# Patient Record
Sex: MALE | Race: WHITE | Employment: OTHER | URBAN - METROPOLITAN AREA
[De-identification: names, ages, dates, MRNs, and addresses within clinical notes are randomized per-mention and may not be internally consistent; named-entity substitution may affect disease eponyms.]

---

## 2017-11-17 PROBLEM — F31.9 BIPOLAR 1 DISORDER (HCC): Status: ACTIVE | Noted: 2017-11-17

## 2017-11-18 PROBLEM — K50.90 CROHN DISEASE (HCC): Status: ACTIVE | Noted: 2017-11-18

## 2017-11-18 PROBLEM — K21.9 GERD (GASTROESOPHAGEAL REFLUX DISEASE): Status: ACTIVE | Noted: 2017-11-18

## 2017-11-19 PROBLEM — F31.9 BIPOLAR 1 DISORDER (HCC): Status: ACTIVE | Noted: 2017-11-19

## 2018-05-25 ENCOUNTER — HOSPITAL ENCOUNTER (OUTPATIENT)
Age: 53
Discharge: HOME | End: 2018-05-27
Payer: MEDICARE

## 2018-05-25 ENCOUNTER — HOSPITAL ENCOUNTER (OUTPATIENT)
Age: 53
Discharge: HOME | End: 2018-05-25
Payer: MEDICARE

## 2018-05-25 ENCOUNTER — HOSPITAL ENCOUNTER (OUTPATIENT)
Dept: GENERAL RADIOLOGY | Age: 53
Discharge: HOME | End: 2018-05-27
Payer: MEDICARE

## 2018-05-25 DIAGNOSIS — R52 PAIN: ICD-10-CM

## 2018-05-25 LAB — VALPROIC ACID LEVEL: 27 MCG/ML (ref 50–100)

## 2018-05-25 PROCEDURE — 71046 X-RAY EXAM CHEST 2 VIEWS: CPT

## 2018-05-25 PROCEDURE — 80164 ASSAY DIPROPYLACETIC ACD TOT: CPT

## 2018-05-25 PROCEDURE — 36415 COLL VENOUS BLD VENIPUNCTURE: CPT

## 2018-05-27 ENCOUNTER — HOSPITAL ENCOUNTER (EMERGENCY)
Age: 53
Discharge: PSYCHIATRIC OR SUBSTANCE USE FACILITY | End: 2018-05-28
Attending: EMERGENCY MEDICINE
Payer: MEDICARE

## 2018-05-27 ENCOUNTER — APPOINTMENT (OUTPATIENT)
Dept: GENERAL RADIOLOGY | Age: 53
End: 2018-05-27
Payer: MEDICARE

## 2018-05-27 DIAGNOSIS — R45.851 SUICIDAL IDEATIONS: ICD-10-CM

## 2018-05-27 DIAGNOSIS — F23 ACUTE PSYCHOSIS (HCC): Primary | ICD-10-CM

## 2018-05-27 DIAGNOSIS — R45.850 HOMICIDAL THOUGHTS: ICD-10-CM

## 2018-05-27 LAB
ACETAMINOPHEN LEVEL: <5 MCG/ML (ref 10–30)
ALBUMIN SERPL-MCNC: 3.6 G/DL (ref 3.5–5.2)
ALP BLD-CCNC: 133 U/L (ref 40–129)
ALT SERPL-CCNC: 13 U/L (ref 0–40)
AMORPHOUS: ABNORMAL
AMPHETAMINE SCREEN, URINE: POSITIVE
ANION GAP SERPL CALCULATED.3IONS-SCNC: 10 MMOL/L (ref 7–16)
AST SERPL-CCNC: 11 U/L (ref 0–39)
BACTERIA: ABNORMAL /HPF
BARBITURATE SCREEN URINE: NOT DETECTED
BASOPHILS ABSOLUTE: 0.07 E9/L (ref 0–0.2)
BASOPHILS RELATIVE PERCENT: 0.5 % (ref 0–2)
BENZODIAZEPINE SCREEN, URINE: POSITIVE
BILIRUB SERPL-MCNC: 0.3 MG/DL (ref 0–1.2)
BILIRUBIN URINE: NEGATIVE
BLOOD, URINE: ABNORMAL
BUN BLDV-MCNC: 23 MG/DL (ref 6–20)
CALCIUM SERPL-MCNC: 9.1 MG/DL (ref 8.6–10.2)
CANNABINOID SCREEN URINE: NOT DETECTED
CHLORIDE BLD-SCNC: 101 MMOL/L (ref 98–107)
CLARITY: CLEAR
CO2: 30 MMOL/L (ref 22–29)
COCAINE METABOLITE SCREEN URINE: NOT DETECTED
COLOR: YELLOW
CREAT SERPL-MCNC: 1.4 MG/DL (ref 0.7–1.2)
EKG ATRIAL RATE: 84 BPM
EKG P AXIS: 59 DEGREES
EKG P-R INTERVAL: 140 MS
EKG Q-T INTERVAL: 372 MS
EKG QRS DURATION: 86 MS
EKG QTC CALCULATION (BAZETT): 439 MS
EKG R AXIS: 48 DEGREES
EKG T AXIS: 53 DEGREES
EKG VENTRICULAR RATE: 84 BPM
EOSINOPHILS ABSOLUTE: 0.16 E9/L (ref 0.05–0.5)
EOSINOPHILS RELATIVE PERCENT: 1.1 % (ref 0–6)
ETHANOL: <10 MG/DL (ref 0–0.08)
GFR AFRICAN AMERICAN: >60
GFR NON-AFRICAN AMERICAN: 53 ML/MIN/1.73
GLUCOSE BLD-MCNC: 109 MG/DL (ref 74–109)
GLUCOSE URINE: NEGATIVE MG/DL
HCT VFR BLD CALC: 40 % (ref 37–54)
HEMOGLOBIN: 12.8 G/DL (ref 12.5–16.5)
IMMATURE GRANULOCYTES #: 0.12 E9/L
IMMATURE GRANULOCYTES %: 0.8 % (ref 0–5)
KETONES, URINE: NEGATIVE MG/DL
LEUKOCYTE ESTERASE, URINE: NEGATIVE
LYMPHOCYTES ABSOLUTE: 2.51 E9/L (ref 1.5–4)
LYMPHOCYTES RELATIVE PERCENT: 17.6 % (ref 20–42)
MCH RBC QN AUTO: 30.1 PG (ref 26–35)
MCHC RBC AUTO-ENTMCNC: 32 % (ref 32–34.5)
MCV RBC AUTO: 94.1 FL (ref 80–99.9)
METHADONE SCREEN, URINE: NOT DETECTED
MONOCYTES ABSOLUTE: 1.21 E9/L (ref 0.1–0.95)
MONOCYTES RELATIVE PERCENT: 8.5 % (ref 2–12)
NEUTROPHILS ABSOLUTE: 10.2 E9/L (ref 1.8–7.3)
NEUTROPHILS RELATIVE PERCENT: 71.5 % (ref 43–80)
NITRITE, URINE: NEGATIVE
OPIATE SCREEN URINE: NOT DETECTED
PDW BLD-RTO: 16.1 FL (ref 11.5–15)
PH UA: 7 (ref 5–9)
PHENCYCLIDINE SCREEN URINE: NOT DETECTED
PLATELET # BLD: 277 E9/L (ref 130–450)
PMV BLD AUTO: 8.6 FL (ref 7–12)
POTASSIUM SERPL-SCNC: 4.1 MMOL/L (ref 3.5–5)
PROPOXYPHENE SCREEN: NOT DETECTED
PROTEIN UA: NEGATIVE MG/DL
RBC # BLD: 4.25 E12/L (ref 3.8–5.8)
RBC UA: ABNORMAL /HPF (ref 0–2)
SALICYLATE, SERUM: <0.3 MG/DL (ref 0–30)
SODIUM BLD-SCNC: 141 MMOL/L (ref 132–146)
SPECIFIC GRAVITY UA: 1.01 (ref 1–1.03)
TOTAL PROTEIN: 6.3 G/DL (ref 6.4–8.3)
TRICYCLIC ANTIDEPRESSANTS SCREEN SERUM: POSITIVE NG/ML
UROBILINOGEN, URINE: 0.2 E.U./DL
VALPROIC ACID LEVEL: 32 MCG/ML (ref 50–100)
WBC # BLD: 14.3 E9/L (ref 4.5–11.5)
WBC UA: ABNORMAL /HPF (ref 0–5)

## 2018-05-27 PROCEDURE — 6370000000 HC RX 637 (ALT 250 FOR IP): Performed by: EMERGENCY MEDICINE

## 2018-05-27 PROCEDURE — 36415 COLL VENOUS BLD VENIPUNCTURE: CPT

## 2018-05-27 PROCEDURE — 80307 DRUG TEST PRSMV CHEM ANLYZR: CPT

## 2018-05-27 PROCEDURE — 85025 COMPLETE CBC W/AUTO DIFF WBC: CPT

## 2018-05-27 PROCEDURE — 71046 X-RAY EXAM CHEST 2 VIEWS: CPT

## 2018-05-27 PROCEDURE — 93005 ELECTROCARDIOGRAM TRACING: CPT | Performed by: EMERGENCY MEDICINE

## 2018-05-27 PROCEDURE — 80164 ASSAY DIPROPYLACETIC ACD TOT: CPT

## 2018-05-27 PROCEDURE — G0480 DRUG TEST DEF 1-7 CLASSES: HCPCS

## 2018-05-27 PROCEDURE — 87088 URINE BACTERIA CULTURE: CPT

## 2018-05-27 PROCEDURE — 99285 EMERGENCY DEPT VISIT HI MDM: CPT

## 2018-05-27 PROCEDURE — 80053 COMPREHEN METABOLIC PANEL: CPT

## 2018-05-27 PROCEDURE — 81001 URINALYSIS AUTO W/SCOPE: CPT

## 2018-05-27 RX ORDER — NICOTINE 21 MG/24HR
1 PATCH, TRANSDERMAL 24 HOURS TRANSDERMAL DAILY
Status: DISCONTINUED | OUTPATIENT
Start: 2018-05-27 | End: 2018-05-27

## 2018-05-27 RX ORDER — ALPRAZOLAM 1 MG/1
1 TABLET ORAL ONCE
Status: COMPLETED | OUTPATIENT
Start: 2018-05-27 | End: 2018-05-27

## 2018-05-27 RX ADMIN — ALPRAZOLAM 1 MG: 1 TABLET ORAL at 17:34

## 2018-05-28 VITALS
WEIGHT: 280 LBS | BODY MASS INDEX: 34.82 KG/M2 | SYSTOLIC BLOOD PRESSURE: 121 MMHG | OXYGEN SATURATION: 98 % | DIASTOLIC BLOOD PRESSURE: 73 MMHG | HEIGHT: 75 IN | HEART RATE: 108 BPM | TEMPERATURE: 97.7 F | RESPIRATION RATE: 18 BRPM

## 2018-05-28 LAB — URINE CULTURE, ROUTINE: NORMAL

## 2018-06-01 LAB
7-AMINOCLONAZEPAM, URINE: <5 NG/ML
ALPHA-HYDROXYALPRAZOLAM, URINE: 578 NG/ML
ALPHA-HYDROXYMIDAZOLAM, URINE: <20 NG/ML
ALPRAZOLAM, URINE: 75 NG/ML
AMPHETAMINES, URINE: >5000 NG/ML
CHLORDIAZEPOXIDE, URINE: <20 NG/ML
CLONAZEPAM, URINE: <5 NG/ML
DIAZEPAM, URINE: <20 NG/ML
LORAZEPAM, URINE: <20 NG/ML
METHAMPHETAMINE, URINE: <200 NG/ML
METHYLENEDIOXYAMPHETAMINE, UR: <200 NG/ML
METHYLENEDIOXYETHYLAMPHETAMINE, UR: <200 NG/ML
METHYLENEDIOXYMETHAMPHETAMINE, UR: <200 NG/ML
MIDAZOLAM, URINE: <20 NG/ML
NORDIAZEPAM, URINE: 1549 NG/ML
OXAZEPAM, URINE: 2948 NG/ML
TEMAZEPAM, URINE: >4000 NG/ML

## 2018-06-18 ENCOUNTER — HOSPITAL ENCOUNTER (OUTPATIENT)
Age: 53
Discharge: HOME | End: 2018-06-18
Payer: MEDICARE

## 2018-06-27 ENCOUNTER — HOSPITAL ENCOUNTER (OUTPATIENT)
Age: 53
Discharge: HOME | End: 2018-06-27
Payer: MEDICARE

## 2018-06-27 LAB
ALBUMIN SERPL-MCNC: 3.9 G/DL (ref 3.5–5.2)
ALP BLD-CCNC: 121 U/L (ref 40–129)
ALT SERPL-CCNC: 13 U/L (ref 0–40)
ANION GAP SERPL CALCULATED.3IONS-SCNC: 11 MMOL/L (ref 7–16)
AST SERPL-CCNC: 11 U/L (ref 0–39)
BASOPHILS ABSOLUTE: 0.08 E9/L (ref 0–0.2)
BASOPHILS RELATIVE PERCENT: 0.7 % (ref 0–2)
BILIRUB SERPL-MCNC: 0.3 MG/DL (ref 0–1.2)
BILIRUBIN DIRECT: <0.2 MG/DL (ref 0–0.3)
BILIRUBIN, INDIRECT: NORMAL MG/DL (ref 0–1)
BUN BLDV-MCNC: 24 MG/DL (ref 6–20)
CALCIUM SERPL-MCNC: 9.6 MG/DL (ref 8.6–10.2)
CHLORIDE BLD-SCNC: 99 MMOL/L (ref 98–107)
CHOLESTEROL, FASTING: 292 MG/DL (ref 0–199)
CO2: 29 MMOL/L (ref 22–29)
CREAT SERPL-MCNC: 1.3 MG/DL (ref 0.7–1.2)
EOSINOPHILS ABSOLUTE: 0.61 E9/L (ref 0.05–0.5)
EOSINOPHILS RELATIVE PERCENT: 5.5 % (ref 0–6)
GFR AFRICAN AMERICAN: >60
GFR NON-AFRICAN AMERICAN: 58 ML/MIN/1.73
GLUCOSE FASTING: 89 MG/DL (ref 74–109)
HCT VFR BLD CALC: 47.3 % (ref 37–54)
HDLC SERPL-MCNC: 67 MG/DL
HEMOGLOBIN: 15.3 G/DL (ref 12.5–16.5)
IMMATURE GRANULOCYTES #: 0.07 E9/L
IMMATURE GRANULOCYTES %: 0.6 % (ref 0–5)
LDL CHOLESTEROL CALCULATED: 167 MG/DL (ref 0–99)
LITHIUM DOSE AMOUNT: ABNORMAL
LITHIUM LEVEL: <0.1 MMOL/L (ref 0.5–1.5)
LYMPHOCYTES ABSOLUTE: 1.73 E9/L (ref 1.5–4)
LYMPHOCYTES RELATIVE PERCENT: 15.6 % (ref 20–42)
MCH RBC QN AUTO: 30.1 PG (ref 26–35)
MCHC RBC AUTO-ENTMCNC: 32.3 % (ref 32–34.5)
MCV RBC AUTO: 93.1 FL (ref 80–99.9)
MONOCYTES ABSOLUTE: 0.68 E9/L (ref 0.1–0.95)
MONOCYTES RELATIVE PERCENT: 6.1 % (ref 2–12)
NEUTROPHILS ABSOLUTE: 7.89 E9/L (ref 1.8–7.3)
NEUTROPHILS RELATIVE PERCENT: 71.5 % (ref 43–80)
PDW BLD-RTO: 13.7 FL (ref 11.5–15)
PLATELET # BLD: 280 E9/L (ref 130–450)
PMV BLD AUTO: 8.7 FL (ref 7–12)
POTASSIUM SERPL-SCNC: 4.3 MMOL/L (ref 3.5–5)
RBC # BLD: 5.08 E12/L (ref 3.8–5.8)
SODIUM BLD-SCNC: 139 MMOL/L (ref 132–146)
T3 TOTAL: 99.06 NG/DL (ref 80–200)
T4 FREE: 1.25 NG/DL (ref 0.93–1.7)
T4 TOTAL: 7.2 MCG/DL (ref 4.5–11.7)
TOTAL PROTEIN: 6.8 G/DL (ref 6.4–8.3)
TRIGLYCERIDE, FASTING: 288 MG/DL (ref 0–149)
TSH SERPL DL<=0.05 MIU/L-ACNC: 3.31 UIU/ML (ref 0.27–4.2)
VLDLC SERPL CALC-MCNC: 58 MG/DL
WBC # BLD: 11.1 E9/L (ref 4.5–11.5)

## 2018-06-27 PROCEDURE — 80061 LIPID PANEL: CPT

## 2018-06-27 PROCEDURE — 84443 ASSAY THYROID STIM HORMONE: CPT

## 2018-06-27 PROCEDURE — 36415 COLL VENOUS BLD VENIPUNCTURE: CPT

## 2018-06-27 PROCEDURE — 80076 HEPATIC FUNCTION PANEL: CPT

## 2018-06-27 PROCEDURE — 84480 ASSAY TRIIODOTHYRONINE (T3): CPT

## 2018-06-27 PROCEDURE — 85025 COMPLETE CBC W/AUTO DIFF WBC: CPT

## 2018-06-27 PROCEDURE — 80178 ASSAY OF LITHIUM: CPT

## 2018-06-27 PROCEDURE — 80053 COMPREHEN METABOLIC PANEL: CPT

## 2018-06-27 PROCEDURE — 84439 ASSAY OF FREE THYROXINE: CPT

## 2018-07-19 ENCOUNTER — HOSPITAL ENCOUNTER (EMERGENCY)
Age: 53
Discharge: PSYCHIATRIC OR SUBSTANCE USE FACILITY | End: 2018-07-20
Attending: EMERGENCY MEDICINE
Payer: MEDICARE

## 2018-07-19 DIAGNOSIS — F39 MOOD DISORDER (HCC): Primary | ICD-10-CM

## 2018-07-19 LAB
ACETAMINOPHEN LEVEL: <5 MCG/ML (ref 10–30)
AMPHETAMINE SCREEN, URINE: POSITIVE
ANION GAP SERPL CALCULATED.3IONS-SCNC: 12 MMOL/L (ref 7–16)
BARBITURATE SCREEN URINE: NOT DETECTED
BENZODIAZEPINE SCREEN, URINE: POSITIVE
BUN BLDV-MCNC: 21 MG/DL (ref 6–20)
CALCIUM SERPL-MCNC: 8.8 MG/DL (ref 8.6–10.2)
CANNABINOID SCREEN URINE: NOT DETECTED
CHLORIDE BLD-SCNC: 101 MMOL/L (ref 98–107)
CO2: 26 MMOL/L (ref 22–29)
COCAINE METABOLITE SCREEN URINE: NOT DETECTED
CREAT SERPL-MCNC: 1.5 MG/DL (ref 0.7–1.2)
ETHANOL: <10 MG/DL (ref 0–0.08)
GFR AFRICAN AMERICAN: 59
GFR NON-AFRICAN AMERICAN: 49 ML/MIN/1.73
GLUCOSE BLD-MCNC: 115 MG/DL (ref 74–109)
HCT VFR BLD CALC: 37.8 % (ref 37–54)
HEMOGLOBIN: 12.7 G/DL (ref 12.5–16.5)
MCH RBC QN AUTO: 31 PG (ref 26–35)
MCHC RBC AUTO-ENTMCNC: 33.6 % (ref 32–34.5)
MCV RBC AUTO: 92.2 FL (ref 80–99.9)
METHADONE SCREEN, URINE: NOT DETECTED
OPIATE SCREEN URINE: NOT DETECTED
PDW BLD-RTO: 12.9 FL (ref 11.5–15)
PHENCYCLIDINE SCREEN URINE: NOT DETECTED
PLATELET # BLD: 242 E9/L (ref 130–450)
PMV BLD AUTO: 8.9 FL (ref 7–12)
POTASSIUM SERPL-SCNC: 3.4 MMOL/L (ref 3.5–5)
PROPOXYPHENE SCREEN: NOT DETECTED
RBC # BLD: 4.1 E12/L (ref 3.8–5.8)
SALICYLATE, SERUM: <0.3 MG/DL (ref 0–30)
SODIUM BLD-SCNC: 139 MMOL/L (ref 132–146)
TRICYCLIC ANTIDEPRESSANTS SCREEN SERUM: NEGATIVE NG/ML
WBC # BLD: 10.2 E9/L (ref 4.5–11.5)

## 2018-07-19 PROCEDURE — 80307 DRUG TEST PRSMV CHEM ANLYZR: CPT

## 2018-07-19 PROCEDURE — G0480 DRUG TEST DEF 1-7 CLASSES: HCPCS

## 2018-07-19 PROCEDURE — 80048 BASIC METABOLIC PNL TOTAL CA: CPT

## 2018-07-19 PROCEDURE — 36415 COLL VENOUS BLD VENIPUNCTURE: CPT

## 2018-07-19 PROCEDURE — 99285 EMERGENCY DEPT VISIT HI MDM: CPT

## 2018-07-19 PROCEDURE — 85027 COMPLETE CBC AUTOMATED: CPT

## 2018-07-19 NOTE — ED PROVIDER NOTES
EXAM--------------------------------------    Constitutional/General: Alert and oriented x3, well appearing, non toxic in NAD  Head: Normocephalic and atraumatic  Eyes: PERRL, EOMI. Mouth: Oropharynx clear, mucous membranes moist.   Neck: Supple. Full ROM. Respiratory: Lungs clear to auscultation bilaterally, no wheezes, rales, or rhonchi. Not in respiratory distress   Cardiovascular:  Regular rate. Regular rhythm. No murmurs, gallops, or rubs. 2+ distal pulses  GI:  Abdomen Soft, Non tender, Non distended. No rebound, guarding, or rigidity. Musculoskeletal: Moves all extremities x 4. Warm and well perfused. Integument: skin warm and dry. No rashes. Neurologic: GCS 15, 5/5 strength. Psychiatric: denies suicidal or homicidal ideation.   -------------------------------------------------- RESULTS -------------------------------------------------  I have personally reviewed all laboratory and imaging results for this patient. Results are listed below.      LABS:  Results for orders placed or performed during the hospital encounter of 07/19/18   CBC   Result Value Ref Range    WBC 10.2 4.5 - 11.5 E9/L    RBC 4.10 3.80 - 5.80 E12/L    Hemoglobin 12.7 12.5 - 16.5 g/dL    Hematocrit 37.8 37.0 - 54.0 %    MCV 92.2 80.0 - 99.9 fL    MCH 31.0 26.0 - 35.0 pg    MCHC 33.6 32.0 - 34.5 %    RDW 12.9 11.5 - 15.0 fL    Platelets 990 854 - 646 E9/L    MPV 8.9 7.0 - 12.0 fL   Basic Metabolic Panel   Result Value Ref Range    Sodium 139 132 - 146 mmol/L    Potassium 3.4 (L) 3.5 - 5.0 mmol/L    Chloride 101 98 - 107 mmol/L    CO2 26 22 - 29 mmol/L    Anion Gap 12 7 - 16 mmol/L    Glucose 115 (H) 74 - 109 mg/dL    BUN 21 (H) 6 - 20 mg/dL    CREATININE 1.5 (H) 0.7 - 1.2 mg/dL    GFR Non-African American 49 >=60 mL/min/1.73    GFR African American 59     Calcium 8.8 8.6 - 10.2 mg/dL   Urine Drug Screen   Result Value Ref Range    Amphetamine Screen, Urine POSITIVE (A) Negative <1000 ng/mL    Barbiturate Screen, Ur NOT DETECTED

## 2018-07-20 VITALS
RESPIRATION RATE: 20 BRPM | BODY MASS INDEX: 24.49 KG/M2 | HEIGHT: 75 IN | OXYGEN SATURATION: 98 % | TEMPERATURE: 98.3 F | DIASTOLIC BLOOD PRESSURE: 85 MMHG | HEART RATE: 84 BPM | WEIGHT: 197 LBS | SYSTOLIC BLOOD PRESSURE: 119 MMHG

## 2018-07-20 LAB
EKG ATRIAL RATE: 78 BPM
EKG P AXIS: 63 DEGREES
EKG P-R INTERVAL: 152 MS
EKG Q-T INTERVAL: 418 MS
EKG QRS DURATION: 90 MS
EKG QTC CALCULATION (BAZETT): 476 MS
EKG R AXIS: 48 DEGREES
EKG T AXIS: 58 DEGREES
EKG VENTRICULAR RATE: 78 BPM

## 2018-07-20 NOTE — ED NOTES
Peg from [facility] reports:    Accepted at [facility] by Dr. Dustin Calderon. [facility], room 501-A  Report already called. [facility] setting up transport.         Jeffrey Murry RN  07/20/18 5428

## 2018-07-20 NOTE — ED NOTES
Verbal consent given to talk to patient's wife. Wife, Efren Kennedy reports she has POA, paperwork requested. Verbalized understanding. Patient's wife reports that the patient got angry today and decided he was leaving and stated he was going to [facility]. Wife reports that when he says this, she feels he means he is going there to kill himself. While he was out driving, she called the  and the  found him when he got back to the house. She was worried he was going to kill himself or kill someone else just by driving. She reports he took his home medications today with Tylenol and she is unsure if this caused what happened. She also reports that he had recent medication changes and today was the first day of one of the changes. He does not have access to his medications, his wife reports they are locked up and she gives them to him daily. (Per physician note, \"wife stated that the patient has been over medicating\") She is unsure of all the medication names and dosages. Pharmacy closed at this time. She reports he treats at [facility] and sees the NP Cipriano Govea. Patient does sometimes slur his words, per wife. She believes he is hallucinating, did not further explain how or why. Does not appear to be internally stimulated at this time. Wife reports that he does have tremors at times and does not like to drink/eat when people are watching because he is embarrassed. Has been incontinent of urine/stool for about 1 year and wears a brief 24/7, but they have not been able to find out why. She reports he cleans himself up usually, but this nurse did have to clean up patient of urine incontinence. Unable to obtain urine sample at this time d/t recent incontinence. Oriented to person and place at this time. He is sometimes cooperative with staff and responds to direction and other times requires multiple redirection and seems as though he does not remember instructions he was given.    Patient does get anger outbursts but does calm down with verbal redirection. Per medical record: history of depression, suicidal thoughts, multiple suicide attempts, multiple overdoses, hx of attempting to cut his neck and wrists requiring stitches, this occurred 11/17 and attempted to kill self by turning on the gas in his home approx 3/17. History of schizophrenia, MDD, bipolar 1, anxiety, ADD, and depression. Wife reports a history of alcohol abuse (sober 3 years) and drug abuse about 28 years ago \"anything\". Patient does have a penile implant that is \"off\" and cannot get MRIs. She requests we do not send to [facility] because when he was sent there, he was discontinued off all medications except one and patient did not do well, per wife.          Bridgett Cheung RN  07/19/18 1460

## 2018-07-20 NOTE — ED NOTES
Attempted to speak to the pt. He woke up momentarily and then fell back to sleep.      [facility]  07/19/18 2128

## 2018-07-20 NOTE — ED NOTES
Pt referred to the [facility].      Ludwin Rivers, [facility]  07/19/18 5392

## 2018-07-22 LAB
7-AMINOCLONAZEPAM, URINE: <5 NG/ML
ALPHA-HYDROXYALPRAZOLAM, URINE: >1000 NG/ML
ALPHA-HYDROXYMIDAZOLAM, URINE: <20 NG/ML
ALPRAZOLAM, URINE: 223 NG/ML
CHLORDIAZEPOXIDE, URINE: <20 NG/ML
CLONAZEPAM, URINE: <5 NG/ML
DIAZEPAM, URINE: <20 NG/ML
LORAZEPAM, URINE: <20 NG/ML
MIDAZOLAM, URINE: <20 NG/ML
NORDIAZEPAM, URINE: 52 NG/ML
OXAZEPAM, URINE: 629 NG/ML
TEMAZEPAM, URINE: 325 NG/ML

## 2018-07-23 LAB
AMPHETAMINES, URINE: >5000 NG/ML
METHAMPHETAMINE, URINE: <200 NG/ML
METHYLENEDIOXYAMPHETAMINE, UR: <200 NG/ML
METHYLENEDIOXYETHYLAMPHETAMINE, UR: <200 NG/ML
METHYLENEDIOXYMETHAMPHETAMINE, UR: <200 NG/ML

## 2018-08-06 ENCOUNTER — HOSPITAL ENCOUNTER (OUTPATIENT)
Age: 53
Discharge: HOME | End: 2018-08-06
Payer: MEDICARE

## 2018-08-06 PROCEDURE — 80299 QUANTITATIVE ASSAY DRUG: CPT

## 2018-08-08 LAB
CLOMIP & MET, TOTAL: 186 NG/ML (ref 220–500)
CLOMIPRAMINE (ANAFRANIL) LEVEL: 113 NG/ML
DESEMETHYLCLOMIPRAMINE: 73 NG/ML

## 2018-09-04 ENCOUNTER — HOSPITAL ENCOUNTER (EMERGENCY)
Age: 53
Discharge: HOME | End: 2018-09-04
Attending: EMERGENCY MEDICINE
Payer: MEDICARE

## 2018-09-04 VITALS
TEMPERATURE: 98.3 F | BODY MASS INDEX: 22.38 KG/M2 | OXYGEN SATURATION: 95 % | HEART RATE: 110 BPM | DIASTOLIC BLOOD PRESSURE: 81 MMHG | RESPIRATION RATE: 16 BRPM | SYSTOLIC BLOOD PRESSURE: 137 MMHG | HEIGHT: 75 IN | WEIGHT: 180 LBS

## 2018-09-04 DIAGNOSIS — B02.9 HERPES ZOSTER WITHOUT COMPLICATION: Primary | ICD-10-CM

## 2018-09-04 DIAGNOSIS — S05.02XA ABRASION OF LEFT CORNEA, INITIAL ENCOUNTER: ICD-10-CM

## 2018-09-04 PROCEDURE — 99282 EMERGENCY DEPT VISIT SF MDM: CPT

## 2018-09-04 PROCEDURE — 6370000000 HC RX 637 (ALT 250 FOR IP): Performed by: EMERGENCY MEDICINE

## 2018-09-04 PROCEDURE — 6370000000 HC RX 637 (ALT 250 FOR IP)

## 2018-09-04 RX ORDER — ERYTHROMYCIN 5 MG/G
OINTMENT OPHTHALMIC NIGHTLY
Status: DISCONTINUED | OUTPATIENT
Start: 2018-09-04 | End: 2018-09-04

## 2018-09-04 RX ORDER — HYDROCODONE BITARTRATE AND ACETAMINOPHEN 5; 325 MG/1; MG/1
1 TABLET ORAL EVERY 6 HOURS PRN
Qty: 12 TABLET | Refills: 0 | Status: SHIPPED | OUTPATIENT
Start: 2018-09-04 | End: 2018-09-07

## 2018-09-04 RX ORDER — PREDNISONE 20 MG/1
80 TABLET ORAL DAILY
Qty: 20 TABLET | Refills: 0 | Status: SHIPPED | OUTPATIENT
Start: 2018-09-04 | End: 2018-09-09

## 2018-09-04 RX ORDER — TETRACAINE HYDROCHLORIDE 5 MG/ML
1 SOLUTION OPHTHALMIC ONCE
Status: COMPLETED | OUTPATIENT
Start: 2018-09-04 | End: 2018-09-04

## 2018-09-04 RX ORDER — ERYTHROMYCIN 5 MG/G
OINTMENT OPHTHALMIC ONCE
Status: COMPLETED | OUTPATIENT
Start: 2018-09-04 | End: 2018-09-04

## 2018-09-04 RX ORDER — ACYCLOVIR 200 MG/1
800 CAPSULE ORAL ONCE
Status: COMPLETED | OUTPATIENT
Start: 2018-09-04 | End: 2018-09-04

## 2018-09-04 RX ORDER — VALACYCLOVIR HYDROCHLORIDE 1 G/1
1000 TABLET, FILM COATED ORAL 3 TIMES DAILY
Qty: 14 TABLET | Refills: 0 | Status: SHIPPED | OUTPATIENT
Start: 2018-09-04 | End: 2018-09-11

## 2018-09-04 RX ORDER — PREDNISONE 20 MG/1
80 TABLET ORAL ONCE
Status: COMPLETED | OUTPATIENT
Start: 2018-09-04 | End: 2018-09-04

## 2018-09-04 RX ADMIN — TETRACAINE HYDROCHLORIDE 1 DROP: 5 SOLUTION OPHTHALMIC at 06:19

## 2018-09-04 RX ADMIN — FLUORESCEIN SODIUM 1 EACH: 0.6 STRIP OPHTHALMIC at 06:19

## 2018-09-04 RX ADMIN — PREDNISONE 80 MG: 20 TABLET ORAL at 06:50

## 2018-09-04 RX ADMIN — ACYCLOVIR 800 MG: 200 CAPSULE ORAL at 06:50

## 2018-09-04 RX ADMIN — ERYTHROMYCIN: 5 OINTMENT OPHTHALMIC at 06:50

## 2018-09-04 ASSESSMENT — PAIN SCALES - GENERAL: PAINLEVEL_OUTOF10: 10

## 2018-09-04 ASSESSMENT — ENCOUNTER SYMPTOMS
EYE ITCHING: 1
EYE DISCHARGE: 0
BACK PAIN: 0
DIARRHEA: 0
WHEEZING: 0
EYE PAIN: 0
PHOTOPHOBIA: 0
ABDOMINAL PAIN: 0
BLOOD IN STOOL: 0
FACIAL SWELLING: 0
SORE THROAT: 0
VOMITING: 0
SHORTNESS OF BREATH: 0
SINUS PRESSURE: 0
THROAT SWELLING: 0
NAUSEA: 0
CONSTIPATION: 0
EYE REDNESS: 0
COUGH: 0

## 2018-09-04 ASSESSMENT — PAIN DESCRIPTION - LOCATION: LOCATION: FACE

## 2018-09-04 ASSESSMENT — PAIN DESCRIPTION - PAIN TYPE: TYPE: ACUTE PAIN

## 2018-09-04 ASSESSMENT — PAIN DESCRIPTION - ORIENTATION: ORIENTATION: LEFT

## 2018-09-04 ASSESSMENT — PAIN DESCRIPTION - DESCRIPTORS: DESCRIPTORS: BURNING

## 2018-09-04 NOTE — ED PROVIDER NOTES
lesion is not vesicular in nature. The rash seems to involve to C2 and C3 branches from the ear to the lower neck on the left. My plan: Symptomatic and supportive care. Antiviral and steroids and pain management. Ophthalmology follow up.      Electronically signed by Serene Garnica DO on 9/4/18 at 6:31 AM        [TG]      ED Course User Index  [TG] Serene Garnica [address]  Resident  09/04/18 5916

## 2018-10-03 ENCOUNTER — HOSPITAL ENCOUNTER (INPATIENT)
Age: 53
LOS: 3 days | Discharge: OTHER INSTITUTION | DRG: 917 | End: 2018-10-07
Attending: EMERGENCY MEDICINE | Admitting: FAMILY MEDICINE
Payer: MEDICARE

## 2018-10-03 DIAGNOSIS — T40.2X1A OPIOID OVERDOSE, ACCIDENTAL OR UNINTENTIONAL, INITIAL ENCOUNTER (HCC): ICD-10-CM

## 2018-10-03 DIAGNOSIS — N17.9 ACUTE RENAL FAILURE, UNSPECIFIED ACUTE RENAL FAILURE TYPE (HCC): Primary | ICD-10-CM

## 2018-10-03 LAB
ACETAMINOPHEN LEVEL: 7.3 MCG/ML (ref 10–30)
ALBUMIN SERPL-MCNC: 3.4 G/DL (ref 3.5–5.2)
ALP BLD-CCNC: 80 U/L (ref 40–129)
ALT SERPL-CCNC: 8 U/L (ref 0–40)
AMPHETAMINE SCREEN, URINE: POSITIVE
ANION GAP SERPL CALCULATED.3IONS-SCNC: 9 MMOL/L (ref 7–16)
AST SERPL-CCNC: 10 U/L (ref 0–39)
B.E.: 4.3 MMOL/L (ref -3–3)
BARBITURATE SCREEN URINE: NOT DETECTED
BASOPHILS ABSOLUTE: 0.08 E9/L (ref 0–0.2)
BASOPHILS RELATIVE PERCENT: 0.6 % (ref 0–2)
BENZODIAZEPINE SCREEN, URINE: POSITIVE
BILIRUB SERPL-MCNC: <0.2 MG/DL (ref 0–1.2)
BUN BLDV-MCNC: 23 MG/DL (ref 6–20)
CALCIUM SERPL-MCNC: 8.9 MG/DL (ref 8.6–10.2)
CANNABINOID SCREEN URINE: NOT DETECTED
CHLORIDE BLD-SCNC: 100 MMOL/L (ref 98–107)
CO2: 31 MMOL/L (ref 22–29)
COCAINE METABOLITE SCREEN URINE: NOT DETECTED
COHB: 0.7 % (ref 0–1.5)
CREAT SERPL-MCNC: 1.8 MG/DL (ref 0.7–1.2)
CRITICAL: ABNORMAL
DATE ANALYZED: ABNORMAL
DATE OF COLLECTION: ABNORMAL
EKG ATRIAL RATE: 56 BPM
EKG P AXIS: 49 DEGREES
EKG P-R INTERVAL: 158 MS
EKG Q-T INTERVAL: 486 MS
EKG QRS DURATION: 90 MS
EKG QTC CALCULATION (BAZETT): 468 MS
EKG R AXIS: 45 DEGREES
EKG T AXIS: 67 DEGREES
EKG VENTRICULAR RATE: 56 BPM
EOSINOPHILS ABSOLUTE: 0.26 E9/L (ref 0.05–0.5)
EOSINOPHILS RELATIVE PERCENT: 1.9 % (ref 0–6)
ETHANOL: <10 MG/DL (ref 0–0.08)
GFR AFRICAN AMERICAN: 48
GFR NON-AFRICAN AMERICAN: 40 ML/MIN/1.73
GLUCOSE BLD-MCNC: 121 MG/DL (ref 74–109)
HCO3: 29.9 MMOL/L (ref 22–26)
HCT VFR BLD CALC: 42.1 % (ref 37–54)
HEMOGLOBIN: 13.2 G/DL (ref 12.5–16.5)
HHB: 4.2 % (ref 0–5)
IMMATURE GRANULOCYTES #: 0.28 E9/L
IMMATURE GRANULOCYTES %: 2.1 % (ref 0–5)
LAB: 9558
LITHIUM DOSE AMOUNT: ABNORMAL
LITHIUM LEVEL: 2.67 MMOL/L (ref 0.5–1.5)
LYMPHOCYTES ABSOLUTE: 4.19 E9/L (ref 1.5–4)
LYMPHOCYTES RELATIVE PERCENT: 30.8 % (ref 20–42)
Lab: ABNORMAL
MCH RBC QN AUTO: 30.2 PG (ref 26–35)
MCHC RBC AUTO-ENTMCNC: 31.4 % (ref 32–34.5)
MCV RBC AUTO: 96.3 FL (ref 80–99.9)
METER GLUCOSE: 107 MG/DL (ref 70–110)
METER GLUCOSE: 78 MG/DL (ref 70–110)
METHADONE SCREEN, URINE: NOT DETECTED
METHB: 0.3 % (ref 0–1.5)
MODE: ABNORMAL
MONOCYTES ABSOLUTE: 1.14 E9/L (ref 0.1–0.95)
MONOCYTES RELATIVE PERCENT: 8.4 % (ref 2–12)
NEUTROPHILS ABSOLUTE: 7.67 E9/L (ref 1.8–7.3)
NEUTROPHILS RELATIVE PERCENT: 56.2 % (ref 43–80)
O2 CONTENT: 18 ML/DL
O2 SATURATION: 95.8 % (ref 92–98.5)
O2HB: 94.8 % (ref 94–97)
OPERATOR ID: 1088
OPIATE SCREEN URINE: NOT DETECTED
PATIENT TEMP: 37 C
PCO2: 48.4 MMHG (ref 35–45)
PDW BLD-RTO: 14.4 FL (ref 11.5–15)
PH BLOOD GAS: 7.41 (ref 7.35–7.45)
PHENCYCLIDINE SCREEN URINE: NOT DETECTED
PLATELET # BLD: 230 E9/L (ref 130–450)
PMV BLD AUTO: 9 FL (ref 7–12)
PO2: 80.9 MMHG (ref 60–100)
POTASSIUM SERPL-SCNC: 3.6 MMOL/L (ref 3.5–5)
POTASSIUM SERPL-SCNC: 4.16 MMOL/L (ref 3.3–5.1)
PROPOXYPHENE SCREEN: NOT DETECTED
RBC # BLD: 4.37 E12/L (ref 3.8–5.8)
SALICYLATE, SERUM: <0.3 MG/DL (ref 0–30)
SODIUM BLD-SCNC: 140 MMOL/L (ref 132–146)
SOURCE, BLOOD GAS: ABNORMAL
THB: 13.5 G/DL (ref 11.5–16.5)
TIME ANALYZED: 2301
TOTAL CK: 26 U/L (ref 20–200)
TOTAL PROTEIN: 5.9 G/DL (ref 6.4–8.3)
TRICYCLIC ANTIDEPRESSANTS SCREEN SERUM: POSITIVE NG/ML
WBC # BLD: 13.6 E9/L (ref 4.5–11.5)

## 2018-10-03 PROCEDURE — G0378 HOSPITAL OBSERVATION PER HR: HCPCS

## 2018-10-03 PROCEDURE — 82805 BLOOD GASES W/O2 SATURATION: CPT

## 2018-10-03 PROCEDURE — 93005 ELECTROCARDIOGRAM TRACING: CPT | Performed by: INTERNAL MEDICINE

## 2018-10-03 PROCEDURE — 82140 ASSAY OF AMMONIA: CPT

## 2018-10-03 PROCEDURE — G0480 DRUG TEST DEF 1-7 CLASSES: HCPCS

## 2018-10-03 PROCEDURE — 36600 WITHDRAWAL OF ARTERIAL BLOOD: CPT

## 2018-10-03 PROCEDURE — 82550 ASSAY OF CK (CPK): CPT

## 2018-10-03 PROCEDURE — 80076 HEPATIC FUNCTION PANEL: CPT

## 2018-10-03 PROCEDURE — 80178 ASSAY OF LITHIUM: CPT

## 2018-10-03 PROCEDURE — 02HV33Z INSERTION OF INFUSION DEVICE INTO SUPERIOR VENA CAVA, PERCUTANEOUS APPROACH: ICD-10-PCS | Performed by: FAMILY MEDICINE

## 2018-10-03 PROCEDURE — 83605 ASSAY OF LACTIC ACID: CPT

## 2018-10-03 PROCEDURE — 83735 ASSAY OF MAGNESIUM: CPT

## 2018-10-03 PROCEDURE — 80164 ASSAY DIPROPYLACETIC ACD TOT: CPT

## 2018-10-03 PROCEDURE — 84132 ASSAY OF SERUM POTASSIUM: CPT

## 2018-10-03 PROCEDURE — 84100 ASSAY OF PHOSPHORUS: CPT

## 2018-10-03 PROCEDURE — 36415 COLL VENOUS BLD VENIPUNCTURE: CPT

## 2018-10-03 PROCEDURE — 85025 COMPLETE CBC W/AUTO DIFF WBC: CPT

## 2018-10-03 PROCEDURE — 82962 GLUCOSE BLOOD TEST: CPT

## 2018-10-03 PROCEDURE — 94761 N-INVAS EAR/PLS OXIMETRY MLT: CPT

## 2018-10-03 PROCEDURE — 80053 COMPREHEN METABOLIC PANEL: CPT

## 2018-10-03 PROCEDURE — 6360000002 HC RX W HCPCS

## 2018-10-03 PROCEDURE — 93005 ELECTROCARDIOGRAM TRACING: CPT | Performed by: EMERGENCY MEDICINE

## 2018-10-03 PROCEDURE — 5A1D70Z PERFORMANCE OF URINARY FILTRATION, INTERMITTENT, LESS THAN 6 HOURS PER DAY: ICD-10-PCS | Performed by: FAMILY MEDICINE

## 2018-10-03 PROCEDURE — 80307 DRUG TEST PRSMV CHEM ANLYZR: CPT

## 2018-10-03 PROCEDURE — 99285 EMERGENCY DEPT VISIT HI MDM: CPT

## 2018-10-03 PROCEDURE — 51702 INSERT TEMP BLADDER CATH: CPT

## 2018-10-03 PROCEDURE — 6360000002 HC RX W HCPCS: Performed by: STUDENT IN AN ORGANIZED HEALTH CARE EDUCATION/TRAINING PROGRAM

## 2018-10-03 PROCEDURE — 2580000003 HC RX 258: Performed by: STUDENT IN AN ORGANIZED HEALTH CARE EDUCATION/TRAINING PROGRAM

## 2018-10-03 PROCEDURE — 84443 ASSAY THYROID STIM HORMONE: CPT

## 2018-10-03 RX ORDER — DEXTROSE MONOHYDRATE 50 MG/ML
100 INJECTION, SOLUTION INTRAVENOUS PRN
Status: DISCONTINUED | OUTPATIENT
Start: 2018-10-03 | End: 2018-10-07 | Stop reason: HOSPADM

## 2018-10-03 RX ORDER — DEXTROSE MONOHYDRATE 25 G/50ML
12.5 INJECTION, SOLUTION INTRAVENOUS PRN
Status: DISCONTINUED | OUTPATIENT
Start: 2018-10-03 | End: 2018-10-07 | Stop reason: HOSPADM

## 2018-10-03 RX ORDER — NALOXONE HYDROCHLORIDE 0.4 MG/ML
2 INJECTION, SOLUTION INTRAMUSCULAR; INTRAVENOUS; SUBCUTANEOUS ONCE
Status: COMPLETED | OUTPATIENT
Start: 2018-10-03 | End: 2018-10-03

## 2018-10-03 RX ORDER — NALOXONE HYDROCHLORIDE 1 MG/ML
INJECTION INTRAMUSCULAR; INTRAVENOUS; SUBCUTANEOUS
Status: COMPLETED
Start: 2018-10-03 | End: 2018-10-03

## 2018-10-03 RX ORDER — NICOTINE POLACRILEX 4 MG
15 LOZENGE BUCCAL PRN
Status: DISCONTINUED | OUTPATIENT
Start: 2018-10-03 | End: 2018-10-07 | Stop reason: HOSPADM

## 2018-10-03 RX ORDER — SODIUM CHLORIDE 9 MG/ML
INJECTION, SOLUTION INTRAVENOUS CONTINUOUS
Status: DISCONTINUED | OUTPATIENT
Start: 2018-10-03 | End: 2018-10-05

## 2018-10-03 RX ORDER — 0.9 % SODIUM CHLORIDE 0.9 %
1000 INTRAVENOUS SOLUTION INTRAVENOUS ONCE
Status: COMPLETED | OUTPATIENT
Start: 2018-10-03 | End: 2018-10-03

## 2018-10-03 RX ADMIN — NALOXONE HYDROCHLORIDE: 1 INJECTION PARENTERAL at 15:42

## 2018-10-03 RX ADMIN — SODIUM CHLORIDE 1000 ML: 9 INJECTION, SOLUTION INTRAVENOUS at 19:04

## 2018-10-03 RX ADMIN — NALOXONE HYDROCHLORIDE: 1 INJECTION PARENTERAL at 15:38

## 2018-10-03 RX ADMIN — NALOXONE HYDROCHLORIDE 2 MG: 0.4 INJECTION, SOLUTION INTRAMUSCULAR; INTRAVENOUS; SUBCUTANEOUS at 19:05

## 2018-10-03 NOTE — ED NOTES
Pt had 5-6 large bowel movements.   everytime he got cleaned up he would have another bowel movement      Nancy Padilla RN  10/03/18 0657

## 2018-10-03 NOTE — ED NOTES
Bed: 13  Expected date:   Expected time:   Means of arrival:   Comments:  ems     Liz Hillman RN  10/03/18 8120

## 2018-10-03 NOTE — ED PROVIDER NOTES
Patient is a 52-year-old male who presents to ED for evaluation of altered mental status. Patient was found in the driveway unresponsive. EMS gave 2 mg of Narcan with improved responsiveness. On arrival to ED, patient not responsive to her normal stimuli, responsive to pain. Patient was given 2 of Narcan in ED with response. Patient then needed 2 more of Narcan after return of decreased responsiveness. Patient with significant past medical history drug abuse, bipolar 1, and mood disorder. Review of Systems   Unable to perform ROS: Mental status change       Physical Exam   Constitutional: He appears well-developed and well-nourished. He appears lethargic. No distress. HENT:   Head: Normocephalic and atraumatic. Right Ear: External ear normal.   Left Ear: External ear normal.   Mouth/Throat: Oropharynx is clear and moist. No oropharyngeal exudate. Eyes: Pupils are equal, round, and reactive to light. Conjunctivae and EOM are normal. Right eye exhibits no discharge. Left eye exhibits no discharge. Neck: Normal range of motion. Neck supple. No thyromegaly present. Cardiovascular: Normal rate, regular rhythm and normal heart sounds. No murmur heard. Pulmonary/Chest: Effort normal and breath sounds normal. No respiratory distress. He has no wheezes. He has no rales. He exhibits no tenderness. Abdominal: Soft. He exhibits no distension and no mass. There is no tenderness. There is no rebound and no guarding. Musculoskeletal: Normal range of motion. He exhibits no tenderness. Neurological: He appears lethargic. He is not disoriented. Skin: Skin is warm and dry. No rash noted. He is not diaphoretic. Psychiatric: He has a normal mood and affect. His speech is delayed and slurred. He is slowed and withdrawn. Cognition and memory are impaired.        Procedures    MDM  Number of Diagnoses or Management Options  Acute renal failure, unspecified acute renal failure type [facility]):   Opioid ------------------------- NURSING NOTES AND VITALS REVIEWED ---------------------------  Date / Time Roomed:  10/3/2018  3:24 PM  ED Bed Assignment:  13/13    The nursing notes within the ED encounter and vital signs as below have been reviewed. Patient Vitals for the past 24 hrs:   BP Temp Temp src Pulse Resp SpO2 Height Weight   10/03/18 1744 (!) 108/54 98.4 °F (36.9 °C) - 67 16 96 % - -   10/03/18 1527 90/68 98.4 °F (36.9 °C) Temporal 60 14 94 % 6' (1.829 m) 246 lb (111.6 kg)       Oxygen Saturation Interpretation: Normal    ------------------------------------------ PROGRESS NOTES ------------------------------------------  Re-evaluation(s):  Time: 6:26 PM  Patients symptoms show no change  Repeat physical examination is not changed    Counseling:  I have spoken with the patient and discussed todays results, in addition to providing specific details for the plan of care and counseling regarding the diagnosis and prognosis. Their questions are answered at this time and they are agreeable with the plan of admission.    --------------------------------- ADDITIONAL PROVIDER NOTES ---------------------------------  Consultations:  Time: 6:37 PM. Spoke with Dr. Jose Schmitz. Discussed case. They will admit the patient. This patient's ED course included: a personal history and physicial examination, re-evaluation prior to disposition, multiple bedside re-evaluations, IV medications, cardiac monitoring and continuous pulse oximetry    This patient has remained hemodynamically stable during their ED course. Diagnosis:  1. Acute renal failure, unspecified acute renal failure type ([facility]    2. Opioid overdose, accidental or unintentional, initial encounter ([facility]        Disposition:  Patient's disposition: Admit to telemetry  Patient's condition is stable.          Angel Brantley DO  Resident  10/03/18 6778

## 2018-10-04 LAB
ACETAMINOPHEN LEVEL: <5 MCG/ML (ref 10–30)
ALBUMIN SERPL-MCNC: 3 G/DL (ref 3.5–5.2)
ALP BLD-CCNC: 69 U/L (ref 40–129)
ALT SERPL-CCNC: 9 U/L (ref 0–40)
AMMONIA: 102 UMOL/L (ref 16–60)
AMMONIA: 109 UMOL/L (ref 16–60)
ANION GAP SERPL CALCULATED.3IONS-SCNC: 5 MMOL/L (ref 7–16)
ANION GAP SERPL CALCULATED.3IONS-SCNC: 6 MMOL/L (ref 7–16)
ANION GAP SERPL CALCULATED.3IONS-SCNC: 8 MMOL/L (ref 7–16)
AST SERPL-CCNC: 17 U/L (ref 0–39)
B.E.: 4.5 MMOL/L (ref -3–3)
BASOPHILS ABSOLUTE: 0.05 E9/L (ref 0–0.2)
BASOPHILS RELATIVE PERCENT: 0.4 % (ref 0–2)
BILIRUB SERPL-MCNC: <0.2 MG/DL (ref 0–1.2)
BILIRUBIN DIRECT: <0.2 MG/DL (ref 0–0.3)
BILIRUBIN, INDIRECT: ABNORMAL MG/DL (ref 0–1)
BUN BLDV-MCNC: 10 MG/DL (ref 6–20)
BUN BLDV-MCNC: 12 MG/DL (ref 6–20)
BUN BLDV-MCNC: 20 MG/DL (ref 6–20)
CALCIUM SERPL-MCNC: 7.5 MG/DL (ref 8.6–10.2)
CALCIUM SERPL-MCNC: 8.1 MG/DL (ref 8.6–10.2)
CALCIUM SERPL-MCNC: 8.6 MG/DL (ref 8.6–10.2)
CHLORIDE BLD-SCNC: 105 MMOL/L (ref 98–107)
CHLORIDE BLD-SCNC: 106 MMOL/L (ref 98–107)
CHLORIDE BLD-SCNC: 106 MMOL/L (ref 98–107)
CO2: 26 MMOL/L (ref 22–29)
CO2: 29 MMOL/L (ref 22–29)
CO2: 29 MMOL/L (ref 22–29)
COHB: 0.6 % (ref 0–1.5)
CREAT SERPL-MCNC: 1 MG/DL (ref 0.7–1.2)
CREAT SERPL-MCNC: 1.1 MG/DL (ref 0.7–1.2)
CREAT SERPL-MCNC: 1.5 MG/DL (ref 0.7–1.2)
CRITICAL: ABNORMAL
DATE ANALYZED: ABNORMAL
DATE OF COLLECTION: ABNORMAL
EOSINOPHILS ABSOLUTE: 0.38 E9/L (ref 0.05–0.5)
EOSINOPHILS RELATIVE PERCENT: 3.1 % (ref 0–6)
GFR AFRICAN AMERICAN: 59
GFR AFRICAN AMERICAN: >60
GFR AFRICAN AMERICAN: >60
GFR NON-AFRICAN AMERICAN: 49 ML/MIN/1.73
GFR NON-AFRICAN AMERICAN: >60 ML/MIN/1.73
GFR NON-AFRICAN AMERICAN: >60 ML/MIN/1.73
GLUCOSE BLD-MCNC: 58 MG/DL (ref 74–109)
GLUCOSE BLD-MCNC: 62 MG/DL (ref 74–109)
GLUCOSE BLD-MCNC: 69 MG/DL (ref 74–109)
HAV IGM SER IA-ACNC: NORMAL
HBV SURFACE AB TITR SER: NORMAL {TITER}
HCO3: 28.9 MMOL/L (ref 22–26)
HCT VFR BLD CALC: 41.2 % (ref 37–54)
HEMOGLOBIN: 12.8 G/DL (ref 12.5–16.5)
HEPATITIS B CORE IGM ANTIBODY: NORMAL
HEPATITIS B SURFACE ANTIGEN INTERPRETATION: NORMAL
HEPATITIS C ANTIBODY INTERPRETATION: NORMAL
HHB: 1.4 % (ref 0–5)
IMMATURE GRANULOCYTES #: 0.27 E9/L
IMMATURE GRANULOCYTES %: 2.2 % (ref 0–5)
LAB: 9558
LACTIC ACID: 2 MMOL/L (ref 0.5–2.2)
LITHIUM DOSE AMOUNT: ABNORMAL
LITHIUM DOSE AMOUNT: NORMAL
LITHIUM LEVEL: 1.25 MMOL/L (ref 0.5–1.5)
LITHIUM LEVEL: 1.53 MMOL/L (ref 0.5–1.5)
LITHIUM LEVEL: 1.6 MMOL/L (ref 0.5–1.5)
LITHIUM LEVEL: 1.66 MMOL/L (ref 0.5–1.5)
LITHIUM LEVEL: 1.77 MMOL/L (ref 0.5–1.5)
LITHIUM LEVEL: 1.83 MMOL/L (ref 0.5–1.5)
LITHIUM LEVEL: 3.13 MMOL/L (ref 0.5–1.5)
LITHIUM LEVEL: 3.65 MMOL/L (ref 0.5–1.5)
LITHIUM LEVEL: 3.85 MMOL/L (ref 0.5–1.5)
LITHIUM LEVEL: 3.95 MMOL/L (ref 0.5–1.5)
LYMPHOCYTES ABSOLUTE: 1.95 E9/L (ref 1.5–4)
LYMPHOCYTES RELATIVE PERCENT: 16.1 % (ref 20–42)
Lab: ABNORMAL
MAGNESIUM: 2.3 MG/DL (ref 1.6–2.6)
MCH RBC QN AUTO: 30.5 PG (ref 26–35)
MCHC RBC AUTO-ENTMCNC: 31.1 % (ref 32–34.5)
MCV RBC AUTO: 98.1 FL (ref 80–99.9)
METER GLUCOSE: 104 MG/DL (ref 70–110)
METER GLUCOSE: 112 MG/DL (ref 70–110)
METER GLUCOSE: 46 MG/DL (ref 70–110)
METER GLUCOSE: 60 MG/DL (ref 70–110)
METER GLUCOSE: 85 MG/DL (ref 70–110)
METER GLUCOSE: 89 MG/DL (ref 70–110)
METER GLUCOSE: 89 MG/DL (ref 70–110)
METHB: 0.3 % (ref 0–1.5)
MODE: ABNORMAL
MONOCYTES ABSOLUTE: 0.47 E9/L (ref 0.1–0.95)
MONOCYTES RELATIVE PERCENT: 3.9 % (ref 2–12)
NEUTROPHILS ABSOLUTE: 9.02 E9/L (ref 1.8–7.3)
NEUTROPHILS RELATIVE PERCENT: 74.3 % (ref 43–80)
O2 CONTENT: 17.6 ML/DL
O2 SATURATION: 98.6 % (ref 92–98.5)
O2HB: 97.7 % (ref 94–97)
OPERATOR ID: 1868
PATIENT TEMP: 37 C
PCO2: 41.9 MMHG (ref 35–45)
PDW BLD-RTO: 14.2 FL (ref 11.5–15)
PH BLOOD GAS: 7.46 (ref 7.35–7.45)
PHOSPHORUS: 3.4 MG/DL (ref 2.5–4.5)
PLATELET # BLD: 207 E9/L (ref 130–450)
PMV BLD AUTO: 9 FL (ref 7–12)
PO2: 125.3 MMHG (ref 60–100)
POTASSIUM SERPL-SCNC: 3.7 MMOL/L (ref 3.5–5)
POTASSIUM SERPL-SCNC: 3.9 MMOL/L (ref 3.5–5)
POTASSIUM SERPL-SCNC: 4.4 MMOL/L (ref 3.5–5)
RBC # BLD: 4.2 E12/L (ref 3.8–5.8)
SODIUM BLD-SCNC: 140 MMOL/L (ref 132–146)
SOURCE, BLOOD GAS: ABNORMAL
THB: 12.7 G/DL (ref 11.5–16.5)
TIME ANALYZED: 1539
TOTAL CK: 79 U/L (ref 20–200)
TOTAL CK: 80 U/L (ref 20–200)
TOTAL CK: 81 U/L (ref 20–200)
TOTAL PROTEIN: 5.5 G/DL (ref 6.4–8.3)
TSH SERPL DL<=0.05 MIU/L-ACNC: 4.71 UIU/ML (ref 0.27–4.2)
VALPROIC ACID LEVEL: 55 MCG/ML (ref 50–100)
VALPROIC ACID LEVEL: 87 MCG/ML (ref 50–100)
WBC # BLD: 12.1 E9/L (ref 4.5–11.5)

## 2018-10-04 PROCEDURE — 82962 GLUCOSE BLOOD TEST: CPT

## 2018-10-04 PROCEDURE — 82805 BLOOD GASES W/O2 SATURATION: CPT

## 2018-10-04 PROCEDURE — 80164 ASSAY DIPROPYLACETIC ACD TOT: CPT

## 2018-10-04 PROCEDURE — 86706 HEP B SURFACE ANTIBODY: CPT

## 2018-10-04 PROCEDURE — 36415 COLL VENOUS BLD VENIPUNCTURE: CPT

## 2018-10-04 PROCEDURE — 80048 BASIC METABOLIC PNL TOTAL CA: CPT

## 2018-10-04 PROCEDURE — 2580000003 HC RX 258: Performed by: FAMILY MEDICINE

## 2018-10-04 PROCEDURE — 80074 ACUTE HEPATITIS PANEL: CPT

## 2018-10-04 PROCEDURE — 6370000000 HC RX 637 (ALT 250 FOR IP): Performed by: INTERNAL MEDICINE

## 2018-10-04 PROCEDURE — 82550 ASSAY OF CK (CPK): CPT

## 2018-10-04 PROCEDURE — 2060000000 HC ICU INTERMEDIATE R&B

## 2018-10-04 PROCEDURE — 80178 ASSAY OF LITHIUM: CPT

## 2018-10-04 PROCEDURE — 90935 HEMODIALYSIS ONE EVALUATION: CPT | Performed by: INTERNAL MEDICINE

## 2018-10-04 PROCEDURE — 2580000003 HC RX 258: Performed by: INTERNAL MEDICINE

## 2018-10-04 PROCEDURE — 93010 ELECTROCARDIOGRAM REPORT: CPT | Performed by: INTERNAL MEDICINE

## 2018-10-04 PROCEDURE — 80307 DRUG TEST PRSMV CHEM ANLYZR: CPT

## 2018-10-04 PROCEDURE — 82140 ASSAY OF AMMONIA: CPT

## 2018-10-04 RX ORDER — LACTULOSE 10 G/15ML
20 SOLUTION ORAL 3 TIMES DAILY
Status: DISCONTINUED | OUTPATIENT
Start: 2018-10-04 | End: 2018-10-06

## 2018-10-04 RX ORDER — LACTULOSE 10 G/15ML
20 SOLUTION ORAL 3 TIMES DAILY
Status: DISCONTINUED | OUTPATIENT
Start: 2018-10-04 | End: 2018-10-04

## 2018-10-04 RX ORDER — SODIUM CHLORIDE 0.9 % (FLUSH) 0.9 %
10 SYRINGE (ML) INJECTION PRN
Status: DISCONTINUED | OUTPATIENT
Start: 2018-10-04 | End: 2018-10-07 | Stop reason: HOSPADM

## 2018-10-04 RX ADMIN — SODIUM CHLORIDE: 9 INJECTION, SOLUTION INTRAVENOUS at 01:35

## 2018-10-04 RX ADMIN — DEXTROSE MONOHYDRATE 12.5 G: 25 INJECTION, SOLUTION INTRAVENOUS at 12:30

## 2018-10-04 RX ADMIN — Medication 10 ML: at 13:04

## 2018-10-04 RX ADMIN — LACTULOSE 20 G: 20 SOLUTION ORAL at 14:54

## 2018-10-04 RX ADMIN — LACTULOSE 20 G: 20 SOLUTION ORAL at 21:43

## 2018-10-04 RX ADMIN — LACTULOSE 20 G: 20 SOLUTION ORAL at 02:00

## 2018-10-04 RX ADMIN — DEXTROSE MONOHYDRATE 12.5 G: 25 INJECTION, SOLUTION INTRAVENOUS at 17:17

## 2018-10-04 RX ADMIN — DEXTROSE MONOHYDRATE 12.5 G: 25 INJECTION, SOLUTION INTRAVENOUS at 13:03

## 2018-10-04 ASSESSMENT — PAIN SCALES - GENERAL
PAINLEVEL_OUTOF10: 0

## 2018-10-04 NOTE — CONSULTS
Psychosis      Recommendations and plan of treatment: Patient is actively presenting with severe psychiatric symptoms. Will benefit from inpatient hospitalization. Please transfer to psych when medically clear.

## 2018-10-04 NOTE — PROCEDURES
flat  Catheter type: double lumen  Catheter size: 14 Fr  Pre-procedure: landmarks identified  Ultrasound guidance: yes  Sterile ultrasound techniques: sterile gel and sterile probe covers were used  Number of attempts: 1  Successful placement: yes  Post-procedure: line sutured and dressing applied  Assessment: free fluid flow,  placement verified by x-ray and no pneumothorax on x-ray  Patient tolerance: Patient tolerated the procedure well with no immediate complications          Willy Hall MD  10/4/2018

## 2018-10-04 NOTE — FLOWSHEET NOTE
10/04/18 1145   Vital Signs   BP (!) 135/54   Temp 98.1 °F (36.7 °C)   Pulse 88   Weight 224 lb 13.9 oz (102 kg)   Weight Method Actual   Percent Weight Change 0.89   Pain Assessment   Pain Assessment 0-10   Pain Level 0   Post-Hemodialysis Assessment   Post-Treatment Procedures Blood returned;Catheter capped, clamped and heparinized x 2 ports   Machine Disinfection Process Acid/Vinegar Clean;Bleach   Rinseback Volume (ml) 300 ml   Total Liters Processed (l/min) 43.9 l/min   Duration of Treatment (minutes) 220 minutes   Hemodialysis Intake (ml) 400 ml   Hemodialysis Output (ml) 700 ml   NET Removed (ml) 0 ml   Tolerated Treatment Good   Patient Response to Treatment Tolerated well, more alert at end of tx, tx stopped 25 min early, system clotting, able to return all of blood. Bilateral Breath Sounds Clear   Edema None   Physician Notified?  Yes

## 2018-10-04 NOTE — PROGRESS NOTES
Pt bed alarm was going off, pt was out of the bed looking in the closet thinking he was at home when two nurses arrived. Pt stated he was going into the kitchen to get something to eat. Two nurses tried to redirect the patient and patient was not re-directable. Pt became manic and physically abusive with the staff. Staff was able to get patient back into bed. Pt was more confused and only alert to self in comparison to his noon assessment post dialysis. Pt previous ripped out two IV sites. A new IV was attempted to be placed, unsuccessful at that time. Pt was in bed tremoring. Nurse observed behavior, vitals were taken and a message was sent to Dr. Kishore Valenzuela requesting stat labs. New orders were obtained. Pt stopped shaking and started to become more alert. Nurse instructed pt to stay in bed while new IV supplies were obtained. Bed alarm was turned on prior to nurse leaving the room. Nurse left the room and bed alarm started to go off. Nurse turned around to go back into room and pt ran out into the hallway not wanting to go back into room. Multiple nurses were able to get pt back into room. Once pt calmed down a new a new IV site was placed in RAC, fluids were resumed. Nurse went back into pt room because IV was beeping, IV site was infiltrated and pt was pulling at IV site. IV team was paged for a new IV.

## 2018-10-04 NOTE — PROGRESS NOTES
Dr. Millie Nobles called in regards to pt status. Pt was placed in 4pt soft restraints. Pt. continuously trying to get out of bed, removing IVs, pulling at oneill, and verbally/physically abusive with the staff. Order obtained. Dr. Mlilie Nobles was notified about poison control  recommending levocarnitin antidote. Medication not ordered at this time. Dr. Nuzhat Albarran notified about poison control recommendation. Dr. Nuzhat Albarran stated pt received dialysis and does not need this medication at this time. We were instructed to tell poison control this when they call to check on the patient again.

## 2018-10-04 NOTE — H&P
EXAMINATION:  GENERAL:  Reveals a 50-year-old male, in no acute distress. VITAL SIGNS:  BP of 108/54, pulse rate of 67, respiratory rate of 16,  temperature is 98.4, skin is noted to be warm with good turgor. HEENT:  Negative. HEART:  Regular. LUNGS:  Clear. ABDOMEN:  Noted to be soft with positive bowel sounds. EXTREMITIES:  Revealed no evidence of edema and/or cyanosis. ASSESSMENT:  1. Acute encephalopathy in the setting of lithium overdose. 2.  Acute kidney injury in the setting of lithium overdose. 3.  History of mood disorder with bipolar I. PLAN OF TREATMENT:  See orders with appropriate consultations. Discharge  plan to be determined based on hospital outcome.         Kenneth Martinez DO    D: 10/04/2018 5:54:00       T: 10/04/2018 8:53:36     [id number]  Job#: [id number]     Doc#: [id number]    CC:

## 2018-10-05 LAB
ALBUMIN SERPL-MCNC: 3 G/DL (ref 3.5–5.2)
ALP BLD-CCNC: 67 U/L (ref 40–129)
ALT SERPL-CCNC: 9 U/L (ref 0–40)
AMMONIA: 39 UMOL/L (ref 16–60)
ANION GAP SERPL CALCULATED.3IONS-SCNC: 12 MMOL/L (ref 7–16)
AST SERPL-CCNC: 14 U/L (ref 0–39)
BILIRUB SERPL-MCNC: 0.2 MG/DL (ref 0–1.2)
BUN BLDV-MCNC: 12 MG/DL (ref 6–20)
CALCIUM SERPL-MCNC: 8.6 MG/DL (ref 8.6–10.2)
CHLORIDE BLD-SCNC: 104 MMOL/L (ref 98–107)
CO2: 23 MMOL/L (ref 22–29)
CREAT SERPL-MCNC: 1.5 MG/DL (ref 0.7–1.2)
EKG ATRIAL RATE: 60 BPM
EKG P AXIS: 61 DEGREES
EKG P-R INTERVAL: 156 MS
EKG Q-T INTERVAL: 448 MS
EKG QRS DURATION: 90 MS
EKG QTC CALCULATION (BAZETT): 448 MS
EKG R AXIS: 39 DEGREES
EKG T AXIS: 64 DEGREES
EKG VENTRICULAR RATE: 60 BPM
GFR AFRICAN AMERICAN: 59
GFR NON-AFRICAN AMERICAN: 49 ML/MIN/1.73
GLUCOSE BLD-MCNC: 92 MG/DL (ref 74–109)
LITHIUM DOSE AMOUNT: NORMAL
LITHIUM DOSE AMOUNT: NORMAL
LITHIUM LEVEL: 1.02 MMOL/L (ref 0.5–1.5)
LITHIUM LEVEL: 1.21 MMOL/L (ref 0.5–1.5)
METER GLUCOSE: 110 MG/DL (ref 70–110)
METER GLUCOSE: 110 MG/DL (ref 70–110)
METER GLUCOSE: 87 MG/DL (ref 70–110)
METER GLUCOSE: 87 MG/DL (ref 70–110)
METER GLUCOSE: 90 MG/DL (ref 70–110)
METER GLUCOSE: 91 MG/DL (ref 70–110)
POTASSIUM SERPL-SCNC: 3.7 MMOL/L (ref 3.5–5)
SODIUM BLD-SCNC: 139 MMOL/L (ref 132–146)
TOTAL CK: 44 U/L (ref 20–200)
TOTAL CK: 50 U/L (ref 20–200)
TOTAL CK: 57 U/L (ref 20–200)
TOTAL CK: 65 U/L (ref 20–200)
TOTAL PROTEIN: 5.3 G/DL (ref 6.4–8.3)

## 2018-10-05 PROCEDURE — 82140 ASSAY OF AMMONIA: CPT

## 2018-10-05 PROCEDURE — 6370000000 HC RX 637 (ALT 250 FOR IP): Performed by: INTERNAL MEDICINE

## 2018-10-05 PROCEDURE — 80053 COMPREHEN METABOLIC PANEL: CPT

## 2018-10-05 PROCEDURE — 6370000000 HC RX 637 (ALT 250 FOR IP): Performed by: NEUROMUSCULOSKELETAL MEDICINE & OMM

## 2018-10-05 PROCEDURE — 2060000000 HC ICU INTERMEDIATE R&B

## 2018-10-05 PROCEDURE — 80178 ASSAY OF LITHIUM: CPT

## 2018-10-05 PROCEDURE — 2580000003 HC RX 258: Performed by: INTERNAL MEDICINE

## 2018-10-05 PROCEDURE — 82550 ASSAY OF CK (CPK): CPT

## 2018-10-05 PROCEDURE — 36415 COLL VENOUS BLD VENIPUNCTURE: CPT

## 2018-10-05 PROCEDURE — 82962 GLUCOSE BLOOD TEST: CPT

## 2018-10-05 RX ORDER — ACETAMINOPHEN 325 MG/1
650 TABLET ORAL EVERY 6 HOURS PRN
Status: DISCONTINUED | OUTPATIENT
Start: 2018-10-05 | End: 2018-10-07 | Stop reason: HOSPADM

## 2018-10-05 RX ADMIN — SODIUM CHLORIDE: 9 INJECTION, SOLUTION INTRAVENOUS at 05:28

## 2018-10-05 RX ADMIN — LACTULOSE 20 G: 20 SOLUTION ORAL at 07:47

## 2018-10-05 RX ADMIN — ACETAMINOPHEN 650 MG: 325 TABLET, FILM COATED ORAL at 21:53

## 2018-10-05 RX ADMIN — ACETAMINOPHEN 650 MG: 325 TABLET, FILM COATED ORAL at 11:39

## 2018-10-05 RX ADMIN — LACTULOSE 20 G: 20 SOLUTION ORAL at 14:05

## 2018-10-05 RX ADMIN — LACTULOSE 20 G: 20 SOLUTION ORAL at 20:14

## 2018-10-05 ASSESSMENT — PAIN DESCRIPTION - FREQUENCY
FREQUENCY: INTERMITTENT
FREQUENCY: INTERMITTENT

## 2018-10-05 ASSESSMENT — PAIN DESCRIPTION - PAIN TYPE
TYPE: ACUTE PAIN
TYPE: ACUTE PAIN

## 2018-10-05 ASSESSMENT — PAIN DESCRIPTION - ONSET
ONSET: GRADUAL

## 2018-10-05 ASSESSMENT — PAIN DESCRIPTION - LOCATION
LOCATION: GENERALIZED
LOCATION: GENERALIZED
LOCATION: HEAD
LOCATION: HEAD

## 2018-10-05 ASSESSMENT — PAIN SCALES - GENERAL
PAINLEVEL_OUTOF10: 4
PAINLEVEL_OUTOF10: 0
PAINLEVEL_OUTOF10: 0
PAINLEVEL_OUTOF10: 3
PAINLEVEL_OUTOF10: 0
PAINLEVEL_OUTOF10: 3
PAINLEVEL_OUTOF10: 3
PAINLEVEL_OUTOF10: 0
PAINLEVEL_OUTOF10: 2
PAINLEVEL_OUTOF10: 0

## 2018-10-05 ASSESSMENT — PAIN DESCRIPTION - DESCRIPTORS
DESCRIPTORS: ACHING;HEADACHE;DISCOMFORT
DESCRIPTORS: HEADACHE;NAGGING
DESCRIPTORS: ACHING;DISCOMFORT;HEADACHE

## 2018-10-05 NOTE — PROGRESS NOTES
Pt. continuously trying to get out of bed, removing IVs, pulling at oneill, and verbally/physically abusive with the staff. Nursing reoriented patient and will continue to monitor.

## 2018-10-05 NOTE — PLAN OF CARE
Problem: Skin Integrity:  Goal: Absence of new skin breakdown  Absence of new skin breakdown  Outcome: Met This Shift

## 2018-10-05 NOTE — PROGRESS NOTES
Pt. continuously trying to get out of bed, removing IVs, pulling at oneill. Nursing reoriented patient and will continue to monitor.

## 2018-10-05 NOTE — PROGRESS NOTES
Nephrology Attending   Inpatient Progress Note    Admit Date: 10/3/2018                                  PCP: Norma Booker DO    Patient Active Problem List   Diagnosis    Drug overdose, multiple drugs    Severe episode of recurrent major depressive disorder, with psychotic features [facility]    Mood disorder due to a general medical condition    [facility] attempt ([facility]    Laceration of neck    Laceration of left wrist    Bipolar 1 disorder ([facility]    Crohn disease ([facility]    GERD (gastroesophageal reflux disease)    Bipolar 1 disorder ([facility]    Opioid overdose ([facility]       Subjective:  No new c/o; periods of agitation        Vitals:  VITALS:  /82   Pulse 89   Temp 98.2 °F (36.8 °C) (Oral)   Resp 18   Ht 6' (1.829 m)   Wt 224 lb 13.9 oz (102 kg)   SpO2 96%   BMI 30.50 kg/m²   24HR INTAKE/OUTPUT:    Intake/Output Summary (Last 24 hours) at 10/05/18 1431  Last data filed at 10/05/18 0524   Gross per 24 hour   Intake              240 ml   Output             1200 ml   Net             -960 ml     CURRENT PULSE OXIMETRY:  SpO2: 96 %  24HR PULSE OXIMETRY RANGE:  SpO2  Av.4 %  Min: 92 %  Max: 96 %        I/O:      I/O last 3 completed shifts: In: 860 [P.O.:460]  Out: 3400 [Urine:2700]  No intake/output data recorded.     Medications:    IV:   dextrose        lactulose  20 g Oral TID        Current Meds:  Current Facility-Administered Medications   Medication Dose Route Frequency Provider Last Rate Last Dose    acetaminophen (TYLENOL) tablet 650 mg  650 mg Oral Q6H PRN Nura Azar, DO   650 mg at 10/05/18 1139    lactulose (CHRONULAC) 10 GM/15ML solution 20 g  20 g Oral TID Cirilo Miramontes MD   20 g at 10/05/18 1405    sodium chloride flush 0.9 % injection 10 mL  10 mL Intravenous PRN Skylar Salmon, DO   10 mL at 10/04/18 1304    glucose (GLUTOSE) 40 % oral gel 15 g  15 g Oral PRN Cirilo Miramontes MD        dextrose 50 % solution 12.5 g  12.5 g Intravenous PRN Cirilo Miramontes MD   12.5 g at 10/04/18 6697 lithium toxicity; level now within therapeutic range; no ffurther dialysis planned; temp HD cat removed  5. Hyperammonemia, ? Cause; no h/o lever disease; repeat  Ammonia level and if improved stop /decrease lactulose    Procedure: Removal of temp HD cath          Care reviewed with the patient's family as available.       Sugey Loaiza MD

## 2018-10-06 LAB
7-AMINOCLONAZEPAM, URINE: <5 NG/ML
ALPHA-HYDROXYALPRAZOLAM, URINE: 650 NG/ML
ALPHA-HYDROXYMIDAZOLAM, URINE: <20 NG/ML
ALPRAZOLAM, URINE: 205 NG/ML
ANION GAP SERPL CALCULATED.3IONS-SCNC: 13 MMOL/L (ref 7–16)
BUN BLDV-MCNC: 9 MG/DL (ref 6–20)
CALCIUM SERPL-MCNC: 9 MG/DL (ref 8.6–10.2)
CHLORDIAZEPOXIDE, URINE: <20 NG/ML
CHLORIDE BLD-SCNC: 101 MMOL/L (ref 98–107)
CLONAZEPAM, URINE: <5 NG/ML
CO2: 24 MMOL/L (ref 22–29)
CREAT SERPL-MCNC: 1.1 MG/DL (ref 0.7–1.2)
DIAZEPAM, URINE: <20 NG/ML
GFR AFRICAN AMERICAN: >60
GFR NON-AFRICAN AMERICAN: >60 ML/MIN/1.73
GLUCOSE BLD-MCNC: 104 MG/DL (ref 74–109)
LITHIUM DOSE AMOUNT: NORMAL
LITHIUM LEVEL: 0.7 MMOL/L (ref 0.5–1.5)
LORAZEPAM, URINE: <20 NG/ML
METER GLUCOSE: 95 MG/DL (ref 70–110)
MIDAZOLAM, URINE: <20 NG/ML
NORDIAZEPAM, URINE: 699 NG/ML
OXAZEPAM, URINE: 1615 NG/ML
POTASSIUM SERPL-SCNC: 3.7 MMOL/L (ref 3.5–5)
SODIUM BLD-SCNC: 138 MMOL/L (ref 132–146)
TEMAZEPAM, URINE: 2705 NG/ML
TOTAL CK: 26 U/L (ref 20–200)
TOTAL CK: 32 U/L (ref 20–200)
TOTAL CK: 43 U/L (ref 20–200)

## 2018-10-06 PROCEDURE — 82550 ASSAY OF CK (CPK): CPT

## 2018-10-06 PROCEDURE — 80178 ASSAY OF LITHIUM: CPT

## 2018-10-06 PROCEDURE — 80048 BASIC METABOLIC PNL TOTAL CA: CPT

## 2018-10-06 PROCEDURE — 2060000000 HC ICU INTERMEDIATE R&B

## 2018-10-06 PROCEDURE — 6370000000 HC RX 637 (ALT 250 FOR IP): Performed by: NEUROMUSCULOSKELETAL MEDICINE & OMM

## 2018-10-06 PROCEDURE — 36415 COLL VENOUS BLD VENIPUNCTURE: CPT

## 2018-10-06 PROCEDURE — 82962 GLUCOSE BLOOD TEST: CPT

## 2018-10-06 RX ORDER — LACTULOSE 10 G/15ML
10 SOLUTION ORAL 3 TIMES DAILY
Status: DISCONTINUED | OUTPATIENT
Start: 2018-10-06 | End: 2018-10-07

## 2018-10-06 RX ORDER — SUMATRIPTAN 50 MG/1
100 TABLET, FILM COATED ORAL ONCE
Status: COMPLETED | OUTPATIENT
Start: 2018-10-06 | End: 2018-10-06

## 2018-10-06 RX ADMIN — ACETAMINOPHEN 650 MG: 325 TABLET, FILM COATED ORAL at 14:09

## 2018-10-06 RX ADMIN — SUMATRIPTAN SUCCINATE 100 MG: 50 TABLET, FILM COATED ORAL at 18:54

## 2018-10-06 RX ADMIN — ACETAMINOPHEN 650 MG: 325 TABLET, FILM COATED ORAL at 06:40

## 2018-10-06 ASSESSMENT — PAIN SCALES - GENERAL
PAINLEVEL_OUTOF10: 0
PAINLEVEL_OUTOF10: 5
PAINLEVEL_OUTOF10: 0
PAINLEVEL_OUTOF10: 0
PAINLEVEL_OUTOF10: 3
PAINLEVEL_OUTOF10: 10
PAINLEVEL_OUTOF10: 5
PAINLEVEL_OUTOF10: 0
PAINLEVEL_OUTOF10: 10
PAINLEVEL_OUTOF10: 3
PAINLEVEL_OUTOF10: 0
PAINLEVEL_OUTOF10: 4

## 2018-10-06 ASSESSMENT — PAIN DESCRIPTION - DESCRIPTORS
DESCRIPTORS: HEADACHE
DESCRIPTORS: ACHING;DISCOMFORT;HEADACHE
DESCRIPTORS: ACHING;CONSTANT;DISCOMFORT;DULL
DESCRIPTORS: ACHING;DISCOMFORT;DULL;HEADACHE

## 2018-10-06 ASSESSMENT — PAIN DESCRIPTION - FREQUENCY: FREQUENCY: INTERMITTENT

## 2018-10-06 ASSESSMENT — PAIN DESCRIPTION - PAIN TYPE
TYPE: ACUTE PAIN

## 2018-10-06 ASSESSMENT — PAIN DESCRIPTION - LOCATION
LOCATION: HEAD
LOCATION: GENERALIZED

## 2018-10-06 ASSESSMENT — PAIN DESCRIPTION - ONSET: ONSET: GRADUAL

## 2018-10-06 NOTE — PLAN OF CARE
Problem: Restraint Use - Nonviolent/Non-Self-Destructive Behavior:  Goal: Absence of restraint indications  Absence of restraint indications   Outcome: Not Met This Shift    Goal: Absence of restraint-related injury  Absence of restraint-related injury   Outcome: Met This Shift      Problem: Skin Integrity:  Goal: Absence of new skin breakdown  Absence of new skin breakdown   Outcome: Met This Shift      Problem: Pain:  Goal: Control of acute pain  Control of acute pain   Outcome: Not Met This Shift

## 2018-10-07 ENCOUNTER — HOSPITAL ENCOUNTER (INPATIENT)
Age: 53
LOS: 4 days | Discharge: HOME | DRG: 918 | End: 2018-10-11
Attending: PSYCHIATRY & NEUROLOGY | Admitting: PSYCHIATRY & NEUROLOGY
Payer: MEDICARE

## 2018-10-07 VITALS
HEIGHT: 72 IN | DIASTOLIC BLOOD PRESSURE: 88 MMHG | HEART RATE: 96 BPM | OXYGEN SATURATION: 93 % | TEMPERATURE: 98.3 F | SYSTOLIC BLOOD PRESSURE: 138 MMHG | RESPIRATION RATE: 16 BRPM | BODY MASS INDEX: 30.46 KG/M2 | WEIGHT: 224.87 LBS

## 2018-10-07 LAB
AMPHETAMINES, URINE: 1652 NG/ML
ANION GAP SERPL CALCULATED.3IONS-SCNC: 16 MMOL/L (ref 7–16)
BUN BLDV-MCNC: 12 MG/DL (ref 6–20)
CALCIUM SERPL-MCNC: 9.2 MG/DL (ref 8.6–10.2)
CHLORIDE BLD-SCNC: 98 MMOL/L (ref 98–107)
CO2: 23 MMOL/L (ref 22–29)
CREAT SERPL-MCNC: 1.2 MG/DL (ref 0.7–1.2)
GFR AFRICAN AMERICAN: >60
GFR NON-AFRICAN AMERICAN: >60 ML/MIN/1.73
GLUCOSE BLD-MCNC: 112 MG/DL (ref 74–109)
LITHIUM DOSE AMOUNT: ABNORMAL
LITHIUM DOSE AMOUNT: NORMAL
LITHIUM LEVEL: 0.44 MMOL/L (ref 0.5–1.5)
LITHIUM LEVEL: 0.52 MMOL/L (ref 0.5–1.5)
METHAMPHETAMINE, URINE: <200 NG/ML
METHYLENEDIOXYAMPHETAMINE, UR: <200 NG/ML
METHYLENEDIOXYETHYLAMPHETAMINE, UR: <200 NG/ML
METHYLENEDIOXYMETHAMPHETAMINE, UR: <200 NG/ML
POTASSIUM SERPL-SCNC: 4.2 MMOL/L (ref 3.5–5)
SODIUM BLD-SCNC: 137 MMOL/L (ref 132–146)
TOTAL CK: 18 U/L (ref 20–200)
TOTAL CK: 26 U/L (ref 20–200)
TOTAL CK: 42 U/L (ref 20–200)

## 2018-10-07 PROCEDURE — 82550 ASSAY OF CK (CPK): CPT

## 2018-10-07 PROCEDURE — 6370000000 HC RX 637 (ALT 250 FOR IP): Performed by: NURSE PRACTITIONER

## 2018-10-07 PROCEDURE — 6370000000 HC RX 637 (ALT 250 FOR IP): Performed by: NEUROMUSCULOSKELETAL MEDICINE & OMM

## 2018-10-07 PROCEDURE — 1240000000 HC EMOTIONAL WELLNESS R&B

## 2018-10-07 PROCEDURE — 36415 COLL VENOUS BLD VENIPUNCTURE: CPT

## 2018-10-07 PROCEDURE — 80178 ASSAY OF LITHIUM: CPT

## 2018-10-07 PROCEDURE — 80048 BASIC METABOLIC PNL TOTAL CA: CPT

## 2018-10-07 RX ORDER — FERROUS SULFATE 325(65) MG
325 TABLET ORAL
Status: DISCONTINUED | OUTPATIENT
Start: 2018-10-08 | End: 2018-10-11 | Stop reason: HOSPADM

## 2018-10-07 RX ORDER — LACTULOSE 10 G/15ML
10 SOLUTION ORAL DAILY
Status: CANCELLED | OUTPATIENT
Start: 2018-10-08

## 2018-10-07 RX ORDER — FLUTICASONE PROPIONATE 50 MCG
1 SPRAY, SUSPENSION (ML) NASAL DAILY
Status: DISCONTINUED | OUTPATIENT
Start: 2018-10-07 | End: 2018-10-11 | Stop reason: HOSPADM

## 2018-10-07 RX ORDER — OLANZAPINE 10 MG/1
10 TABLET ORAL
Status: COMPLETED | OUTPATIENT
Start: 2018-10-07 | End: 2018-10-07

## 2018-10-07 RX ORDER — FERROUS SULFATE 325(65) MG
325 TABLET ORAL
Status: CANCELLED | OUTPATIENT
Start: 2018-10-08

## 2018-10-07 RX ORDER — BENZTROPINE MESYLATE 1 MG/ML
2 INJECTION INTRAMUSCULAR; INTRAVENOUS 2 TIMES DAILY PRN
Status: DISCONTINUED | OUTPATIENT
Start: 2018-10-07 | End: 2018-10-11 | Stop reason: HOSPADM

## 2018-10-07 RX ORDER — OLANZAPINE 10 MG/1
20 TABLET ORAL NIGHTLY
Status: CANCELLED | OUTPATIENT
Start: 2018-10-07

## 2018-10-07 RX ORDER — CLONAZEPAM 0.5 MG/1
1 TABLET ORAL NIGHTLY PRN
Status: CANCELLED | OUTPATIENT
Start: 2018-10-07

## 2018-10-07 RX ORDER — DEXTROAMPHETAMINE SACCHARATE, AMPHETAMINE ASPARTATE, DEXTROAMPHETAMINE SULFATE AND AMPHETAMINE SULFATE 7.5; 7.5; 7.5; 7.5 MG/1; MG/1; MG/1; MG/1
30 TABLET ORAL DAILY
Status: CANCELLED | OUTPATIENT
Start: 2018-10-07

## 2018-10-07 RX ORDER — DIVALPROEX SODIUM 500 MG/1
500 TABLET, EXTENDED RELEASE ORAL 2 TIMES DAILY
Status: CANCELLED | OUTPATIENT
Start: 2018-10-07

## 2018-10-07 RX ORDER — OLANZAPINE 10 MG/1
10 TABLET ORAL DAILY
Status: CANCELLED | OUTPATIENT
Start: 2018-10-07

## 2018-10-07 RX ORDER — HYDROXYZINE PAMOATE 50 MG/1
50 CAPSULE ORAL EVERY 6 HOURS PRN
Status: DISCONTINUED | OUTPATIENT
Start: 2018-10-07 | End: 2018-10-11 | Stop reason: HOSPADM

## 2018-10-07 RX ORDER — TRAZODONE HYDROCHLORIDE 50 MG/1
50 TABLET ORAL NIGHTLY PRN
Status: DISCONTINUED | OUTPATIENT
Start: 2018-10-07 | End: 2018-10-11 | Stop reason: HOSPADM

## 2018-10-07 RX ORDER — MAGNESIUM HYDROXIDE/ALUMINUM HYDROXICE/SIMETHICONE 120; 1200; 1200 MG/30ML; MG/30ML; MG/30ML
30 SUSPENSION ORAL PRN
Status: DISCONTINUED | OUTPATIENT
Start: 2018-10-07 | End: 2018-10-11 | Stop reason: HOSPADM

## 2018-10-07 RX ORDER — HALOPERIDOL 5 MG/ML
10 INJECTION INTRAMUSCULAR EVERY 6 HOURS PRN
Status: DISCONTINUED | OUTPATIENT
Start: 2018-10-07 | End: 2018-10-11 | Stop reason: HOSPADM

## 2018-10-07 RX ORDER — TAMSULOSIN HYDROCHLORIDE 0.4 MG/1
0.4 CAPSULE ORAL DAILY
Status: CANCELLED | OUTPATIENT
Start: 2018-10-07

## 2018-10-07 RX ORDER — ACETAMINOPHEN 325 MG/1
650 TABLET ORAL EVERY 4 HOURS PRN
Status: DISCONTINUED | OUTPATIENT
Start: 2018-10-07 | End: 2018-10-11 | Stop reason: HOSPADM

## 2018-10-07 RX ORDER — CLONAZEPAM 0.5 MG/1
0.5 TABLET ORAL 2 TIMES DAILY PRN
Status: CANCELLED | OUTPATIENT
Start: 2018-10-07

## 2018-10-07 RX ORDER — DIVALPROEX SODIUM 500 MG/1
500 TABLET, EXTENDED RELEASE ORAL 2 TIMES DAILY
Status: DISCONTINUED | OUTPATIENT
Start: 2018-10-07 | End: 2018-10-11 | Stop reason: HOSPADM

## 2018-10-07 RX ADMIN — ACETAMINOPHEN 650 MG: 325 TABLET, FILM COATED ORAL at 20:47

## 2018-10-07 RX ADMIN — LURASIDONE HYDROCHLORIDE 80 MG: 20 TABLET, FILM COATED ORAL at 20:39

## 2018-10-07 RX ADMIN — DIVALPROEX SODIUM 500 MG: 500 TABLET, EXTENDED RELEASE ORAL at 20:39

## 2018-10-07 RX ADMIN — TRAZODONE HYDROCHLORIDE 50 MG: 50 TABLET ORAL at 20:39

## 2018-10-07 RX ADMIN — FLUTICASONE PROPIONATE 1 SPRAY: 50 SPRAY, METERED NASAL at 17:27

## 2018-10-07 RX ADMIN — OLANZAPINE 10 MG: 10 TABLET, FILM COATED ORAL at 20:47

## 2018-10-07 RX ADMIN — ACETAMINOPHEN 650 MG: 325 TABLET, FILM COATED ORAL at 10:30

## 2018-10-07 RX ADMIN — ACETAMINOPHEN 650 MG: 325 TABLET, FILM COATED ORAL at 01:07

## 2018-10-07 ASSESSMENT — PAIN SCALES - GENERAL
PAINLEVEL_OUTOF10: 7
PAINLEVEL_OUTOF10: 7
PAINLEVEL_OUTOF10: 3
PAINLEVEL_OUTOF10: 9
PAINLEVEL_OUTOF10: 0
PAINLEVEL_OUTOF10: 8
PAINLEVEL_OUTOF10: 0
PAINLEVEL_OUTOF10: 9

## 2018-10-07 ASSESSMENT — PAIN DESCRIPTION - ONSET: ONSET: GRADUAL

## 2018-10-07 ASSESSMENT — PAIN DESCRIPTION - DESCRIPTORS: DESCRIPTORS: HEADACHE

## 2018-10-07 ASSESSMENT — PAIN DESCRIPTION - ORIENTATION
ORIENTATION: RIGHT;LEFT
ORIENTATION: RIGHT
ORIENTATION: RIGHT

## 2018-10-07 ASSESSMENT — SLEEP AND FATIGUE QUESTIONNAIRES
DIFFICULTY ARISING: NO
DIFFICULTY FALLING ASLEEP: NO
DO YOU USE A SLEEP AID: NO
DIFFICULTY STAYING ASLEEP: YES
AVERAGE NUMBER OF SLEEP HOURS: 6
DO YOU HAVE DIFFICULTY SLEEPING: YES
SLEEP PATTERN: DISTURBED/INTERRUPTED SLEEP;RESTLESSNESS

## 2018-10-07 ASSESSMENT — PAIN DESCRIPTION - FREQUENCY: FREQUENCY: INTERMITTENT

## 2018-10-07 ASSESSMENT — PAIN DESCRIPTION - LOCATION
LOCATION: HEAD

## 2018-10-07 ASSESSMENT — LIFESTYLE VARIABLES: HISTORY_ALCOHOL_USE: YES

## 2018-10-07 ASSESSMENT — PAIN DESCRIPTION - PAIN TYPE: TYPE: ACUTE PAIN

## 2018-10-07 NOTE — PROGRESS NOTES
General Progress Note  10/7/2018 10:05 AM  Subjective:   Admit Date: 10/3/2018  PCP: Skylar Salmon DO  Interval History: no acute issues overnight. Medically doing well. Appreciate Psych input. Diet: DIET GENERAL; Safety Tray  Pain is:None  Nausea:None  Bowel Movement/Flatus yes    Data:   Scheduled Meds:   lactulose  10 g Oral TID     Continuous Infusions:   dextrose       PRN Meds:acetaminophen, sodium chloride flush, glucose, dextrose, glucagon (rDNA), dextrose  I/O last 3 completed shifts: In: 680 [P.O.:680]  Out: 750 [Urine:750]  I/O this shift:   In: 0   Out: 1500 [Urine:1500]    Intake/Output Summary (Last 24 hours) at 10/07/18 1005  Last data filed at 10/07/18 0819   Gross per 24 hour   Intake              460 ml   Output             2250 ml   Net            -1790 ml       CBC with Differential:  Lab Results   Component Value Date    WBC 12.1 10/03/2018    RBC 4.20 10/03/2018    HGB 12.8 10/03/2018    HCT 41.2 10/03/2018     10/03/2018    MCV 98.1 10/03/2018    MCH 30.5 10/03/2018    MCHC 31.1 10/03/2018    RDW 14.2 10/03/2018    NRBC 0.0 07/08/2017    BANDSPCT 1 09/17/2014    METASPCT 1.7 07/08/2017    LYMPHOPCT 16.1 10/03/2018    MONOPCT 3.9 10/03/2018    BASOPCT 0.4 10/03/2018    MONOSABS 0.47 10/03/2018    LYMPHSABS 1.95 10/03/2018    EOSABS 0.38 10/03/2018    BASOSABS 0.05 10/03/2018     CMP:  Lab Results   Component Value Date     10/07/2018    K 4.2 10/07/2018    CL 98 10/07/2018    CO2 23 10/07/2018    BUN 12 10/07/2018    CREATININE 1.2 10/07/2018    GFRAA >60 10/07/2018    LABGLOM >60 10/07/2018    GLUCOSE 112 10/07/2018    GLUCOSE 118 02/05/2011    PROT 5.3 10/05/2018    LABALBU 3.0 10/05/2018    LABALBU 3.5 02/05/2011    CALCIUM 9.2 10/07/2018    BILITOT 0.2 10/05/2018    ALKPHOS 67 10/05/2018    AST 14 10/05/2018    ALT 9 10/05/2018     Magnesium:    Lab Results   Component Value Date    MG 2.3 10/03/2018     Phosphorus:    Lab Results   Component Value Date    PHOS 3.4

## 2018-10-08 LAB
CHOLESTEROL, TOTAL: 188 MG/DL (ref 0–199)
HBA1C MFR BLD: 5.5 % (ref 4–5.6)
HCT VFR BLD CALC: 36.3 % (ref 37–54)
HDLC SERPL-MCNC: 52 MG/DL
HEMOGLOBIN: 12.1 G/DL (ref 12.5–16.5)
LDL CHOLESTEROL CALCULATED: 102 MG/DL (ref 0–99)
MCH RBC QN AUTO: 30.4 PG (ref 26–35)
MCHC RBC AUTO-ENTMCNC: 33.3 % (ref 32–34.5)
MCV RBC AUTO: 91.2 FL (ref 80–99.9)
PDW BLD-RTO: 14.1 FL (ref 11.5–15)
PLATELET # BLD: 160 E9/L (ref 130–450)
PMV BLD AUTO: 9.5 FL (ref 7–12)
RBC # BLD: 3.98 E12/L (ref 3.8–5.8)
TRIGL SERPL-MCNC: 171 MG/DL (ref 0–149)
VLDLC SERPL CALC-MCNC: 34 MG/DL
WBC # BLD: 18.6 E9/L (ref 4.5–11.5)

## 2018-10-08 PROCEDURE — 99222 1ST HOSP IP/OBS MODERATE 55: CPT | Performed by: PSYCHIATRY & NEUROLOGY

## 2018-10-08 PROCEDURE — 6370000000 HC RX 637 (ALT 250 FOR IP): Performed by: NURSE PRACTITIONER

## 2018-10-08 PROCEDURE — 85027 COMPLETE CBC AUTOMATED: CPT

## 2018-10-08 PROCEDURE — 80061 LIPID PANEL: CPT

## 2018-10-08 PROCEDURE — 83036 HEMOGLOBIN GLYCOSYLATED A1C: CPT

## 2018-10-08 PROCEDURE — 36415 COLL VENOUS BLD VENIPUNCTURE: CPT

## 2018-10-08 PROCEDURE — 1240000000 HC EMOTIONAL WELLNESS R&B

## 2018-10-08 RX ORDER — PROPRANOLOL HYDROCHLORIDE 10 MG/1
10 TABLET ORAL 2 TIMES DAILY
Status: DISCONTINUED | OUTPATIENT
Start: 2018-10-08 | End: 2018-10-11 | Stop reason: HOSPADM

## 2018-10-08 RX ORDER — PRAZOSIN HYDROCHLORIDE 5 MG/1
5 CAPSULE ORAL NIGHTLY
Status: ON HOLD | COMMUNITY
End: 2018-10-11 | Stop reason: HOSPADM

## 2018-10-08 RX ORDER — CLOMIPRAMINE HYDROCHLORIDE 25 MG/1
25 CAPSULE ORAL NIGHTLY
Status: ON HOLD | COMMUNITY
End: 2018-10-11 | Stop reason: HOSPADM

## 2018-10-08 RX ORDER — HYDROXYZINE HYDROCHLORIDE 25 MG/1
25 TABLET, FILM COATED ORAL EVERY MORNING
Status: ON HOLD | COMMUNITY
End: 2018-10-11 | Stop reason: HOSPADM

## 2018-10-08 RX ORDER — CLOMIPRAMINE HYDROCHLORIDE 75 MG/1
75 CAPSULE ORAL 2 TIMES DAILY
Status: ON HOLD | COMMUNITY
End: 2018-10-11 | Stop reason: HOSPADM

## 2018-10-08 RX ORDER — HYDROXYZINE 50 MG/1
50 TABLET, FILM COATED ORAL NIGHTLY
Status: ON HOLD | COMMUNITY
End: 2018-10-11 | Stop reason: HOSPADM

## 2018-10-08 RX ORDER — PROPRANOLOL HYDROCHLORIDE 10 MG/1
10 TABLET ORAL 2 TIMES DAILY
COMMUNITY
End: 2018-12-27 | Stop reason: ALTCHOICE

## 2018-10-08 RX ORDER — ALPRAZOLAM 1 MG/1
1 TABLET ORAL 4 TIMES DAILY PRN
Status: ON HOLD | COMMUNITY
End: 2018-10-11 | Stop reason: HOSPADM

## 2018-10-08 RX ADMIN — DIVALPROEX SODIUM 500 MG: 500 TABLET, EXTENDED RELEASE ORAL at 21:36

## 2018-10-08 RX ADMIN — PROPRANOLOL HYDROCHLORIDE 10 MG: 10 TABLET ORAL at 13:33

## 2018-10-08 RX ADMIN — PROPRANOLOL HYDROCHLORIDE 10 MG: 10 TABLET ORAL at 21:36

## 2018-10-08 RX ADMIN — DIVALPROEX SODIUM 500 MG: 500 TABLET, EXTENDED RELEASE ORAL at 08:21

## 2018-10-08 RX ADMIN — FERROUS SULFATE TAB 325 MG (65 MG ELEMENTAL FE) 325 MG: 325 (65 FE) TAB at 08:21

## 2018-10-08 RX ADMIN — HYDROXYZINE PAMOATE 50 MG: 50 CAPSULE ORAL at 18:22

## 2018-10-08 RX ADMIN — FLUTICASONE PROPIONATE 1 SPRAY: 50 SPRAY, METERED NASAL at 08:21

## 2018-10-08 RX ADMIN — LURASIDONE HYDROCHLORIDE 80 MG: 20 TABLET, FILM COATED ORAL at 21:36

## 2018-10-08 ASSESSMENT — SLEEP AND FATIGUE QUESTIONNAIRES
DIFFICULTY ARISING: YES
RESTFUL SLEEP: YES
DIFFICULTY STAYING ASLEEP: NO
DO YOU USE A SLEEP AID: NO
DO YOU HAVE DIFFICULTY SLEEPING: YES
DIFFICULTY FALLING ASLEEP: NO
SLEEP PATTERN: DIFFICULTY ARISING;RESTLESSNESS
AVERAGE NUMBER OF SLEEP HOURS: 9

## 2018-10-08 ASSESSMENT — PAIN SCALES - GENERAL
PAINLEVEL_OUTOF10: 0
PAINLEVEL_OUTOF10: 0

## 2018-10-08 NOTE — H&P
Current Health Status     2. Weaknesses: [x] Emotional          [] Motivational     MEDICATIONS: Current Facility-Administered Medications: propranolol (INDERAL) tablet 10 mg, 10 mg, Oral, BID  divalproex (DEPAKOTE ER) extended release tablet 500 mg, 500 mg, Oral, BID  ferrous sulfate tablet 325 mg, 325 mg, Oral, Daily with breakfast  fluticasone (FLONASE) 50 MCG/ACT nasal spray 1 spray, 1 spray, Nasal, Daily  lurasidone (LATUDA) tablet 80 mg, 80 mg, Oral, Nightly  acetaminophen (TYLENOL) tablet 650 mg, 650 mg, Oral, Q4H PRN  hydrOXYzine (VISTARIL) capsule 50 mg, 50 mg, Oral, Q6H PRN  haloperidol lactate (HALDOL) injection 10 mg, 10 mg, Intramuscular, Q6H PRN  traZODone (DESYREL) tablet 50 mg, 50 mg, Oral, Nightly PRN  benztropine mesylate (COGENTIN) injection 2 mg, 2 mg, Intramuscular, BID PRN  magnesium hydroxide (MILK OF MAGNESIA) 400 MG/5ML suspension 30 mL, 30 mL, Oral, Daily PRN  aluminum & magnesium hydroxide-simethicone (MAALOX) 200-200-20 MG/5ML suspension 30 mL, 30 mL, Oral, PRN    Medical Review of Systems:     All other than marked systmes have been reviewed and are all negative.     Constitutional Symptoms: []  fever []  Chills  Skin Symptoms: [] rash []  Pruritus   Eye Symptoms: [] Vision unchanged []  recent vision problems[] blurred vision   Respiratory Symptoms:[] shortness of breath [] cough  Cardiovascular Symptoms:  [] chest pain   [] palpitations   Gastrointestinal Symptoms: []  abdominal pain []  nausea []  vomiting []  diarrhea  Genitourinary Symptoms: []  dysuria  []  hematuria   Musculoskeletal Symptoms: []  back pain []  muscle pain []  joint pain  Neurologic Symptoms: []  headache []  dizziness  Hematolymphoid Symptoms: [] Adenopathy [] Bruises   [] Schimosis       VITALS: /78 Comment: manual  Pulse 76   Temp 98.1 °F (36.7 °C) (Temporal)   Resp 18   Ht 6' 3\" (1.905 m)   Wt 210 lb 11.2 oz (95.6 kg)   SpO2 97%   BMI 26.34 kg/m²     ALLERGIES: Ciprofloxacin hcl and Nsaids

## 2018-10-09 ENCOUNTER — APPOINTMENT (OUTPATIENT)
Dept: GENERAL RADIOLOGY | Age: 53
DRG: 918 | End: 2018-10-09
Attending: PSYCHIATRY & NEUROLOGY
Payer: MEDICARE

## 2018-10-09 LAB
BACTERIA: ABNORMAL /HPF
BILIRUBIN URINE: NEGATIVE
BLOOD, URINE: ABNORMAL
CLARITY: CLEAR
COLOR: YELLOW
GLUCOSE URINE: NEGATIVE MG/DL
KETONES, URINE: NEGATIVE MG/DL
LEUKOCYTE ESTERASE, URINE: ABNORMAL
NITRITE, URINE: NEGATIVE
PH UA: 6 (ref 5–9)
PROTEIN UA: NEGATIVE MG/DL
RBC UA: ABNORMAL /HPF (ref 0–2)
SPECIFIC GRAVITY UA: <=1.005 (ref 1–1.03)
UROBILINOGEN, URINE: 0.2 E.U./DL
WBC UA: >20 /HPF (ref 0–5)

## 2018-10-09 PROCEDURE — 81001 URINALYSIS AUTO W/SCOPE: CPT

## 2018-10-09 PROCEDURE — 6370000000 HC RX 637 (ALT 250 FOR IP): Performed by: NURSE PRACTITIONER

## 2018-10-09 PROCEDURE — 1240000000 HC EMOTIONAL WELLNESS R&B

## 2018-10-09 PROCEDURE — 36415 COLL VENOUS BLD VENIPUNCTURE: CPT

## 2018-10-09 PROCEDURE — 71045 X-RAY EXAM CHEST 1 VIEW: CPT

## 2018-10-09 PROCEDURE — 87040 BLOOD CULTURE FOR BACTERIA: CPT

## 2018-10-09 PROCEDURE — 99231 SBSQ HOSP IP/OBS SF/LOW 25: CPT | Performed by: PSYCHIATRY & NEUROLOGY

## 2018-10-09 RX ADMIN — PROPRANOLOL HYDROCHLORIDE 10 MG: 10 TABLET ORAL at 20:35

## 2018-10-09 RX ADMIN — ACETAMINOPHEN 650 MG: 325 TABLET, FILM COATED ORAL at 07:57

## 2018-10-09 RX ADMIN — FLUTICASONE PROPIONATE 1 SPRAY: 50 SPRAY, METERED NASAL at 08:54

## 2018-10-09 RX ADMIN — PROPRANOLOL HYDROCHLORIDE 10 MG: 10 TABLET ORAL at 08:54

## 2018-10-09 RX ADMIN — DIVALPROEX SODIUM 500 MG: 500 TABLET, EXTENDED RELEASE ORAL at 08:54

## 2018-10-09 RX ADMIN — LURASIDONE HYDROCHLORIDE 80 MG: 20 TABLET, FILM COATED ORAL at 20:41

## 2018-10-09 RX ADMIN — FERROUS SULFATE TAB 325 MG (65 MG ELEMENTAL FE) 325 MG: 325 (65 FE) TAB at 08:54

## 2018-10-09 RX ADMIN — DIVALPROEX SODIUM 500 MG: 500 TABLET, EXTENDED RELEASE ORAL at 20:35

## 2018-10-09 ASSESSMENT — PAIN DESCRIPTION - PAIN TYPE: TYPE: ACUTE PAIN

## 2018-10-09 ASSESSMENT — PAIN DESCRIPTION - LOCATION: LOCATION: HEAD

## 2018-10-09 ASSESSMENT — PAIN SCALES - GENERAL
PAINLEVEL_OUTOF10: 0
PAINLEVEL_OUTOF10: 9
PAINLEVEL_OUTOF10: 0
PAINLEVEL_OUTOF10: 0

## 2018-10-09 ASSESSMENT — PAIN DESCRIPTION - DESCRIPTORS: DESCRIPTORS: ACHING;HEADACHE;HEAVINESS

## 2018-10-09 NOTE — PROGRESS NOTES
DATE OF SERVICE:     10/9/2018    Ruiz Torres seen today for the purpose of continuation of care. Nursing, social work reports, laboratory studies and vital signs are reviewed. Patient chief complaint today is:             [] Depression      [] Anxiety        [x] Psychosis         [] Suicidal/Homicidal                         [] Delusions           [] Aggression          Subjective: Today patient states that \"I think I am doing better, I feel better back on my medicine. \"  Patient denies AVH currently, SI are improving. Sleep:  [] Good [x] Fair  [] Poor  Appetite:  [] Good [x] Fair  [] Poor    Depression:  [] Mild [] Moderate [x] Severe                [x] Constant [] Sporadic     Anxiety: [] Mild [] Moderate [x] Severe    [x] Constant [] Sporadic     Delusions: [] Mild [] Moderate [x] Severe     [x] Constant [] Sporadic     [x] Paranoid [] Somatic [] Grandiose     Hallucinations: [x] Mild [] Moderate [] Severe     [] Constant [x] Sporadic    [x] Auditory  [] Visual [] Tactile       Suicidal: [] Constant [] Sporadic  Homicidal: [] Constant [] Sporadic    Unscheduled Medications     [] Patient Receiving Emergency Medications \" Chemical Restraint\"   [] Requesting PRN medications for anxiety    Medical Review of Systems:     All other than marked systmes have been reviewed and are all negative.     Constitutional Symptoms: [x]  fever []  Chills  Skin Symptoms: [] rash []  Pruritus   Eye Symptoms: [] Vision unchanged []  recent vision problems[] blurred vision   Respiratory Symptoms:[] shortness of breath [] cough  Cardiovascular Symptoms:  [] chest pain   [] palpitations   Gastrointestinal Symptoms: []  abdominal pain []  nausea []  vomiting []  diarrhea  Genitourinary Symptoms: []  dysuria  []  hematuria   Musculoskeletal Symptoms: []  back pain []  muscle pain []  joint pain  Neurologic Symptoms: []  headache []  dizziness  Hematolymphoid Symptoms: [] Adenopathy [] Bruises   [] Schimosis [] Grandiose    Perception: [x]  None  [] Auditory   [] Visual  [] tactile   [] olfactory  [] Illusions         Insight: [] Intact  [] Fair  [x] Limited    Judgement:  [] Intact  [] Fair  [x] Limited      Assessment/Plan:        Patient Active Problem List   Diagnosis Code    Drug overdose, multiple drugs T50.901A    Severe episode of recurrent major depressive disorder, with psychotic features ([facility] F33.3    Mood disorder due to a general medical condition F06.30    Suicide attempt ([facility] T14.91XA    Laceration of neck S11.91XA    Laceration of left wrist S61.512A    Bipolar 1 disorder (HCC) F31.9    Crohn disease ([facility] K50.90    GERD (gastroesophageal reflux disease) K21.9    [facility] F31.9    Opioid overdose ([facility] T40.2X1A         Plan:    []  Patient is refusing medications  [] Improving as expected   [x] Not improving as expected   [] Worsening    []  At Baseline     GI and medical consulted. Patient with elevated WBC, has a history of Chron's and is having diarrhea and low grade temp.       Reason for more than one antipsychotic:  [x] N/A  [] 3 failed monotherapy(drugs tried):  [] Cross over to a new antipsychotic  [] Taper to monotherapy from polypharmacy  [] Augmentation of Clozapine therapy due to treatment resistance to single therapy      Signed:  Denia Mullen  10/9/2018  1:58 PM

## 2018-10-09 NOTE — PROGRESS NOTES
Spiritual Support Group Note    Number of Participants in Group:  8                            Time: 2-2:45    Goal: Relief from isolation and loneliness             Christina Sharing             Self-understanding and gain insight              Acceptance and belonging            Recognize they are not alone                Socialization             Empowerment       Encouragement    Topic:  [x] Spiritual Wellness and Self Care                  [] Hope                     [] Connecting with Divine/Others        [x] Thankfulness and Gratitude               []  Meaningfulness and Purpose               [] Forgiveness               [] Peace               [] Connect to [employer]      [] Other    Participation Level:   [x] Active Listener   [] Minimal   [] Monopolizing   [x] Interactive   [] No Participation   []  Other:     Attention:   [x] Alert   [] Distractible   [] Drowsy   [] Poor   [] Other:    Manner:   [x] Cooperative   [] Suspicious   [] Withdrawn   [] Guarded   [] Irritable   [] Inhospitable   [] Other:     Others Comments from Group:

## 2018-10-10 LAB
BASOPHILS ABSOLUTE: 0.06 E9/L (ref 0–0.2)
BASOPHILS RELATIVE PERCENT: 0.5 % (ref 0–2)
EOSINOPHILS ABSOLUTE: 0.3 E9/L (ref 0.05–0.5)
EOSINOPHILS RELATIVE PERCENT: 2.5 % (ref 0–6)
HCT VFR BLD CALC: 37 % (ref 37–54)
HEMOGLOBIN: 12 G/DL (ref 12.5–16.5)
IMMATURE GRANULOCYTES #: 0.07 E9/L
IMMATURE GRANULOCYTES %: 0.6 % (ref 0–5)
LYMPHOCYTES ABSOLUTE: 1.66 E9/L (ref 1.5–4)
LYMPHOCYTES RELATIVE PERCENT: 13.7 % (ref 20–42)
MCH RBC QN AUTO: 30.1 PG (ref 26–35)
MCHC RBC AUTO-ENTMCNC: 32.4 % (ref 32–34.5)
MCV RBC AUTO: 92.7 FL (ref 80–99.9)
MONOCYTES ABSOLUTE: 1.32 E9/L (ref 0.1–0.95)
MONOCYTES RELATIVE PERCENT: 10.9 % (ref 2–12)
NEUTROPHILS ABSOLUTE: 8.68 E9/L (ref 1.8–7.3)
NEUTROPHILS RELATIVE PERCENT: 71.8 % (ref 43–80)
PDW BLD-RTO: 14.1 FL (ref 11.5–15)
PLATELET # BLD: 208 E9/L (ref 130–450)
PMV BLD AUTO: 9.7 FL (ref 7–12)
RBC # BLD: 3.99 E12/L (ref 3.8–5.8)
WBC # BLD: 12.1 E9/L (ref 4.5–11.5)

## 2018-10-10 PROCEDURE — 6370000000 HC RX 637 (ALT 250 FOR IP): Performed by: NURSE PRACTITIONER

## 2018-10-10 PROCEDURE — 99232 SBSQ HOSP IP/OBS MODERATE 35: CPT | Performed by: PSYCHIATRY & NEUROLOGY

## 2018-10-10 PROCEDURE — 85025 COMPLETE CBC W/AUTO DIFF WBC: CPT

## 2018-10-10 PROCEDURE — 36415 COLL VENOUS BLD VENIPUNCTURE: CPT

## 2018-10-10 PROCEDURE — 1240000000 HC EMOTIONAL WELLNESS R&B

## 2018-10-10 PROCEDURE — 6370000000 HC RX 637 (ALT 250 FOR IP): Performed by: PSYCHIATRY & NEUROLOGY

## 2018-10-10 RX ORDER — PAROXETINE 10 MG/1
10 TABLET, FILM COATED ORAL DAILY
Status: DISCONTINUED | OUTPATIENT
Start: 2018-10-10 | End: 2018-10-11 | Stop reason: HOSPADM

## 2018-10-10 RX ADMIN — PROPRANOLOL HYDROCHLORIDE 10 MG: 10 TABLET ORAL at 09:29

## 2018-10-10 RX ADMIN — LURASIDONE HYDROCHLORIDE 80 MG: 20 TABLET, FILM COATED ORAL at 21:21

## 2018-10-10 RX ADMIN — PROPRANOLOL HYDROCHLORIDE 10 MG: 10 TABLET ORAL at 21:21

## 2018-10-10 RX ADMIN — PAROXETINE HYDROCHLORIDE HEMIHYDRATE 10 MG: 10 TABLET, FILM COATED ORAL at 15:06

## 2018-10-10 RX ADMIN — DIVALPROEX SODIUM 500 MG: 500 TABLET, EXTENDED RELEASE ORAL at 21:21

## 2018-10-10 RX ADMIN — TRAZODONE HYDROCHLORIDE 50 MG: 50 TABLET ORAL at 21:21

## 2018-10-10 RX ADMIN — DIVALPROEX SODIUM 500 MG: 500 TABLET, EXTENDED RELEASE ORAL at 09:29

## 2018-10-10 RX ADMIN — FLUTICASONE PROPIONATE 1 SPRAY: 50 SPRAY, METERED NASAL at 09:29

## 2018-10-10 ASSESSMENT — PAIN SCALES - GENERAL
PAINLEVEL_OUTOF10: 0

## 2018-10-10 NOTE — PROGRESS NOTES
DATE OF SERVICE:     10/10/2018    Edgar Bhatt seen today for the purpose of continuation of care. Nursing, social work reports, laboratory studies and vital signs are reviewed. Patient chief complaint today is:             [x] Depression      [x] Anxiety        [] Psychosis         [] Suicidal/Homicidal                         [x] Delusions           [] Aggression          Subjective: Today patient states that \"I am okay. I did not take the lithium to kill myself, just to try and get high. \"  Patient admits to feeling anxious. Patient denies AVH currently. Sleep:  [] Good [x] Fair  [] Poor  Appetite:  [] Good [x] Fair  [] Poor    Depression:  [] Mild [] Moderate [x] Severe                [x] Constant [] Sporadic     Anxiety: [] Mild [] Moderate [x] Severe    [x] Constant [] Sporadic     Delusions: [] Mild [x] Moderate [] Severe     [] Constant [x] Sporadic     [x] Paranoid [] Somatic [] Grandiose     Hallucinations: [] Mild [] Moderate [] Severe     [] Constant [x] Sporadic    [] Auditory  [] Visual [] Tactile       Suicidal: [] Constant [] Sporadic  Homicidal: [] Constant [] Sporadic    Unscheduled Medications     [] Patient Receiving Emergency Medications \" Chemical Restraint\"   [] Requesting PRN medications for anxiety    Medical Review of Systems:     All other than marked systmes have been reviewed and are all negative.     Constitutional Symptoms: [x]  fever []  Chills  Skin Symptoms: [] rash []  Pruritus   Eye Symptoms: [] Vision unchanged []  recent vision problems[] blurred vision   Respiratory Symptoms:[] shortness of breath [] cough  Cardiovascular Symptoms:  [] chest pain   [] palpitations   Gastrointestinal Symptoms: []  abdominal pain []  nausea []  vomiting []  diarrhea  Genitourinary Symptoms: []  dysuria  []  hematuria   Musculoskeletal Symptoms: []  back pain []  muscle pain []  joint pain  Neurologic Symptoms: []  headache []  dizziness  Hematolymphoid Symptoms: [] Adenopathy [] [x] Paranoid  [] Somatic  [] Grandiose    Perception: [x]  None  [] Auditory   [] Visual  [] tactile   [] olfactory  [] Illusions         Insight: [] Intact  [] Fair  [x] Limited    Judgement:  [] Intact  [] Fair  [x] Limited      Assessment/Plan:        Patient Active Problem List   Diagnosis Code    Drug overdose, multiple drugs T50.901A    Severe episode of recurrent major depressive disorder, with psychotic features ([facility] [address] F33.3    Mood disorder due to a general medical condition F06.30    Suicide attempt ([facility] [address] T14.91XA    Laceration of neck S11.91XA    Laceration of left wrist S61.512A    Bipolar 1 disorder (HCC) F31.9    Crohn disease ([facility] [facility] K50.90    GERD (gastroesophageal reflux disease) K21.9    Bipolar 1 disorder ([facility] [facility] F31.9    Opio[facility] overdose [facility] T40.2X1A         Plan:    []  Patient is refusing medications  [x] Improving as expected Will start Paxil 10 mg daily   [] Not improving as expected   [] Worsening    []  At Baseline           Reason for more than one antipsychotic:  [x] N/A  [] 3 failed monotherapy(drugs tried):  [] Cross over to a new antipsychotic  [] Taper to monotherapy from polypharmacy  [] Augmentation of Clozapine therapy due to treatment resistance to single therapy      Signed:  Ethyl Blind  10/10/2018  1:51 PM

## 2018-10-10 NOTE — PROGRESS NOTES
Group Therapy Note    Patient attended community meeting. Patient identified daily goal as to think better. Group Therapy Note     Date: 10/10/2018  Start Time: 11:00  End Time:  11:45  Number of Participants: 9     Type of Group: Psychoeducation     Wellness Binder Information  Module Name:  Coping Skills  Session Number:  9-1     Patient's Goal: To identify at least one positive coping skill.     Notes: Attended group and was able to identify positive coping skills to use at least on a daily basis.     Status After Intervention:  Improved    Participation Level:  Active Listener and Interactive    Participation Quality: Attentive and Sharing      Speech:  normal      Thought Process/Content: Logical      Affective Functioning: Flat      Mood: depressed      Level of consciousness:  Alert      Response to Learning: Progressing to goal      Endings: None Reported    Modes of Intervention: Education      Discipline Responsible: Psychoeducational Specialist      Signature:  Reyes Cella, LSW

## 2018-10-10 NOTE — PROGRESS NOTES
Group Therapy Note    Date: 10/10/2018  Start Time: 1:15  End Time:  1:50  Number of Participants: 8     Type of Group: Cognitive Skills     Wellness Binder Information  Module Name:  Happiness  Session Number:  7-2     Patient's Goal:  To understand the benefits of happiness in wellness recovery.     Notes: Attended group and was an active participant in the discussion on happiness.     Status After Intervention:  Improved    Participation Level:  Active Listener and Interactive    Participation Quality: Attentive and Sharing      Speech:  normal      Thought Process/Content: Logical      Affective Functioning: Flat      Mood: depressed      Level of consciousness:  Alert      Response to Learning: Progressing to goal      Endings: None Reported    Modes of Intervention: Education      Discipline Responsible: Psychoeducational Specialist      Signature:  SUSHANT Cole

## 2018-10-10 NOTE — PLAN OF CARE
Problem: Depressive Behavior With or Without Suicide Precautions:  Goal: Able to verbalize and/or display a decrease in depressive symptoms  Able to verbalize and/or display a decrease in depressive symptoms   Outcome: Ongoing  Patient resting quietly, no S/S of distress noted or observed, No prn medications given at this time or behavior issues. Needs met. Will continue to monitor and provide safe environment with continued rounding every 15 minutes per facility policy.

## 2018-10-10 NOTE — PROGRESS NOTES
Group Therapy Note    Date: 10/10/2018  Start Time: 2:00  End Time:  2:30  Number of Participants: 9    Type of Group: Recovery    Wellness Binder Information  Module Name: 5 principles to  Increase happiness.   Session Number:     Patient's Goal:  Pt will learn 5 new ways to increase happiness    Notes:  Pt will be able to verbalize and acknowledge new learning    Status After Intervention:  Improved    Participation Level: Interactive    Participation Quality: Attentive and Sharing      Speech:  Normal      Thought Process/Content: Logical      Affective Functioning: Congruent      Mood: anxious and depressed      Level of consciousness:  Attentive      Response to Learning: Able to verbalize/acknowledge new learning      Endings: None Reported    Modes of Intervention: Support and Socialization      Discipline Responsible: /Counselor      Signature:  AVRIL Lambert

## 2018-10-10 NOTE — PROGRESS NOTES
Pt did  attend and did  participate in  groups. pleasant mood, bright affect. Questionable still  insight and fair judgement. Pt states he will  talk with staff if they experience feelings of SI or HI. No c/o or aggressive behavior. Appropriate interactions with staff and peers. Monitored for safety, encouraged to express feelings, and to attend groups. Pt was  cooperative and showed  response to encouragement. Pt was  med compliant. .  Will continue to monitor.

## 2018-10-10 NOTE — PROGRESS NOTES
Group Therapy Note    Date: 10/10/2018  Start Time: 3:15  End Time:  3:50  Number of Participants: 5     Type of Group: Recreational     Wellness Binder Information  Module Name:  Leisure Activity  Session Number:  NA     Patient's Goal:  To understand the benefits of leisure activity in wellness recovery.     Notes: Attended activity and was an active participant. Status After Intervention:  Improved    Participation Level:  Active Listener and Interactive    Participation Quality: Attentive and Sharing      Speech:  normal      Thought Process/Content: Logical      Affective Functioning: Flat      Mood: depressed      Level of consciousness:  Alert      Response to Learning: Progressing to goal      Endings: None Reported    Modes of Intervention: Education      Discipline Responsible: Psychoeducational Specialist      Signature:  SUSHANT Smith

## 2018-10-11 VITALS
HEART RATE: 98 BPM | TEMPERATURE: 97.8 F | SYSTOLIC BLOOD PRESSURE: 115 MMHG | RESPIRATION RATE: 16 BRPM | HEIGHT: 75 IN | DIASTOLIC BLOOD PRESSURE: 80 MMHG | BODY MASS INDEX: 26.2 KG/M2 | WEIGHT: 210.7 LBS | OXYGEN SATURATION: 98 %

## 2018-10-11 PROBLEM — G25.1 DRUG-INDUCED TREMOR: Status: ACTIVE | Noted: 2018-10-11

## 2018-10-11 LAB — VALPROIC ACID LEVEL: 33 MCG/ML (ref 50–100)

## 2018-10-11 PROCEDURE — 6370000000 HC RX 637 (ALT 250 FOR IP): Performed by: NURSE PRACTITIONER

## 2018-10-11 PROCEDURE — 99238 HOSP IP/OBS DSCHRG MGMT 30/<: CPT | Performed by: PSYCHIATRY & NEUROLOGY

## 2018-10-11 PROCEDURE — 6370000000 HC RX 637 (ALT 250 FOR IP): Performed by: PSYCHIATRY & NEUROLOGY

## 2018-10-11 PROCEDURE — 80164 ASSAY DIPROPYLACETIC ACD TOT: CPT

## 2018-10-11 PROCEDURE — 36415 COLL VENOUS BLD VENIPUNCTURE: CPT

## 2018-10-11 RX ORDER — DIVALPROEX SODIUM 500 MG/1
500 TABLET, EXTENDED RELEASE ORAL 2 TIMES DAILY
Qty: 30 TABLET | Refills: 0 | Status: SHIPPED | OUTPATIENT
Start: 2018-10-11 | End: 2018-12-19

## 2018-10-11 RX ORDER — PAROXETINE 10 MG/1
10 TABLET, FILM COATED ORAL DAILY
Qty: 30 TABLET | Refills: 0 | Status: ON HOLD | OUTPATIENT
Start: 2018-10-12 | End: 2018-11-23 | Stop reason: HOSPADM

## 2018-10-11 RX ADMIN — FLUTICASONE PROPIONATE 1 SPRAY: 50 SPRAY, METERED NASAL at 08:47

## 2018-10-11 RX ADMIN — FERROUS SULFATE TAB 325 MG (65 MG ELEMENTAL FE) 325 MG: 325 (65 FE) TAB at 08:41

## 2018-10-11 RX ADMIN — DIVALPROEX SODIUM 500 MG: 500 TABLET, EXTENDED RELEASE ORAL at 08:41

## 2018-10-11 RX ADMIN — PAROXETINE HYDROCHLORIDE HEMIHYDRATE 10 MG: 10 TABLET, FILM COATED ORAL at 08:41

## 2018-10-11 RX ADMIN — PROPRANOLOL HYDROCHLORIDE 10 MG: 10 TABLET ORAL at 08:41

## 2018-10-11 ASSESSMENT — PAIN SCALES - GENERAL: PAINLEVEL_OUTOF10: 0

## 2018-10-14 LAB
BLOOD CULTURE, ROUTINE: NORMAL
CULTURE, BLOOD 2: NORMAL

## 2018-10-15 NOTE — CARE COORDINATION
SARA called PT for follow up. PT reported he is doing good . PT reported he was looking forward to going to his follow up appointment. PT reported his Meds are helping him sleep. PT reported he enjoyed his stay at the hospital and this time he went to the psych banks was the most beneficial stay.

## 2018-10-24 ENCOUNTER — APPOINTMENT (OUTPATIENT)
Dept: GENERAL RADIOLOGY | Age: 53
DRG: 728 | End: 2018-10-24
Payer: MEDICARE

## 2018-10-24 ENCOUNTER — HOSPITAL ENCOUNTER (INPATIENT)
Age: 53
LOS: 2 days | Discharge: HOME | DRG: 728 | End: 2018-10-26
Attending: EMERGENCY MEDICINE | Admitting: FAMILY MEDICINE
Payer: MEDICARE

## 2018-10-24 ENCOUNTER — APPOINTMENT (OUTPATIENT)
Dept: CT IMAGING | Age: 53
DRG: 728 | End: 2018-10-24
Payer: MEDICARE

## 2018-10-24 ENCOUNTER — APPOINTMENT (OUTPATIENT)
Dept: ULTRASOUND IMAGING | Age: 53
DRG: 728 | End: 2018-10-24
Payer: MEDICARE

## 2018-10-24 DIAGNOSIS — N45.2 ORCHITIS OF LEFT TESTICLE: Primary | ICD-10-CM

## 2018-10-24 LAB
ALBUMIN SERPL-MCNC: 3.9 G/DL (ref 3.5–5.2)
ALP BLD-CCNC: 108 U/L (ref 40–129)
ALT SERPL-CCNC: 10 U/L (ref 0–40)
AMORPHOUS: ABNORMAL
ANION GAP SERPL CALCULATED.3IONS-SCNC: 15 MMOL/L (ref 7–16)
AST SERPL-CCNC: 24 U/L (ref 0–39)
BACTERIA: ABNORMAL /HPF
BASOPHILS ABSOLUTE: 0.08 E9/L (ref 0–0.2)
BASOPHILS RELATIVE PERCENT: 0.6 % (ref 0–2)
BILIRUB SERPL-MCNC: 0.3 MG/DL (ref 0–1.2)
BILIRUBIN URINE: NEGATIVE
BLOOD, URINE: ABNORMAL
BUN BLDV-MCNC: 20 MG/DL (ref 6–20)
CALCIUM SERPL-MCNC: 9.3 MG/DL (ref 8.6–10.2)
CHLORIDE BLD-SCNC: 99 MMOL/L (ref 98–107)
CLARITY: CLEAR
CO2: 23 MMOL/L (ref 22–29)
COLOR: YELLOW
CREAT SERPL-MCNC: 1.6 MG/DL (ref 0.7–1.2)
EKG ATRIAL RATE: 111 BPM
EKG P AXIS: 62 DEGREES
EKG P-R INTERVAL: 142 MS
EKG Q-T INTERVAL: 322 MS
EKG QRS DURATION: 82 MS
EKG QTC CALCULATION (BAZETT): 437 MS
EKG R AXIS: 41 DEGREES
EKG T AXIS: 53 DEGREES
EKG VENTRICULAR RATE: 111 BPM
EOSINOPHILS ABSOLUTE: 0.09 E9/L (ref 0.05–0.5)
EOSINOPHILS RELATIVE PERCENT: 0.7 % (ref 0–6)
EPITHELIAL CELLS, UA: ABNORMAL /HPF
GFR AFRICAN AMERICAN: 55
GFR NON-AFRICAN AMERICAN: 46 ML/MIN/1.73
GLUCOSE BLD-MCNC: 105 MG/DL (ref 74–109)
GLUCOSE URINE: NEGATIVE MG/DL
HCT VFR BLD CALC: 40.7 % (ref 37–54)
HEMOGLOBIN: 13.5 G/DL (ref 12.5–16.5)
IMMATURE GRANULOCYTES #: 0.06 E9/L
IMMATURE GRANULOCYTES %: 0.5 % (ref 0–5)
KETONES, URINE: ABNORMAL MG/DL
LACTIC ACID: 1.6 MMOL/L (ref 0.5–2.2)
LEUKOCYTE ESTERASE, URINE: ABNORMAL
LITHIUM DOSE AMOUNT: ABNORMAL
LITHIUM LEVEL: <0.1 MMOL/L (ref 0.5–1.5)
LYMPHOCYTES ABSOLUTE: 2.03 E9/L (ref 1.5–4)
LYMPHOCYTES RELATIVE PERCENT: 15.7 % (ref 20–42)
MCH RBC QN AUTO: 30.6 PG (ref 26–35)
MCHC RBC AUTO-ENTMCNC: 33.2 % (ref 32–34.5)
MCV RBC AUTO: 92.3 FL (ref 80–99.9)
MONOCYTES ABSOLUTE: 1.29 E9/L (ref 0.1–0.95)
MONOCYTES RELATIVE PERCENT: 10 % (ref 2–12)
NEUTROPHILS ABSOLUTE: 9.36 E9/L (ref 1.8–7.3)
NEUTROPHILS RELATIVE PERCENT: 72.5 % (ref 43–80)
NITRITE, URINE: NEGATIVE
PDW BLD-RTO: 13.4 FL (ref 11.5–15)
PH UA: 6 (ref 5–9)
PLATELET # BLD: 379 E9/L (ref 130–450)
PMV BLD AUTO: 9.2 FL (ref 7–12)
POTASSIUM SERPL-SCNC: 4.9 MMOL/L (ref 3.5–5)
PROTEIN UA: ABNORMAL MG/DL
RBC # BLD: 4.41 E12/L (ref 3.8–5.8)
RBC UA: ABNORMAL /HPF (ref 0–2)
SODIUM BLD-SCNC: 137 MMOL/L (ref 132–146)
SPECIFIC GRAVITY UA: 1.02 (ref 1–1.03)
TOTAL PROTEIN: 7.4 G/DL (ref 6.4–8.3)
TROPONIN: <0.01 NG/ML (ref 0–0.03)
UROBILINOGEN, URINE: 0.2 E.U./DL
WBC # BLD: 12.9 E9/L (ref 4.5–11.5)
WBC UA: ABNORMAL /HPF (ref 0–5)

## 2018-10-24 PROCEDURE — 81001 URINALYSIS AUTO W/SCOPE: CPT

## 2018-10-24 PROCEDURE — 83605 ASSAY OF LACTIC ACID: CPT

## 2018-10-24 PROCEDURE — 36415 COLL VENOUS BLD VENIPUNCTURE: CPT

## 2018-10-24 PROCEDURE — 2580000003 HC RX 258: Performed by: EMERGENCY MEDICINE

## 2018-10-24 PROCEDURE — 84484 ASSAY OF TROPONIN QUANT: CPT

## 2018-10-24 PROCEDURE — 99285 EMERGENCY DEPT VISIT HI MDM: CPT

## 2018-10-24 PROCEDURE — 6360000004 HC RX CONTRAST MEDICATION: Performed by: RADIOLOGY

## 2018-10-24 PROCEDURE — 85025 COMPLETE CBC W/AUTO DIFF WBC: CPT

## 2018-10-24 PROCEDURE — 80178 ASSAY OF LITHIUM: CPT

## 2018-10-24 PROCEDURE — 74177 CT ABD & PELVIS W/CONTRAST: CPT

## 2018-10-24 PROCEDURE — 76870 US EXAM SCROTUM: CPT

## 2018-10-24 PROCEDURE — 93005 ELECTROCARDIOGRAM TRACING: CPT | Performed by: EMERGENCY MEDICINE

## 2018-10-24 PROCEDURE — 6370000000 HC RX 637 (ALT 250 FOR IP): Performed by: EMERGENCY MEDICINE

## 2018-10-24 PROCEDURE — 71045 X-RAY EXAM CHEST 1 VIEW: CPT

## 2018-10-24 PROCEDURE — 87186 SC STD MICRODIL/AGAR DIL: CPT

## 2018-10-24 PROCEDURE — 6360000002 HC RX W HCPCS: Performed by: EMERGENCY MEDICINE

## 2018-10-24 PROCEDURE — 80053 COMPREHEN METABOLIC PANEL: CPT

## 2018-10-24 PROCEDURE — 6360000002 HC RX W HCPCS: Performed by: INTERNAL MEDICINE

## 2018-10-24 PROCEDURE — 2580000003 HC RX 258: Performed by: INTERNAL MEDICINE

## 2018-10-24 PROCEDURE — 87088 URINE BACTERIA CULTURE: CPT

## 2018-10-24 PROCEDURE — 87040 BLOOD CULTURE FOR BACTERIA: CPT

## 2018-10-24 PROCEDURE — 2060000000 HC ICU INTERMEDIATE R&B

## 2018-10-24 PROCEDURE — 96365 THER/PROPH/DIAG IV INF INIT: CPT

## 2018-10-24 RX ORDER — ALPRAZOLAM 1 MG/1
1 TABLET ORAL 4 TIMES DAILY
Status: ON HOLD | COMMUNITY
End: 2018-11-23 | Stop reason: HOSPADM

## 2018-10-24 RX ORDER — SODIUM CHLORIDE 9 MG/ML
INJECTION, SOLUTION INTRAVENOUS ONCE
Status: COMPLETED | OUTPATIENT
Start: 2018-10-24 | End: 2018-10-24

## 2018-10-24 RX ORDER — MORPHINE SULFATE 2 MG/ML
4 INJECTION, SOLUTION INTRAMUSCULAR; INTRAVENOUS ONCE
Status: COMPLETED | OUTPATIENT
Start: 2018-10-24 | End: 2018-10-24

## 2018-10-24 RX ORDER — LINEZOLID 2 MG/ML
600 INJECTION, SOLUTION INTRAVENOUS EVERY 12 HOURS
Status: DISCONTINUED | OUTPATIENT
Start: 2018-10-24 | End: 2018-10-26

## 2018-10-24 RX ORDER — ACETAMINOPHEN 500 MG
1000 TABLET ORAL ONCE
Status: COMPLETED | OUTPATIENT
Start: 2018-10-24 | End: 2018-10-24

## 2018-10-24 RX ADMIN — LINEZOLID 600 MG: 600 INJECTION, SOLUTION INTRAVENOUS at 21:46

## 2018-10-24 RX ADMIN — VANCOMYCIN HYDROCHLORIDE 1000 MG: 1 INJECTION, POWDER, LYOPHILIZED, FOR SOLUTION INTRAVENOUS at 19:58

## 2018-10-24 RX ADMIN — ACETAMINOPHEN 1000 MG: 500 TABLET ORAL at 18:38

## 2018-10-24 RX ADMIN — CEFTRIAXONE SODIUM 2 G: 2 INJECTION, POWDER, FOR SOLUTION INTRAMUSCULAR; INTRAVENOUS at 18:44

## 2018-10-24 RX ADMIN — MORPHINE SULFATE 4 MG: 2 INJECTION, SOLUTION INTRAMUSCULAR; INTRAVENOUS at 19:49

## 2018-10-24 RX ADMIN — IOPAMIDOL 110 ML: 755 INJECTION, SOLUTION INTRAVENOUS at 19:19

## 2018-10-24 RX ADMIN — SODIUM CHLORIDE: 9 INJECTION, SOLUTION INTRAVENOUS at 18:39

## 2018-10-24 RX ADMIN — CEFEPIME 1 G: 1 INJECTION, POWDER, FOR SOLUTION INTRAMUSCULAR; INTRAVENOUS at 22:50

## 2018-10-24 ASSESSMENT — PAIN DESCRIPTION - DESCRIPTORS
DESCRIPTORS: ACHING
DESCRIPTORS: CONSTANT;ACHING;BURNING
DESCRIPTORS: ACHING
DESCRIPTORS: DISCOMFORT;NAGGING;CONSTANT

## 2018-10-24 ASSESSMENT — PAIN DESCRIPTION - FREQUENCY
FREQUENCY: CONTINUOUS

## 2018-10-24 ASSESSMENT — PAIN DESCRIPTION - LOCATION
LOCATION: SCROTUM
LOCATION: SCROTUM
LOCATION: OTHER (COMMENT)

## 2018-10-24 ASSESSMENT — PAIN SCALES - GENERAL
PAINLEVEL_OUTOF10: 10
PAINLEVEL_OUTOF10: 9
PAINLEVEL_OUTOF10: 9
PAINLEVEL_OUTOF10: 8
PAINLEVEL_OUTOF10: 9

## 2018-10-24 ASSESSMENT — PAIN DESCRIPTION - PROGRESSION: CLINICAL_PROGRESSION: GRADUALLY WORSENING

## 2018-10-24 ASSESSMENT — PAIN DESCRIPTION - PAIN TYPE
TYPE: ACUTE PAIN

## 2018-10-24 ASSESSMENT — PAIN DESCRIPTION - ORIENTATION
ORIENTATION: LEFT

## 2018-10-24 ASSESSMENT — PAIN DESCRIPTION - ONSET: ONSET: GRADUAL

## 2018-10-25 PROCEDURE — 6370000000 HC RX 637 (ALT 250 FOR IP): Performed by: FAMILY MEDICINE

## 2018-10-25 PROCEDURE — 2580000003 HC RX 258: Performed by: INTERNAL MEDICINE

## 2018-10-25 PROCEDURE — 6360000002 HC RX W HCPCS: Performed by: FAMILY MEDICINE

## 2018-10-25 PROCEDURE — 2060000000 HC ICU INTERMEDIATE R&B

## 2018-10-25 PROCEDURE — 6360000002 HC RX W HCPCS: Performed by: INTERNAL MEDICINE

## 2018-10-25 RX ORDER — PROPRANOLOL HYDROCHLORIDE 20 MG/1
10 TABLET ORAL 2 TIMES DAILY
Status: DISCONTINUED | OUTPATIENT
Start: 2018-10-25 | End: 2018-10-26 | Stop reason: HOSPADM

## 2018-10-25 RX ORDER — FLUTICASONE PROPIONATE 50 MCG
1 SPRAY, SUSPENSION (ML) NASAL DAILY
Status: DISCONTINUED | OUTPATIENT
Start: 2018-10-25 | End: 2018-10-26 | Stop reason: HOSPADM

## 2018-10-25 RX ORDER — DIVALPROEX SODIUM 500 MG/1
500 TABLET, EXTENDED RELEASE ORAL 2 TIMES DAILY
Status: DISCONTINUED | OUTPATIENT
Start: 2018-10-25 | End: 2018-10-26 | Stop reason: HOSPADM

## 2018-10-25 RX ORDER — ALPRAZOLAM 1 MG/1
1 TABLET ORAL 4 TIMES DAILY
Status: DISCONTINUED | OUTPATIENT
Start: 2018-10-25 | End: 2018-10-25

## 2018-10-25 RX ORDER — FERROUS SULFATE 325(65) MG
325 TABLET ORAL
Status: DISCONTINUED | OUTPATIENT
Start: 2018-10-25 | End: 2018-10-26 | Stop reason: HOSPADM

## 2018-10-25 RX ORDER — PAROXETINE 10 MG/1
10 TABLET, FILM COATED ORAL DAILY
Status: DISCONTINUED | OUTPATIENT
Start: 2018-10-25 | End: 2018-10-26 | Stop reason: HOSPADM

## 2018-10-25 RX ORDER — ALPRAZOLAM 1 MG/1
2 TABLET ORAL 2 TIMES DAILY
Status: DISCONTINUED | OUTPATIENT
Start: 2018-10-25 | End: 2018-10-26 | Stop reason: HOSPADM

## 2018-10-25 RX ORDER — MORPHINE SULFATE 2 MG/ML
2 INJECTION, SOLUTION INTRAMUSCULAR; INTRAVENOUS EVERY 4 HOURS PRN
Status: DISCONTINUED | OUTPATIENT
Start: 2018-10-25 | End: 2018-10-26 | Stop reason: HOSPADM

## 2018-10-25 RX ADMIN — ALPRAZOLAM 1 MG: 1 TABLET ORAL at 00:46

## 2018-10-25 RX ADMIN — PAROXETINE HYDROCHLORIDE HEMIHYDRATE 10 MG: 10 TABLET, FILM COATED ORAL at 09:00

## 2018-10-25 RX ADMIN — FERROUS SULFATE TAB 325 MG (65 MG ELEMENTAL FE) 325 MG: 325 (65 FE) TAB at 08:57

## 2018-10-25 RX ADMIN — FLUTICASONE PROPIONATE 1 SPRAY: 50 SPRAY, METERED NASAL at 09:13

## 2018-10-25 RX ADMIN — LURASIDONE HYDROCHLORIDE 80 MG: 20 TABLET, FILM COATED ORAL at 20:26

## 2018-10-25 RX ADMIN — DIVALPROEX SODIUM 500 MG: 500 TABLET, FILM COATED, EXTENDED RELEASE ORAL at 00:46

## 2018-10-25 RX ADMIN — LINEZOLID 600 MG: 600 INJECTION, SOLUTION INTRAVENOUS at 20:27

## 2018-10-25 RX ADMIN — ALPRAZOLAM 2 MG: 1 TABLET ORAL at 18:21

## 2018-10-25 RX ADMIN — PROPRANOLOL HYDROCHLORIDE 10 MG: 20 TABLET ORAL at 20:26

## 2018-10-25 RX ADMIN — DIVALPROEX SODIUM 500 MG: 500 TABLET, FILM COATED, EXTENDED RELEASE ORAL at 08:57

## 2018-10-25 RX ADMIN — ENOXAPARIN SODIUM 30 MG: 100 INJECTION SUBCUTANEOUS at 08:58

## 2018-10-25 RX ADMIN — PROPRANOLOL HYDROCHLORIDE 10 MG: 20 TABLET ORAL at 09:00

## 2018-10-25 RX ADMIN — CEFEPIME 1 G: 1 INJECTION, POWDER, FOR SOLUTION INTRAMUSCULAR; INTRAVENOUS at 06:44

## 2018-10-25 RX ADMIN — LINEZOLID 600 MG: 600 INJECTION, SOLUTION INTRAVENOUS at 09:56

## 2018-10-25 RX ADMIN — DIVALPROEX SODIUM 500 MG: 500 TABLET, FILM COATED, EXTENDED RELEASE ORAL at 20:26

## 2018-10-25 RX ADMIN — MORPHINE SULFATE 2 MG: 2 INJECTION, SOLUTION INTRAMUSCULAR; INTRAVENOUS at 09:01

## 2018-10-25 RX ADMIN — MORPHINE SULFATE 2 MG: 2 INJECTION, SOLUTION INTRAMUSCULAR; INTRAVENOUS at 00:45

## 2018-10-25 RX ADMIN — MORPHINE SULFATE 2 MG: 2 INJECTION, SOLUTION INTRAMUSCULAR; INTRAVENOUS at 15:38

## 2018-10-25 RX ADMIN — ALPRAZOLAM 2 MG: 1 TABLET ORAL at 08:56

## 2018-10-25 RX ADMIN — PROPRANOLOL HYDROCHLORIDE 10 MG: 20 TABLET ORAL at 00:46

## 2018-10-25 RX ADMIN — MORPHINE SULFATE 2 MG: 2 INJECTION, SOLUTION INTRAMUSCULAR; INTRAVENOUS at 20:34

## 2018-10-25 RX ADMIN — CEFEPIME 1 G: 1 INJECTION, POWDER, FOR SOLUTION INTRAMUSCULAR; INTRAVENOUS at 15:38

## 2018-10-25 RX ADMIN — MORPHINE SULFATE 2 MG: 2 INJECTION, SOLUTION INTRAMUSCULAR; INTRAVENOUS at 04:45

## 2018-10-25 ASSESSMENT — PAIN SCALES - GENERAL
PAINLEVEL_OUTOF10: 5
PAINLEVEL_OUTOF10: 0
PAINLEVEL_OUTOF10: 5
PAINLEVEL_OUTOF10: 8
PAINLEVEL_OUTOF10: 8
PAINLEVEL_OUTOF10: 5
PAINLEVEL_OUTOF10: 9
PAINLEVEL_OUTOF10: 9

## 2018-10-25 ASSESSMENT — PAIN DESCRIPTION - ORIENTATION
ORIENTATION: LEFT

## 2018-10-25 ASSESSMENT — PAIN DESCRIPTION - DESCRIPTORS: DESCRIPTORS: ACHING;DISCOMFORT;SORE

## 2018-10-25 ASSESSMENT — PAIN DESCRIPTION - LOCATION
LOCATION: SCROTUM

## 2018-10-25 ASSESSMENT — PAIN DESCRIPTION - PAIN TYPE
TYPE: ACUTE PAIN

## 2018-10-25 ASSESSMENT — PAIN DESCRIPTION - PROGRESSION: CLINICAL_PROGRESSION: GRADUALLY IMPROVING

## 2018-10-25 ASSESSMENT — PAIN DESCRIPTION - FREQUENCY: FREQUENCY: INTERMITTENT

## 2018-10-25 ASSESSMENT — PAIN DESCRIPTION - ONSET: ONSET: GRADUAL

## 2018-10-25 NOTE — PROGRESS NOTES
Attention Dr Sahra Rene: The patient's current CrCl is 65 mL/min and is currently ordered Lovenox 30 mg daily. According to official package labeling, if the patient's CrCl is greater than or equal to 30 mL/min, the recommended dose for Lovenox for DVT prophylaxis is 40 mg daily.   Charles Villalobos [facility]  10/25/2018  12:03 AM

## 2018-10-25 NOTE — CONSULTS
[address] [place] [address] [facility]    Consultation Note     Admit Date: 10/24/2018  3:37 PM    Reason for Consult:  orchitis  Attending Physician:  Ev Pleitez DO       Chief Complaint   Patient presents with    Groin Swelling     left testicle pain, swelling and redness, onset 2 weeks ago         History Obtained From:  For a detailed history please see previous and current available medical records. HISTORY OF PRESENT ILLNESS:             The patient is a 47 y.o. male admitted from home with L groin and scrotum swelling, redness and severe pain worsening over the last 2 weeks. He has h/o robotic assisted laparoscopic prostatectomy at age 51 by Dr Dowd in 2014. Since then he had stress incontinence and required an artifical urinary sphincter inserted on 5/3/17. He was recently in the hospital after suicide attempt and had a oneill catheter placement 2 weeks ago. Reports having significant pain while the catheter was removed. He had more frequency and urgency following this, but no blood in the urine. He is still incontinent of urine. ID was consulted from ER last night and was started ion zyvox and cefepime. He received one dose of iv vancomycin and rocephin in the ER. Past Medical History:        Diagnosis Date    ADD (attention deficit disorder)     Anxiety     Bipolar 1 disorder ([address] 11/19/2017    Cancer ([address] prostate cancer    2014 / treated with surgery    [facility]'s [facility]     Depression     GERD (gastroesophageal reflux disease)     Panic attack     Rectal pain     has a fissure    Schizo affective schizophrenia ([address]     stable     Past Surgical History:        Procedure Laterality Date    COLONOSCOPY      ENDOSCOPY, COLON, DIAGNOSTIC      NOSE SURGERY  2002? deviated septum    PROSTATECTOMY  9/15/2014    laparoscopic robotic assisted.      Current Medications:   Scheduled Meds:   enoxaparin  30 mg Subcutaneous Daily    divalproex  500 mg Oral BID   
sensory deficits in the 4 quadrant extremities   Rectal: deferred  Genitalia:  There is a left scrotal swelling with erythema but there is no crepitance. The pump for the urinary sphincter is deep in the hemiscrotum and I could feel it and I was able to deactivate it feeling both  the pump and the deactivation bead    Labs:     Recent Labs      10/24/18   1720   WBC  12.9*   RBC  4.41   HGB  13.5   HCT  40.7   MCV  92.3   MCH  30.6   MCHC  33.2   RDW  13.4   PLT  379   MPV  9.2         Recent Labs      10/24/18   1720   CREATININE  1.6*         Assessment:  Aroldotyra Beebe 47 y.o. male     I think when the patient had his Castillo catheter 2 weeks ago and he may have had some disruption of the mucosa during that admission. I think he subsequently has developed infection from the urethra along the pump and the hemiscrotum and then up through the scrotum to the inguinal area around the reservoir    Plan: We will treat him with antibiotics at this point and nonsurgical management.  At some point he may need cystoscopy and explantation  of the AUS system    Electronically signed by Rere Gunter MD on 10/24/2018 at 9:48 PM

## 2018-10-25 NOTE — PLAN OF CARE
Problem: Pain:  Goal: Pain level will decrease  Pain level will decrease   Outcome: Met This Shift    Goal: Control of acute pain  Control of acute pain   Outcome: Met This Shift      Problem:  Body Temperature - Imbalanced:  Goal: Ability to maintain a body temperature in the normal range will improve  Ability to maintain a body temperature in the normal range will improve  Outcome: Met This Shift      Problem: Pain:  Goal: Pain level will decrease  Pain level will decrease   Outcome: Met This Shift

## 2018-10-26 VITALS
OXYGEN SATURATION: 95 % | HEIGHT: 75 IN | RESPIRATION RATE: 16 BRPM | WEIGHT: 208 LBS | HEART RATE: 76 BPM | BODY MASS INDEX: 25.86 KG/M2 | TEMPERATURE: 97.5 F | DIASTOLIC BLOOD PRESSURE: 84 MMHG | SYSTOLIC BLOOD PRESSURE: 120 MMHG

## 2018-10-26 PROBLEM — E44.0 MODERATE PROTEIN-CALORIE MALNUTRITION (HCC): Chronic | Status: ACTIVE | Noted: 2018-10-26

## 2018-10-26 LAB
ORGANISM: ABNORMAL
URINE CULTURE, ROUTINE: ABNORMAL
URINE CULTURE, ROUTINE: ABNORMAL

## 2018-10-26 PROCEDURE — 6370000000 HC RX 637 (ALT 250 FOR IP): Performed by: FAMILY MEDICINE

## 2018-10-26 PROCEDURE — 6360000002 HC RX W HCPCS: Performed by: FAMILY MEDICINE

## 2018-10-26 PROCEDURE — 6360000002 HC RX W HCPCS: Performed by: INTERNAL MEDICINE

## 2018-10-26 PROCEDURE — 2580000003 HC RX 258

## 2018-10-26 PROCEDURE — 2580000003 HC RX 258: Performed by: INTERNAL MEDICINE

## 2018-10-26 RX ORDER — SODIUM CHLORIDE 0.9 % (FLUSH) 0.9 %
SYRINGE (ML) INJECTION
Status: COMPLETED
Start: 2018-10-26 | End: 2018-10-26

## 2018-10-26 RX ORDER — CLOMIPRAMINE HYDROCHLORIDE 25 MG/1
25 CAPSULE ORAL
Status: ON HOLD | COMMUNITY
End: 2018-11-23 | Stop reason: HOSPADM

## 2018-10-26 RX ORDER — CIPROFLOXACIN 500 MG/1
500 TABLET, FILM COATED ORAL EVERY 12 HOURS SCHEDULED
Status: DISCONTINUED | OUTPATIENT
Start: 2018-10-26 | End: 2018-10-26 | Stop reason: HOSPADM

## 2018-10-26 RX ORDER — OLANZAPINE 5 MG/1
5 TABLET ORAL NIGHTLY
COMMUNITY
End: 2018-11-20 | Stop reason: ALTCHOICE

## 2018-10-26 RX ORDER — CLOMIPRAMINE HYDROCHLORIDE 75 MG/1
75 CAPSULE ORAL NIGHTLY
Status: ON HOLD | COMMUNITY
End: 2018-11-23 | Stop reason: HOSPADM

## 2018-10-26 RX ORDER — CIPROFLOXACIN 500 MG/1
500 TABLET, FILM COATED ORAL EVERY 12 HOURS SCHEDULED
Qty: 28 TABLET | Refills: 0 | Status: SHIPPED | OUTPATIENT
Start: 2018-10-26 | End: 2018-11-09

## 2018-10-26 RX ORDER — OLANZAPINE 10 MG/1
10 TABLET ORAL
COMMUNITY
End: 2018-11-20 | Stop reason: ALTCHOICE

## 2018-10-26 RX ORDER — CLOMIPRAMINE HYDROCHLORIDE 75 MG/1
75 CAPSULE ORAL
Status: ON HOLD | COMMUNITY
End: 2018-11-23 | Stop reason: HOSPADM

## 2018-10-26 RX ORDER — HYDROXYZINE PAMOATE 25 MG/1
25 CAPSULE ORAL 3 TIMES DAILY PRN
COMMUNITY
End: 2018-12-19

## 2018-10-26 RX ORDER — SODIUM CHLORIDE 0.9 % (FLUSH) 0.9 %
SYRINGE (ML) INJECTION
Status: DISCONTINUED
Start: 2018-10-26 | End: 2018-10-26 | Stop reason: HOSPADM

## 2018-10-26 RX ORDER — CLOMIPRAMINE HYDROCHLORIDE 25 MG/1
25 CAPSULE ORAL NIGHTLY
Status: DISCONTINUED | OUTPATIENT
Start: 2018-10-26 | End: 2018-10-26 | Stop reason: HOSPADM

## 2018-10-26 RX ADMIN — FLUTICASONE PROPIONATE 1 SPRAY: 50 SPRAY, METERED NASAL at 09:16

## 2018-10-26 RX ADMIN — MORPHINE SULFATE 2 MG: 2 INJECTION, SOLUTION INTRAMUSCULAR; INTRAVENOUS at 09:12

## 2018-10-26 RX ADMIN — CLOMIPRAMINE HYDROCHLORIDE 75 MG: 50 CAPSULE ORAL at 09:15

## 2018-10-26 RX ADMIN — PROPRANOLOL HYDROCHLORIDE 10 MG: 20 TABLET ORAL at 09:16

## 2018-10-26 RX ADMIN — ENOXAPARIN SODIUM 30 MG: 100 INJECTION SUBCUTANEOUS at 09:16

## 2018-10-26 RX ADMIN — ALPRAZOLAM 2 MG: 1 TABLET ORAL at 09:16

## 2018-10-26 RX ADMIN — Medication 10 ML: at 12:33

## 2018-10-26 RX ADMIN — MORPHINE SULFATE 2 MG: 2 INJECTION, SOLUTION INTRAMUSCULAR; INTRAVENOUS at 12:32

## 2018-10-26 RX ADMIN — MORPHINE SULFATE 2 MG: 2 INJECTION, SOLUTION INTRAMUSCULAR; INTRAVENOUS at 00:16

## 2018-10-26 RX ADMIN — CEFEPIME 1 G: 1 INJECTION, POWDER, FOR SOLUTION INTRAMUSCULAR; INTRAVENOUS at 07:01

## 2018-10-26 RX ADMIN — SODIUM CHLORIDE, PRESERVATIVE FREE: 5 INJECTION INTRAVENOUS at 12:51

## 2018-10-26 RX ADMIN — FERROUS SULFATE TAB 325 MG (65 MG ELEMENTAL FE) 325 MG: 325 (65 FE) TAB at 09:16

## 2018-10-26 RX ADMIN — PAROXETINE HYDROCHLORIDE HEMIHYDRATE 10 MG: 10 TABLET, FILM COATED ORAL at 09:15

## 2018-10-26 RX ADMIN — MORPHINE SULFATE 2 MG: 2 INJECTION, SOLUTION INTRAMUSCULAR; INTRAVENOUS at 04:33

## 2018-10-26 RX ADMIN — DIVALPROEX SODIUM 500 MG: 500 TABLET, FILM COATED, EXTENDED RELEASE ORAL at 09:16

## 2018-10-26 RX ADMIN — LINEZOLID 600 MG: 600 INJECTION, SOLUTION INTRAVENOUS at 11:12

## 2018-10-26 RX ADMIN — CEFEPIME 1 G: 1 INJECTION, POWDER, FOR SOLUTION INTRAMUSCULAR; INTRAVENOUS at 00:10

## 2018-10-26 ASSESSMENT — PAIN DESCRIPTION - FREQUENCY
FREQUENCY: CONTINUOUS

## 2018-10-26 ASSESSMENT — PAIN DESCRIPTION - PAIN TYPE
TYPE: ACUTE PAIN

## 2018-10-26 ASSESSMENT — PAIN DESCRIPTION - DESCRIPTORS
DESCRIPTORS: ACHING;SHARP;STABBING;CONSTANT
DESCRIPTORS: ACHING;SHARP;STABBING;CONSTANT
DESCRIPTORS: ACHING;CONSTANT;SHARP;BURNING

## 2018-10-26 ASSESSMENT — PAIN DESCRIPTION - LOCATION
LOCATION: SCROTUM

## 2018-10-26 ASSESSMENT — PAIN SCALES - GENERAL
PAINLEVEL_OUTOF10: 8
PAINLEVEL_OUTOF10: 4
PAINLEVEL_OUTOF10: 9
PAINLEVEL_OUTOF10: 9
PAINLEVEL_OUTOF10: 0
PAINLEVEL_OUTOF10: 9
PAINLEVEL_OUTOF10: 9
PAINLEVEL_OUTOF10: 0

## 2018-10-26 ASSESSMENT — PAIN DESCRIPTION - PROGRESSION
CLINICAL_PROGRESSION: NOT CHANGED

## 2018-10-26 ASSESSMENT — PAIN DESCRIPTION - ONSET
ONSET: ON-GOING

## 2018-10-26 ASSESSMENT — PAIN DESCRIPTION - ORIENTATION
ORIENTATION: LEFT

## 2018-10-26 NOTE — PLAN OF CARE
Problem: Pain:  Goal: Pain level will decrease  Pain level will decrease    Outcome: Met This Shift    Goal: Control of acute pain  Control of acute pain   Outcome: Met This Shift      Problem: Pain:  Goal: Pain level will decrease  Pain level will decrease    Outcome: Met This Shift

## 2018-10-26 NOTE — PLAN OF CARE
Problem: Skin Integrity/Risk  Goal: No skin breakdown during hospitalization  Outcome: Met This Shift

## 2018-10-26 NOTE — PROGRESS NOTES
Admit Date: 10/24/2018      Subjective:     Patient: no acute issues reported overnight  Medication side effects: none    Scheduled Meds:   enoxaparin  30 mg Subcutaneous Daily    divalproex  500 mg Oral BID    ferrous sulfate  325 mg Oral Daily with breakfast    fluticasone  1 spray Nasal Daily    lurasidone  80 mg Oral Nightly    propranolol  10 mg Oral BID    PARoxetine  10 mg Oral Daily    ALPRAZolam  2 mg Oral BID    linezolid  600 mg Intravenous Q12H    cefepime  1 g Intravenous Q8H     Continuous Infusions:  PRN Meds:morphine    Objective:   I/O last 3 completed shifts: In: 600 [P.O.:600]  Out: -   No intake/output data recorded.     /75   Pulse 72   Temp 98.8 °F (37.1 °C) (Oral)   Resp 16   Ht 6' 3\" (1.905 m)   Wt 211 lb 6.4 oz (95.9 kg)   SpO2 97%   BMI 26.42 kg/m²   General appearance: alert, appears stated age and cooperative  Skin:negative  Heent:negative  Lungs: clear to auscultation bilaterally  Heart: regular rate and rhythm, S1, S2 normal, no murmur, click, rub or gallop  Abdomen: soft, non-tender; bowel sounds normal; no masses,  no organomegaly  Extremities:no edema  Neurologic: Grossly normal    Labs:  CBC with Differential:    Lab Results   Component Value Date    WBC 12.9 10/24/2018    RBC 4.41 10/24/2018    HGB 13.5 10/24/2018    HCT 40.7 10/24/2018     10/24/2018    MCV 92.3 10/24/2018    MCH 30.6 10/24/2018    MCHC 33.2 10/24/2018    RDW 13.4 10/24/2018    NRBC 0.0 07/08/2017    BANDSPCT 1 09/17/2014    METASPCT 1.7 07/08/2017    LYMPHOPCT 15.7 10/24/2018    MONOPCT 10.0 10/24/2018    BASOPCT 0.6 10/24/2018    MONOSABS 1.29 10/24/2018    LYMPHSABS 2.03 10/24/2018    EOSABS 0.09 10/24/2018    BASOSABS 0.08 10/24/2018     BMP:    Lab Results   Component Value Date     10/24/2018    K 4.9 10/24/2018    CL 99 10/24/2018    CO2 23 10/24/2018    BUN 20 10/24/2018    LABALBU 3.9 10/24/2018    LABALBU 3.5 02/05/2011    CREATININE 1.6 10/24/2018    CALCIUM 9.3 10/24/2018    GFRAA 55 10/24/2018    LABGLOM 46 10/24/2018     PT/INR:    Lab Results   Component Value Date    PROTIME 11.7 01/20/2018    PROTIME 11.5 02/05/2011    INR 1.1 01/20/2018     Last 3 Troponin:    Lab Results   Component Value Date    TROPONINI <0.01 10/24/2018    TROPONINI <0.01 11/16/2017    TROPONINI <0.01 11/07/2017     TSH:    Lab Results   Component Value Date    TSH 4.710 10/03/2018      Assessment:     1. L scrotal cellulitis  2.  Bipolar disorder    Plan:       Continue same plan and orders

## 2018-10-26 NOTE — PROGRESS NOTES
Dr. Ainsley Abernathy will put in discharge orders later today, patient will not be discharged with pain medication, just alternate tylenol and ibuprofen.  Electronically signed by Millicent Olivier RN on 10/26/2018 at 2:46 PM

## 2018-10-29 LAB
BLOOD CULTURE, ROUTINE: NORMAL
CULTURE, BLOOD 2: NORMAL

## 2018-11-20 ENCOUNTER — HOSPITAL ENCOUNTER (INPATIENT)
Age: 53
LOS: 3 days | Discharge: HOME | DRG: 885 | End: 2018-11-23
Attending: EMERGENCY MEDICINE | Admitting: PSYCHIATRY & NEUROLOGY
Payer: MEDICARE

## 2018-11-20 DIAGNOSIS — F25.0 SCHIZOAFFECTIVE DISORDER, BIPOLAR TYPE (HCC): ICD-10-CM

## 2018-11-20 DIAGNOSIS — F23 ACUTE PSYCHOSIS (HCC): Primary | ICD-10-CM

## 2018-11-20 LAB
ACETAMINOPHEN LEVEL: <5 MCG/ML (ref 10–30)
ALBUMIN SERPL-MCNC: 3.9 G/DL (ref 3.5–5.2)
ALP BLD-CCNC: 74 U/L (ref 40–129)
ALT SERPL-CCNC: 8 U/L (ref 0–40)
AMPHETAMINE SCREEN, URINE: NOT DETECTED
ANION GAP SERPL CALCULATED.3IONS-SCNC: 15 MMOL/L (ref 7–16)
AST SERPL-CCNC: 9 U/L (ref 0–39)
BARBITURATE SCREEN URINE: NOT DETECTED
BASOPHILS ABSOLUTE: 0.02 E9/L (ref 0–0.2)
BASOPHILS RELATIVE PERCENT: 0.3 % (ref 0–2)
BENZODIAZEPINE SCREEN, URINE: NOT DETECTED
BILIRUB SERPL-MCNC: <0.2 MG/DL (ref 0–1.2)
BUN BLDV-MCNC: 27 MG/DL (ref 6–20)
CALCIUM SERPL-MCNC: 9.1 MG/DL (ref 8.6–10.2)
CANNABINOID SCREEN URINE: NOT DETECTED
CHLORIDE BLD-SCNC: 100 MMOL/L (ref 98–107)
CO2: 22 MMOL/L (ref 22–29)
COCAINE METABOLITE SCREEN URINE: NOT DETECTED
CREAT SERPL-MCNC: 1.3 MG/DL (ref 0.7–1.2)
EKG ATRIAL RATE: 73 BPM
EKG P AXIS: 51 DEGREES
EKG P-R INTERVAL: 144 MS
EKG Q-T INTERVAL: 356 MS
EKG QRS DURATION: 76 MS
EKG QTC CALCULATION (BAZETT): 392 MS
EKG R AXIS: 35 DEGREES
EKG T AXIS: 57 DEGREES
EKG VENTRICULAR RATE: 73 BPM
EOSINOPHILS ABSOLUTE: 0.02 E9/L (ref 0.05–0.5)
EOSINOPHILS RELATIVE PERCENT: 0.3 % (ref 0–6)
ETHANOL: <10 MG/DL (ref 0–0.08)
GFR AFRICAN AMERICAN: >60
GFR NON-AFRICAN AMERICAN: 58 ML/MIN/1.73
GLUCOSE BLD-MCNC: 116 MG/DL (ref 74–99)
HCT VFR BLD CALC: 40.2 % (ref 37–54)
HEMOGLOBIN: 13 G/DL (ref 12.5–16.5)
IMMATURE GRANULOCYTES #: 0.04 E9/L
IMMATURE GRANULOCYTES %: 0.5 % (ref 0–5)
LYMPHOCYTES ABSOLUTE: 0.95 E9/L (ref 1.5–4)
LYMPHOCYTES RELATIVE PERCENT: 12 % (ref 20–42)
MCH RBC QN AUTO: 29.6 PG (ref 26–35)
MCHC RBC AUTO-ENTMCNC: 32.3 % (ref 32–34.5)
MCV RBC AUTO: 91.6 FL (ref 80–99.9)
METHADONE SCREEN, URINE: NOT DETECTED
MONOCYTES ABSOLUTE: 0.31 E9/L (ref 0.1–0.95)
MONOCYTES RELATIVE PERCENT: 3.9 % (ref 2–12)
NEUTROPHILS ABSOLUTE: 6.59 E9/L (ref 1.8–7.3)
NEUTROPHILS RELATIVE PERCENT: 83 % (ref 43–80)
OPIATE SCREEN URINE: NOT DETECTED
PDW BLD-RTO: 13.2 FL (ref 11.5–15)
PHENCYCLIDINE SCREEN URINE: NOT DETECTED
PLATELET # BLD: 265 E9/L (ref 130–450)
PMV BLD AUTO: 9.7 FL (ref 7–12)
POTASSIUM SERPL-SCNC: 4.1 MMOL/L (ref 3.5–5)
PROPOXYPHENE SCREEN: NOT DETECTED
RBC # BLD: 4.39 E12/L (ref 3.8–5.8)
SALICYLATE, SERUM: <0.3 MG/DL (ref 0–30)
SODIUM BLD-SCNC: 137 MMOL/L (ref 132–146)
TOTAL PROTEIN: 6.5 G/DL (ref 6.4–8.3)
TRICYCLIC ANTIDEPRESSANTS SCREEN SERUM: NEGATIVE NG/ML
TROPONIN: <0.01 NG/ML (ref 0–0.03)
VALPROIC ACID LEVEL: 52 MCG/ML (ref 50–100)
WBC # BLD: 7.9 E9/L (ref 4.5–11.5)

## 2018-11-20 PROCEDURE — 84484 ASSAY OF TROPONIN QUANT: CPT

## 2018-11-20 PROCEDURE — 1240000000 HC EMOTIONAL WELLNESS R&B

## 2018-11-20 PROCEDURE — 99284 EMERGENCY DEPT VISIT MOD MDM: CPT

## 2018-11-20 PROCEDURE — 36415 COLL VENOUS BLD VENIPUNCTURE: CPT

## 2018-11-20 PROCEDURE — 93005 ELECTROCARDIOGRAM TRACING: CPT | Performed by: EMERGENCY MEDICINE

## 2018-11-20 PROCEDURE — G0480 DRUG TEST DEF 1-7 CLASSES: HCPCS

## 2018-11-20 PROCEDURE — 80053 COMPREHEN METABOLIC PANEL: CPT

## 2018-11-20 PROCEDURE — 80164 ASSAY DIPROPYLACETIC ACD TOT: CPT

## 2018-11-20 PROCEDURE — 80307 DRUG TEST PRSMV CHEM ANLYZR: CPT

## 2018-11-20 PROCEDURE — 85025 COMPLETE CBC W/AUTO DIFF WBC: CPT

## 2018-11-20 RX ORDER — TRAZODONE HYDROCHLORIDE 50 MG/1
50 TABLET ORAL NIGHTLY PRN
Status: DISCONTINUED | OUTPATIENT
Start: 2018-11-20 | End: 2018-11-23 | Stop reason: HOSPADM

## 2018-11-20 RX ORDER — HALOPERIDOL 5 MG/ML
10 INJECTION INTRAMUSCULAR EVERY 6 HOURS PRN
Status: DISCONTINUED | OUTPATIENT
Start: 2018-11-20 | End: 2018-11-23 | Stop reason: HOSPADM

## 2018-11-20 RX ORDER — BENZTROPINE MESYLATE 1 MG/ML
2 INJECTION INTRAMUSCULAR; INTRAVENOUS 2 TIMES DAILY PRN
Status: DISCONTINUED | OUTPATIENT
Start: 2018-11-20 | End: 2018-11-23 | Stop reason: HOSPADM

## 2018-11-20 RX ORDER — ACETAMINOPHEN 325 MG/1
650 TABLET ORAL EVERY 4 HOURS PRN
Status: DISCONTINUED | OUTPATIENT
Start: 2018-11-20 | End: 2018-11-23 | Stop reason: HOSPADM

## 2018-11-20 RX ORDER — MAGNESIUM HYDROXIDE/ALUMINUM HYDROXICE/SIMETHICONE 120; 1200; 1200 MG/30ML; MG/30ML; MG/30ML
30 SUSPENSION ORAL PRN
Status: DISCONTINUED | OUTPATIENT
Start: 2018-11-20 | End: 2018-11-23 | Stop reason: HOSPADM

## 2018-11-20 RX ORDER — HYDROXYZINE PAMOATE 50 MG/1
50 CAPSULE ORAL EVERY 6 HOURS PRN
Status: DISCONTINUED | OUTPATIENT
Start: 2018-11-20 | End: 2018-11-23 | Stop reason: HOSPADM

## 2018-11-20 RX ORDER — OLANZAPINE 10 MG/1
10 TABLET ORAL
Status: COMPLETED | OUTPATIENT
Start: 2018-11-20 | End: 2018-11-21

## 2018-11-20 ASSESSMENT — SLEEP AND FATIGUE QUESTIONNAIRES
DO YOU HAVE DIFFICULTY SLEEPING: YES
DIFFICULTY STAYING ASLEEP: YES
DIFFICULTY ARISING: YES
AVERAGE NUMBER OF SLEEP HOURS: 2
RESTFUL SLEEP: NO
DIFFICULTY FALLING ASLEEP: YES
DO YOU USE A SLEEP AID: NO

## 2018-11-20 ASSESSMENT — PAIN SCALES - GENERAL
PAINLEVEL_OUTOF10: 0
PAINLEVEL_OUTOF10: 6

## 2018-11-20 ASSESSMENT — PAIN DESCRIPTION - ORIENTATION: ORIENTATION: LEFT

## 2018-11-20 ASSESSMENT — PAIN DESCRIPTION - DIRECTION: RADIATING_TOWARDS: NON RADIATING

## 2018-11-20 ASSESSMENT — LIFESTYLE VARIABLES: HISTORY_ALCOHOL_USE: NO

## 2018-11-20 ASSESSMENT — PAIN DESCRIPTION - DESCRIPTORS: DESCRIPTORS: ACHING;SORE

## 2018-11-20 ASSESSMENT — PAIN DESCRIPTION - FREQUENCY: FREQUENCY: INTERMITTENT

## 2018-11-20 ASSESSMENT — PAIN DESCRIPTION - LOCATION: LOCATION: CHEST

## 2018-11-20 ASSESSMENT — PAIN DESCRIPTION - ONSET: ONSET: ON-GOING

## 2018-11-20 ASSESSMENT — PATIENT HEALTH QUESTIONNAIRE - PHQ9: SUM OF ALL RESPONSES TO PHQ QUESTIONS 1-9: 24

## 2018-11-20 ASSESSMENT — PAIN DESCRIPTION - PAIN TYPE: TYPE: CHRONIC PAIN

## 2018-11-20 NOTE — ED NOTES
UPON MEDICAL CLEARANCE, VANESSA WILL PROCEED WITH ADMISSION FOR IN PT [address] [place], [place]  11/20/18 [id number]

## 2018-11-20 NOTE — ED NOTES
Pt is stable and alert. Pt reports feeling psychotic. Denies suicidal / homicidal ideations presently. Pt denies hallucinations. Pt cooperative. Pt is not violent at this time. Though reports being violent at home ripping things up. Not violent towards wife. Will follow and monitor.       Delfino Quintero RN  11/20/18 [place]  11/20/18 [id number]

## 2018-11-21 PROBLEM — F25.0 SCHIZOAFFECTIVE DISORDER, BIPOLAR TYPE (HCC): Status: ACTIVE | Noted: 2018-11-21

## 2018-11-21 PROBLEM — F31.2 BIPOLAR DISORDER, CURRENT EPISODE MANIC, SEVERE WITH PSYCHOTIC FEATURES (HCC): Status: ACTIVE | Noted: 2018-11-21

## 2018-11-21 PROCEDURE — 6370000000 HC RX 637 (ALT 250 FOR IP): Performed by: NURSE PRACTITIONER

## 2018-11-21 PROCEDURE — 6360000002 HC RX W HCPCS: Performed by: NURSE PRACTITIONER

## 2018-11-21 PROCEDURE — 6370000000 HC RX 637 (ALT 250 FOR IP)

## 2018-11-21 PROCEDURE — 1240000000 HC EMOTIONAL WELLNESS R&B

## 2018-11-21 PROCEDURE — 99222 1ST HOSP IP/OBS MODERATE 55: CPT | Performed by: NURSE PRACTITIONER

## 2018-11-21 PROCEDURE — 6370000000 HC RX 637 (ALT 250 FOR IP): Performed by: PSYCHIATRY & NEUROLOGY

## 2018-11-21 RX ORDER — PAROXETINE HYDROCHLORIDE 20 MG/1
20 TABLET, FILM COATED ORAL 2 TIMES DAILY
Status: DISCONTINUED | OUTPATIENT
Start: 2018-11-21 | End: 2018-11-23 | Stop reason: HOSPADM

## 2018-11-21 RX ORDER — OLANZAPINE 10 MG/1
TABLET ORAL
Status: COMPLETED
Start: 2018-11-21 | End: 2018-11-21

## 2018-11-21 RX ORDER — PAROXETINE 10 MG/1
10 TABLET, FILM COATED ORAL DAILY
Status: DISCONTINUED | OUTPATIENT
Start: 2018-11-21 | End: 2018-11-21

## 2018-11-21 RX ORDER — GABAPENTIN 300 MG/1
300 CAPSULE ORAL 3 TIMES DAILY
Status: DISCONTINUED | OUTPATIENT
Start: 2018-11-21 | End: 2018-11-23 | Stop reason: HOSPADM

## 2018-11-21 RX ORDER — FLUTICASONE PROPIONATE 50 MCG
1 SPRAY, SUSPENSION (ML) NASAL DAILY
Status: DISCONTINUED | OUTPATIENT
Start: 2018-11-21 | End: 2018-11-23 | Stop reason: HOSPADM

## 2018-11-21 RX ORDER — DIVALPROEX SODIUM 500 MG/1
500 TABLET, EXTENDED RELEASE ORAL 2 TIMES DAILY
Status: DISCONTINUED | OUTPATIENT
Start: 2018-11-21 | End: 2018-11-23 | Stop reason: HOSPADM

## 2018-11-21 RX ORDER — PERPHENAZINE 2 MG/1
2 TABLET, FILM COATED ORAL 2 TIMES DAILY WITH MEALS
Status: DISCONTINUED | OUTPATIENT
Start: 2018-11-21 | End: 2018-11-22

## 2018-11-21 RX ORDER — PROPRANOLOL HYDROCHLORIDE 10 MG/1
10 TABLET ORAL 2 TIMES DAILY
Status: DISCONTINUED | OUTPATIENT
Start: 2018-11-21 | End: 2018-11-23 | Stop reason: HOSPADM

## 2018-11-21 RX ADMIN — OLANZAPINE 10 MG: 10 TABLET ORAL at 00:32

## 2018-11-21 RX ADMIN — TRAZODONE HYDROCHLORIDE 50 MG: 50 TABLET ORAL at 20:58

## 2018-11-21 RX ADMIN — HALOPERIDOL LACTATE 10 MG: 5 INJECTION, SOLUTION INTRAMUSCULAR at 00:26

## 2018-11-21 RX ADMIN — PAROXETINE 20 MG: 20 TABLET, FILM COATED ORAL at 09:38

## 2018-11-21 RX ADMIN — GABAPENTIN 300 MG: 300 CAPSULE ORAL at 09:38

## 2018-11-21 RX ADMIN — GABAPENTIN 300 MG: 300 CAPSULE ORAL at 20:58

## 2018-11-21 RX ADMIN — PROPRANOLOL HYDROCHLORIDE 10 MG: 10 TABLET ORAL at 09:38

## 2018-11-21 RX ADMIN — PAROXETINE 20 MG: 20 TABLET, FILM COATED ORAL at 20:58

## 2018-11-21 RX ADMIN — GABAPENTIN 300 MG: 300 CAPSULE ORAL at 14:41

## 2018-11-21 RX ADMIN — TRAZODONE HYDROCHLORIDE 50 MG: 50 TABLET ORAL at 00:26

## 2018-11-21 RX ADMIN — DIVALPROEX SODIUM 500 MG: 500 TABLET, EXTENDED RELEASE ORAL at 09:38

## 2018-11-21 RX ADMIN — DIVALPROEX SODIUM 500 MG: 500 TABLET, EXTENDED RELEASE ORAL at 20:58

## 2018-11-21 RX ADMIN — OLANZAPINE 10 MG: 10 TABLET, FILM COATED ORAL at 00:32

## 2018-11-21 RX ADMIN — PROPRANOLOL HYDROCHLORIDE 10 MG: 10 TABLET ORAL at 20:58

## 2018-11-21 RX ADMIN — PERPHENAZINE 2 MG: 2 TABLET, FILM COATED ORAL at 13:05

## 2018-11-21 RX ADMIN — FLUTICASONE PROPIONATE 1 SPRAY: 50 SPRAY, METERED NASAL at 08:00

## 2018-11-21 ASSESSMENT — SLEEP AND FATIGUE QUESTIONNAIRES
DIFFICULTY FALLING ASLEEP: YES
AVERAGE NUMBER OF SLEEP HOURS: 0
SLEEP PATTERN: DISTURBED/INTERRUPTED SLEEP;INSOMNIA;NIGHTMARES/TERRORS
DO YOU HAVE DIFFICULTY SLEEPING: YES
RESTFUL SLEEP: NO
DO YOU USE A SLEEP AID: NO
DIFFICULTY ARISING: YES
DIFFICULTY STAYING ASLEEP: YES

## 2018-11-21 ASSESSMENT — LIFESTYLE VARIABLES: HISTORY_ALCOHOL_USE: NO

## 2018-11-21 ASSESSMENT — PATIENT HEALTH QUESTIONNAIRE - PHQ9: SUM OF ALL RESPONSES TO PHQ QUESTIONS 1-9: 25

## 2018-11-22 PROBLEM — Z86.002 H/O CARCINOMA IN SITU OF PROSTATE: Chronic | Status: ACTIVE | Noted: 2018-11-22

## 2018-11-22 PROBLEM — K50.90 CROHN'S DISEASE (HCC): Chronic | Status: ACTIVE | Noted: 2018-11-22

## 2018-11-22 PROCEDURE — 6370000000 HC RX 637 (ALT 250 FOR IP): Performed by: PSYCHIATRY & NEUROLOGY

## 2018-11-22 PROCEDURE — 99232 SBSQ HOSP IP/OBS MODERATE 35: CPT | Performed by: NURSE PRACTITIONER

## 2018-11-22 PROCEDURE — 6370000000 HC RX 637 (ALT 250 FOR IP): Performed by: FAMILY MEDICINE

## 2018-11-22 PROCEDURE — 6370000000 HC RX 637 (ALT 250 FOR IP): Performed by: NURSE PRACTITIONER

## 2018-11-22 PROCEDURE — 1240000000 HC EMOTIONAL WELLNESS R&B

## 2018-11-22 RX ORDER — PERPHENAZINE 4 MG/1
4 TABLET, FILM COATED ORAL 2 TIMES DAILY WITH MEALS
Status: DISCONTINUED | OUTPATIENT
Start: 2018-11-22 | End: 2018-11-23 | Stop reason: HOSPADM

## 2018-11-22 RX ORDER — PREDNISONE 20 MG/1
20 TABLET ORAL ONCE
Status: COMPLETED | OUTPATIENT
Start: 2018-11-22 | End: 2018-11-22

## 2018-11-22 RX ADMIN — HYDROXYZINE PAMOATE 50 MG: 50 CAPSULE ORAL at 23:46

## 2018-11-22 RX ADMIN — GABAPENTIN 300 MG: 300 CAPSULE ORAL at 13:37

## 2018-11-22 RX ADMIN — PROPRANOLOL HYDROCHLORIDE 10 MG: 10 TABLET ORAL at 09:11

## 2018-11-22 RX ADMIN — PERPHENAZINE 4 MG: 2 TABLET, FILM COATED ORAL at 17:01

## 2018-11-22 RX ADMIN — GABAPENTIN 300 MG: 300 CAPSULE ORAL at 21:12

## 2018-11-22 RX ADMIN — GABAPENTIN 300 MG: 300 CAPSULE ORAL at 09:10

## 2018-11-22 RX ADMIN — FLUTICASONE PROPIONATE 1 SPRAY: 50 SPRAY, METERED NASAL at 09:10

## 2018-11-22 RX ADMIN — PROPRANOLOL HYDROCHLORIDE 10 MG: 10 TABLET ORAL at 21:11

## 2018-11-22 RX ADMIN — PAROXETINE 20 MG: 20 TABLET, FILM COATED ORAL at 09:10

## 2018-11-22 RX ADMIN — TRAZODONE HYDROCHLORIDE 50 MG: 50 TABLET ORAL at 21:12

## 2018-11-22 RX ADMIN — DIVALPROEX SODIUM 500 MG: 500 TABLET, EXTENDED RELEASE ORAL at 21:11

## 2018-11-22 RX ADMIN — PAROXETINE 20 MG: 20 TABLET, FILM COATED ORAL at 21:11

## 2018-11-22 RX ADMIN — DIVALPROEX SODIUM 500 MG: 500 TABLET, EXTENDED RELEASE ORAL at 09:10

## 2018-11-22 RX ADMIN — PREDNISONE 20 MG: 20 TABLET ORAL at 17:18

## 2018-11-22 ASSESSMENT — PAIN SCALES - GENERAL: PAINLEVEL_OUTOF10: 0

## 2018-11-23 VITALS
DIASTOLIC BLOOD PRESSURE: 73 MMHG | HEIGHT: 75 IN | BODY MASS INDEX: 25.86 KG/M2 | SYSTOLIC BLOOD PRESSURE: 117 MMHG | OXYGEN SATURATION: 97 % | WEIGHT: 208 LBS | HEART RATE: 81 BPM | RESPIRATION RATE: 14 BRPM | TEMPERATURE: 97.4 F

## 2018-11-23 PROCEDURE — 6370000000 HC RX 637 (ALT 250 FOR IP): Performed by: NURSE PRACTITIONER

## 2018-11-23 PROCEDURE — 6370000000 HC RX 637 (ALT 250 FOR IP): Performed by: PSYCHIATRY & NEUROLOGY

## 2018-11-23 PROCEDURE — 99238 HOSP IP/OBS DSCHRG MGMT 30/<: CPT | Performed by: NURSE PRACTITIONER

## 2018-11-23 PROCEDURE — 6370000000 HC RX 637 (ALT 250 FOR IP): Performed by: FAMILY MEDICINE

## 2018-11-23 RX ORDER — PAROXETINE HYDROCHLORIDE 40 MG/1
40 TABLET, FILM COATED ORAL EVERY MORNING
Qty: 30 TABLET | Refills: 0 | Status: SHIPPED | OUTPATIENT
Start: 2018-11-23 | End: 2018-12-19

## 2018-11-23 RX ORDER — GABAPENTIN 300 MG/1
300 CAPSULE ORAL 3 TIMES DAILY
Qty: 90 CAPSULE | Refills: 0 | Status: SHIPPED | OUTPATIENT
Start: 2018-11-23 | End: 2018-12-27 | Stop reason: ALTCHOICE

## 2018-11-23 RX ORDER — PERPHENAZINE 4 MG/1
4 TABLET, FILM COATED ORAL 2 TIMES DAILY WITH MEALS
Qty: 60 TABLET | Refills: 0 | Status: SHIPPED | OUTPATIENT
Start: 2018-11-23 | End: 2018-12-19

## 2018-11-23 RX ADMIN — GABAPENTIN 300 MG: 300 CAPSULE ORAL at 14:43

## 2018-11-23 RX ADMIN — FLUTICASONE PROPIONATE 1 SPRAY: 50 SPRAY, METERED NASAL at 09:35

## 2018-11-23 RX ADMIN — PROPRANOLOL HYDROCHLORIDE 10 MG: 10 TABLET ORAL at 09:29

## 2018-11-23 RX ADMIN — GABAPENTIN 300 MG: 300 CAPSULE ORAL at 09:30

## 2018-11-23 RX ADMIN — PERPHENAZINE 4 MG: 2 TABLET, FILM COATED ORAL at 09:29

## 2018-11-23 RX ADMIN — PAROXETINE 20 MG: 20 TABLET, FILM COATED ORAL at 09:29

## 2018-11-23 RX ADMIN — DIVALPROEX SODIUM 500 MG: 500 TABLET, EXTENDED RELEASE ORAL at 09:34

## 2018-11-23 RX ADMIN — PREDNISONE 15 MG: 5 TABLET ORAL at 09:29

## 2018-11-23 ASSESSMENT — PAIN SCALES - GENERAL: PAINLEVEL_OUTOF10: 0

## 2018-11-23 NOTE — CONSULTS
CARDIOLOGY CONSULTATION  Coverage for Dr. Jose Melgar    Patient Name:  Zeny Vang    :  1965    Reason for Consultation:   Cardiac assessment of psychiatric drugs    History of Present Illness:   Zeny Vang presents to [facility], following a history of acute decompensation of his psychiatric state. Upon further questioning, he notes he has a long-standing history of Crohn's disease and has been on multiple medications for treatment of his Crohn's disease where he is treated at the [facility]. His last medication was Stelera and up to this time has had no problems with it. Regarding multiple colonoscopies by his gastroenterologist at the [facility]. He suffered from depression since age 16 and has been on multiple drug regimens. In rare occasion he feels a lingering in his left sclerae area and exertional chest pressure, jaw arm nor intrascapular discomfort he probably notes that he quit smoking 6 months ago noted to be compliant with his Crohn's disease medications. To the best of his knowledge, he has not been treated for any cardiac disorder. Past Medical History:   has a past medical history of ADD (attention deficit disorder); Anxiety; Bipolar 1 disorder ([address] Cancer ([facility] Crohn's disease ([facility] [address] Depression; GERD (gastroesophageal reflux disease); Panic attack; Rectal pain; and Schizo affective schizophrenia ([address] Surgical History:   has a past surgical history that includes Colonoscopy; Nose surgery (?); Prostatectomy (9/15/2014); and Endoscopy, colon, diagnostic. Social History:   reports that he quit smoking about 12 months ago. His smoking use included Cigarettes. He has a 60.00 pack-year smoking history. He has never used smokeless tobacco. He reports that he does not drink alcohol or use drugs.      Family History:  family history includes Depression in his father; Heart Disease in his mother; Mental Illness in his brother, father, mother, and No cough or wheezing, no sputum production. No hemoptysis, pleuritic pain. · Gastrointestinal: Occasional abdominal pain, appetite loss, blood in stools. No change in bowel habits. No hematemesis  · Genitourinary: No dysuria, trouble voiding or hematuria. No nocturia or increased frequency. · Musculoskeletal:  No gait disturbance, weakness or joint complaints. · Integumentary: No rash or pruritis. · Neurological: No headache, diplopia, change in muscle strength, numbness or tingling. No change in gait, balance, coordination, mood, affect, memory, mentation, behavior. · Psychiatric: Has anxiety and depression. · Endocrine: No temperature intolerance. No excessive thirst, fluid intake, or urination. No tremor. · Hematologic/Lymphatic: No abnormal bruising or bleeding, blood clots or swollen lymph nodes. · Allergic/Immunologic: No nasal congestion or hives. Physical Examination:    Vital Signs: BP (!) 154/94   Pulse 74   Temp 98.4 °F (36.9 °C) (Oral)   Resp 18   Ht 6' 3\" (1.905 m)   Wt 208 lb (94.3 kg)   SpO2 97%   BMI 26.00 kg/m²   General appearance: Well preserved, mesomorphic body habitus, alert, no distress. Skin: Skin color, texture, turgor normal. No rashes or lesions. No induration or tightening palpated. Head: Normocephalic. No masses, lesions, tenderness or abnormalities  Eyes: Conjunctivae/corneas clear. PERRL, EOMs intact. Sclera non icteric. Ears: External ears normal. Canals clear. TM's clear bilaterally. Hearing normal to finger rub. Nose/Sinuses: Nares normal. Septum midline. Mucosa normal. No drainage or sinus tenderness. Oropharynx: Lips, mucosa, and tongue normal. Oropharynx clear with no exudate seen. Neck: Neck supple and symmetric. No adenopathy. Thyroid symmetric, normal size, without nodules. Trachea is midline. Carotids brisk in upstroke without bruits, no abnormal JVP noted at 45°. Chest: Even excursion  Lungs: Lungs clear to auscultation bilaterally.  No retractions or of iterative reconstruction. FINDINGS: CHEST: The lung bases are normal in appearance with no evidence of acute dependent thoracic cardiopulmonary pathology findings. The chest wall appears intact. . Old healed multilevel right-sided lateral rib fractures are noted. LIVER: The liver is uniform in density, likely representing a degree of fatty change without focal abnormality. GALLBLADDER AND BILIARY TREE: The gallbladder is normal in appearance with no evidence of ductal ectasia. SPLEEN: Normal ADRENALS:  Normal bilaterally PANCREAS: No acute inflammatory change, and slightly decreased density in the dependent head and uncinate process of the pancreas is unchanged from the comparison study, and stable since the comparison study of July 9, 2014. KIDNEYS/BLADDER: Small posterior cortical cysts are noted in the cortex of the left kidney, one cyst shows some marginal density and equivocally increased attenuation measurements, and should be further characterized with ultrasound to exclude a suspicious mass. MRI or noncontrast CT imaging would also be options for further characterization of the posterior medial left renal exophytic finding with perpendicular diameter measurements of 2.2 x 1.7 cm. APPENDIX:  No inflammation noted BOWEL:  The cecum is on a mesentery and extends medially. There is no evidence of obstruction, perforation, or inflammation in the abdomen or pelvis. There are some fluid-filled mid ileum in the left upper pelvic region. PELVIS: There is a penile prosthesis with the reservoir noted in the abdomen above the left pubic bone. This just posterior to the inguinal canal, and the prosthetic catheter from the reservoir extends through the left inguinal canal. LYMPHATICS: Small retroperitoneal lymph nodes are noted, but there is no evidence of mesenteric adenopathy. VASCULAR: There is minimal atherosclerotic aortic calcification SKELETAL: Degenerative changes lower lumbar spine     1.  A posterior left

## 2018-11-23 NOTE — PLAN OF CARE
Problem: Altered Mood, Manic Behavior:  Goal: Ability to interact with others will improve  Ability to interact with others will improve   Outcome: Ongoing  OUT ON THE UNIT SOCIAL AND PLAYING TABLE GAMES WITH PEERS. WIFE VISITED. DENIES SUICIDAL THOUGHTS OR INTENT TO HARM HIMSELF OR OTHERS.

## 2018-11-23 NOTE — PROGRESS NOTES
[facility]  Day 3 Interdisciplinary Treatment Plan NOTE    Review Date & Time: 11-23-18 0900    Patient WAS in treatment team    Admission Type:   Admission Type: Voluntary    Reason for admission:  Reason for Admission: \"TO GET HELP FOR MY SCHIZOAFFECTIVE, OCD, ADD AND DEPRESSION\"  Estimated Length of Stay Update:  3-5 DAYS  Estimated Discharge Date Update: 5-8 DAYS    PATIENT STRENGTHS:  Patient Strengths Strengths: Communication, Motivated  Patient Strengths and Limitations:Limitations: Difficulty problem solving/relies on others to help solve problems, Inappropriate/potentially harmful leisure interests, General negative or hopeless attitude about future/recovery  Addictive Behavior:Addictive Behavior  In the past 3 months, have you felt or has someone told you that you have a problem with:  : None  Do you have a history of Chemical Use?: No  Do you have a history of Alcohol Use?: No  Do you have a history of Street Drug Abuse?: No  Histroy of Prescripton Drug Abuse?: No  Medical Problems:  Past Medical History:   Diagnosis Date    ADD (attention deficit disorder)     Anxiety     B[facility] 1 disorder ([facility] 11/19/2017    Cancer ([facility] prostate cancer    2014 / treated with surgery    Crohn's disease ([facility]     Depression     GERD (gastroesophageal reflux disease)     Panic attack     Rectal pain     has a fissure    Schizo affective schizophrenia ([facility]     stable       Risk:  Fall RiskTotal: 77  Andre Scale Andre Scale Score: 22  BVC Total: 0  Change in scores NO.  Changes to plan of Care NONE    Status EXAM:   Status and Exam  Normal: Yes  Facial Expression: Avoids Gaze  Affect: Appropriate  Level of Consciousness: Alert  Mood:Normal: No  Mood: Anxious  Motor Activity:Normal: Yes  Motor Activity: Increased, Tremors  Interview Behavior: Cooperative  Preception: [place] to Person, [place] to Time, [place] to Place, [place] to Situation  Attention:Normal: No  Attention: Distractible  Thought
Attended community meeting, shared goal for the day as to have more gratitude. Group Therapy Note    Date: 11/23/2018  Start Time: 1010  End Time:  1055  Number of Participants: 18    Type of Group: Psychoeducation    Wellness Binder Information  Module Name: id of stressors   Session Number:  na  Patient's Goal: patient will be able to id daily/life stressors. And discuss positive coping skills to manage on a daily basis. Notes:  Pleasant and engaged in group. Status After Intervention:  Improved    Participation Level:  Active Listener and Interactive    Participation Quality: Appropriate, Attentive, Sharing and Supportive      Speech:  normal      Thought Process/Content: Logical      Affective Functioning: Congruent      Mood: euthymic      Level of consciousness:  Alert, Oriented x4 and Attentive      Response to Learning: Able to verbalize/acknowledge new learning, Able to retain information and Progressing to goal      Endings: None Reported    Modes of Intervention: Education, Support, Socialization, Exploration and Problem-solving      Discipline Responsible: Psychoeducational Specialist      Signature:  Antony Lewis
CLINICAL PHARMACY NOTE: MEDS TO [address] Select Patient?: Yes  Total # of Prescriptions Filled: 3   The following medications were delivered to the patient:  · Perphenazine 4mg  · Gabapentin 300mg  · paxil 40mg  Total # of Interventions Completed: 3  Time Spent (min): 30    Additional Documentation:
Declined wellness and wrap up groups-new admit
Patient attended wrap up group, goal achieved.
Patient awake and crying and hollering in the hallway. Patient came up to the nurses station stating \"I'm having horrible nightmares, every night I wake up from nightmares. \"  Patient states that \"no matter how hard I try I can't get them to stop. \"  Patient stated that he was cussing and yelling in his dreams, but no staff heard him doing so. Will continue to monitor.
Patient declined to attend community meeting. Attempted to have patient identify goal for the day but patient stated: \"No\"                                                                         Group Therapy Note    Date: 11/21/2018  Start Time: 10:00  End Time:  10:45  Number of Participants: 14    Type of Group: Psychoeducation    Wellness Binder Information  Module Name:  APPLE(Acknowledge, Pause, Pull back, Let go, and Explore)  Session Number:  n/a    Patient's Goal:  Patient will identify ways to utilize the APPLE acronym in recovery. Notes:  Patient was interactive during group sharing how she can utilize APPLE in her recovery. Patient accepted support from others. Status After Intervention:  Improved    Participation Level:  Active Listener and Interactive    Participation Quality: Appropriate, Attentive, Sharing and Supportive      Speech:  normal      Thought Process/Content: Logical      Affective Functioning: Congruent      Mood: euthymic      Level of consciousness:  Alert, Oriented x4 and Attentive      Response to Learning: Able to verbalize current knowledge/experience, Able to verbalize/acknowledge new learning, Able to retain information, Capable of insight, Able to change behavior and Progressing to goal      Endings: None Reported    Modes of Intervention: Education, Support, Socialization, Exploration, Clarifying and Problem-solving      Discipline Responsible: Psychoeducational Specialist      Signature:  Lizzy Jo
Patient up and active on unit, socializing with other peers. Patient talking with this nurse during med pass regarding a medication that he took earlier which made him \"feel weird and not right. I was tired all day and I didn't like the way it made me feel. I probably shouldn't take that pill anymore. It also gave me really vivid and scary dreams. \"  Went through med list with the patient and determined that it was possibly the perphenazine, as he described it was a \"little white pill. \"  Told patient that he should mention it to the doctor and that I would put a note in and pass it on as well. No other complaints or concerns at this time.
Psychoeducation assessment was completed.
Spoke with pt. He states he was on prednisone prior to hospitalization and is aware of how to take since he was on it for chron's disease, it was ordered by dr. Lata Blakely and it is at his house. He will take his prednisone as ordered by dr. Lata Blakely.
THE PT SLEPT WELL
[]  muscle pain []  joint pain  Neurologic Symptoms: []  headache []  dizziness  Hematolymphoid Symptoms: [] Adenopathy [] Bruises   [] Schimosis       Psychiatric Review of systems  Delusions:  [] Denies [] Endorses   Withdrawals:  [] Denies [] Endorses    Hallucinations: [] Denies [] Endorses    Extra Pyramidal Symptoms: [] Denies [] Endorses      BP (!) 135/91   Pulse 129   Temp 98.4 °F (36.9 °C) (Oral)   Resp 18   Ht 6' 3\" (1.905 m)   Wt 208 lb (94.3 kg)   SpO2 97%   BMI 26.00 kg/m²     Mental Status Examination:    Cognition:      [x] Alert  [x] Awake  [x] Oriented  [x] Person  [x] Place [x] Time      [] drowsy  [] tired  [] lethargic  [] distractable  [] Other     Attention/Concentration:   [x] Attentive  [] Distracted        Memory Recent and Remote: [x] Intact   [] Impaired [] Partially Impaired     Language: [] Able to recognize and name objects          [] Unable to recognize and name Objects    Fund of Knowledge:  [] Poor []  Fair  [x] Good    Speech: [x] Normal  [] Soft  [] Slow  [] Fast [] Pressured            [] Loud [] Dysarthria  [] Incoherent    Appearance: [] Well Groomed  [x] Casual Dressed  [] Unkept  [] Disheveled          [] Normal weight[] Thin  [] Overweight  [] Obese           Attitude: [] Positive  [] Hostile  [] Demanding  [] Guarded  [] Defensive         [x] Cooperative  []  Uncooperative      Behavior:  [x] Normal Gait  [] Walks with Assistance  [] Deborah Chair    [] Walks with Manisha Lennox  [] In Hospital Bed  [] Sitting in Chair    Muscle-Skeletal:  [x] Normal Muscle Tone [] Muscle Atrophy       [] Abnormal Muscle Movement     Eye Contact:  [x] Good eye contact  [] Intermittent Eye Contact  [] Poor Eye Contact     Mood: [] Depressed  [x] Anxious  [] Irritated  [] Euthymic   [] Angry [x] Restless    Affect:  [] Congruent  [] Incongruent  [x] Labile  [] Constricted  [] Flat  [] Bizarre     Thought Process and Association:  [] Logical [] Illogical       [] Linear and Goal Directed  []
and Judgment: No  Insight and Judgment: Poor Judgment, Poor Insight, Unrealistic  Present Suicidal Ideation: No  Present Homicidal Ideation: No    Tobacco Screening:  Practical Counseling, on admission, cherie X, if applicable and completed (first 3 are required if patient doesn't refuse):            ( )  Recognizing danger situations (included triggers and roadblocks)                    ( )  Coping skills (new ways to manage stress, exercise, relaxation techniques, changing routine, distraction)                                                           ( )  Basic information about quitting (benefits of quitting, techniques in how to quit, available resources  ( ) Referral for counseling faxed to [facility]                                           ( ) Patient refused counseling  ( X) Patient has not smoked in the last 30 days    Metabolic Screening:    Lab Results   Component Value Date    LABA1C 5.5 10/08/2018       No results for input(s): CHOL, TRIG, HDL, LDLCALC, LABVLDL in the last 72 hours. Body mass index is 26 kg/m². BP Readings from Last 2 Encounters:   11/20/18 (!) 133/96   10/26/18 120/84           Pt admitted with followings belongings:  Dentures: None  Vision - Corrective Lenses: Glasses  Hearing Aid: None  Jewelry: Ring  Body Piercings Removed: N/A  Were All Patient Medications Collected?: Not Applicable     Patient's home medications were REVIEWED. Patient oriented to surroundings and program expectations and copy of patient rights given. Received admission packet. Consents reviewed, signed. Patient verbalize understanding OF PROGRAM AND EXPECTATIONS. Patient education on SUICIDE, FALLS precautions AND FREQUENT CHECKS.                   Nella Can RN

## 2018-12-19 ENCOUNTER — HOSPITAL ENCOUNTER (EMERGENCY)
Age: 53
Discharge: HOME | End: 2018-12-19
Attending: EMERGENCY MEDICINE
Payer: MEDICARE

## 2018-12-19 VITALS
RESPIRATION RATE: 16 BRPM | OXYGEN SATURATION: 96 % | HEIGHT: 75 IN | TEMPERATURE: 97.6 F | HEART RATE: 108 BPM | BODY MASS INDEX: 25.86 KG/M2 | SYSTOLIC BLOOD PRESSURE: 148 MMHG | WEIGHT: 208 LBS | DIASTOLIC BLOOD PRESSURE: 109 MMHG

## 2018-12-19 DIAGNOSIS — Z76.89 ENCOUNTER FOR PSYCHIATRIC ASSESSMENT: Primary | ICD-10-CM

## 2018-12-19 LAB
ACETAMINOPHEN LEVEL: <5 MCG/ML (ref 10–30)
ALBUMIN SERPL-MCNC: 3.8 G/DL (ref 3.5–5.2)
ALP BLD-CCNC: 133 U/L (ref 40–129)
ALT SERPL-CCNC: 11 U/L (ref 0–40)
AMPHETAMINE SCREEN, URINE: NOT DETECTED
ANION GAP SERPL CALCULATED.3IONS-SCNC: 11 MMOL/L (ref 7–16)
AST SERPL-CCNC: 15 U/L (ref 0–39)
BACTERIA: NORMAL /HPF
BARBITURATE SCREEN URINE: NOT DETECTED
BASOPHILS ABSOLUTE: 0.05 E9/L (ref 0–0.2)
BASOPHILS RELATIVE PERCENT: 0.7 % (ref 0–2)
BENZODIAZEPINE SCREEN, URINE: POSITIVE
BILIRUB SERPL-MCNC: <0.2 MG/DL (ref 0–1.2)
BILIRUBIN URINE: NEGATIVE
BLOOD, URINE: ABNORMAL
BUN BLDV-MCNC: 16 MG/DL (ref 6–20)
CALCIUM SERPL-MCNC: 9.7 MG/DL (ref 8.6–10.2)
CANNABINOID SCREEN URINE: NOT DETECTED
CHLORIDE BLD-SCNC: 104 MMOL/L (ref 98–107)
CLARITY: CLEAR
CO2: 27 MMOL/L (ref 22–29)
COCAINE METABOLITE SCREEN URINE: NOT DETECTED
COLOR: YELLOW
CREAT SERPL-MCNC: 1.4 MG/DL (ref 0.7–1.2)
CRYSTALS, UA: NORMAL
EOSINOPHILS ABSOLUTE: 0.33 E9/L (ref 0.05–0.5)
EOSINOPHILS RELATIVE PERCENT: 4.9 % (ref 0–6)
ETHANOL: <10 MG/DL (ref 0–0.08)
GFR AFRICAN AMERICAN: >60
GFR NON-AFRICAN AMERICAN: 53 ML/MIN/1.73
GLUCOSE BLD-MCNC: 111 MG/DL (ref 74–99)
GLUCOSE URINE: 100 MG/DL
HCT VFR BLD CALC: 42.2 % (ref 37–54)
HEMOGLOBIN: 13.7 G/DL (ref 12.5–16.5)
IMMATURE GRANULOCYTES #: 0.02 E9/L
IMMATURE GRANULOCYTES %: 0.3 % (ref 0–5)
KETONES, URINE: NEGATIVE MG/DL
LEUKOCYTE ESTERASE, URINE: NEGATIVE
LYMPHOCYTES ABSOLUTE: 1.67 E9/L (ref 1.5–4)
LYMPHOCYTES RELATIVE PERCENT: 24.7 % (ref 20–42)
MCH RBC QN AUTO: 29.7 PG (ref 26–35)
MCHC RBC AUTO-ENTMCNC: 32.5 % (ref 32–34.5)
MCV RBC AUTO: 91.3 FL (ref 80–99.9)
METHADONE SCREEN, URINE: NOT DETECTED
MONOCYTES ABSOLUTE: 0.39 E9/L (ref 0.1–0.95)
MONOCYTES RELATIVE PERCENT: 5.8 % (ref 2–12)
NEUTROPHILS ABSOLUTE: 4.29 E9/L (ref 1.8–7.3)
NEUTROPHILS RELATIVE PERCENT: 63.6 % (ref 43–80)
NITRITE, URINE: NEGATIVE
OPIATE SCREEN URINE: NOT DETECTED
PDW BLD-RTO: 13.9 FL (ref 11.5–15)
PH UA: 5 (ref 5–9)
PHENCYCLIDINE SCREEN URINE: NOT DETECTED
PLATELET # BLD: 316 E9/L (ref 130–450)
PMV BLD AUTO: 8.8 FL (ref 7–12)
POTASSIUM SERPL-SCNC: 3.8 MMOL/L (ref 3.5–5)
PROPOXYPHENE SCREEN: NOT DETECTED
PROTEIN UA: 30 MG/DL
RBC # BLD: 4.62 E12/L (ref 3.8–5.8)
RBC UA: NORMAL /HPF (ref 0–2)
SALICYLATE, SERUM: <0.3 MG/DL (ref 0–30)
SODIUM BLD-SCNC: 142 MMOL/L (ref 132–146)
SPECIFIC GRAVITY UA: >=1.03 (ref 1–1.03)
TOTAL PROTEIN: 6.7 G/DL (ref 6.4–8.3)
TRICYCLIC ANTIDEPRESSANTS SCREEN SERUM: NEGATIVE NG/ML
UROBILINOGEN, URINE: 0.2 E.U./DL
WBC # BLD: 6.8 E9/L (ref 4.5–11.5)
WBC UA: NORMAL /HPF (ref 0–5)

## 2018-12-19 PROCEDURE — 80053 COMPREHEN METABOLIC PANEL: CPT

## 2018-12-19 PROCEDURE — 85025 COMPLETE CBC W/AUTO DIFF WBC: CPT

## 2018-12-19 PROCEDURE — G0480 DRUG TEST DEF 1-7 CLASSES: HCPCS

## 2018-12-19 PROCEDURE — 99284 EMERGENCY DEPT VISIT MOD MDM: CPT

## 2018-12-19 PROCEDURE — 80307 DRUG TEST PRSMV CHEM ANLYZR: CPT

## 2018-12-19 PROCEDURE — 81001 URINALYSIS AUTO W/SCOPE: CPT

## 2018-12-19 PROCEDURE — 36415 COLL VENOUS BLD VENIPUNCTURE: CPT

## 2018-12-19 PROCEDURE — 6370000000 HC RX 637 (ALT 250 FOR IP): Performed by: PHYSICIAN ASSISTANT

## 2018-12-19 RX ORDER — PERPHENAZINE 4 MG/1
4 TABLET, FILM COATED ORAL 2 TIMES DAILY WITH MEALS
Qty: 6 TABLET | Refills: 0 | Status: SHIPPED | OUTPATIENT
Start: 2018-12-19 | End: 2018-12-27 | Stop reason: ALTCHOICE

## 2018-12-19 RX ORDER — PAROXETINE HYDROCHLORIDE 40 MG/1
40 TABLET, FILM COATED ORAL EVERY MORNING
Qty: 3 TABLET | Refills: 0 | Status: SHIPPED | OUTPATIENT
Start: 2018-12-19 | End: 2018-12-27 | Stop reason: ALTCHOICE

## 2018-12-19 RX ORDER — HYDROXYZINE PAMOATE 25 MG/1
25 CAPSULE ORAL 3 TIMES DAILY PRN
Qty: 9 CAPSULE | Refills: 0 | Status: SHIPPED | OUTPATIENT
Start: 2018-12-19 | End: 2018-12-22

## 2018-12-19 RX ORDER — DIVALPROEX SODIUM 500 MG/1
500 TABLET, EXTENDED RELEASE ORAL 2 TIMES DAILY
Qty: 6 TABLET | Refills: 0 | Status: SHIPPED | OUTPATIENT
Start: 2018-12-19 | End: 2018-12-27 | Stop reason: ALTCHOICE

## 2018-12-19 RX ORDER — CALCIUM CARBONATE 200(500)MG
500 TABLET,CHEWABLE ORAL ONCE
Status: COMPLETED | OUTPATIENT
Start: 2018-12-19 | End: 2018-12-19

## 2018-12-19 RX ADMIN — CALCIUM CARBONATE (ANTACID) CHEW TAB 500 MG 500 MG: 500 CHEW TAB at 17:41

## 2018-12-22 ENCOUNTER — APPOINTMENT (OUTPATIENT)
Dept: GENERAL RADIOLOGY | Age: 53
End: 2018-12-22
Payer: MEDICARE

## 2018-12-22 ENCOUNTER — HOSPITAL ENCOUNTER (EMERGENCY)
Age: 53
Discharge: HOME | End: 2018-12-22
Payer: MEDICARE

## 2018-12-22 VITALS
WEIGHT: 208 LBS | HEIGHT: 75 IN | RESPIRATION RATE: 16 BRPM | TEMPERATURE: 97.5 F | DIASTOLIC BLOOD PRESSURE: 114 MMHG | HEART RATE: 95 BPM | BODY MASS INDEX: 25.86 KG/M2 | SYSTOLIC BLOOD PRESSURE: 158 MMHG | OXYGEN SATURATION: 98 %

## 2018-12-22 DIAGNOSIS — M79.604 RIGHT LEG PAIN: Primary | ICD-10-CM

## 2018-12-22 DIAGNOSIS — S39.012A STRAIN OF LUMBAR REGION, INITIAL ENCOUNTER: ICD-10-CM

## 2018-12-22 PROCEDURE — 73552 X-RAY EXAM OF FEMUR 2/>: CPT

## 2018-12-22 PROCEDURE — 99283 EMERGENCY DEPT VISIT LOW MDM: CPT

## 2018-12-22 PROCEDURE — 72100 X-RAY EXAM L-S SPINE 2/3 VWS: CPT

## 2018-12-22 ASSESSMENT — PAIN DESCRIPTION - FREQUENCY: FREQUENCY: CONTINUOUS

## 2018-12-22 ASSESSMENT — PAIN SCALES - GENERAL: PAINLEVEL_OUTOF10: 10

## 2018-12-22 ASSESSMENT — PAIN DESCRIPTION - PAIN TYPE: TYPE: ACUTE PAIN

## 2018-12-22 ASSESSMENT — PAIN DESCRIPTION - ORIENTATION: ORIENTATION: RIGHT

## 2018-12-22 ASSESSMENT — PAIN DESCRIPTION - LOCATION: LOCATION: LEG

## 2018-12-22 ASSESSMENT — PAIN DESCRIPTION - DESCRIPTORS: DESCRIPTORS: ACHING

## 2018-12-22 NOTE — ED NOTES
This RN went to discharge patient and patient had already left before discharge papers were signed     Davonte Briggs RN  12/22/18 8271

## 2018-12-22 NOTE — ED PROVIDER NOTES
alcohol or use drugs. Family History: family history includes Depression in his father; Heart Disease in his mother; Mental Illness in his brother, father, mother, and sister. Allergies: Ciprofloxacin hcl and Nsaids    Physical Exam   Oxygen Saturation Interpretation: Normal.   ED Triage Vitals [12/22/18 1426]   BP Temp Temp Source Pulse Resp SpO2 Height Weight   (!) 158/114 97.5 °F (36.4 °C) Temporal 120 16 98 % 6' 3\" (1.905 m) 208 lb (94.3 kg)       Physical Exam  · Constitutional/General: Alert and oriented x3, well appearing, non toxic  · HEENT:  NC/NT. PERRLA,  Airway patent. · Neck: Supple, full ROM, non tender to palpation in the midline, no stridor, no crepitus, no meningeal signs  · Respiratory: Lungs clear to auscultation bilaterally, no wheezes, rales, or rhonchi. Not in respiratory distress  · CV:  Regular rate. Regular rhythm. No murmurs, gallops, or rubs. 2+ distal pulses  · Chest: No chest wall tenderness  · GI:  Abdomen Soft, Non tender, Non distended. +BS. No rebound, guarding, or rigidity. No pulsatile masses. · Musculoskeletal: Moves all extremities x 4. Warm and well perfused, no clubbing, cyanosis, or edema. Capillary refill <3 seconds. 2+ dorsalis pedis pulses b/l. Moderate tenderness to palpation of the right lateral hip and right mid femur area. No swelling or erythema. Patient able to ambulate without difficulty. We will tenderness over the lower lumbar midline. No overlying erythema, ecchymosis or edema. · Integument: skin warm and dry. No rashes. · Lymphatic: no lymphadenopathy noted  · Neurologic: GCS 15, no focal deficits, symmetric strength 5/5 in the upper and lower extremities bilaterally  · Psychiatric: Normal Affect    Lab / Imaging Results   (All laboratory and radiology results have been personally reviewed by myself)  Labs:  No results found for this visit on 12/22/18. Imaging: All Radiology results interpreted by Radiologist unless otherwise noted.   XR LUMBAR SPINE

## 2018-12-24 LAB
7-AMINOCLONAZEPAM, URINE: <5 NG/ML
ALPHA-HYDROXYALPRAZOLAM, URINE: <5 NG/ML
ALPHA-HYDROXYMIDAZOLAM, URINE: <20 NG/ML
ALPRAZOLAM, URINE: <5 NG/ML
CHLORDIAZEPOXIDE, URINE: <20 NG/ML
CLONAZEPAM, URINE: <5 NG/ML
DIAZEPAM, URINE: <20 NG/ML
LORAZEPAM, URINE: <20 NG/ML
MIDAZOLAM, URINE: <20 NG/ML
NORDIAZEPAM, URINE: 818 NG/ML
OXAZEPAM, URINE: 2743 NG/ML
TEMAZEPAM, URINE: 1346 NG/ML

## 2018-12-26 ENCOUNTER — HOSPITAL ENCOUNTER (OUTPATIENT)
Age: 53
Discharge: HOME | End: 2018-12-26
Payer: MEDICARE

## 2018-12-26 LAB
EKG ATRIAL RATE: 82 BPM
EKG P AXIS: 58 DEGREES
EKG P-R INTERVAL: 152 MS
EKG Q-T INTERVAL: 360 MS
EKG QRS DURATION: 78 MS
EKG QTC CALCULATION (BAZETT): 420 MS
EKG R AXIS: 53 DEGREES
EKG T AXIS: 68 DEGREES
EKG VENTRICULAR RATE: 82 BPM

## 2018-12-26 PROCEDURE — 93010 ELECTROCARDIOGRAM REPORT: CPT | Performed by: INTERNAL MEDICINE

## 2018-12-26 PROCEDURE — 93005 ELECTROCARDIOGRAM TRACING: CPT | Performed by: NURSE PRACTITIONER

## 2018-12-27 ENCOUNTER — HOSPITAL ENCOUNTER (EMERGENCY)
Age: 53
Discharge: HOME | End: 2018-12-27
Attending: EMERGENCY MEDICINE
Payer: MEDICARE

## 2018-12-27 ENCOUNTER — APPOINTMENT (OUTPATIENT)
Dept: GENERAL RADIOLOGY | Age: 53
End: 2018-12-27
Payer: MEDICARE

## 2018-12-27 ENCOUNTER — APPOINTMENT (OUTPATIENT)
Dept: CT IMAGING | Age: 53
End: 2018-12-27
Payer: MEDICARE

## 2018-12-27 VITALS
OXYGEN SATURATION: 98 % | SYSTOLIC BLOOD PRESSURE: 161 MMHG | HEIGHT: 75 IN | BODY MASS INDEX: 25.86 KG/M2 | DIASTOLIC BLOOD PRESSURE: 87 MMHG | TEMPERATURE: 98.5 F | RESPIRATION RATE: 17 BRPM | HEART RATE: 91 BPM | WEIGHT: 208 LBS

## 2018-12-27 DIAGNOSIS — I10 HYPERTENSION, UNSPECIFIED TYPE: Primary | ICD-10-CM

## 2018-12-27 LAB
ALBUMIN SERPL-MCNC: 4 G/DL (ref 3.5–5.2)
ALP BLD-CCNC: 112 U/L (ref 40–129)
ALT SERPL-CCNC: 10 U/L (ref 0–40)
ANION GAP SERPL CALCULATED.3IONS-SCNC: 11 MMOL/L (ref 7–16)
AST SERPL-CCNC: 15 U/L (ref 0–39)
BACTERIA: NORMAL /HPF
BASOPHILS ABSOLUTE: 0.06 E9/L (ref 0–0.2)
BASOPHILS RELATIVE PERCENT: 0.7 % (ref 0–2)
BILIRUB SERPL-MCNC: 0.4 MG/DL (ref 0–1.2)
BILIRUBIN URINE: NEGATIVE
BLOOD, URINE: ABNORMAL
BUN BLDV-MCNC: 14 MG/DL (ref 6–20)
CALCIUM SERPL-MCNC: 9.7 MG/DL (ref 8.6–10.2)
CHLORIDE BLD-SCNC: 101 MMOL/L (ref 98–107)
CLARITY: CLEAR
CO2: 26 MMOL/L (ref 22–29)
COLOR: YELLOW
CREAT SERPL-MCNC: 1.1 MG/DL (ref 0.7–1.2)
EKG ATRIAL RATE: 95 BPM
EKG P AXIS: 66 DEGREES
EKG P-R INTERVAL: 154 MS
EKG Q-T INTERVAL: 352 MS
EKG QRS DURATION: 76 MS
EKG QTC CALCULATION (BAZETT): 442 MS
EKG R AXIS: 73 DEGREES
EKG T AXIS: 74 DEGREES
EKG VENTRICULAR RATE: 95 BPM
EOSINOPHILS ABSOLUTE: 0.22 E9/L (ref 0.05–0.5)
EOSINOPHILS RELATIVE PERCENT: 2.6 % (ref 0–6)
GFR AFRICAN AMERICAN: >60
GFR NON-AFRICAN AMERICAN: >60 ML/MIN/1.73
GLUCOSE BLD-MCNC: 109 MG/DL (ref 74–99)
GLUCOSE URINE: NEGATIVE MG/DL
HCT VFR BLD CALC: 43.4 % (ref 37–54)
HEMOGLOBIN: 13.8 G/DL (ref 12.5–16.5)
IMMATURE GRANULOCYTES #: 0.03 E9/L
IMMATURE GRANULOCYTES %: 0.4 % (ref 0–5)
KETONES, URINE: NEGATIVE MG/DL
LEUKOCYTE ESTERASE, URINE: ABNORMAL
LYMPHOCYTES ABSOLUTE: 1.7 E9/L (ref 1.5–4)
LYMPHOCYTES RELATIVE PERCENT: 20 % (ref 20–42)
MCH RBC QN AUTO: 29.1 PG (ref 26–35)
MCHC RBC AUTO-ENTMCNC: 31.8 % (ref 32–34.5)
MCV RBC AUTO: 91.4 FL (ref 80–99.9)
MONOCYTES ABSOLUTE: 0.7 E9/L (ref 0.1–0.95)
MONOCYTES RELATIVE PERCENT: 8.2 % (ref 2–12)
NEUTROPHILS ABSOLUTE: 5.81 E9/L (ref 1.8–7.3)
NEUTROPHILS RELATIVE PERCENT: 68.1 % (ref 43–80)
NITRITE, URINE: NEGATIVE
PDW BLD-RTO: 13.9 FL (ref 11.5–15)
PH UA: 6.5 (ref 5–9)
PLATELET # BLD: 281 E9/L (ref 130–450)
PMV BLD AUTO: 9.6 FL (ref 7–12)
POTASSIUM REFLEX MAGNESIUM: 3.7 MMOL/L (ref 3.5–5)
PROTEIN UA: NEGATIVE MG/DL
RBC # BLD: 4.75 E12/L (ref 3.8–5.8)
RBC UA: NORMAL /HPF (ref 0–2)
SODIUM BLD-SCNC: 138 MMOL/L (ref 132–146)
SPECIFIC GRAVITY UA: <=1.005 (ref 1–1.03)
TOTAL PROTEIN: 6.9 G/DL (ref 6.4–8.3)
TROPONIN: <0.01 NG/ML (ref 0–0.03)
UROBILINOGEN, URINE: 0.2 E.U./DL
WBC # BLD: 8.5 E9/L (ref 4.5–11.5)
WBC UA: NORMAL /HPF (ref 0–5)

## 2018-12-27 PROCEDURE — 81001 URINALYSIS AUTO W/SCOPE: CPT

## 2018-12-27 PROCEDURE — 70450 CT HEAD/BRAIN W/O DYE: CPT

## 2018-12-27 PROCEDURE — 85025 COMPLETE CBC W/AUTO DIFF WBC: CPT

## 2018-12-27 PROCEDURE — 93005 ELECTROCARDIOGRAM TRACING: CPT | Performed by: NURSE PRACTITIONER

## 2018-12-27 PROCEDURE — 36415 COLL VENOUS BLD VENIPUNCTURE: CPT

## 2018-12-27 PROCEDURE — 84484 ASSAY OF TROPONIN QUANT: CPT

## 2018-12-27 PROCEDURE — 80053 COMPREHEN METABOLIC PANEL: CPT

## 2018-12-27 PROCEDURE — 71046 X-RAY EXAM CHEST 2 VIEWS: CPT

## 2018-12-27 PROCEDURE — 99283 EMERGENCY DEPT VISIT LOW MDM: CPT

## 2018-12-27 RX ORDER — FLUOXETINE HYDROCHLORIDE 40 MG/1
80 CAPSULE ORAL DAILY
Status: ON HOLD | COMMUNITY
End: 2019-07-30 | Stop reason: HOSPADM

## 2018-12-27 RX ORDER — OLANZAPINE 10 MG/1
10 TABLET ORAL NIGHTLY
COMMUNITY
End: 2019-01-11

## 2018-12-27 RX ORDER — HYDROXYZINE PAMOATE 25 MG/1
25 CAPSULE ORAL 2 TIMES DAILY
COMMUNITY
End: 2020-01-25

## 2018-12-27 RX ORDER — DIVALPROEX SODIUM 500 MG/1
500 TABLET, DELAYED RELEASE ORAL 2 TIMES DAILY
COMMUNITY
End: 2018-12-28 | Stop reason: SDUPTHER

## 2018-12-27 ASSESSMENT — PAIN DESCRIPTION - LOCATION: LOCATION: HEAD

## 2018-12-27 ASSESSMENT — PAIN DESCRIPTION - PAIN TYPE: TYPE: ACUTE PAIN

## 2018-12-27 ASSESSMENT — PAIN DESCRIPTION - DESCRIPTORS: DESCRIPTORS: SHARP

## 2018-12-27 ASSESSMENT — PAIN SCALES - GENERAL: PAINLEVEL_OUTOF10: 9

## 2018-12-27 NOTE — ED PROVIDER NOTES
Department of Emergency Medicine   ED  Provider Note  Admit Date/RoomTime: 12/27/2018  4:26 PM  ED Room: 19/19   Chief Complaint   Hypertension (was seen at [address] and sent here for HTN)    History of Present Illness   Source of history provided by:  patient and spouse/SO. History/Exam Limitations: mental illness. Nilesh Odell is a 47 y.o. old male who has a past medical history of:   Past Medical History:   Diagnosis Date    ADD (attention deficit disorder)     Anxiety     Bipolar 1 disorder ([facility] 11/19/2017    Cancer ([facility] prostate cancer    2014 / treated with surgery    Crohn's disease ([facility]     Depression     GERD (gastroesophageal reflux disease)     Panic attack     Rectal pain     has a fissure    Schizo affective schizophrenia ([facility]     stable     Patient with extensive psychiatric history presents for elevated blood pressure readings. He states that 2 weeks ago, he went to the [facility] crisis unit because he was having hallucinations and thoughts of killing himself. He ended up at the [facility] for the past 2 weeks. They were checking his vital signs there and noticed that his blood pressure was elevated. He has been a symptomatically at this. He denies taking any blood pressure medications. He states the highest systolic number was 542. He denies chest pain, shortness of breath or dizziness. He was discharged from the [facility] today and decided to come to the emergency department to have it checked. Severity:  How High: known to be 066 systolic. Symptoms:  None. Current Pregnancy:  NA.  HTN Treatment: on no treatment. Recent Use of:  No OTC or illegal substances. Associated Signs & Symptoms:    []   Abdominal Pain     []   Back Pain     []   Hematuria     []   Chest Pain     []   Shortness of Breath     []   Palpitations     []   Headache     []   Dizziness     []   Blurred Vision     .   ROS    Pertinent positives and negatives are stated within HPI, all other systems reviewed and are negative. Past Surgical History:   Procedure Laterality Date    COLONOSCOPY      ENDOSCOPY, COLON, DIAGNOSTIC      NOSE SURGERY  2002? deviated septum    PROSTATECTOMY  9/15/2014    laparoscopic robotic assisted. Social History:  reports that he quit smoking about 13 months ago. His smoking use included Cigarettes. He has a 60.00 pack-year smoking history. He has never used smokeless tobacco. He reports that he does not drink alcohol or use drugs. Family History: family history includes Depression in his father; Heart Disease in his mother; Mental Illness in his brother, father, mother, and sister. Allergies: Ciprofloxacin hcl and Nsaids    Physical Exam           ED Triage Vitals [12/27/18 1242]   BP Temp Temp Source Pulse Resp SpO2 Height Weight   (!) 165/112 98.5 °F (36.9 °C) Oral 112 16 97 % 6' 3\" (1.905 m) 208 lb (94.3 kg)      Oxygen Saturation Interpretation: Normal.    Constitutional:  Alert, development consistent with age. Eyes:  PERRL, EOMI, no discharge or conjunctival injection. Ears:  External ears without lesions. Throat:  Pharynx without injection, exudate, or tonsillar hypertrophy. Airway patient. Neck:  Normal ROM. Supple. Respiratory:  Clear to auscultation and breath sounds equal.  CV:  Regular rate and rhythm, normal heart sounds, without pathological murmurs, ectopy, gallops, or rubs. GI:  Abdomen Soft, nontender, good bowel sounds. No firm or pulsatile mass. Back:  No costovertebral tenderness. Integument:  Normal turgor. Warm, dry, without visible rash, unless noted elsewhere. Lymphatics: No lymphangitis or adenopathy noted. Neurological:  Oriented. Motor functions intact.     Lab / Imaging Results   (All laboratory and radiology results have been personally reviewed by myself)  Labs:  Results for orders placed or performed during the hospital encounter of 12/27/18   CBC Auto Differential   Result Value Ref Range    WBC 8.5 4.5 - 11.5 E9/L

## 2018-12-28 PROBLEM — I10 ESSENTIAL HYPERTENSION: Status: ACTIVE | Noted: 2018-12-28

## 2019-01-11 PROBLEM — R09.82 PND (POST-NASAL DRIP): Status: ACTIVE | Noted: 2019-01-11

## 2019-02-03 ENCOUNTER — APPOINTMENT (OUTPATIENT)
Dept: CT IMAGING | Age: 54
DRG: 387 | End: 2019-02-03
Payer: MEDICARE

## 2019-02-03 ENCOUNTER — HOSPITAL ENCOUNTER (INPATIENT)
Age: 54
LOS: 3 days | Discharge: LEFT BEFORE DISCHARGE | DRG: 387 | End: 2019-02-07
Attending: EMERGENCY MEDICINE | Admitting: INTERNAL MEDICINE
Payer: MEDICARE

## 2019-02-03 DIAGNOSIS — K52.9 COLITIS: Primary | ICD-10-CM

## 2019-02-03 LAB
ANION GAP SERPL CALCULATED.3IONS-SCNC: 11 MMOL/L (ref 7–16)
APTT: 28.5 SEC (ref 24.5–35.1)
BUN BLDV-MCNC: 21 MG/DL (ref 6–20)
CALCIUM SERPL-MCNC: 8.4 MG/DL (ref 8.6–10.2)
CHLORIDE BLD-SCNC: 97 MMOL/L (ref 98–107)
CO2: 24 MMOL/L (ref 22–29)
CREAT SERPL-MCNC: 1.1 MG/DL (ref 0.7–1.2)
GFR AFRICAN AMERICAN: >60
GFR NON-AFRICAN AMERICAN: >60 ML/MIN/1.73
GLUCOSE BLD-MCNC: 107 MG/DL (ref 74–99)
INR BLD: 1.2
POTASSIUM REFLEX MAGNESIUM: 3.9 MMOL/L (ref 3.5–5)
PROTHROMBIN TIME: 13.4 SEC (ref 9.3–12.4)
SODIUM BLD-SCNC: 132 MMOL/L (ref 132–146)

## 2019-02-03 PROCEDURE — 99285 EMERGENCY DEPT VISIT HI MDM: CPT

## 2019-02-03 PROCEDURE — 85610 PROTHROMBIN TIME: CPT

## 2019-02-03 PROCEDURE — 96374 THER/PROPH/DIAG INJ IV PUSH: CPT

## 2019-02-03 PROCEDURE — 85730 THROMBOPLASTIN TIME PARTIAL: CPT

## 2019-02-03 PROCEDURE — 96375 TX/PRO/DX INJ NEW DRUG ADDON: CPT

## 2019-02-03 PROCEDURE — 6360000002 HC RX W HCPCS: Performed by: EMERGENCY MEDICINE

## 2019-02-03 PROCEDURE — 85025 COMPLETE CBC W/AUTO DIFF WBC: CPT

## 2019-02-03 PROCEDURE — 74177 CT ABD & PELVIS W/CONTRAST: CPT

## 2019-02-03 PROCEDURE — 2580000003 HC RX 258: Performed by: EMERGENCY MEDICINE

## 2019-02-03 PROCEDURE — 6360000004 HC RX CONTRAST MEDICATION: Performed by: RADIOLOGY

## 2019-02-03 PROCEDURE — 80048 BASIC METABOLIC PNL TOTAL CA: CPT

## 2019-02-03 RX ORDER — 0.9 % SODIUM CHLORIDE 0.9 %
1000 INTRAVENOUS SOLUTION INTRAVENOUS ONCE
Status: COMPLETED | OUTPATIENT
Start: 2019-02-03 | End: 2019-02-04

## 2019-02-03 RX ORDER — ONDANSETRON 2 MG/ML
4 INJECTION INTRAMUSCULAR; INTRAVENOUS EVERY 6 HOURS PRN
Status: DISCONTINUED | OUTPATIENT
Start: 2019-02-03 | End: 2019-02-07 | Stop reason: HOSPADM

## 2019-02-03 RX ADMIN — HYDROMORPHONE HYDROCHLORIDE 1 MG: 1 INJECTION, SOLUTION INTRAMUSCULAR; INTRAVENOUS; SUBCUTANEOUS at 23:03

## 2019-02-03 RX ADMIN — IOPAMIDOL 110 ML: 755 INJECTION, SOLUTION INTRAVENOUS at 23:45

## 2019-02-03 RX ADMIN — SODIUM CHLORIDE 1000 ML: 9 INJECTION, SOLUTION INTRAVENOUS at 23:04

## 2019-02-03 RX ADMIN — ONDANSETRON 4 MG: 2 INJECTION INTRAMUSCULAR; INTRAVENOUS at 23:03

## 2019-02-03 ASSESSMENT — PAIN DESCRIPTION - PAIN TYPE: TYPE: ACUTE PAIN

## 2019-02-03 ASSESSMENT — PAIN DESCRIPTION - DESCRIPTORS: DESCRIPTORS: PATIENT UNABLE TO DESCRIBE

## 2019-02-03 ASSESSMENT — PAIN DESCRIPTION - ONSET: ONSET: SUDDEN

## 2019-02-03 ASSESSMENT — PAIN DESCRIPTION - ORIENTATION: ORIENTATION: LOWER

## 2019-02-03 ASSESSMENT — PAIN SCALES - GENERAL
PAINLEVEL_OUTOF10: 10
PAINLEVEL_OUTOF10: 10

## 2019-02-03 ASSESSMENT — PAIN DESCRIPTION - FREQUENCY: FREQUENCY: CONTINUOUS

## 2019-02-03 ASSESSMENT — PAIN DESCRIPTION - LOCATION: LOCATION: ABDOMEN

## 2019-02-04 PROBLEM — K52.9 COLITIS: Status: ACTIVE | Noted: 2019-02-04

## 2019-02-04 LAB
BASOPHILS ABSOLUTE: 0.06 E9/L (ref 0–0.2)
BASOPHILS RELATIVE PERCENT: 0.5 % (ref 0–2)
EOSINOPHILS ABSOLUTE: 0.34 E9/L (ref 0.05–0.5)
EOSINOPHILS RELATIVE PERCENT: 2.9 % (ref 0–6)
HCT VFR BLD CALC: 41.6 % (ref 37–54)
HEMOGLOBIN: 13.8 G/DL (ref 12.5–16.5)
IMMATURE GRANULOCYTES #: 0.03 E9/L
IMMATURE GRANULOCYTES %: 0.3 % (ref 0–5)
LYMPHOCYTES ABSOLUTE: 2 E9/L (ref 1.5–4)
LYMPHOCYTES RELATIVE PERCENT: 16.8 % (ref 20–42)
MCH RBC QN AUTO: 29 PG (ref 26–35)
MCHC RBC AUTO-ENTMCNC: 33.2 % (ref 32–34.5)
MCV RBC AUTO: 87.4 FL (ref 80–99.9)
MONOCYTES ABSOLUTE: 1.54 E9/L (ref 0.1–0.95)
MONOCYTES RELATIVE PERCENT: 13 % (ref 2–12)
NEUTROPHILS ABSOLUTE: 7.92 E9/L (ref 1.8–7.3)
NEUTROPHILS RELATIVE PERCENT: 66.5 % (ref 43–80)
PDW BLD-RTO: 14 FL (ref 11.5–15)
PLATELET # BLD: 253 E9/L (ref 130–450)
PMV BLD AUTO: 8.9 FL (ref 7–12)
RBC # BLD: 4.76 E12/L (ref 3.8–5.8)
WBC # BLD: 11.9 E9/L (ref 4.5–11.5)

## 2019-02-04 PROCEDURE — 2580000003 HC RX 258: Performed by: INTERNAL MEDICINE

## 2019-02-04 PROCEDURE — 2700000000 HC OXYGEN THERAPY PER DAY

## 2019-02-04 PROCEDURE — 2500000003 HC RX 250 WO HCPCS: Performed by: EMERGENCY MEDICINE

## 2019-02-04 PROCEDURE — 6370000000 HC RX 637 (ALT 250 FOR IP): Performed by: INTERNAL MEDICINE

## 2019-02-04 PROCEDURE — 87324 CLOSTRIDIUM AG IA: CPT

## 2019-02-04 PROCEDURE — 2500000003 HC RX 250 WO HCPCS: Performed by: INTERNAL MEDICINE

## 2019-02-04 PROCEDURE — 6360000002 HC RX W HCPCS: Performed by: EMERGENCY MEDICINE

## 2019-02-04 PROCEDURE — 1200000000 HC SEMI PRIVATE

## 2019-02-04 PROCEDURE — 87425 ROTAVIRUS AG IA: CPT

## 2019-02-04 PROCEDURE — G0328 FECAL BLOOD SCRN IMMUNOASSAY: HCPCS

## 2019-02-04 PROCEDURE — 87329 GIARDIA AG IA: CPT

## 2019-02-04 PROCEDURE — 87077 CULTURE AEROBIC IDENTIFY: CPT

## 2019-02-04 PROCEDURE — 2580000003 HC RX 258: Performed by: EMERGENCY MEDICINE

## 2019-02-04 PROCEDURE — 87045 FECES CULTURE AEROBIC BACT: CPT

## 2019-02-04 PROCEDURE — 96375 TX/PRO/DX INJ NEW DRUG ADDON: CPT

## 2019-02-04 RX ORDER — SODIUM CHLORIDE 9 MG/ML
INJECTION, SOLUTION INTRAVENOUS CONTINUOUS
Status: DISCONTINUED | OUTPATIENT
Start: 2019-02-04 | End: 2019-02-07 | Stop reason: HOSPADM

## 2019-02-04 RX ORDER — OLANZAPINE 10 MG/1
20 TABLET ORAL NIGHTLY
Status: DISCONTINUED | OUTPATIENT
Start: 2019-02-04 | End: 2019-02-07 | Stop reason: HOSPADM

## 2019-02-04 RX ORDER — SODIUM CHLORIDE 0.9 % (FLUSH) 0.9 %
10 SYRINGE (ML) INJECTION EVERY 12 HOURS SCHEDULED
Status: DISCONTINUED | OUTPATIENT
Start: 2019-02-04 | End: 2019-02-07 | Stop reason: HOSPADM

## 2019-02-04 RX ORDER — FLUOXETINE HYDROCHLORIDE 20 MG/1
60 CAPSULE ORAL DAILY
Status: DISCONTINUED | OUTPATIENT
Start: 2019-02-04 | End: 2019-02-07 | Stop reason: HOSPADM

## 2019-02-04 RX ORDER — DIVALPROEX SODIUM 250 MG/1
500 TABLET, DELAYED RELEASE ORAL 2 TIMES DAILY
Status: DISCONTINUED | OUTPATIENT
Start: 2019-02-04 | End: 2019-02-07 | Stop reason: HOSPADM

## 2019-02-04 RX ORDER — AMLODIPINE BESYLATE 5 MG/1
5 TABLET ORAL DAILY
Status: DISCONTINUED | OUTPATIENT
Start: 2019-02-04 | End: 2019-02-07 | Stop reason: HOSPADM

## 2019-02-04 RX ORDER — SODIUM CHLORIDE 0.9 % (FLUSH) 0.9 %
10 SYRINGE (ML) INJECTION PRN
Status: DISCONTINUED | OUTPATIENT
Start: 2019-02-04 | End: 2019-02-07 | Stop reason: HOSPADM

## 2019-02-04 RX ADMIN — METRONIDAZOLE 500 MG: 500 INJECTION, SOLUTION INTRAVENOUS at 00:53

## 2019-02-04 RX ADMIN — OLANZAPINE 20 MG: 10 TABLET, FILM COATED ORAL at 20:56

## 2019-02-04 RX ADMIN — METRONIDAZOLE 500 MG: 500 INJECTION, SOLUTION INTRAVENOUS at 08:42

## 2019-02-04 RX ADMIN — AMLODIPINE BESYLATE 5 MG: 5 TABLET ORAL at 08:42

## 2019-02-04 RX ADMIN — FLUOXETINE 60 MG: 20 CAPSULE ORAL at 08:42

## 2019-02-04 RX ADMIN — DIVALPROEX SODIUM 500 MG: 250 TABLET, DELAYED RELEASE ORAL at 08:42

## 2019-02-04 RX ADMIN — METRONIDAZOLE 500 MG: 500 INJECTION, SOLUTION INTRAVENOUS at 23:32

## 2019-02-04 RX ADMIN — SODIUM CHLORIDE: 9 INJECTION, SOLUTION INTRAVENOUS at 08:42

## 2019-02-04 RX ADMIN — DIVALPROEX SODIUM 500 MG: 250 TABLET, DELAYED RELEASE ORAL at 20:56

## 2019-02-04 RX ADMIN — CEFTRIAXONE SODIUM 2 G: 2 INJECTION, POWDER, FOR SOLUTION INTRAMUSCULAR; INTRAVENOUS at 00:52

## 2019-02-04 RX ADMIN — SODIUM CHLORIDE: 9 INJECTION, SOLUTION INTRAVENOUS at 20:56

## 2019-02-04 RX ADMIN — METRONIDAZOLE 500 MG: 500 INJECTION, SOLUTION INTRAVENOUS at 16:16

## 2019-02-04 ASSESSMENT — PAIN SCALES - GENERAL
PAINLEVEL_OUTOF10: 0

## 2019-02-05 LAB
C DIFFICILE TOXIN, EIA: NORMAL
HCT VFR BLD CALC: 34.4 % (ref 37–54)
HEMOGLOBIN: 11.2 G/DL (ref 12.5–16.5)
MCH RBC QN AUTO: 29.3 PG (ref 26–35)
MCHC RBC AUTO-ENTMCNC: 32.6 % (ref 32–34.5)
MCV RBC AUTO: 90.1 FL (ref 80–99.9)
OCCULT BLOOD SCREENING: NORMAL
PDW BLD-RTO: 13.9 FL (ref 11.5–15)
PLATELET # BLD: 225 E9/L (ref 130–450)
PMV BLD AUTO: 9.1 FL (ref 7–12)
RBC # BLD: 3.82 E12/L (ref 3.8–5.8)
ROTAVIRUS ANTIGEN: NORMAL
WBC # BLD: 8.9 E9/L (ref 4.5–11.5)

## 2019-02-05 PROCEDURE — 85027 COMPLETE CBC AUTOMATED: CPT

## 2019-02-05 PROCEDURE — 6360000002 HC RX W HCPCS: Performed by: INTERNAL MEDICINE

## 2019-02-05 PROCEDURE — 6370000000 HC RX 637 (ALT 250 FOR IP): Performed by: INTERNAL MEDICINE

## 2019-02-05 PROCEDURE — 90686 IIV4 VACC NO PRSV 0.5 ML IM: CPT | Performed by: INTERNAL MEDICINE

## 2019-02-05 PROCEDURE — 36415 COLL VENOUS BLD VENIPUNCTURE: CPT

## 2019-02-05 PROCEDURE — 1200000000 HC SEMI PRIVATE

## 2019-02-05 PROCEDURE — 2580000003 HC RX 258: Performed by: INTERNAL MEDICINE

## 2019-02-05 PROCEDURE — G0008 ADMIN INFLUENZA VIRUS VAC: HCPCS | Performed by: INTERNAL MEDICINE

## 2019-02-05 PROCEDURE — 2700000000 HC OXYGEN THERAPY PER DAY

## 2019-02-05 PROCEDURE — 2500000003 HC RX 250 WO HCPCS: Performed by: INTERNAL MEDICINE

## 2019-02-05 RX ADMIN — AMLODIPINE BESYLATE 5 MG: 5 TABLET ORAL at 07:39

## 2019-02-05 RX ADMIN — METRONIDAZOLE 500 MG: 500 INJECTION, SOLUTION INTRAVENOUS at 16:54

## 2019-02-05 RX ADMIN — FLUOXETINE 60 MG: 20 CAPSULE ORAL at 07:39

## 2019-02-05 RX ADMIN — OLANZAPINE 20 MG: 10 TABLET, FILM COATED ORAL at 20:03

## 2019-02-05 RX ADMIN — DIVALPROEX SODIUM 500 MG: 250 TABLET, DELAYED RELEASE ORAL at 20:03

## 2019-02-05 RX ADMIN — DIVALPROEX SODIUM 500 MG: 250 TABLET, DELAYED RELEASE ORAL at 07:39

## 2019-02-05 RX ADMIN — Medication 10 ML: at 16:55

## 2019-02-05 RX ADMIN — METRONIDAZOLE 500 MG: 500 INJECTION, SOLUTION INTRAVENOUS at 07:39

## 2019-02-05 RX ADMIN — INFLUENZA A VIRUS A/MICHIGAN/45/2015 X-275 (H1N1) ANTIGEN (FORMALDEHYDE INACTIVATED), INFLUENZA A VIRUS A/SINGAPORE/INFIMH-16-0019/2016 IVR-186 (H3N2) ANTIGEN (FORMALDEHYDE INACTIVATED), INFLUENZA B VIRUS B/PHUKET/3073/2013 ANTIGEN (FORMALDEHYDE INACTIVATED), AND INFLUENZA B VIRUS B/MARYLAND/15/2016 BX-69A ANTIGEN (FORMALDEHYDE INACTIVATED) 0.5 ML: 15; 15; 15; 15 INJECTION, SUSPENSION INTRAMUSCULAR at 07:40

## 2019-02-05 ASSESSMENT — PAIN SCALES - GENERAL
PAINLEVEL_OUTOF10: 0
PAINLEVEL_OUTOF10: 0

## 2019-02-06 LAB
ALBUMIN SERPL-MCNC: 3.2 G/DL (ref 3.5–5.2)
ALP BLD-CCNC: 69 U/L (ref 40–129)
ALT SERPL-CCNC: 13 U/L (ref 0–40)
ANION GAP SERPL CALCULATED.3IONS-SCNC: 10 MMOL/L (ref 7–16)
AST SERPL-CCNC: 21 U/L (ref 0–39)
BASOPHILS ABSOLUTE: 0.04 E9/L (ref 0–0.2)
BASOPHILS RELATIVE PERCENT: 0.6 % (ref 0–2)
BILIRUB SERPL-MCNC: <0.2 MG/DL (ref 0–1.2)
BUN BLDV-MCNC: 12 MG/DL (ref 6–20)
C-REACTIVE PROTEIN: 3.3 MG/DL (ref 0–0.4)
CALCIUM SERPL-MCNC: 8.8 MG/DL (ref 8.6–10.2)
CHLORIDE BLD-SCNC: 105 MMOL/L (ref 98–107)
CO2: 26 MMOL/L (ref 22–29)
CREAT SERPL-MCNC: 0.9 MG/DL (ref 0.7–1.2)
CULTURE, STOOL: NORMAL
EOSINOPHILS ABSOLUTE: 0.53 E9/L (ref 0.05–0.5)
EOSINOPHILS RELATIVE PERCENT: 7.6 % (ref 0–6)
GFR AFRICAN AMERICAN: >60
GFR NON-AFRICAN AMERICAN: >60 ML/MIN/1.73
GIARDIA ANTIGEN STOOL: NORMAL
GLUCOSE BLD-MCNC: 99 MG/DL (ref 74–99)
HCT VFR BLD CALC: 36.9 % (ref 37–54)
HEMOGLOBIN: 12 G/DL (ref 12.5–16.5)
IMMATURE GRANULOCYTES #: 0.02 E9/L
IMMATURE GRANULOCYTES %: 0.3 % (ref 0–5)
LYMPHOCYTES ABSOLUTE: 0.93 E9/L (ref 1.5–4)
LYMPHOCYTES RELATIVE PERCENT: 13.4 % (ref 20–42)
MCH RBC QN AUTO: 28.8 PG (ref 26–35)
MCHC RBC AUTO-ENTMCNC: 32.5 % (ref 32–34.5)
MCV RBC AUTO: 88.7 FL (ref 80–99.9)
MONOCYTES ABSOLUTE: 0.32 E9/L (ref 0.1–0.95)
MONOCYTES RELATIVE PERCENT: 4.6 % (ref 2–12)
NEUTROPHILS ABSOLUTE: 5.11 E9/L (ref 1.8–7.3)
NEUTROPHILS RELATIVE PERCENT: 73.5 % (ref 43–80)
PDW BLD-RTO: 14 FL (ref 11.5–15)
PLATELET # BLD: 285 E9/L (ref 130–450)
PMV BLD AUTO: 9 FL (ref 7–12)
POTASSIUM SERPL-SCNC: 4.5 MMOL/L (ref 3.5–5)
RBC # BLD: 4.16 E12/L (ref 3.8–5.8)
SODIUM BLD-SCNC: 141 MMOL/L (ref 132–146)
TOTAL PROTEIN: 6.1 G/DL (ref 6.4–8.3)
WBC # BLD: 7 E9/L (ref 4.5–11.5)

## 2019-02-06 PROCEDURE — 36415 COLL VENOUS BLD VENIPUNCTURE: CPT

## 2019-02-06 PROCEDURE — 2500000003 HC RX 250 WO HCPCS: Performed by: INTERNAL MEDICINE

## 2019-02-06 PROCEDURE — 6370000000 HC RX 637 (ALT 250 FOR IP): Performed by: CLINICAL NURSE SPECIALIST

## 2019-02-06 PROCEDURE — 2580000003 HC RX 258: Performed by: INTERNAL MEDICINE

## 2019-02-06 PROCEDURE — 6370000000 HC RX 637 (ALT 250 FOR IP): Performed by: INTERNAL MEDICINE

## 2019-02-06 PROCEDURE — 85025 COMPLETE CBC W/AUTO DIFF WBC: CPT

## 2019-02-06 PROCEDURE — 1200000000 HC SEMI PRIVATE

## 2019-02-06 PROCEDURE — 6360000002 HC RX W HCPCS: Performed by: CLINICAL NURSE SPECIALIST

## 2019-02-06 PROCEDURE — 80053 COMPREHEN METABOLIC PANEL: CPT

## 2019-02-06 PROCEDURE — 86140 C-REACTIVE PROTEIN: CPT

## 2019-02-06 RX ORDER — PANTOPRAZOLE SODIUM 40 MG/1
40 TABLET, DELAYED RELEASE ORAL
Status: DISCONTINUED | OUTPATIENT
Start: 2019-02-06 | End: 2019-02-07 | Stop reason: HOSPADM

## 2019-02-06 RX ORDER — DOCUSATE SODIUM 100 MG/1
100 CAPSULE, LIQUID FILLED ORAL DAILY
Status: DISCONTINUED | OUTPATIENT
Start: 2019-02-07 | End: 2019-02-07 | Stop reason: HOSPADM

## 2019-02-06 RX ORDER — ACETAMINOPHEN 325 MG/1
650 TABLET ORAL EVERY 4 HOURS PRN
Status: DISCONTINUED | OUTPATIENT
Start: 2019-02-06 | End: 2019-02-07 | Stop reason: HOSPADM

## 2019-02-06 RX ORDER — METHYLPREDNISOLONE SODIUM SUCCINATE 40 MG/ML
30 INJECTION, POWDER, LYOPHILIZED, FOR SOLUTION INTRAMUSCULAR; INTRAVENOUS EVERY 6 HOURS
Status: DISCONTINUED | OUTPATIENT
Start: 2019-02-06 | End: 2019-02-07 | Stop reason: HOSPADM

## 2019-02-06 RX ORDER — HYDROXYZINE PAMOATE 25 MG/1
25 CAPSULE ORAL 2 TIMES DAILY PRN
Status: DISCONTINUED | OUTPATIENT
Start: 2019-02-06 | End: 2019-02-07 | Stop reason: HOSPADM

## 2019-02-06 RX ADMIN — DIVALPROEX SODIUM 500 MG: 250 TABLET, DELAYED RELEASE ORAL at 21:00

## 2019-02-06 RX ADMIN — METHYLPREDNISOLONE SODIUM SUCCINATE 30 MG: 40 INJECTION, POWDER, FOR SOLUTION INTRAMUSCULAR; INTRAVENOUS at 14:23

## 2019-02-06 RX ADMIN — ACETAMINOPHEN 650 MG: 325 TABLET ORAL at 23:58

## 2019-02-06 RX ADMIN — METRONIDAZOLE 500 MG: 500 INJECTION, SOLUTION INTRAVENOUS at 08:47

## 2019-02-06 RX ADMIN — HYDROXYZINE PAMOATE 25 MG: 25 CAPSULE ORAL at 23:58

## 2019-02-06 RX ADMIN — SODIUM CHLORIDE: 9 INJECTION, SOLUTION INTRAVENOUS at 16:30

## 2019-02-06 RX ADMIN — METRONIDAZOLE 500 MG: 500 INJECTION, SOLUTION INTRAVENOUS at 16:30

## 2019-02-06 RX ADMIN — AMLODIPINE BESYLATE 5 MG: 5 TABLET ORAL at 08:47

## 2019-02-06 RX ADMIN — DIVALPROEX SODIUM 500 MG: 250 TABLET, DELAYED RELEASE ORAL at 08:47

## 2019-02-06 RX ADMIN — PANTOPRAZOLE SODIUM 40 MG: 40 TABLET, DELAYED RELEASE ORAL at 16:30

## 2019-02-06 RX ADMIN — SODIUM CHLORIDE: 9 INJECTION, SOLUTION INTRAVENOUS at 00:18

## 2019-02-06 RX ADMIN — FLUOXETINE 60 MG: 20 CAPSULE ORAL at 08:47

## 2019-02-06 RX ADMIN — Medication 10 ML: at 20:25

## 2019-02-06 RX ADMIN — OLANZAPINE 20 MG: 10 TABLET, FILM COATED ORAL at 20:24

## 2019-02-06 RX ADMIN — METRONIDAZOLE 500 MG: 500 INJECTION, SOLUTION INTRAVENOUS at 00:18

## 2019-02-06 RX ADMIN — METHYLPREDNISOLONE SODIUM SUCCINATE 30 MG: 40 INJECTION, POWDER, FOR SOLUTION INTRAMUSCULAR; INTRAVENOUS at 20:24

## 2019-02-06 ASSESSMENT — PAIN DESCRIPTION - ONSET: ONSET: ON-GOING

## 2019-02-06 ASSESSMENT — PAIN DESCRIPTION - ORIENTATION: ORIENTATION: RIGHT

## 2019-02-06 ASSESSMENT — PAIN DESCRIPTION - LOCATION: LOCATION: LEG

## 2019-02-06 ASSESSMENT — PAIN DESCRIPTION - PAIN TYPE: TYPE: ACUTE PAIN

## 2019-02-06 ASSESSMENT — PAIN SCALES - GENERAL
PAINLEVEL_OUTOF10: 0
PAINLEVEL_OUTOF10: 10

## 2019-02-06 ASSESSMENT — PAIN DESCRIPTION - FREQUENCY: FREQUENCY: INTERMITTENT

## 2019-02-06 ASSESSMENT — PAIN DESCRIPTION - DESCRIPTORS: DESCRIPTORS: ACHING;DISCOMFORT

## 2019-02-07 VITALS
HEART RATE: 95 BPM | TEMPERATURE: 98.5 F | DIASTOLIC BLOOD PRESSURE: 85 MMHG | OXYGEN SATURATION: 94 % | RESPIRATION RATE: 16 BRPM | SYSTOLIC BLOOD PRESSURE: 134 MMHG | WEIGHT: 225 LBS | HEIGHT: 74 IN | BODY MASS INDEX: 28.88 KG/M2

## 2019-02-07 LAB
ALBUMIN SERPL-MCNC: 3.2 G/DL (ref 3.5–5.2)
ALP BLD-CCNC: 70 U/L (ref 40–129)
ALT SERPL-CCNC: 12 U/L (ref 0–40)
ANION GAP SERPL CALCULATED.3IONS-SCNC: 9 MMOL/L (ref 7–16)
AST SERPL-CCNC: 15 U/L (ref 0–39)
BASOPHILS ABSOLUTE: 0.01 E9/L (ref 0–0.2)
BASOPHILS RELATIVE PERCENT: 0.2 % (ref 0–2)
BILIRUB SERPL-MCNC: <0.2 MG/DL (ref 0–1.2)
BUN BLDV-MCNC: 13 MG/DL (ref 6–20)
BURR CELLS: ABNORMAL
CALCIUM SERPL-MCNC: 8.8 MG/DL (ref 8.6–10.2)
CHLORIDE BLD-SCNC: 106 MMOL/L (ref 98–107)
CO2: 23 MMOL/L (ref 22–29)
CREAT SERPL-MCNC: 0.7 MG/DL (ref 0.7–1.2)
EOSINOPHILS ABSOLUTE: 0 E9/L (ref 0.05–0.5)
EOSINOPHILS RELATIVE PERCENT: 0 % (ref 0–6)
GFR AFRICAN AMERICAN: >60
GFR NON-AFRICAN AMERICAN: >60 ML/MIN/1.73
GLUCOSE BLD-MCNC: 144 MG/DL (ref 74–99)
HCT VFR BLD CALC: 37.2 % (ref 37–54)
HEMOGLOBIN: 12.3 G/DL (ref 12.5–16.5)
IMMATURE GRANULOCYTES #: 0.03 E9/L
IMMATURE GRANULOCYTES %: 0.6 % (ref 0–5)
LYMPHOCYTES ABSOLUTE: 0.59 E9/L (ref 1.5–4)
LYMPHOCYTES RELATIVE PERCENT: 11.2 % (ref 20–42)
MCH RBC QN AUTO: 29.2 PG (ref 26–35)
MCHC RBC AUTO-ENTMCNC: 33.1 % (ref 32–34.5)
MCV RBC AUTO: 88.4 FL (ref 80–99.9)
MONOCYTES ABSOLUTE: 0.11 E9/L (ref 0.1–0.95)
MONOCYTES RELATIVE PERCENT: 2.1 % (ref 2–12)
NEUTROPHILS ABSOLUTE: 4.51 E9/L (ref 1.8–7.3)
NEUTROPHILS RELATIVE PERCENT: 85.9 % (ref 43–80)
PDW BLD-RTO: 13.9 FL (ref 11.5–15)
PLATELET # BLD: 279 E9/L (ref 130–450)
PMV BLD AUTO: 9.4 FL (ref 7–12)
POIKILOCYTES: ABNORMAL
POLYCHROMASIA: ABNORMAL
POTASSIUM SERPL-SCNC: 4.3 MMOL/L (ref 3.5–5)
RBC # BLD: 4.21 E12/L (ref 3.8–5.8)
SODIUM BLD-SCNC: 138 MMOL/L (ref 132–146)
TOTAL PROTEIN: 6.1 G/DL (ref 6.4–8.3)
WBC # BLD: 5.3 E9/L (ref 4.5–11.5)

## 2019-02-07 PROCEDURE — 6360000002 HC RX W HCPCS: Performed by: CLINICAL NURSE SPECIALIST

## 2019-02-07 PROCEDURE — 6370000000 HC RX 637 (ALT 250 FOR IP): Performed by: CLINICAL NURSE SPECIALIST

## 2019-02-07 PROCEDURE — 6370000000 HC RX 637 (ALT 250 FOR IP): Performed by: INTERNAL MEDICINE

## 2019-02-07 PROCEDURE — 2500000003 HC RX 250 WO HCPCS: Performed by: INTERNAL MEDICINE

## 2019-02-07 PROCEDURE — 2580000003 HC RX 258: Performed by: INTERNAL MEDICINE

## 2019-02-07 PROCEDURE — 36415 COLL VENOUS BLD VENIPUNCTURE: CPT

## 2019-02-07 PROCEDURE — 85025 COMPLETE CBC W/AUTO DIFF WBC: CPT

## 2019-02-07 PROCEDURE — 80053 COMPREHEN METABOLIC PANEL: CPT

## 2019-02-07 RX ADMIN — Medication 10 ML: at 08:55

## 2019-02-07 RX ADMIN — AMLODIPINE BESYLATE 5 MG: 5 TABLET ORAL at 08:55

## 2019-02-07 RX ADMIN — METHYLPREDNISOLONE SODIUM SUCCINATE 30 MG: 40 INJECTION, POWDER, FOR SOLUTION INTRAMUSCULAR; INTRAVENOUS at 07:21

## 2019-02-07 RX ADMIN — PANTOPRAZOLE SODIUM 40 MG: 40 TABLET, DELAYED RELEASE ORAL at 06:20

## 2019-02-07 RX ADMIN — FLUOXETINE 60 MG: 20 CAPSULE ORAL at 08:55

## 2019-02-07 RX ADMIN — METHYLPREDNISOLONE SODIUM SUCCINATE 30 MG: 40 INJECTION, POWDER, FOR SOLUTION INTRAMUSCULAR; INTRAVENOUS at 01:19

## 2019-02-07 RX ADMIN — METRONIDAZOLE 500 MG: 500 INJECTION, SOLUTION INTRAVENOUS at 00:19

## 2019-02-07 RX ADMIN — METRONIDAZOLE 500 MG: 500 INJECTION, SOLUTION INTRAVENOUS at 08:55

## 2019-02-07 RX ADMIN — DIVALPROEX SODIUM 500 MG: 250 TABLET, DELAYED RELEASE ORAL at 08:55

## 2019-02-07 RX ADMIN — DOCUSATE SODIUM 100 MG: 100 CAPSULE, LIQUID FILLED ORAL at 08:55

## 2019-02-07 ASSESSMENT — PAIN SCALES - GENERAL
PAINLEVEL_OUTOF10: 0
PAINLEVEL_OUTOF10: 0

## 2019-03-05 ENCOUNTER — HOSPITAL ENCOUNTER (OUTPATIENT)
Age: 54
Discharge: HOME | End: 2019-03-05
Payer: MEDICARE

## 2019-03-05 LAB
ALBUMIN SERPL-MCNC: 3.7 G/DL (ref 3.5–5.2)
ALP BLD-CCNC: 101 U/L (ref 40–129)
ALT SERPL-CCNC: 17 U/L (ref 0–40)
ANION GAP SERPL CALCULATED.3IONS-SCNC: 10 MMOL/L (ref 7–16)
AST SERPL-CCNC: 23 U/L (ref 0–39)
BILIRUB SERPL-MCNC: <0.2 MG/DL (ref 0–1.2)
BUN BLDV-MCNC: 15 MG/DL (ref 6–20)
C-REACTIVE PROTEIN: 1.3 MG/DL (ref 0–0.4)
CALCIUM SERPL-MCNC: 9.4 MG/DL (ref 8.6–10.2)
CHLORIDE BLD-SCNC: 103 MMOL/L (ref 98–107)
CO2: 26 MMOL/L (ref 22–29)
CREAT SERPL-MCNC: 1.1 MG/DL (ref 0.7–1.2)
GFR AFRICAN AMERICAN: >60
GFR NON-AFRICAN AMERICAN: >60 ML/MIN/1.73
GLUCOSE BLD-MCNC: 135 MG/DL (ref 74–99)
HCT VFR BLD CALC: 44.4 % (ref 37–54)
HEMOGLOBIN: 14.2 G/DL (ref 12.5–16.5)
MCH RBC QN AUTO: 28.7 PG (ref 26–35)
MCHC RBC AUTO-ENTMCNC: 32 % (ref 32–34.5)
MCV RBC AUTO: 89.7 FL (ref 80–99.9)
PDW BLD-RTO: 14.2 FL (ref 11.5–15)
PLATELET # BLD: 316 E9/L (ref 130–450)
PMV BLD AUTO: 9.2 FL (ref 7–12)
POTASSIUM SERPL-SCNC: 4.1 MMOL/L (ref 3.5–5)
RBC # BLD: 4.95 E12/L (ref 3.8–5.8)
SODIUM BLD-SCNC: 139 MMOL/L (ref 132–146)
TOTAL PROTEIN: 6.8 G/DL (ref 6.4–8.3)
WBC # BLD: 8 E9/L (ref 4.5–11.5)

## 2019-03-05 PROCEDURE — 80053 COMPREHEN METABOLIC PANEL: CPT

## 2019-03-05 PROCEDURE — 86706 HEP B SURFACE ANTIBODY: CPT

## 2019-03-05 PROCEDURE — 85027 COMPLETE CBC AUTOMATED: CPT

## 2019-03-05 PROCEDURE — 86140 C-REACTIVE PROTEIN: CPT

## 2019-03-05 PROCEDURE — 36415 COLL VENOUS BLD VENIPUNCTURE: CPT

## 2019-03-08 LAB — HBV SURFACE AB TITR SER: NORMAL {TITER}

## 2019-04-10 ENCOUNTER — HOSPITAL ENCOUNTER (OUTPATIENT)
Age: 54
Discharge: HOME | End: 2019-04-12
Payer: MEDICARE

## 2019-04-10 DIAGNOSIS — F31.9 BIPOLAR 1 DISORDER (HCC): ICD-10-CM

## 2019-04-10 DIAGNOSIS — Z79.899 ENCOUNTER FOR LONG-TERM (CURRENT) USE OF MEDICATIONS: ICD-10-CM

## 2019-04-10 LAB
ALBUMIN SERPL-MCNC: 3.7 G/DL (ref 3.5–5.2)
ALP BLD-CCNC: 103 U/L (ref 40–129)
ALT SERPL-CCNC: 12 U/L (ref 0–40)
ANION GAP SERPL CALCULATED.3IONS-SCNC: 13 MMOL/L (ref 7–16)
AST SERPL-CCNC: 19 U/L (ref 0–39)
BASOPHILS ABSOLUTE: 0.05 E9/L (ref 0–0.2)
BASOPHILS RELATIVE PERCENT: 0.5 % (ref 0–2)
BILIRUB SERPL-MCNC: <0.2 MG/DL (ref 0–1.2)
BUN BLDV-MCNC: 24 MG/DL (ref 6–20)
CALCIUM SERPL-MCNC: 9.3 MG/DL (ref 8.6–10.2)
CHLORIDE BLD-SCNC: 102 MMOL/L (ref 98–107)
CO2: 23 MMOL/L (ref 22–29)
CREAT SERPL-MCNC: 1.1 MG/DL (ref 0.7–1.2)
EOSINOPHILS ABSOLUTE: 0.43 E9/L (ref 0.05–0.5)
EOSINOPHILS RELATIVE PERCENT: 4.5 % (ref 0–6)
GFR AFRICAN AMERICAN: >60
GFR NON-AFRICAN AMERICAN: >60 ML/MIN/1.73
GLUCOSE BLD-MCNC: 111 MG/DL (ref 74–99)
HCT VFR BLD CALC: 40.9 % (ref 37–54)
HEMOGLOBIN: 13.2 G/DL (ref 12.5–16.5)
IMMATURE GRANULOCYTES #: 0.04 E9/L
IMMATURE GRANULOCYTES %: 0.4 % (ref 0–5)
LYMPHOCYTES ABSOLUTE: 1.96 E9/L (ref 1.5–4)
LYMPHOCYTES RELATIVE PERCENT: 20.6 % (ref 20–42)
MCH RBC QN AUTO: 28.3 PG (ref 26–35)
MCHC RBC AUTO-ENTMCNC: 32.3 % (ref 32–34.5)
MCV RBC AUTO: 87.8 FL (ref 80–99.9)
MONOCYTES ABSOLUTE: 0.67 E9/L (ref 0.1–0.95)
MONOCYTES RELATIVE PERCENT: 7 % (ref 2–12)
NEUTROPHILS ABSOLUTE: 6.37 E9/L (ref 1.8–7.3)
NEUTROPHILS RELATIVE PERCENT: 67 % (ref 43–80)
PDW BLD-RTO: 14.8 FL (ref 11.5–15)
PLATELET # BLD: 225 E9/L (ref 130–450)
PMV BLD AUTO: 10.1 FL (ref 7–12)
POTASSIUM SERPL-SCNC: 4.2 MMOL/L (ref 3.5–5)
RBC # BLD: 4.66 E12/L (ref 3.8–5.8)
SODIUM BLD-SCNC: 138 MMOL/L (ref 132–146)
TOTAL PROTEIN: 6.7 G/DL (ref 6.4–8.3)
VALPROIC ACID LEVEL: 108 MCG/ML (ref 50–100)
WBC # BLD: 9.5 E9/L (ref 4.5–11.5)

## 2019-04-10 PROCEDURE — 80164 ASSAY DIPROPYLACETIC ACD TOT: CPT

## 2019-04-10 PROCEDURE — 80053 COMPREHEN METABOLIC PANEL: CPT

## 2019-04-10 PROCEDURE — 85025 COMPLETE CBC W/AUTO DIFF WBC: CPT

## 2019-04-30 ENCOUNTER — HOSPITAL ENCOUNTER (EMERGENCY)
Age: 54
Discharge: HOME | End: 2019-04-30
Attending: EMERGENCY MEDICINE
Payer: MEDICARE

## 2019-04-30 VITALS
RESPIRATION RATE: 18 BRPM | HEIGHT: 75 IN | WEIGHT: 225 LBS | HEART RATE: 96 BPM | DIASTOLIC BLOOD PRESSURE: 98 MMHG | SYSTOLIC BLOOD PRESSURE: 152 MMHG | TEMPERATURE: 97.8 F | OXYGEN SATURATION: 95 % | BODY MASS INDEX: 27.98 KG/M2

## 2019-04-30 DIAGNOSIS — K50.90 EXACERBATION OF CROHN'S DISEASE WITHOUT COMPLICATION (HCC): Primary | ICD-10-CM

## 2019-04-30 LAB
ALBUMIN SERPL-MCNC: 4 G/DL (ref 3.5–5.2)
ALP BLD-CCNC: 100 U/L (ref 40–129)
ALT SERPL-CCNC: 10 U/L (ref 0–40)
ANION GAP SERPL CALCULATED.3IONS-SCNC: 14 MMOL/L (ref 7–16)
AST SERPL-CCNC: 13 U/L (ref 0–39)
BASOPHILS ABSOLUTE: 0.09 E9/L (ref 0–0.2)
BASOPHILS RELATIVE PERCENT: 0.8 % (ref 0–2)
BILIRUB SERPL-MCNC: <0.2 MG/DL (ref 0–1.2)
BUN BLDV-MCNC: 20 MG/DL (ref 6–20)
CALCIUM SERPL-MCNC: 9.3 MG/DL (ref 8.6–10.2)
CHLORIDE BLD-SCNC: 103 MMOL/L (ref 98–107)
CO2: 23 MMOL/L (ref 22–29)
CREAT SERPL-MCNC: 1.2 MG/DL (ref 0.7–1.2)
EOSINOPHILS ABSOLUTE: 0.33 E9/L (ref 0.05–0.5)
EOSINOPHILS RELATIVE PERCENT: 3.1 % (ref 0–6)
GFR AFRICAN AMERICAN: >60
GFR NON-AFRICAN AMERICAN: >60 ML/MIN/1.73
GLUCOSE BLD-MCNC: 96 MG/DL (ref 74–99)
HCT VFR BLD CALC: 40.3 % (ref 37–54)
HEMOGLOBIN: 13.2 G/DL (ref 12.5–16.5)
IMMATURE GRANULOCYTES #: 0.03 E9/L
IMMATURE GRANULOCYTES %: 0.3 % (ref 0–5)
LACTIC ACID: 1.4 MMOL/L (ref 0.5–2.2)
LIPASE: 31 U/L (ref 13–60)
LYMPHOCYTES ABSOLUTE: 2.09 E9/L (ref 1.5–4)
LYMPHOCYTES RELATIVE PERCENT: 19.7 % (ref 20–42)
MCH RBC QN AUTO: 29 PG (ref 26–35)
MCHC RBC AUTO-ENTMCNC: 32.8 % (ref 32–34.5)
MCV RBC AUTO: 88.6 FL (ref 80–99.9)
MONOCYTES ABSOLUTE: 0.88 E9/L (ref 0.1–0.95)
MONOCYTES RELATIVE PERCENT: 8.3 % (ref 2–12)
NEUTROPHILS ABSOLUTE: 7.19 E9/L (ref 1.8–7.3)
NEUTROPHILS RELATIVE PERCENT: 67.8 % (ref 43–80)
PDW BLD-RTO: 16.1 FL (ref 11.5–15)
PLATELET # BLD: 280 E9/L (ref 130–450)
PMV BLD AUTO: 9.3 FL (ref 7–12)
POTASSIUM REFLEX MAGNESIUM: 3.9 MMOL/L (ref 3.5–5)
RBC # BLD: 4.55 E12/L (ref 3.8–5.8)
REASON FOR REJECTION: NORMAL
REJECTED TEST: NORMAL
SODIUM BLD-SCNC: 140 MMOL/L (ref 132–146)
TOTAL PROTEIN: 7.2 G/DL (ref 6.4–8.3)
WBC # BLD: 10.6 E9/L (ref 4.5–11.5)

## 2019-04-30 PROCEDURE — 2580000003 HC RX 258: Performed by: EMERGENCY MEDICINE

## 2019-04-30 PROCEDURE — 96375 TX/PRO/DX INJ NEW DRUG ADDON: CPT

## 2019-04-30 PROCEDURE — 80053 COMPREHEN METABOLIC PANEL: CPT

## 2019-04-30 PROCEDURE — 85025 COMPLETE CBC W/AUTO DIFF WBC: CPT

## 2019-04-30 PROCEDURE — 96374 THER/PROPH/DIAG INJ IV PUSH: CPT

## 2019-04-30 PROCEDURE — 36415 COLL VENOUS BLD VENIPUNCTURE: CPT

## 2019-04-30 PROCEDURE — 83605 ASSAY OF LACTIC ACID: CPT

## 2019-04-30 PROCEDURE — 99284 EMERGENCY DEPT VISIT MOD MDM: CPT

## 2019-04-30 PROCEDURE — 83690 ASSAY OF LIPASE: CPT

## 2019-04-30 PROCEDURE — 6360000002 HC RX W HCPCS: Performed by: EMERGENCY MEDICINE

## 2019-04-30 RX ORDER — PREDNISONE 50 MG/1
TABLET ORAL
Qty: 5 TABLET | Refills: 0 | Status: ON HOLD | OUTPATIENT
Start: 2019-04-30 | End: 2019-07-21

## 2019-04-30 RX ORDER — 0.9 % SODIUM CHLORIDE 0.9 %
1000 INTRAVENOUS SOLUTION INTRAVENOUS ONCE
Status: COMPLETED | OUTPATIENT
Start: 2019-04-30 | End: 2019-04-30

## 2019-04-30 RX ORDER — MORPHINE SULFATE 2 MG/ML
8 INJECTION, SOLUTION INTRAMUSCULAR; INTRAVENOUS ONCE
Status: COMPLETED | OUTPATIENT
Start: 2019-04-30 | End: 2019-04-30

## 2019-04-30 RX ORDER — METHYLPREDNISOLONE SODIUM SUCCINATE 125 MG/2ML
125 INJECTION, POWDER, LYOPHILIZED, FOR SOLUTION INTRAMUSCULAR; INTRAVENOUS ONCE
Status: COMPLETED | OUTPATIENT
Start: 2019-04-30 | End: 2019-04-30

## 2019-04-30 RX ORDER — ONDANSETRON 2 MG/ML
4 INJECTION INTRAMUSCULAR; INTRAVENOUS ONCE
Status: COMPLETED | OUTPATIENT
Start: 2019-04-30 | End: 2019-04-30

## 2019-04-30 RX ORDER — HYDROCODONE BITARTRATE AND ACETAMINOPHEN 5; 325 MG/1; MG/1
1 TABLET ORAL EVERY 6 HOURS PRN
Qty: 12 TABLET | Refills: 0 | Status: SHIPPED | OUTPATIENT
Start: 2019-04-30 | End: 2019-05-03

## 2019-04-30 RX ADMIN — SODIUM CHLORIDE 1000 ML: 9 INJECTION, SOLUTION INTRAVENOUS at 03:45

## 2019-04-30 RX ADMIN — METHYLPREDNISOLONE SODIUM SUCCINATE 125 MG: 125 INJECTION, POWDER, FOR SOLUTION INTRAMUSCULAR; INTRAVENOUS at 03:56

## 2019-04-30 RX ADMIN — ONDANSETRON 4 MG: 2 INJECTION INTRAMUSCULAR; INTRAVENOUS at 03:56

## 2019-04-30 RX ADMIN — MORPHINE SULFATE 8 MG: 2 INJECTION, SOLUTION INTRAMUSCULAR; INTRAVENOUS at 03:56

## 2019-04-30 ASSESSMENT — PAIN SCALES - GENERAL: PAINLEVEL_OUTOF10: 10

## 2019-04-30 ASSESSMENT — PAIN DESCRIPTION - PAIN TYPE: TYPE: ACUTE PAIN

## 2019-04-30 ASSESSMENT — PAIN DESCRIPTION - DESCRIPTORS: DESCRIPTORS: CRAMPING

## 2019-04-30 ASSESSMENT — PAIN DESCRIPTION - ORIENTATION: ORIENTATION: LOWER

## 2019-04-30 ASSESSMENT — PAIN DESCRIPTION - LOCATION: LOCATION: ABDOMEN

## 2019-04-30 NOTE — ED PROVIDER NOTES
HPI:  4/30/19,   Time: 3:15 AM         Pete Menjivar is a 47 y.o. male presenting to the ED for diffuse lower abdominal pain and history of Crohn's, beginning 2 hours ago. The complaint has been constant, moderate in severity, and worsened by nothing. Gradual onset, is out of prednisone at this time,  we'll receive his immune modulator on May 10, no fever or chills and no vomiting or diarrhea or black or bloody stools    ROS:   Pertinent positives and negatives are stated within HPI, all other systems reviewed and are negative.  --------------------------------------------- PAST HISTORY ---------------------------------------------  Past Medical History:  has a past medical history of ADD (attention deficit disorder), Anxiety, Bipolar 1 disorder ([facility] Cancer ([facility] Crohn's disease ([facility] Depression, GERD (gastroesophageal reflux disease), Panic attack, Rectal pain, and Schizo affective schizophrenia ([facility] Past Surgical History:  has a past surgical history that includes Colonoscopy; Nose surgery (2002?); Prostatectomy (9/15/2014); and Endoscopy, colon, diagnostic. Social History:  reports that he has been smoking cigarettes. He started smoking about 41 years ago. He has been smoking about 2.00 packs per day. He has never used smokeless tobacco. He reports that he does not drink alcohol or use drugs. Family History: family history includes Depression in his father; Heart Disease in his mother; Mental Illness in his brother, father, mother, and sister. The patients home medications have been reviewed.     Allergies: Ciprofloxacin hcl and Nsaids    -------------------------------------------------- RESULTS -------------------------------------------------  All laboratory and radiology results have been personally reviewed by myself   LABS:  Results for orders placed or performed during the hospital encounter of 04/30/19   Comprehensive Metabolic Panel w/ Reflex to MG   Result Value Ref Range    Sodium 140 132 - 146 mmol/L    Potassium reflex Magnesium 3.9 3.5 - 5.0 mmol/L    Chloride 103 98 - 107 mmol/L    CO2 23 22 - 29 mmol/L    Anion Gap 14 7 - 16 mmol/L    Glucose 96 74 - 99 mg/dL    BUN 20 6 - 20 mg/dL    CREATININE 1.2 0.7 - 1.2 mg/dL    GFR Non-African American >60 >=60 mL/min/1.73    GFR African American >60     Calcium 9.3 8.6 - 10.2 mg/dL    Total Protein 7.2 6.4 - 8.3 g/dL    Alb 4.0 3.5 - 5.2 g/dL    Total Bilirubin <0.2 0.0 - 1.2 mg/dL    Alkaline Phosphatase 100 40 - 129 U/L    ALT 10 0 - 40 U/L    AST 13 0 - 39 U/L   Lipase   Result Value Ref Range    Lipase 31 13 - 60 U/L   Lactic Acid, Plasma   Result Value Ref Range    Lactic Acid 1.4 0.5 - 2.2 mmol/L   SPECIMEN REJECTION   Result Value Ref Range    Rejected Test CBCWD     Reason for Rejection see below    CBC auto differential   Result Value Ref Range    WBC 10.6 4.5 - 11.5 E9/L    RBC 4.55 3.80 - 5.80 E12/L    Hemoglobin 13.2 12.5 - 16.5 g/dL    Hematocrit 40.3 37.0 - 54.0 %    MCV 88.6 80.0 - 99.9 fL    MCH 29.0 26.0 - 35.0 pg    MCHC 32.8 32.0 - 34.5 %    RDW 16.1 (H) 11.5 - 15.0 fL    Platelets 060 436 - 574 E9/L    MPV 9.3 7.0 - 12.0 fL    Neutrophils % 67.8 43.0 - 80.0 %    Immature Granulocytes % 0.3 0.0 - 5.0 %    Lymphocytes % 19.7 (L) 20.0 - 42.0 %    Monocytes % 8.3 2.0 - 12.0 %    Eosinophils % 3.1 0.0 - 6.0 %    Basophils % 0.8 0.0 - 2.0 %    Neutrophils # 7.19 1.80 - 7.30 E9/L    Immature Granulocytes # 0.03 E9/L    Lymphocytes # 2.09 1.50 - 4.00 E9/L    Monocytes # 0.88 0.10 - 0.95 E9/L    Eosinophils # 0.33 0.05 - 0.50 E9/L    Basophils # 0.09 0.00 - 0.20 E9/L       RADIOLOGY:  Interpreted by Radiologist.  No orders to display       ------------------------- NURSING NOTES AND VITALS REVIEWED ---------------------------   The nursing notes within the ED encounter and vital signs as below have been reviewed.    BP (!) 152/98   Pulse 96   Temp 97.8 °F (36.6 °C) (Oral)   Resp 18   Ht 6' 3\" (1.905 m)   Wt 225 lb (102.1 kg) SpO2 95%   BMI 28.12 kg/m²   Oxygen Saturation Interpretation: Normal      ---------------------------------------------------PHYSICAL EXAM--------------------------------------    Injection for Crohn's  Constitutional/General: Alert and oriented x3, well appearing, non toxic in mild distress secondary to pain  Head: NC/AT  Eyes: PERRL, EOMI  Mouth: Oropharynx clear, handling secretions, no trismus  Neck: Supple, full ROM, no meningeal signs  Pulmonary: Lungs clear to auscultation bilaterally, no wheezes, rales, or rhonchi. Not in respiratory distress  Cardiovascular:  Regular rate and rhythm, no murmurs, gallops, or rubs. 2+ distal pulses  Abdomen: Soft, mild diffuse lower abdominal tenderness to palpation with mild guarding but no rebound tenderness, non distended,   Extremities: Moves all extremities x 4. Warm and well perfused  Skin: warm and dry without rash  Neurologic: GCS 15,  Psych: Normal Affect      ------------------------------ ED COURSE/MEDICAL DECISION MAKING----------------------  Medications   0.9 % sodium chloride bolus (0 mLs Intravenous Stopped 4/30/19 0526)   morphine (PF) injection 8 mg (8 mg Intravenous Given 4/30/19 0356)   ondansetron (ZOFRAN) injection 4 mg (4 mg Intravenous Given 4/30/19 0356)   methylPREDNISolone sodium (SOLU-MEDROL) injection 125 mg (125 mg Intravenous Given 4/30/19 0356)         Medical Decision Making:    Suspected Crohn's exacerbation-will hold off on CT scan at this time because he has had multiple CT scans and is afebrile at this time    Counseling: The emergency provider has spoken with the patient and discussed todays results, in addition to providing specific details for the plan of care and counseling regarding the diagnosis and prognosis. Questions are answered at this time and they are agreeable with the plan.      --------------------------------- IMPRESSION AND DISPOSITION ---------------------------------    IMPRESSION  1.  Exacerbation of [facility]'s disease without complication [facility] Stable       DISPOSITION  Disposition: Discharge to home  Patient condition is stable                  Therehetal Warren MD  04/30/19 4511

## 2019-05-13 ENCOUNTER — HOSPITAL ENCOUNTER (INPATIENT)
Age: 54
LOS: 6 days | Discharge: HOME | DRG: 709 | End: 2019-05-20
Attending: EMERGENCY MEDICINE | Admitting: FAMILY MEDICINE
Payer: MEDICARE

## 2019-05-13 ENCOUNTER — APPOINTMENT (OUTPATIENT)
Dept: CT IMAGING | Age: 54
DRG: 709 | End: 2019-05-13
Payer: MEDICARE

## 2019-05-13 ENCOUNTER — APPOINTMENT (OUTPATIENT)
Dept: ULTRASOUND IMAGING | Age: 54
DRG: 709 | End: 2019-05-13
Payer: MEDICARE

## 2019-05-13 DIAGNOSIS — N49.2 SCROTAL ABSCESS: Primary | ICD-10-CM

## 2019-05-13 DIAGNOSIS — N45.2 ORCHITIS OF LEFT TESTICLE: ICD-10-CM

## 2019-05-13 LAB
BACTERIA: ABNORMAL /HPF
BILIRUBIN URINE: NEGATIVE
BLOOD, URINE: ABNORMAL
CLARITY: CLEAR
COLOR: YELLOW
EPITHELIAL CELLS, UA: ABNORMAL /HPF
GLUCOSE URINE: NEGATIVE MG/DL
KETONES, URINE: ABNORMAL MG/DL
LEUKOCYTE ESTERASE, URINE: ABNORMAL
MUCUS: PRESENT
NITRITE, URINE: NEGATIVE
PH UA: 5.5 (ref 5–9)
PROTEIN UA: ABNORMAL MG/DL
RBC UA: ABNORMAL /HPF (ref 0–2)
SPECIFIC GRAVITY UA: 1.02 (ref 1–1.03)
UROBILINOGEN, URINE: 0.2 E.U./DL
WBC UA: ABNORMAL /HPF (ref 0–5)
YEAST: ABNORMAL

## 2019-05-13 PROCEDURE — 81001 URINALYSIS AUTO W/SCOPE: CPT

## 2019-05-13 PROCEDURE — 80048 BASIC METABOLIC PNL TOTAL CA: CPT

## 2019-05-13 PROCEDURE — 76870 US EXAM SCROTUM: CPT

## 2019-05-13 PROCEDURE — 83605 ASSAY OF LACTIC ACID: CPT

## 2019-05-13 PROCEDURE — 6360000002 HC RX W HCPCS: Performed by: STUDENT IN AN ORGANIZED HEALTH CARE EDUCATION/TRAINING PROGRAM

## 2019-05-13 PROCEDURE — 85025 COMPLETE CBC W/AUTO DIFF WBC: CPT

## 2019-05-13 PROCEDURE — 74177 CT ABD & PELVIS W/CONTRAST: CPT

## 2019-05-13 PROCEDURE — 96375 TX/PRO/DX INJ NEW DRUG ADDON: CPT

## 2019-05-13 PROCEDURE — 99285 EMERGENCY DEPT VISIT HI MDM: CPT

## 2019-05-13 RX ORDER — MORPHINE SULFATE 2 MG/ML
8 INJECTION, SOLUTION INTRAMUSCULAR; INTRAVENOUS ONCE
Status: COMPLETED | OUTPATIENT
Start: 2019-05-13 | End: 2019-05-13

## 2019-05-13 RX ADMIN — MORPHINE SULFATE 8 MG: 2 INJECTION, SOLUTION INTRAMUSCULAR; INTRAVENOUS at 23:55

## 2019-05-13 ASSESSMENT — PAIN DESCRIPTION - ORIENTATION: ORIENTATION: LEFT

## 2019-05-13 ASSESSMENT — PAIN SCALES - GENERAL
PAINLEVEL_OUTOF10: 10
PAINLEVEL_OUTOF10: 10

## 2019-05-13 ASSESSMENT — ENCOUNTER SYMPTOMS
COUGH: 0
CONSTIPATION: 0
NAUSEA: 0
SHORTNESS OF BREATH: 0
VOMITING: 0
SORE THROAT: 0
BACK PAIN: 0
COLOR CHANGE: 0
ABDOMINAL PAIN: 0
DIARRHEA: 0

## 2019-05-13 ASSESSMENT — PAIN DESCRIPTION - FREQUENCY: FREQUENCY: CONTINUOUS

## 2019-05-13 ASSESSMENT — PAIN DESCRIPTION - DESCRIPTORS: DESCRIPTORS: ACHING;CONSTANT;DISCOMFORT

## 2019-05-13 ASSESSMENT — PAIN DESCRIPTION - PAIN TYPE: TYPE: ACUTE PAIN

## 2019-05-13 ASSESSMENT — PAIN DESCRIPTION - ONSET: ONSET: ON-GOING

## 2019-05-13 ASSESSMENT — PAIN DESCRIPTION - LOCATION: LOCATION: SCROTUM

## 2019-05-13 ASSESSMENT — PAIN DESCRIPTION - PROGRESSION: CLINICAL_PROGRESSION: NOT CHANGED

## 2019-05-14 ENCOUNTER — APPOINTMENT (OUTPATIENT)
Dept: CT IMAGING | Age: 54
DRG: 709 | End: 2019-05-14
Payer: MEDICARE

## 2019-05-14 ENCOUNTER — ANESTHESIA (OUTPATIENT)
Dept: OPERATING ROOM | Age: 54
DRG: 709 | End: 2019-05-14
Payer: MEDICARE

## 2019-05-14 ENCOUNTER — ANESTHESIA EVENT (OUTPATIENT)
Dept: OPERATING ROOM | Age: 54
DRG: 709 | End: 2019-05-14
Payer: MEDICARE

## 2019-05-14 PROBLEM — N49.2 SCROTAL ABSCESS: Status: ACTIVE | Noted: 2019-05-14

## 2019-05-14 LAB
ALBUMIN SERPL-MCNC: 3.5 G/DL (ref 3.5–5.2)
ALP BLD-CCNC: 83 U/L (ref 40–129)
ALT SERPL-CCNC: 10 U/L (ref 0–40)
ANION GAP SERPL CALCULATED.3IONS-SCNC: 11 MMOL/L (ref 7–16)
ANION GAP SERPL CALCULATED.3IONS-SCNC: 12 MMOL/L (ref 7–16)
AST SERPL-CCNC: 13 U/L (ref 0–39)
BASOPHILS ABSOLUTE: 0.04 E9/L (ref 0–0.2)
BASOPHILS ABSOLUTE: 0.06 E9/L (ref 0–0.2)
BASOPHILS RELATIVE PERCENT: 0.6 % (ref 0–2)
BASOPHILS RELATIVE PERCENT: 0.7 % (ref 0–2)
BILIRUB SERPL-MCNC: <0.2 MG/DL (ref 0–1.2)
BUN BLDV-MCNC: 21 MG/DL (ref 6–20)
BUN BLDV-MCNC: 28 MG/DL (ref 6–20)
CALCIUM SERPL-MCNC: 8.6 MG/DL (ref 8.6–10.2)
CALCIUM SERPL-MCNC: 9.2 MG/DL (ref 8.6–10.2)
CHLORIDE BLD-SCNC: 102 MMOL/L (ref 98–107)
CHLORIDE BLD-SCNC: 103 MMOL/L (ref 98–107)
CO2: 23 MMOL/L (ref 22–29)
CO2: 24 MMOL/L (ref 22–29)
CREAT SERPL-MCNC: 1 MG/DL (ref 0.7–1.2)
CREAT SERPL-MCNC: 1.1 MG/DL (ref 0.7–1.2)
EOSINOPHILS ABSOLUTE: 0.56 E9/L (ref 0.05–0.5)
EOSINOPHILS ABSOLUTE: 0.69 E9/L (ref 0.05–0.5)
EOSINOPHILS RELATIVE PERCENT: 7.9 % (ref 0–6)
EOSINOPHILS RELATIVE PERCENT: 8.1 % (ref 0–6)
GFR AFRICAN AMERICAN: >60
GFR AFRICAN AMERICAN: >60
GFR NON-AFRICAN AMERICAN: >60 ML/MIN/1.73
GFR NON-AFRICAN AMERICAN: >60 ML/MIN/1.73
GLUCOSE BLD-MCNC: 103 MG/DL (ref 74–99)
GLUCOSE BLD-MCNC: 107 MG/DL (ref 74–99)
HCT VFR BLD CALC: 37.2 % (ref 37–54)
HCT VFR BLD CALC: 39.5 % (ref 37–54)
HEMOGLOBIN: 12.1 G/DL (ref 12.5–16.5)
HEMOGLOBIN: 13.1 G/DL (ref 12.5–16.5)
IMMATURE GRANULOCYTES #: 0.02 E9/L
IMMATURE GRANULOCYTES #: 0.04 E9/L
IMMATURE GRANULOCYTES %: 0.3 % (ref 0–5)
IMMATURE GRANULOCYTES %: 0.5 % (ref 0–5)
LACTIC ACID: 1.4 MMOL/L (ref 0.5–2.2)
LYMPHOCYTES ABSOLUTE: 1.95 E9/L (ref 1.5–4)
LYMPHOCYTES ABSOLUTE: 2.35 E9/L (ref 1.5–4)
LYMPHOCYTES RELATIVE PERCENT: 27.4 % (ref 20–42)
LYMPHOCYTES RELATIVE PERCENT: 27.6 % (ref 20–42)
MCH RBC QN AUTO: 29.3 PG (ref 26–35)
MCH RBC QN AUTO: 29.7 PG (ref 26–35)
MCHC RBC AUTO-ENTMCNC: 32.5 % (ref 32–34.5)
MCHC RBC AUTO-ENTMCNC: 33.2 % (ref 32–34.5)
MCV RBC AUTO: 89.6 FL (ref 80–99.9)
MCV RBC AUTO: 90.1 FL (ref 80–99.9)
MONOCYTES ABSOLUTE: 0.61 E9/L (ref 0.1–0.95)
MONOCYTES ABSOLUTE: 0.93 E9/L (ref 0.1–0.95)
MONOCYTES RELATIVE PERCENT: 10.9 % (ref 2–12)
MONOCYTES RELATIVE PERCENT: 8.6 % (ref 2–12)
NEUTROPHILS ABSOLUTE: 3.93 E9/L (ref 1.8–7.3)
NEUTROPHILS ABSOLUTE: 4.43 E9/L (ref 1.8–7.3)
NEUTROPHILS RELATIVE PERCENT: 52.2 % (ref 43–80)
NEUTROPHILS RELATIVE PERCENT: 55.2 % (ref 43–80)
PDW BLD-RTO: 15.8 FL (ref 11.5–15)
PDW BLD-RTO: 15.8 FL (ref 11.5–15)
PLATELET # BLD: 286 E9/L (ref 130–450)
PLATELET # BLD: 320 E9/L (ref 130–450)
PMV BLD AUTO: 8.7 FL (ref 7–12)
PMV BLD AUTO: 9.1 FL (ref 7–12)
POTASSIUM SERPL-SCNC: 3.7 MMOL/L (ref 3.5–5)
POTASSIUM SERPL-SCNC: 4.8 MMOL/L (ref 3.5–5)
RBC # BLD: 4.13 E12/L (ref 3.8–5.8)
RBC # BLD: 4.41 E12/L (ref 3.8–5.8)
SODIUM BLD-SCNC: 137 MMOL/L (ref 132–146)
SODIUM BLD-SCNC: 138 MMOL/L (ref 132–146)
TOTAL PROTEIN: 6.4 G/DL (ref 6.4–8.3)
WBC # BLD: 7.1 E9/L (ref 4.5–11.5)
WBC # BLD: 8.5 E9/L (ref 4.5–11.5)

## 2019-05-14 PROCEDURE — 80053 COMPREHEN METABOLIC PANEL: CPT

## 2019-05-14 PROCEDURE — 6360000002 HC RX W HCPCS: Performed by: EMERGENCY MEDICINE

## 2019-05-14 PROCEDURE — 2580000003 HC RX 258: Performed by: RADIOLOGY

## 2019-05-14 PROCEDURE — 85025 COMPLETE CBC W/AUTO DIFF WBC: CPT

## 2019-05-14 PROCEDURE — 87081 CULTURE SCREEN ONLY: CPT

## 2019-05-14 PROCEDURE — 96365 THER/PROPH/DIAG IV INF INIT: CPT

## 2019-05-14 PROCEDURE — 6370000000 HC RX 637 (ALT 250 FOR IP): Performed by: FAMILY MEDICINE

## 2019-05-14 PROCEDURE — 6360000002 HC RX W HCPCS: Performed by: FAMILY MEDICINE

## 2019-05-14 PROCEDURE — 36415 COLL VENOUS BLD VENIPUNCTURE: CPT

## 2019-05-14 PROCEDURE — 1200000000 HC SEMI PRIVATE

## 2019-05-14 PROCEDURE — 6360000004 HC RX CONTRAST MEDICATION: Performed by: RADIOLOGY

## 2019-05-14 PROCEDURE — 2580000003 HC RX 258: Performed by: EMERGENCY MEDICINE

## 2019-05-14 PROCEDURE — 2580000003 HC RX 258: Performed by: FAMILY MEDICINE

## 2019-05-14 PROCEDURE — 72193 CT PELVIS W/DYE: CPT

## 2019-05-14 PROCEDURE — 2580000003 HC RX 258: Performed by: SPECIALIST

## 2019-05-14 PROCEDURE — 6360000002 HC RX W HCPCS: Performed by: SPECIALIST

## 2019-05-14 RX ORDER — SODIUM CHLORIDE 0.9 % (FLUSH) 0.9 %
10 SYRINGE (ML) INJECTION EVERY 12 HOURS SCHEDULED
Status: DISCONTINUED | OUTPATIENT
Start: 2019-05-14 | End: 2019-05-20 | Stop reason: HOSPADM

## 2019-05-14 RX ORDER — AMLODIPINE BESYLATE 10 MG/1
10 TABLET ORAL DAILY
Status: DISCONTINUED | OUTPATIENT
Start: 2019-05-14 | End: 2019-05-20 | Stop reason: HOSPADM

## 2019-05-14 RX ORDER — SODIUM CHLORIDE 9 MG/ML
INJECTION, SOLUTION INTRAVENOUS CONTINUOUS
Status: DISCONTINUED | OUTPATIENT
Start: 2019-05-14 | End: 2019-05-20 | Stop reason: HOSPADM

## 2019-05-14 RX ORDER — DIVALPROEX SODIUM 250 MG/1
500 TABLET, DELAYED RELEASE ORAL 2 TIMES DAILY
Status: DISCONTINUED | OUTPATIENT
Start: 2019-05-14 | End: 2019-05-20 | Stop reason: HOSPADM

## 2019-05-14 RX ORDER — FLUOXETINE HYDROCHLORIDE 20 MG/1
80 CAPSULE ORAL DAILY
Status: DISCONTINUED | OUTPATIENT
Start: 2019-05-14 | End: 2019-05-20 | Stop reason: HOSPADM

## 2019-05-14 RX ORDER — MORPHINE SULFATE 2 MG/ML
2 INJECTION, SOLUTION INTRAMUSCULAR; INTRAVENOUS EVERY 4 HOURS PRN
Status: DISCONTINUED | OUTPATIENT
Start: 2019-05-14 | End: 2019-05-20 | Stop reason: HOSPADM

## 2019-05-14 RX ORDER — SODIUM CHLORIDE 0.9 % (FLUSH) 0.9 %
10 SYRINGE (ML) INJECTION PRN
Status: DISCONTINUED | OUTPATIENT
Start: 2019-05-14 | End: 2019-05-20 | Stop reason: HOSPADM

## 2019-05-14 RX ORDER — 0.9 % SODIUM CHLORIDE 0.9 %
1000 INTRAVENOUS SOLUTION INTRAVENOUS ONCE
Status: COMPLETED | OUTPATIENT
Start: 2019-05-14 | End: 2019-05-14

## 2019-05-14 RX ORDER — HYDROXYZINE PAMOATE 25 MG/1
25 CAPSULE ORAL 2 TIMES DAILY
Status: DISCONTINUED | OUTPATIENT
Start: 2019-05-14 | End: 2019-05-20 | Stop reason: HOSPADM

## 2019-05-14 RX ORDER — SODIUM CHLORIDE 0.9 % (FLUSH) 0.9 %
10 SYRINGE (ML) INJECTION 2 TIMES DAILY
Status: DISCONTINUED | OUTPATIENT
Start: 2019-05-14 | End: 2019-05-20 | Stop reason: HOSPADM

## 2019-05-14 RX ORDER — MORPHINE SULFATE 2 MG/ML
4 INJECTION, SOLUTION INTRAMUSCULAR; INTRAVENOUS ONCE
Status: COMPLETED | OUTPATIENT
Start: 2019-05-14 | End: 2019-05-14

## 2019-05-14 RX ORDER — ACETAMINOPHEN 325 MG/1
650 TABLET ORAL EVERY 4 HOURS PRN
Status: DISCONTINUED | OUTPATIENT
Start: 2019-05-14 | End: 2019-05-20 | Stop reason: HOSPADM

## 2019-05-14 RX ORDER — CHLORPROMAZINE HYDROCHLORIDE 25 MG/1
50 TABLET, FILM COATED ORAL 3 TIMES DAILY
Status: DISCONTINUED | OUTPATIENT
Start: 2019-05-14 | End: 2019-05-20 | Stop reason: HOSPADM

## 2019-05-14 RX ADMIN — SODIUM CHLORIDE: 9 INJECTION, SOLUTION INTRAVENOUS at 06:40

## 2019-05-14 RX ADMIN — CHLORPROMAZINE HYDROCHLORIDE 50 MG: 25 TABLET, SUGAR COATED ORAL at 20:11

## 2019-05-14 RX ADMIN — IOPAMIDOL 100 ML: 755 INJECTION, SOLUTION INTRAVENOUS at 00:44

## 2019-05-14 RX ADMIN — MORPHINE SULFATE 2 MG: 2 INJECTION, SOLUTION INTRAMUSCULAR; INTRAVENOUS at 15:12

## 2019-05-14 RX ADMIN — MORPHINE SULFATE 2 MG: 2 INJECTION, SOLUTION INTRAMUSCULAR; INTRAVENOUS at 11:06

## 2019-05-14 RX ADMIN — CEFTRIAXONE 2 G: 2 INJECTION, POWDER, FOR SOLUTION INTRAMUSCULAR; INTRAVENOUS at 12:36

## 2019-05-14 RX ADMIN — DIVALPROEX SODIUM 500 MG: 250 TABLET, DELAYED RELEASE ORAL at 20:11

## 2019-05-14 RX ADMIN — Medication 10 ML: at 05:46

## 2019-05-14 RX ADMIN — SODIUM CHLORIDE 1000 ML: 9 INJECTION, SOLUTION INTRAVENOUS at 03:30

## 2019-05-14 RX ADMIN — IOPAMIDOL 110 ML: 755 INJECTION, SOLUTION INTRAVENOUS at 08:14

## 2019-05-14 RX ADMIN — MORPHINE SULFATE 2 MG: 2 INJECTION, SOLUTION INTRAMUSCULAR; INTRAVENOUS at 06:42

## 2019-05-14 RX ADMIN — MORPHINE SULFATE 4 MG: 2 INJECTION, SOLUTION INTRAMUSCULAR; INTRAVENOUS at 05:09

## 2019-05-14 RX ADMIN — HYDROXYZINE PAMOATE 25 MG: 25 CAPSULE ORAL at 20:11

## 2019-05-14 RX ADMIN — VANCOMYCIN HYDROCHLORIDE 1000 MG: 1 INJECTION, POWDER, LYOPHILIZED, FOR SOLUTION INTRAVENOUS at 05:46

## 2019-05-14 RX ADMIN — Medication 10 ML: at 00:41

## 2019-05-14 RX ADMIN — FLUOXETINE 80 MG: 20 CAPSULE ORAL at 15:46

## 2019-05-14 RX ADMIN — CEFEPIME 2 G: 2 INJECTION, POWDER, FOR SOLUTION INTRAVENOUS at 03:30

## 2019-05-14 ASSESSMENT — PAIN DESCRIPTION - DESCRIPTORS
DESCRIPTORS: BURNING;THROBBING;CONSTANT
DESCRIPTORS: BURNING;CONSTANT;THROBBING
DESCRIPTORS: ACHING;DISCOMFORT;CONSTANT
DESCRIPTORS: ACHING;CONSTANT;DISCOMFORT

## 2019-05-14 ASSESSMENT — PAIN DESCRIPTION - PAIN TYPE
TYPE: ACUTE PAIN

## 2019-05-14 ASSESSMENT — PAIN DESCRIPTION - ONSET
ONSET: ON-GOING

## 2019-05-14 ASSESSMENT — PAIN SCALES - GENERAL
PAINLEVEL_OUTOF10: 5
PAINLEVEL_OUTOF10: 9
PAINLEVEL_OUTOF10: 3
PAINLEVEL_OUTOF10: 9
PAINLEVEL_OUTOF10: 3
PAINLEVEL_OUTOF10: 4
PAINLEVEL_OUTOF10: 3
PAINLEVEL_OUTOF10: 4

## 2019-05-14 ASSESSMENT — PAIN DESCRIPTION - ORIENTATION
ORIENTATION: LEFT
ORIENTATION: LEFT

## 2019-05-14 ASSESSMENT — PAIN DESCRIPTION - PROGRESSION
CLINICAL_PROGRESSION: NOT CHANGED
CLINICAL_PROGRESSION: NOT CHANGED

## 2019-05-14 ASSESSMENT — PAIN DESCRIPTION - FREQUENCY
FREQUENCY: CONTINUOUS

## 2019-05-14 ASSESSMENT — LIFESTYLE VARIABLES: SMOKING_STATUS: 0

## 2019-05-14 ASSESSMENT — PAIN DESCRIPTION - LOCATION
LOCATION: SCROTUM

## 2019-05-14 NOTE — CONSULTS
Consults     INPATIENT CONSULTATION RECORD FOR  5/14/2019      [facility]  [address] [facility]. [facility], [address] [facility]  [phone]        REASON FOR CONSULTATION:      Epididymitis versus scrotal abscess    HISTORY OF PRESENT ILLNESS:      The patient is a 47 y.o. male patient who presents with left testicular pain that is severe. He was admitted overnight. He is well-known to Dr. Kenya Dowd with a history of prostate cancer. Status post prostatectomy leading to urinary incontinence. He had an artificial urinary sphincter placed which he states is never really worked very well. He has a history of scrotal infection with cellulitis last year but is never had concern for an abscess. Imaging reveals a possible abscess versus orchitis. The patient was made nothing by mouth in the event that he needs to be taken to surgery based on assessment over the phone. Past Medical History:   Diagnosis Date    ADD (attention deficit disorder)     Anxiety     Bipolar 1 disorder ([facility] 11/19/2017    Cancer ([facility] prostate cancer    2014 / treated with surgery    Crohn's disease ([facility]     Depression     GERD (gastroesophageal reflux disease)     Panic attack     Rectal pain     has a fissure    Schizo affective schizophrenia ([facility]     stable         Past Surgical History:   Procedure Laterality Date    COLONOSCOPY      ENDOSCOPY, COLON, DIAGNOSTIC      NOSE SURGERY  2002? deviated septum    PROSTATECTOMY  9/15/2014    laparoscopic robotic assisted.        Medications Prior to Admission:    Medications Prior to Admission: SUMAtriptan (IMITREX) 50 MG tablet, Take 1 tablet by mouth once as needed for Migraine  predniSONE (DELTASONE) 50 MG tablet, Take 50mg po qd x 5 days QS for 5 days  amLODIPine (NORVASC) 10 MG tablet, Take 1 tablet by mouth daily  chlorproMAZINE (THORAZINE) 50 MG tablet, Take 50 mg by mouth 3 times daily   divalproex (DEPAKOTE) 500 MG DR tablet, Take 500 mg by mouth 2 times daily  ustekinumab (STELARA) 90 MG/ML SOSY injection, Inject 90 mg into the skin Once every 6 weeks  FLUoxetine (PROZAC) 40 MG capsule, Take 80 mg by mouth daily   hydrOXYzine (VISTARIL) 25 MG capsule, Take 25 mg by mouth 2 times daily     Allergies:    Ciprofloxacin hcl and Nsaids    Social History:    reports that he has been smoking cigarettes. He started smoking about 41 years ago. He has been smoking about 2.00 packs per day. He has never used smokeless tobacco. He reports that he does not drink alcohol or use drugs. Family History:   Non-contributory to this Urological problem  family history includes Depression in his father; Heart Disease in his mother; Mental Illness in his brother, father, mother, and sister. REVIEW OF SYSTEMS:  Respiratory: negative for cough and hemoptysis  Cardiovascular: negative for chest pain and dyspnea  Gastrointestinal: negative for abdominal pain, diarrhea, nausea and vomiting  Derm: negative for rash and skin lesion(s)  Neurological: negative for seizures and tremors  Endocrine: negative for diabetic symptoms including polydipsia and polyuria  : As above in the HPI, otherwise negative  All other systems negative    PHYSICAL EXAM:    Vitals:  BP (!) 139/96   Pulse 73   Temp 98.1 °F (36.7 °C) (Oral)   Resp 18   Ht 6' 3\" (1.905 m)   Wt 238 lb (108 kg)   SpO2 96%   BMI 29.75 kg/m²     General:  Awake, alert, oriented X 3. Well developed, well nourished, well groomed. No apparent distress. HEENT:  Normocephalic, atraumatic. Pupils equal, round. No scleral icterus. No conjunctival injection. Normal lips, teeth, and gums. No nasal discharge. Neck:  Supple, no masses. Heart:  RRR  Lungs:  No audible wheezing. Respirations symmetric and non-labored.   Abdomen:  soft, nontender, no masses, no organomegaly, no peritoneal signs  Extremities:  No clubbing, cyanosis, or edema  Skin:  Warm and dry, no open lesions or rashes  Neuro:  Cranial nerves 2-12 intact, no

## 2019-05-14 NOTE — CARE COORDINATION
Introduced my self and provided explanation of CM role to patient. Patient is awake, alert, and aware of current diagnosis and treatment plan including ID and urology consults. He is hopeful urology plans to surgically remove artificial sphincter. Patient verbalizes no concerns and identifies no areas to focus on nor barriers to discharge. He is open to [place] [id number] for IV atb therapy if needed. Will follow along with  and assist with discharge planning as necessary. Explained ELOS of 3-4 days; patient voiced understanding and agreement. Srikanth Muro.  Dale, MSN, RN  [facility] Case Management  [phone]

## 2019-05-14 NOTE — PROGRESS NOTES
Nothing by mouth after midnight for incision and drainage of scrotal abscess and explantation of artificial urinary sphincter.

## 2019-05-14 NOTE — CARE COORDINATION
Social Work / Discharge Planning : SW and CM met with patient and spouse and explained role as discharge planner/ transition of care. Patient resides with spouse. Prior to admit, patient is independent and denies use of device. Patient denies hx of HHC/SNF. Patient does have psyche hx . Patient PCP is DR Salmon and he uses [facility]. Patient currently on IV antibiotics and plan is for surgery. Patient will need HHC. [facility] choices discussed and he would like referral to [facility]. SW did make referral to Helen from [facility]. Await ID plan and need to fax scripts if any for IV antibiotcs to [facility]. SW to follow.  ,Electronically signed by SUSHANT Cueva on 5/14/19 at 2:16 PM

## 2019-05-14 NOTE — H&P
[address] [address]                              HISTORY AND PHYSICAL    PATIENT NAME: Danilo Kruger                       :        1965  MED REC NO:   [id number]                            ROOM:       0515  ACCOUNT NO:   [de-identified]                           ADMIT DATE: 2019  PROVIDER:     Kashif Lou DO    CHIEF COMPLAINT/REASON FOR THIS HOSPITAL ADMISSION:  Scrotal abscess. HISTORY OF PRESENT ILLNESS:  The patient is a 52-year-old  male  with history of GERD, Crohn's disease, bipolar disorder, and essential  hypertension with a history of an implantable intersphincteric, who  presents to this Emergency Room Department complaining of left  testicular pain and swelling that he first noticed on the day of this  admission. He states he has been urinating okay. He does have some  incontinence, which is his normal baseline. He states that he had an  implantable urinary sphincter placed four to five years ago by  urologist.  Upon arrival to this hospital facility, he was noted to have  a scrotal abscess as per the emergency room physician. He was started  on antibiotic therapy with Urology and ID consultations requested. MEDICATIONS:  His recent and present medications are noted. PAST SURGICAL HISTORY:  Consistent with prior colonoscopy and  prostatectomy. SOCIAL HISTORY:  The patient is without use of alcohol, tobacco, or  illicit drugs. FAMILY HISTORY:  Negative with regards to above chief complaint. ALLERGIES:  Are to CIPRO and NSAIDs. REVIEW OF SYSTEMS:  Remainder of systems is otherwise negative except  for the above-mentioned. PHYSICAL EXAMINATION:  GENERAL:  Physical assessment reveals a 52-year-old male, in no acute  distress. VITAL SIGNS:  His blood pressure is 118/74, pulse rate of 90,  respiratory rate of 18, temperature is 98.9.   HEENT:  ENT exam is

## 2019-05-14 NOTE — PLAN OF CARE
Problem: Pain:  Goal: Control of acute pain  Description  Control of acute pain  5/14/2019 1924 by Tabatha Chávez RN  Outcome: Ongoing

## 2019-05-14 NOTE — CONSULTS
[address] [place] [address] [facility]    Consultation Note     Admit Date: 5/13/2019 10:24 PM    Reason for Consult:   Recurrent left scrotal abscess assoc c sphincter device    Attending Physician:  Nelson Peters DO     Chief Complaint: pain and swelling left testicle      HISTORY OF PRESENT ILLNESS:   The patient is a 47 y.o.  man known to the Infectious Diseases service. The patient is admitted c recurrent swelling and pain in left scrotal area. He has a urinary sphincter device that he is not using and is located in the left scrotum. He was seen on 1-24-18 by ID and at that time a straight cath was _ E coli and he was treated for 2 weeks c cipro. He has h/o robotic assisted laparoscopic prostatectomy at age 51 by Dr Dowd in 2014. Since then he had stress incontinence and required an artifical urinary sphincter inserted on 5/3/17. He was recently in the hospital( 2018)  after suicide attempt and had a oneill catheter placement 2 weeks prior to the readmission for the left scrotal pain, swelling and e coli. Past Medical History:        Diagnosis Date    ADD (attention deficit disorder)     Anxiety     Bipolar 1 disorder ([address] 11/19/2017    Cancer ([facility] prostate cancer    2014 / treated with surgery    Crohn's disease ([facility]     Depression     GERD (gastroesophageal reflux disease)     Panic attack     Rectal pain     has a fissure    Schizo affective schizophrenia ([facility]     stable     Past Surgical History:        Procedure Laterality Date    COLONOSCOPY      ENDOSCOPY, COLON, DIAGNOSTIC      NOSE SURGERY  2002? deviated septum    PROSTATECTOMY  9/15/2014    laparoscopic robotic assisted.      Current Medications:   Scheduled Meds:   sodium chloride flush  10 mL Intravenous BID    sodium chloride flush  10 mL Intravenous 2 times per day    enoxaparin  40 mg Subcutaneous Daily    amLODIPine  10 mg Oral Daily    chlorproMAZINE  50 mg Oral TID    divalproex 500 mg Oral BID    FLUoxetine  80 mg Oral Daily    hydrOXYzine  25 mg Oral BID     Continuous Infusions:   sodium chloride 75 mL/hr at 05/14/19 0640     PRN Meds:sodium chloride flush, acetaminophen, morphine    Allergies:  Ciprofloxacin hcl and Nsaids    Social History:   Social History     Socioeconomic History    Marital status:      Spouse name: ZENAIDA    Number of children: 0    Years of education: 12 +8    Highest education level: Not on file   Occupational History    Occupation: [employer]     Employer: UNEMPLOYED     Comment: SSDI   Social Needs    Financial resource strain: Not on file    Food insecurity:     Worry: Not on file     Inability: Not on file    Transportation needs:     Medical: Not on file     Non-medical: Not on file   Tobacco Use    Smoking status: Current Every Day Smoker     Packs/day: 2.00     Types: Cigarettes     Start date: 1978    Smokeless tobacco: Never Used   Substance and Sexual Activity    Alcohol use: No     Comment: no alcohol in5 yrs    Drug use: No     Comment: last use 1997 / marijuana    Sexual activity: Yes   Lifestyle    Physical activity:     Days per week: Not on file     Minutes per session: Not on file    Stress: Not on file   Relationships    Social connections:     Talks on phone: Not on file     Gets together: Not on file     Attends Confucianism service: Not on file     Active member of club or organization: Not on file     Attends meetings of clubs or organizations: Not on file     Relationship status: Not on file    Intimate partner violence:     Fear of current or ex partner: Not on file     Emotionally abused: Not on file     Physically abused: Not on file     Forced sexual activity: Not on file   Other Topics Concern    Not on file   Social History Narrative    ** Merged History Encounter **            Family History:       Problem Relation Age of Onset    Mental Illness Mother     Mental Illness Father    [facility] peripheral      CBC+dif:  Recent Labs     05/13/19  2338 05/14/19  0650   WBC 8.5 7.1   HGB 13.1 12.1*   HCT 39.5 37.2   MCV 89.6 90.1    286   NEUTROABS 4.43 3.93     Lab Results   Component Value Date    CRP 1.3 (H) 03/05/2019    CRP 3.3 (H) 02/06/2019     No results found for: CRPHS  No results found for: SEDRATE  Lab Results   Component Value Date    ALT 10 05/14/2019    AST 13 05/14/2019    ALKPHOS 83 05/14/2019    BILITOT <0.2 05/14/2019     Lab Results   Component Value Date     05/14/2019    K 3.7 05/14/2019    K 3.9 04/30/2019     05/14/2019    CO2 24 05/14/2019    BUN 21 05/14/2019    CREATININE 1.0 05/14/2019    GFRAA >60 05/14/2019    LABGLOM >60 05/14/2019    GLUCOSE 107 05/14/2019    GLUCOSE 118 02/05/2011    PROT 6.4 05/14/2019    LABALBU 3.5 05/14/2019    LABALBU 3.5 02/05/2011    CALCIUM 8.6 05/14/2019    BILITOT <0.2 05/14/2019    ALKPHOS 83 05/14/2019    AST 13 05/14/2019    ALT 10 05/14/2019       Lab Results   Component Value Date    PROTIME 13.4 02/03/2019    PROTIME 11.5 02/05/2011    INR 1.2 02/03/2019       Lab Results   Component Value Date    TSH 4.710 10/03/2018       Lab Results   Component Value Date    COLORU Yellow 05/13/2019    PHUR 5.5 05/13/2019    LABCAST RARE 03/06/2018    WBCUA 10-20 05/13/2019    RBCUA 2-5 05/13/2019    MUCUS Present 05/13/2019    YEAST MODERATE 05/13/2019    BACTERIA FEW 05/13/2019    CLARITYU Clear 05/13/2019    SPECGRAV 1.025 05/13/2019    LEUKOCYTESUR SMALL 05/13/2019    UROBILINOGEN 0.2 05/13/2019    BILIRUBINUR Negative 05/13/2019    BLOODU MODERATE 05/13/2019    GLUCOSEU Negative 05/13/2019    AMORPHOUS FEW 10/24/2018       No results found for: [place], BEART, C3EETRTB, PHART, THGBART, WGC2BET, PO2ART, DDW3JGN  Radiology:  CT PELVIS W CONTRAST Additional Contrast? None   Final Result   Findings compatible with abscess involving the left   scrotum. This does not appear to extend into the pelvis proper. See   discussion above.

## 2019-05-14 NOTE — ANESTHESIA PRE PROCEDURE
Department of Anesthesiology  Preprocedure Note       Name:  Giorgio Chong   Age:  47 y.o.  :  1965                                          MRN:  [id number]         Date:  2019      Surgeon: Richard Stern):  Fide Ceballos MD    Procedure: I & D SCROTAL ABSCESS POSSIBLE EXPLANTATION ARTIFICIAL URINARY SPHINCTER (N/A )    Medications prior to admission:   Prior to Admission medications    Medication Sig Start Date End Date Taking?  Authorizing Provider   SUMAtriptan (IMITREX) 50 MG tablet Take 1 tablet by mouth once as needed for Migraine 5/10/19 5/10/19  Skylar Buccino, DO   predniSONE (DELTASONE) 50 MG tablet Take 50mg po qd x 5 days  QS for 5 days 19   Dianna Kaminski MD   amLODIPine (NORVASC) 10 MG tablet Take 1 tablet by mouth daily 19   Skylar Buccino, DO   chlorproMAZINE (THORAZINE) 50 MG tablet Take 50 mg by mouth 3 times daily     Historical Provider, MD   divalproex (DEPAKOTE) 500 MG DR tablet Take 500 mg by mouth 2 times daily 19   Historical Provider, MD   ustekinumab Cindra Boas) 90 MG/ML SOSY injection Inject 90 mg into the skin Once every 6 weeks 19   Historical Provider, MD   FLUoxetine (PROZAC) 40 MG capsule Take 80 mg by mouth daily     Historical Provider, MD   hydrOXYzine (VISTARIL) 25 MG capsule Take 25 mg by mouth 2 times daily     Historical Provider, MD       Current medications:    Current Facility-Administered Medications   Medication Dose Route Frequency Provider Last Rate Last Dose    sodium chloride flush 0.9 % injection 10 mL  10 mL Intravenous BID Dianna Kaminski MD   10 mL at 19 0041    sodium chloride flush 0.9 % injection 10 mL  10 mL Intravenous 2 times per day Skylar Buccino, DO        sodium chloride flush 0.9 % injection 10 mL  10 mL Intravenous PRN Skylar Buccino, DO   10 mL at 19 0546    acetaminophen (TYLENOL) tablet 650 mg  650 mg Oral Q4H PRN Skylar Buccino, DO        enoxaparin (LOVENOX) injection 40 mg  40 mg Subcutaneous Daily Skylar Buccino, DO        amLODIPine (NORVASC) tablet 10 mg  10 mg Oral Daily Skylar Buccino, DO        chlorproMAZINE (THORAZINE) tablet 50 mg  50 mg Oral TID Skylar Buccino, DO        divalproex (DEPAKOTE) DR tablet 500 mg  500 mg Oral BID Skylar Buccino, DO        FLUoxetine (PROZAC) capsule 80 mg  80 mg Oral Daily Skylar Buccino, DO   80 mg at 05/14/19 1546    hydrOXYzine (VISTARIL) capsule 25 mg  25 mg Oral BID Skylar Buccino, DO        0.9 % sodium chloride infusion   Intravenous Continuous Skylar Buccino, DO 75 mL/hr at 05/14/19 0640      morphine (PF) injection 2 mg  2 mg Intravenous Q4H PRN Skylar Buccino, DO   2 mg at 05/14/19 1512    cefTRIAXone (ROCEPHIN) 2 g IVPB in D5W 50ml minibag  2 g Intravenous Q24H Kevin Roberts MD   Stopped at 05/14/19 1306       Allergies: Allergies   Allergen Reactions    Ciprofloxacin Hcl      constipation    Nsaids Other (See Comments)     Per pt's physician Chiqui Felder) he is not to take NSAIDS due to HX of Crohn's disease.        Problem List:    Patient Active Problem List   Diagnosis Code    Drug overdose, multiple drugs T50.901A    Severe episode of recurrent major depressive disorder, with psychotic features ([address] F33.3    Mood disorder due to a general medical condition F06.30    Suicide attempt [facility]) T14.91XA    Laceration of neck S11.91XA    Laceration of left wrist S61.512A    Bipolar 1 disorder ([address] F31.9    Crohn disease ([address] K50.90    GERD (gastroesophageal reflux disease) K21.9    Bipolar 1 disorder ([address] F31.9    Opioid overdose ([address] T40.2X1A    Drug-induced tremor G25.1    Orchitis N45.2    Moderate protein-calorie malnutrition (HCC) E44.0    Acute psychosis ([address] F23    Schizoaffective disorder, bipolar type ([address] F25.0    Crohn's disease ([address] K50.90    H/O carcinoma in situ of prostate Z86.008    Essential hypertension I10    PND (post-nasal drip) R09.82    Colitis K52.9    Scrotal abscess N49.2       Past Medical History: Results   Component Value Date     05/14/2019    K 3.7 05/14/2019    K 3.9 04/30/2019     05/14/2019    CO2 24 05/14/2019    BUN 21 05/14/2019    CREATININE 1.0 05/14/2019    GFRAA >60 05/14/2019    LABGLOM >60 05/14/2019    GLUCOSE 107 05/14/2019    GLUCOSE 118 02/05/2011    PROT 6.4 05/14/2019    CALCIUM 8.6 05/14/2019    BILITOT <0.2 05/14/2019    ALKPHOS 83 05/14/2019    AST 13 05/14/2019    ALT 10 05/14/2019       POC Tests: No results for input(s): POCGLU, POCNA, POCK, POCCL, POCBUN, POCHEMO, POCHCT in the last 72 hours. Coags:   Lab Results   Component Value Date    PROTIME 13.4 02/03/2019    PROTIME 11.5 02/05/2011    INR 1.2 02/03/2019    APTT 28.5 02/03/2019       HCG (If Applicable): No results found for: PREGTESTUR, PREGSERUM, HCG, HCGQUANT     ABGs: No results found for: PHART, PO2ART, KJN1SMU, NSG4SAH, BEART, L3SBTRTB     Type & Screen (If Applicable):  No results found for: LABABO, [address]    Anesthesia Evaluation  Patient summary reviewed and Nursing notes reviewed no history of anesthetic complications:   Airway: Mallampati: III  TM distance: >3 FB   Neck ROM: full  Mouth opening: > = 3 FB Dental:      Comment: MISSING SEVERAL    Pulmonary:Negative Pulmonary ROS breath sounds clear to auscultation      (-) not a current smoker                           Cardiovascular:  Exercise tolerance: good (>4 METS),   (+) hypertension:,         Rhythm: regular  Rate: normal           Beta Blocker:  Not on Beta Blocker         Neuro/Psych:   (+) psychiatric history:depression/anxiety              ROS comment: Bipolar disorder. GI/Hepatic/Renal:   (+) GERD: well controlled,          ROS comment: CROHNS--recently took steroids. .   Endo/Other: Negative Endo/Other ROS   (+) malignancy/cancer (Prostate). Abdominal:           Vascular: negative vascular ROS.                                    Anesthesia Plan      general     ASA 3     (Pt agrees to General--IV induction and

## 2019-05-14 NOTE — ED PROVIDER NOTES
2 CT scan attempts to image the scrotum were unsuccessful but clinically appears to be scrotal abscess with induration and slight fluctuation noted throughout the inferior aspect of the scrotum .   Spoke with urology Maggi Phillips who states to place the patient on his schedule today and spoke with primary care physician Dr. Sandra Holter who agrees to admit the patient     Katie Brennan MD  05/14/19 6329

## 2019-05-14 NOTE — ED PROVIDER NOTES
swelling. Musculoskeletal: Negative for arthralgias, back pain, joint swelling, myalgias, neck pain and neck stiffness. Skin: Negative for color change, pallor, rash and wound. Allergic/Immunologic: Negative for immunocompromised state. Neurological: Negative for dizziness, seizures, syncope, weakness, light-headedness and headaches. Hematological: Negative for adenopathy. Does not bruise/bleed easily. Psychiatric/Behavioral: Negative. Physical Exam   Constitutional: He is oriented to person, place, and time. He appears well-developed and well-nourished. Non-toxic appearance. He does not have a sickly appearance. He does not appear ill. No distress. HENT:   Head: Normocephalic and atraumatic. Right Ear: External ear normal.   Left Ear: External ear normal.   Nose: Nose normal.   Mouth/Throat: Uvula is midline, oropharynx is clear and moist and mucous membranes are normal. No trismus in the jaw. No uvula swelling. No oropharyngeal exudate, posterior oropharyngeal edema, posterior oropharyngeal erythema or tonsillar abscesses. Eyes: Pupils are equal, round, and reactive to light. Conjunctivae and EOM are normal. Right eye exhibits no discharge. Left eye exhibits no discharge. No scleral icterus. Neck: Normal range of motion. Neck supple. No JVD present. No tracheal deviation present. No thyromegaly present. Cardiovascular: Normal rate, regular rhythm, S1 normal, S2 normal, normal heart sounds and intact distal pulses. Exam reveals no gallop, no S3, no S4, no distant heart sounds and no friction rub. No murmur heard. Pulses:       Radial pulses are 2+ on the right side, and 2+ on the left side. Pulmonary/Chest: Effort normal and breath sounds normal. No accessory muscle usage or stridor. No tachypnea. No respiratory distress. He has no decreased breath sounds. He has no wheezes. He has no rhonchi. He has no rales. He exhibits no tenderness. Abdominal: Soft.  Bowel sounds are normal. He exhibits no distension, no pulsatile midline mass and no mass. There is no tenderness. There is no rigidity, no rebound, no guarding and no CVA tenderness. Hernia confirmed negative in the right inguinal area and confirmed negative in the left inguinal area. Genitourinary: Penis normal. Cremasteric reflex is present. Right testis shows no mass, no swelling and no tenderness. Right testis is descended. Cremasteric reflex is not absent on the right side. Left testis shows no mass, no swelling and no tenderness. Left testis is descended. Cremasteric reflex is not absent on the left side. Genitourinary Comments: The left hemiscrotum has a palpable object which the patient describes this is implanted device. Musculoskeletal: Normal range of motion. He exhibits no edema, tenderness or deformity. Lymphadenopathy:     He has no cervical adenopathy. No inguinal adenopathy noted on the right or left side. Neurological: He is alert and oriented to person, place, and time. Skin: Skin is warm and dry. Capillary refill takes less than 2 seconds. No rash noted. He is not diaphoretic. No erythema. No pallor. Psychiatric: He has a normal mood and affect. His speech is normal and behavior is normal. Judgment and thought content normal. Cognition and memory are normal.   Nursing note and vitals reviewed. Procedures    MDM  Patient presents to the ED for left testicle pain and swelling. Differential diagnoses included but not limited to testicular torsion, hydrocele, cellulitis, abscess, necrotizing soft tissue infection, Audrey's gangrene, epididymitis, infection of implanted. Workup in the ED revealed findings on ultrasound consistent with orchitis versus abscess as well as hydrocele. Patient was given IV morphine. CT scan imaging was performed to assess for abscess versus necrotizing soft tissue infection.  Patient requires continued workup and management of their symptoms and will be admitted to the [facility] for further evaluation and treatment. ED Course as of May 14 0200   Mon May 13, 2019   2321 Patient has findings on all symptoms consistent with left-sided orchitis versus abscess as well as there is a left hydrocele present. We're going to CT scan the patient to further assess the testicle and scrotum.    [LS]   Tue May 14, 2019   0141 CT scan was performed and the scrotum was not imaged and this study. I have called back radiology notified that we need to include the scrotum and the study due to concerns for abscess versus necrotizing soft tissue infection of the scrotum. These insertions were specifically included on the 1st imaging ordered. We will perform a CT scan without contrast at this time.    [LS]   0159 Patient signed out to Dr. Ankur Mcmillan and Dr. Flako Brothers. We are repeating the CT scan to include the scrotum and urology will need to be consulted.     [LS]      ED Course User Index  [LS] DO Chelsea Black DO  Resident  05/14/19 0200

## 2019-05-14 NOTE — PATIENT CARE CONFERENCE
[facility] Quality Flow/Interdisciplinary Rounds Progress Note        Quality Flow Rounds held on May 14, 2019    Disciplines Attending:  Bedside Nurse, ,  and Nursing Unit Keyona Morejon was admitted on 5/13/2019 10:24 PM    Anticipated Discharge Date:  Expected Discharge Date: 05/17/19    Disposition:    Andre Score:  Andre Scale Score: 22    Readmission Risk              Risk of Unplanned Readmission:        29           Discussed patient goal for the day, patient clinical progression, and barriers to discharge.   The following Goal(s) of the Day/Commitment(s) have been identified:  Diagnostics - Report Results and Labs - Report Results      Zach Schmidt  May 14, 2019

## 2019-05-15 VITALS
RESPIRATION RATE: 1 BRPM | DIASTOLIC BLOOD PRESSURE: 88 MMHG | SYSTOLIC BLOOD PRESSURE: 135 MMHG | TEMPERATURE: 96.3 F | OXYGEN SATURATION: 94 %

## 2019-05-15 PROCEDURE — 7100000000 HC PACU RECOVERY - FIRST 15 MIN: Performed by: UROLOGY

## 2019-05-15 PROCEDURE — 1200000000 HC SEMI PRIVATE

## 2019-05-15 PROCEDURE — 3700000001 HC ADD 15 MINUTES (ANESTHESIA): Performed by: UROLOGY

## 2019-05-15 PROCEDURE — 0TJB8ZZ INSPECTION OF BLADDER, VIA NATURAL OR ARTIFICIAL OPENING ENDOSCOPIC: ICD-10-PCS | Performed by: UROLOGY

## 2019-05-15 PROCEDURE — 2709999900 HC NON-CHARGEABLE SUPPLY: Performed by: UROLOGY

## 2019-05-15 PROCEDURE — 2580000003 HC RX 258: Performed by: NURSE ANESTHETIST, CERTIFIED REGISTERED

## 2019-05-15 PROCEDURE — 3600000012 HC SURGERY LEVEL 2 ADDTL 15MIN: Performed by: UROLOGY

## 2019-05-15 PROCEDURE — C1769 GUIDE WIRE: HCPCS | Performed by: UROLOGY

## 2019-05-15 PROCEDURE — 0TQD0ZZ REPAIR URETHRA, OPEN APPROACH: ICD-10-PCS | Performed by: UROLOGY

## 2019-05-15 PROCEDURE — 7100000001 HC PACU RECOVERY - ADDTL 15 MIN: Performed by: UROLOGY

## 2019-05-15 PROCEDURE — 6360000002 HC RX W HCPCS: Performed by: SPECIALIST

## 2019-05-15 PROCEDURE — 3600000002 HC SURGERY LEVEL 2 BASE: Performed by: UROLOGY

## 2019-05-15 PROCEDURE — 87205 SMEAR GRAM STAIN: CPT

## 2019-05-15 PROCEDURE — 3700000000 HC ANESTHESIA ATTENDED CARE: Performed by: UROLOGY

## 2019-05-15 PROCEDURE — 2500000003 HC RX 250 WO HCPCS: Performed by: NURSE ANESTHETIST, CERTIFIED REGISTERED

## 2019-05-15 PROCEDURE — 6360000002 HC RX W HCPCS: Performed by: NURSE ANESTHETIST, CERTIFIED REGISTERED

## 2019-05-15 PROCEDURE — 6360000002 HC RX W HCPCS: Performed by: FAMILY MEDICINE

## 2019-05-15 PROCEDURE — 2580000003 HC RX 258: Performed by: SPECIALIST

## 2019-05-15 PROCEDURE — 87070 CULTURE OTHR SPECIMN AEROBIC: CPT

## 2019-05-15 PROCEDURE — 0V953ZZ DRAINAGE OF SCROTUM, PERCUTANEOUS APPROACH: ICD-10-PCS | Performed by: UROLOGY

## 2019-05-15 PROCEDURE — 0VJ84ZZ INSPECTION OF SCROTUM AND TUNICA VAGINALIS, PERCUTANEOUS ENDOSCOPIC APPROACH: ICD-10-PCS | Performed by: UROLOGY

## 2019-05-15 PROCEDURE — 0HQ9XZZ REPAIR PERINEUM SKIN, EXTERNAL APPROACH: ICD-10-PCS | Performed by: UROLOGY

## 2019-05-15 PROCEDURE — 6370000000 HC RX 637 (ALT 250 FOR IP)

## 2019-05-15 PROCEDURE — 6370000000 HC RX 637 (ALT 250 FOR IP): Performed by: FAMILY MEDICINE

## 2019-05-15 PROCEDURE — 0T7D8ZZ DILATION OF URETHRA, VIA NATURAL OR ARTIFICIAL OPENING ENDOSCOPIC: ICD-10-PCS | Performed by: UROLOGY

## 2019-05-15 PROCEDURE — 2580000003 HC RX 258: Performed by: FAMILY MEDICINE

## 2019-05-15 PROCEDURE — 87075 CULTR BACTERIA EXCEPT BLOOD: CPT

## 2019-05-15 PROCEDURE — 88300 SURGICAL PATH GROSS: CPT

## 2019-05-15 PROCEDURE — 6360000002 HC RX W HCPCS: Performed by: ANESTHESIOLOGY

## 2019-05-15 RX ORDER — FENTANYL CITRATE 50 UG/ML
INJECTION, SOLUTION INTRAMUSCULAR; INTRAVENOUS PRN
Status: DISCONTINUED | OUTPATIENT
Start: 2019-05-15 | End: 2019-05-15 | Stop reason: SDUPTHER

## 2019-05-15 RX ORDER — DIMETHICONE, OXYBENZONE, AND PADIMATE O 2; 2.5; 6.6 G/100G; G/100G; G/100G
STICK TOPICAL
Status: COMPLETED
Start: 2019-05-15 | End: 2019-05-15

## 2019-05-15 RX ORDER — MIDAZOLAM HYDROCHLORIDE 1 MG/ML
INJECTION INTRAMUSCULAR; INTRAVENOUS PRN
Status: DISCONTINUED | OUTPATIENT
Start: 2019-05-15 | End: 2019-05-15 | Stop reason: SDUPTHER

## 2019-05-15 RX ORDER — NEOSTIGMINE METHYLSULFATE 1 MG/ML
INJECTION, SOLUTION INTRAVENOUS PRN
Status: DISCONTINUED | OUTPATIENT
Start: 2019-05-15 | End: 2019-05-15 | Stop reason: SDUPTHER

## 2019-05-15 RX ORDER — FENTANYL CITRATE 50 UG/ML
25 INJECTION, SOLUTION INTRAMUSCULAR; INTRAVENOUS EVERY 5 MIN PRN
Status: DISCONTINUED | OUTPATIENT
Start: 2019-05-15 | End: 2019-05-15 | Stop reason: HOSPADM

## 2019-05-15 RX ORDER — ROCURONIUM BROMIDE 10 MG/ML
INJECTION, SOLUTION INTRAVENOUS PRN
Status: DISCONTINUED | OUTPATIENT
Start: 2019-05-15 | End: 2019-05-15 | Stop reason: SDUPTHER

## 2019-05-15 RX ORDER — FENTANYL CITRATE 50 UG/ML
25 INJECTION, SOLUTION INTRAMUSCULAR; INTRAVENOUS EVERY 5 MIN PRN
Status: COMPLETED | OUTPATIENT
Start: 2019-05-15 | End: 2019-05-15

## 2019-05-15 RX ORDER — PROPOFOL 10 MG/ML
INJECTION, EMULSION INTRAVENOUS PRN
Status: DISCONTINUED | OUTPATIENT
Start: 2019-05-15 | End: 2019-05-15 | Stop reason: SDUPTHER

## 2019-05-15 RX ORDER — SODIUM CHLORIDE, SODIUM LACTATE, POTASSIUM CHLORIDE, CALCIUM CHLORIDE 600; 310; 30; 20 MG/100ML; MG/100ML; MG/100ML; MG/100ML
INJECTION, SOLUTION INTRAVENOUS CONTINUOUS PRN
Status: DISCONTINUED | OUTPATIENT
Start: 2019-05-15 | End: 2019-05-15 | Stop reason: SDUPTHER

## 2019-05-15 RX ORDER — ONDANSETRON 2 MG/ML
INJECTION INTRAMUSCULAR; INTRAVENOUS PRN
Status: DISCONTINUED | OUTPATIENT
Start: 2019-05-15 | End: 2019-05-15 | Stop reason: SDUPTHER

## 2019-05-15 RX ORDER — CEFTRIAXONE 1 G/1
INJECTION, POWDER, FOR SOLUTION INTRAMUSCULAR; INTRAVENOUS PRN
Status: DISCONTINUED | OUTPATIENT
Start: 2019-05-15 | End: 2019-05-15 | Stop reason: SDUPTHER

## 2019-05-15 RX ORDER — GLYCOPYRROLATE 1 MG/5 ML
SYRINGE (ML) INTRAVENOUS PRN
Status: DISCONTINUED | OUTPATIENT
Start: 2019-05-15 | End: 2019-05-15 | Stop reason: SDUPTHER

## 2019-05-15 RX ORDER — DEXAMETHASONE SODIUM PHOSPHATE 4 MG/ML
INJECTION, SOLUTION INTRA-ARTICULAR; INTRALESIONAL; INTRAMUSCULAR; INTRAVENOUS; SOFT TISSUE PRN
Status: DISCONTINUED | OUTPATIENT
Start: 2019-05-15 | End: 2019-05-15 | Stop reason: SDUPTHER

## 2019-05-15 RX ORDER — LIDOCAINE HYDROCHLORIDE 20 MG/ML
INJECTION, SOLUTION EPIDURAL; INFILTRATION; INTRACAUDAL; PERINEURAL PRN
Status: DISCONTINUED | OUTPATIENT
Start: 2019-05-15 | End: 2019-05-15 | Stop reason: SDUPTHER

## 2019-05-15 RX ADMIN — MIDAZOLAM HYDROCHLORIDE 2 MG: 1 INJECTION, SOLUTION INTRAMUSCULAR; INTRAVENOUS at 11:11

## 2019-05-15 RX ADMIN — ONDANSETRON HYDROCHLORIDE 4 MG: 2 INJECTION, SOLUTION INTRAMUSCULAR; INTRAVENOUS at 12:58

## 2019-05-15 RX ADMIN — DIVALPROEX SODIUM 500 MG: 250 TABLET, DELAYED RELEASE ORAL at 21:15

## 2019-05-15 RX ADMIN — LIDOCAINE HYDROCHLORIDE 20 MG: 20 INJECTION, SOLUTION EPIDURAL; INFILTRATION; INTRACAUDAL; PERINEURAL at 11:22

## 2019-05-15 RX ADMIN — Medication 3 MG: at 12:59

## 2019-05-15 RX ADMIN — MORPHINE SULFATE 2 MG: 2 INJECTION, SOLUTION INTRAMUSCULAR; INTRAVENOUS at 19:08

## 2019-05-15 RX ADMIN — Medication: at 17:27

## 2019-05-15 RX ADMIN — FENTANYL CITRATE 50 MCG: 50 INJECTION, SOLUTION INTRAMUSCULAR; INTRAVENOUS at 12:09

## 2019-05-15 RX ADMIN — CEFTRIAXONE 2 G: 2 INJECTION, POWDER, FOR SOLUTION INTRAMUSCULAR; INTRAVENOUS at 11:45

## 2019-05-15 RX ADMIN — FENTANYL CITRATE 25 MCG: 50 INJECTION, SOLUTION INTRAMUSCULAR; INTRAVENOUS at 13:52

## 2019-05-15 RX ADMIN — FENTANYL CITRATE 50 MCG: 50 INJECTION, SOLUTION INTRAMUSCULAR; INTRAVENOUS at 11:47

## 2019-05-15 RX ADMIN — DEXAMETHASONE SODIUM PHOSPHATE 10 MG: 4 INJECTION, SOLUTION INTRAMUSCULAR; INTRAVENOUS at 11:22

## 2019-05-15 RX ADMIN — SODIUM CHLORIDE, POTASSIUM CHLORIDE, SODIUM LACTATE AND CALCIUM CHLORIDE: 600; 310; 30; 20 INJECTION, SOLUTION INTRAVENOUS at 13:00

## 2019-05-15 RX ADMIN — CHLORPROMAZINE HYDROCHLORIDE 50 MG: 25 TABLET, SUGAR COATED ORAL at 15:12

## 2019-05-15 RX ADMIN — ROCURONIUM BROMIDE 5 MG: 10 SOLUTION INTRAVENOUS at 11:50

## 2019-05-15 RX ADMIN — FENTANYL CITRATE 25 MCG: 50 INJECTION, SOLUTION INTRAMUSCULAR; INTRAVENOUS at 13:42

## 2019-05-15 RX ADMIN — ROCURONIUM BROMIDE 40 MG: 10 SOLUTION INTRAVENOUS at 11:22

## 2019-05-15 RX ADMIN — ENOXAPARIN SODIUM 40 MG: 40 INJECTION SUBCUTANEOUS at 21:15

## 2019-05-15 RX ADMIN — HYDROMORPHONE HYDROCHLORIDE 0.25 MG: 1 INJECTION, SOLUTION INTRAMUSCULAR; INTRAVENOUS; SUBCUTANEOUS at 14:03

## 2019-05-15 RX ADMIN — CEFTRIAXONE SODIUM 2 G: 1 INJECTION, POWDER, FOR SOLUTION INTRAMUSCULAR; INTRAVENOUS at 11:45

## 2019-05-15 RX ADMIN — FENTANYL CITRATE 25 MCG: 50 INJECTION, SOLUTION INTRAMUSCULAR; INTRAVENOUS at 13:47

## 2019-05-15 RX ADMIN — SODIUM CHLORIDE, POTASSIUM CHLORIDE, SODIUM LACTATE AND CALCIUM CHLORIDE: 600; 310; 30; 20 INJECTION, SOLUTION INTRAVENOUS at 11:11

## 2019-05-15 RX ADMIN — FENTANYL CITRATE 50 MCG: 50 INJECTION, SOLUTION INTRAMUSCULAR; INTRAVENOUS at 12:50

## 2019-05-15 RX ADMIN — PROPOFOL 250 MG: 10 INJECTION, EMULSION INTRAVENOUS at 11:22

## 2019-05-15 RX ADMIN — FENTANYL CITRATE 50 MCG: 50 INJECTION, SOLUTION INTRAMUSCULAR; INTRAVENOUS at 11:54

## 2019-05-15 RX ADMIN — Medication 10 ML: at 15:12

## 2019-05-15 RX ADMIN — HYDROXYZINE PAMOATE 25 MG: 25 CAPSULE ORAL at 15:13

## 2019-05-15 RX ADMIN — Medication 0.6 MG: at 12:59

## 2019-05-15 RX ADMIN — HYDROMORPHONE HYDROCHLORIDE 0.25 MG: 1 INJECTION, SOLUTION INTRAMUSCULAR; INTRAVENOUS; SUBCUTANEOUS at 14:16

## 2019-05-15 RX ADMIN — MORPHINE SULFATE 2 MG: 2 INJECTION, SOLUTION INTRAMUSCULAR; INTRAVENOUS at 23:21

## 2019-05-15 RX ADMIN — FENTANYL CITRATE 25 MCG: 50 INJECTION, SOLUTION INTRAMUSCULAR; INTRAVENOUS at 13:37

## 2019-05-15 RX ADMIN — FLUOXETINE 80 MG: 20 CAPSULE ORAL at 15:12

## 2019-05-15 RX ADMIN — CHLORPROMAZINE HYDROCHLORIDE 50 MG: 25 TABLET, SUGAR COATED ORAL at 21:15

## 2019-05-15 RX ADMIN — AMLODIPINE BESYLATE 10 MG: 10 TABLET ORAL at 15:13

## 2019-05-15 RX ADMIN — HYDROMORPHONE HYDROCHLORIDE 0.25 MG: 1 INJECTION, SOLUTION INTRAMUSCULAR; INTRAVENOUS; SUBCUTANEOUS at 14:08

## 2019-05-15 RX ADMIN — HYDROXYZINE PAMOATE 25 MG: 25 CAPSULE ORAL at 21:15

## 2019-05-15 RX ADMIN — HYDROMORPHONE HYDROCHLORIDE 0.25 MG: 1 INJECTION, SOLUTION INTRAMUSCULAR; INTRAVENOUS; SUBCUTANEOUS at 13:57

## 2019-05-15 RX ADMIN — DIVALPROEX SODIUM 500 MG: 250 TABLET, DELAYED RELEASE ORAL at 15:12

## 2019-05-15 RX ADMIN — FENTANYL CITRATE 50 MCG: 50 INJECTION, SOLUTION INTRAMUSCULAR; INTRAVENOUS at 11:22

## 2019-05-15 ASSESSMENT — PULMONARY FUNCTION TESTS
PIF_VALUE: 25
PIF_VALUE: 23
PIF_VALUE: 3
PIF_VALUE: 23
PIF_VALUE: 25
PIF_VALUE: 1
PIF_VALUE: 21
PIF_VALUE: 19
PIF_VALUE: 25
PIF_VALUE: 22
PIF_VALUE: 1
PIF_VALUE: 1
PIF_VALUE: 22
PIF_VALUE: 23
PIF_VALUE: 22
PIF_VALUE: 18
PIF_VALUE: 23
PIF_VALUE: 21
PIF_VALUE: 21
PIF_VALUE: 23
PIF_VALUE: 23
PIF_VALUE: 24
PIF_VALUE: 18
PIF_VALUE: 21
PIF_VALUE: 19
PIF_VALUE: 0
PIF_VALUE: 24
PIF_VALUE: 0
PIF_VALUE: 0
PIF_VALUE: 25
PIF_VALUE: 19
PIF_VALUE: 23
PIF_VALUE: 18
PIF_VALUE: 21
PIF_VALUE: 7
PIF_VALUE: 2
PIF_VALUE: 22
PIF_VALUE: 21
PIF_VALUE: 19
PIF_VALUE: 21
PIF_VALUE: 17
PIF_VALUE: 21
PIF_VALUE: 25
PIF_VALUE: 20
PIF_VALUE: 5
PIF_VALUE: 23
PIF_VALUE: 22
PIF_VALUE: 23
PIF_VALUE: 1
PIF_VALUE: 18
PIF_VALUE: 0
PIF_VALUE: 23
PIF_VALUE: 19
PIF_VALUE: 21
PIF_VALUE: 27
PIF_VALUE: 22
PIF_VALUE: 18
PIF_VALUE: 1
PIF_VALUE: 21
PIF_VALUE: 21
PIF_VALUE: 18
PIF_VALUE: 19
PIF_VALUE: 0
PIF_VALUE: 22
PIF_VALUE: 25
PIF_VALUE: 21
PIF_VALUE: 26
PIF_VALUE: 22
PIF_VALUE: 21
PIF_VALUE: 20
PIF_VALUE: 23
PIF_VALUE: 21
PIF_VALUE: 2
PIF_VALUE: 22
PIF_VALUE: 19
PIF_VALUE: 22
PIF_VALUE: 0
PIF_VALUE: 21
PIF_VALUE: 22
PIF_VALUE: 10
PIF_VALUE: 21
PIF_VALUE: 22
PIF_VALUE: 1
PIF_VALUE: 25
PIF_VALUE: 25
PIF_VALUE: 22
PIF_VALUE: 22
PIF_VALUE: 21
PIF_VALUE: 0
PIF_VALUE: 2
PIF_VALUE: 21
PIF_VALUE: 24
PIF_VALUE: 21
PIF_VALUE: 22
PIF_VALUE: 21
PIF_VALUE: 21
PIF_VALUE: 25
PIF_VALUE: 23
PIF_VALUE: 19
PIF_VALUE: 21
PIF_VALUE: 22
PIF_VALUE: 22
PIF_VALUE: 21
PIF_VALUE: 5
PIF_VALUE: 0
PIF_VALUE: 10
PIF_VALUE: 22
PIF_VALUE: 25
PIF_VALUE: 1
PIF_VALUE: 7
PIF_VALUE: 21
PIF_VALUE: 24
PIF_VALUE: 26
PIF_VALUE: 25

## 2019-05-15 ASSESSMENT — PAIN SCALES - GENERAL
PAINLEVEL_OUTOF10: 8
PAINLEVEL_OUTOF10: 9
PAINLEVEL_OUTOF10: 9
PAINLEVEL_OUTOF10: 7
PAINLEVEL_OUTOF10: 9
PAINLEVEL_OUTOF10: 7
PAINLEVEL_OUTOF10: 8
PAINLEVEL_OUTOF10: 8
PAINLEVEL_OUTOF10: 9

## 2019-05-15 ASSESSMENT — PAIN DESCRIPTION - FREQUENCY
FREQUENCY: CONTINUOUS

## 2019-05-15 ASSESSMENT — PAIN DESCRIPTION - LOCATION
LOCATION: SCROTUM
LOCATION: SCROTUM;OTHER (COMMENT)
LOCATION: SCROTUM

## 2019-05-15 ASSESSMENT — PAIN DESCRIPTION - ORIENTATION
ORIENTATION: LEFT

## 2019-05-15 ASSESSMENT — PAIN DESCRIPTION - ONSET
ONSET: ON-GOING
ONSET: GRADUAL
ONSET: ON-GOING

## 2019-05-15 ASSESSMENT — PAIN DESCRIPTION - PROGRESSION
CLINICAL_PROGRESSION: NOT CHANGED
CLINICAL_PROGRESSION: NOT CHANGED

## 2019-05-15 ASSESSMENT — PAIN DESCRIPTION - DESCRIPTORS
DESCRIPTORS: ACHING
DESCRIPTORS: ACHING
DESCRIPTORS: ACHING;BURNING;CONSTANT
DESCRIPTORS: ACHING
DESCRIPTORS: BURNING;CONSTANT
DESCRIPTORS: ACHING

## 2019-05-15 ASSESSMENT — PAIN DESCRIPTION - PAIN TYPE
TYPE: SURGICAL PAIN

## 2019-05-15 ASSESSMENT — PAIN - FUNCTIONAL ASSESSMENT
PAIN_FUNCTIONAL_ASSESSMENT: PREVENTS OR INTERFERES SOME ACTIVE ACTIVITIES AND ADLS
PAIN_FUNCTIONAL_ASSESSMENT: PREVENTS OR INTERFERES SOME ACTIVE ACTIVITIES AND ADLS

## 2019-05-15 NOTE — HOME CARE
HERMAN AMNUEL    PRICE QUOTE GIVEN TO: INTAKE  ORDERED THERAPY AT TIME OF QUOTE: CEFTRIAXONE 2GM IV Q24H  COVERAGE: [employer] FOR DRUG; 80% COVERAGE FOR PER ROLA  DRUG COST: 0  PER ROLA COST: $28.74/DAY  TOTAL PT RESPONSIBILITY: $28.74/DAY    Spoke with wife this a.m. She states that they are not able to afford the $28.74/day. Would like to discuss other options. Explained billing/payment arrangements for pharmacy bill and  Continues to state they would not be agreeable to pricing. LSW notified.    Nilo Contreras LPN/HC

## 2019-05-15 NOTE — PLAN OF CARE
Problem: Pain:  Description  Pain management should include both nonpharmacologic and pharmacologic interventions.   Goal: Pain level will decrease  Description  Pain level will decrease  Outcome: Met This Shift  Goal: Control of acute pain  Description  Control of acute pain  5/15/2019 0256 by Vera Florentino RN  Outcome: Met This Shift  Goal: Control of chronic pain  Description  Control of chronic pain  Outcome: Met This Shift

## 2019-05-15 NOTE — PROGRESS NOTES
Infectious Disease  Progress Note  [facility]    Chief Complaint: left scrotal pain    Subjective:  Left scrotum feels better after explantation of sphincter device    Scheduled Meds:   medicated lip balm        sodium chloride flush  10 mL Intravenous BID    sodium chloride flush  10 mL Intravenous 2 times per day    enoxaparin  40 mg Subcutaneous Daily    amLODIPine  10 mg Oral Daily    chlorproMAZINE  50 mg Oral TID    divalproex  500 mg Oral BID    FLUoxetine  80 mg Oral Daily    hydrOXYzine  25 mg Oral BID    cefTRIAXone (ROCEPHIN) IV  2 g Intravenous Q24H     Continuous Infusions:   sodium chloride 75 mL/hr at 05/14/19 0640     PRN Meds:sodium chloride flush, acetaminophen, morphine    ROS:  Constitutional:No Fever, chills or rigors. No unexplained weight loss. HEENT: No headache, dizziness or lightheadedness. No recent change in vision or hearing. No sore throat or runny nose. Respiratory: no cough, chest pain, shortness of breath or wheeze. Cardiovascular: no chest pain or palpitations  Gastrointestinal: no nausea, vomiting, diarrhea, constipation. No blood in stool. Genitourinary: No h/o dysuria, hematuria, urgency, frequency or incontinence. Musculoskeletal: No h/o joint pain anywhere in body, or bodyache. Neurologic: No h/o passing out, focal weakness or seizure. Skin: No h/o rashes or easy bruising. Hematologic: No gum bleeding or easy bruising. No hemoptysis.     Patient Vitals for the past 24 hrs:   BP Temp Temp src Pulse Resp SpO2 Weight   05/15/19 1500 131/75 97.4 °F (36.3 °C) Oral 70 14 -- --   05/15/19 1416 -- -- -- -- -- 94 % --   05/15/19 1415 (!) 146/104 97.3 °F (36.3 °C) Temporal 67 11 97 % --   05/15/19 1406 -- -- -- -- -- 92 % --   05/15/19 1401 -- -- -- -- -- 97 % --   05/15/19 1400 (!) 155/110 -- -- 67 14 94 % --   05/15/19 1356 -- -- -- -- -- 94 % --   05/15/19 1351 -- -- -- -- -- 94 % --   05/15/19 1345 (!) 147/96 97.2 °F (36.2 °C) Temporal 78 10 94 % --   05/15/19 1341 -- -- -- -- -- 94 % --   05/15/19 1337 -- -- -- -- -- 97 % --   05/15/19 1330 (!) 138/93 -- -- 82 10 98 % --   05/15/19 1320 (!) 138/93 97.3 °F (36.3 °C) Temporal 81 16 95 % --   05/15/19 1315 (!) 138/93 97.3 °F (36.3 °C) Temporal 73 16 93 % --   05/15/19 0842 109/65 98.3 °F (36.8 °C) Oral 65 16 94 % --   05/15/19 0500 -- -- -- -- -- -- 229 lb (103.9 kg)   05/14/19 1930 122/76 98.1 °F (36.7 °C) Oral 63 18 95 % --       CBC with Differential:    Lab Results   Component Value Date    WBC 7.1 05/14/2019    WBC 8.5 05/13/2019    WBC 10.6 04/30/2019    WBC 9.5 04/10/2019    WBC 8.0 03/05/2019    RBC 4.13 05/14/2019    RBC 4.41 05/13/2019    RBC 4.55 04/30/2019    RBC 4.66 04/10/2019    RBC 4.95 03/05/2019    HGB 12.1 05/14/2019    HGB 13.1 05/13/2019    HGB 13.2 04/30/2019    HGB 13.2 04/10/2019    HGB 14.2 03/05/2019    HCT 37.2 05/14/2019    HCT 39.5 05/13/2019    HCT 40.3 04/30/2019    HCT 40.9 04/10/2019    HCT 44.4 03/05/2019     05/14/2019     05/13/2019     04/30/2019     04/10/2019     03/05/2019    MCV 90.1 05/14/2019    MCV 89.6 05/13/2019    MCV 88.6 04/30/2019    MCV 87.8 04/10/2019    MCV 89.7 03/05/2019    MCH 29.3 05/14/2019    MCH 29.7 05/13/2019    MCH 29.0 04/30/2019    MCH 28.3 04/10/2019    MCH 28.7 03/05/2019    MCHC 32.5 05/14/2019    MCHC 33.2 05/13/2019    MCHC 32.8 04/30/2019    MCHC 32.3 04/10/2019    MCHC 32.0 03/05/2019    RDW 15.8 05/14/2019    RDW 15.8 05/13/2019    RDW 16.1 04/30/2019    RDW 14.8 04/10/2019    RDW 14.2 03/05/2019    NRBC 0.0 07/08/2017    NRBC 0.0 05/04/2017    BANDSPCT 1 09/17/2014    METASPCT 1.7 07/08/2017    METASPCT 1 09/17/2014    LYMPHOPCT 27.4 05/14/2019    LYMPHOPCT 27.6 05/13/2019    LYMPHOPCT 19.7 04/30/2019    LYMPHOPCT 20.6 04/10/2019    LYMPHOPCT 11.2 02/07/2019    MONOPCT 8.6 05/14/2019    MONOPCT 10.9 05/13/2019    MONOPCT 8.3 04/30/2019    MONOPCT 7.0 04/10/2019    MONOPCT 2.1 02/07/2019    BASOPCT 0.6 05/14/2019    BASOPCT 0.7 05/13/2019    BASOPCT 0.8 04/30/2019    BASOPCT 0.5 04/10/2019    BASOPCT 0.2 02/07/2019    MONOSABS 0.61 05/14/2019    MONOSABS 0.93 05/13/2019    MONOSABS 0.88 04/30/2019    MONOSABS 0.67 04/10/2019    MONOSABS 0.11 02/07/2019    LYMPHSABS 1.95 05/14/2019    LYMPHSABS 2.35 05/13/2019    LYMPHSABS 2.09 04/30/2019    LYMPHSABS 1.96 04/10/2019    LYMPHSABS 0.59 02/07/2019    EOSABS 0.56 05/14/2019    EOSABS 0.69 05/13/2019    EOSABS 0.33 04/30/2019    EOSABS 0.43 04/10/2019    EOSABS 0.00 02/07/2019    BASOSABS 0.04 05/14/2019    BASOSABS 0.06 05/13/2019    BASOSABS 0.09 04/30/2019    BASOSABS 0.05 04/10/2019    BASOSABS 0.01 02/07/2019     CMP:    Lab Results   Component Value Date     05/14/2019    K 3.7 05/14/2019    K 3.9 04/30/2019     05/14/2019    CO2 24 05/14/2019    BUN 21 05/14/2019    BUN 28 05/13/2019    BUN 20 04/30/2019    BUN 24 04/10/2019    BUN 15 03/05/2019    CREATININE 1.0 05/14/2019    CREATININE 1.1 05/13/2019    CREATININE 1.2 04/30/2019    CREATININE 1.1 04/10/2019    CREATININE 1.1 03/05/2019    GFRAA >60 05/14/2019    LABGLOM >60 05/14/2019    GLUCOSE 107 05/14/2019    GLUCOSE 118 02/05/2011    PROT 6.4 05/14/2019    LABALBU 3.5 05/14/2019    LABALBU 3.5 02/05/2011    CALCIUM 8.6 05/14/2019    BILITOT <0.2 05/14/2019    ALKPHOS 83 05/14/2019    AST 13 05/14/2019    ALT 10 05/14/2019     Lab Results   Component Value Date    CRP 1.3 (H) 03/05/2019    CRP 3.3 (H) 02/06/2019     No results found for: SEDRATE      /75   Pulse 70   Temp 97.4 °F (36.3 °C) (Oral)   Resp 14   Ht 6' 3\" (1.905 m)   Wt 229 lb (103.9 kg)   SpO2 94%   BMI 28.62 kg/m²     Physical Exam  Const/Neuro- unchanged, no signs of acute distress, Alert  ENMT- Within Normal Limits, Normocephalic, mucous membranes pink/moist, No thrush  Neck: Neck supple  Heart- Regular, Rate, Rhythm- no murmur appreciated. Lungs- clear to ascultation. Respirations even and nonlabored.   Abdomen- Soft, bowel sounds positive, non tender  Musculo/Extremities-  Equal and symmetrical, no edema. No tenderness. Neurological- No focal motor or sensory loss. No confusion  Skin:  Warm and dry, free from rashes. Scrotal area packed and dressed    Cultures reviewed  pending  Radiology reviewed  CT PELVIS W CONTRAST Additional Contrast? None   Final Result   Findings compatible with abscess involving the left   scrotum. This does not appear to extend into the pelvis proper. See   discussion above. CT ABDOMEN PELVIS W IV CONTRAST Additional Contrast? None   Final Result   Addendum 1 of 1   ADDENDUM:      Additional images of the lower pelvis, scrotum, and perineum were    provided. Left inguinal approach penile prosthetic device in place. There is    increased    attenuation of the left hemiscrotum soft tissues, possibly cellulitis. No    evidence of scrotal or perineum abscess or soft tissue gas. Small bowel    fat    containing inguinal hernias. This addendum has been electronically signed by Mahin Sethi MD.      Final      [address]   Final Result   Increased flow as well as abnormal echogenicity in the left left   testicle in a pattern compatible with orchitis, an abscess could give   this appearance. Please correlate clinically.    Left hydrocele   Right epididymal cyst or spermatocele      ALERT:  THIS IS AN ABNORMAL REPORT             Assessment  Infected sphincter device  Cellulitis of scrotum on left    Plan  Follow up cultures  Continue ceftriaxone      Electronically signed by Saqib Craven MD on 5/15/2019 at 4:26 PM

## 2019-05-15 NOTE — PROGRESS NOTES
Admit Date: 5/13/2019      Subjective:     Patient: no acute issues reported overnight  Medication side effects: none    Scheduled Meds:   sodium chloride flush  10 mL Intravenous BID    sodium chloride flush  10 mL Intravenous 2 times per day    enoxaparin  40 mg Subcutaneous Daily    amLODIPine  10 mg Oral Daily    chlorproMAZINE  50 mg Oral TID    divalproex  500 mg Oral BID    FLUoxetine  80 mg Oral Daily    hydrOXYzine  25 mg Oral BID    cefTRIAXone (ROCEPHIN) IV  2 g Intravenous Q24H     Continuous Infusions:   sodium chloride 75 mL/hr at 05/14/19 0640     PRN Meds:sodium chloride flush, acetaminophen, morphine    Objective:   I/O last 3 completed shifts:   In: 240 [P.O.:240]  Out: 200 [Urine:200]  I/O this shift:  In: 1640 [I.V.:1640]  Out: -     /76   Pulse 63   Temp 98.1 °F (36.7 °C) (Oral)   Resp 18   Ht 6' 3\" (1.905 m)   Wt 229 lb (103.9 kg)   SpO2 95%   BMI 28.62 kg/m²   General appearance: alert, appears stated age and cooperative  Skin:negative  Heent: negative  Lungs: clear to auscultation bilaterally  Heart: regular rate and rhythm, S1, S2 normal, no murmur, click, rub or gallop  Abdomen: soft, non-tender; bowel sounds normal; no masses,  no organomegaly  Extremities:no edema  Neurologic: Grossly normal    Labs:  CBC with Differential:    Lab Results   Component Value Date    WBC 7.1 05/14/2019    RBC 4.13 05/14/2019    HGB 12.1 05/14/2019    HCT 37.2 05/14/2019     05/14/2019    MCV 90.1 05/14/2019    MCH 29.3 05/14/2019    MCHC 32.5 05/14/2019    RDW 15.8 05/14/2019    NRBC 0.0 07/08/2017    BANDSPCT 1 09/17/2014    METASPCT 1.7 07/08/2017    LYMPHOPCT 27.4 05/14/2019    MONOPCT 8.6 05/14/2019    BASOPCT 0.6 05/14/2019    MONOSABS 0.61 05/14/2019    LYMPHSABS 1.95 05/14/2019    EOSABS 0.56 05/14/2019    BASOSABS 0.04 05/14/2019     BMP:    Lab Results   Component Value Date     05/14/2019    K 3.7 05/14/2019    K 3.9 04/30/2019     05/14/2019    CO2 24 05/14/2019    BUN 21 05/14/2019    LABALBU 3.5 05/14/2019    LABALBU 3.5 02/05/2011    CREATININE 1.0 05/14/2019    CALCIUM 8.6 05/14/2019    GFRAA >60 05/14/2019    LABGLOM >60 05/14/2019     PT/INR:    Lab Results   Component Value Date    PROTIME 13.4 02/03/2019    PROTIME 11.5 02/05/2011    INR 1.2 02/03/2019     Last 3 Troponin:    Lab Results   Component Value Date    TROPONINI <0.01 12/27/2018    TROPONINI <0.01 11/20/2018    TROPONINI <0.01 10/24/2018     TSH:    Lab Results   Component Value Date    TSH 4.710 10/03/2018      Assessment:     1. Scrotal abscess in the setting of infected artificial urinary sphincter  2.  H/O essential HTN  3. H/O bipolar disorder    Plan:       Continue same plan and orders    For I & D of scrotal abscess and removal of artificial urinary sphincter

## 2019-05-15 NOTE — PLAN OF CARE
Problem: Pain:  Goal: Pain level will decrease  Description  Pain level will decrease  Outcome: Met This Shift  Goal: Control of acute pain  Description  Control of acute pain  Outcome: Met This Shift  Goal: Control of chronic pain  Description  Control of chronic pain  Outcome: Met This Shift     Problem: Skin Integrity:  Goal: Absence of new skin breakdown  Description  Absence of new skin breakdown  Outcome: Met This Shift

## 2019-05-15 NOTE — CARE COORDINATION
Social Work / Discharge Planning : SW updated by Erica Vargas at [facility]. If patient needs IV antibiotics at discharge, patient spouse refusing to pay 28.00 a day cost for [address] for IV. Await ID plan, can refer to [facility] one time a day if IV antibiotics needed. SW to follow.  Electronically signed by SUSHANT Nicole on 5/15/19 at 8:42 AM  .

## 2019-05-15 NOTE — PROGRESS NOTES
Pt unable to get ring off left ring finger. Retained jewelry consent signed and in chart.    Electronically signed by Heriberto Avila RN on 5/15/2019 at 8:55 AM

## 2019-05-16 LAB
GRAM STAIN ORDERABLE: NORMAL
MRSA CULTURE ONLY: NORMAL

## 2019-05-16 PROCEDURE — 6370000000 HC RX 637 (ALT 250 FOR IP): Performed by: FAMILY MEDICINE

## 2019-05-16 PROCEDURE — 1200000000 HC SEMI PRIVATE

## 2019-05-16 PROCEDURE — 6370000000 HC RX 637 (ALT 250 FOR IP): Performed by: SPECIALIST

## 2019-05-16 PROCEDURE — 6360000002 HC RX W HCPCS: Performed by: SPECIALIST

## 2019-05-16 PROCEDURE — 2580000003 HC RX 258: Performed by: FAMILY MEDICINE

## 2019-05-16 PROCEDURE — 2580000003 HC RX 258: Performed by: SPECIALIST

## 2019-05-16 PROCEDURE — 6360000002 HC RX W HCPCS: Performed by: FAMILY MEDICINE

## 2019-05-16 RX ORDER — LINEZOLID 600 MG/1
600 TABLET, FILM COATED ORAL EVERY 12 HOURS SCHEDULED
Status: DISCONTINUED | OUTPATIENT
Start: 2019-05-16 | End: 2019-05-20

## 2019-05-16 RX ADMIN — FLUOXETINE 80 MG: 20 CAPSULE ORAL at 09:18

## 2019-05-16 RX ADMIN — CHLORPROMAZINE HYDROCHLORIDE 50 MG: 25 TABLET, SUGAR COATED ORAL at 21:03

## 2019-05-16 RX ADMIN — CEFTRIAXONE 2 G: 2 INJECTION, POWDER, FOR SOLUTION INTRAMUSCULAR; INTRAVENOUS at 12:06

## 2019-05-16 RX ADMIN — SODIUM CHLORIDE: 9 INJECTION, SOLUTION INTRAVENOUS at 18:28

## 2019-05-16 RX ADMIN — SODIUM CHLORIDE: 9 INJECTION, SOLUTION INTRAVENOUS at 04:01

## 2019-05-16 RX ADMIN — MORPHINE SULFATE 2 MG: 2 INJECTION, SOLUTION INTRAMUSCULAR; INTRAVENOUS at 22:57

## 2019-05-16 RX ADMIN — HYDROXYZINE PAMOATE 25 MG: 25 CAPSULE ORAL at 21:03

## 2019-05-16 RX ADMIN — CHLORPROMAZINE HYDROCHLORIDE 50 MG: 25 TABLET, SUGAR COATED ORAL at 14:19

## 2019-05-16 RX ADMIN — LINEZOLID 600 MG: 600 TABLET, FILM COATED ORAL at 12:06

## 2019-05-16 RX ADMIN — HYDROXYZINE PAMOATE 25 MG: 25 CAPSULE ORAL at 09:20

## 2019-05-16 RX ADMIN — AMLODIPINE BESYLATE 10 MG: 10 TABLET ORAL at 09:20

## 2019-05-16 RX ADMIN — DIVALPROEX SODIUM 500 MG: 250 TABLET, DELAYED RELEASE ORAL at 09:20

## 2019-05-16 RX ADMIN — MORPHINE SULFATE 2 MG: 2 INJECTION, SOLUTION INTRAMUSCULAR; INTRAVENOUS at 04:01

## 2019-05-16 RX ADMIN — CHLORPROMAZINE HYDROCHLORIDE 50 MG: 25 TABLET, SUGAR COATED ORAL at 09:20

## 2019-05-16 RX ADMIN — MORPHINE SULFATE 2 MG: 2 INJECTION, SOLUTION INTRAMUSCULAR; INTRAVENOUS at 12:07

## 2019-05-16 RX ADMIN — DIVALPROEX SODIUM 500 MG: 250 TABLET, DELAYED RELEASE ORAL at 21:04

## 2019-05-16 RX ADMIN — ENOXAPARIN SODIUM 40 MG: 40 INJECTION SUBCUTANEOUS at 09:18

## 2019-05-16 RX ADMIN — LINEZOLID 600 MG: 600 TABLET, FILM COATED ORAL at 21:03

## 2019-05-16 RX ADMIN — MORPHINE SULFATE 2 MG: 2 INJECTION, SOLUTION INTRAMUSCULAR; INTRAVENOUS at 18:43

## 2019-05-16 ASSESSMENT — PAIN DESCRIPTION - ONSET
ONSET: ON-GOING

## 2019-05-16 ASSESSMENT — PAIN SCALES - GENERAL
PAINLEVEL_OUTOF10: 3
PAINLEVEL_OUTOF10: 8
PAINLEVEL_OUTOF10: 8
PAINLEVEL_OUTOF10: 9
PAINLEVEL_OUTOF10: 0
PAINLEVEL_OUTOF10: 0
PAINLEVEL_OUTOF10: 8
PAINLEVEL_OUTOF10: 8
PAINLEVEL_OUTOF10: 9

## 2019-05-16 ASSESSMENT — PAIN DESCRIPTION - LOCATION
LOCATION: SCROTUM

## 2019-05-16 ASSESSMENT — PAIN DESCRIPTION - ORIENTATION
ORIENTATION: LEFT

## 2019-05-16 ASSESSMENT — PAIN DESCRIPTION - FREQUENCY
FREQUENCY: CONTINUOUS

## 2019-05-16 ASSESSMENT — PAIN - FUNCTIONAL ASSESSMENT
PAIN_FUNCTIONAL_ASSESSMENT: ACTIVITIES ARE NOT PREVENTED
PAIN_FUNCTIONAL_ASSESSMENT: ACTIVITIES ARE NOT PREVENTED
PAIN_FUNCTIONAL_ASSESSMENT: PREVENTS OR INTERFERES SOME ACTIVE ACTIVITIES AND ADLS
PAIN_FUNCTIONAL_ASSESSMENT: PREVENTS OR INTERFERES SOME ACTIVE ACTIVITIES AND ADLS
PAIN_FUNCTIONAL_ASSESSMENT: ACTIVITIES ARE NOT PREVENTED

## 2019-05-16 ASSESSMENT — PAIN DESCRIPTION - PAIN TYPE
TYPE: SURGICAL PAIN
TYPE: SURGICAL PAIN;ACUTE PAIN
TYPE: SURGICAL PAIN
TYPE: SURGICAL PAIN;ACUTE PAIN
TYPE: SURGICAL PAIN;ACUTE PAIN

## 2019-05-16 ASSESSMENT — PAIN DESCRIPTION - PROGRESSION
CLINICAL_PROGRESSION: NOT CHANGED

## 2019-05-16 ASSESSMENT — PAIN DESCRIPTION - DESCRIPTORS
DESCRIPTORS: CONSTANT;BURNING
DESCRIPTORS: BURNING;CONSTANT
DESCRIPTORS: BURNING;DISCOMFORT;SHARP
DESCRIPTORS: CONSTANT;BURNING
DESCRIPTORS: ACHING;BURNING;DISCOMFORT

## 2019-05-16 ASSESSMENT — PAIN DESCRIPTION - DIRECTION: RADIATING_TOWARDS: GROIN

## 2019-05-16 NOTE — PLAN OF CARE
Problem: Pain:  Goal: Pain level will decrease  Description  Pain level will decrease  5/16/2019 1124 by Nathan John RN  Outcome: Met This Shift     Problem: Pain:  Goal: Control of acute pain  Description  Control of acute pain  5/16/2019 1124 by Nathan John RN  Outcome: Met This Shift     Problem: Pain:  Goal: Control of chronic pain  Description  Control of chronic pain  5/16/2019 1124 by Nathan John RN  Outcome: Met This Shift     Problem: Skin Integrity:  Goal: Will show no infection signs and symptoms  Description  Will show no infection signs and symptoms  5/16/2019 1124 by Nathan John RN  Outcome: Met This Shift     Problem: Skin Integrity:  Goal: Absence of new skin breakdown  Description  Absence of new skin breakdown  5/16/2019 1124 by Nathan John RN  Outcome: Met This Shift

## 2019-05-16 NOTE — OP NOTE
[id number] [address]                                OPERATIVE REPORT    PATIENT NAME: Taylor Mccarty                       :        1965  MED REC NO:   [id number]                            ROOM:       0515  ACCOUNT NO:   [de-identified]                           ADMIT DATE: 2019  PROVIDER:     Joe Dowd DO    DATE OF PROCEDURE:  05/15/2019    PREOPERATIVE DIAGNOSES:  1. History of prostate cancer, status post a radical robotic-assisted  laparoscopic prostatectomy. 2.  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 3.  Noncompliance with the artificial urinary sphincter. 4.  Scrotal abscess. 5.  Underlying psychiatric disorder. POSTOPERATIVE DIAGNOSES:  1. History of prostate cancer, status post a radical robotic-assisted  laparoscopic prostatectomy. 2.  Postprostatectomy incontinence with placement of an artificial  urinary sphincter in . 3.  Noncompliance with the artificial urinary sphincter. 4.  Scrotal abscess. 5.  Underlying psychiatric disorder. 6.  There was a complete urethral erosion. 7.  There was also a urethral stricture. PROCEDURE PERFORMED:  The patient had:  1. Scrotal exploration. 2.  I and D, scrotal abscess. 3.  Explantation of an artificial urinary sphincter. 4.  Flexible cystoscopy. 5.  Dilation of a proximal urethral stricture. 6.  Complex urethroplasty. 7.  Complex perineal reconstruction. ANESTHESIA:  General anesthesia. SURGEON:  Toshia Aguila DO.    ASSISTANT:  Leni Smith, certified surgical tech. ESTIMATED BLOOD LOSS:  Less than 50 mL. PATHOLOGIC SPECIMEN:  Intraoperative cultures were obtained. Preoperative antibiotics were administered.     STORY ON THIS PATIENT:  This is a 81-year-old male, well known to me  that I diagnosed with prostate cancer two years ago and he underwent a  robotic-assisted laparoscopic prostatectomy. Postprostatectomy, he  complained significantly of a postprostatectomy incontinence. He  underwent an artificial urinary sphincter in 2007. He had some  underlying psychiatric disorder, and he was unable to manually use the  sphincter and though it worked, and was able to get him dry. It was  deactivated and then the patient had noncompliance in the office. We  had even talked to him about explantation, but on multiple attempts, he  has canceled progressing with this. He presented to [facility] on 05/13/2019, and had inflammation over the  underlying pump mechanism in the scrotum. He was put on for me today  for scrotal exploration and explantation of the artificial urinary  sphincter. The risks, the benefits, and the alternatives of the  proposed surgical procedure were extensively explained to his wife as  well as to the patient. They understood the risks, the benefits, and  the alternatives, and elected to proceed. DESCRIPTION OF PROCEDURE:  This is 67-year-old  male was  brought to the operating room #6 at [facility],  [facility], placed in the supine position, and induced with general  anesthesia. Anesthesia monitored the head and neck area, IV access, and  vital signs throughout the course of the case. Status post induction,  the patient was placed in the dorsal lithotomy position. All underlying  points were pressure padded. SCDs were applied. He was prepped and  draped in normal sterile fashion. I proceeded by localizing and making  a perineal incision and finding the actual pump mechanism in the scrotum  and I was able to deliver this and as I did this, I got immediate  abscess to pour out. I should say that I tried to put a Castillo catheter  in first and when I met resistance, I knew that there was at that point  most likely malfunctioning of the cuff, but also _____ malposition of  the cuff.   So, in the process of removing this, I was getting the  flexible cystoscope ready and I started to the remove the pump and I got  this abscess to drain and it was at this point that I did scope, I did  note there was a complete disruption of the urethra and that the cuff  had eroded completely through the urethra. I continued with my  dissection and I was able to identify the cuff and remove it, but  basically the patient had a 3 cm gaping defect in the underlying  urethra. I was able to find the more proximal aspect of the urethra and  placed a wire in. I then subsequently placed a scope in, but it was  densely adherent. Basically, the patient had formed a stricture  proximal to the underlying cuff. I was able to dilate, get a scope in,  and drain clear urine out. After this occurred, I did remove the  remaining portion of the artificial urinary sphincter. I got the  reservoir out, got the actual cuff out, got the pump out, got all the  tubing out. I did copiously irrigate all these area. Then, I began to  mobilize the urethra knowing that there was quite a large urethral  defect. I opted to do a complex urethroplasty and what I did was really  mobilize it on its proximal and distal aspect. Then, I did 3-0 Vicryl  on SHS, probably about 15 of them, and I reapproximated all of the  urethra bringing back into in appropriate position. Prior to doing  this, I did place a Silastic Castillo catheter through the tip of the penis  into the bladder without any issues. I then irrigated this and made  sure after the complex urethroplasty was closed that there was no  leakage, which there was not. The tissue looked viable, looked healthy,  and looked tension free, and was watertight in its nature. I then  reapproximated the tissue also with interrupteds, but this time with 2-0  Vicryl on SHS. I made sure that the next layer also was interrupted. I  did about four layers all the way to the skin.   On the skin, I did

## 2019-05-16 NOTE — PROGRESS NOTES
Admit Date: 5/13/2019      Subjective:     Patient: no acute issues reported overnight  Medication side effects: none    Scheduled Meds:   sodium chloride flush  10 mL Intravenous BID    sodium chloride flush  10 mL Intravenous 2 times per day    enoxaparin  40 mg Subcutaneous Daily    amLODIPine  10 mg Oral Daily    chlorproMAZINE  50 mg Oral TID    divalproex  500 mg Oral BID    FLUoxetine  80 mg Oral Daily    hydrOXYzine  25 mg Oral BID    cefTRIAXone (ROCEPHIN) IV  2 g Intravenous Q24H     Continuous Infusions:   sodium chloride 75 mL/hr at 05/16/19 0401     PRN Meds:sodium chloride flush, acetaminophen, morphine    Objective:   I/O last 3 completed shifts:   In: 3155 [P.O.:360; I.V.:2795]  Out: 725 [Urine:225; Blood:500]  I/O this shift:  In: -   Out: 550 [Urine:550]    /71   Pulse 88   Temp 98.5 °F (36.9 °C) (Oral)   Resp 16   Ht 6' 3\" (1.905 m)   Wt 234 lb 11.2 oz (106.5 kg)   SpO2 98%   BMI 29.34 kg/m²   General appearance: alert, appears stated age and cooperative  Skin:negative  Heent:negative  Lungs: clear to auscultation bilaterally  Heart: regular rate and rhythm, S1, S2 normal, no murmur, click, rub or gallop  Abdomen: soft, non-tender; bowel sounds normal; no masses,  no organomegaly  Extremities:no edema  Neurologic: Grossly normal    Labs:  CBC with Differential:    Lab Results   Component Value Date    WBC 7.1 05/14/2019    RBC 4.13 05/14/2019    HGB 12.1 05/14/2019    HCT 37.2 05/14/2019     05/14/2019    MCV 90.1 05/14/2019    MCH 29.3 05/14/2019    MCHC 32.5 05/14/2019    RDW 15.8 05/14/2019    NRBC 0.0 07/08/2017    BANDSPCT 1 09/17/2014    METASPCT 1.7 07/08/2017    LYMPHOPCT 27.4 05/14/2019    MONOPCT 8.6 05/14/2019    BASOPCT 0.6 05/14/2019    MONOSABS 0.61 05/14/2019    LYMPHSABS 1.95 05/14/2019    EOSABS 0.56 05/14/2019    BASOSABS 0.04 05/14/2019     BMP:    Lab Results   Component Value Date     05/14/2019    K 3.7 05/14/2019    K 3.9 04/30/2019    CL 103 05/14/2019    CO2 24 05/14/2019    BUN 21 05/14/2019    LABALBU 3.5 05/14/2019    LABALBU 3.5 02/05/2011    CREATININE 1.0 05/14/2019    CALCIUM 8.6 05/14/2019    GFRAA >60 05/14/2019    LABGLOM >60 05/14/2019     PT/INR:    Lab Results   Component Value Date    PROTIME 13.4 02/03/2019    PROTIME 11.5 02/05/2011    INR 1.2 02/03/2019     Last 3 Troponin:    Lab Results   Component Value Date    TROPONINI <0.01 12/27/2018    TROPONINI <0.01 11/20/2018    TROPONINI <0.01 10/24/2018     TSH:    Lab Results   Component Value Date    TSH 4.710 10/03/2018      Assessment:     1.  Scrotal abscess in the setting of infected artificial urinary sphincter device s/p I & D  2. H/O HTN  3. H/O bipolar disorder    Plan:       Continue same plan and orders

## 2019-05-16 NOTE — PLAN OF CARE
Problem: Pain:  Goal: Pain level will decrease  Description  Pain level will decrease  5/15/2019 2211 by Lawyer Diamond RN  Outcome: Met This Shift  5/15/2019 1759 by Pepe Méndez RN  Outcome: Met This Shift  Goal: Control of acute pain  Description  Control of acute pain  5/15/2019 2211 by Lawyer Diamond RN  Outcome: Met This Shift  5/15/2019 1759 by Pepe Méndez RN  Outcome: Met This Shift  Goal: Control of chronic pain  Description  Control of chronic pain  5/15/2019 2211 by Lawyer Diamond RN  Outcome: Met This Shift  5/15/2019 1759 by Pepe Méndez RN  Outcome: Met This Shift     Problem: Skin Integrity:  Goal: Will show no infection signs and symptoms  Description  Will show no infection signs and symptoms  Outcome: Met This Shift  Goal: Absence of new skin breakdown  Description  Absence of new skin breakdown  5/15/2019 2211 by Lawyer Diamond RN  Outcome: Met This Shift  5/15/2019 1759 by Pepe Méndez RN  Outcome: Met This Shift

## 2019-05-16 NOTE — PROGRESS NOTES
[facility] PROGRESS NOTE                                                                       5/16/2019        CHIEF UROLOGIC COMPLAINT: Scrotal abscess     HISTORY OF PRESENT ILLNESS:  Patient without new complaints. Feels better than admission especially after procedure yesterday. Catheter indwelling and overall comfortable. He denies fever, chills, nausea, vomiting. He is eating well.     REVIEW OF SYSTEMS:   CONSTITUTIONAL: negative  HEENT: negative  HEMATOLOGIC: negative  ENDOCRINE: negative  RESPIRATORY: negative  CV: negative  GI: negative  NEURO: negative  ORTHOPEDICS: negative  PSYCHIATRIC: negative  : as above    PAST FAMILY HISTORY:    Family History   Problem Relation Age of Onset    Mental Illness Mother     Mental Illness Father     Mental Illness Sister     Mental Illness Brother     Heart Disease Mother     Depression Father      PAST SOCIAL HISTORY:    Social History     Socioeconomic History    Marital status:      Spouse name: ZENAIDA    Number of children: 0    Years of education: 12 +8    Highest education level: None   Occupational History    Occupation: STUDIED [employer]     Employer: UNEMPLOYED     Comment: SSDI   Social Needs    Financial resource strain: None    Food insecurity:     Worry: None     Inability: None    Transportation needs:     Medical: None     Non-medical: None   Tobacco Use    Smoking status: Current Every Day Smoker     Packs/day: 2.00     Types: Cigarettes     Start date: 1978    Smokeless tobacco: Never Used   Substance and Sexual Activity    Alcohol use: No     Comment: no alcohol in5 yrs    Drug use: No     Comment: last use 1997 / marijuana    Sexual activity: Yes   Lifestyle    Physical activity:     Days per week: None     Minutes per session: None    Stress: None   Relationships    Social connections:     Talks on phone: None     Gets together: None     Attends [employer] service: None     Active member of club or organization: None     Attends meetings of clubs or organizations: None     Relationship status: None    Intimate partner violence:     Fear of current or ex partner: None     Emotionally abused: None     Physically abused: None     Forced sexual activity: None   Other Topics Concern    None   Social History Narrative    ** Merged History Encounter **            Scheduled Meds:   sodium chloride flush  10 mL Intravenous BID    sodium chloride flush  10 mL Intravenous 2 times per day    enoxaparin  40 mg Subcutaneous Daily    amLODIPine  10 mg Oral Daily    chlorproMAZINE  50 mg Oral TID    divalproex  500 mg Oral BID    FLUoxetine  80 mg Oral Daily    hydrOXYzine  25 mg Oral BID    cefTRIAXone (ROCEPHIN) IV  2 g Intravenous Q24H     Continuous Infusions:   sodium chloride 75 mL/hr at 05/16/19 0401     PRN Meds:.sodium chloride flush, acetaminophen, morphine    /71   Pulse 88   Temp 98.5 °F (36.9 °C) (Oral)   Resp 16   Ht 6' 3\" (1.905 m)   Wt 234 lb 11.2 oz (106.5 kg)   SpO2 98%   BMI 29.34 kg/m²     Lab Results   Component Value Date    WBC 7.1 05/14/2019    HGB 12.1 (L) 05/14/2019    HCT 37.2 05/14/2019    MCV 90.1 05/14/2019     05/14/2019       Lab Results   Component Value Date    CREATININE 1.0 05/14/2019       Lab Results   Component Value Date    PSA <0.01 06/20/2017    PSA <0.01 02/09/2017    PSA <0.01 07/12/2016       No results for input(s): Minh Vallejoois in the last 72 hours. No results for input(s): BC in the last 72 hours. No results for input(s): Alexandre Torres in the last 72 hours. PHYSICAL EXAMINATION:  Skin dry, without rashes  Respirations non-labored, intact  Abdomen soft, non-tender, non-distended  Alert and oriented x3  Incision on scrotum clean, dry, intact. Moderate induration bilaterally of the scrotum.   Castillo draining clear, yellow

## 2019-05-16 NOTE — PROGRESS NOTES
Infectious Disease  Progress Note  [facility]    Chief Complaint: left scrotal pain    Subjective:  Left scrotum feels better after explantation of sphincter device    Scheduled Meds:   sodium chloride flush  10 mL Intravenous BID    sodium chloride flush  10 mL Intravenous 2 times per day    enoxaparin  40 mg Subcutaneous Daily    amLODIPine  10 mg Oral Daily    chlorproMAZINE  50 mg Oral TID    divalproex  500 mg Oral BID    FLUoxetine  80 mg Oral Daily    hydrOXYzine  25 mg Oral BID    cefTRIAXone (ROCEPHIN) IV  2 g Intravenous Q24H     Continuous Infusions:   sodium chloride 75 mL/hr at 05/16/19 0401     PRN Meds:sodium chloride flush, acetaminophen, morphine    ROS:  Constitutional:No Fever, chills or rigors. No unexplained weight loss. HEENT: No headache, dizziness or lightheadedness. No recent change in vision or hearing. No sore throat or runny nose. Respiratory: no cough, chest pain, shortness of breath or wheeze. Cardiovascular: no chest pain or palpitations  Gastrointestinal: no nausea, vomiting, diarrhea, constipation. No blood in stool. Genitourinary: No h/o dysuria, hematuria, urgency, frequency or incontinence. Musculoskeletal: No h/o joint pain anywhere in body, or bodyache. Neurologic: No h/o passing out, focal weakness or seizure. Skin: No h/o rashes or easy bruising. Hematologic: No gum bleeding or easy bruising. No hemoptysis.     Patient Vitals for the past 24 hrs:   BP Temp Temp src Pulse Resp SpO2 Weight   05/16/19 0745 (!) 108/58 98.4 °F (36.9 °C) Oral 75 16 93 % --   05/16/19 0345 123/71 98.5 °F (36.9 °C) Oral 88 16 98 % --   05/16/19 0221 -- -- -- -- -- -- 234 lb 11.2 oz (106.5 kg)   05/15/19 2315 123/80 98.6 °F (37 °C) Oral 98 18 97 % --   05/15/19 2100 103/72 98.5 °F (36.9 °C) Oral 85 16 93 % --   05/15/19 1639 109/66 97.8 °F (36.6 °C) Oral 66 14 -- --   05/15/19 1500 131/75 97.4 °F (36.3 °C) Oral 70 14 94 % --   05/15/19 1416 -- -- -- -- -- 94 % --   05/15/19 1415 (!) 146/104 97.3 °F (36.3 °C) Temporal 67 11 97 % --   05/15/19 1406 -- -- -- -- -- 92 % --   05/15/19 1401 -- -- -- -- -- 97 % --   05/15/19 1400 (!) 155/110 -- -- 67 14 94 % --   05/15/19 1356 -- -- -- -- -- 94 % --   05/15/19 1351 -- -- -- -- -- 94 % --   05/15/19 1345 (!) 147/96 97.2 °F (36.2 °C) Temporal 78 10 94 % --   05/15/19 1341 -- -- -- -- -- 94 % --   05/15/19 1337 -- -- -- -- -- 97 % --   05/15/19 1330 (!) 138/93 -- -- 82 10 98 % --   05/15/19 1320 (!) 138/93 97.3 °F (36.3 °C) Temporal 81 16 95 % --   05/15/19 1315 (!) 138/93 97.3 °F (36.3 °C) Temporal 73 16 93 % --       CBC with Differential:    Lab Results   Component Value Date    WBC 7.1 05/14/2019    WBC 8.5 05/13/2019    WBC 10.6 04/30/2019    WBC 9.5 04/10/2019    WBC 8.0 03/05/2019    RBC 4.13 05/14/2019    RBC 4.41 05/13/2019    RBC 4.55 04/30/2019    RBC 4.66 04/10/2019    RBC 4.95 03/05/2019    HGB 12.1 05/14/2019    HGB 13.1 05/13/2019    HGB 13.2 04/30/2019    HGB 13.2 04/10/2019    HGB 14.2 03/05/2019    HCT 37.2 05/14/2019    HCT 39.5 05/13/2019    HCT 40.3 04/30/2019    HCT 40.9 04/10/2019    HCT 44.4 03/05/2019     05/14/2019     05/13/2019     04/30/2019     04/10/2019     03/05/2019    MCV 90.1 05/14/2019    MCV 89.6 05/13/2019    MCV 88.6 04/30/2019    MCV 87.8 04/10/2019    MCV 89.7 03/05/2019    MCH 29.3 05/14/2019    MCH 29.7 05/13/2019    MCH 29.0 04/30/2019    MCH 28.3 04/10/2019    MCH 28.7 03/05/2019    MCHC 32.5 05/14/2019    MCHC 33.2 05/13/2019    MCHC 32.8 04/30/2019    MCHC 32.3 04/10/2019    MCHC 32.0 03/05/2019    RDW 15.8 05/14/2019    RDW 15.8 05/13/2019    RDW 16.1 04/30/2019    RDW 14.8 04/10/2019    RDW 14.2 03/05/2019    NRBC 0.0 07/08/2017    NRBC 0.0 05/04/2017    BANDSPCT 1 09/17/2014    METASPCT 1.7 07/08/2017    METASPCT 1 09/17/2014    LYMPHOPCT 27.4 05/14/2019    LYMPHOPCT 27.6 05/13/2019    LYMPHOPCT 19.7 04/30/2019    LYMPHOPCT 20.6 04/10/2019    LYMPHOPCT 11.2 02/07/2019 MONOPCT 8.6 05/14/2019    MONOPCT 10.9 05/13/2019    MONOPCT 8.3 04/30/2019    MONOPCT 7.0 04/10/2019    MONOPCT 2.1 02/07/2019    BASOPCT 0.6 05/14/2019    BASOPCT 0.7 05/13/2019    BASOPCT 0.8 04/30/2019    BASOPCT 0.5 04/10/2019    BASOPCT 0.2 02/07/2019    MONOSABS 0.61 05/14/2019    MONOSABS 0.93 05/13/2019    MONOSABS 0.88 04/30/2019    MONOSABS 0.67 04/10/2019    MONOSABS 0.11 02/07/2019    LYMPHSABS 1.95 05/14/2019    LYMPHSABS 2.35 05/13/2019    LYMPHSABS 2.09 04/30/2019    LYMPHSABS 1.96 04/10/2019    LYMPHSABS 0.59 02/07/2019    EOSABS 0.56 05/14/2019    EOSABS 0.69 05/13/2019    EOSABS 0.33 04/30/2019    EOSABS 0.43 04/10/2019    EOSABS 0.00 02/07/2019    BASOSABS 0.04 05/14/2019    BASOSABS 0.06 05/13/2019    BASOSABS 0.09 04/30/2019    BASOSABS 0.05 04/10/2019    BASOSABS 0.01 02/07/2019     CMP:    Lab Results   Component Value Date     05/14/2019    K 3.7 05/14/2019    K 3.9 04/30/2019     05/14/2019    CO2 24 05/14/2019    BUN 21 05/14/2019    BUN 28 05/13/2019    BUN 20 04/30/2019    BUN 24 04/10/2019    BUN 15 03/05/2019    CREATININE 1.0 05/14/2019    CREATININE 1.1 05/13/2019    CREATININE 1.2 04/30/2019    CREATININE 1.1 04/10/2019    CREATININE 1.1 03/05/2019    GFRAA >60 05/14/2019    LABGLOM >60 05/14/2019    GLUCOSE 107 05/14/2019    GLUCOSE 118 02/05/2011    PROT 6.4 05/14/2019    LABALBU 3.5 05/14/2019    LABALBU 3.5 02/05/2011    CALCIUM 8.6 05/14/2019    BILITOT <0.2 05/14/2019    ALKPHOS 83 05/14/2019    AST 13 05/14/2019    ALT 10 05/14/2019     Lab Results   Component Value Date    CRP 1.3 (H) 03/05/2019    CRP 3.3 (H) 02/06/2019     No results found for: SEDRATE      BP (!) 108/58   Pulse 75   Temp 98.4 °F (36.9 °C) (Oral)   Resp 16   Ht 6' 3\" (1.905 m)   Wt 234 lb 11.2 oz (106.5 kg)   SpO2 93%   BMI 29.34 kg/m²     Physical Exam  Const/Neuro- unchanged, no signs of acute distress, Alert  ENMT- Within Normal Limits, Normocephalic, mucous membranes pink/moist, No thrush  Neck: Neck supple  Heart- Regular, Rate, Rhythm- no murmur appreciated. Lungs- clear to ascultation. Respirations even and nonlabored. Abdomen- Soft, bowel sounds positive, non tender  Musculo/Extremities-  Equal and symmetrical, no edema. No tenderness. Neurological- No focal motor or sensory loss. No confusion  Skin:  Warm and dry, free from rashes. Scrotal area packed and dressed    Cultures reviewed  pending  Radiology reviewed  CT PELVIS W CONTRAST Additional Contrast? None   Final Result   Findings compatible with abscess involving the left   scrotum. This does not appear to extend into the pelvis proper. See   discussion above. CT ABDOMEN PELVIS W IV CONTRAST Additional Contrast? None   Final Result   Addendum 1 of 1   ADDENDUM:      Additional images of the lower pelvis, scrotum, and perineum were    provided. Left inguinal approach penile prosthetic device in place. There is    increased    attenuation of the left hemiscrotum soft tissues, possibly cellulitis. No    evidence of scrotal or perineum abscess or soft tissue gas. Small bowel    fat    containing inguinal hernias. This addendum has been electronically signed by Kya Aguilera MD.      Final      [address]   Final Result   Increased flow as well as abnormal echogenicity in the left left   testicle in a pattern compatible with orchitis, an abscess could give   this appearance. Please correlate clinically.    Left hydrocele   Right epididymal cyst or spermatocele      ALERT:  THIS IS AN ABNORMAL REPORT             Assessment  Infected sphincter device  Cellulitis of scrotum on left    Plan  Follow up cultures  Continue ceftriaxone      Electronically signed by Kevin Roberts MD on 5/16/2019 at 10:42 AM

## 2019-05-17 LAB
CULTURE SURGICAL: NORMAL
GRAM STAIN RESULT: NORMAL

## 2019-05-17 PROCEDURE — 6370000000 HC RX 637 (ALT 250 FOR IP): Performed by: SPECIALIST

## 2019-05-17 PROCEDURE — 2580000003 HC RX 258: Performed by: FAMILY MEDICINE

## 2019-05-17 PROCEDURE — 1200000000 HC SEMI PRIVATE

## 2019-05-17 PROCEDURE — 2580000003 HC RX 258: Performed by: SPECIALIST

## 2019-05-17 PROCEDURE — 6360000002 HC RX W HCPCS: Performed by: SPECIALIST

## 2019-05-17 PROCEDURE — 6370000000 HC RX 637 (ALT 250 FOR IP): Performed by: INTERNAL MEDICINE

## 2019-05-17 PROCEDURE — 6370000000 HC RX 637 (ALT 250 FOR IP): Performed by: FAMILY MEDICINE

## 2019-05-17 PROCEDURE — 6360000002 HC RX W HCPCS: Performed by: FAMILY MEDICINE

## 2019-05-17 RX ORDER — DOCUSATE SODIUM 100 MG/1
100 CAPSULE, LIQUID FILLED ORAL 2 TIMES DAILY
Status: DISCONTINUED | OUTPATIENT
Start: 2019-05-17 | End: 2019-05-20 | Stop reason: HOSPADM

## 2019-05-17 RX ADMIN — MORPHINE SULFATE 2 MG: 2 INJECTION, SOLUTION INTRAMUSCULAR; INTRAVENOUS at 17:26

## 2019-05-17 RX ADMIN — CHLORPROMAZINE HYDROCHLORIDE 50 MG: 25 TABLET, SUGAR COATED ORAL at 13:59

## 2019-05-17 RX ADMIN — MORPHINE SULFATE 2 MG: 2 INJECTION, SOLUTION INTRAMUSCULAR; INTRAVENOUS at 07:38

## 2019-05-17 RX ADMIN — MORPHINE SULFATE 2 MG: 2 INJECTION, SOLUTION INTRAMUSCULAR; INTRAVENOUS at 02:57

## 2019-05-17 RX ADMIN — SODIUM CHLORIDE: 9 INJECTION, SOLUTION INTRAVENOUS at 17:28

## 2019-05-17 RX ADMIN — CHLORPROMAZINE HYDROCHLORIDE 50 MG: 25 TABLET, SUGAR COATED ORAL at 09:16

## 2019-05-17 RX ADMIN — DIVALPROEX SODIUM 500 MG: 250 TABLET, DELAYED RELEASE ORAL at 20:54

## 2019-05-17 RX ADMIN — DIVALPROEX SODIUM 500 MG: 250 TABLET, DELAYED RELEASE ORAL at 09:16

## 2019-05-17 RX ADMIN — DOCUSATE SODIUM 100 MG: 100 CAPSULE, LIQUID FILLED ORAL at 12:23

## 2019-05-17 RX ADMIN — DOCUSATE SODIUM 100 MG: 100 CAPSULE, LIQUID FILLED ORAL at 20:54

## 2019-05-17 RX ADMIN — FLUOXETINE 80 MG: 20 CAPSULE ORAL at 09:16

## 2019-05-17 RX ADMIN — HYDROXYZINE PAMOATE 25 MG: 25 CAPSULE ORAL at 09:16

## 2019-05-17 RX ADMIN — SODIUM CHLORIDE: 9 INJECTION, SOLUTION INTRAVENOUS at 05:39

## 2019-05-17 RX ADMIN — HYDROXYZINE PAMOATE 25 MG: 25 CAPSULE ORAL at 20:54

## 2019-05-17 RX ADMIN — CEFTRIAXONE 2 G: 2 INJECTION, POWDER, FOR SOLUTION INTRAMUSCULAR; INTRAVENOUS at 11:58

## 2019-05-17 RX ADMIN — CHLORPROMAZINE HYDROCHLORIDE 50 MG: 25 TABLET, SUGAR COATED ORAL at 20:54

## 2019-05-17 RX ADMIN — AMLODIPINE BESYLATE 10 MG: 10 TABLET ORAL at 09:16

## 2019-05-17 RX ADMIN — MORPHINE SULFATE 2 MG: 2 INJECTION, SOLUTION INTRAMUSCULAR; INTRAVENOUS at 12:32

## 2019-05-17 RX ADMIN — ENOXAPARIN SODIUM 40 MG: 40 INJECTION SUBCUTANEOUS at 09:16

## 2019-05-17 RX ADMIN — MORPHINE SULFATE 2 MG: 2 INJECTION, SOLUTION INTRAMUSCULAR; INTRAVENOUS at 21:39

## 2019-05-17 RX ADMIN — LINEZOLID 600 MG: 600 TABLET, FILM COATED ORAL at 20:54

## 2019-05-17 RX ADMIN — LINEZOLID 600 MG: 600 TABLET, FILM COATED ORAL at 09:16

## 2019-05-17 ASSESSMENT — PAIN DESCRIPTION - DESCRIPTORS
DESCRIPTORS: ACHING;BURNING;DISCOMFORT
DESCRIPTORS: BURNING;DISCOMFORT
DESCRIPTORS: ACHING;BURNING;DISCOMFORT
DESCRIPTORS: ACHING;BURNING;DISCOMFORT

## 2019-05-17 ASSESSMENT — PAIN - FUNCTIONAL ASSESSMENT
PAIN_FUNCTIONAL_ASSESSMENT: PREVENTS OR INTERFERES SOME ACTIVE ACTIVITIES AND ADLS

## 2019-05-17 ASSESSMENT — PAIN SCALES - GENERAL
PAINLEVEL_OUTOF10: 2
PAINLEVEL_OUTOF10: 9
PAINLEVEL_OUTOF10: 9
PAINLEVEL_OUTOF10: 8
PAINLEVEL_OUTOF10: 4
PAINLEVEL_OUTOF10: 0
PAINLEVEL_OUTOF10: 8
PAINLEVEL_OUTOF10: 4
PAINLEVEL_OUTOF10: 5

## 2019-05-17 ASSESSMENT — PAIN DESCRIPTION - PROGRESSION
CLINICAL_PROGRESSION: NOT CHANGED
CLINICAL_PROGRESSION: NOT CHANGED

## 2019-05-17 ASSESSMENT — PAIN DESCRIPTION - PAIN TYPE
TYPE: SURGICAL PAIN;ACUTE PAIN
TYPE: ACUTE PAIN;SURGICAL PAIN
TYPE: SURGICAL PAIN;ACUTE PAIN
TYPE: ACUTE PAIN;SURGICAL PAIN

## 2019-05-17 ASSESSMENT — PAIN DESCRIPTION - ONSET
ONSET: ON-GOING

## 2019-05-17 ASSESSMENT — PAIN DESCRIPTION - FREQUENCY
FREQUENCY: CONTINUOUS

## 2019-05-17 ASSESSMENT — PAIN DESCRIPTION - LOCATION
LOCATION: SCROTUM

## 2019-05-17 ASSESSMENT — PAIN DESCRIPTION - ORIENTATION
ORIENTATION: LEFT

## 2019-05-17 NOTE — CARE COORDINATION
05/17/19 1320   IMM Letter   IMM Letter given to Patient/Family/Significant other/Guardian/POA/by: Trevon Amezcua   IMM Letter date given: 05/17/19   IMM Letter time given: 36   In anticipation of discharge, copy of original Important message from Medicare delivered to patient and reviewed. Henderson Sandifer.  Dale, MSN, RN  [facility] Case Management  [phone]

## 2019-05-17 NOTE — PROGRESS NOTES
WBC 7.1 05/14/2019    WBC 8.5 05/13/2019    WBC 10.6 04/30/2019    WBC 9.5 04/10/2019    WBC 8.0 03/05/2019    RBC 4.13 05/14/2019    RBC 4.41 05/13/2019    RBC 4.55 04/30/2019    RBC 4.66 04/10/2019    RBC 4.95 03/05/2019    HGB 12.1 05/14/2019    HGB 13.1 05/13/2019    HGB 13.2 04/30/2019    HGB 13.2 04/10/2019    HGB 14.2 03/05/2019    HCT 37.2 05/14/2019    HCT 39.5 05/13/2019    HCT 40.3 04/30/2019    HCT 40.9 04/10/2019    HCT 44.4 03/05/2019     05/14/2019     05/13/2019     04/30/2019     04/10/2019     03/05/2019    MCV 90.1 05/14/2019    MCV 89.6 05/13/2019    MCV 88.6 04/30/2019    MCV 87.8 04/10/2019    MCV 89.7 03/05/2019    MCH 29.3 05/14/2019    MCH 29.7 05/13/2019    MCH 29.0 04/30/2019    MCH 28.3 04/10/2019    MCH 28.7 03/05/2019    MCHC 32.5 05/14/2019    MCHC 33.2 05/13/2019    MCHC 32.8 04/30/2019    MCHC 32.3 04/10/2019    MCHC 32.0 03/05/2019    RDW 15.8 05/14/2019    RDW 15.8 05/13/2019    RDW 16.1 04/30/2019    RDW 14.8 04/10/2019    RDW 14.2 03/05/2019    NRBC 0.0 07/08/2017    NRBC 0.0 05/04/2017    BANDSPCT 1 09/17/2014    METASPCT 1.7 07/08/2017    METASPCT 1 09/17/2014    LYMPHOPCT 27.4 05/14/2019    LYMPHOPCT 27.6 05/13/2019    LYMPHOPCT 19.7 04/30/2019    LYMPHOPCT 20.6 04/10/2019    LYMPHOPCT 11.2 02/07/2019    MONOPCT 8.6 05/14/2019    MONOPCT 10.9 05/13/2019    MONOPCT 8.3 04/30/2019    MONOPCT 7.0 04/10/2019    MONOPCT 2.1 02/07/2019    BASOPCT 0.6 05/14/2019    BASOPCT 0.7 05/13/2019    BASOPCT 0.8 04/30/2019    BASOPCT 0.5 04/10/2019    BASOPCT 0.2 02/07/2019    MONOSABS 0.61 05/14/2019    MONOSABS 0.93 05/13/2019    MONOSABS 0.88 04/30/2019    MONOSABS 0.67 04/10/2019    MONOSABS 0.11 02/07/2019    LYMPHSABS 1.95 05/14/2019    LYMPHSABS 2.35 05/13/2019    LYMPHSABS 2.09 04/30/2019    LYMPHSABS 1.96 04/10/2019    LYMPHSABS 0.59 02/07/2019    EOSABS 0.56 05/14/2019    EOSABS 0.69 05/13/2019    EOSABS 0.33 04/30/2019    EOSABS 0.43 04/10/2019 EOSABS 0.00 02/07/2019    BASOSABS 0.04 05/14/2019    BASOSABS 0.06 05/13/2019    BASOSABS 0.09 04/30/2019    BASOSABS 0.05 04/10/2019    BASOSABS 0.01 02/07/2019     CMP:    Lab Results   Component Value Date     05/14/2019    K 3.7 05/14/2019    K 3.9 04/30/2019     05/14/2019    CO2 24 05/14/2019    BUN 21 05/14/2019    BUN 28 05/13/2019    BUN 20 04/30/2019    BUN 24 04/10/2019    BUN 15 03/05/2019    CREATININE 1.0 05/14/2019    CREATININE 1.1 05/13/2019    CREATININE 1.2 04/30/2019    CREATININE 1.1 04/10/2019    CREATININE 1.1 03/05/2019    GFRAA >60 05/14/2019    LABGLOM >60 05/14/2019    GLUCOSE 107 05/14/2019    GLUCOSE 118 02/05/2011    PROT 6.4 05/14/2019    LABALBU 3.5 05/14/2019    LABALBU 3.5 02/05/2011    CALCIUM 8.6 05/14/2019    BILITOT <0.2 05/14/2019    ALKPHOS 83 05/14/2019    AST 13 05/14/2019    ALT 10 05/14/2019     Lab Results   Component Value Date    CRP 1.3 (H) 03/05/2019    CRP 3.3 (H) 02/06/2019     No results found for: SEDRATE      /77   Pulse 65   Temp 98.4 °F (36.9 °C) (Oral)   Resp 16   Ht 6' 3\" (1.905 m)   Wt 245 lb 8 oz (111.4 kg)   SpO2 93%   BMI 30.69 kg/m²     Physical Exam  Const/Neuro- unchanged, no signs of acute distress, Alert  ENMT- Within Normal Limits, Normocephalic, mucous membranes pink/moist, No thrush  Neck: Neck supple  Heart- Regular, Rate, Rhythm- no murmur appreciated. Lungs- clear to ascultation. Respirations even and nonlabored. Abdomen- Soft, bowel sounds positive, non tender  Musculo/Extremities-  Equal and symmetrical, no edema. No tenderness. Neurological- No focal motor or sensory loss. No confusion  Skin:  Warm and dry, free from rashes. Scrotal area incision clean and min drainage.  Left inguinal area is red  There is overall edema in scrotal   Castillo in place- op note indicated disruption of urethra required revision  Cultures reviewed  pending  Radiology reviewed  CT PELVIS W CONTRAST Additional Contrast? None   Final Result   Findings compatible with abscess involving the left   scrotum. This does not appear to extend into the pelvis proper. See   discussion above. CT ABDOMEN PELVIS W IV CONTRAST Additional Contrast? None   Final Result   Addendum 1 of 1   ADDENDUM:      Additional images of the lower pelvis, scrotum, and perineum were    provided. Left inguinal approach penile prosthetic device in place. There is    increased    attenuation of the left hemiscrotum soft tissues, possibly cellulitis. No    evidence of scrotal or perineum abscess or soft tissue gas. Small bowel    fat    containing inguinal hernias. This addendum has been electronically signed by Vazquez Goldsmith MD.      Final      [address]   Final Result   Increased flow as well as abnormal echogenicity in the left left   testicle in a pattern compatible with orchitis, an abscess could give   this appearance. Please correlate clinically.    Left hydrocele   Right epididymal cyst or spermatocele      ALERT:  THIS IS AN ABNORMAL REPORT             Assessment  Infected sphincter device  Disruption of urethra and revision  Cellulitis of scrotum on left- resolved  Left inguinal inflammation     Plan  Follow up cultures  Continue ceftriaxone and linezolid      Pt on antidepressive - monitor for serotonin effect     Electronically signed by Shelby Ziegler MD on 5/17/2019 at 10:46 AM

## 2019-05-17 NOTE — PATIENT CARE CONFERENCE
[facility] Quality Flow/Interdisciplinary Rounds Progress Note        Quality Flow Rounds held on May 17, 2019    Disciplines Attending:  Bedside Nurse, ,  and Nursing Unit Keyona Morejon was admitted on 5/13/2019 10:24 PM    Anticipated Discharge Date:  Expected Discharge Date: 05/18/19    Disposition:    Andre Score:  Andre Scale Score: 22    Readmission Risk              Risk of Unplanned Readmission:        25           Discussed patient goal for the day, patient clinical progression, and barriers to discharge.   The following Goal(s) of the Day/Commitment(s) have been identified:  Diagnostics - Report Results      Jacqueline Quiroz  May 17, 2019

## 2019-05-17 NOTE — CARE COORDINATION
In anticipation of discharge on po linezolid 600 mg bid x 14 days, a prior auth was obtained for a prescription of same. The prior auth  Reference number is  [id number]. Same potential script processed for cost at [facility] ambulatory pharmacy. Cost at retail is $0.00. The authorization is valid through 5/31/19. Actual script still needed if desired at time of discharge. Chloe Goodman.  Dale, MSN, RN  [facility] Case Management  [phone]

## 2019-05-17 NOTE — PLAN OF CARE
Problem: Pain:  Description  Pain management should include both nonpharmacologic and pharmacologic interventions.   Goal: Pain level will decrease  Description  Pain level will decrease  5/16/2019 2238 by Garrett Tomas RN  Outcome: Met This Shift     Problem: Skin Integrity:  Goal: Will show no infection signs and symptoms  Description  Will show no infection signs and symptoms  5/16/2019 2238 by Garrett Tomas RN  Outcome: Met This Shift     Problem: Skin Integrity:  Goal: Absence of new skin breakdown  Description  Absence of new skin breakdown  5/16/2019 2238 by Garrett Tomas RN  Outcome: Met This Shift     Problem: Infection - Surgical Site:  Goal: Will show no infection signs and symptoms  Description  Will show no infection signs and symptoms  5/16/2019 2238 by Garrett Tomas RN  Outcome: Met This Shift

## 2019-05-17 NOTE — PROGRESS NOTES
Attends Mosque service: None     Active member of club or organization: None     Attends meetings of clubs or organizations: None     Relationship status: None    Intimate partner violence:     Fear of current or ex partner: None     Emotionally abused: None     Physically abused: None     Forced sexual activity: None   Other Topics Concern    None   Social History Narrative    ** Merged History Encounter **            Scheduled Meds:   linezolid  600 mg Oral 2 times per day    sodium chloride flush  10 mL Intravenous BID    sodium chloride flush  10 mL Intravenous 2 times per day    enoxaparin  40 mg Subcutaneous Daily    amLODIPine  10 mg Oral Daily    chlorproMAZINE  50 mg Oral TID    divalproex  500 mg Oral BID    FLUoxetine  80 mg Oral Daily    hydrOXYzine  25 mg Oral BID    cefTRIAXone (ROCEPHIN) IV  2 g Intravenous Q24H     Continuous Infusions:   sodium chloride 75 mL/hr at 05/16/19 1828     PRN Meds:.sodium chloride flush, acetaminophen, morphine    BP (!) 106/59   Pulse 71   Temp 98.5 °F (36.9 °C) (Oral)   Resp 18   Ht 6' 3\" (1.905 m)   Wt 245 lb 8 oz (111.4 kg)   SpO2 98%   BMI 30.69 kg/m²     Lab Results   Component Value Date    WBC 7.1 05/14/2019    HGB 12.1 (L) 05/14/2019    HCT 37.2 05/14/2019    MCV 90.1 05/14/2019     05/14/2019       Lab Results   Component Value Date    CREATININE 1.0 05/14/2019       Lab Results   Component Value Date    PSA <0.01 06/20/2017    PSA <0.01 02/09/2017    PSA <0.01 07/12/2016           PHYSICAL EXAMINATION:  Skin dry, without rashes  Respirations non-labored, intact  Abdomen soft, non-tender, non-distended  Alert and oriented x3  Castillo draining clear urine  Anterior scrotal incision without drainage. No evidence of crepitus or fluctuance      ASSESSMENT AND PLAN:  1.   Scrotal abscess in the setting of an artificial urinary sphincter status post incision and drainage and explantation of device yesterday per Dr. Gogo Quan Nile.  -continue oneill catheter thru discharge given repair of urethra  -continue abx per ID for infection  -Given the patient's pain he will remain in the hospital for additional 24 hours.  I've encouraged ambulation  -Continue SCDs for DVT prophylaxis    We will continue to follow          Electronically signed by Chandler Hernandez MD on 5/17/2019 at 5:30 AM

## 2019-05-18 PROCEDURE — 6370000000 HC RX 637 (ALT 250 FOR IP): Performed by: INTERNAL MEDICINE

## 2019-05-18 PROCEDURE — 6370000000 HC RX 637 (ALT 250 FOR IP): Performed by: SPECIALIST

## 2019-05-18 PROCEDURE — 2580000003 HC RX 258: Performed by: SPECIALIST

## 2019-05-18 PROCEDURE — 2580000003 HC RX 258: Performed by: FAMILY MEDICINE

## 2019-05-18 PROCEDURE — 6360000002 HC RX W HCPCS: Performed by: SPECIALIST

## 2019-05-18 PROCEDURE — 1200000000 HC SEMI PRIVATE

## 2019-05-18 PROCEDURE — 6370000000 HC RX 637 (ALT 250 FOR IP): Performed by: FAMILY MEDICINE

## 2019-05-18 PROCEDURE — 6360000002 HC RX W HCPCS: Performed by: FAMILY MEDICINE

## 2019-05-18 RX ADMIN — AMLODIPINE BESYLATE 10 MG: 10 TABLET ORAL at 08:00

## 2019-05-18 RX ADMIN — MORPHINE SULFATE 2 MG: 2 INJECTION, SOLUTION INTRAMUSCULAR; INTRAVENOUS at 11:51

## 2019-05-18 RX ADMIN — SODIUM CHLORIDE: 9 INJECTION, SOLUTION INTRAVENOUS at 18:36

## 2019-05-18 RX ADMIN — ENOXAPARIN SODIUM 40 MG: 40 INJECTION SUBCUTANEOUS at 07:59

## 2019-05-18 RX ADMIN — CHLORPROMAZINE HYDROCHLORIDE 50 MG: 25 TABLET, SUGAR COATED ORAL at 08:00

## 2019-05-18 RX ADMIN — DOCUSATE SODIUM 100 MG: 100 CAPSULE, LIQUID FILLED ORAL at 08:00

## 2019-05-18 RX ADMIN — CEFTRIAXONE 2 G: 2 INJECTION, POWDER, FOR SOLUTION INTRAMUSCULAR; INTRAVENOUS at 11:36

## 2019-05-18 RX ADMIN — MORPHINE SULFATE 2 MG: 2 INJECTION, SOLUTION INTRAMUSCULAR; INTRAVENOUS at 20:24

## 2019-05-18 RX ADMIN — LINEZOLID 600 MG: 600 TABLET, FILM COATED ORAL at 20:24

## 2019-05-18 RX ADMIN — LINEZOLID 600 MG: 600 TABLET, FILM COATED ORAL at 08:00

## 2019-05-18 RX ADMIN — FLUOXETINE 80 MG: 20 CAPSULE ORAL at 08:01

## 2019-05-18 RX ADMIN — SODIUM CHLORIDE: 9 INJECTION, SOLUTION INTRAVENOUS at 06:29

## 2019-05-18 RX ADMIN — MORPHINE SULFATE 2 MG: 2 INJECTION, SOLUTION INTRAMUSCULAR; INTRAVENOUS at 16:19

## 2019-05-18 RX ADMIN — DIVALPROEX SODIUM 500 MG: 250 TABLET, DELAYED RELEASE ORAL at 20:24

## 2019-05-18 RX ADMIN — DOCUSATE SODIUM 100 MG: 100 CAPSULE, LIQUID FILLED ORAL at 20:24

## 2019-05-18 RX ADMIN — DIVALPROEX SODIUM 500 MG: 250 TABLET, DELAYED RELEASE ORAL at 08:00

## 2019-05-18 RX ADMIN — Medication 10 ML: at 07:58

## 2019-05-18 RX ADMIN — HYDROXYZINE PAMOATE 25 MG: 25 CAPSULE ORAL at 20:24

## 2019-05-18 RX ADMIN — CHLORPROMAZINE HYDROCHLORIDE 50 MG: 25 TABLET, SUGAR COATED ORAL at 20:24

## 2019-05-18 RX ADMIN — CHLORPROMAZINE HYDROCHLORIDE 50 MG: 25 TABLET, SUGAR COATED ORAL at 14:59

## 2019-05-18 RX ADMIN — MORPHINE SULFATE 2 MG: 2 INJECTION, SOLUTION INTRAMUSCULAR; INTRAVENOUS at 07:58

## 2019-05-18 RX ADMIN — HYDROXYZINE PAMOATE 25 MG: 25 CAPSULE ORAL at 08:00

## 2019-05-18 ASSESSMENT — PAIN SCALES - GENERAL
PAINLEVEL_OUTOF10: 6
PAINLEVEL_OUTOF10: 4
PAINLEVEL_OUTOF10: 9
PAINLEVEL_OUTOF10: 0
PAINLEVEL_OUTOF10: 3
PAINLEVEL_OUTOF10: 8
PAINLEVEL_OUTOF10: 4
PAINLEVEL_OUTOF10: 9
PAINLEVEL_OUTOF10: 9
PAINLEVEL_OUTOF10: 0

## 2019-05-18 ASSESSMENT — PAIN DESCRIPTION - FREQUENCY
FREQUENCY: CONTINUOUS

## 2019-05-18 ASSESSMENT — PAIN - FUNCTIONAL ASSESSMENT
PAIN_FUNCTIONAL_ASSESSMENT: PREVENTS OR INTERFERES SOME ACTIVE ACTIVITIES AND ADLS

## 2019-05-18 ASSESSMENT — PAIN DESCRIPTION - ONSET
ONSET: ON-GOING

## 2019-05-18 ASSESSMENT — PAIN DESCRIPTION - PROGRESSION
CLINICAL_PROGRESSION: NOT CHANGED

## 2019-05-18 ASSESSMENT — PAIN DESCRIPTION - LOCATION
LOCATION: GROIN;SCROTUM
LOCATION: SCROTUM
LOCATION: GROIN;SCROTUM
LOCATION: GROIN;SCROTUM

## 2019-05-18 ASSESSMENT — PAIN DESCRIPTION - PAIN TYPE
TYPE: ACUTE PAIN;SURGICAL PAIN

## 2019-05-18 ASSESSMENT — PAIN DESCRIPTION - ORIENTATION
ORIENTATION: LEFT

## 2019-05-18 ASSESSMENT — PAIN DESCRIPTION - DESCRIPTORS
DESCRIPTORS: ACHING;DISCOMFORT
DESCRIPTORS: ACHING;DISCOMFORT
DESCRIPTORS: ACHING;BURNING;CONSTANT
DESCRIPTORS: ACHING;DISCOMFORT

## 2019-05-18 NOTE — PROGRESS NOTES
[facility]  [facility]  Progress Note    SUBJECTIVE:  Chief Complaint   Patient presents with    Other     states \"has a device to help him urinate and his L testicle is swollen and throbbing, not sure if it is out of place or there is another issue\"     Patient is tolerating medications. + pain left scrotum 8/10. No nausea, vomiting, diarrhea. Review of systems:  As stated above in the chief complaint, otherwise negative. Medications:  Scheduled Meds:   docusate sodium  100 mg Oral BID    linezolid  600 mg Oral 2 times per day    sodium chloride flush  10 mL Intravenous BID    sodium chloride flush  10 mL Intravenous 2 times per day    enoxaparin  40 mg Subcutaneous Daily    amLODIPine  10 mg Oral Daily    chlorproMAZINE  50 mg Oral TID    divalproex  500 mg Oral BID    FLUoxetine  80 mg Oral Daily    hydrOXYzine  25 mg Oral BID    cefTRIAXone (ROCEPHIN) IV  2 g Intravenous Q24H     Continuous Infusions:   sodium chloride 75 mL/hr at 19 0629     PRN Meds:magnesium hydroxide, sodium chloride flush, acetaminophen, morphine    OBJECTIVE:  /72   Pulse 74   Temp 99 °F (37.2 °C) (Oral)   Resp 16   Ht 6' 3\" (1.905 m)   Wt 245 lb 8 oz (111.4 kg)   SpO2 92%   BMI 30.69 kg/m²   Temp  Av.4 °F (36.9 °C)  Min: 97.7 °F (36.5 °C)  Max: 99 °F (37.2 °C)  Constitutional: The patient is awake, alert, and oriented. Skin: Warm and dry. No rashes were noted. HEENT: Eyes show round, and reactive pupils. No jaundice. Moist mucous membranes, no ulcerations, no thrush. Neck: Supple to movements. No lymphadenopathy. Chest: No use of accessory muscles to breathe. Symmetrical expansion. Auscultation reveals no wheezing, crackles, or rhonchi. Cardiovascular: S1 and S2 are rhythmic and regular. No murmurs appreciated. Abdomen: Positive bowel sounds to auscultation. Benign to palpation. Extremities: No clubbing, no cyanosis, no edema.   : + scrotal edema, as Continue to monitor.     Desiree Jin  5/18/2019

## 2019-05-18 NOTE — PROGRESS NOTES
Admit Date: 5/13/2019    Subjective:   ADMITTED WITH SCROTAL ABSCESS  STILL WITH SOME PAIN  AFEBRILE      Objective:     Patient Vitals for the past 8 hrs:   BP Temp Temp src Pulse Resp SpO2   05/18/19 0800 116/68 98.1 °F (36.7 °C) Oral 73 16 92 %     I/O last 3 completed shifts: In: 3065 [P.O.:1270; I.V.:1795]  Out: 3300 [Urine:3300]  I/O this shift:  In: 360 [P.O.:360]  Out: -     HEENT: Normal  NECK: Thyroid normal. No carotid bruit. No lymphphadenopathy. CVS: RRR  RS: Clear. No wheeze. No rhonchi. Good airflow bilaterally. ABD: Soft. Non tender. No mass. Normal BS. BACK: Skin normal.  EXT: No edema. Non tender. Pulses present. Skin intact.   SCROTUM STILL SWOLLEN AND TENDER  NEURO: Intact      Scheduled Meds:   docusate sodium  100 mg Oral BID    linezolid  600 mg Oral 2 times per day    sodium chloride flush  10 mL Intravenous BID    sodium chloride flush  10 mL Intravenous 2 times per day    enoxaparin  40 mg Subcutaneous Daily    amLODIPine  10 mg Oral Daily    chlorproMAZINE  50 mg Oral TID    divalproex  500 mg Oral BID    FLUoxetine  80 mg Oral Daily    hydrOXYzine  25 mg Oral BID    cefTRIAXone (ROCEPHIN) IV  2 g Intravenous Q24H     Continuous Infusions:   sodium chloride 75 mL/hr at 05/18/19 0802       CBC with Differential:    Lab Results   Component Value Date    WBC 7.1 05/14/2019    RBC 4.13 05/14/2019    HGB 12.1 05/14/2019    HCT 37.2 05/14/2019     05/14/2019    MCV 90.1 05/14/2019    MCH 29.3 05/14/2019    MCHC 32.5 05/14/2019    RDW 15.8 05/14/2019    NRBC 0.0 07/08/2017    BANDSPCT 1 09/17/2014    METASPCT 1.7 07/08/2017    LYMPHOPCT 27.4 05/14/2019    MONOPCT 8.6 05/14/2019    BASOPCT 0.6 05/14/2019    MONOSABS 0.61 05/14/2019    LYMPHSABS 1.95 05/14/2019    EOSABS 0.56 05/14/2019    BASOSABS 0.04 05/14/2019     CMP:    Lab Results   Component Value Date     05/14/2019    K 3.7 05/14/2019    K 3.9 04/30/2019     05/14/2019    CO2 24 05/14/2019    BUN 21 05/14/2019    CREATININE 1.0 05/14/2019    GFRAA >60 05/14/2019    LABGLOM >60 05/14/2019    PROT 6.4 05/14/2019    LABALBU 3.5 05/14/2019    LABALBU 3.5 02/05/2011    CALCIUM 8.6 05/14/2019    BILITOT <0.2 05/14/2019    ALKPHOS 83 05/14/2019    AST 13 05/14/2019    ALT 10 05/14/2019     PT/INR:    Lab Results   Component Value Date    PROTIME 13.4 02/03/2019    PROTIME 11.5 02/05/2011    INR 1.2 02/03/2019       Assessment:     Active Problems:    Scrotal abscess  Resolved Problems:    * No resolved hospital problems.  *      Plan:   STABLE   ONGOING IV ANTIBIO

## 2019-05-18 NOTE — ANESTHESIA POSTPROCEDURE EVALUATION
Department of Anesthesiology  Postprocedure Note    Patient: Lexa Davidson  MRN: [id number]  YOB: 1965  Date of evaluation: 5/18/2019  Time:  7:31 AM     Procedure Summary     Date:  05/15/19 Room / Location:  [facility] OR 06 / [facility]    Anesthesia Start:  1111 Anesthesia Stop:  1324    Procedure:  I & D SCROTAL ABSCESS WITH EXPLANTATION ARTIFICIAL URINARY SPHINCTER COMPLEX URETHROPLASTY (N/A ) Diagnosis:  (-)    Surgeon:  Ang Dowd DO Responsible Provider:  Curtis Florez MD    Anesthesia Type:  general ASA Status:  3          Anesthesia Type: general    David Phase I: David Score: 9    David Phase II:      Last vitals: Reviewed and per EMR flowsheets.        Anesthesia Post Evaluation    Patient location during evaluation: PACU  Level of consciousness: awake  Airway patency: patent  Nausea & Vomiting: no nausea and no vomiting  Complications: no  Cardiovascular status: hemodynamically stable  Respiratory status: acceptable  Hydration status: euvolemic

## 2019-05-18 NOTE — PROGRESS NOTES
[facility] PROGRESS NOTE                                                                       5/18/2019        CHIEF UROLOGIC COMPLAINT: scrotal abscess     HISTORY OF PRESENT ILLNESS:  Patient without new complaints. Still with some soreness but feeling overall better. Castillo catheter indwelling and draining clear, yellow urine.     REVIEW OF SYSTEMS:   CONSTITUTIONAL: negative  HEENT: negative  HEMATOLOGIC: negative  ENDOCRINE: negative  RESPIRATORY: negative  CV: negative  GI: negative  NEURO: negative  ORTHOPEDICS: negative  PSYCHIATRIC: negative  : as above    PAST FAMILY HISTORY:    Family History   Problem Relation Age of Onset    Mental Illness Mother     Mental Illness Father     Mental Illness Sister     Mental Illness Brother     Heart Disease Mother     Depression Father      PAST SOCIAL HISTORY:    Social History     Socioeconomic History    Marital status:      Spouse name: ZENAIDA    Number of children: 0    Years of education: 12 +8    Highest education level: None   Occupational History    Occupation: STUDIED [employer]     Employer: UNEMPLOYED     Comment: SSDI   Social Needs    Financial resource strain: None    Food insecurity:     Worry: None     Inability: None    Transportation needs:     Medical: None     Non-medical: None   Tobacco Use    Smoking status: Current Every Day Smoker     Packs/day: 2.00     Types: Cigarettes     Start date: 1978    Smokeless tobacco: Never Used   Substance and Sexual Activity    Alcohol use: No     Comment: no alcohol in5 yrs    Drug use: No     Comment: last use 1997 / marijuana    Sexual activity: Yes   Lifestyle    Physical activity:     Days per week: None     Minutes per session: None    Stress: None   Relationships    Social connections:     Talks on phone: None     Gets together: None Attends Oriental orthodox service: None     Active member of club or organization: None     Attends meetings of clubs or organizations: None     Relationship status: None    Intimate partner violence:     Fear of current or ex partner: None     Emotionally abused: None     Physically abused: None     Forced sexual activity: None   Other Topics Concern    None   Social History Narrative    ** Merged History Encounter **            Scheduled Meds:   docusate sodium  100 mg Oral BID    linezolid  600 mg Oral 2 times per day    sodium chloride flush  10 mL Intravenous BID    sodium chloride flush  10 mL Intravenous 2 times per day    enoxaparin  40 mg Subcutaneous Daily    amLODIPine  10 mg Oral Daily    chlorproMAZINE  50 mg Oral TID    divalproex  500 mg Oral BID    FLUoxetine  80 mg Oral Daily    hydrOXYzine  25 mg Oral BID    cefTRIAXone (ROCEPHIN) IV  2 g Intravenous Q24H     Continuous Infusions:   sodium chloride 75 mL/hr at 05/18/19 0629     PRN Meds:.magnesium hydroxide, sodium chloride flush, acetaminophen, morphine    /72   Pulse 74   Temp 99 °F (37.2 °C) (Oral)   Resp 16   Ht 6' 3\" (1.905 m)   Wt 245 lb 8 oz (111.4 kg)   SpO2 92%   BMI 30.69 kg/m²     Lab Results   Component Value Date    WBC 7.1 05/14/2019    HGB 12.1 (L) 05/14/2019    HCT 37.2 05/14/2019    MCV 90.1 05/14/2019     05/14/2019       Lab Results   Component Value Date    CREATININE 1.0 05/14/2019       Lab Results   Component Value Date    PSA <0.01 06/20/2017    PSA <0.01 02/09/2017    PSA <0.01 07/12/2016       No results for input(s): Rivera Sorrel in the last 72 hours. No results for input(s): BC in the last 72 hours. No results for input(s): Madina Cater in the last 72 hours. PHYSICAL EXAMINATION:  Skin dry, without rashes  Respirations non-labored, intact  Abdomen soft, non-tender, non-distended  Alert and oriented x3  Scrotum and left groin with very mild erythema. Incision on the scrotum clean, dry, intact.

## 2019-05-18 NOTE — PLAN OF CARE
Problem: Pain:  Goal: Pain level will decrease  Description  Pain level will decrease  5/17/2019 2217 by Annalise Cote RN  Outcome: Met This Shift     Problem: Pain:  Goal: Control of acute pain  Description  Control of acute pain  5/17/2019 2217 by Annalise Cote RN  Outcome: Met This Shift     Problem: Pain:  Goal: Control of chronic pain  Description  Control of chronic pain  5/17/2019 2217 by Annalise Cote RN  Outcome: Met This Shift     Problem: Skin Integrity:  Goal: Will show no infection signs and symptoms  Description  Will show no infection signs and symptoms  5/17/2019 2217 by Annalise Cote RN  Outcome: Met This Shift     Problem: Skin Integrity:  Goal: Absence of new skin breakdown  Description  Absence of new skin breakdown  5/17/2019 2217 by Annalise Cote RN  Outcome: Met This Shift     Problem: Infection - Surgical Site:  Goal: Will show no infection signs and symptoms  Description  Will show no infection signs and symptoms  5/17/2019 2217 by Annalise Cote RN  Outcome: Met This Shift     Problem: Falls - Risk of:  Goal: Will remain free from falls  Description  Will remain free from falls  Outcome: Met This Shift     Problem: Falls - Risk of:  Goal: Absence of physical injury  Description  Absence of physical injury  Outcome: Met This Shift

## 2019-05-19 PROCEDURE — 6370000000 HC RX 637 (ALT 250 FOR IP): Performed by: SPECIALIST

## 2019-05-19 PROCEDURE — 6360000002 HC RX W HCPCS: Performed by: FAMILY MEDICINE

## 2019-05-19 PROCEDURE — 1200000000 HC SEMI PRIVATE

## 2019-05-19 PROCEDURE — 2580000003 HC RX 258: Performed by: FAMILY MEDICINE

## 2019-05-19 PROCEDURE — 2580000003 HC RX 258: Performed by: SPECIALIST

## 2019-05-19 PROCEDURE — 6370000000 HC RX 637 (ALT 250 FOR IP): Performed by: FAMILY MEDICINE

## 2019-05-19 PROCEDURE — 6360000002 HC RX W HCPCS: Performed by: SPECIALIST

## 2019-05-19 PROCEDURE — 6370000000 HC RX 637 (ALT 250 FOR IP): Performed by: INTERNAL MEDICINE

## 2019-05-19 RX ADMIN — CHLORPROMAZINE HYDROCHLORIDE 50 MG: 25 TABLET, SUGAR COATED ORAL at 20:26

## 2019-05-19 RX ADMIN — AMLODIPINE BESYLATE 10 MG: 10 TABLET ORAL at 08:51

## 2019-05-19 RX ADMIN — CEFTRIAXONE 2 G: 2 INJECTION, POWDER, FOR SOLUTION INTRAMUSCULAR; INTRAVENOUS at 11:52

## 2019-05-19 RX ADMIN — Medication 10 ML: at 07:40

## 2019-05-19 RX ADMIN — SODIUM CHLORIDE: 9 INJECTION, SOLUTION INTRAVENOUS at 06:55

## 2019-05-19 RX ADMIN — Medication 10 ML: at 15:59

## 2019-05-19 RX ADMIN — MORPHINE SULFATE 2 MG: 2 INJECTION, SOLUTION INTRAMUSCULAR; INTRAVENOUS at 07:40

## 2019-05-19 RX ADMIN — CHLORPROMAZINE HYDROCHLORIDE 50 MG: 25 TABLET, SUGAR COATED ORAL at 08:51

## 2019-05-19 RX ADMIN — HYDROXYZINE PAMOATE 25 MG: 25 CAPSULE ORAL at 08:51

## 2019-05-19 RX ADMIN — Medication 10 ML: at 11:55

## 2019-05-19 RX ADMIN — DOCUSATE SODIUM 100 MG: 100 CAPSULE, LIQUID FILLED ORAL at 08:51

## 2019-05-19 RX ADMIN — SODIUM CHLORIDE: 9 INJECTION, SOLUTION INTRAVENOUS at 20:09

## 2019-05-19 RX ADMIN — MORPHINE SULFATE 2 MG: 2 INJECTION, SOLUTION INTRAMUSCULAR; INTRAVENOUS at 11:54

## 2019-05-19 RX ADMIN — MORPHINE SULFATE 2 MG: 2 INJECTION, SOLUTION INTRAMUSCULAR; INTRAVENOUS at 15:58

## 2019-05-19 RX ADMIN — MORPHINE SULFATE 2 MG: 2 INJECTION, SOLUTION INTRAMUSCULAR; INTRAVENOUS at 20:26

## 2019-05-19 RX ADMIN — LINEZOLID 600 MG: 600 TABLET, FILM COATED ORAL at 20:26

## 2019-05-19 RX ADMIN — MORPHINE SULFATE 2 MG: 2 INJECTION, SOLUTION INTRAMUSCULAR; INTRAVENOUS at 00:20

## 2019-05-19 RX ADMIN — CHLORPROMAZINE HYDROCHLORIDE 50 MG: 25 TABLET, SUGAR COATED ORAL at 13:53

## 2019-05-19 RX ADMIN — FLUOXETINE 80 MG: 20 CAPSULE ORAL at 08:51

## 2019-05-19 RX ADMIN — LINEZOLID 600 MG: 600 TABLET, FILM COATED ORAL at 08:51

## 2019-05-19 RX ADMIN — DIVALPROEX SODIUM 500 MG: 250 TABLET, DELAYED RELEASE ORAL at 08:51

## 2019-05-19 RX ADMIN — ENOXAPARIN SODIUM 40 MG: 40 INJECTION SUBCUTANEOUS at 08:51

## 2019-05-19 RX ADMIN — DIVALPROEX SODIUM 500 MG: 250 TABLET, DELAYED RELEASE ORAL at 20:26

## 2019-05-19 RX ADMIN — HYDROXYZINE PAMOATE 25 MG: 25 CAPSULE ORAL at 20:26

## 2019-05-19 ASSESSMENT — PAIN SCALES - GENERAL
PAINLEVEL_OUTOF10: 0
PAINLEVEL_OUTOF10: 9
PAINLEVEL_OUTOF10: 8
PAINLEVEL_OUTOF10: 8
PAINLEVEL_OUTOF10: 0
PAINLEVEL_OUTOF10: 8
PAINLEVEL_OUTOF10: 8
PAINLEVEL_OUTOF10: 0
PAINLEVEL_OUTOF10: 8

## 2019-05-19 ASSESSMENT — PAIN - FUNCTIONAL ASSESSMENT
PAIN_FUNCTIONAL_ASSESSMENT: ACTIVITIES ARE NOT PREVENTED

## 2019-05-19 ASSESSMENT — PAIN DESCRIPTION - PAIN TYPE
TYPE: ACUTE PAIN
TYPE: ACUTE PAIN;SURGICAL PAIN
TYPE: ACUTE PAIN

## 2019-05-19 ASSESSMENT — PAIN DESCRIPTION - PROGRESSION
CLINICAL_PROGRESSION: NOT CHANGED
CLINICAL_PROGRESSION: NOT CHANGED
CLINICAL_PROGRESSION: GRADUALLY WORSENING
CLINICAL_PROGRESSION: NOT CHANGED
CLINICAL_PROGRESSION: GRADUALLY WORSENING
CLINICAL_PROGRESSION: GRADUALLY WORSENING

## 2019-05-19 ASSESSMENT — PAIN DESCRIPTION - FREQUENCY
FREQUENCY: CONTINUOUS

## 2019-05-19 ASSESSMENT — PAIN DESCRIPTION - DESCRIPTORS
DESCRIPTORS: ACHING
DESCRIPTORS: ACHING;DISCOMFORT;BURNING
DESCRIPTORS: ACHING;DISCOMFORT
DESCRIPTORS: ACHING;DISCOMFORT

## 2019-05-19 ASSESSMENT — PAIN DESCRIPTION - ONSET
ONSET: ON-GOING

## 2019-05-19 ASSESSMENT — PAIN DESCRIPTION - ORIENTATION
ORIENTATION: LEFT

## 2019-05-19 ASSESSMENT — PAIN DESCRIPTION - LOCATION
LOCATION: SCROTUM

## 2019-05-19 NOTE — PROGRESS NOTES
Admit Date: 5/13/2019    Subjective:   Less scrotal pain  NO FEVER      Objective:     Patient Vitals for the past 8 hrs:   BP Temp Temp src Pulse Resp SpO2   05/19/19 0735 (!) 112/58 97 °F (36.1 °C) Oral 76 16 92 %     I/O last 3 completed shifts: In: 2748 [P.O.:840; I.V.:1908]  Out: 9837 [Urine:3225]  I/O this shift:  In: 240 [P.O.:240]  Out: -     HEENT: Normal  NECK: Thyroid normal. No carotid bruit. No lymphphadenopathy. CVS: RRR  RS: Clear. No wheeze. No rhonchi. Good airflow bilaterally. ABD: Soft. Non tender. No mass. Normal BS. BACK: Skin normal.  EXT: No edema. Non tender. Pulses present. Skin intact.   SCROTUM WO ERYTHEMA BUT STILL SWOLLEN AND TENDER  NEURO: Intact      Scheduled Meds:   docusate sodium  100 mg Oral BID    linezolid  600 mg Oral 2 times per day    sodium chloride flush  10 mL Intravenous BID    sodium chloride flush  10 mL Intravenous 2 times per day    enoxaparin  40 mg Subcutaneous Daily    amLODIPine  10 mg Oral Daily    chlorproMAZINE  50 mg Oral TID    divalproex  500 mg Oral BID    FLUoxetine  80 mg Oral Daily    hydrOXYzine  25 mg Oral BID    cefTRIAXone (ROCEPHIN) IV  2 g Intravenous Q24H     Continuous Infusions:   sodium chloride 75 mL/hr at 05/19/19 0655       CBC with Differential:    Lab Results   Component Value Date    WBC 7.1 05/14/2019    RBC 4.13 05/14/2019    HGB 12.1 05/14/2019    HCT 37.2 05/14/2019     05/14/2019    MCV 90.1 05/14/2019    MCH 29.3 05/14/2019    MCHC 32.5 05/14/2019    RDW 15.8 05/14/2019    NRBC 0.0 07/08/2017    BANDSPCT 1 09/17/2014    METASPCT 1.7 07/08/2017    LYMPHOPCT 27.4 05/14/2019    MONOPCT 8.6 05/14/2019    BASOPCT 0.6 05/14/2019    MONOSABS 0.61 05/14/2019    LYMPHSABS 1.95 05/14/2019    EOSABS 0.56 05/14/2019    BASOSABS 0.04 05/14/2019     CMP:    Lab Results   Component Value Date     05/14/2019    K 3.7 05/14/2019    K 3.9 04/30/2019     05/14/2019    CO2 24 05/14/2019    BUN 21 05/14/2019 CREATININE 1.0 05/14/2019    GFRAA >60 05/14/2019    LABGLOM >60 05/14/2019    PROT 6.4 05/14/2019    LABALBU 3.5 05/14/2019    LABALBU 3.5 02/05/2011    CALCIUM 8.6 05/14/2019    BILITOT <0.2 05/14/2019    ALKPHOS 83 05/14/2019    AST 13 05/14/2019    ALT 10 05/14/2019     PT/INR:    Lab Results   Component Value Date    PROTIME 13.4 02/03/2019    PROTIME 11.5 02/05/2011    INR 1.2 02/03/2019       Assessment:     Active Problems:    Scrotal abscess  Resolved Problems:    * No resolved hospital problems.  *  BY JOAQUIN E.HTN    Plan:   DOING BETTER  ONGOING IV ANTIBIO

## 2019-05-19 NOTE — PLAN OF CARE
Problem: Pain:  Description  Pain management should include both nonpharmacologic and pharmacologic interventions.   Goal: Pain level will decrease  Description  Pain level will decrease  5/18/2019 2142 by Jose Hinkle RN  Outcome: Met This Shift     Problem: Skin Integrity:  Goal: Will show no infection signs and symptoms  Description  Will show no infection signs and symptoms  5/18/2019 2142 by Jose Hinkle RN  Outcome: Met This Shift     Problem: Skin Integrity:  Goal: Absence of new skin breakdown  Description  Absence of new skin breakdown  Outcome: Met This Shift     Problem: Infection - Surgical Site:  Goal: Will show no infection signs and symptoms  Description  Will show no infection signs and symptoms  5/18/2019 2142 by Jose Hinkle RN  Outcome: Met This Shift     Problem: Falls - Risk of:  Goal: Will remain free from falls  Description  Will remain free from falls  5/18/2019 2142 by Jose Hinkle RN  Outcome: Met This Shift     Problem: Falls - Risk of:  Goal: Absence of physical injury  Description  Absence of physical injury  5/18/2019 2142 by Jose Hinkle RN  Outcome: Met This Shift

## 2019-05-19 NOTE — PROGRESS NOTES
[facility] PROGRESS NOTE                                                                       5/19/2019        CHIEF UROLOGIC COMPLAINT: scrotal abscess. HISTORY OF PRESENT ILLNESS:  Patient without new complaints. Some increased swelling in his left groin area. Caitlyn Trujillo is asking when he can go home and feels ready to go home. Castillo catheter draining well.     REVIEW OF SYSTEMS:   CONSTITUTIONAL: negative  HEENT: negative  HEMATOLOGIC: negative  ENDOCRINE: negative  RESPIRATORY: negative  CV: negative  GI: negative  NEURO: negative  ORTHOPEDICS: negative  PSYCHIATRIC: negative  : as above    PAST FAMILY HISTORY:    Family History   Problem Relation Age of Onset    Mental Illness Mother     Mental Illness Father     Mental Illness Sister     Mental Illness Brother     Heart Disease Mother     Depression Father      PAST SOCIAL HISTORY:    Social History     Socioeconomic History    Marital status:      Spouse name: ZENAIDA    Number of children: 0    Years of education: 12 +8    Highest education level: None   Occupational History    Occupation: STUDIED [employer]     Employer: UNEMPLOYED     Comment: SSDI   Social Needs    Financial resource strain: None    Food insecurity:     Worry: None     Inability: None    Transportation needs:     Medical: None     Non-medical: None   Tobacco Use    Smoking status: Current Every Day Smoker     Packs/day: 2.00     Types: Cigarettes     Start date: 1978    Smokeless tobacco: Never Used   Substance and Sexual Activity    Alcohol use: No     Comment: no alcohol in5 yrs    Drug use: No     Comment: last use 1997 / marijuana    Sexual activity: Yes   Lifestyle    Physical activity:     Days per week: None     Minutes per session: None    Stress: None   Relationships    Social connections:     Talks on phone: None Gets together: None     Attends [employer] service: None     Active member of club or organization: None     Attends meetings of clubs or organizations: None     Relationship status: None    Intimate partner violence:     Fear of current or ex partner: None     Emotionally abused: None     Physically abused: None     Forced sexual activity: None   Other Topics Concern    None   Social History Narrative    ** Merged History Encounter **            Scheduled Meds:   docusate sodium  100 mg Oral BID    linezolid  600 mg Oral 2 times per day    sodium chloride flush  10 mL Intravenous BID    sodium chloride flush  10 mL Intravenous 2 times per day    enoxaparin  40 mg Subcutaneous Daily    amLODIPine  10 mg Oral Daily    chlorproMAZINE  50 mg Oral TID    divalproex  500 mg Oral BID    FLUoxetine  80 mg Oral Daily    hydrOXYzine  25 mg Oral BID    cefTRIAXone (ROCEPHIN) IV  2 g Intravenous Q24H     Continuous Infusions:   sodium chloride 75 mL/hr at 05/19/19 0655     PRN Meds:.magnesium hydroxide, sodium chloride flush, acetaminophen, morphine    BP (!) 112/58   Pulse 76   Temp 97 °F (36.1 °C) (Oral)   Resp 16   Ht 6' 3\" (1.905 m)   Wt 245 lb 8 oz (111.4 kg)   SpO2 92%   BMI 30.69 kg/m²     Lab Results   Component Value Date    WBC 7.1 05/14/2019    HGB 12.1 (L) 05/14/2019    HCT 37.2 05/14/2019    MCV 90.1 05/14/2019     05/14/2019       Lab Results   Component Value Date    CREATININE 1.0 05/14/2019       Lab Results   Component Value Date    PSA <0.01 06/20/2017    PSA <0.01 02/09/2017    PSA <0.01 07/12/2016       No results for input(s): Krystal Armstrong in the last 72 hours. No results for input(s): BC in the last 72 hours. No results for input(s): Bryanna Peoples in the last 72 hours. PHYSICAL EXAMINATION:  Skin dry, without rashes  Respirations non-labored, intact  Abdomen soft, non-tender, non-distended  Alert and oriented x3  Scrotum incision clean, dry, intact.   Mild increase in induration in the left groin with mild redness to the skin that does not appear to be rapidly progressing  Oneill draining clear, yellow urine      ASSESSMENT AND PLAN:  1. Scrotal abscess in the setting of an artificial urinary sphincter status post incision and drainage and explantation of device POD #3 per Dr. Greg Tejada Nile.  -continue oneill catheter thru discharge given repair of urethra and need for healing  -antibiotics per Infectious Disease  -Mildly increased induration of the left groin area.   This may be postsurgical reaction but if worsening by tomorrow or if any concerns, reimaging with CAT scan of this area may play a role.           Shyal Hoover M.D.  5/19/2019  10:43 AM

## 2019-05-19 NOTE — PROGRESS NOTES
[address] [place] [address] [facility]  Progress Note    SUBJECTIVE:  Chief Complaint   Patient presents with    Other     states \"has a device to help him urinate and his L testicle is swollen and throbbing, not sure if it is out of place or there is another issue\"     Patient is tolerating medications. + pain left scrotum 8/10 - more of a \"burning\" now, not a throbbing pain. .   No nausea, vomiting, diarrhea. Review of systems:  As stated above in the chief complaint, otherwise negative. Medications:  Scheduled Meds:   docusate sodium  100 mg Oral BID    linezolid  600 mg Oral 2 times per day    sodium chloride flush  10 mL Intravenous BID    sodium chloride flush  10 mL Intravenous 2 times per day    enoxaparin  40 mg Subcutaneous Daily    amLODIPine  10 mg Oral Daily    chlorproMAZINE  50 mg Oral TID    divalproex  500 mg Oral BID    FLUoxetine  80 mg Oral Daily    hydrOXYzine  25 mg Oral BID    cefTRIAXone (ROCEPHIN) IV  2 g Intravenous Q24H     Continuous Infusions:   sodium chloride 75 mL/hr at 19 0655     PRN Meds:magnesium hydroxide, sodium chloride flush, acetaminophen, morphine    OBJECTIVE:  BP (!) 112/58   Pulse 76   Temp 97 °F (36.1 °C) (Oral)   Resp 16   Ht 6' 3\" (1.905 m)   Wt 245 lb 8 oz (111.4 kg)   SpO2 92%   BMI 30.69 kg/m²   Temp  Av.5 °F (36.4 °C)  Min: 97 °F (36.1 °C)  Max: 97.9 °F (36.6 °C)  Constitutional: The patient is awake, alert, and oriented. Skin: Warm and dry. No rashes were noted. HEENT: Eyes show round, and reactive pupils. No jaundice. Moist mucous membranes, no ulcerations, no thrush. Neck: Supple to movements. No lymphadenopathy. Chest: No use of accessory muscles to breathe. Symmetrical expansion. Auscultation reveals no wheezing, crackles, or rhonchi. Cardiovascular: S1 and S2 are rhythmic and regular. No murmurs appreciated. Abdomen: Positive bowel sounds to auscultation. Benign to palpation.    Extremities: No clubbing, no cyanosis, no edema. :  left inguinal to left and superior of scrotum as well as scrotum - less edema/erythema  Lines: peripheral    Laboratory and Tests Review:  Lab Results   Component Value Date    WBC 7.1 05/14/2019    WBC 8.5 05/13/2019    WBC 10.6 04/30/2019    HGB 12.1 (L) 05/14/2019    HCT 37.2 05/14/2019    MCV 90.1 05/14/2019     05/14/2019     Lab Results   Component Value Date    NEUTROABS 3.93 05/14/2019    NEUTROABS 4.43 05/13/2019    NEUTROABS 7.19 04/30/2019     Lab Results   Component Value Date    CRP 1.3 (H) 03/05/2019    CRP 3.3 (H) 02/06/2019     No results found for: CRPHS  No results found for: SEDRATE  Lab Results   Component Value Date    ALT 10 05/14/2019    AST 13 05/14/2019    ALKPHOS 83 05/14/2019    BILITOT <0.2 05/14/2019     Lab Results   Component Value Date     05/14/2019    K 3.7 05/14/2019    K 3.9 04/30/2019     05/14/2019    CO2 24 05/14/2019    BUN 21 05/14/2019    CREATININE 1.0 05/14/2019    CREATININE 1.1 05/13/2019    CREATININE 1.2 04/30/2019    GFRAA >60 05/14/2019    LABGLOM >60 05/14/2019    GLUCOSE 107 05/14/2019    GLUCOSE 118 02/05/2011    PROT 6.4 05/14/2019    LABALBU 3.5 05/14/2019    LABALBU 3.5 02/05/2011    CALCIUM 8.6 05/14/2019    BILITOT <0.2 05/14/2019    ALKPHOS 83 05/14/2019    AST 13 05/14/2019    ALT 10 05/14/2019     Radiology:      Microbiology:   MRSA screen neg  Final OR culture negative Gram stain no organisms    ASSESSMENT:  Infected sphincter device s/p removal and repair of urethra  Cellulitis of scrotum/abscess - improving  Left inguinal inflammation      Plan  Continue Ceftriaxone and Linezolid   Pt on antidepressive - monitor for serotonin effect   Labs in am    April [address] [facility],[address]  9:57 AM  5/19/2019     Patient seen and examined. I had a face to face encounter with the patient. Agree with exam, assessment and plan as outlined above. Addition and corrections were done as deemed appropriate.  My exam and plan include: Patient is tolerating current antibiotic therapy without any adverse reactions.     Jerald Win  5/19/2019

## 2019-05-19 NOTE — PLAN OF CARE
Problem: Pain:  Goal: Pain level will decrease  Description  Pain level will decrease  5/19/2019 0656 by Gifty Kumar RN  Outcome: Met This Shift  5/18/2019 2142 by Chiqui Marcelo RN  Outcome: Met This Shift  Goal: Control of acute pain  Description  Control of acute pain  Outcome: Ongoing  Goal: Control of chronic pain  Description  Control of chronic pain  Outcome: Ongoing     Problem: Skin Integrity:  Goal: Will show no infection signs and symptoms  Description  Will show no infection signs and symptoms  5/19/2019 0656 by Gifty Kumar RN  Outcome: Met This Shift  5/18/2019 2142 by Chiqui Marcelo RN  Outcome: Met This Shift  Goal: Absence of new skin breakdown  Description  Absence of new skin breakdown  5/19/2019 0656 by Gifty Kumar RN  Outcome: Met This Shift  5/18/2019 2142 by Chiqui Marcelo RN  Outcome: Met This Shift     Problem: Infection - Surgical Site:  Goal: Will show no infection signs and symptoms  Description  Will show no infection signs and symptoms  5/19/2019 0656 by Gifty Kumar RN  Outcome: Met This Shift  5/18/2019 2142 by Chiqui Marcelo RN  Outcome: Met This Shift     Problem: Falls - Risk of:  Goal: Will remain free from falls  Description  Will remain free from falls  5/19/2019 0656 by Gifty Kumar RN  Outcome: Met This Shift  5/18/2019 2142 by Chiqui Marcelo RN  Outcome: Met This Shift  Goal: Absence of physical injury  Description  Absence of physical injury  5/19/2019 0656 by Gifty Kumar RN  Outcome: Met This Shift  5/18/2019 2142 by Chiqui Marcelo RN  Outcome: Met This Shift

## 2019-05-20 VITALS
RESPIRATION RATE: 18 BRPM | HEART RATE: 79 BPM | DIASTOLIC BLOOD PRESSURE: 69 MMHG | OXYGEN SATURATION: 92 % | HEIGHT: 75 IN | BODY MASS INDEX: 31.08 KG/M2 | WEIGHT: 250 LBS | TEMPERATURE: 98.3 F | SYSTOLIC BLOOD PRESSURE: 116 MMHG

## 2019-05-20 LAB
ALBUMIN SERPL-MCNC: 3.2 G/DL (ref 3.5–5.2)
ALP BLD-CCNC: 86 U/L (ref 40–129)
ALT SERPL-CCNC: 8 U/L (ref 0–40)
ANAEROBIC CULTURE: NORMAL
ANION GAP SERPL CALCULATED.3IONS-SCNC: 11 MMOL/L (ref 7–16)
AST SERPL-CCNC: 9 U/L (ref 0–39)
BASOPHILS ABSOLUTE: 0.03 E9/L (ref 0–0.2)
BASOPHILS RELATIVE PERCENT: 0.5 % (ref 0–2)
BILIRUB SERPL-MCNC: <0.2 MG/DL (ref 0–1.2)
BUN BLDV-MCNC: 13 MG/DL (ref 6–20)
CALCIUM SERPL-MCNC: 8.9 MG/DL (ref 8.6–10.2)
CHLORIDE BLD-SCNC: 101 MMOL/L (ref 98–107)
CO2: 26 MMOL/L (ref 22–29)
CREAT SERPL-MCNC: 1.1 MG/DL (ref 0.7–1.2)
EOSINOPHILS ABSOLUTE: 0.61 E9/L (ref 0.05–0.5)
EOSINOPHILS RELATIVE PERCENT: 10.6 % (ref 0–6)
GFR AFRICAN AMERICAN: >60
GFR NON-AFRICAN AMERICAN: >60 ML/MIN/1.73
GLUCOSE BLD-MCNC: 88 MG/DL (ref 74–99)
HCT VFR BLD CALC: 36 % (ref 37–54)
HEMOGLOBIN: 11.6 G/DL (ref 12.5–16.5)
IMMATURE GRANULOCYTES #: 0.02 E9/L
IMMATURE GRANULOCYTES %: 0.3 % (ref 0–5)
LYMPHOCYTES ABSOLUTE: 1.77 E9/L (ref 1.5–4)
LYMPHOCYTES RELATIVE PERCENT: 30.7 % (ref 20–42)
MCH RBC QN AUTO: 28.9 PG (ref 26–35)
MCHC RBC AUTO-ENTMCNC: 32.2 % (ref 32–34.5)
MCV RBC AUTO: 89.6 FL (ref 80–99.9)
MONOCYTES ABSOLUTE: 0.59 E9/L (ref 0.1–0.95)
MONOCYTES RELATIVE PERCENT: 10.2 % (ref 2–12)
NEUTROPHILS ABSOLUTE: 2.74 E9/L (ref 1.8–7.3)
NEUTROPHILS RELATIVE PERCENT: 47.7 % (ref 43–80)
PDW BLD-RTO: 15.4 FL (ref 11.5–15)
PLATELET # BLD: 323 E9/L (ref 130–450)
PMV BLD AUTO: 9 FL (ref 7–12)
POTASSIUM SERPL-SCNC: 4.4 MMOL/L (ref 3.5–5)
RBC # BLD: 4.02 E12/L (ref 3.8–5.8)
SODIUM BLD-SCNC: 138 MMOL/L (ref 132–146)
TOTAL PROTEIN: 5.7 G/DL (ref 6.4–8.3)
WBC # BLD: 5.8 E9/L (ref 4.5–11.5)

## 2019-05-20 PROCEDURE — 2580000003 HC RX 258: Performed by: SPECIALIST

## 2019-05-20 PROCEDURE — 6370000000 HC RX 637 (ALT 250 FOR IP): Performed by: FAMILY MEDICINE

## 2019-05-20 PROCEDURE — 6370000000 HC RX 637 (ALT 250 FOR IP): Performed by: INTERNAL MEDICINE

## 2019-05-20 PROCEDURE — 6360000002 HC RX W HCPCS: Performed by: SPECIALIST

## 2019-05-20 PROCEDURE — 36415 COLL VENOUS BLD VENIPUNCTURE: CPT

## 2019-05-20 PROCEDURE — 2580000003 HC RX 258: Performed by: FAMILY MEDICINE

## 2019-05-20 PROCEDURE — 6360000002 HC RX W HCPCS: Performed by: FAMILY MEDICINE

## 2019-05-20 PROCEDURE — 6370000000 HC RX 637 (ALT 250 FOR IP): Performed by: SPECIALIST

## 2019-05-20 PROCEDURE — 85025 COMPLETE CBC W/AUTO DIFF WBC: CPT

## 2019-05-20 PROCEDURE — 80053 COMPREHEN METABOLIC PANEL: CPT

## 2019-05-20 RX ORDER — SULFAMETHOXAZOLE AND TRIMETHOPRIM 800; 160 MG/1; MG/1
1 TABLET ORAL EVERY 12 HOURS SCHEDULED
Qty: 28 TABLET | Refills: 0 | Status: SHIPPED | OUTPATIENT
Start: 2019-05-20 | End: 2019-06-03

## 2019-05-20 RX ORDER — SULFAMETHOXAZOLE AND TRIMETHOPRIM 800; 160 MG/1; MG/1
1 TABLET ORAL EVERY 12 HOURS SCHEDULED
Status: DISCONTINUED | OUTPATIENT
Start: 2019-05-20 | End: 2019-05-20 | Stop reason: HOSPADM

## 2019-05-20 RX ADMIN — SODIUM CHLORIDE: 9 INJECTION, SOLUTION INTRAVENOUS at 08:25

## 2019-05-20 RX ADMIN — DOCUSATE SODIUM 100 MG: 100 CAPSULE, LIQUID FILLED ORAL at 08:25

## 2019-05-20 RX ADMIN — HYDROXYZINE PAMOATE 25 MG: 25 CAPSULE ORAL at 08:26

## 2019-05-20 RX ADMIN — DIVALPROEX SODIUM 500 MG: 250 TABLET, DELAYED RELEASE ORAL at 08:26

## 2019-05-20 RX ADMIN — CEFTRIAXONE 2 G: 2 INJECTION, POWDER, FOR SOLUTION INTRAMUSCULAR; INTRAVENOUS at 12:04

## 2019-05-20 RX ADMIN — LINEZOLID 600 MG: 600 TABLET, FILM COATED ORAL at 08:27

## 2019-05-20 RX ADMIN — CHLORPROMAZINE HYDROCHLORIDE 50 MG: 25 TABLET, SUGAR COATED ORAL at 08:26

## 2019-05-20 RX ADMIN — ENOXAPARIN SODIUM 40 MG: 40 INJECTION SUBCUTANEOUS at 08:25

## 2019-05-20 RX ADMIN — FLUOXETINE 80 MG: 20 CAPSULE ORAL at 08:26

## 2019-05-20 RX ADMIN — AMLODIPINE BESYLATE 10 MG: 10 TABLET ORAL at 08:26

## 2019-05-20 RX ADMIN — MORPHINE SULFATE 2 MG: 2 INJECTION, SOLUTION INTRAMUSCULAR; INTRAVENOUS at 08:25

## 2019-05-20 ASSESSMENT — PAIN DESCRIPTION - ORIENTATION: ORIENTATION: LEFT

## 2019-05-20 ASSESSMENT — PAIN DESCRIPTION - FREQUENCY: FREQUENCY: CONTINUOUS

## 2019-05-20 ASSESSMENT — PAIN DESCRIPTION - DESCRIPTORS: DESCRIPTORS: BURNING;CONSTANT;SORE

## 2019-05-20 ASSESSMENT — PAIN DESCRIPTION - PAIN TYPE: TYPE: SURGICAL PAIN

## 2019-05-20 ASSESSMENT — PAIN SCALES - GENERAL
PAINLEVEL_OUTOF10: 8
PAINLEVEL_OUTOF10: 4

## 2019-05-20 ASSESSMENT — PAIN DESCRIPTION - ONSET: ONSET: ON-GOING

## 2019-05-20 ASSESSMENT — PAIN DESCRIPTION - PROGRESSION: CLINICAL_PROGRESSION: GRADUALLY IMPROVING

## 2019-05-20 ASSESSMENT — PAIN DESCRIPTION - LOCATION: LOCATION: SCROTUM

## 2019-05-20 NOTE — PROGRESS NOTES
[facility]                                                            PROGRESS NOTE                                                                       5/20/2019          SUBJECTIVE:  Afebrile  Pt feels well wants to go home  Urine clear    Wbc 5800 creatinine 1.1    OBJECTIVE:    /86   Pulse 90   Temp 98.5 °F (36.9 °C) (Oral)   Resp 18   Ht 6' 3\" (1.905 m)   Wt 250 lb (113.4 kg)   SpO2 95%   BMI 31.25 kg/m²     PHYSICAL EXAMINATION:  Skin: dry, without rashes  Respirations: non-labored, intact  Abdomen: soft, non-tender, non-distended  Has tenderness and induration of left scrotum and left inguinal area  No flucuation  floey in place  Scrotal wound ok    Lab Results   Component Value Date    WBC 5.8 05/20/2019    HGB 11.6 (L) 05/20/2019    HCT 36.0 (L) 05/20/2019    MCV 89.6 05/20/2019     05/20/2019       Lab Results   Component Value Date    CREATININE 1.1 05/20/2019       Lab Results   Component Value Date    PSA <0.01 06/20/2017    PSA <0.01 02/09/2017    PSA <0.01 07/12/2016       REVIEW OF SYSTEMS:    CONSTITUTIONAL: negative  HEENT: negative  HEMATOLOGIC: negative  ENDOCRINE: negative  RESPIRATORY: negative  CV: negative  GI: negative  NEURO: negative  ORTHOPEDICS: negative  PSYCHIATRIC: negative  : as above    PAST FAMILY HISTORY:    Family History   Problem Relation Age of Onset    Mental Illness Mother     Mental Illness Father     Mental Illness Sister     Mental Illness Brother     Heart Disease Mother     Depression Father      PAST SOCIAL HISTORY:    Social History     Socioeconomic History    Marital status:      Spouse name: ZENAIDA    Number of children: 0    Years of education: 12 +8    Highest education level: None   Occupational History    Occupation: STUDIED [employer]     Employer: UNEMPLOYED     Comment: SSDI   Social Needs    Financial resource strain: None    Food insecurity:     Worry: None     Inability: None    Transportation needs:     Medical: None     Non-medical: None   Tobacco Use    Smoking status: Current Every Day Smoker     Packs/day: 2.00     Types: Cigarettes     Start date: 1    Smokeless tobacco: Never Used   Substance and Sexual Activity    Alcohol use: No     Comment: no alcohol in5 yrs    Drug use: No     Comment: last use 1997 / marijuana    Sexual activity: Yes   Lifestyle    Physical activity:     Days per week: None     Minutes per session: None    Stress: None   Relationships    Social connections:     Talks on phone: None     Gets together: None     Attends Muslim service: None     Active member of club or organization: None     Attends meetings of clubs or organizations: None     Relationship status: None    Intimate partner violence:     Fear of current or ex partner: None     Emotionally abused: None     Physically abused: None     Forced sexual activity: None   Other Topics Concern    None   Social History Narrative    ** Merged History Encounter **            Scheduled Meds:   docusate sodium  100 mg Oral BID    linezolid  600 mg Oral 2 times per day    sodium chloride flush  10 mL Intravenous BID    sodium chloride flush  10 mL Intravenous 2 times per day    enoxaparin  40 mg Subcutaneous Daily    amLODIPine  10 mg Oral Daily    chlorproMAZINE  50 mg Oral TID    divalproex  500 mg Oral BID    FLUoxetine  80 mg Oral Daily    hydrOXYzine  25 mg Oral BID    cefTRIAXone (ROCEPHIN) IV  2 g Intravenous Q24H     Continuous Infusions:   sodium chloride 75 mL/hr at 05/19/19 2009     PRN Meds:.magnesium hydroxide, sodium chloride flush, acetaminophen, morphine    ASSESSMENT:    Patient Active Problem List   Diagnosis    Drug overdose, multiple drugs    Severe episode of recurrent major depressive disorder, with psychotic features ([facility]    Mood disorder due to a general medical condition    Suicide attempt ([facility]   

## 2019-05-20 NOTE — PATIENT CARE CONFERENCE
P Quality Flow/Interdisciplinary Rounds Progress Note        Quality Flow Rounds held on May 20, 2019    Disciplines Attending:  Bedside Nurse, ,  and Nursing Unit Keyona Morejon was admitted on 5/13/2019 10:24 PM    Anticipated Discharge Date:  Expected Discharge Date: 05/20/19    Disposition:    Andre Score:  Andre Scale Score: 21    Readmission Risk              Risk of Unplanned Readmission:        29           Discussed patient goal for the day, patient clinical progression, and barriers to discharge.   The following Goal(s) of the Day/Commitment(s) have been identified:  Diagnostics - Report Results- CA planning      Pam Anderson  May 20, 2019

## 2019-05-20 NOTE — PROGRESS NOTES
CALCIUM 8.9 05/20/2019    BILITOT <0.2 05/20/2019    ALKPHOS 86 05/20/2019    AST 9 05/20/2019    ALT 8 05/20/2019     PT/INR:    Lab Results   Component Value Date    PROTIME 13.4 02/03/2019    PROTIME 11.5 02/05/2011    INR 1.2 02/03/2019       Assessment:     Active Problems:    Scrotal abscess   Bipolar      Plan:   Continue ATB per ID

## 2019-05-20 NOTE — PLAN OF CARE
Problem: Skin Integrity:  Goal: Will show no infection signs and symptoms  Description  Will show no infection signs and symptoms  5/20/2019 1227 by Ruther Cooks, RN  Outcome: Met This Shift

## 2019-05-20 NOTE — CARE COORDINATION
05/20/19 1055   IMM Letter   IMM Letter given to Patient/Family/Significant other/Guardian/POA/by: Jaun Chase   IMM Letter date given: 05/20/19   IMM Letter time given: 1055   In anticipation of discharge, copy of original Important message from Medicare delivered to patient and reviewed. Veverly Shall.  Dale, MSN, RN  [facility] Case Management  [phone]

## 2019-05-20 NOTE — PLAN OF CARE
Problem: Pain:  Goal: Pain level will decrease  Description  Pain level will decrease  Outcome: Met This Shift  Goal: Control of acute pain  Description  Control of acute pain  Outcome: Met This Shift  Goal: Control of chronic pain  Description  Control of chronic pain  Outcome: Met This Shift     Problem: Skin Integrity:  Goal: Will show no infection signs and symptoms  Description  Will show no infection signs and symptoms  Outcome: Met This Shift  Goal: Absence of new skin breakdown  Description  Absence of new skin breakdown  Outcome: Met This Shift     Problem: Infection - Surgical Site:  Goal: Will show no infection signs and symptoms  Description  Will show no infection signs and symptoms  Outcome: Met This Shift     Problem: Falls - Risk of:  Goal: Will remain free from falls  Description  Will remain free from falls  Outcome: Met This Shift  Goal: Absence of physical injury  Description  Absence of physical injury  Outcome: Met This Shift

## 2019-05-21 ENCOUNTER — APPOINTMENT (OUTPATIENT)
Dept: ULTRASOUND IMAGING | Age: 54
End: 2019-05-21
Payer: MEDICARE

## 2019-05-21 ENCOUNTER — HOSPITAL ENCOUNTER (EMERGENCY)
Age: 54
Discharge: HOME | End: 2019-05-21
Attending: EMERGENCY MEDICINE
Payer: MEDICARE

## 2019-05-21 VITALS
OXYGEN SATURATION: 94 % | TEMPERATURE: 97.9 F | BODY MASS INDEX: 29.59 KG/M2 | HEIGHT: 75 IN | SYSTOLIC BLOOD PRESSURE: 137 MMHG | HEART RATE: 72 BPM | DIASTOLIC BLOOD PRESSURE: 92 MMHG | RESPIRATION RATE: 16 BRPM | WEIGHT: 238 LBS

## 2019-05-21 DIAGNOSIS — N50.82 SCROTAL PAIN: ICD-10-CM

## 2019-05-21 DIAGNOSIS — Z98.890 POST-OPERATIVE STATE: Primary | ICD-10-CM

## 2019-05-21 LAB
ANION GAP SERPL CALCULATED.3IONS-SCNC: 14 MMOL/L (ref 7–16)
BASOPHILS ABSOLUTE: 0.04 E9/L (ref 0–0.2)
BASOPHILS RELATIVE PERCENT: 0.5 % (ref 0–2)
BUN BLDV-MCNC: 19 MG/DL (ref 6–20)
CALCIUM SERPL-MCNC: 9.5 MG/DL (ref 8.6–10.2)
CHLORIDE BLD-SCNC: 106 MMOL/L (ref 98–107)
CO2: 21 MMOL/L (ref 22–29)
CREAT SERPL-MCNC: 1.2 MG/DL (ref 0.7–1.2)
EOSINOPHILS ABSOLUTE: 0.66 E9/L (ref 0.05–0.5)
EOSINOPHILS RELATIVE PERCENT: 8.6 % (ref 0–6)
GFR AFRICAN AMERICAN: >60
GFR NON-AFRICAN AMERICAN: >60 ML/MIN/1.73
GLUCOSE BLD-MCNC: 160 MG/DL (ref 74–99)
HCT VFR BLD CALC: 39.4 % (ref 37–54)
HEMOGLOBIN: 12.5 G/DL (ref 12.5–16.5)
IMMATURE GRANULOCYTES #: 0.03 E9/L
IMMATURE GRANULOCYTES %: 0.4 % (ref 0–5)
LACTIC ACID: 2.3 MMOL/L (ref 0.5–2.2)
LYMPHOCYTES ABSOLUTE: 1.49 E9/L (ref 1.5–4)
LYMPHOCYTES RELATIVE PERCENT: 19.4 % (ref 20–42)
MCH RBC QN AUTO: 28.3 PG (ref 26–35)
MCHC RBC AUTO-ENTMCNC: 31.7 % (ref 32–34.5)
MCV RBC AUTO: 89.3 FL (ref 80–99.9)
MONOCYTES ABSOLUTE: 0.58 E9/L (ref 0.1–0.95)
MONOCYTES RELATIVE PERCENT: 7.5 % (ref 2–12)
NEUTROPHILS ABSOLUTE: 4.9 E9/L (ref 1.8–7.3)
NEUTROPHILS RELATIVE PERCENT: 63.6 % (ref 43–80)
PDW BLD-RTO: 15.7 FL (ref 11.5–15)
PLATELET # BLD: 312 E9/L (ref 130–450)
PMV BLD AUTO: 9 FL (ref 7–12)
POTASSIUM SERPL-SCNC: 4 MMOL/L (ref 3.5–5)
RBC # BLD: 4.41 E12/L (ref 3.8–5.8)
SODIUM BLD-SCNC: 141 MMOL/L (ref 132–146)
WBC # BLD: 7.7 E9/L (ref 4.5–11.5)

## 2019-05-21 PROCEDURE — 2580000003 HC RX 258: Performed by: EMERGENCY MEDICINE

## 2019-05-21 PROCEDURE — 80048 BASIC METABOLIC PNL TOTAL CA: CPT

## 2019-05-21 PROCEDURE — 6360000002 HC RX W HCPCS: Performed by: EMERGENCY MEDICINE

## 2019-05-21 PROCEDURE — 36415 COLL VENOUS BLD VENIPUNCTURE: CPT

## 2019-05-21 PROCEDURE — 99283 EMERGENCY DEPT VISIT LOW MDM: CPT

## 2019-05-21 PROCEDURE — 76870 US EXAM SCROTUM: CPT

## 2019-05-21 PROCEDURE — 96374 THER/PROPH/DIAG INJ IV PUSH: CPT

## 2019-05-21 PROCEDURE — 93975 VASCULAR STUDY: CPT

## 2019-05-21 PROCEDURE — 83605 ASSAY OF LACTIC ACID: CPT

## 2019-05-21 PROCEDURE — 85025 COMPLETE CBC W/AUTO DIFF WBC: CPT

## 2019-05-21 PROCEDURE — 6370000000 HC RX 637 (ALT 250 FOR IP): Performed by: EMERGENCY MEDICINE

## 2019-05-21 RX ORDER — FENTANYL CITRATE 50 UG/ML
75 INJECTION, SOLUTION INTRAMUSCULAR; INTRAVENOUS ONCE
Status: COMPLETED | OUTPATIENT
Start: 2019-05-21 | End: 2019-05-21

## 2019-05-21 RX ORDER — 0.9 % SODIUM CHLORIDE 0.9 %
1000 INTRAVENOUS SOLUTION INTRAVENOUS ONCE
Status: COMPLETED | OUTPATIENT
Start: 2019-05-21 | End: 2019-05-21

## 2019-05-21 RX ORDER — HYDROCODONE BITARTRATE AND ACETAMINOPHEN 5; 325 MG/1; MG/1
1 TABLET ORAL EVERY 8 HOURS PRN
Qty: 12 TABLET | Refills: 0 | Status: SHIPPED | OUTPATIENT
Start: 2019-05-21 | End: 2019-05-25

## 2019-05-21 RX ORDER — HYDROCODONE BITARTRATE AND ACETAMINOPHEN 5; 325 MG/1; MG/1
1 TABLET ORAL ONCE
Status: COMPLETED | OUTPATIENT
Start: 2019-05-21 | End: 2019-05-21

## 2019-05-21 RX ADMIN — SODIUM CHLORIDE 1000 ML: 9 INJECTION, SOLUTION INTRAVENOUS at 13:19

## 2019-05-21 RX ADMIN — HYDROCODONE BITARTRATE AND ACETAMINOPHEN 1 TABLET: 5; 325 TABLET ORAL at 15:03

## 2019-05-21 RX ADMIN — FENTANYL CITRATE 75 MCG: 50 INJECTION, SOLUTION INTRAMUSCULAR; INTRAVENOUS at 13:19

## 2019-05-21 ASSESSMENT — PAIN DESCRIPTION - PROGRESSION
CLINICAL_PROGRESSION: NOT CHANGED
CLINICAL_PROGRESSION: RAPIDLY IMPROVING
CLINICAL_PROGRESSION: RAPIDLY IMPROVING

## 2019-05-21 ASSESSMENT — PAIN DESCRIPTION - LOCATION
LOCATION: OTHER (COMMENT)
LOCATION: SCROTUM

## 2019-05-21 ASSESSMENT — ENCOUNTER SYMPTOMS
VOMITING: 0
SORE THROAT: 0
DIARRHEA: 0
NAUSEA: 0
COLOR CHANGE: 0
CONSTIPATION: 0
ABDOMINAL PAIN: 0
RHINORRHEA: 0
BLOOD IN STOOL: 0
BACK PAIN: 0
SHORTNESS OF BREATH: 0
COUGH: 0

## 2019-05-21 ASSESSMENT — PAIN DESCRIPTION - DESCRIPTORS: DESCRIPTORS: THROBBING

## 2019-05-21 ASSESSMENT — PAIN DESCRIPTION - ORIENTATION: ORIENTATION: LEFT

## 2019-05-21 ASSESSMENT — PAIN SCALES - GENERAL
PAINLEVEL_OUTOF10: 4
PAINLEVEL_OUTOF10: 9
PAINLEVEL_OUTOF10: 4
PAINLEVEL_OUTOF10: 8

## 2019-05-21 ASSESSMENT — PAIN DESCRIPTION - PAIN TYPE
TYPE: ACUTE PAIN
TYPE: ACUTE PAIN

## 2019-05-21 ASSESSMENT — PAIN - FUNCTIONAL ASSESSMENT
PAIN_FUNCTIONAL_ASSESSMENT: PREVENTS OR INTERFERES SOME ACTIVE ACTIVITIES AND ADLS
PAIN_FUNCTIONAL_ASSESSMENT: 0-10

## 2019-05-21 ASSESSMENT — PAIN DESCRIPTION - FREQUENCY: FREQUENCY: CONTINUOUS

## 2019-05-21 NOTE — ED NOTES
Oneill catheter bag changed to bag w/ leg strap for discharge. Patient given new oneill bag for night time as well.       Pinky De La Torre RN  05/21/19 4419

## 2019-05-21 NOTE — DISCHARGE SUMMARY
Physician Discharge Summary     Patient ID:  Dylan Parkinson  [id number]  86 y.o.  1965    Admit date: 5/13/2019    Discharge date and time: 5/20/2019  1:50 PM     Admitting Physician: Massimo Cortes DO     Discharge Physician: Iban Espana MD.    Admission Diagnoses: Scrotal abscess [N49.2]  Scrotal abscess [N49.2]    Discharge Diagnoses: same and  Bipolar  HTN    Discharged Condition: stable    Hospital Course: admitted from ER with swelling left testes known with artificial urinary sphincter found with abscess seen by urology as well as ID treated with ATB ,support improved stabilized discharged home     Consults: ID and urology    Disposition: home    In process/preliminary results:  Outstanding Order Results     No orders found from 4/14/2019 to 5/14/2019.           Patient Instructions:   Discharge Medication List as of 5/20/2019 12:35 PM      START taking these medications    Details   sulfamethoxazole-trimethoprim (BACTRIM DS;SEPTRA DS) 800-160 MG per tablet Take 1 tablet by mouth every 12 hours for 14 days, Disp-28 tablet, R-0Print         CONTINUE these medications which have NOT CHANGED    Details   SUMAtriptan (IMITREX) 50 MG tablet Take 1 tablet by mouth once as needed for Migraine, Disp-9 tablet, R-0Normal      predniSONE (DELTASONE) 50 MG tablet Take 50mg po qd x 5 days  QS for 5 days, Disp-5 tablet, R-0Print      amLODIPine (NORVASC) 10 MG tablet Take 1 tablet by mouth daily, Disp-30 tablet, R-3Normal      chlorproMAZINE (THORAZINE) 50 MG tablet Take 50 mg by mouth 3 times daily Historical Med      divalproex (DEPAKOTE) 500 MG DR tablet Take 500 mg by mouth 2 times dailyHistorical Med      ustekinumab (STELARA) 90 MG/ML SOSY injection Inject 90 mg into the skin Once every 6 weeksHistorical Med      FLUoxetine (PROZAC) 40 MG capsule Take 80 mg by mouth daily Historical Med      hydrOXYzine (VISTARIL) 25 MG capsule Take 25 mg by mouth 2 times daily Historical Med           Activity: activity as tolerated  Diet: regular diet  Wound Care: as directed    Signed:  Sunita Chanel MD.  5/21/2019  8:55 AM

## 2019-05-21 NOTE — CARE COORDINATION
5/21/2019 Introduced myself to patient and described my role as a . The patient was recently discharged on 5/20/2019 to home. The patient is independent for mobility uses no devices. The patient discharged on  oral antibiotic therapy for 2 weeks. He lives with his spouse in a 2 story home with br and bathroom on the same floor. He uses [facility] for his prescriptions. He sees Dr Partice Conway as his PCP.  (PS)

## 2019-05-21 NOTE — CARE COORDINATION
5/21/2019 Introduced myself to patient and described my role as a . the patient was recently discharged from the hospital yesterday to home. He has a urological operation for a scrotal abcess with sphincter stent removal. He was discharged with an indwelling oneill catheter that needs to stay in place until his follow up appointment in a week. He also has sutures in his scrotal wound that are to be removed in a week. The patient was presenting to the ER today for pain in the surgical wound. He stated that he was discharged without pain medication and his PCP was out of town this week and his urologist told him to take tylenol.  (PS)

## 2019-05-21 NOTE — ED PROVIDER NOTES
Patient is a 64-year-old male presenting with groin swelling and pain. He recently was in the hospital and had a surgery for a scrotal abscess. He also had an artificial urinary sphincter in for history of urinary incontinence and that was removed as well. He says he was discharged and instructed to take Tylenol. His pain is constant and nothing particular makes it worse other than palpation. He says the pain is not controlled with Tylenol. He has diffuse scrotal pain with some edema. He says his incision looks good with no erythema or drainage. He has a Castillo catheter in place. He denies any abdominal pain or fevers. He was placed on Bactrim and told me he takes it twice a day and scheduled to do that for the next 14 days. Review of Systems   Constitutional: Negative for chills and fever. HENT: Negative for congestion, rhinorrhea and sore throat. Respiratory: Negative for cough and shortness of breath. Cardiovascular: Negative for chest pain and palpitations. Gastrointestinal: Negative for abdominal pain, blood in stool, constipation, diarrhea, nausea and vomiting. Genitourinary: Positive for scrotal swelling and testicular pain (diffuse discomfort). Negative for difficulty urinating, dysuria and hematuria. Castillo catheter in place   Musculoskeletal: Negative for back pain and neck pain. Skin: Negative for color change, rash and wound. Neurological: Negative for dizziness, syncope, weakness, light-headedness and headaches. Psychiatric/Behavioral: Negative for confusion. Physical Exam   Constitutional: He is oriented to person, place, and time. He appears well-developed and well-nourished. No distress. HENT:   Head: Normocephalic and atraumatic. Right Ear: External ear normal.   Left Ear: External ear normal.   Nose: Nose normal.   Mouth/Throat: Oropharynx is clear and moist. No oropharyngeal exudate. Eyes: Pupils are equal, round, and reactive to light. Conjunctivae and EOM are normal. Right eye exhibits no discharge. Left eye exhibits no discharge. No scleral icterus. Neck: Neck supple. Cardiovascular: Normal rate, regular rhythm, normal heart sounds and intact distal pulses. Exam reveals no gallop and no friction rub. No murmur heard. Pulmonary/Chest: Effort normal and breath sounds normal. No stridor. No respiratory distress. He has no wheezes. He has no rales. He exhibits no tenderness. Abdominal: Soft. Bowel sounds are normal. He exhibits no distension and no mass. There is no tenderness. There is no rebound and no guarding. Genitourinary:   Genitourinary Comments: Diffuse scrotal edema and pain; anterior incision of scrotum closed without any erythema or drainage. No obvious fluctuance; no pain to palpation and perineum; Castillo catheter in place with a little urine seen on the inside of his leg   Musculoskeletal: He exhibits no edema. Neurological: He is alert and oriented to person, place, and time. He exhibits normal muscle tone. Skin: Skin is warm and dry. No rash noted. He is not diaphoretic. No erythema. No pallor. Psychiatric: He has a normal mood and affect. His behavior is normal. Judgment and thought content normal.       Procedures    MDM  Number of Diagnoses or Management Options  Post-operative state:   Scrotal pain:   Diagnosis management comments: Labs and imaging ordered and reviewed. We'll also count was normal.  Patient initially tachycardic but thought to be due from pain. He was given fentanyl and also oral Norco.  His pain improved. Ultrasound of scrotum likely showed postoperative changes. He is on Bactrim is encouraged to continue taking that. OARRS report checked and patient had no concerns for narcotic abuse. He was given 3 days of Norco. He was encouraged to follow-up with Urology or return here if needed. ED Course as of May 21 1458   Tue May 21, 2019   1434 Patient updated on labs.   He is feeling a little bit better. Heart rate has improved into the 80s. He continues to have leaking around his Castillo catheter. We will exchange this and why he discharged him home.    [EM]   8841 Castillo catheter exchanged without problem. No more leaking.    [EM]      ED Course User Index  [EM] Jamaica Morin DO       --------------------------------------------- PAST HISTORY ---------------------------------------------  Past Medical History:  has a past medical history of ADD (attention deficit disorder), Anxiety, Bipolar 1 disorder ([facility] [address] Cancer ([facility] [address] [facility] Depression, GERD (gastroesophageal reflux disease), Panic attack, Rectal pain, and Schizo affective schizophrenia ([facility] Past Surgical History:  has a past surgical history that includes Colonoscopy; Nose surgery (2002?); Prostatectomy (9/15/2014); Endoscopy, colon, diagnostic; and incision and drainage (N/A, 5/15/2019). Social History:  reports that he has been smoking cigarettes. He started smoking about 41 years ago. He has been smoking about 2.00 packs per day. He has never used smokeless tobacco. He reports that he does not drink alcohol or use drugs. Family History: family history includes Depression in his father; Heart Disease in his mother; Mental Illness in his brother, father, mother, and sister. The patients home medications have been reviewed.     Allergies: Ciprofloxacin hcl and Nsaids    -------------------------------------------------- RESULTS -------------------------------------------------  Labs:  Results for orders placed or performed during the hospital encounter of 05/21/19   CBC Auto Differential   Result Value Ref Range    WBC 7.7 4.5 - 11.5 E9/L    RBC 4.41 3.80 - 5.80 E12/L    Hemoglobin 12.5 12.5 - 16.5 g/dL    Hematocrit 39.4 37.0 - 54.0 %    MCV 89.3 80.0 - 99.9 fL    MCH 28.3 26.0 - 35.0 pg    MCHC 31.7 (L) 32.0 - 34.5 %    RDW 15.7 (H) 11.5 - 15.0 fL    Platelets 413 985 - 886 E9/L    MPV 9.0 7.0 - 12.0 fL Neutrophils % 63.6 43.0 - 80.0 %    Immature Granulocytes % 0.4 0.0 - 5.0 %    Lymphocytes % 19.4 (L) 20.0 - 42.0 %    Monocytes % 7.5 2.0 - 12.0 %    Eosinophils % 8.6 (H) 0.0 - 6.0 %    Basophils % 0.5 0.0 - 2.0 %    Neutrophils # 4.90 1.80 - 7.30 E9/L    Immature Granulocytes # 0.03 E9/L    Lymphocytes # 1.49 (L) 1.50 - 4.00 E9/L    Monocytes # 0.58 0.10 - 0.95 E9/L    Eosinophils # 0.66 (H) 0.05 - 0.50 E9/L    Basophils # 0.04 0.00 - 0.20 I5/X   Basic Metabolic Panel   Result Value Ref Range    Sodium 141 132 - 146 mmol/L    Potassium 4.0 3.5 - 5.0 mmol/L    Chloride 106 98 - 107 mmol/L    CO2 21 (L) 22 - 29 mmol/L    Anion Gap 14 7 - 16 mmol/L    Glucose 160 (H) 74 - 99 mg/dL    BUN 19 6 - 20 mg/dL    CREATININE 1.2 0.7 - 1.2 mg/dL    GFR Non-African American >60 >=60 mL/min/1.73    GFR African American >60     Calcium 9.5 8.6 - 10.2 mg/dL   Lactic Acid, Plasma   Result Value Ref Range    Lactic Acid 2.3 (H) 0.5 - 2.2 mmol/L       Radiology:  US SCROTUM AND TESTICLES   Final Result      1. Complex left hydrocele, new since the prior exam. This may   represent a hematocele or pyocele. 2. A cluster of subcutaneous fluid collections within the left scrotum   which may represent seromas or tiny abscesses. ALERT:  THIS IS AN ABNORMAL REPORT         US DUP ABD PEL RETRO SCROT COMPLETE   Final Result      1. Complex left hydrocele, new since the prior exam. This may   represent a hematocele or pyocele. 2. A cluster of subcutaneous fluid collections within the left scrotum   which may represent seromas or tiny abscesses. ALERT:  THIS IS AN ABNORMAL REPORT               ------------------------- NURSING NOTES AND VITALS REVIEWED ---------------------------  Date / Time Roomed:  5/21/2019 12:26 PM  ED Bed Assignment:  24/24    The nursing notes within the ED encounter and vital signs as below have been reviewed.    /65   Pulse 80   Temp 97.9 °F (36.6 °C) (Oral)   Resp 18   Ht 6' 3\" (1.905 m)   Wt 238 lb (108 kg)   SpO2 100%   BMI 29.75 kg/m²   Oxygen Saturation Interpretation: Normal      ------------------------------------------ PROGRESS NOTES ------------------------------------------  ED COURSE MEDICATIONS:                Medications   0.9 % sodium chloride bolus (1,000 mLs Intravenous New Bag 5/21/19 1319)   HYDROcodone-acetaminophen (NORCO) 5-325 MG per tablet 1 tablet (has no administration in time range)   fentaNYL (SUBLIMAZE) injection 75 mcg (75 mcg Intravenous Given 5/21/19 1319)       I have spoken with the patient and spouse and discussed todays results, in addition to providing specific details for the plan of care and counseling regarding the diagnosis and prognosis. Their questions are answered at this time and they are agreeable with the plan. I discussed at length with them reasons for immediate return here for re evaluation. They will followup with primary care by calling their office tomorrow. --------------------------------- ADDITIONAL PROVIDER NOTES ---------------------------------  At this time the patient is without objective evidence of an acute process requiring hospitalization or inpatient management. They have remained hemodynamically stable throughout their entire ED visit and are stable for discharge with outpatient follow-up. The plan has been discussed in detail and they are aware of the specific conditions for emergent return, as well as the importance of follow-up. New Prescriptions    HYDROCODONE-ACETAMINOPHEN (NORCO) 5-325 MG PER TABLET    Take 1 tablet by mouth every 8 hours as needed for Pain for up to 4 days. Diagnosis:  1. Post-operative state    2. Scrotal pain        Disposition:  Patient's disposition: Discharge to home  Patient's condition is stable.             Kirk Lundberg DO  Resident  05/21/19 9156

## 2019-06-07 ENCOUNTER — HOSPITAL ENCOUNTER (EMERGENCY)
Age: 54
Discharge: HOME | End: 2019-06-07
Attending: EMERGENCY MEDICINE
Payer: MEDICARE

## 2019-06-07 ENCOUNTER — APPOINTMENT (OUTPATIENT)
Dept: ULTRASOUND IMAGING | Age: 54
End: 2019-06-07
Payer: MEDICARE

## 2019-06-07 VITALS
HEIGHT: 75 IN | RESPIRATION RATE: 14 BRPM | SYSTOLIC BLOOD PRESSURE: 145 MMHG | DIASTOLIC BLOOD PRESSURE: 95 MMHG | WEIGHT: 238 LBS | OXYGEN SATURATION: 100 % | BODY MASS INDEX: 29.59 KG/M2 | TEMPERATURE: 98.1 F | HEART RATE: 71 BPM

## 2019-06-07 DIAGNOSIS — G89.18 POST-OP PAIN: ICD-10-CM

## 2019-06-07 DIAGNOSIS — N50.89 SCROTAL SWELLING: Primary | ICD-10-CM

## 2019-06-07 LAB
BACTERIA: ABNORMAL /HPF
BASOPHILS ABSOLUTE: 0.07 E9/L (ref 0–0.2)
BASOPHILS RELATIVE PERCENT: 0.7 % (ref 0–2)
BILIRUBIN URINE: NEGATIVE
BLOOD, URINE: ABNORMAL
CLARITY: CLEAR
COLOR: YELLOW
EOSINOPHILS ABSOLUTE: 0.72 E9/L (ref 0.05–0.5)
EOSINOPHILS RELATIVE PERCENT: 7.2 % (ref 0–6)
EPITHELIAL CELLS, UA: ABNORMAL /HPF
GLUCOSE URINE: NEGATIVE MG/DL
HCT VFR BLD CALC: 41.1 % (ref 37–54)
HEMOGLOBIN: 13.5 G/DL (ref 12.5–16.5)
IMMATURE GRANULOCYTES #: 0.04 E9/L
IMMATURE GRANULOCYTES %: 0.4 % (ref 0–5)
KETONES, URINE: ABNORMAL MG/DL
LEUKOCYTE ESTERASE, URINE: ABNORMAL
LYMPHOCYTES ABSOLUTE: 2.17 E9/L (ref 1.5–4)
LYMPHOCYTES RELATIVE PERCENT: 21.6 % (ref 20–42)
MCH RBC QN AUTO: 29.3 PG (ref 26–35)
MCHC RBC AUTO-ENTMCNC: 32.8 % (ref 32–34.5)
MCV RBC AUTO: 89.2 FL (ref 80–99.9)
MONOCYTES ABSOLUTE: 0.8 E9/L (ref 0.1–0.95)
MONOCYTES RELATIVE PERCENT: 8 % (ref 2–12)
NEUTROPHILS ABSOLUTE: 6.25 E9/L (ref 1.8–7.3)
NEUTROPHILS RELATIVE PERCENT: 62.1 % (ref 43–80)
NITRITE, URINE: NEGATIVE
PDW BLD-RTO: 16.5 FL (ref 11.5–15)
PH UA: 6 (ref 5–9)
PLATELET # BLD: 320 E9/L (ref 130–450)
PMV BLD AUTO: 9 FL (ref 7–12)
PROTEIN UA: 30 MG/DL
RBC # BLD: 4.61 E12/L (ref 3.8–5.8)
RBC UA: ABNORMAL /HPF (ref 0–2)
SPECIFIC GRAVITY UA: >=1.03 (ref 1–1.03)
UROBILINOGEN, URINE: 0.2 E.U./DL
WBC # BLD: 10.1 E9/L (ref 4.5–11.5)
WBC UA: ABNORMAL /HPF (ref 0–5)

## 2019-06-07 PROCEDURE — 96374 THER/PROPH/DIAG INJ IV PUSH: CPT

## 2019-06-07 PROCEDURE — 81001 URINALYSIS AUTO W/SCOPE: CPT

## 2019-06-07 PROCEDURE — 99284 EMERGENCY DEPT VISIT MOD MDM: CPT

## 2019-06-07 PROCEDURE — 6370000000 HC RX 637 (ALT 250 FOR IP): Performed by: STUDENT IN AN ORGANIZED HEALTH CARE EDUCATION/TRAINING PROGRAM

## 2019-06-07 PROCEDURE — 6360000002 HC RX W HCPCS: Performed by: STUDENT IN AN ORGANIZED HEALTH CARE EDUCATION/TRAINING PROGRAM

## 2019-06-07 PROCEDURE — 76870 US EXAM SCROTUM: CPT

## 2019-06-07 PROCEDURE — 85025 COMPLETE CBC W/AUTO DIFF WBC: CPT

## 2019-06-07 RX ORDER — HYDROCODONE BITARTRATE AND ACETAMINOPHEN 5; 325 MG/1; MG/1
1 TABLET ORAL ONCE
Status: COMPLETED | OUTPATIENT
Start: 2019-06-07 | End: 2019-06-07

## 2019-06-07 RX ORDER — FENTANYL CITRATE 50 UG/ML
75 INJECTION, SOLUTION INTRAMUSCULAR; INTRAVENOUS ONCE
Status: COMPLETED | OUTPATIENT
Start: 2019-06-07 | End: 2019-06-07

## 2019-06-07 RX ORDER — HYDROCODONE BITARTRATE AND ACETAMINOPHEN 5; 325 MG/1; MG/1
1 TABLET ORAL EVERY 4 HOURS PRN
Qty: 12 TABLET | Refills: 0 | Status: SHIPPED | OUTPATIENT
Start: 2019-06-07 | End: 2019-06-10

## 2019-06-07 RX ADMIN — HYDROCODONE BITARTRATE AND ACETAMINOPHEN 1 TABLET: 5; 325 TABLET ORAL at 06:21

## 2019-06-07 RX ADMIN — FENTANYL CITRATE 75 MCG: 50 INJECTION INTRAMUSCULAR; INTRAVENOUS at 08:28

## 2019-06-07 ASSESSMENT — ENCOUNTER SYMPTOMS
CONSTIPATION: 0
SHORTNESS OF BREATH: 0
RHINORRHEA: 0
WHEEZING: 0
BLOOD IN STOOL: 0
BACK PAIN: 0
COUGH: 0
NAUSEA: 0
SORE THROAT: 0
DIARRHEA: 0
VOMITING: 0
CHEST TIGHTNESS: 0
ABDOMINAL PAIN: 0

## 2019-06-07 ASSESSMENT — PAIN DESCRIPTION - LOCATION: LOCATION: SCROTUM

## 2019-06-07 ASSESSMENT — PAIN DESCRIPTION - PAIN TYPE: TYPE: ACUTE PAIN

## 2019-06-07 ASSESSMENT — PAIN SCALES - GENERAL
PAINLEVEL_OUTOF10: 10

## 2019-06-07 ASSESSMENT — PAIN DESCRIPTION - ORIENTATION: ORIENTATION: LEFT

## 2019-06-07 NOTE — ED NOTES
Pt states that he is still in pain. Resident aware. Patient also states that he is having financial hardship at home and he is unsure of how he is going to live. Pt states that he is on disability for mental illness as is his wife. He states he has \"$100 dollars to live off of for the next couple of weeks\". Golden Pearson.  [id number], RN  06/07/19 5740

## 2019-06-07 NOTE — ED NOTES
Report to [facility] [place], RN, care of pt relinquished     Ulises Alicea  06/07/19 0736

## 2019-06-07 NOTE — ED NOTES
Pt states he is having pain to left testicle since yesterday that has gotten worse this am,  He states he had surgery on his left testicle on may 15, 2019 to remove a sphincter. This am he noticed that left testicle feels harder than right testicle.   Sutures noted to be intact to scrotal sac, pt denies urinary difficulty     Radha Andres  06/07/19 0581

## 2019-06-07 NOTE — ED PROVIDER NOTES
Patient is 52-year-old male with past medical history significant for multiple psychiatric diagnoses as well as recent surgery for urethroplasty who presents to the emergency department for testicular pain. Patient states that his left testicle is felt very swollen and tender to palpation. States that his right does not have any pain. He was seen in the emergency department to 3 weeks ago for the same complaint. At that time he was found to have a new hydrocele formation from previous exams. Patient was treated for pain and instructed to follow-up with his urologist. Patient is unclear as to whether or not he is followed up with his urologist. He denies any fevers, nausea/vomiting, abdominal pain. Also states that incontinence is chronic this point. Denies any changes in his stooling. Patient also is noting no drainage. Patient DENIES any chest pain or shortness of breath. He said note recent changes to his medications. States that he has an appointment to follow-up with his urologist on the 29th of this month. Patient and placed on Bactrim previously. Testicle Pain    This is a recurrent problem. The current episode started more than 2 weeks ago. The problem occurs continuously. The problem has been unchanged. The problem affects the left side. There is swelling in the left testicle. The pain is moderate. Associated symptoms include testicular pain. Pertinent negatives include no chest pain, no fever, no abdominal pain, no constipation, no diarrhea, no nausea, no vomiting, no dysuria, no penile discharge, no penile pain, no headaches, no sore throat, no cough, no shortness of breath and no rash. His past medical history is significant for hydrocele. Review of Systems   Constitutional: Negative for diaphoresis and fever. HENT: Negative for congestion, rhinorrhea and sore throat. Eyes: Negative for visual disturbance.    Respiratory: Negative for cough, chest tightness, shortness of breath and time. No cranial nerve deficit. Skin: Skin is warm and dry. Capillary refill takes less than 2 seconds. No rash noted. He is not diaphoretic. No erythema. No pallor. See  findings   Psychiatric: He has a normal mood and affect. His behavior is normal.   Nursing note and vitals reviewed. Procedures    [facility]    ED Course as of Jun 07 0903 Fri Jun 07, 2019 0818 ATTENDING PROVIDER ATTESTATION:     I have personally performed and/or participated in the history, exam, medical decision making, and procedures and agree with all pertinent clinical information unless otherwise noted. I have also reviewed and agree with the past medical, family and social history unless otherwise noted. I have discussed this patient in detail with the resident and provided the instruction and education regarding the evidence-based evaluation and treatment of testicular pain. History: This patient has recently had an abscess drained from the scrotum. He was seen here recently for continued pain. He is back today with the same complaint of continued pain. No fever or chills. No N/V/D nor chest pain. My findings: Wandy Junior is a 47 y.o. male whom is in no distress. Physical exam reveals the left scrotum to be slightly swollen and tender. No palpable abscess nor cellulitis. No tenderness outside the local area. My plan: Symptomatic and supportive care. US, CBC, UA. Electronically signed by Joseluis Gaffney DO on 6/7/19 at 8:18 AM          [TG]      ED Course User Index  [TG] Joseluis Gaffney DO     ED Course as of Jun 07 0903 Fri Jun 07, 2019 0818 ATTENDING PROVIDER ATTESTATION:     I have personally performed and/or participated in the history, exam, medical decision making, and procedures and agree with all pertinent clinical information unless otherwise noted. I have also reviewed and agree with the past medical, family and social history unless otherwise noted.     I have discussed this patient in detail with the resident and provided the instruction and education regarding the evidence-based evaluation and treatment of testicular pain. History: This patient has recently had an abscess drained from the scrotum. He was seen here recently for continued pain. He is back today with the same complaint of continued pain. No fever or chills. No N/V/D nor chest pain. My findings: Kelley Jorgensen is a 47 y.o. male whom is in no distress. Physical exam reveals the left scrotum to be slightly swollen and tender. No palpable abscess nor cellulitis. No tenderness outside the local area. My plan: Symptomatic and supportive care. US, CBC, UA. Electronically signed by Aye Camarillo DO on 6/7/19 at 8:18 AM          [TG]      ED Course User Index  [TG] Aye Camarillo DO       --------------------------------------------- PAST HISTORY ---------------------------------------------  Past Medical History:  has a past medical history of ADD (attention deficit disorder), Anxiety, Bipolar 1 disorder ([facility] [address] Cancer ([facility] [address] [facility] ([facility] Depression, GERD (gastroesophageal reflux disease), Panic attack, Rectal pain, and Schizo affective schizophrenia ([facility] [address] Past Surgical History:  has a past surgical history that includes Colonoscopy; Nose surgery (2002?); Prostatectomy (9/15/2014); Endoscopy, colon, diagnostic; and incision and drainage (N/A, 5/15/2019). Social History:  reports that he has been smoking cigarettes. He started smoking about 41 years ago. He has been smoking about 2.00 packs per day. He has never used smokeless tobacco. He reports that he does not drink alcohol or use drugs. Family History: family history includes Depression in his father; Heart Disease in his mother; Mental Illness in his brother, father, mother, and sister. The patients home medications have been reviewed.     Allergies: Ciprofloxacin hcl and Nsaids    -------------------------------------------------- RESULTS -------------------------------------------------  Labs:  Results for orders placed or performed during the hospital encounter of 06/07/19   CBC Auto Differential   Result Value Ref Range    WBC 10.1 4.5 - 11.5 E9/L    RBC 4.61 3.80 - 5.80 E12/L    Hemoglobin 13.5 12.5 - 16.5 g/dL    Hematocrit 41.1 37.0 - 54.0 %    MCV 89.2 80.0 - 99.9 fL    MCH 29.3 26.0 - 35.0 pg    MCHC 32.8 32.0 - 34.5 %    RDW 16.5 (H) 11.5 - 15.0 fL    Platelets 024 071 - 428 E9/L    MPV 9.0 7.0 - 12.0 fL    Neutrophils % 62.1 43.0 - 80.0 %    Immature Granulocytes % 0.4 0.0 - 5.0 %    Lymphocytes % 21.6 20.0 - 42.0 %    Monocytes % 8.0 2.0 - 12.0 %    Eosinophils % 7.2 (H) 0.0 - 6.0 %    Basophils % 0.7 0.0 - 2.0 %    Neutrophils # 6.25 1.80 - 7.30 E9/L    Immature Granulocytes # 0.04 E9/L    Lymphocytes # 2.17 1.50 - 4.00 E9/L    Monocytes # 0.80 0.10 - 0.95 E9/L    Eosinophils # 0.72 (H) 0.05 - 0.50 E9/L    Basophils # 0.07 0.00 - 0.20 E9/L   Urinalysis   Result Value Ref Range    Color, UA Yellow Straw/Yellow    Clarity, UA Clear Clear    Glucose, Ur Negative Negative mg/dL    Bilirubin Urine Negative Negative    Ketones, Urine TRACE (A) Negative mg/dL    Specific Gravity, UA >=1.030 1.005 - 1.030    Blood, Urine TRACE-INTACT Negative    pH, UA 6.0 5.0 - 9.0    Protein, UA 30 (A) Negative mg/dL    Urobilinogen, Urine 0.2 <2.0 E.U./dL    Nitrite, Urine Negative Negative    Leukocyte Esterase, Urine TRACE (A) Negative   Microscopic Urinalysis   Result Value Ref Range    WBC, UA 1-3 0 - 5 /HPF    RBC, UA 0-1 0 - 2 /HPF    Epi Cells FEW /HPF    Bacteria, UA FEW (A) /HPF       Radiology:  US SCROTUM AND TESTICLES   Final Result      Scrotal cellulitis on the left. Adjacent to this is a septated complex   cystic structure which probably represents the residua of the drained   scrotal abscess.  Additional observations as outlined above.             ------------------------- NURSING NOTES AND VITALS REVIEWED ---------------------------  Date / Time Roomed:  6/7/2019  5:18 AM  ED Bed Assignment:  21/21    The nursing notes within the ED encounter and vital signs as below have been reviewed. BP (!) 141/93   Pulse 75   Temp 98.1 °F (36.7 °C) (Oral)   Resp 16   Ht 6' 3\" (1.905 m)   Wt 238 lb (108 kg)   SpO2 100%   BMI 29.75 kg/m²   Oxygen Saturation Interpretation: Normal      ------------------------------------------ PROGRESS NOTES ------------------------------------------  I have spoken with the patient and discussed todays results, in addition to providing specific details for the plan of care and counseling regarding the diagnosis and prognosis. Their questions are answered at this time and they are agreeable with the plan. I discussed at length with them reasons for immediate return here for re evaluation. They will followup with primary care by calling their office tomorrow. --------------------------------- ADDITIONAL PROVIDER NOTES ---------------------------------  At this time the patient is without objective evidence of an acute process requiring hospitalization or inpatient management. They have remained hemodynamically stable throughout their entire ED visit and are stable for discharge with outpatient follow-up. Discussed imaging and laboratory results the patient. Imaging shows improvement of patient's hydrocele. Also shows postoperative changes with no recurrence of abscess. Patient's laboratory work was within normal limits. Instructed patient to follow up with his primary care provider and with his urologist for further pain management. Patient has an appointment with a pain specialist. Nella Yey him also to return the emergency Department if symptoms worsen. Provided pain management emergency department and a prescription for predictive pain management. Instructed patient also to not take his Tylenol as well as the Norco that was prescribed to him due to the additional Tylenol in the Norco dosing.  Patient agreed with this plan and was discharged home. The plan has been discussed in detail and they are aware of the specific conditions for emergent return, as well as the importance of follow-up. New Prescriptions    HYDROCODONE-ACETAMINOPHEN (NORCO) 5-325 MG PER TABLET    Take 1 tablet by mouth every 4 hours as needed for Pain for up to 3 days. Intended supply: 3 days. Take lowest dose possible to manage pain       Diagnosis:  1. Scrotal swelling    2. Post-op pain        Disposition:  Patient's disposition: Discharge to home  Patient's condition is stable.          Quentin Astorga,   Resident  06/07/19 9617

## 2019-06-07 NOTE — ED NOTES
RN speaking with resident and attending about comments made by patient. Pt states that he had a plan in the past and that he has attempted suicide multiple times before. Pt states he does wish he would go to bed and not wake up. RN did C-SSRS screening tool again and notified physicians of results. They are in to speak with patient. Suicide precautions not ordered yet, will wait for attending decision. Shira Matthews. Rik Abad RN  06/07/19 [address]   Rik Abad RN  06/07/19 1621

## 2019-06-07 NOTE — ED NOTES
Iv access obtained, labs obtained and sent, pt waiting to go to ultrasound     Antoni Rubio  06/07/19 8532

## 2019-07-16 ENCOUNTER — HOSPITAL ENCOUNTER (EMERGENCY)
Age: 54
Discharge: HOME | End: 2019-07-16
Payer: MEDICARE

## 2019-07-16 VITALS
TEMPERATURE: 98.8 F | OXYGEN SATURATION: 97 % | RESPIRATION RATE: 16 BRPM | WEIGHT: 225 LBS | HEIGHT: 75 IN | SYSTOLIC BLOOD PRESSURE: 119 MMHG | HEART RATE: 96 BPM | DIASTOLIC BLOOD PRESSURE: 90 MMHG | BODY MASS INDEX: 27.98 KG/M2

## 2019-07-16 DIAGNOSIS — K08.89 PAIN, DENTAL: Primary | ICD-10-CM

## 2019-07-16 DIAGNOSIS — M27.3 DRY TOOTH SOCKET: ICD-10-CM

## 2019-07-16 PROCEDURE — 99282 EMERGENCY DEPT VISIT SF MDM: CPT

## 2019-07-16 PROCEDURE — 6370000000 HC RX 637 (ALT 250 FOR IP): Performed by: NURSE PRACTITIONER

## 2019-07-16 RX ORDER — AMOXICILLIN 250 MG/1
500 CAPSULE ORAL ONCE
Status: COMPLETED | OUTPATIENT
Start: 2019-07-16 | End: 2019-07-16

## 2019-07-16 RX ORDER — CHLORHEXIDINE GLUCONATE 0.12 MG/ML
15 RINSE ORAL 2 TIMES DAILY
Qty: 420 ML | Refills: 0 | Status: SHIPPED | OUTPATIENT
Start: 2019-07-16 | End: 2019-07-30

## 2019-07-16 RX ORDER — AMOXICILLIN 500 MG/1
500 CAPSULE ORAL 3 TIMES DAILY
Qty: 21 CAPSULE | Refills: 0 | Status: ON HOLD | OUTPATIENT
Start: 2019-07-16 | End: 2019-07-30 | Stop reason: HOSPADM

## 2019-07-16 RX ORDER — ACETAMINOPHEN 325 MG/1
650 TABLET ORAL ONCE
Status: COMPLETED | OUTPATIENT
Start: 2019-07-16 | End: 2019-07-16

## 2019-07-16 RX ORDER — LIDOCAINE HYDROCHLORIDE 20 MG/ML
15 SOLUTION OROPHARYNGEAL ONCE
Status: COMPLETED | OUTPATIENT
Start: 2019-07-16 | End: 2019-07-16

## 2019-07-16 RX ADMIN — LIDOCAINE HYDROCHLORIDE 15 ML: 20 SOLUTION ORAL; TOPICAL at 03:33

## 2019-07-16 RX ADMIN — AMOXICILLIN 500 MG: 250 CAPSULE ORAL at 03:33

## 2019-07-16 RX ADMIN — ACETAMINOPHEN 650 MG: 325 TABLET ORAL at 03:33

## 2019-07-16 ASSESSMENT — PAIN SCALES - GENERAL
PAINLEVEL_OUTOF10: 10
PAINLEVEL_OUTOF10: 10

## 2019-07-16 ASSESSMENT — PAIN DESCRIPTION - PAIN TYPE: TYPE: ACUTE PAIN

## 2019-07-16 NOTE — ED PROVIDER NOTES
Independent MLP      HPI: Stefanie Ponce 47 y.o. male with a past medical history of   Past Medical History:   Diagnosis Date    ADD (attention deficit disorder)     Anxiety     Bipolar 1 disorder ([facility] 11/19/2017    Cancer ([facility] prostate cancer    2014 / treated with surgery    Crohn's disease ([facility]     Depression     GERD (gastroesophageal reflux disease)     Panic attack     Rectal pain     has a fissure    Schizo affective schizophrenia ([facility]     stable    presents with a complaint of dental pain to the left lower tooth extraction site from 1 week ago. The patient states this pain has been gradual in onset, persistent, moderate in severity and worse today which is what prompted the visit. Pain has not been relieved with any OTC medications. Patient denies any unilateral facial swelling. Patient is able to handle their own secretions and drink fluids without difficulty. Patient denies any fever. The patient also denies any history of dental trauma. Denies difficulty breathing or swallowing. The location of the pain and appears to be isolated over tooth number 30. He is a smoker. Review of Systems:   Pertinent positives and negatives are stated within HPI, all other systems reviewed and are negative.         --------------------------------------------- PAST HISTORY ---------------------------------------------  Past Medical History:  has a past medical history of ADD (attention deficit disorder), Anxiety, Bipolar 1 disorder ([facility] Cancer ([facility] Crohn's disease ([facility] Depression, GERD (gastroesophageal reflux disease), Panic attack, Rectal pain, and Schizo affective schizophrenia ([facility] Past Surgical History:  has a past surgical history that includes Colonoscopy; Nose surgery (2002?); Prostatectomy (9/15/2014); Endoscopy, colon, diagnostic; and incision and drainage (N/A, 5/15/2019). Social History:  reports that he has been smoking cigarettes. He started smoking about 41 years ago.  He has

## 2019-07-18 ENCOUNTER — CARE COORDINATION (OUTPATIENT)
Dept: CARE COORDINATION | Age: 54
End: 2019-07-18

## 2019-07-21 ENCOUNTER — APPOINTMENT (OUTPATIENT)
Dept: CT IMAGING | Age: 54
DRG: 914 | End: 2019-07-21
Payer: MEDICARE

## 2019-07-21 ENCOUNTER — HOSPITAL ENCOUNTER (INPATIENT)
Age: 54
LOS: 9 days | Discharge: NURSING, REHAB OR LONG-TERM CARE | DRG: 914 | End: 2019-07-30
Attending: EMERGENCY MEDICINE | Admitting: PSYCHIATRY & NEUROLOGY
Payer: MEDICARE

## 2019-07-21 DIAGNOSIS — T14.91XA SUICIDE ATTEMPT (HCC): Primary | ICD-10-CM

## 2019-07-21 DIAGNOSIS — I10 ESSENTIAL HYPERTENSION: ICD-10-CM

## 2019-07-21 PROBLEM — R45.851 DEPRESSION WITH SUICIDAL IDEATION: Status: ACTIVE | Noted: 2019-07-21

## 2019-07-21 PROBLEM — F32.A DEPRESSION WITH SUICIDAL IDEATION: Status: ACTIVE | Noted: 2019-07-21

## 2019-07-21 LAB
ACETAMINOPHEN LEVEL: <5 MCG/ML (ref 10–30)
ALBUMIN SERPL-MCNC: 4.3 G/DL (ref 3.5–5.2)
ALP BLD-CCNC: 146 U/L (ref 40–129)
ALT SERPL-CCNC: 11 U/L (ref 0–40)
AMPHETAMINE SCREEN, URINE: NOT DETECTED
ANION GAP SERPL CALCULATED.3IONS-SCNC: 17 MMOL/L (ref 7–16)
AST SERPL-CCNC: 13 U/L (ref 0–39)
B.E.: 0.1 MMOL/L (ref -3–3)
BACTERIA: ABNORMAL /HPF
BARBITURATE SCREEN URINE: NOT DETECTED
BASOPHILS ABSOLUTE: 0.03 E9/L (ref 0–0.2)
BASOPHILS RELATIVE PERCENT: 0.4 % (ref 0–2)
BENZODIAZEPINE SCREEN, URINE: POSITIVE
BILIRUB SERPL-MCNC: 0.3 MG/DL (ref 0–1.2)
BILIRUBIN URINE: NEGATIVE
BLOOD, URINE: ABNORMAL
BUN BLDV-MCNC: 17 MG/DL (ref 6–20)
CALCIUM SERPL-MCNC: 10.1 MG/DL (ref 8.6–10.2)
CANNABINOID SCREEN URINE: NOT DETECTED
CHLORIDE BLD-SCNC: 99 MMOL/L (ref 98–107)
CLARITY: CLEAR
CO2: 22 MMOL/L (ref 22–29)
COCAINE METABOLITE SCREEN URINE: NOT DETECTED
COHB: 3 % (ref 0–1.5)
COLOR: YELLOW
CREAT SERPL-MCNC: 1.5 MG/DL (ref 0.7–1.2)
CRITICAL: ABNORMAL
DATE ANALYZED: ABNORMAL
DATE OF COLLECTION: ABNORMAL
EKG ATRIAL RATE: 94 BPM
EKG P AXIS: 61 DEGREES
EKG P-R INTERVAL: 158 MS
EKG Q-T INTERVAL: 560 MS
EKG QRS DURATION: 88 MS
EKG QTC CALCULATION (BAZETT): 700 MS
EKG R AXIS: 41 DEGREES
EKG T AXIS: 66 DEGREES
EKG VENTRICULAR RATE: 94 BPM
EOSINOPHILS ABSOLUTE: 0.02 E9/L (ref 0.05–0.5)
EOSINOPHILS RELATIVE PERCENT: 0.2 % (ref 0–6)
ETHANOL: <10 MG/DL (ref 0–0.08)
GFR AFRICAN AMERICAN: 59
GFR NON-AFRICAN AMERICAN: 49 ML/MIN/1.73
GLUCOSE BLD-MCNC: 151 MG/DL (ref 74–99)
GLUCOSE URINE: NEGATIVE MG/DL
HCO3: 23.5 MMOL/L (ref 22–26)
HCT VFR BLD CALC: 42.4 % (ref 37–54)
HEMOGLOBIN: 14.3 G/DL (ref 12.5–16.5)
HHB: 4.7 % (ref 0–5)
IMMATURE GRANULOCYTES #: 0.02 E9/L
IMMATURE GRANULOCYTES %: 0.2 % (ref 0–5)
KETONES, URINE: 15 MG/DL
LAB: ABNORMAL
LEUKOCYTE ESTERASE, URINE: ABNORMAL
LYMPHOCYTES ABSOLUTE: 1.82 E9/L (ref 1.5–4)
LYMPHOCYTES RELATIVE PERCENT: 22.1 % (ref 20–42)
Lab: ABNORMAL
MCH RBC QN AUTO: 29.1 PG (ref 26–35)
MCHC RBC AUTO-ENTMCNC: 33.7 % (ref 32–34.5)
MCV RBC AUTO: 86.2 FL (ref 80–99.9)
METHADONE SCREEN, URINE: NOT DETECTED
METHB: 0.3 % (ref 0–1.5)
MODE: ABNORMAL
MONOCYTES ABSOLUTE: 0.6 E9/L (ref 0.1–0.95)
MONOCYTES RELATIVE PERCENT: 7.3 % (ref 2–12)
NEUTROPHILS ABSOLUTE: 5.73 E9/L (ref 1.8–7.3)
NEUTROPHILS RELATIVE PERCENT: 69.8 % (ref 43–80)
NITRITE, URINE: NEGATIVE
O2 CONTENT: 19.4 ML/DL
O2 SATURATION: 95.1 % (ref 92–98.5)
O2HB: 92 % (ref 94–97)
OPERATOR ID: 421
OPIATE SCREEN URINE: NOT DETECTED
PATIENT TEMP: 37 C
PCO2: 34.7 MMHG (ref 35–45)
PDW BLD-RTO: 13.4 FL (ref 11.5–15)
PH BLOOD GAS: 7.45 (ref 7.35–7.45)
PH UA: 5.5 (ref 5–9)
PHENCYCLIDINE SCREEN URINE: NOT DETECTED
PLATELET # BLD: 327 E9/L (ref 130–450)
PMV BLD AUTO: 9.4 FL (ref 7–12)
PO2: 75.6 MMHG (ref 60–100)
POTASSIUM SERPL-SCNC: 3.3 MMOL/L (ref 3.5–5)
PROPOXYPHENE SCREEN: NOT DETECTED
PROTEIN UA: NEGATIVE MG/DL
RBC # BLD: 4.92 E12/L (ref 3.8–5.8)
RBC UA: ABNORMAL /HPF (ref 0–2)
SALICYLATE, SERUM: <0.3 MG/DL (ref 0–30)
SODIUM BLD-SCNC: 138 MMOL/L (ref 132–146)
SOURCE, BLOOD GAS: ABNORMAL
SPECIFIC GRAVITY UA: 1.01 (ref 1–1.03)
THB: 15 G/DL (ref 11.5–16.5)
TIME ANALYZED: 839
TOTAL PROTEIN: 7.8 G/DL (ref 6.4–8.3)
TRICYCLIC ANTIDEPRESSANTS SCREEN SERUM: NEGATIVE NG/ML
UROBILINOGEN, URINE: 0.2 E.U./DL
WBC # BLD: 8.2 E9/L (ref 4.5–11.5)
WBC UA: ABNORMAL /HPF (ref 0–5)

## 2019-07-21 PROCEDURE — 6370000000 HC RX 637 (ALT 250 FOR IP): Performed by: INTERNAL MEDICINE

## 2019-07-21 PROCEDURE — 1240000000 HC EMOTIONAL WELLNESS R&B

## 2019-07-21 PROCEDURE — 93010 ELECTROCARDIOGRAM REPORT: CPT | Performed by: INTERNAL MEDICINE

## 2019-07-21 PROCEDURE — 36415 COLL VENOUS BLD VENIPUNCTURE: CPT

## 2019-07-21 PROCEDURE — 80053 COMPREHEN METABOLIC PANEL: CPT

## 2019-07-21 PROCEDURE — 99285 EMERGENCY DEPT VISIT HI MDM: CPT

## 2019-07-21 PROCEDURE — G0480 DRUG TEST DEF 1-7 CLASSES: HCPCS

## 2019-07-21 PROCEDURE — 82805 BLOOD GASES W/O2 SATURATION: CPT

## 2019-07-21 PROCEDURE — 81001 URINALYSIS AUTO W/SCOPE: CPT

## 2019-07-21 PROCEDURE — 80307 DRUG TEST PRSMV CHEM ANLYZR: CPT

## 2019-07-21 PROCEDURE — 93005 ELECTROCARDIOGRAM TRACING: CPT | Performed by: EMERGENCY MEDICINE

## 2019-07-21 PROCEDURE — 85025 COMPLETE CBC W/AUTO DIFF WBC: CPT

## 2019-07-21 PROCEDURE — 70450 CT HEAD/BRAIN W/O DYE: CPT

## 2019-07-21 RX ORDER — OLANZAPINE 10 MG/1
5 INJECTION, POWDER, LYOPHILIZED, FOR SOLUTION INTRAMUSCULAR EVERY 4 HOURS PRN
Status: DISCONTINUED | OUTPATIENT
Start: 2019-07-21 | End: 2019-07-30 | Stop reason: HOSPADM

## 2019-07-21 RX ORDER — POTASSIUM CHLORIDE 20 MEQ/1
40 TABLET, EXTENDED RELEASE ORAL ONCE
Status: COMPLETED | OUTPATIENT
Start: 2019-07-21 | End: 2019-07-21

## 2019-07-21 RX ORDER — HYDROXYZINE PAMOATE 25 MG/1
50 CAPSULE ORAL 3 TIMES DAILY PRN
Status: DISCONTINUED | OUTPATIENT
Start: 2019-07-21 | End: 2019-07-30 | Stop reason: HOSPADM

## 2019-07-21 RX ORDER — MAGNESIUM HYDROXIDE/ALUMINUM HYDROXICE/SIMETHICONE 120; 1200; 1200 MG/30ML; MG/30ML; MG/30ML
30 SUSPENSION ORAL PRN
Status: DISCONTINUED | OUTPATIENT
Start: 2019-07-21 | End: 2019-07-30 | Stop reason: HOSPADM

## 2019-07-21 RX ORDER — ACETAMINOPHEN 325 MG/1
650 TABLET ORAL EVERY 4 HOURS PRN
Status: DISCONTINUED | OUTPATIENT
Start: 2019-07-21 | End: 2019-07-30 | Stop reason: HOSPADM

## 2019-07-21 RX ORDER — BENZTROPINE MESYLATE 1 MG/ML
2 INJECTION INTRAMUSCULAR; INTRAVENOUS 2 TIMES DAILY PRN
Status: DISCONTINUED | OUTPATIENT
Start: 2019-07-21 | End: 2019-07-30 | Stop reason: HOSPADM

## 2019-07-21 RX ORDER — OLANZAPINE 2.5 MG/1
2.5 TABLET ORAL EVERY 4 HOURS PRN
Status: DISCONTINUED | OUTPATIENT
Start: 2019-07-21 | End: 2019-07-30 | Stop reason: HOSPADM

## 2019-07-21 RX ADMIN — POTASSIUM CHLORIDE 40 MEQ: 20 TABLET, EXTENDED RELEASE ORAL at 21:40

## 2019-07-21 ASSESSMENT — LIFESTYLE VARIABLES: HISTORY_ALCOHOL_USE: NO

## 2019-07-21 ASSESSMENT — PAIN DESCRIPTION - DESCRIPTORS: DESCRIPTORS: HEADACHE

## 2019-07-21 ASSESSMENT — SLEEP AND FATIGUE QUESTIONNAIRES
DO YOU USE A SLEEP AID: NO
DO YOU HAVE DIFFICULTY SLEEPING: NO
AVERAGE NUMBER OF SLEEP HOURS: 7

## 2019-07-21 ASSESSMENT — PAIN DESCRIPTION - FREQUENCY: FREQUENCY: CONTINUOUS

## 2019-07-21 ASSESSMENT — ENCOUNTER SYMPTOMS
SHORTNESS OF BREATH: 0
PHOTOPHOBIA: 0
BACK PAIN: 0
CHEST TIGHTNESS: 0
ABDOMINAL PAIN: 0

## 2019-07-21 ASSESSMENT — PAIN DESCRIPTION - PROGRESSION: CLINICAL_PROGRESSION: NOT CHANGED

## 2019-07-21 ASSESSMENT — PAIN DESCRIPTION - LOCATION: LOCATION: HEAD

## 2019-07-21 ASSESSMENT — PATIENT HEALTH QUESTIONNAIRE - PHQ9
SUM OF ALL RESPONSES TO PHQ QUESTIONS 1-9: 14
SUM OF ALL RESPONSES TO PHQ QUESTIONS 1-9: 14

## 2019-07-21 ASSESSMENT — PAIN DESCRIPTION - PAIN TYPE: TYPE: ACUTE PAIN

## 2019-07-21 ASSESSMENT — PAIN SCALES - GENERAL: PAINLEVEL_OUTOF10: 0

## 2019-07-21 NOTE — PROGRESS NOTES
patient rights given. Received admission packet:  yes. Consents reviewed, signed yes. Refused no. Patient verbalize understanding:  yes. Patient education on precautions: yes      Patient admitted from [facility], suicide attempt with wife by turning on gas in home, patient states he has been very depressed due to financial problems, just filed for bankruptcy on Thursday and put his dog down recently. Patient states both his aunt and cousin committed suicide. His last attempt was one year ago by overdosing on Xanax. Patient denies hallucinations, patient states \"I just feel sad now because of all the losses but I'm not suicidal \". Denies suicidal ideation, denies homicidal ideation. Patient is sad and depressed. Patient is cooperative with interview, all consents were signed, patient was oriented to unit. Dinner tray ordered for patient. Patient states he has only had 2 donuts to eat in last 3 days due to no money to purchase food.  Emotional support given, will monitor                    Aidan Villalpando RN

## 2019-07-21 NOTE — ED NOTES
I called [place] police [phone] and requested that they bring the original pink slip - all we have is a faxed copy of the front.     Awaiting call back from the dispatcher     Marissa Nava, [facility]  07/21/19 2095

## 2019-07-21 NOTE — ED NOTES
I met with pt, who is a 47year old  male. I attempted to assess, but pt kept falling asleep. When I asked pt is he attempted to kill himself he said \"yes\". Pt is very lethargic and unable to stay awake. However, pt is pink slipped by [facility] police indicating that when they arrived to the home it was filled with natural gas, pt was in bed, and admitted to having chrohns disease and wanting to die. No other information obtained at this time. Pt has a mh hx, last admission here on 11/20/18 for psychosis. It's unknown if pt is currently treating for [facility] services at this time. Pt meets criteria for in pt admission and is medically cleared by Dr. Angelika Andres.          Mateo William, [facility]  07/21/19 5811

## 2019-07-21 NOTE — ED NOTES
Found pts belongings in rm 1 cupboard no foot wear found pt reports he had sandals none found.      Prosper Wilder RN  07/21/19 1706

## 2019-07-21 NOTE — ED NOTES
[facility] F[facility] THE [facility]IGINAL AND WILL ARRANGE FOR AN OFFICER TO BRING IT TO THIS FACILITY.      Yadira Avery, [facility]  07/21/19 1818

## 2019-07-22 PROBLEM — F33.2 SEVERE EPISODE OF RECURRENT MAJOR DEPRESSIVE DISORDER, WITHOUT PSYCHOTIC FEATURES (HCC): Status: ACTIVE | Noted: 2019-07-22

## 2019-07-22 LAB
CHOLESTEROL, TOTAL: 320 MG/DL (ref 0–199)
HBA1C MFR BLD: 5.2 % (ref 4–5.6)
HDLC SERPL-MCNC: 46 MG/DL
LDL CHOLESTEROL CALCULATED: 244 MG/DL (ref 0–99)
TRIGL SERPL-MCNC: 148 MG/DL (ref 0–149)
VLDLC SERPL CALC-MCNC: 30 MG/DL

## 2019-07-22 PROCEDURE — 1240000000 HC EMOTIONAL WELLNESS R&B

## 2019-07-22 PROCEDURE — 6370000000 HC RX 637 (ALT 250 FOR IP): Performed by: NURSE PRACTITIONER

## 2019-07-22 PROCEDURE — 36415 COLL VENOUS BLD VENIPUNCTURE: CPT

## 2019-07-22 PROCEDURE — 80061 LIPID PANEL: CPT

## 2019-07-22 PROCEDURE — 83036 HEMOGLOBIN GLYCOSYLATED A1C: CPT

## 2019-07-22 PROCEDURE — 6370000000 HC RX 637 (ALT 250 FOR IP): Performed by: FAMILY MEDICINE

## 2019-07-22 PROCEDURE — 6370000000 HC RX 637 (ALT 250 FOR IP): Performed by: PSYCHIATRY & NEUROLOGY

## 2019-07-22 PROCEDURE — 99222 1ST HOSP IP/OBS MODERATE 55: CPT | Performed by: NURSE PRACTITIONER

## 2019-07-22 RX ORDER — BENZTROPINE MESYLATE 1 MG/1
0.5 TABLET ORAL 2 TIMES DAILY
Status: DISCONTINUED | OUTPATIENT
Start: 2019-07-22 | End: 2019-07-30 | Stop reason: HOSPADM

## 2019-07-22 RX ORDER — AMLODIPINE BESYLATE 10 MG/1
10 TABLET ORAL DAILY
Status: DISCONTINUED | OUTPATIENT
Start: 2019-07-22 | End: 2019-07-30 | Stop reason: HOSPADM

## 2019-07-22 RX ORDER — FLUOXETINE HYDROCHLORIDE 20 MG/1
80 CAPSULE ORAL DAILY
Status: DISCONTINUED | OUTPATIENT
Start: 2019-07-22 | End: 2019-07-23

## 2019-07-22 RX ORDER — PERPHENAZINE 4 MG/1
8 TABLET, FILM COATED ORAL 2 TIMES DAILY
Status: DISCONTINUED | OUTPATIENT
Start: 2019-07-22 | End: 2019-07-23

## 2019-07-22 RX ORDER — CHLORHEXIDINE GLUCONATE 0.12 MG/ML
15 RINSE ORAL 2 TIMES DAILY
Status: DISCONTINUED | OUTPATIENT
Start: 2019-07-22 | End: 2019-07-30 | Stop reason: HOSPADM

## 2019-07-22 RX ADMIN — BENZTROPINE MESYLATE 0.5 MG: 1 TABLET ORAL at 21:10

## 2019-07-22 RX ADMIN — ALUMINUM HYDROXIDE, MAGNESIUM HYDROXIDE, AND SIMETHICONE 30 ML: 200; 200; 20 SUSPENSION ORAL at 00:44

## 2019-07-22 RX ADMIN — CHLORHEXIDINE GLUCONATE 0.12% ORAL RINSE 15 ML: 1.2 LIQUID ORAL at 21:09

## 2019-07-22 RX ADMIN — PERPHENAZINE 8 MG: 4 TABLET, FILM COATED ORAL at 14:09

## 2019-07-22 RX ADMIN — BENZTROPINE MESYLATE 0.5 MG: 1 TABLET ORAL at 14:09

## 2019-07-22 RX ADMIN — AMLODIPINE BESYLATE 10 MG: 10 TABLET ORAL at 09:34

## 2019-07-22 RX ADMIN — PERPHENAZINE 8 MG: 4 TABLET, FILM COATED ORAL at 21:10

## 2019-07-22 RX ADMIN — CHLORHEXIDINE GLUCONATE 0.12% ORAL RINSE 15 ML: 1.2 LIQUID ORAL at 14:09

## 2019-07-22 RX ADMIN — FLUOXETINE HYDROCHLORIDE 80 MG: 20 CAPSULE ORAL at 14:09

## 2019-07-22 ASSESSMENT — LIFESTYLE VARIABLES: HISTORY_ALCOHOL_USE: NO

## 2019-07-22 ASSESSMENT — SLEEP AND FATIGUE QUESTIONNAIRES
DO YOU HAVE DIFFICULTY SLEEPING: NO
AVERAGE NUMBER OF SLEEP HOURS: 15
DO YOU USE A SLEEP AID: NO

## 2019-07-22 ASSESSMENT — PATIENT HEALTH QUESTIONNAIRE - PHQ9: SUM OF ALL RESPONSES TO PHQ QUESTIONS 1-9: 22

## 2019-07-22 NOTE — PROGRESS NOTES
Dr. Artemio Acevedo on call for Dr. Chauncey Pena this evening. Doctor was made aware of med rec being completed, potassium level and reason for consult. Verbal order given for PO potassium. Per Dr. Carmela Gan, Dr. Chauncey Pena is to be called in morning to be made aware of consult as well. Will continue to monitor.

## 2019-07-22 NOTE — PROGRESS NOTES
2:00-2:30    Patient attended and participated in recreation roup of chicken soup for the soul game. Enjoyed interacting with peers and listening to peers share. Patient was 1 out of 7 in attendance.

## 2019-07-22 NOTE — H&P
Ciprofloxacin hcl and Nsaids            Physical Examination:    Head:  [x] Atraumatic:  [] normocephalic  Skin and Mucosa       [] Moist [] Dry [] Pale [x] Normal   Neck: [x] Thyroid [] Palpable    [x] Not palpable []  venus distention [] adenopathy   Chest: [x] Clear [] Rhonchi  [] Wheezing   CV: [x] S1 [x] S2 [x] No murmer   Abdomen:  [x] Soft   [] Tender  [] Viceromegaly   Extremities:  [x] No Edema   [] Edema    Cranial Nerves Examination:    CN II: [x] Pupils are reactive to light [] Pupils are non reactive to light  CN III, IV, VI:[x] No eye deviation  [x] No diplopia or ptosis   CN V: [x] Facial Sensation is intact  [] Facial Sensation is not intact   CN IIIV:  [x] Hearing is normal to rubbing fingers   CN IX, X:  [x] Normal gag reflex and phonation   CN XI: [x] Shoulder shrug and neck rotation is normal  CNXII: [x] Tongue is midline no deviation or atrophy       For further PE refer to ED note      MENTAL STATUS EXAM:       Cognition:      [x] Alert  [x] Awake  [x] Oriented  [x] Person  [x] Place [x] Time      [] drowsy  [] tired  [] lethargic  [] distractable     Attention/Concentration:   [x] Attentive  [] Distracted        Memory Recent and Remote: [] Intact   [] Impaired [] Partially Impaired     Language: [] Able to recognize and name objects          [] Unable to recognize and name Objects    Fund of Knowledge:  [] Poor []  Fair  [] Good    Speech: [] Normal  [] Soft  [] Slow  [] Fast [] Pressured            [x] Loud [] Dysarthria  [] Incoherent       Appearance: [] Well Groomed  [] Casual Dressed  [] Unkept  [x] Disheveled          [x] Normal weight  [] Thin  [] Overweight  [] Obese           Attitude: [x] Positive  [] Hostile  [] Demanding  [] Guarded  [] Defensive         [x] Cooperative  []  Uncooperative      Behavior:  [x] Normal Gait  [] Abnormal Gait [] Walks with Assistance  [] Deborah Chair     [] Walks with [place]  [] In Hospital Bed  [] Sitting in Chair    Muscle-Skeletal:  [x] Normal Muscle

## 2019-07-23 PROCEDURE — 97161 PT EVAL LOW COMPLEX 20 MIN: CPT

## 2019-07-23 PROCEDURE — 6370000000 HC RX 637 (ALT 250 FOR IP): Performed by: FAMILY MEDICINE

## 2019-07-23 PROCEDURE — 6370000000 HC RX 637 (ALT 250 FOR IP): Performed by: NURSE PRACTITIONER

## 2019-07-23 PROCEDURE — 1240000000 HC EMOTIONAL WELLNESS R&B

## 2019-07-23 PROCEDURE — 97110 THERAPEUTIC EXERCISES: CPT

## 2019-07-23 PROCEDURE — 99231 SBSQ HOSP IP/OBS SF/LOW 25: CPT | Performed by: NURSE PRACTITIONER

## 2019-07-23 PROCEDURE — 6370000000 HC RX 637 (ALT 250 FOR IP): Performed by: PSYCHIATRY & NEUROLOGY

## 2019-07-23 RX ORDER — PALIPERIDONE 3 MG/1
3 TABLET, EXTENDED RELEASE ORAL DAILY
Status: DISCONTINUED | OUTPATIENT
Start: 2019-07-23 | End: 2019-07-23

## 2019-07-23 RX ORDER — VENLAFAXINE HYDROCHLORIDE 75 MG/1
75 CAPSULE, EXTENDED RELEASE ORAL
Status: DISCONTINUED | OUTPATIENT
Start: 2019-07-24 | End: 2019-07-30 | Stop reason: HOSPADM

## 2019-07-23 RX ADMIN — PERPHENAZINE 8 MG: 4 TABLET, FILM COATED ORAL at 10:03

## 2019-07-23 RX ADMIN — CHLORHEXIDINE GLUCONATE 0.12% ORAL RINSE 15 ML: 1.2 LIQUID ORAL at 21:36

## 2019-07-23 RX ADMIN — AMLODIPINE BESYLATE 10 MG: 10 TABLET ORAL at 10:04

## 2019-07-23 RX ADMIN — BENZTROPINE MESYLATE 0.5 MG: 1 TABLET ORAL at 10:04

## 2019-07-23 RX ADMIN — ACETAMINOPHEN 650 MG: 325 TABLET, FILM COATED ORAL at 10:27

## 2019-07-23 RX ADMIN — BENZTROPINE MESYLATE 0.5 MG: 1 TABLET ORAL at 21:36

## 2019-07-23 RX ADMIN — FLUOXETINE HYDROCHLORIDE 80 MG: 20 CAPSULE ORAL at 10:03

## 2019-07-23 RX ADMIN — CHLORHEXIDINE GLUCONATE 0.12% ORAL RINSE 15 ML: 1.2 LIQUID ORAL at 10:17

## 2019-07-23 ASSESSMENT — PAIN DESCRIPTION - FREQUENCY: FREQUENCY: INTERMITTENT

## 2019-07-23 ASSESSMENT — PAIN DESCRIPTION - LOCATION: LOCATION: HEAD

## 2019-07-23 ASSESSMENT — PAIN SCALES - GENERAL: PAINLEVEL_OUTOF10: 10

## 2019-07-23 ASSESSMENT — PAIN DESCRIPTION - DESCRIPTORS: DESCRIPTORS: ACHING

## 2019-07-23 ASSESSMENT — PAIN DESCRIPTION - PAIN TYPE: TYPE: CHRONIC PAIN

## 2019-07-23 NOTE — GROUP NOTE
Group Therapy Note    Date: July 23    Group Start Time: 1630  Group End Time: 1700  Group Topic: Group Therapy    [facility] 2    Wilbert Del Angel RN        Group Therapy Note    Attendees: Patient attended and participated in group therapy             Signature:  Wilbert Del Angel RN

## 2019-07-24 LAB
7-AMINOCLONAZEPAM, URINE: <5 NG/ML
ALPHA-HYDROXYALPRAZOLAM, URINE: <5 NG/ML
ALPHA-HYDROXYMIDAZOLAM, URINE: <20 NG/ML
ALPRAZOLAM, URINE: <5 NG/ML
ANION GAP SERPL CALCULATED.3IONS-SCNC: 12 MMOL/L (ref 7–16)
BUN BLDV-MCNC: 25 MG/DL (ref 6–20)
CALCIUM SERPL-MCNC: 9.4 MG/DL (ref 8.6–10.2)
CHLORDIAZEPOXIDE, URINE: <20 NG/ML
CHLORIDE BLD-SCNC: 102 MMOL/L (ref 98–107)
CLONAZEPAM, URINE: <5 NG/ML
CO2: 25 MMOL/L (ref 22–29)
CREAT SERPL-MCNC: 0.9 MG/DL (ref 0.7–1.2)
DIAZEPAM, URINE: <20 NG/ML
GFR AFRICAN AMERICAN: >60
GFR NON-AFRICAN AMERICAN: >60 ML/MIN/1.73
GLUCOSE BLD-MCNC: 90 MG/DL (ref 74–99)
LORAZEPAM, URINE: <20 NG/ML
MAGNESIUM: 2.1 MG/DL (ref 1.6–2.6)
MIDAZOLAM, URINE: <20 NG/ML
NORDIAZEPAM, URINE: 408 NG/ML
OXAZEPAM, URINE: 1039 NG/ML
POTASSIUM SERPL-SCNC: 4.3 MMOL/L (ref 3.5–5)
SODIUM BLD-SCNC: 139 MMOL/L (ref 132–146)
TEMAZEPAM, URINE: 2149 NG/ML

## 2019-07-24 PROCEDURE — 83735 ASSAY OF MAGNESIUM: CPT

## 2019-07-24 PROCEDURE — 36415 COLL VENOUS BLD VENIPUNCTURE: CPT

## 2019-07-24 PROCEDURE — 6370000000 HC RX 637 (ALT 250 FOR IP): Performed by: FAMILY MEDICINE

## 2019-07-24 PROCEDURE — 99232 SBSQ HOSP IP/OBS MODERATE 35: CPT | Performed by: NURSE PRACTITIONER

## 2019-07-24 PROCEDURE — 1240000000 HC EMOTIONAL WELLNESS R&B

## 2019-07-24 PROCEDURE — 80048 BASIC METABOLIC PNL TOTAL CA: CPT

## 2019-07-24 PROCEDURE — 6370000000 HC RX 637 (ALT 250 FOR IP): Performed by: NURSE PRACTITIONER

## 2019-07-24 RX ORDER — PALIPERIDONE 3 MG/1
3 TABLET, EXTENDED RELEASE ORAL DAILY
Status: DISCONTINUED | OUTPATIENT
Start: 2019-07-24 | End: 2019-07-26

## 2019-07-24 RX ADMIN — VENLAFAXINE HYDROCHLORIDE 75 MG: 75 CAPSULE, EXTENDED RELEASE ORAL at 09:25

## 2019-07-24 RX ADMIN — CHLORHEXIDINE GLUCONATE 0.12% ORAL RINSE 15 ML: 1.2 LIQUID ORAL at 09:27

## 2019-07-24 RX ADMIN — BENZTROPINE MESYLATE 0.5 MG: 1 TABLET ORAL at 22:25

## 2019-07-24 RX ADMIN — PALIPERIDONE 3 MG: 3 TABLET, EXTENDED RELEASE ORAL at 16:59

## 2019-07-24 RX ADMIN — CHLORHEXIDINE GLUCONATE 0.12% ORAL RINSE 15 ML: 1.2 LIQUID ORAL at 22:24

## 2019-07-24 RX ADMIN — AMLODIPINE BESYLATE 10 MG: 10 TABLET ORAL at 09:24

## 2019-07-24 RX ADMIN — BENZTROPINE MESYLATE 0.5 MG: 1 TABLET ORAL at 09:26

## 2019-07-24 ASSESSMENT — PAIN SCALES - GENERAL
PAINLEVEL_OUTOF10: 0
PAINLEVEL_OUTOF10: 0

## 2019-07-24 NOTE — PROGRESS NOTES
07/21/2019    LABGLOM 49 07/21/2019     PT/INR:    Lab Results   Component Value Date    PROTIME 13.4 02/03/2019    PROTIME 11.5 02/05/2011    INR 1.2 02/03/2019     Last 3 Troponin:    Lab Results   Component Value Date    TROPONINI <0.01 12/27/2018    TROPONINI <0.01 11/20/2018    TROPONINI <0.01 10/24/2018     TSH:    Lab Results   Component Value Date    TSH 4.710 10/03/2018      Assessment:     Principal Problem:    Severe episode of recurrent major depressive disorder, with psychotic features ([facility]  Active Problems:    Depression with suicidal ideation    H/O HTN    H/O Crohns disease      Plan:       Continue same plan and orders

## 2019-07-24 NOTE — GROUP NOTE
Group Therapy Note    Date: July 24    Group Start Time: 1630  Group End Time: 1700  Group Topic: Group Therapy    [facility] AC[facility]  2    Richelle Bianchi RN        Group Therapy Note    Attendees: Patient attended and actively participated in group therapy           Signature:  Richelle Bianchi RN

## 2019-07-24 NOTE — CONSULTS
Smoking status: Current Every Day Smoker     Packs/day: 2.00     Types: Cigarettes     Start date: 1    Smokeless tobacco: Never Used   Substance and Sexual Activity    Alcohol use: No     Comment: no alcohol in5 yrs    Drug use: No     Comment: last use 1997 / marijuana    Sexual activity: Yes   Lifestyle    Physical activity:     Days per week: Not on file     Minutes per session: Not on file    Stress: Not on file   Relationships    Social connections:     Talks on phone: Not on file     Gets together: Not on file     Attends Worship service: Not on file     Active member of club or organization: Not on file     Attends meetings of clubs or organizations: Not on file     Relationship status: Not on file    Intimate partner violence:     Fear of current or ex partner: Not on file     Emotionally abused: Not on file     Physically abused: Not on file     Forced sexual activity: Not on file   Other Topics Concern    Not on file   Social History Narrative    ** Merged History Encounter **            Family History   Problem Relation Age of Onset    Mental Illness Mother     Mental Illness Father     Mental Illness Sister     Mental Illness Brother     Heart Disease Mother     Depression Father        REVIEW OF SYSTEMS:     CONSTITUTIONAL:  negative for  fevers, chills, sweats and fatigue  EYES:  negative for  double vision, blurred vision and blind spots  HEENT:  negative for  tinnitus, earaches, nasal congestion and epistaxis  RESPIRATORY:  negative for  dry cough, cough with sputum, dyspnea, wheezing and hemoptysis  CARDIOVASCULAR: as per HPI  GASTROINTESTINAL:  negative for nausea, vomiting, diarrhea, constipation, pruritus and jaundice  GENITOURINARY:  negative for frequency, dysuria, nocturia, urinary incontinence and hesitancy  HEMATOLOGIC/LYMPHATIC:  negative for easy bruising, bleeding, lymphadenopathy and petechiae  ALLERGIC/IMMUNOLOGIC:  negative for urticaria, hay fever and angioedema  ENDOCRINE:  negative for heat intolerance, cold intolerance, tremor, hair loss and diabetic symptoms including neither polyuria nor polydipsia nor blurred vision  MUSCULOSKELETAL:  negative for  myalgias, arthralgias, joint swelling, stiff joints and decreased range of motion  NEUROLOGICAL:  negative for memory problems, speech problems, visual disturbance, dysphagia, weakness and numbness      PHYSICAL EXAM:   CONSTITUTIONAL:  awake, alert, cooperative, no apparent distress, and appears stated age  EYES:  lids and lashes normal and pupils equal, round and reactive to light, anicteric sclerae  HEAD:  normocepalic, without obvious abnormality, atraumatic, pink, moist mucous membranes. NECK:  Supple, symmetrical, trachea midline, no adenopathy, thyroid symmetric, not enlarged and no tenderness, skin normal  HEMATOLOGIC/LYMPHATICS:  no cervical lymphadenopathy and no supraclavicular lymphadenopathy  LUNGS:  No increased work of breathing, good air exchange, clear to auscultation bilaterally, no crackles or wheezing  CARDIOVASCULAR:  Normal apical impulse, regular rate and rhythm, normal S1 and S2, no S3 or S4, and no murmur noted and no JVD, no carotid bruit, no pedal edema, good carotid upstroke bilaterally. ABDOMEN:  Soft, nontender, no masses, no hepatomegaly or splenomegaly, BS+  CHEST: nontender to palpation, expands symmetrically  MUSCULOSKELETAL:  No clubbing no cyanosis. there is no redness, warmth, or swelling of the joints  full range of motion noted  NEUROLOGIC:  Alert, awake,oriented x3, no focal neurologic deficit was appreciated  SKIN:  no bruising or bleeding, normal skin color, texture, turgor and no redness, warmth, or swelling        /64   Pulse 69   Temp 98.7 °F (37.1 °C) (Temporal)   Resp 18   Ht 6' 3\" (1.905 m)   Wt 225 lb (102.1 kg)   SpO2 94%   BMI 28.12 kg/m²     DATA:   I personally reviewed the admission EKG with the following interpretation: sinus rhythm with

## 2019-07-24 NOTE — PROGRESS NOTES
[x] Bizarre                     [] Heightened    [] Exaggerated       Thought Process and Association:  [] Logical [x] Illogical                                        [] Linear and Goal Directed  [] Tangential  [x] Circumstantial      Thought Content:  [x] Denies [] Endorses [] Suicidal [] Homicidal  [] Delusional                                       [] Paranoid  [] Somatic  [] Grandiose     Perception: [x]  None  [] Auditory   [] Visual  [] tactile   [] olfactory  [] Illusions          Insight: [] Intact  [] Fair  [x] Limited    Judgement:  [] Intact  [] Fair  [x] Limited       Assessment/Plan:        Patient Active Problem List   Diagnosis Code    Drug overdose, multiple drugs T50.901A    Severe episode of recurrent major depressive disorder, with psychotic features ([facility] F33.3    Mood disorder due to a general medical condition F06.30    Suicide attempt ([facility] T14.91XA    Laceration of neck S11.91XA    Laceration of left wrist S61.512A    Bipolar 1 disorder (HCC) F31.9    Crohn disease ([facility] K50.90    GERD (gastroesophageal reflux disease) K21.9    Bipolar 1 disorder (HCC) F31.9    Opioid overdose ([facility]) T40.2X1A    Drug-induced tremor G25.1    Orchitis N45.2    Moderate protein-calorie malnutrition (HCC) E44.0    Acute psychosis ([facility]) F23    Schizoaffective disorder, bipolar type ([facility] F25.0    Crohn's disease ([facility] K50.90    H/O carcinoma in situ of prostate Z86.008    Essential hypertension I10    PND (post-nasal drip) R09.82    Colitis K52.9    Scrotal abscess N49.2    Depression with suicidal ideation F32.9, R45.851         Plan:    []  Patient is refusing medications  [x] Improving as expected   [] Not improving as expected   [] Worsening    []  At Baseline     Add Invega 3 mg      Reason for more than one antipsychotic:  [x] N/A  [] 3 failed monotherapy(drugs tried):  [] Cross over to a new antipsychotic  [] Taper to monotherapy from polypharmacy  [] Augmentation of Clozapine therapy

## 2019-07-24 NOTE — PROGRESS NOTES
Took has meds tonight of cogentin. Denies wanting to harm self this evening every 15 min observations continued. Next shift given report and awre of  and Dr Sirena Hagan orders.

## 2019-07-25 PROCEDURE — 6370000000 HC RX 637 (ALT 250 FOR IP): Performed by: FAMILY MEDICINE

## 2019-07-25 PROCEDURE — 99232 SBSQ HOSP IP/OBS MODERATE 35: CPT | Performed by: NURSE PRACTITIONER

## 2019-07-25 PROCEDURE — 6370000000 HC RX 637 (ALT 250 FOR IP): Performed by: NURSE PRACTITIONER

## 2019-07-25 PROCEDURE — 1240000000 HC EMOTIONAL WELLNESS R&B

## 2019-07-25 RX ADMIN — BENZTROPINE MESYLATE 0.5 MG: 1 TABLET ORAL at 10:25

## 2019-07-25 RX ADMIN — AMLODIPINE BESYLATE 10 MG: 10 TABLET ORAL at 10:24

## 2019-07-25 RX ADMIN — CHLORHEXIDINE GLUCONATE 0.12% ORAL RINSE 15 ML: 1.2 LIQUID ORAL at 21:12

## 2019-07-25 RX ADMIN — BENZTROPINE MESYLATE 0.5 MG: 1 TABLET ORAL at 21:11

## 2019-07-25 RX ADMIN — CHLORHEXIDINE GLUCONATE 0.12% ORAL RINSE 15 ML: 1.2 LIQUID ORAL at 10:25

## 2019-07-25 RX ADMIN — PALIPERIDONE 3 MG: 3 TABLET, EXTENDED RELEASE ORAL at 10:25

## 2019-07-25 RX ADMIN — VENLAFAXINE HYDROCHLORIDE 75 MG: 75 CAPSULE, EXTENDED RELEASE ORAL at 10:25

## 2019-07-25 ASSESSMENT — PAIN SCALES - GENERAL: PAINLEVEL_OUTOF10: 0

## 2019-07-25 NOTE — PROGRESS NOTES
9.4 07/24/2019    GFRAA >60 07/24/2019    LABGLOM >60 07/24/2019     PT/INR:    Lab Results   Component Value Date    PROTIME 13.4 02/03/2019    PROTIME 11.5 02/05/2011    INR 1.2 02/03/2019     Last 3 Troponin:    Lab Results   Component Value Date    TROPONINI <0.01 12/27/2018    TROPONINI <0.01 11/20/2018    TROPONINI <0.01 10/24/2018     TSH:    Lab Results   Component Value Date    TSH 4.710 10/03/2018      Assessment:     Principal Problem:    Severe episode of recurrent major depressive disorder, with psychotic features ([facility]  Active Problems:    Depression with suicidal ideation    H/O HTN    H/O Crohn's disease      Plan:       Continue same plan and orders

## 2019-07-25 NOTE — PROGRESS NOTES
labs and radiologic studies. I also participated in medical decision making with Jaren Hernandez CNP on the date of service and I agree with all of the pertinent clinical information, assessment and treatment plan and status of the problem list as documented and have edited it. NOTE: This report was transcribed using voice recognition software.  Every effort was made to ensure accuracy; however, inadvertent computerized transcription errors may be present

## 2019-07-26 PROCEDURE — 99231 SBSQ HOSP IP/OBS SF/LOW 25: CPT | Performed by: NURSE PRACTITIONER

## 2019-07-26 PROCEDURE — 6370000000 HC RX 637 (ALT 250 FOR IP): Performed by: FAMILY MEDICINE

## 2019-07-26 PROCEDURE — 6370000000 HC RX 637 (ALT 250 FOR IP): Performed by: NURSE PRACTITIONER

## 2019-07-26 PROCEDURE — 1240000000 HC EMOTIONAL WELLNESS R&B

## 2019-07-26 RX ORDER — PALIPERIDONE 6 MG/1
6 TABLET, EXTENDED RELEASE ORAL DAILY
Status: DISCONTINUED | OUTPATIENT
Start: 2019-07-27 | End: 2019-07-30 | Stop reason: HOSPADM

## 2019-07-26 RX ADMIN — BENZTROPINE MESYLATE 0.5 MG: 1 TABLET ORAL at 09:24

## 2019-07-26 RX ADMIN — CHLORHEXIDINE GLUCONATE 0.12% ORAL RINSE 15 ML: 1.2 LIQUID ORAL at 21:16

## 2019-07-26 RX ADMIN — AMLODIPINE BESYLATE 10 MG: 10 TABLET ORAL at 09:24

## 2019-07-26 RX ADMIN — VENLAFAXINE HYDROCHLORIDE 75 MG: 75 CAPSULE, EXTENDED RELEASE ORAL at 09:24

## 2019-07-26 RX ADMIN — CHLORHEXIDINE GLUCONATE 0.12% ORAL RINSE 15 ML: 1.2 LIQUID ORAL at 09:25

## 2019-07-26 RX ADMIN — BENZTROPINE MESYLATE 0.5 MG: 1 TABLET ORAL at 21:17

## 2019-07-26 RX ADMIN — PALIPERIDONE 3 MG: 3 TABLET, EXTENDED RELEASE ORAL at 09:24

## 2019-07-26 ASSESSMENT — PAIN SCALES - GENERAL
PAINLEVEL_OUTOF10: 0

## 2019-07-26 NOTE — PROGRESS NOTES
Admit Date: 7/21/2019      Subjective:     Patient: no acute issues reported overnight per nursing staff  Medication side effects: none    Scheduled Meds:   paliperidone  3 mg Oral Daily    venlafaxine  75 mg Oral Daily with breakfast    amLODIPine  10 mg Oral Daily    benztropine  0.5 mg Oral BID    chlorhexidine  15 mL Mouth/Throat BID     Continuous Infusions:  PRN Meds:acetaminophen, hydrOXYzine, OLANZapine **OR** OLANZapine, sterile water, benztropine mesylate, magnesium hydroxide, aluminum & magnesium hydroxide-simethicone, nicotine polacrilex    Objective:   No intake/output data recorded. No intake/output data recorded.     BP 98/69   Pulse 65   Temp 97.5 °F (36.4 °C) (Oral)   Resp 17   Ht 6' 3\" (1.905 m)   Wt 225 lb (102.1 kg)   SpO2 97%   BMI 28.12 kg/m²   General appearance: alert, appears stated age and cooperative  Skin:negative  Heent:negative  Lungs: clear to auscultation bilaterally  Heart: regular rate and rhythm, S1, S2 normal, no murmur, click, rub or gallop  Abdomen: soft, non-tender; bowel sounds normal; no masses,  no organomegaly  Extremities:no edema  Neurologic: Grossly normal    Labs:  CBC with Differential:    Lab Results   Component Value Date    WBC 8.2 07/21/2019    RBC 4.92 07/21/2019    HGB 14.3 07/21/2019    HCT 42.4 07/21/2019     07/21/2019    MCV 86.2 07/21/2019    MCH 29.1 07/21/2019    MCHC 33.7 07/21/2019    RDW 13.4 07/21/2019    NRBC 0.0 07/08/2017    BANDSPCT 1 09/17/2014    METASPCT 1.7 07/08/2017    LYMPHOPCT 22.1 07/21/2019    MONOPCT 7.3 07/21/2019    BASOPCT 0.4 07/21/2019    MONOSABS 0.60 07/21/2019    LYMPHSABS 1.82 07/21/2019    EOSABS 0.02 07/21/2019    BASOSABS 0.03 07/21/2019     BMP:    Lab Results   Component Value Date     07/24/2019    K 4.3 07/24/2019    K 3.9 04/30/2019     07/24/2019    CO2 25 07/24/2019    BUN 25 07/24/2019    LABALBU 4.3 07/21/2019    LABALBU 3.5 02/05/2011    CREATININE 0.9 07/24/2019    CALCIUM 9.4

## 2019-07-27 PROCEDURE — 6370000000 HC RX 637 (ALT 250 FOR IP): Performed by: FAMILY MEDICINE

## 2019-07-27 PROCEDURE — 6370000000 HC RX 637 (ALT 250 FOR IP): Performed by: NURSE PRACTITIONER

## 2019-07-27 PROCEDURE — 99231 SBSQ HOSP IP/OBS SF/LOW 25: CPT | Performed by: NURSE PRACTITIONER

## 2019-07-27 PROCEDURE — 1240000000 HC EMOTIONAL WELLNESS R&B

## 2019-07-27 RX ADMIN — BENZTROPINE MESYLATE 0.5 MG: 1 TABLET ORAL at 09:27

## 2019-07-27 RX ADMIN — CHLORHEXIDINE GLUCONATE 0.12% ORAL RINSE 15 ML: 1.2 LIQUID ORAL at 20:40

## 2019-07-27 RX ADMIN — BENZTROPINE MESYLATE 0.5 MG: 1 TABLET ORAL at 20:39

## 2019-07-27 RX ADMIN — VENLAFAXINE HYDROCHLORIDE 75 MG: 75 CAPSULE, EXTENDED RELEASE ORAL at 09:27

## 2019-07-27 RX ADMIN — CHLORHEXIDINE GLUCONATE 0.12% ORAL RINSE 15 ML: 1.2 LIQUID ORAL at 09:27

## 2019-07-27 RX ADMIN — AMLODIPINE BESYLATE 10 MG: 10 TABLET ORAL at 09:27

## 2019-07-27 RX ADMIN — PALIPERIDONE 6 MG: 6 TABLET, EXTENDED RELEASE ORAL at 09:27

## 2019-07-27 ASSESSMENT — PAIN SCALES - GENERAL: PAINLEVEL_OUTOF10: 0

## 2019-07-27 NOTE — PROGRESS NOTES
Patient denies SI, HI or hallucinations. Patient isolative to his room this evening. Will continue to observe, and support.

## 2019-07-27 NOTE — PROGRESS NOTES
[] Heightened    [] Exaggerated       Thought Process and Association:  [] Logical [x] Illogical                                        [] Linear and Goal Directed  [] Tangential  [x] Circumstantial      Thought Content:  [x] Denies [] Endorses [] Suicidal [] Homicidal  [] Delusional                                       [] Paranoid  [] Somatic  [] Grandiose     Perception: [x]  None  [] Auditory   [] Visual  [] tactile   [] olfactory  [] Illusions          Insight: [] Intact  [] Fair  [x] Limited    Judgement:  [] Intact  [] Fair  [x] Limited       Assessment/Plan:        Patient Active Problem List   Diagnosis Code    Drug overdose, multiple drugs T50.901A    Severe episode of recurrent major depressive disorder, with psychotic features ([facility] F33.3    Mood disorder due to a general medical condition F06.30    Suicide attempt ([facility] T14.91XA    Laceration of neck S11.91XA    Laceration of left wrist S61.512A    Bipolar 1 disorder (HCC) F31.9    Crohn disease ([facility] K50.90    GERD (gastroesophageal reflux disease) K21.9    Bipolar 1 disorder (HCC) F31.9    Opioid overdose ([facility]) T40.2X1A    Drug-induced tremor G25.1    Orchitis N45.2    Moderate protein-calorie malnutrition ([facility]) E44.0    Acute psychosis (HCC) F23    Schizoaffective disorder, bipolar type ([facility] F25.0    Crohn's disease ([facility] K50.90    H/O carcinoma in situ of prostate Z86.008    Essential hypertension I10    PND (post-nasal drip) R09.82    Colitis K52.9    Scrotal abscess N49.2    Depression with suicidal ideation F32.9, R45.851         Plan:    []  Patient is refusing medications  [x] Improving as expected   [] Not improving as expected   [] Worsening    []  At Baseline       Continue current treatment  EKG in AM  Family meeting today      Reason for more than one antipsychotic:  [x] N/A  [] 3 failed monotherapy(drugs tried):  [] Cross over to a new antipsychotic  [] Taper to monotherapy from polypharmacy  [] Augmentation

## 2019-07-28 PROCEDURE — 1240000000 HC EMOTIONAL WELLNESS R&B

## 2019-07-28 PROCEDURE — 99231 SBSQ HOSP IP/OBS SF/LOW 25: CPT | Performed by: NURSE PRACTITIONER

## 2019-07-28 PROCEDURE — 6370000000 HC RX 637 (ALT 250 FOR IP): Performed by: FAMILY MEDICINE

## 2019-07-28 PROCEDURE — 6370000000 HC RX 637 (ALT 250 FOR IP): Performed by: NURSE PRACTITIONER

## 2019-07-28 RX ADMIN — VENLAFAXINE HYDROCHLORIDE 75 MG: 75 CAPSULE, EXTENDED RELEASE ORAL at 09:14

## 2019-07-28 RX ADMIN — CHLORHEXIDINE GLUCONATE 0.12% ORAL RINSE 15 ML: 1.2 LIQUID ORAL at 20:34

## 2019-07-28 RX ADMIN — CHLORHEXIDINE GLUCONATE 0.12% ORAL RINSE 15 ML: 1.2 LIQUID ORAL at 09:14

## 2019-07-28 RX ADMIN — AMLODIPINE BESYLATE 10 MG: 10 TABLET ORAL at 09:13

## 2019-07-28 RX ADMIN — BENZTROPINE MESYLATE 0.5 MG: 1 TABLET ORAL at 20:34

## 2019-07-28 RX ADMIN — BENZTROPINE MESYLATE 0.5 MG: 1 TABLET ORAL at 09:13

## 2019-07-28 RX ADMIN — PALIPERIDONE 6 MG: 6 TABLET, EXTENDED RELEASE ORAL at 09:13

## 2019-07-28 ASSESSMENT — PAIN SCALES - GENERAL
PAINLEVEL_OUTOF10: 0
PAINLEVEL_OUTOF10: 0

## 2019-07-28 NOTE — PROGRESS NOTES
Attended afternoon leisure activity. Able to identify the benefits of leisure and activities to enhance happiness.

## 2019-07-28 NOTE — PROGRESS NOTES
DATE OF SERVICE:     7/28/2019    Glenn Bourgeois seen today for the purpose of continuation of care. Nursing, social work reports, laboratory studies and vital signs are reviewed. Patient chief complaint today is: not depressed or anxious            [] Depression      [] Anxiety        [] Psychosis         [] Suicidal/Homicidal                         [] Delusions           [] Aggression          Subjective: Today patient is pleasant and cooperative. States he is not depressed or anxious. Pleasant and cooperative. Denies SI, HI, and AVH. Med compliant and going to groups. Sleep:  [x] Good [] Fair  [] Poor  Appetite:  [x] Good [] Fair  [] Poor    Depression:  [] Mild [] Moderate [] Severe                [] Constant [] Sporadic     Anxiety: [] Mild [] Moderate [] Severe    [] Constant [] Sporadic     Delusions: [] Mild [] Moderate [] Severe     [] Constant [] Sporadic     [] Paranoid [] Somatic [] Grandiose     Hallucinations: [] Mild [] Moderate [] Severe     [] Constant [] Sporadic    [] Auditory  [] Visual [] Tactile       Suicidal: [] Constant [] Sporadic  Homicidal: [] Constant [] Sporadic    Unscheduled Medications     [] Patient Receiving Emergency Medications \" Chemical Restraint\"   [] Requesting PRN medications for anxiety    Medical Review of Systems:     All other than marked systmes have been reviewed and are all negative.     Constitutional Symptoms: []  fever []  Chills  Skin Symptoms: [] rash []  Pruritus   Eye Symptoms: [] Vision unchanged []  recent vision problems[] blurred vision   Respiratory Symptoms:[] shortness of breath [] cough  Cardiovascular Symptoms:  [] chest pain   [] palpitations   Gastrointestinal Symptoms: []  abdominal pain []  nausea []  vomiting []  diarrhea  Genitourinary Symptoms: []  dysuria  []  hematuria   Musculoskeletal Symptoms: []  back pain []  muscle pain []  joint pain  Neurologic Symptoms: []  headache []  dizziness  Hematolymphoid Symptoms: [] Adenopathy

## 2019-07-29 PROCEDURE — 6370000000 HC RX 637 (ALT 250 FOR IP): Performed by: NURSE PRACTITIONER

## 2019-07-29 PROCEDURE — 1240000000 HC EMOTIONAL WELLNESS R&B

## 2019-07-29 PROCEDURE — 99231 SBSQ HOSP IP/OBS SF/LOW 25: CPT | Performed by: NURSE PRACTITIONER

## 2019-07-29 PROCEDURE — 6370000000 HC RX 637 (ALT 250 FOR IP): Performed by: FAMILY MEDICINE

## 2019-07-29 RX ADMIN — VENLAFAXINE HYDROCHLORIDE 75 MG: 75 CAPSULE, EXTENDED RELEASE ORAL at 08:29

## 2019-07-29 RX ADMIN — BENZTROPINE MESYLATE 0.5 MG: 1 TABLET ORAL at 21:34

## 2019-07-29 RX ADMIN — CHLORHEXIDINE GLUCONATE 0.12% ORAL RINSE 15 ML: 1.2 LIQUID ORAL at 08:29

## 2019-07-29 RX ADMIN — BENZTROPINE MESYLATE 0.5 MG: 1 TABLET ORAL at 08:29

## 2019-07-29 RX ADMIN — CHLORHEXIDINE GLUCONATE 0.12% ORAL RINSE 15 ML: 1.2 LIQUID ORAL at 21:34

## 2019-07-29 RX ADMIN — PALIPERIDONE 6 MG: 6 TABLET, EXTENDED RELEASE ORAL at 08:29

## 2019-07-29 RX ADMIN — AMLODIPINE BESYLATE 10 MG: 10 TABLET ORAL at 08:29

## 2019-07-29 ASSESSMENT — PAIN SCALES - GENERAL
PAINLEVEL_OUTOF10: 0
PAINLEVEL_OUTOF10: 0

## 2019-07-29 NOTE — PROGRESS NOTES
Patient compliant with scheduled medications, ate 100% breakfast. Attends group therapy. Denies thoughts to harm self or others, denies hallucinations. Is preoccupied and observed talking to self in his room. Patient speech is loud and pressured. Is anxious and worried , but this is improving. Behavior in control.  Will monitor

## 2019-07-30 VITALS
WEIGHT: 225 LBS | SYSTOLIC BLOOD PRESSURE: 141 MMHG | TEMPERATURE: 97.2 F | RESPIRATION RATE: 18 BRPM | HEIGHT: 75 IN | DIASTOLIC BLOOD PRESSURE: 96 MMHG | HEART RATE: 89 BPM | BODY MASS INDEX: 27.98 KG/M2 | OXYGEN SATURATION: 99 %

## 2019-07-30 PROCEDURE — 99238 HOSP IP/OBS DSCHRG MGMT 30/<: CPT | Performed by: NURSE PRACTITIONER

## 2019-07-30 PROCEDURE — 6370000000 HC RX 637 (ALT 250 FOR IP): Performed by: NURSE PRACTITIONER

## 2019-07-30 PROCEDURE — 6370000000 HC RX 637 (ALT 250 FOR IP): Performed by: FAMILY MEDICINE

## 2019-07-30 RX ORDER — VENLAFAXINE HYDROCHLORIDE 75 MG/1
75 CAPSULE, EXTENDED RELEASE ORAL
Qty: 30 CAPSULE | Refills: 0 | Status: SHIPPED | OUTPATIENT
Start: 2019-07-31 | End: 2020-01-25

## 2019-07-30 RX ORDER — PALIPERIDONE 6 MG/1
6 TABLET, EXTENDED RELEASE ORAL DAILY
Qty: 30 TABLET | Refills: 0 | Status: SHIPPED | OUTPATIENT
Start: 2019-07-31 | End: 2020-01-25

## 2019-07-30 RX ORDER — AMLODIPINE BESYLATE 10 MG/1
10 TABLET ORAL DAILY
Qty: 7 TABLET | Refills: 0 | Status: SHIPPED | OUTPATIENT
Start: 2019-07-30 | End: 2019-08-26 | Stop reason: SDUPTHER

## 2019-07-30 RX ADMIN — BENZTROPINE MESYLATE 0.5 MG: 1 TABLET ORAL at 08:46

## 2019-07-30 RX ADMIN — CHLORHEXIDINE GLUCONATE 0.12% ORAL RINSE 15 ML: 1.2 LIQUID ORAL at 08:46

## 2019-07-30 RX ADMIN — AMLODIPINE BESYLATE 10 MG: 10 TABLET ORAL at 08:46

## 2019-07-30 RX ADMIN — VENLAFAXINE HYDROCHLORIDE 75 MG: 75 CAPSULE, EXTENDED RELEASE ORAL at 08:46

## 2019-07-30 RX ADMIN — PALIPERIDONE 6 MG: 6 TABLET, EXTENDED RELEASE ORAL at 08:46

## 2019-07-30 ASSESSMENT — PAIN SCALES - GENERAL
PAINLEVEL_OUTOF10: 0
PAINLEVEL_OUTOF10: 0

## 2019-07-30 NOTE — PLAN OF CARE
Isolative to room this evening mostly sleeping. States he feels tired. Declined relaxation group. Pt voices anxiety 8/10 and denies depression. Denies SI, HI and hallucinations.      Problem: Depressive Behavior With or Without Suicide Precautions:  Goal: Able to verbalize acceptance of life and situations over which he or she has no control  Description  Able to verbalize acceptance of life and situations over which he or she has no control  7/25/2019 2240 by Pablo   Outcome: Met This Shift     Problem: Depressive Behavior With or Without Suicide Precautions:  Goal: Able to verbalize and/or display a decrease in depressive symptoms  Description  Able to verbalize and/or display a decrease in depressive symptoms  7/25/2019 2240 by [place]   Outcome: Met This Shift     Problem: Depressive Behavior With or Without Suicide Precautions:  Goal: Able to verbalize support systems  Description  Able to verbalize support systems  7/25/2019 2240 by Pablo   Outcome: Met This Shift     Problem: Depressive Behavior With or Without Suicide Precautions:  Goal: Absence of self-harm  Description  Absence of self-harm  7/25/2019 2240 by Pablo   Outcome: Met This Shift
Nakita Milton very polite but mostly to himself much of the day. approp and verbal when approached for conferences. Became rather loud and animated during one of the afternoon groups, speaking about his frustrations w med failures over the years given his particular condn. States he did not like certain MDs saying he was \"drug seeking\" when he was merely looking for a successful med to reduce his aH sx. Signed his 3 d intra D conference slip. Ackn underst but also asked entirely approp   ( insightful ) questions. Denies a/vH, si, hi at present . States ongoing difficulty w anx;  runs 7-8+ all the time he says. States he is concerned re his resting tremor ( which is rather pronounced ). States this is fr being given ( needing ) large doses of haldol at a young age.    Also requiring halcion for sleep routinely ( also at a young age. )
Problem: Depressive Behavior With or Without Suicide Precautions:  Goal: Ability to disclose and discuss suicidal ideas will improve  Description  Ability to disclose and discuss suicidal ideas will improve  Outcome: Met This Shift     Problem: Depressive Behavior With or Without Suicide Precautions:  Goal: Absence of self-harm  Description  Absence of self-harm  Outcome: Met This Shift     Problem: Depressive Behavior With or Without Suicide Precautions:  Goal: Participates in care planning  Description  Participates in care planning  Outcome: Met This Shift     Problem: Falls - Risk of:  Goal: Will remain free from falls  Description  Will remain free from falls  Outcome: Met This Shift     Problem: Falls - Risk of:  Goal: Absence of physical injury  Description  Absence of physical injury  Outcome: Met This Shift     Problem: Depressive Behavior With or Without Suicide Precautions:  Goal: Able to verbalize acceptance of life and situations over which he or she has no control  Description  Able to verbalize acceptance of life and situations over which he or she has no control  Outcome: Ongoing     Problem: Depressive Behavior With or Without Suicide Precautions:  Goal: Able to verbalize and/or display a decrease in depressive symptoms  Description  Able to verbalize and/or display a decrease in depressive symptoms  Outcome: Ongoing     Problem: Depressive Behavior With or Without Suicide Precautions:  Goal: Able to verbalize support systems  Description  Able to verbalize support systems  Outcome: Ongoing
Problem: Depressive Behavior With or Without Suicide Precautions:  Goal: Able to verbalize acceptance of life and situations over which he or she has no control  Description  Able to verbalize acceptance of life and situations over which he or she has no control  7/29/2019 1131 by Wing Urban RN  Outcome: Ongoing  7/28/2019 2205 by Iker Hoover RN  Outcome: Ongoing  Goal: Able to verbalize and/or display a decrease in depressive symptoms  Description  Able to verbalize and/or display a decrease in depressive symptoms  7/29/2019 1131 by Wing Urban RN  Outcome: Ongoing  Goal: Ability to disclose and discuss suicidal ideas will improve  Description  Ability to disclose and discuss suicidal ideas will improve  7/29/2019 1131 by Wing Urban RN  Outcome: Ongoing  7/28/2019 2205 by Iker Hoover RN  Outcome: Met This Shift  Goal: Able to verbalize support systems  Description  Able to verbalize support systems  7/29/2019 1131 by Wing Urban RN  Outcome: Ongoing  7/28/2019 2205 by Iker Hoover RN  Outcome: Ongoing  Goal: Absence of self-harm  Description  Absence of self-harm  7/29/2019 1131 by Wing Urban RN  Outcome: Ongoing  Goal: Participates in care planning  Description  Participates in care planning  7/29/2019 1131 by Wing Urban RN  Outcome: Ongoing
Problem: Depressive Behavior With or Without Suicide Precautions:  Goal: Able to verbalize acceptance of life and situations over which he or she has no control  Description  Able to verbalize acceptance of life and situations over which he or she has no control  Outcome: Ongoing  Goal: Able to verbalize and/or display a decrease in depressive symptoms  Description  Able to verbalize and/or display a decrease in depressive symptoms  Outcome: Ongoing  Goal: Ability to disclose and discuss suicidal ideas will improve  Description  Ability to disclose and discuss suicidal ideas will improve  Outcome: Ongoing  Goal: Able to verbalize support systems  Description  Able to verbalize support systems  Outcome: Ongoing  Goal: Absence of self-harm  Description  Absence of self-harm  Outcome: Ongoing  Goal: Participates in care planning  Description  Participates in care planning  Outcome: Ongoing
Pt cooperative and pleasant. Denies SI, HI, and hallucinations at this time. Pt is out and social with peers. Pt expresses poor insight. Pt states that he would like to return home with his wife upon discharge. Pt reminded that he and his wife had planned their suicide attempts together. Pt states that they would not do this again. Pt is out and social with peers. No unit problems. Will continue to observe and support.
Pt cooperative and pleasant. Denies SI, HI, and hallucinations at this time. Pt is out and social with peers. Pt states that he is depressed, but feels safe while he is here. Pt states that he is tired of feeling depressed. No unit problems. Will continue to observe and support.
Pt denies SI HI and hallucinations. Pt states \"I'm ready to get out of here. \" Pt rates depression 6/10 and anxiety 6-7/10. States he feels much better than when he first came to the unit. Pt is pleasant, brightened. Pt is medication compliant. Pt is attending groups and eating provided meals. No aggression. Pt is social with staff and others on the unit. Pt independent in ADLs. We will continue to provide support and comfort for the patient.      Problem: Depressive Behavior With or Without Suicide Precautions:  Goal: Able to verbalize acceptance of life and situations over which he or she has no control  Description  Able to verbalize acceptance of life and situations over which he or she has no control  Outcome: Met This Shift     Problem: Depressive Behavior With or Without Suicide Precautions:  Goal: Able to verbalize and/or display a decrease in depressive symptoms  Description  Able to verbalize and/or display a decrease in depressive symptoms  Outcome: Met This Shift     Problem: Depressive Behavior With or Without Suicide Precautions:  Goal: Ability to disclose and discuss suicidal ideas will improve  Description  Ability to disclose and discuss suicidal ideas will improve  Outcome: Met This Shift     Problem: Depressive Behavior With or Without Suicide Precautions:  Goal: Able to verbalize support systems  Description  Able to verbalize support systems  Outcome: Met This Shift     Problem: Depressive Behavior With or Without Suicide Precautions:  Goal: Absence of self-harm  Description  Absence of self-harm  Outcome: Met This Shift     Problem: Depressive Behavior With or Without Suicide Precautions:  Goal: Participates in care planning  Description  Participates in care planning  Outcome: Met This Shift     Problem: Falls - Risk of:  Goal: Will remain free from falls  Description  Will remain free from falls  Outcome: Met This Shift
Pt isolative to room this evening. Spent most of evening sleeping. Rates depression 7/10 and anxiety 9/10 related to discharge planning. Declined relaxation group. Pt spoke about wife briefly this evening. Denies SI, HI and hallucinations.        Problem: Depressive Behavior With or Without Suicide Precautions:  Goal: Ability to disclose and discuss suicidal ideas will improve  Description  Ability to disclose and discuss suicidal ideas will improve  7/23/2019 2230 by John John  Outcome: Met This Shift     Problem: Depressive Behavior With or Without Suicide Precautions:  Goal: Able to verbalize support systems  Description  Able to verbalize support systems  Outcome: Met This Shift     Problem: Depressive Behavior With or Without Suicide Precautions:  Goal: Absence of self-harm  Description  Absence of self-harm  Outcome: Met This Shift     Problem: Suicide risk  Goal: Provide patient with safe environment  Description  Provide patient with safe environment  Outcome: Met This Shift
Pt isolative to room. Cooperative. Reports anxiety \"a little bit\". Reports depression 7/10. Brightened mood when speaking about wife. States his wife is out of the coma and he was able to speak to her today. Pt states him and his wife turned on the gas in the house as a suicide attempt because they only had $5 to last for the month. States they have no family support \"they all sick of us, because we go over to eat\". Pt Currently denies SI states \"not any more, no\". Denies Hi and hallucinations.     Problem: Depressive Behavior With or Without Suicide Precautions:  Goal: Able to verbalize support systems  Description  Able to verbalize support systems  7/22/2019 2138 by Fletcher Garcia  Outcome: Met This Shift     Problem: Depressive Behavior With or Without Suicide Precautions:  Goal: Absence of self-harm  Description  Absence of self-harm  7/22/2019 2138 by Fletcher Garcia  Outcome: Met This Shift     Problem: Falls - Risk of:  Goal: Will remain free from falls  Description  Will remain free from falls  7/22/2019 2138 by Fletcher Garcia  Outcome: Met This Shift     Problem: Falls - Risk of:  Goal: Absence of physical injury  Description  Absence of physical injury  Outcome: Met This Shift     Problem: Suicide risk  Goal: Provide patient with safe environment  Description  Provide patient with safe environment  Outcome: Met This Shift
Pt resting in bed with eyes closed, no observed abnormalities. Close observations continue.    Problem: Depressive Behavior With or Without Suicide Precautions:  Goal: Able to verbalize support systems  Description  Able to verbalize support systems  Outcome: Met This Shift     Problem: Suicide risk  Goal: Provide patient with safe environment  Description  Provide patient with safe environment  7/24/2019 0300 by Manolo Lyons  Outcome: Met This Shift
Met This Shift

## 2019-07-30 NOTE — PROGRESS NOTES
CLINICAL PHARMACY NOTE: MEDS TO [address] Select Patient?: Yes  Total # of Prescriptions Filled: 2   The following medications were delivered to the patient:  · Venlafaxine hcl er 75mg cp24  · paliperidone er 6mg tb24  Total # of Interventions Completed: 3  Time Spent (min): 30    Additional Documentation:

## 2019-08-11 LAB
EKG ATRIAL RATE: 70 BPM
EKG ATRIAL RATE: 73 BPM
EKG ATRIAL RATE: 74 BPM
EKG P AXIS: 46 DEGREES
EKG P AXIS: 49 DEGREES
EKG P AXIS: 54 DEGREES
EKG P-R INTERVAL: 148 MS
EKG P-R INTERVAL: 154 MS
EKG P-R INTERVAL: 164 MS
EKG Q-T INTERVAL: 378 MS
EKG Q-T INTERVAL: 406 MS
EKG Q-T INTERVAL: 414 MS
EKG QRS DURATION: 88 MS
EKG QRS DURATION: 88 MS
EKG QRS DURATION: 90 MS
EKG QTC CALCULATION (BAZETT): 408 MS
EKG QTC CALCULATION (BAZETT): 450 MS
EKG QTC CALCULATION (BAZETT): 456 MS
EKG R AXIS: 43 DEGREES
EKG R AXIS: 52 DEGREES
EKG R AXIS: 53 DEGREES
EKG T AXIS: 54 DEGREES
EKG T AXIS: 64 DEGREES
EKG T AXIS: 84 DEGREES
EKG VENTRICULAR RATE: 70 BPM
EKG VENTRICULAR RATE: 73 BPM
EKG VENTRICULAR RATE: 74 BPM

## 2019-09-05 ENCOUNTER — HOSPITAL ENCOUNTER (EMERGENCY)
Age: 54
Discharge: HOME | End: 2019-09-05
Attending: EMERGENCY MEDICINE
Payer: COMMERCIAL

## 2019-09-05 ENCOUNTER — APPOINTMENT (OUTPATIENT)
Dept: CT IMAGING | Age: 54
End: 2019-09-05
Payer: COMMERCIAL

## 2019-09-05 VITALS
DIASTOLIC BLOOD PRESSURE: 100 MMHG | TEMPERATURE: 98.1 F | WEIGHT: 230 LBS | HEART RATE: 69 BPM | OXYGEN SATURATION: 96 % | HEIGHT: 75 IN | BODY MASS INDEX: 28.6 KG/M2 | RESPIRATION RATE: 20 BRPM | SYSTOLIC BLOOD PRESSURE: 147 MMHG

## 2019-09-05 DIAGNOSIS — M54.2 NECK PAIN: ICD-10-CM

## 2019-09-05 DIAGNOSIS — R45.851 SUICIDAL IDEATION: Primary | ICD-10-CM

## 2019-09-05 DIAGNOSIS — T14.91XA SUICIDE ATTEMPT (HCC): ICD-10-CM

## 2019-09-05 DIAGNOSIS — S09.90XA CLOSED HEAD INJURY, INITIAL ENCOUNTER: ICD-10-CM

## 2019-09-05 LAB
ACETAMINOPHEN LEVEL: <5 MCG/ML (ref 10–30)
ALBUMIN SERPL-MCNC: 4.1 G/DL (ref 3.5–5.2)
ALP BLD-CCNC: 152 U/L (ref 40–129)
ALT SERPL-CCNC: 14 U/L (ref 0–40)
AMPHETAMINE SCREEN, URINE: NOT DETECTED
ANION GAP SERPL CALCULATED.3IONS-SCNC: 11 MMOL/L (ref 7–16)
AST SERPL-CCNC: 14 U/L (ref 0–39)
BACTERIA: ABNORMAL /HPF
BARBITURATE SCREEN URINE: NOT DETECTED
BASOPHILS ABSOLUTE: 0.04 E9/L (ref 0–0.2)
BASOPHILS RELATIVE PERCENT: 0.6 % (ref 0–2)
BENZODIAZEPINE SCREEN, URINE: POSITIVE
BILIRUB SERPL-MCNC: 0.3 MG/DL (ref 0–1.2)
BILIRUBIN URINE: ABNORMAL
BLOOD, URINE: ABNORMAL
BUN BLDV-MCNC: 12 MG/DL (ref 6–20)
CALCIUM SERPL-MCNC: 9.1 MG/DL (ref 8.6–10.2)
CANNABINOID SCREEN URINE: NOT DETECTED
CHLORIDE BLD-SCNC: 102 MMOL/L (ref 98–107)
CLARITY: CLEAR
CO2: 26 MMOL/L (ref 22–29)
COCAINE METABOLITE SCREEN URINE: NOT DETECTED
COLOR: YELLOW
CREAT SERPL-MCNC: 1.1 MG/DL (ref 0.7–1.2)
EKG ATRIAL RATE: 87 BPM
EKG P AXIS: 32 DEGREES
EKG P-R INTERVAL: 128 MS
EKG Q-T INTERVAL: 352 MS
EKG QRS DURATION: 70 MS
EKG QTC CALCULATION (BAZETT): 423 MS
EKG R AXIS: 43 DEGREES
EKG T AXIS: 57 DEGREES
EKG VENTRICULAR RATE: 87 BPM
EOSINOPHILS ABSOLUTE: 0.21 E9/L (ref 0.05–0.5)
EOSINOPHILS RELATIVE PERCENT: 3 % (ref 0–6)
ETHANOL: <10 MG/DL (ref 0–0.08)
GFR AFRICAN AMERICAN: >60
GFR NON-AFRICAN AMERICAN: >60 ML/MIN/1.73
GLUCOSE BLD-MCNC: 96 MG/DL (ref 74–99)
GLUCOSE URINE: NEGATIVE MG/DL
HCT VFR BLD CALC: 44.5 % (ref 37–54)
HEMOGLOBIN: 14.7 G/DL (ref 12.5–16.5)
IMMATURE GRANULOCYTES #: 0.02 E9/L
IMMATURE GRANULOCYTES %: 0.3 % (ref 0–5)
KETONES, URINE: NEGATIVE MG/DL
LEUKOCYTE ESTERASE, URINE: NEGATIVE
LYMPHOCYTES ABSOLUTE: 1.45 E9/L (ref 1.5–4)
LYMPHOCYTES RELATIVE PERCENT: 20.5 % (ref 20–42)
Lab: ABNORMAL
MCH RBC QN AUTO: 28.2 PG (ref 26–35)
MCHC RBC AUTO-ENTMCNC: 33 % (ref 32–34.5)
MCV RBC AUTO: 85.4 FL (ref 80–99.9)
METHADONE SCREEN, URINE: NOT DETECTED
MONOCYTES ABSOLUTE: 0.49 E9/L (ref 0.1–0.95)
MONOCYTES RELATIVE PERCENT: 6.9 % (ref 2–12)
NEUTROPHILS ABSOLUTE: 4.85 E9/L (ref 1.8–7.3)
NEUTROPHILS RELATIVE PERCENT: 68.7 % (ref 43–80)
NITRITE, URINE: NEGATIVE
OPIATE SCREEN URINE: NOT DETECTED
PDW BLD-RTO: 14 FL (ref 11.5–15)
PH UA: 6 (ref 5–9)
PHENCYCLIDINE SCREEN URINE: NOT DETECTED
PLATELET # BLD: 273 E9/L (ref 130–450)
PMV BLD AUTO: 9.1 FL (ref 7–12)
POTASSIUM SERPL-SCNC: 4 MMOL/L (ref 3.5–5)
PROPOXYPHENE SCREEN: NOT DETECTED
PROTEIN UA: ABNORMAL MG/DL
RBC # BLD: 5.21 E12/L (ref 3.8–5.8)
RBC UA: ABNORMAL /HPF (ref 0–2)
SALICYLATE, SERUM: <0.3 MG/DL (ref 0–30)
SODIUM BLD-SCNC: 139 MMOL/L (ref 132–146)
SPECIFIC GRAVITY UA: 1.02 (ref 1–1.03)
TOTAL PROTEIN: 7.5 G/DL (ref 6.4–8.3)
TRICYCLIC ANTIDEPRESSANTS SCREEN SERUM: NEGATIVE NG/ML
UROBILINOGEN, URINE: 0.2 E.U./DL
WBC # BLD: 7.1 E9/L (ref 4.5–11.5)
WBC UA: ABNORMAL /HPF (ref 0–5)

## 2019-09-05 PROCEDURE — 36415 COLL VENOUS BLD VENIPUNCTURE: CPT

## 2019-09-05 PROCEDURE — 80307 DRUG TEST PRSMV CHEM ANLYZR: CPT

## 2019-09-05 PROCEDURE — G0480 DRUG TEST DEF 1-7 CLASSES: HCPCS

## 2019-09-05 PROCEDURE — 93010 ELECTROCARDIOGRAM REPORT: CPT | Performed by: INTERNAL MEDICINE

## 2019-09-05 PROCEDURE — 81001 URINALYSIS AUTO W/SCOPE: CPT

## 2019-09-05 PROCEDURE — 85025 COMPLETE CBC W/AUTO DIFF WBC: CPT

## 2019-09-05 PROCEDURE — 93005 ELECTROCARDIOGRAM TRACING: CPT | Performed by: EMERGENCY MEDICINE

## 2019-09-05 PROCEDURE — 80053 COMPREHEN METABOLIC PANEL: CPT

## 2019-09-05 PROCEDURE — 70450 CT HEAD/BRAIN W/O DYE: CPT

## 2019-09-05 PROCEDURE — 99285 EMERGENCY DEPT VISIT HI MDM: CPT

## 2019-09-05 PROCEDURE — 72125 CT NECK SPINE W/O DYE: CPT

## 2019-09-05 NOTE — ED NOTES
FIRST PROVIDER CONTACT ASSESSMENT NOTE      Department of Emergency Medicine   9/5/19  12:06 PM    Chief Complaint: Suicidal (pt reports he has many plans for harming self.)      History of Present Illness:    Alona Mayo is a 47 y.o. male who presents to the ED by private car for SI. States \"many plans\" to harm himself. Patient pink slipped. Focused Screening Exam:  Constitutional:  Alert, appears stated age and is in no distress.       *ALLERGIES*     Ciprofloxacin hcl and Nsaids     ED Triage Vitals   BP Temp Temp Source Pulse Resp SpO2 Height Weight   09/05/19 1205 09/05/19 1205 09/05/19 1205 09/05/19 1139 09/05/19 1205 09/05/19 1139 09/05/19 1205 09/05/19 1205   (!) 136/116 98.4 °F (36.9 °C) Temporal 124 17 94 % 6' 3\" (1.905 m) 230 lb (104.3 kg)        Initial Plan of Care:  Initiate Treatment-Testing, Proceed toTreatment Area When Bed Available for ED Attending/MLP to Continue Care    -----------------[address] ASSESSMENT NOTE--------------  Electronically signed by RICHAR Roman   DD: 9/5/19       RICHAR Roman  09/05/19 120

## 2019-09-05 NOTE — ED NOTES
Per SARA Miller, [facility] said inmates cannot go on our psych unit. Pt will return to residential and residential can pursue [facility] referral if needed. Nurse to nurse report called to Nurse Larry Villa at residential.       Vannesa Toussaint RN  09/05/19 9830

## 2019-09-05 NOTE — ED PROVIDER NOTES
Department of Emergency Medicine   ED  Provider Note  Admit Date/RoomTime: 9/5/2019 12:06 PM  ED Room: [address] [place] [address]          History of Present Illness:  9/5/19, Time: 12:16 PM         Ramandeep Kumar is a 47 y.o. male presenting to the ED for suicidal, beginning earlier today. The complaint has been persistent, moderate in severity, and worsened by emotional upset. Pt states that he is in halfway because he tried to commit suicide by turning on the gas on his stove. States that today, he jumped up off a table then landed head first trying to commit suicide in halfway. Pt states that he has left head pain, neck pain, and right lower back pain. States that he feels \"worthless, hate himself, and has nothing going for him\". States that he is incontinent of urine and does not go anything. States that he has a hx of ADD, bipolar 1 disorder, schizophrenia, and depression for which he is on disability for. Staff from the halfway reports that pt has dot on suicide watch. Denies chest pain, shortness of breath, fever, abdominal pain, nausea, emesis, and further complaints at this time. Review of Systems:   Pertinent positives and negatives are stated within HPI, all other systems reviewed and are negative.      --------------------------------------------- PAST HISTORY ---------------------------------------------  Past Medical History:  has a past medical history of ADD (attention deficit disorder), Anxiety, Bipolar 1 disorder ([facility] [address] Cancer ([facility] [address] Crohn's disease ([facility] [address] Depression, GERD (gastroesophageal reflux disease), Panic attack, Rectal pain, and Schizo affective schizophrenia ([facility] [address] Past Surgical History:  has a past surgical history that includes Colonoscopy; Nose surgery (2002?); Prostatectomy (9/15/2014); Endoscopy, colon, diagnostic; and incision and drainage (N/A, 5/15/2019). Social History:  reports that he has been smoking cigarettes. He started smoking about 41 years ago.  He has been smoking about 2.00 packs

## 2019-09-09 LAB
7-AMINOCLONAZEPAM, URINE: <5 NG/ML
ALPHA-HYDROXYALPRAZOLAM, URINE: <5 NG/ML
ALPHA-HYDROXYMIDAZOLAM, URINE: <20 NG/ML
ALPRAZOLAM, URINE: <5 NG/ML
CHLORDIAZEPOXIDE, URINE: <20 NG/ML
CLONAZEPAM, URINE: <5 NG/ML
DIAZEPAM, URINE: <20 NG/ML
LORAZEPAM, URINE: <20 NG/ML
MIDAZOLAM, URINE: <20 NG/ML
NORDIAZEPAM, URINE: 734 NG/ML
OXAZEPAM, URINE: 3916 NG/ML
TEMAZEPAM, URINE: 3220 NG/ML

## 2019-10-16 ENCOUNTER — HOSPITAL ENCOUNTER (EMERGENCY)
Age: 54
Discharge: HOME | End: 2019-10-16
Attending: EMERGENCY MEDICINE
Payer: COMMERCIAL

## 2019-10-16 VITALS
TEMPERATURE: 97.5 F | RESPIRATION RATE: 16 BRPM | HEIGHT: 74 IN | SYSTOLIC BLOOD PRESSURE: 128 MMHG | DIASTOLIC BLOOD PRESSURE: 88 MMHG | WEIGHT: 202 LBS | OXYGEN SATURATION: 98 % | HEART RATE: 82 BPM | BODY MASS INDEX: 25.93 KG/M2

## 2019-10-16 DIAGNOSIS — F39 MOOD DISORDER (HCC): Primary | ICD-10-CM

## 2019-10-16 LAB
ACETAMINOPHEN LEVEL: <5 MCG/ML (ref 10–30)
ALBUMIN SERPL-MCNC: 4.2 G/DL (ref 3.5–5.2)
ALP BLD-CCNC: 160 U/L (ref 40–129)
ALT SERPL-CCNC: 17 U/L (ref 0–40)
AMPHETAMINE SCREEN, URINE: NOT DETECTED
ANION GAP SERPL CALCULATED.3IONS-SCNC: 7 MMOL/L (ref 7–16)
AST SERPL-CCNC: 17 U/L (ref 0–39)
BARBITURATE SCREEN URINE: NOT DETECTED
BASOPHILS ABSOLUTE: 0.04 E9/L (ref 0–0.2)
BASOPHILS RELATIVE PERCENT: 0.8 % (ref 0–2)
BENZODIAZEPINE SCREEN, URINE: NOT DETECTED
BILIRUB SERPL-MCNC: 0.3 MG/DL (ref 0–1.2)
BUN BLDV-MCNC: 16 MG/DL (ref 6–20)
CALCIUM SERPL-MCNC: 10.4 MG/DL (ref 8.6–10.2)
CANNABINOID SCREEN URINE: NOT DETECTED
CHLORIDE BLD-SCNC: 103 MMOL/L (ref 98–107)
CO2: 32 MMOL/L (ref 22–29)
COCAINE METABOLITE SCREEN URINE: NOT DETECTED
CREAT SERPL-MCNC: 1.1 MG/DL (ref 0.7–1.2)
EOSINOPHILS ABSOLUTE: 0.37 E9/L (ref 0.05–0.5)
EOSINOPHILS RELATIVE PERCENT: 7.4 % (ref 0–6)
ETHANOL: <10 MG/DL (ref 0–0.08)
GFR AFRICAN AMERICAN: >60
GFR NON-AFRICAN AMERICAN: >60 ML/MIN/1.73
GLUCOSE BLD-MCNC: 102 MG/DL (ref 74–99)
HCT VFR BLD CALC: 43.6 % (ref 37–54)
HEMOGLOBIN: 14.2 G/DL (ref 12.5–16.5)
IMMATURE GRANULOCYTES #: 0.02 E9/L
IMMATURE GRANULOCYTES %: 0.4 % (ref 0–5)
LYMPHOCYTES ABSOLUTE: 1.56 E9/L (ref 1.5–4)
LYMPHOCYTES RELATIVE PERCENT: 31.1 % (ref 20–42)
Lab: NORMAL
MCH RBC QN AUTO: 27.9 PG (ref 26–35)
MCHC RBC AUTO-ENTMCNC: 32.6 % (ref 32–34.5)
MCV RBC AUTO: 85.7 FL (ref 80–99.9)
METHADONE SCREEN, URINE: NOT DETECTED
MONOCYTES ABSOLUTE: 0.53 E9/L (ref 0.1–0.95)
MONOCYTES RELATIVE PERCENT: 10.6 % (ref 2–12)
NEUTROPHILS ABSOLUTE: 2.49 E9/L (ref 1.8–7.3)
NEUTROPHILS RELATIVE PERCENT: 49.7 % (ref 43–80)
OPIATE SCREEN URINE: NOT DETECTED
PDW BLD-RTO: 14.9 FL (ref 11.5–15)
PHENCYCLIDINE SCREEN URINE: NOT DETECTED
PLATELET # BLD: 275 E9/L (ref 130–450)
PMV BLD AUTO: 9.5 FL (ref 7–12)
POTASSIUM SERPL-SCNC: 4.4 MMOL/L (ref 3.5–5)
PROPOXYPHENE SCREEN: NOT DETECTED
RBC # BLD: 5.09 E12/L (ref 3.8–5.8)
SALICYLATE, SERUM: <0.3 MG/DL (ref 0–30)
SODIUM BLD-SCNC: 142 MMOL/L (ref 132–146)
TOTAL PROTEIN: 7.6 G/DL (ref 6.4–8.3)
TRICYCLIC ANTIDEPRESSANTS SCREEN SERUM: NEGATIVE NG/ML
WBC # BLD: 5 E9/L (ref 4.5–11.5)

## 2019-10-16 PROCEDURE — 93005 ELECTROCARDIOGRAM TRACING: CPT | Performed by: EMERGENCY MEDICINE

## 2019-10-16 PROCEDURE — 80307 DRUG TEST PRSMV CHEM ANLYZR: CPT

## 2019-10-16 PROCEDURE — 85025 COMPLETE CBC W/AUTO DIFF WBC: CPT

## 2019-10-16 PROCEDURE — 80053 COMPREHEN METABOLIC PANEL: CPT

## 2019-10-16 PROCEDURE — G0480 DRUG TEST DEF 1-7 CLASSES: HCPCS

## 2019-10-16 PROCEDURE — 99283 EMERGENCY DEPT VISIT LOW MDM: CPT

## 2019-10-16 PROCEDURE — 36415 COLL VENOUS BLD VENIPUNCTURE: CPT

## 2019-10-17 LAB
EKG ATRIAL RATE: 72 BPM
EKG P AXIS: 60 DEGREES
EKG P-R INTERVAL: 160 MS
EKG Q-T INTERVAL: 402 MS
EKG QRS DURATION: 94 MS
EKG QTC CALCULATION (BAZETT): 440 MS
EKG R AXIS: 58 DEGREES
EKG T AXIS: 57 DEGREES
EKG VENTRICULAR RATE: 72 BPM

## 2019-10-17 PROCEDURE — 93010 ELECTROCARDIOGRAM REPORT: CPT | Performed by: INTERNAL MEDICINE

## 2019-10-29 PROBLEM — K52.9 COLITIS: Status: RESOLVED | Noted: 2019-02-04 | Resolved: 2019-10-29

## 2019-12-05 ENCOUNTER — TELEPHONE (OUTPATIENT)
Dept: ADMINISTRATIVE | Age: 54
End: 2019-12-05

## 2020-01-13 NOTE — PROGRESS NOTES
DATE OF SERVICE:     7/29/2019    Vinicius Mckeon seen today for the purpose of continuation of care. Nursing, social work reports, laboratory studies and vital signs are reviewed. Patient chief complaint today is:            [x] Depression      [x] Anxiety        [] Psychosis         [] Suicidal/Homicidal                         [] Delusions           [] Aggression          Subjective: Today patient is pleasant and cooperative. Patient denies any issues or concerns, states that he feels the medications are working. Patient denies denies SI, HI, and AVH. Sleep:  [x] Good [] Fair  [] Poor  Appetite:  [x] Good [] Fair  [] Poor    Depression:  [x] Mild [] Moderate [] Severe                [] Constant [] Sporadic     Anxiety: [x] Mild [] Moderate [] Severe    [] Constant [] Sporadic     Delusions: [] Mild [] Moderate [] Severe     [] Constant [] Sporadic     [] Paranoid [] Somatic [] Grandiose     Hallucinations: [] Mild [] Moderate [] Severe     [] Constant [] Sporadic    [] Auditory  [] Visual [] Tactile       Suicidal: [] Constant [] Sporadic  Homicidal: [] Constant [] Sporadic    Unscheduled Medications     [] Patient Receiving Emergency Medications \" Chemical Restraint\"   [] Requesting PRN medications for anxiety    Medical Review of Systems:     All other than marked systmes have been reviewed and are all negative.     Constitutional Symptoms: []  fever []  Chills  Skin Symptoms: [] rash []  Pruritus   Eye Symptoms: [] Vision unchanged []  recent vision problems[] blurred vision   Respiratory Symptoms:[] shortness of breath [] cough  Cardiovascular Symptoms:  [] chest pain   [] palpitations   Gastrointestinal Symptoms: []  abdominal pain []  nausea []  vomiting []  diarrhea  Genitourinary Symptoms: []  dysuria  []  hematuria   Musculoskeletal Symptoms: []  back pain []  muscle pain []  joint pain  Neurologic Symptoms: []  headache []  dizziness  Hematolymphoid Symptoms: [] Adenopathy [] Bruises   [] Pt is having worsening joint pain in her knees & hands & is having trouble using stairs. She does not see Dr. Jennifer Pollard until 3/5. She is requesting a refill of Medrol pack to help her pain until she can see Rheum. LOV/LF 10/15.   Please approve or deny pen

## 2020-01-24 ENCOUNTER — HOSPITAL ENCOUNTER (INPATIENT)
Age: 55
LOS: 6 days | Discharge: HOME | DRG: 387 | End: 2020-01-31
Attending: EMERGENCY MEDICINE | Admitting: FAMILY MEDICINE
Payer: MEDICARE

## 2020-01-24 ENCOUNTER — APPOINTMENT (OUTPATIENT)
Dept: CT IMAGING | Age: 55
DRG: 387 | End: 2020-01-24
Payer: MEDICARE

## 2020-01-24 ENCOUNTER — APPOINTMENT (OUTPATIENT)
Dept: GENERAL RADIOLOGY | Age: 55
DRG: 387 | End: 2020-01-24
Payer: MEDICARE

## 2020-01-24 LAB
ABO/RH: NORMAL
ALBUMIN SERPL-MCNC: 3.1 G/DL (ref 3.5–5.2)
ALP BLD-CCNC: 100 U/L (ref 40–129)
ALT SERPL-CCNC: 12 U/L (ref 0–40)
ANION GAP SERPL CALCULATED.3IONS-SCNC: 12 MMOL/L (ref 7–16)
ANTIBODY SCREEN: NORMAL
AST SERPL-CCNC: 17 U/L (ref 0–39)
BACTERIA: NORMAL /HPF
BASOPHILS ABSOLUTE: 0.06 E9/L (ref 0–0.2)
BASOPHILS RELATIVE PERCENT: 0.5 % (ref 0–2)
BILIRUB SERPL-MCNC: 0.3 MG/DL (ref 0–1.2)
BILIRUBIN URINE: NEGATIVE
BLOOD, URINE: ABNORMAL
BUN BLDV-MCNC: 12 MG/DL (ref 6–20)
CALCIUM SERPL-MCNC: 9.1 MG/DL (ref 8.6–10.2)
CHLORIDE BLD-SCNC: 95 MMOL/L (ref 98–107)
CLARITY: CLEAR
CO2: 25 MMOL/L (ref 22–29)
COLOR: YELLOW
CREAT SERPL-MCNC: 1 MG/DL (ref 0.7–1.2)
D DIMER: 604 NG/ML DDU
EOSINOPHILS ABSOLUTE: 0.14 E9/L (ref 0.05–0.5)
EOSINOPHILS RELATIVE PERCENT: 1.1 % (ref 0–6)
GFR AFRICAN AMERICAN: >60
GFR NON-AFRICAN AMERICAN: >60 ML/MIN/1.73
GLUCOSE BLD-MCNC: 102 MG/DL (ref 74–99)
GLUCOSE URINE: NEGATIVE MG/DL
HCT VFR BLD CALC: 36.8 % (ref 37–54)
HEMOGLOBIN: 11.4 G/DL (ref 12.5–16.5)
IMMATURE GRANULOCYTES #: 0.06 E9/L
IMMATURE GRANULOCYTES %: 0.5 % (ref 0–5)
KETONES, URINE: NEGATIVE MG/DL
LACTIC ACID: 2.3 MMOL/L (ref 0.5–2.2)
LEUKOCYTE ESTERASE, URINE: NEGATIVE
LYMPHOCYTES ABSOLUTE: 1.79 E9/L (ref 1.5–4)
LYMPHOCYTES RELATIVE PERCENT: 14.3 % (ref 20–42)
MCH RBC QN AUTO: 25.7 PG (ref 26–35)
MCHC RBC AUTO-ENTMCNC: 31 % (ref 32–34.5)
MCV RBC AUTO: 82.9 FL (ref 80–99.9)
MONOCYTES ABSOLUTE: 0.82 E9/L (ref 0.1–0.95)
MONOCYTES RELATIVE PERCENT: 6.6 % (ref 2–12)
NEUTROPHILS ABSOLUTE: 9.62 E9/L (ref 1.8–7.3)
NEUTROPHILS RELATIVE PERCENT: 77 % (ref 43–80)
NITRITE, URINE: NEGATIVE
PDW BLD-RTO: 13.5 FL (ref 11.5–15)
PH UA: 7.5 (ref 5–9)
PLATELET # BLD: 596 E9/L (ref 130–450)
PMV BLD AUTO: 9 FL (ref 7–12)
POTASSIUM REFLEX MAGNESIUM: 3.8 MMOL/L (ref 3.5–5)
PROTEIN UA: NEGATIVE MG/DL
RBC # BLD: 4.44 E12/L (ref 3.8–5.8)
RBC UA: NORMAL /HPF (ref 0–2)
SODIUM BLD-SCNC: 132 MMOL/L (ref 132–146)
SPECIFIC GRAVITY UA: 1.01 (ref 1–1.03)
TOTAL PROTEIN: 7.3 G/DL (ref 6.4–8.3)
TROPONIN: <0.01 NG/ML (ref 0–0.03)
UROBILINOGEN, URINE: 0.2 E.U./DL
WBC # BLD: 12.5 E9/L (ref 4.5–11.5)
WBC UA: NORMAL /HPF (ref 0–5)

## 2020-01-24 PROCEDURE — 93005 ELECTROCARDIOGRAM TRACING: CPT | Performed by: EMERGENCY MEDICINE

## 2020-01-24 PROCEDURE — 86901 BLOOD TYPING SEROLOGIC RH(D): CPT

## 2020-01-24 PROCEDURE — 6360000004 HC RX CONTRAST MEDICATION: Performed by: RADIOLOGY

## 2020-01-24 PROCEDURE — 71045 X-RAY EXAM CHEST 1 VIEW: CPT

## 2020-01-24 PROCEDURE — 83605 ASSAY OF LACTIC ACID: CPT

## 2020-01-24 PROCEDURE — 85378 FIBRIN DEGRADE SEMIQUANT: CPT

## 2020-01-24 PROCEDURE — 74177 CT ABD & PELVIS W/CONTRAST: CPT

## 2020-01-24 PROCEDURE — 36415 COLL VENOUS BLD VENIPUNCTURE: CPT

## 2020-01-24 PROCEDURE — 85025 COMPLETE CBC W/AUTO DIFF WBC: CPT

## 2020-01-24 PROCEDURE — 84484 ASSAY OF TROPONIN QUANT: CPT

## 2020-01-24 PROCEDURE — 86900 BLOOD TYPING SEROLOGIC ABO: CPT

## 2020-01-24 PROCEDURE — 99285 EMERGENCY DEPT VISIT HI MDM: CPT

## 2020-01-24 PROCEDURE — 94761 N-INVAS EAR/PLS OXIMETRY MLT: CPT

## 2020-01-24 PROCEDURE — 71275 CT ANGIOGRAPHY CHEST: CPT

## 2020-01-24 PROCEDURE — 81001 URINALYSIS AUTO W/SCOPE: CPT

## 2020-01-24 PROCEDURE — 2580000003 HC RX 258: Performed by: RADIOLOGY

## 2020-01-24 PROCEDURE — 80053 COMPREHEN METABOLIC PANEL: CPT

## 2020-01-24 PROCEDURE — 86850 RBC ANTIBODY SCREEN: CPT

## 2020-01-24 RX ORDER — SODIUM CHLORIDE 0.9 % (FLUSH) 0.9 %
10 SYRINGE (ML) INJECTION PRN
Status: DISCONTINUED | OUTPATIENT
Start: 2020-01-24 | End: 2020-01-25 | Stop reason: SDUPTHER

## 2020-01-24 RX ADMIN — Medication 10 ML: at 23:27

## 2020-01-24 RX ADMIN — IOPAMIDOL 110 ML: 755 INJECTION, SOLUTION INTRAVENOUS at 23:26

## 2020-01-24 ASSESSMENT — PAIN SCALES - GENERAL: PAINLEVEL_OUTOF10: 9

## 2020-01-24 NOTE — ED NOTES
FIRST PROVIDER CONTACT ASSESSMENT NOTE      Department of Emergency Medicine   ED  First Provider Note   1/24/20  5:24 PM    Chief Complaint: Diarrhea (hx of crohns disease, reports 30-40 BMs/day, hasn't taken meds for crohns for 8 months); Shortness of Breath (x couple days); and Abdominal Pain      History of Present Illness:    Guanako Briscoe is a 49 y.o. male who presents to the ED by police for complaints of shortness of breath, abdominal pain, and diarrhea hx Crohn's disease has not been on medications for the last 8 months  Focused Screening Exam:  Constitutional:  Alert, appears stated age and is in no distress.       *ALLERGIES*     Ciprofloxacin hcl and Nsaids     ED Triage Vitals [01/24/20 1630]   BP Temp Temp src Pulse Resp SpO2 Height Weight   -- 97.7 °F (36.5 °C) -- 83 -- 98 % -- --        Initial Plan of Care:  Initiate Treatment-Testing, Proceed toTreatment Area When Bed Available for ED Attending/MLP to Continue Care    -----------------END OF FIRST PROVIDER CONTACT ASSESSMENT NOTE--------------  Electronically signed by KAM Mitchell NP   DD: 1/24/20       KAM Corona NP  01/24/20 0185

## 2020-01-25 PROBLEM — K52.9 COLITIS: Status: ACTIVE | Noted: 2020-01-25

## 2020-01-25 LAB
EKG ATRIAL RATE: 70 BPM
EKG P AXIS: 57 DEGREES
EKG P-R INTERVAL: 152 MS
EKG Q-T INTERVAL: 434 MS
EKG QRS DURATION: 90 MS
EKG QTC CALCULATION (BAZETT): 468 MS
EKG R AXIS: 56 DEGREES
EKG T AXIS: 63 DEGREES
EKG VENTRICULAR RATE: 70 BPM

## 2020-01-25 PROCEDURE — 87449 NOS EACH ORGANISM AG IA: CPT

## 2020-01-25 PROCEDURE — 93010 ELECTROCARDIOGRAM REPORT: CPT | Performed by: INTERNAL MEDICINE

## 2020-01-25 PROCEDURE — 1200000000 HC SEMI PRIVATE

## 2020-01-25 PROCEDURE — 87324 CLOSTRIDIUM AG IA: CPT

## 2020-01-25 PROCEDURE — 6360000002 HC RX W HCPCS: Performed by: INTERNAL MEDICINE

## 2020-01-25 PROCEDURE — 6360000002 HC RX W HCPCS: Performed by: HOSPITALIST

## 2020-01-25 PROCEDURE — 6370000000 HC RX 637 (ALT 250 FOR IP): Performed by: INTERNAL MEDICINE

## 2020-01-25 PROCEDURE — 6370000000 HC RX 637 (ALT 250 FOR IP): Performed by: HOSPITALIST

## 2020-01-25 PROCEDURE — 2580000003 HC RX 258: Performed by: HOSPITALIST

## 2020-01-25 RX ORDER — AMLODIPINE BESYLATE 10 MG/1
10 TABLET ORAL DAILY
Status: DISCONTINUED | OUTPATIENT
Start: 2020-01-25 | End: 2020-01-31 | Stop reason: HOSPADM

## 2020-01-25 RX ORDER — BENZTROPINE MESYLATE 0.5 MG/1
0.5 TABLET ORAL 2 TIMES DAILY
Status: DISCONTINUED | OUTPATIENT
Start: 2020-01-25 | End: 2020-01-26 | Stop reason: ALTCHOICE

## 2020-01-25 RX ORDER — OLANZAPINE 15 MG/1
15 TABLET ORAL NIGHTLY
COMMUNITY
End: 2020-04-02

## 2020-01-25 RX ORDER — PALIPERIDONE 6 MG/1
6 TABLET, EXTENDED RELEASE ORAL DAILY
Status: DISCONTINUED | OUTPATIENT
Start: 2020-01-25 | End: 2020-01-26 | Stop reason: ALTCHOICE

## 2020-01-25 RX ORDER — SUMATRIPTAN 50 MG/1
100 TABLET, FILM COATED ORAL
Status: DISCONTINUED | OUTPATIENT
Start: 2020-01-25 | End: 2020-01-25 | Stop reason: ALTCHOICE

## 2020-01-25 RX ORDER — HYDROXYZINE PAMOATE 25 MG/1
25 CAPSULE ORAL 2 TIMES DAILY
Status: DISCONTINUED | OUTPATIENT
Start: 2020-01-25 | End: 2020-01-31 | Stop reason: HOSPADM

## 2020-01-25 RX ORDER — VENLAFAXINE HYDROCHLORIDE 150 MG/1
150 CAPSULE, EXTENDED RELEASE ORAL DAILY
COMMUNITY
End: 2020-04-02

## 2020-01-25 RX ORDER — MORPHINE SULFATE 2 MG/ML
2 INJECTION, SOLUTION INTRAMUSCULAR; INTRAVENOUS EVERY 4 HOURS PRN
Status: DISCONTINUED | OUTPATIENT
Start: 2020-01-25 | End: 2020-01-31 | Stop reason: HOSPADM

## 2020-01-25 RX ORDER — VENLAFAXINE HYDROCHLORIDE 75 MG/1
75 CAPSULE, EXTENDED RELEASE ORAL
Status: DISCONTINUED | OUTPATIENT
Start: 2020-01-25 | End: 2020-01-26 | Stop reason: ALTCHOICE

## 2020-01-25 RX ORDER — ONDANSETRON 2 MG/ML
4 INJECTION INTRAMUSCULAR; INTRAVENOUS EVERY 6 HOURS PRN
Status: DISCONTINUED | OUTPATIENT
Start: 2020-01-25 | End: 2020-01-31 | Stop reason: HOSPADM

## 2020-01-25 RX ORDER — SODIUM CHLORIDE 0.9 % (FLUSH) 0.9 %
10 SYRINGE (ML) INJECTION PRN
Status: DISCONTINUED | OUTPATIENT
Start: 2020-01-25 | End: 2020-01-31 | Stop reason: HOSPADM

## 2020-01-25 RX ORDER — HYDROCODONE BITARTRATE AND ACETAMINOPHEN 5; 325 MG/1; MG/1
1 TABLET ORAL EVERY 4 HOURS PRN
Status: DISCONTINUED | OUTPATIENT
Start: 2020-01-25 | End: 2020-01-31 | Stop reason: HOSPADM

## 2020-01-25 RX ORDER — OLANZAPINE 5 MG/1
5 TABLET ORAL
COMMUNITY
End: 2020-04-02

## 2020-01-25 RX ORDER — SIMVASTATIN 10 MG
10 TABLET ORAL NIGHTLY
Status: ON HOLD | COMMUNITY
End: 2020-04-24 | Stop reason: HOSPADM

## 2020-01-25 RX ORDER — SODIUM CHLORIDE 0.9 % (FLUSH) 0.9 %
10 SYRINGE (ML) INJECTION EVERY 12 HOURS SCHEDULED
Status: DISCONTINUED | OUTPATIENT
Start: 2020-01-25 | End: 2020-01-31 | Stop reason: HOSPADM

## 2020-01-25 RX ADMIN — ENOXAPARIN SODIUM 40 MG: 40 INJECTION SUBCUTANEOUS at 14:06

## 2020-01-25 RX ADMIN — HYDROCODONE BITARTRATE AND ACETAMINOPHEN 1 TABLET: 5; 325 TABLET ORAL at 18:55

## 2020-01-25 RX ADMIN — SODIUM CHLORIDE, PRESERVATIVE FREE 10 ML: 5 INJECTION INTRAVENOUS at 21:05

## 2020-01-25 RX ADMIN — SODIUM CHLORIDE, PRESERVATIVE FREE 10 ML: 5 INJECTION INTRAVENOUS at 14:07

## 2020-01-25 RX ADMIN — HYDROCODONE BITARTRATE AND ACETAMINOPHEN 1 TABLET: 5; 325 TABLET ORAL at 08:29

## 2020-01-25 RX ADMIN — HYDROCODONE BITARTRATE AND ACETAMINOPHEN 1 TABLET: 5; 325 TABLET ORAL at 22:56

## 2020-01-25 RX ADMIN — Medication 2 MG: at 21:05

## 2020-01-25 RX ADMIN — HYDROCODONE BITARTRATE AND ACETAMINOPHEN 1 TABLET: 5; 325 TABLET ORAL at 14:07

## 2020-01-25 RX ADMIN — AMLODIPINE BESYLATE 10 MG: 10 TABLET ORAL at 15:57

## 2020-01-25 RX ADMIN — Medication 2 MG: at 10:42

## 2020-01-25 RX ADMIN — Medication 2 MG: at 15:57

## 2020-01-25 ASSESSMENT — PAIN DESCRIPTION - DESCRIPTORS: DESCRIPTORS: ACHING;CONSTANT;DISCOMFORT;TENDER

## 2020-01-25 ASSESSMENT — PAIN SCALES - GENERAL
PAINLEVEL_OUTOF10: 9
PAINLEVEL_OUTOF10: 9
PAINLEVEL_OUTOF10: 6
PAINLEVEL_OUTOF10: 9
PAINLEVEL_OUTOF10: 9
PAINLEVEL_OUTOF10: 7
PAINLEVEL_OUTOF10: 7
PAINLEVEL_OUTOF10: 6
PAINLEVEL_OUTOF10: 9

## 2020-01-25 ASSESSMENT — PAIN DESCRIPTION - LOCATION: LOCATION: ABDOMEN

## 2020-01-25 ASSESSMENT — PAIN DESCRIPTION - ONSET: ONSET: ON-GOING

## 2020-01-25 ASSESSMENT — PAIN DESCRIPTION - ORIENTATION: ORIENTATION: RIGHT;LEFT;LOWER

## 2020-01-25 ASSESSMENT — PAIN DESCRIPTION - PROGRESSION: CLINICAL_PROGRESSION: GRADUALLY WORSENING

## 2020-01-25 ASSESSMENT — PAIN - FUNCTIONAL ASSESSMENT: PAIN_FUNCTIONAL_ASSESSMENT: ACTIVITIES ARE NOT PREVENTED

## 2020-01-25 ASSESSMENT — PAIN DESCRIPTION - FREQUENCY: FREQUENCY: CONTINUOUS

## 2020-01-25 ASSESSMENT — PAIN DESCRIPTION - PAIN TYPE: TYPE: ACUTE PAIN

## 2020-01-25 NOTE — ED NOTES
Bed: 07  Expected date:   Expected time:   Means of arrival:   Comments:  Elza Méndez RN  01/24/20 1958

## 2020-01-25 NOTE — ED NOTES
Spoke to RN at [facility], updated current med list. Dr. Sadaf Quezada paged at this time and floor notified      Darian Salazar RN  01/25/20 1111

## 2020-01-25 NOTE — ED NOTES
Bed: 16  Expected date:   Expected time:   Means of arrival:   Comments:  [address] [place], RN  01/25/20 5554

## 2020-01-25 NOTE — ED PROVIDER NOTES
EXAM--------------------------------------    Constitutional/General: Alert and oriented x3, well appearing, non toxic in NAD  Head: Normocephalic and atraumatic  Eyes: PERRL, EOMI  Mouth: Oropharynx clear, handling secretions, no trismus  Neck: Supple, full ROM, non tender to palpation in the midline, no stridor, no crepitus, no meningeal signs  Pulmonary: Lungs clear to auscultation bilaterally, no wheezes, rales, or rhonchi. Not in respiratory distress  Cardiovascular:  Regular rate. Regular rhythm. No murmurs, gallops, or rubs. 2+ distal pulses  Chest: no chest wall tenderness  Abdomen: Soft. Tender lower abdomen. Non distended. +BS. No rebound, guarding, or rigidity. No pulsatile masses appreciated. Musculoskeletal: Moves all extremities x 4. Warm and well perfused, no clubbing, cyanosis, or edema. Capillary refill <3 seconds  Skin: warm and dry. No rashes. Neurologic: GCS 15, CN 2-12 grossly intact, no focal deficits, symmetric strength 5/5 in the upper and lower extremities bilaterally  Psych: Normal Affect    -------------------------------------------------- RESULTS -------------------------------------------------  I have personally reviewed all laboratory and imaging results for this patient. Results are listed below.      LABS:  Results for orders placed or performed during the hospital encounter of 01/24/20   Comprehensive Metabolic Panel w/ Reflex to MG   Result Value Ref Range    Sodium 132 132 - 146 mmol/L    Potassium reflex Magnesium 3.8 3.5 - 5.0 mmol/L    Chloride 95 (L) 98 - 107 mmol/L    CO2 25 22 - 29 mmol/L    Anion Gap 12 7 - 16 mmol/L    Glucose 102 (H) 74 - 99 mg/dL    BUN 12 6 - 20 mg/dL    CREATININE 1.0 0.7 - 1.2 mg/dL    GFR Non-African American >60 >=60 mL/min/1.73    GFR African American >60     Calcium 9.1 8.6 - 10.2 mg/dL    Total Protein 7.3 6.4 - 8.3 g/dL    Alb 3.1 (L) 3.5 - 5.2 g/dL    Total Bilirubin 0.3 0.0 - 1.2 mg/dL    Alkaline Phosphatase 100 40 - 129 U/L    ALT 12 0 - 40 U/L    AST 17 0 - 39 U/L   CBC Auto Differential   Result Value Ref Range    WBC 12.5 (H) 4.5 - 11.5 E9/L    RBC 4.44 3.80 - 5.80 E12/L    Hemoglobin 11.4 (L) 12.5 - 16.5 g/dL    Hematocrit 36.8 (L) 37.0 - 54.0 %    MCV 82.9 80.0 - 99.9 fL    MCH 25.7 (L) 26.0 - 35.0 pg    MCHC 31.0 (L) 32.0 - 34.5 %    RDW 13.5 11.5 - 15.0 fL    Platelets 651 (H) 763 - 450 E9/L    MPV 9.0 7.0 - 12.0 fL    Neutrophils % 77.0 43.0 - 80.0 %    Immature Granulocytes % 0.5 0.0 - 5.0 %    Lymphocytes % 14.3 (L) 20.0 - 42.0 %    Monocytes % 6.6 2.0 - 12.0 %    Eosinophils % 1.1 0.0 - 6.0 %    Basophils % 0.5 0.0 - 2.0 %    Neutrophils Absolute 9.62 (H) 1.80 - 7.30 E9/L    Immature Granulocytes # 0.06 E9/L    Lymphocytes Absolute 1.79 1.50 - 4.00 E9/L    Monocytes Absolute 0.82 0.10 - 0.95 E9/L    Eosinophils Absolute 0.14 0.05 - 0.50 E9/L    Basophils Absolute 0.06 0.00 - 0.20 E9/L   Urinalysis, reflex to microscopic   Result Value Ref Range    Color, UA Yellow Straw/Yellow    Clarity, UA Clear Clear    Glucose, Ur Negative Negative mg/dL    Bilirubin Urine Negative Negative    Ketones, Urine Negative Negative mg/dL    Specific Gravity, UA 1.010 1.005 - 1.030    Blood, Urine MODERATE (A) Negative    pH, UA 7.5 5.0 - 9.0    Protein, UA Negative Negative mg/dL    Urobilinogen, Urine 0.2 <2.0 E.U./dL    Nitrite, Urine Negative Negative    Leukocyte Esterase, Urine Negative Negative   Lactic Acid, Plasma   Result Value Ref Range    Lactic Acid 2.3 (H) 0.5 - 2.2 mmol/L   Troponin   Result Value Ref Range    Troponin <0.01 0.00 - 0.03 ng/mL   D-Dimer, Quantitative   Result Value Ref Range    D-Dimer, Quant 604 ng/mL DDU   Microscopic Urinalysis   Result Value Ref Range    WBC, UA NONE 0 - 5 /HPF    RBC, UA 1-3 0 - 2 /HPF    Bacteria, UA NONE /HPF   EKG 12 Lead   Result Value Ref Range    Ventricular Rate 70 BPM    Atrial Rate 70 BPM    P-R Interval 152 ms    QRS Duration 90 ms    Q-T Interval 434 ms    QTc Calculation (Bazett) 468 ms    P Axis 57 degrees    R Axis 56 degrees    T Axis 63 degrees   TYPE AND SCREEN   Result Value Ref Range    ABO/Rh A POS     Antibody Screen NEG        RADIOLOGY:  Interpreted by Radiologist.  Adarsh Bain   Final Result   1. Negative for pulmonary embolus. 2. 3 mm noncalcified left apical lung nodule is unchanged. For patients at low    risk (minimal or absent history of smoking and of other known risk factors), no    routine follow-up is indicated. For patients at high risk (history of smoking    or of other known risk factors), consider optional CT at 12 months. (Maykel Restrepo al., Fleischner Society, 2017)    3. Descending colitis is partially visualized in the upper abdomen. 4. Additional nonacute findings as described above. This report has been electronically signed by aCridad Villa MD.      [address] [facility] Additional Contrast? None   Final Result   1. Acute colitis involves the descending colon and most of the sigmoid colon to    the level of the rectum. The differential etiology includes infection,    inflammation and less likely ischemia. 2. Bilateral renal cysts are likely benign, unchanged. 3. Borderline left periaortic lymphadenopathy is likely reactive. 4.  Fluid filled distended urinary bladder may be related to urinary retention    versus bladder outlet obstruction. Clinical correlation is recommended. 5. Additional nonacute findings as described above. This report has been electronically signed by Caridad Villa MD.      XR CHEST PORTABLE   Final Result   No acute cardiopulmonary findings. EKG Interpretation        ------------------------- NURSING NOTES AND VITALS REVIEWED ---------------------------   The nursing notes within the ED encounter and vital signs as below have been reviewed by myself.   /64   Pulse 84   Temp 98.1 °F (36.7 °C) (Temporal)   Resp 16   Ht 6' 3\" (1.905 m)   Wt 210 lb (95.3 kg)   SpO2 100%   BMI 26.25 kg/m²   Oxygen Saturation Interpretation: Normal    The patients available past medical records and past encounters were reviewed. ------------------------------ ED COURSE/MEDICAL DECISION MAKING----------------------  Medications   sodium chloride flush 0.9 % injection 10 mL (10 mLs Intravenous Given 1/24/20 6290)   iopamidol (ISOVUE-370) 76 % injection 110 mL (110 mLs Intravenous Given 1/24/20 5686)             Medical Decision Making:        Re-Evaluations:             Re-evaluation. Patients symptoms show no change  Patient rechecked and unchanged. Patient made aware of findings. Patient will be admitted. Consultations:                 Critical Care: This patient's ED course included: a personal history and physicial eaxmination    This patient has been closely monitored during their ED course. Counseling: The emergency provider has spoken with the patient and discussed todays results, in addition to providing specific details for the plan of care and counseling regarding the diagnosis and prognosis. Questions are answered at this time and they are agreeable with the plan.       --------------------------------- IMPRESSION AND DISPOSITION ---------------------------------    IMPRESSION  1. Dyspnea and respiratory abnormalities    2. Diarrhea, unspecified type    3. Exacerbation of Crohn's disease with complication ([facility]        DISPOSITION  Disposition: Admit to med/surg floor  Patient condition is stable        NOTE: This report was transcribed using voice recognition software.  Every effort was made to ensure accuracy; however, inadvertent computerized transcription errors may be present          Raf Saba MD  01/25/20 4388

## 2020-01-26 LAB
ANION GAP SERPL CALCULATED.3IONS-SCNC: 18 MMOL/L (ref 7–16)
BASOPHILS ABSOLUTE: 0.05 E9/L (ref 0–0.2)
BASOPHILS RELATIVE PERCENT: 0.6 % (ref 0–2)
BUN BLDV-MCNC: 10 MG/DL (ref 6–20)
C DIFF TOXIN/ANTIGEN: NORMAL
C-REACTIVE PROTEIN: 15.1 MG/DL (ref 0–0.4)
CALCIUM SERPL-MCNC: 8.9 MG/DL (ref 8.6–10.2)
CHLORIDE BLD-SCNC: 99 MMOL/L (ref 98–107)
CO2: 22 MMOL/L (ref 22–29)
CREAT SERPL-MCNC: 1.1 MG/DL (ref 0.7–1.2)
EOSINOPHILS ABSOLUTE: 0.1 E9/L (ref 0.05–0.5)
EOSINOPHILS RELATIVE PERCENT: 1.1 % (ref 0–6)
FERRITIN: 180 NG/ML
GFR AFRICAN AMERICAN: >60
GFR NON-AFRICAN AMERICAN: >60 ML/MIN/1.73
GLUCOSE BLD-MCNC: 111 MG/DL (ref 74–99)
HCT VFR BLD CALC: 35.1 % (ref 37–54)
HEMOGLOBIN: 10.6 G/DL (ref 12.5–16.5)
IMMATURE GRANULOCYTES #: 0.04 E9/L
IMMATURE GRANULOCYTES %: 0.5 % (ref 0–5)
IRON SATURATION: 9 % (ref 20–55)
IRON: 21 MCG/DL (ref 59–158)
LYMPHOCYTES ABSOLUTE: 1.44 E9/L (ref 1.5–4)
LYMPHOCYTES RELATIVE PERCENT: 16.4 % (ref 20–42)
MAGNESIUM: 2.1 MG/DL (ref 1.6–2.6)
MCH RBC QN AUTO: 25.4 PG (ref 26–35)
MCHC RBC AUTO-ENTMCNC: 30.2 % (ref 32–34.5)
MCV RBC AUTO: 84.2 FL (ref 80–99.9)
MONOCYTES ABSOLUTE: 0.99 E9/L (ref 0.1–0.95)
MONOCYTES RELATIVE PERCENT: 11.3 % (ref 2–12)
NEUTROPHILS ABSOLUTE: 6.17 E9/L (ref 1.8–7.3)
NEUTROPHILS RELATIVE PERCENT: 70.1 % (ref 43–80)
PDW BLD-RTO: 13.6 FL (ref 11.5–15)
PLATELET # BLD: 561 E9/L (ref 130–450)
PMV BLD AUTO: 8.8 FL (ref 7–12)
POTASSIUM REFLEX MAGNESIUM: 4.5 MMOL/L (ref 3.5–5)
RBC # BLD: 4.17 E12/L (ref 3.8–5.8)
SODIUM BLD-SCNC: 139 MMOL/L (ref 132–146)
TOTAL IRON BINDING CAPACITY: 225 MCG/DL (ref 250–450)
WBC # BLD: 8.8 E9/L (ref 4.5–11.5)

## 2020-01-26 PROCEDURE — 6360000002 HC RX W HCPCS: Performed by: HOSPITALIST

## 2020-01-26 PROCEDURE — 80048 BASIC METABOLIC PNL TOTAL CA: CPT

## 2020-01-26 PROCEDURE — 83540 ASSAY OF IRON: CPT

## 2020-01-26 PROCEDURE — 6360000002 HC RX W HCPCS: Performed by: INTERNAL MEDICINE

## 2020-01-26 PROCEDURE — 6370000000 HC RX 637 (ALT 250 FOR IP): Performed by: INTERNAL MEDICINE

## 2020-01-26 PROCEDURE — 6370000000 HC RX 637 (ALT 250 FOR IP): Performed by: HOSPITALIST

## 2020-01-26 PROCEDURE — 36415 COLL VENOUS BLD VENIPUNCTURE: CPT

## 2020-01-26 PROCEDURE — 1200000000 HC SEMI PRIVATE

## 2020-01-26 PROCEDURE — 82728 ASSAY OF FERRITIN: CPT

## 2020-01-26 PROCEDURE — 85025 COMPLETE CBC W/AUTO DIFF WBC: CPT

## 2020-01-26 PROCEDURE — 83550 IRON BINDING TEST: CPT

## 2020-01-26 PROCEDURE — 6370000000 HC RX 637 (ALT 250 FOR IP): Performed by: STUDENT IN AN ORGANIZED HEALTH CARE EDUCATION/TRAINING PROGRAM

## 2020-01-26 PROCEDURE — 2580000003 HC RX 258: Performed by: HOSPITALIST

## 2020-01-26 PROCEDURE — 83735 ASSAY OF MAGNESIUM: CPT

## 2020-01-26 PROCEDURE — 86140 C-REACTIVE PROTEIN: CPT

## 2020-01-26 RX ORDER — PREDNISONE 20 MG/1
40 TABLET ORAL DAILY
Status: DISCONTINUED | OUTPATIENT
Start: 2020-01-26 | End: 2020-01-26

## 2020-01-26 RX ORDER — METHYLPREDNISOLONE SODIUM SUCCINATE 40 MG/ML
30 INJECTION, POWDER, LYOPHILIZED, FOR SOLUTION INTRAMUSCULAR; INTRAVENOUS EVERY 12 HOURS
Status: DISCONTINUED | OUTPATIENT
Start: 2020-01-26 | End: 2020-01-31

## 2020-01-26 RX ADMIN — METHYLPREDNISOLONE SODIUM SUCCINATE 30 MG: 40 INJECTION, POWDER, FOR SOLUTION INTRAMUSCULAR; INTRAVENOUS at 17:46

## 2020-01-26 RX ADMIN — SODIUM CHLORIDE, PRESERVATIVE FREE 10 ML: 5 INJECTION INTRAVENOUS at 08:50

## 2020-01-26 RX ADMIN — HYDROCODONE BITARTRATE AND ACETAMINOPHEN 1 TABLET: 5; 325 TABLET ORAL at 08:50

## 2020-01-26 RX ADMIN — SODIUM CHLORIDE, PRESERVATIVE FREE 10 ML: 5 INJECTION INTRAVENOUS at 20:15

## 2020-01-26 RX ADMIN — AMLODIPINE BESYLATE 10 MG: 10 TABLET ORAL at 08:50

## 2020-01-26 RX ADMIN — HYDROXYZINE PAMOATE 25 MG: 25 CAPSULE ORAL at 08:50

## 2020-01-26 RX ADMIN — HYDROCODONE BITARTRATE AND ACETAMINOPHEN 1 TABLET: 5; 325 TABLET ORAL at 17:42

## 2020-01-26 RX ADMIN — PREDNISONE 40 MG: 20 TABLET ORAL at 11:19

## 2020-01-26 RX ADMIN — Medication 2 MG: at 16:03

## 2020-01-26 RX ADMIN — HYDROXYZINE PAMOATE 25 MG: 25 CAPSULE ORAL at 20:14

## 2020-01-26 RX ADMIN — ENOXAPARIN SODIUM 40 MG: 40 INJECTION SUBCUTANEOUS at 08:51

## 2020-01-26 RX ADMIN — Medication 2 MG: at 20:14

## 2020-01-26 ASSESSMENT — PAIN DESCRIPTION - ORIENTATION
ORIENTATION: RIGHT;LEFT;LOWER
ORIENTATION: RIGHT;LEFT;LOWER

## 2020-01-26 ASSESSMENT — PAIN DESCRIPTION - LOCATION
LOCATION: ABDOMEN
LOCATION: ABDOMEN

## 2020-01-26 ASSESSMENT — PAIN SCALES - GENERAL
PAINLEVEL_OUTOF10: 9
PAINLEVEL_OUTOF10: 9
PAINLEVEL_OUTOF10: 0
PAINLEVEL_OUTOF10: 7
PAINLEVEL_OUTOF10: 7
PAINLEVEL_OUTOF10: 5

## 2020-01-26 ASSESSMENT — PAIN DESCRIPTION - DESCRIPTORS
DESCRIPTORS: ACHING;CONSTANT;DISCOMFORT
DESCRIPTORS: ACHING;CONSTANT;DISCOMFORT

## 2020-01-26 ASSESSMENT — PAIN DESCRIPTION - FREQUENCY
FREQUENCY: CONTINUOUS
FREQUENCY: CONTINUOUS

## 2020-01-26 ASSESSMENT — PAIN DESCRIPTION - PROGRESSION
CLINICAL_PROGRESSION: GRADUALLY WORSENING
CLINICAL_PROGRESSION: GRADUALLY WORSENING

## 2020-01-26 NOTE — PROGRESS NOTES
GENERAL SURGERY  DAILY PROGRESS NOTE  1/26/2020    Subjective:  Had 3-4 bowel movements overnight. Slightly improved abdominal pain. No new issues. Objective:  /81   Pulse 97   Temp 100.4 °F (38 °C) (Temporal)   Resp 18   Ht 6' 2\" (1.88 m)   Wt 211 lb 6.4 oz (95.9 kg)   SpO2 95%   BMI 27.14 kg/m²     GENERAL:  Laying in bed, awake, alert, cooperative, no apparent distress  HEAD: Normocephalic, atraumatic  EYES: No sclera icterus, pupils equal  LUNGS:  No increased work of breathing  CARDIOVASCULAR:  Regular rate  ABDOMEN:  Soft,mild diffuse TTP, non-distended  EXTREMITIES: No edema or swelling  SKIN: Warm and dry    Assessment/Plan:  49 y.o. male with likely Crohn's flare, off medication since 8/2019    Admitted to Medicine  Overall care per primary  GI has been consulted, recs pending  No acute surgical interventions indicated at this time  He needs back on immunologic therapy for his Crohn's     Electronically signed by New Oneil on 1/26/2020 at 7:40 AM    Discussed with Dr. Kim Darnell- will start steroids in the interim until seen by GI for this crohn's flare.     Electronically signed by Claudette Blotter, DO on 1/26/2020 at 8:47 AM

## 2020-01-26 NOTE — CONSULTS
medications    Medication Sig Start Date End Date Taking? Authorizing Provider   amLODIPine (NORVASC) 10 MG tablet Take 1 tablet by mouth daily Pt needs appt for further refills 8/26/19  Yes Skylar Salmon, DO   simvastatin (ZOCOR) 10 MG tablet Take 10 mg by mouth nightly    Historical Provider, MD   OLANZapine (ZYPREXA) 5 MG tablet Take 5 mg by mouth daily (with breakfast)    Historical Provider, MD   OLANZapine (ZYPREXA) 15 MG tablet Take 15 mg by mouth nightly    Historical Provider, MD   venlafaxine (EFFEXOR XR) 150 MG extended release capsule Take 150 mg by mouth daily    Historical Provider, MD       Allergies   Allergen Reactions    Ciprofloxacin Hcl      constipation    Nsaids Other (See Comments)     Per pt's physician Keegan Terrazas) he is not to take NSAIDS due to HX of Crohn's disease. Family History   Problem Relation Age of Onset    Mental Illness Mother     Mental Illness Father     Mental Illness Sister     Mental Illness Brother     Heart Disease Mother     Depression Father        Social History     Tobacco Use    Smoking status: Former Smoker     Packs/day: 2.00     Types: Cigarettes     Start date: 1978    Smokeless tobacco: Never Used   Substance Use Topics    Alcohol use: No     Comment: no alcohol in5 yrs    Drug use: No     Comment: last use 1997 / marijuana         Review of Systems:   General ROS: negative  Hematological and Lymphatic ROS: negative  Respiratory ROS: positive for - shortness of breath  Cardiovascular ROS: no chest pain or dyspnea on exertion  Gastrointestinal ROS: see HPI  Genito-Urinary ROS: Hx of prostate cancer s/p prostatectomy  Musculoskeletal ROS: negative      PHYSICAL EXAM:    Vitals:    01/25/20 1615   BP: 133/86   Pulse: 78   Resp: 18   Temp: 98.3 °F (36.8 °C)   SpO2: 95%       GENERAL:  NAD. A&Ox3. HEAD:  Normocephalic. Atraumatic. EYES:   No scleral icterus. PERRL. LUNGS:  No increased work of breathing.   CARDIOVASCULAR: RR  ABDOMEN:  Soft, noted cortical cyst posterior left kidney has diminished in size series 5, image 43. Stomach and bowel: Interval development of circumferential wall thickening involving the descending colon and to a lesser degree the distal sigmoid colon and rectum with associated pericolonic edematous changes involving the descending colon and sigmoid colon. The terminal ileum is within normal limits. Appendix: No evidence of appendicitis. Intraperitoneal space: Unremarkable. No free air. No significant fluid collection. Vasculature: Calcified phleboliths are noted in the pelvis. Lymph nodes: Periaortic lymph nodes have enlarged since the prior examination with the largest one on the left side measuring 1.0 x 1.2 cm series 5, image 46. Bladder: The urinary bladder is somewhat distended, possibly related to urinary retention versus bladder outlet obstruction. Reproductive: Previously noted left hemipelvis penile prosthetic component has been removed in the interval between exams. Bones/joints: Unremarkable. No acute fracture. Soft tissues: There is bilateral gynecomastia noted involving the right chest wall. 1. Acute colitis involves the descending colon and most of the sigmoid colon to the level of the rectum. The differential etiology includes infection, inflammation and less likely ischemia. 2. Bilateral renal cysts are likely benign, unchanged. 3. Borderline left periaortic lymphadenopathy is likely reactive. 4.  Fluid filled distended urinary bladder may be related to urinary retention versus bladder outlet obstruction. Clinical correlation is recommended. 5. Additional nonacute findings as described above.  This report has been electronically signed by Margy Curry MD.    Xr Chest Portable    Result Date: 2020  Patient MRN: [id number] : 1965 Age:  49 years Gender: Male Order Date: 2020 8:30 PM Exam: XR CHEST PORTABLE Number of Images: 1 view Indication:   sob sob Comparison: 2018 FINDINGS: Heart and pulmonary vascularity normal. Lungs clear. Costophrenic angles sharp. Normal aorta. No acute cardiopulmonary findings. Cta Chest W Contrast    Result Date: 1/25/2020  PROCEDURE INFORMATION: Exam: CT Angiography Chest With Contrast Exam date and time: 1/24/2020 10:30 PM Age: 49years old Clinical indication: Shortness of breath; Additional info: SOB TECHNIQUE: Imaging protocol: Computed tomographic angiography of the chest with intravenous contrast. 3D rendering: MIP and/or 3D reconstructed images were created by the technologist. Radiation optimization: All CT scans at this facility use at least one of these dose optimization techniques: automated exposure control; mA and/or kV adjustment per patient size (includes targeted exams where dose is matched to clinical indication); or iterative reconstruction. Contrast material: ISOVUE 370; Contrast volume: 110 ml; Contrast route: IV;  COMPARISON: CT cervical spine 09/05/2019 FINDINGS: Pulmonary arteries: Normal. No pulmonary emboli. Aorta: Unremarkable. No aortic aneurysm. No aortic dissection. Lungs: 2 mm noncalcified left apical pulmonary nodule series 4, image 11 is unchanged. Pleural space: Unremarkable. No pneumothorax. No pleural effusion. Heart: Unremarkable. No cardiomegaly. No pericardial effusion. Stomach and bowel: Descending colon wall thickening with marginal pericolonic edema. Lymph nodes: Unremarkable. No enlarged lymph nodes. Bones/joints: Unremarkable. No acute fracture. Soft tissues: There is bilateral gynecomastia noted involving the anterior chest wall. 1. Negative for pulmonary embolus. 2. 3 mm noncalcified left apical lung nodule is unchanged. For patients at low risk (minimal or absent history of smoking and of other known risk factors), no routine follow-up is indicated. For patients at high risk (history of smoking or of other known risk factors), consider optional CT at 12 months.  (Casi Mcdonnell al., Fleischner Society, 2017) 3. Descending colitis is partially visualized in the upper abdomen. 4. Additional nonacute findings as described above. This report has been electronically signed by Jack Lopez MD.        ASSESSMENT/PLAN:  49 y.o. male with likely Crohn's flare, off medication since 8/2019    Admitted to Medicine  Overall care per primary  GI has been consulted, recs pending  No acute surgical interventions indicated at this time  He needs back on immunologic therapy for his Crohn's - as he is presently incarcerated, may need different therapy than leif so that he can continue treatment regimen while incarcerated. Ultimately defer to GI for best decision for medication regimen. Plan discussed with Dr. Kyara Pereira.     Raeann Hancock  Resident, PGY-1  1/25/2020  7:53 PM

## 2020-01-26 NOTE — PLAN OF CARE
Problem: Pain:  Goal: Pain level will decrease  Description  Pain level will decrease  Outcome: Met This Shift  Goal: Control of acute pain  Description  Control of acute pain  Outcome: Met This Shift  Goal: Control of chronic pain  Description  Control of chronic pain  Outcome: Met This Shift Vitals stable. Dialysis completed this AM, 2.5L off. Denies pain. Pt oriented x1.    Problem: Patient Centered Care  Goal: Patient preferences are identified and integrated in the patient's plan of care  Description  Interventions:  - What would you like us ADULT  Goal: Hemodynamic stability and optimal renal function maintained  Description  INTERVENTIONS:  - Monitor labs and assess for signs and symptoms of volume excess or deficit  - Monitor intake, output and patient weight  - Monitor urine specific Norman Rivera

## 2020-01-27 PROCEDURE — 1200000000 HC SEMI PRIVATE

## 2020-01-27 PROCEDURE — 6360000002 HC RX W HCPCS: Performed by: INTERNAL MEDICINE

## 2020-01-27 PROCEDURE — 6370000000 HC RX 637 (ALT 250 FOR IP): Performed by: INTERNAL MEDICINE

## 2020-01-27 PROCEDURE — 6360000002 HC RX W HCPCS: Performed by: HOSPITALIST

## 2020-01-27 PROCEDURE — 2580000003 HC RX 258: Performed by: HOSPITALIST

## 2020-01-27 PROCEDURE — 6370000000 HC RX 637 (ALT 250 FOR IP): Performed by: HOSPITALIST

## 2020-01-27 RX ORDER — SODIUM PHOSPHATE, DIBASIC AND SODIUM PHOSPHATE, MONOBASIC 7; 19 G/133ML; G/133ML
1 ENEMA RECTAL ONCE
Status: COMPLETED | OUTPATIENT
Start: 2020-01-28 | End: 2020-01-28

## 2020-01-27 RX ADMIN — SODIUM CHLORIDE, PRESERVATIVE FREE 10 ML: 5 INJECTION INTRAVENOUS at 08:28

## 2020-01-27 RX ADMIN — SODIUM CHLORIDE, PRESERVATIVE FREE 10 ML: 5 INJECTION INTRAVENOUS at 04:59

## 2020-01-27 RX ADMIN — HYDROXYZINE PAMOATE 25 MG: 25 CAPSULE ORAL at 20:57

## 2020-01-27 RX ADMIN — HYDROXYZINE PAMOATE 25 MG: 25 CAPSULE ORAL at 12:40

## 2020-01-27 RX ADMIN — METHYLPREDNISOLONE SODIUM SUCCINATE 30 MG: 40 INJECTION, POWDER, FOR SOLUTION INTRAMUSCULAR; INTRAVENOUS at 17:42

## 2020-01-27 RX ADMIN — Medication 2 MG: at 17:41

## 2020-01-27 RX ADMIN — HYDROCODONE BITARTRATE AND ACETAMINOPHEN 1 TABLET: 5; 325 TABLET ORAL at 05:01

## 2020-01-27 RX ADMIN — HYDROCODONE BITARTRATE AND ACETAMINOPHEN 1 TABLET: 5; 325 TABLET ORAL at 20:57

## 2020-01-27 RX ADMIN — AMLODIPINE BESYLATE 10 MG: 10 TABLET ORAL at 08:28

## 2020-01-27 RX ADMIN — SODIUM CHLORIDE, PRESERVATIVE FREE 10 ML: 5 INJECTION INTRAVENOUS at 12:40

## 2020-01-27 RX ADMIN — SODIUM CHLORIDE, PRESERVATIVE FREE 10 ML: 5 INJECTION INTRAVENOUS at 21:01

## 2020-01-27 RX ADMIN — Medication 2 MG: at 12:40

## 2020-01-27 RX ADMIN — ENOXAPARIN SODIUM 40 MG: 40 INJECTION SUBCUTANEOUS at 08:32

## 2020-01-27 RX ADMIN — SODIUM CHLORIDE, PRESERVATIVE FREE 10 ML: 5 INJECTION INTRAVENOUS at 17:41

## 2020-01-27 RX ADMIN — METHYLPREDNISOLONE SODIUM SUCCINATE 30 MG: 40 INJECTION, POWDER, FOR SOLUTION INTRAMUSCULAR; INTRAVENOUS at 04:59

## 2020-01-27 RX ADMIN — HYDROCODONE BITARTRATE AND ACETAMINOPHEN 1 TABLET: 5; 325 TABLET ORAL at 15:31

## 2020-01-27 RX ADMIN — Medication 2 MG: at 08:28

## 2020-01-27 ASSESSMENT — PAIN DESCRIPTION - DESCRIPTORS
DESCRIPTORS: ACHING;CONSTANT;DISCOMFORT
DESCRIPTORS: CRAMPING;DISCOMFORT;CONSTANT
DESCRIPTORS: CONSTANT;DISCOMFORT;CRAMPING
DESCRIPTORS: CRAMPING;CONSTANT;DISCOMFORT
DESCRIPTORS: CRAMPING;CONSTANT

## 2020-01-27 ASSESSMENT — PAIN DESCRIPTION - ORIENTATION
ORIENTATION: LOWER;MID
ORIENTATION: LOWER
ORIENTATION: MID;LOWER
ORIENTATION: ANTERIOR

## 2020-01-27 ASSESSMENT — PAIN DESCRIPTION - PAIN TYPE
TYPE: ACUTE PAIN

## 2020-01-27 ASSESSMENT — PAIN DESCRIPTION - FREQUENCY
FREQUENCY: CONTINUOUS
FREQUENCY: CONTINUOUS

## 2020-01-27 ASSESSMENT — PAIN SCALES - GENERAL
PAINLEVEL_OUTOF10: 7
PAINLEVEL_OUTOF10: 8
PAINLEVEL_OUTOF10: 9
PAINLEVEL_OUTOF10: 7
PAINLEVEL_OUTOF10: 9
PAINLEVEL_OUTOF10: 10
PAINLEVEL_OUTOF10: 8
PAINLEVEL_OUTOF10: 7
PAINLEVEL_OUTOF10: 5
PAINLEVEL_OUTOF10: 10
PAINLEVEL_OUTOF10: 9
PAINLEVEL_OUTOF10: 8
PAINLEVEL_OUTOF10: 7

## 2020-01-27 ASSESSMENT — PAIN DESCRIPTION - LOCATION
LOCATION: ABDOMEN

## 2020-01-27 ASSESSMENT — PAIN - FUNCTIONAL ASSESSMENT: PAIN_FUNCTIONAL_ASSESSMENT: PREVENTS OR INTERFERES WITH ALL ACTIVE AND SOME PASSIVE ACTIVITIES

## 2020-01-27 ASSESSMENT — PAIN DESCRIPTION - PROGRESSION: CLINICAL_PROGRESSION: GRADUALLY WORSENING

## 2020-01-27 ASSESSMENT — PAIN DESCRIPTION - ONSET: ONSET: GRADUAL

## 2020-01-27 NOTE — CONSULTS
[address]                  [address] [place],  [facility]                                  CONSULTATION    PATIENT NAME: Raymon Grajeda                       :        1965  MED REC NO:   [id number]                            ROOM:       8421  ACCOUNT NO:   [de-identified]                           ADMIT DATE: 2020  PROVIDER:     Delvin Palmer MD    CONSULT DATE:  2020    REASON FOR CONSULTATION:  Active Crohn's disease. HISTORY OF PRESENT ILLNESS:  This is a gentleman, 49years of age. He  is currently incarcerated, has been for 8 months. He has a history of  Crohn's disease, which apparently has been followed rather closely  through the [facility] over the years. He has been followed by  Dr. Jessika Haddad. His diagnosis of Crohn's disease was  approximately . Apparently, made locally, but followup has been  through [facility]. He has had complications of perianal disease and has  had perhaps perirectal abscess drained and a seton has been placed in  the past and he has had predominantly rectal disease and perianal  disease with mild changes in the sigmoid. He has had multiple  colonoscopies completed and CT enterography, the latter never  documenting small bowel disease. It seemed like he was treated for more than 10 years with Remicade with  control of symptoms. Control was lost and notes seem to suggest he  developed antibodies to Remicade and absent drug levels, so he was  switched to Entyvio. He apparently did not do very well with that for a  year and then he was switched to Stelara. He thought he was doing  pretty well with that, but it seems that was withdrawn after his  incarceration 8 months ago. His disease has seemingly gradually re-emerged. Now he estimates 20  bowel movements a day with some blood. A little bit of pain, but a  preserved appetite and no fever.   On admission to the hospital, he

## 2020-01-28 ENCOUNTER — ANESTHESIA EVENT (OUTPATIENT)
Dept: ENDOSCOPY | Age: 55
DRG: 387 | End: 2020-01-28
Payer: MEDICARE

## 2020-01-28 ENCOUNTER — ANESTHESIA (OUTPATIENT)
Dept: ENDOSCOPY | Age: 55
DRG: 387 | End: 2020-01-28
Payer: MEDICARE

## 2020-01-28 VITALS
RESPIRATION RATE: 8 BRPM | OXYGEN SATURATION: 99 % | SYSTOLIC BLOOD PRESSURE: 113 MMHG | DIASTOLIC BLOOD PRESSURE: 78 MMHG

## 2020-01-28 PROCEDURE — 1200000000 HC SEMI PRIVATE

## 2020-01-28 PROCEDURE — 2709999900 HC NON-CHARGEABLE SUPPLY: Performed by: INTERNAL MEDICINE

## 2020-01-28 PROCEDURE — 7100000000 HC PACU RECOVERY - FIRST 15 MIN: Performed by: INTERNAL MEDICINE

## 2020-01-28 PROCEDURE — 2580000003 HC RX 258: Performed by: HOSPITALIST

## 2020-01-28 PROCEDURE — 6370000000 HC RX 637 (ALT 250 FOR IP): Performed by: HOSPITALIST

## 2020-01-28 PROCEDURE — 88305 TISSUE EXAM BY PATHOLOGIST: CPT

## 2020-01-28 PROCEDURE — 7100000001 HC PACU RECOVERY - ADDTL 15 MIN: Performed by: INTERNAL MEDICINE

## 2020-01-28 PROCEDURE — 6360000002 HC RX W HCPCS: Performed by: ANESTHESIOLOGIST ASSISTANT

## 2020-01-28 PROCEDURE — 6360000002 HC RX W HCPCS: Performed by: INTERNAL MEDICINE

## 2020-01-28 PROCEDURE — 2580000003 HC RX 258: Performed by: ANESTHESIOLOGIST ASSISTANT

## 2020-01-28 PROCEDURE — 6370000000 HC RX 637 (ALT 250 FOR IP): Performed by: INTERNAL MEDICINE

## 2020-01-28 PROCEDURE — 3609010300 HC COLONOSCOPY W/BIOPSY SINGLE/MULTIPLE: Performed by: INTERNAL MEDICINE

## 2020-01-28 PROCEDURE — 3700000001 HC ADD 15 MINUTES (ANESTHESIA): Performed by: INTERNAL MEDICINE

## 2020-01-28 PROCEDURE — 3700000000 HC ANESTHESIA ATTENDED CARE: Performed by: INTERNAL MEDICINE

## 2020-01-28 RX ORDER — SODIUM CHLORIDE 9 MG/ML
INJECTION, SOLUTION INTRAVENOUS CONTINUOUS PRN
Status: DISCONTINUED | OUTPATIENT
Start: 2020-01-28 | End: 2020-01-28 | Stop reason: SDUPTHER

## 2020-01-28 RX ORDER — PROPOFOL 10 MG/ML
INJECTION, EMULSION INTRAVENOUS PRN
Status: DISCONTINUED | OUTPATIENT
Start: 2020-01-28 | End: 2020-01-28 | Stop reason: SDUPTHER

## 2020-01-28 RX ORDER — FENTANYL CITRATE 50 UG/ML
INJECTION, SOLUTION INTRAMUSCULAR; INTRAVENOUS PRN
Status: DISCONTINUED | OUTPATIENT
Start: 2020-01-28 | End: 2020-01-28 | Stop reason: SDUPTHER

## 2020-01-28 RX ORDER — MIDAZOLAM HYDROCHLORIDE 1 MG/ML
INJECTION INTRAMUSCULAR; INTRAVENOUS PRN
Status: DISCONTINUED | OUTPATIENT
Start: 2020-01-28 | End: 2020-01-28 | Stop reason: SDUPTHER

## 2020-01-28 RX ADMIN — SODIUM CHLORIDE, PRESERVATIVE FREE 10 ML: 5 INJECTION INTRAVENOUS at 09:07

## 2020-01-28 RX ADMIN — HYDROCODONE BITARTRATE AND ACETAMINOPHEN 1 TABLET: 5; 325 TABLET ORAL at 19:41

## 2020-01-28 RX ADMIN — SODIUM CHLORIDE, PRESERVATIVE FREE 10 ML: 5 INJECTION INTRAVENOUS at 19:42

## 2020-01-28 RX ADMIN — METHYLPREDNISOLONE SODIUM SUCCINATE 30 MG: 40 INJECTION, POWDER, FOR SOLUTION INTRAMUSCULAR; INTRAVENOUS at 18:32

## 2020-01-28 RX ADMIN — SODIUM CHLORIDE: 9 INJECTION, SOLUTION INTRAVENOUS at 10:13

## 2020-01-28 RX ADMIN — FENTANYL CITRATE 25 MCG: 50 INJECTION, SOLUTION INTRAMUSCULAR; INTRAVENOUS at 10:30

## 2020-01-28 RX ADMIN — HYDROCODONE BITARTRATE AND ACETAMINOPHEN 1 TABLET: 5; 325 TABLET ORAL at 04:44

## 2020-01-28 RX ADMIN — PROPOFOL 200 MG: 10 INJECTION, EMULSION INTRAVENOUS at 10:17

## 2020-01-28 RX ADMIN — Medication 2 MG: at 00:03

## 2020-01-28 RX ADMIN — FENTANYL CITRATE 50 MCG: 50 INJECTION, SOLUTION INTRAMUSCULAR; INTRAVENOUS at 10:17

## 2020-01-28 RX ADMIN — MIDAZOLAM 2 MG: 1 INJECTION INTRAMUSCULAR; INTRAVENOUS at 10:17

## 2020-01-28 RX ADMIN — SODIUM PHOSPHATE, DIBASIC AND SODIUM PHOSPHATE, MONOBASIC 1 ENEMA: 7; 19 ENEMA RECTAL at 06:33

## 2020-01-28 RX ADMIN — Medication 2 MG: at 12:51

## 2020-01-28 RX ADMIN — HYDROXYZINE PAMOATE 25 MG: 25 CAPSULE ORAL at 19:41

## 2020-01-28 RX ADMIN — METHYLPREDNISOLONE SODIUM SUCCINATE 30 MG: 40 INJECTION, POWDER, FOR SOLUTION INTRAMUSCULAR; INTRAVENOUS at 04:44

## 2020-01-28 RX ADMIN — HYDROCODONE BITARTRATE AND ACETAMINOPHEN 1 TABLET: 5; 325 TABLET ORAL at 15:29

## 2020-01-28 RX ADMIN — Medication 2 MG: at 18:29

## 2020-01-28 ASSESSMENT — PAIN SCALES - GENERAL
PAINLEVEL_OUTOF10: 9
PAINLEVEL_OUTOF10: 7
PAINLEVEL_OUTOF10: 7
PAINLEVEL_OUTOF10: 0
PAINLEVEL_OUTOF10: 7
PAINLEVEL_OUTOF10: 9
PAINLEVEL_OUTOF10: 9
PAINLEVEL_OUTOF10: 7
PAINLEVEL_OUTOF10: 9
PAINLEVEL_OUTOF10: 9
PAINLEVEL_OUTOF10: 6
PAINLEVEL_OUTOF10: 9
PAINLEVEL_OUTOF10: 6
PAINLEVEL_OUTOF10: 7
PAINLEVEL_OUTOF10: 0
PAINLEVEL_OUTOF10: 7

## 2020-01-28 ASSESSMENT — PAIN DESCRIPTION - PAIN TYPE
TYPE: CHRONIC PAIN
TYPE: ACUTE PAIN;CHRONIC PAIN

## 2020-01-28 ASSESSMENT — PAIN DESCRIPTION - FREQUENCY
FREQUENCY: CONTINUOUS

## 2020-01-28 ASSESSMENT — PAIN DESCRIPTION - DESCRIPTORS
DESCRIPTORS: ACHING;SHARP;STABBING
DESCRIPTORS: ACHING;SHARP;SPASM;STABBING
DESCRIPTORS: ACHING;SHARP;SPASM;STABBING
DESCRIPTORS: CONSTANT;SHARP;STABBING

## 2020-01-28 ASSESSMENT — PAIN DESCRIPTION - LOCATION
LOCATION: ABDOMEN

## 2020-01-28 ASSESSMENT — PAIN DESCRIPTION - PROGRESSION
CLINICAL_PROGRESSION: NOT CHANGED

## 2020-01-28 ASSESSMENT — PAIN - FUNCTIONAL ASSESSMENT
PAIN_FUNCTIONAL_ASSESSMENT: ACTIVITIES ARE NOT PREVENTED

## 2020-01-28 ASSESSMENT — PAIN DESCRIPTION - ONSET
ONSET: ON-GOING

## 2020-01-28 ASSESSMENT — PAIN DESCRIPTION - ORIENTATION
ORIENTATION: LEFT;LOWER
ORIENTATION: LEFT;RIGHT;LOWER
ORIENTATION: LEFT;LOWER

## 2020-01-28 ASSESSMENT — LIFESTYLE VARIABLES: SMOKING_STATUS: 0

## 2020-01-28 NOTE — ANESTHESIA POSTPROCEDURE EVALUATION
Department of Anesthesiology  Postprocedure Note    Patient: Dianne Brian  MRN: [id number]  YOB: 1965  Date of evaluation: 1/28/2020  Time:  10:43 AM     Procedure Summary     Date:  01/28/20 Room / Location:  [facility] / [facility]    Anesthesia Start:  1013 Anesthesia Stop:  1034    Procedure:  COLONOSCOPY WITH BIOPSY (N/A ) Diagnosis:  (?)    Surgeon:  Yani Ferris MD Responsible Provider:  Fransisco Wall MD    Anesthesia Type:  MAC ASA Status:  3          Anesthesia Type: MAC    David Phase I: David Score: 9    David Phase II:      Last vitals: Reviewed and per EMR flowsheets.        Anesthesia Post Evaluation    Patient location during evaluation: PACU  Patient participation: complete - patient participated  Level of consciousness: awake and alert  Pain score: 0  Airway patency: patent  Nausea & Vomiting: no vomiting and no nausea  Complications: no  Cardiovascular status: hemodynamically stable  Respiratory status: spontaneous ventilation  Hydration status: stable

## 2020-01-28 NOTE — PLAN OF CARE
Problem: Falls - Risk of:  Goal: Will remain free from falls  Description  Will remain free from falls  1/28/2020 1232 by Silvestre Hopper RN  Outcome: Met This Shift     Problem: Falls - Risk of:  Goal: Absence of physical injury  Description  Absence of physical injury  Outcome: Met This Shift     Problem: Pain:  Goal: Pain level will decrease  Description  Pain level will decrease  Outcome: Ongoing     Problem: Pain:  Goal: Control of acute pain  Description  Control of acute pain  Outcome: Ongoing     Problem: Pain:  Goal: Control of chronic pain  Description  Control of chronic pain  Outcome: Ongoing     Problem: Diarrhea:  Goal: Bowel elimination is within specified parameters  Description  Bowel elimination is within specified parameters  Outcome: Ongoing

## 2020-01-28 NOTE — PROGRESS NOTES
PT/INR:    Lab Results   Component Value Date    PROTIME 13.4 02/03/2019    PROTIME 11.5 02/05/2011    INR 1.2 02/03/2019     Last 3 Troponin:    Lab Results   Component Value Date    TROPONINI <0.01 01/24/2020    TROPONINI <0.01 12/27/2018    TROPONINI <0.01 11/20/2018     TSH:    Lab Results   Component Value Date    TSH 4.710 10/03/2018      Assessment:     1. Acute flare of Crohn's disease  2. HTN  3.  Bipolar disorder    Plan:       Continue same plan and orders    For sigmoidoscopy today

## 2020-01-28 NOTE — PROGRESS NOTES
Enema given this AM. Pt could barley hold in the solution. Successful BM, loose/liquid brown with red streaks. Pt tolerated well.

## 2020-01-28 NOTE — ANESTHESIA PRE PROCEDURE
Department of Anesthesiology  Preprocedure Note       Name:  Imani Jefferson   Age:  49 y.o.  :  1965                                          MRN:  [id number]         Date:  2020      Surgeon: Beka Pruett):  Nery Mendoza MD    Procedure: COLONOSCOPY DIAGNOSTIC (N/A )    Medications prior to admission:   Prior to Admission medications    Medication Sig Start Date End Date Taking? Authorizing Provider   simvastatin (ZOCOR) 10 MG tablet Take 10 mg by mouth nightly    Historical Provider, MD   OLANZapine (ZYPREXA) 5 MG tablet Take 5 mg by mouth daily (with breakfast)    Historical Provider, MD   OLANZapine (ZYPREXA) 15 MG tablet Take 15 mg by mouth nightly    Historical Provider, MD   venlafaxine (EFFEXOR XR) 150 MG extended release capsule Take 150 mg by mouth daily    Historical Provider, MD   amLODIPine (NORVASC) 10 MG tablet Take 1 tablet by mouth daily Pt needs appt for further refills 19   Skylar Salmon DO       Current medications:    No current facility-administered medications for this visit. No current outpatient medications on file.      Facility-Administered Medications Ordered in Other Visits   Medication Dose Route Frequency Provider Last Rate Last Dose    methylPREDNISolone sodium (SOLU-MEDROL) injection 30 mg  30 mg Intravenous Q12H Nery Mendoza MD   30 mg at 20 0444    amLODIPine (NORVASC) tablet 10 mg  10 mg Oral Daily Lyndon Seymour MD   10 mg at 20 8406    hydrOXYzine (VISTARIL) capsule 25 mg  25 mg Oral BID Lyndon Seymour MD   25 mg at 20 205    nicotine polacrilex (NICORETTE) gum 2 mg  2 mg Oral Q2H PRN Lyndon Seymour MD        sodium chloride flush 0.9 % injection 10 mL  10 mL Intravenous 2 times per day Lyndon Seymour MD   10 mL at 20 0907    sodium chloride flush 0.9 % injection 10 mL  10 mL Intravenous PRN Lyndon Seymour MD   10 mL at 20 1741    magnesium hydroxide (MILK OF MAGNESIA) 400 MG/5ML suspension 30 mL  30 mL Oral Daily PRN Lyndon Seymour MD        ondansetron (ZOFRAN) injection 4 mg  4 mg Intravenous Q6H PRN Lyndon Seymour MD        enoxaparin (LOVENOX) injection 40 mg  40 mg Subcutaneous Daily Lyndon Seymour MD   Stopped at 01/28/20 0850    morphine (PF) injection 2 mg  2 mg Intravenous Q4H PRN Glen Umana MD   2 mg at 01/28/20 0003    HYDROcodone-acetaminophen (NORCO) 5-325 MG per tablet 1 tablet  1 tablet Oral Q4H PRN Glen Umana MD   1 tablet at 01/28/20 0444       Allergies: Allergies   Allergen Reactions    Ciprofloxacin Hcl      constipation    Nsaids Other (See Comments)     Per pt's physician Carmen Morgan) he is not to take NSAIDS due to HX of Crohn's disease.        Problem List:    Patient Active Problem List   Diagnosis Code    Drug overdose, multiple drugs T50.911A    Severe episode of recurrent major depressive disorder, with psychotic features ([facility] [address] F33.3    Mood disorder due to a general medical condition F06.30    Suicide attempt [facility]) T14.91XA    Laceration of neck S11.91XA    Laceration of left wrist S61.512A    Bipolar 1 disorder ([address] F31.9    Crohn disease ([facility] [address] K50.90    GERD (gastroesophageal reflux disease) K21.9    Bipolar 1 disorder ([address] F31.9    Opioid overdose ([facility] [address] T40.2X1A    Drug-induced tremor G25.1    Orchitis N45.2    Moderate protein-calorie malnutrition (HCC) E44.0    Acute psychosis ([address] F23    Schizoaffective disorder, bipolar type ([address] F25.0    H/O carcinoma in situ of prostate Z86.002    Essential hypertension I10    PND (post-nasal drip) R09.82    Scrotal abscess N49.2    Depression with suicidal ideation F32.9, R45.851    Colitis K52.9       Past Medical History:        Diagnosis Date    ADD (attention deficit disorder)     Anxiety     Bipolar 1 disorder ([address] 11/19/2017    Cancer ([address] prostate cancer    2014 / treated with surgery    Crohn's disease ([address]     Depression     GERD (gastroesophageal reflux disease)     Panic attack     No results for input(s): POCGLU, POCNA, POCK, POCCL, POCBUN, POCHEMO, POCHCT in the last 72 hours. Coags:   Lab Results   Component Value Date    PROTIME 13.4 02/03/2019    PROTIME 11.5 02/05/2011    INR 1.2 02/03/2019    APTT 28.5 02/03/2019       HCG (If Applicable): No results found for: PREGTESTUR, PREGSERUM, HCG, HCGQUANT     ABGs: No results found for: PHART, PO2ART, GWJ4SDP, BIQ7AQN, BEART, G1MQEVZZ     Type & Screen (If Applicable):  No results found for: LABABO, LABRH     EKG 1/24/2020  Normal sinus rhythm  Normal ECG  When compared with ECG of 16-OCT-2019 20:55,  No significant change was found  Confirmed by Tiny Moy ([id number]) on 1/25/2020 7:38:57 PM    Anesthesia Evaluation  Patient summary reviewed and Nursing notes reviewed no history of anesthetic complications:   Airway: Mallampati: II  TM distance: >3 FB   Neck ROM: full  Mouth opening: > = 3 FB Dental:      Comment: MISSING SEVERAL    Patient stated that all of his teeth are loose    Pulmonary:Negative Pulmonary ROS breath sounds clear to auscultation      (-) not a current smoker                           Cardiovascular:  Exercise tolerance: good (>4 METS),   (+) hypertension:,         Rhythm: regular  Rate: normal           Beta Blocker:  Not on Beta Blocker         Neuro/Psych:   (+) psychiatric history:depression/anxiety              ROS comment: Bipolar disorder. GI/Hepatic/Renal:   (+) GERD: well controlled,          ROS comment: CROHNS--recently took steroids. .   Endo/Other: Negative Endo/Other ROS   (+) malignancy/cancer (Prostate). Abdominal:           Vascular: negative vascular ROS. Anesthesia Plan      MAC     ASA 3       Induction: intravenous. Anesthetic plan and risks discussed with patient. Plan discussed with attending.                   Alex Cameron 77   1/28/2020          Patient will need to be re-evaluated prior to surgery by DOS anesthesiologist.    Helena Kovacs SHEN Caballero           1/28/2020        10:07 AM

## 2020-01-29 PROCEDURE — 2580000003 HC RX 258: Performed by: HOSPITALIST

## 2020-01-29 PROCEDURE — 6370000000 HC RX 637 (ALT 250 FOR IP): Performed by: HOSPITALIST

## 2020-01-29 PROCEDURE — 6360000002 HC RX W HCPCS: Performed by: INTERNAL MEDICINE

## 2020-01-29 PROCEDURE — 1200000000 HC SEMI PRIVATE

## 2020-01-29 PROCEDURE — 6370000000 HC RX 637 (ALT 250 FOR IP): Performed by: INTERNAL MEDICINE

## 2020-01-29 PROCEDURE — 6360000002 HC RX W HCPCS: Performed by: HOSPITALIST

## 2020-01-29 RX ADMIN — Medication 2 MG: at 13:42

## 2020-01-29 RX ADMIN — AMLODIPINE BESYLATE 10 MG: 10 TABLET ORAL at 08:46

## 2020-01-29 RX ADMIN — ENOXAPARIN SODIUM 40 MG: 40 INJECTION SUBCUTANEOUS at 08:46

## 2020-01-29 RX ADMIN — METHYLPREDNISOLONE SODIUM SUCCINATE 30 MG: 40 INJECTION, POWDER, FOR SOLUTION INTRAMUSCULAR; INTRAVENOUS at 04:35

## 2020-01-29 RX ADMIN — HYDROCODONE BITARTRATE AND ACETAMINOPHEN 1 TABLET: 5; 325 TABLET ORAL at 11:21

## 2020-01-29 RX ADMIN — HYDROXYZINE PAMOATE 25 MG: 25 CAPSULE ORAL at 08:46

## 2020-01-29 RX ADMIN — SODIUM CHLORIDE, PRESERVATIVE FREE 10 ML: 5 INJECTION INTRAVENOUS at 08:46

## 2020-01-29 RX ADMIN — Medication 2 MG: at 08:47

## 2020-01-29 RX ADMIN — ONDANSETRON 4 MG: 2 INJECTION INTRAMUSCULAR; INTRAVENOUS at 23:34

## 2020-01-29 RX ADMIN — METHYLPREDNISOLONE SODIUM SUCCINATE 30 MG: 40 INJECTION, POWDER, FOR SOLUTION INTRAMUSCULAR; INTRAVENOUS at 16:34

## 2020-01-29 RX ADMIN — Medication 2 MG: at 18:33

## 2020-01-29 RX ADMIN — Medication 2 MG: at 04:36

## 2020-01-29 RX ADMIN — Medication 2 MG: at 23:34

## 2020-01-29 RX ADMIN — HYDROCODONE BITARTRATE AND ACETAMINOPHEN 1 TABLET: 5; 325 TABLET ORAL at 06:50

## 2020-01-29 RX ADMIN — HYDROCODONE BITARTRATE AND ACETAMINOPHEN 1 TABLET: 5; 325 TABLET ORAL at 15:31

## 2020-01-29 RX ADMIN — SODIUM CHLORIDE, PRESERVATIVE FREE 10 ML: 5 INJECTION INTRAVENOUS at 20:10

## 2020-01-29 RX ADMIN — HYDROXYZINE PAMOATE 25 MG: 25 CAPSULE ORAL at 20:09

## 2020-01-29 ASSESSMENT — PAIN DESCRIPTION - DESCRIPTORS
DESCRIPTORS: ACHING;THROBBING;STABBING
DESCRIPTORS: ACHING;STABBING;THROBBING
DESCRIPTORS: ACHING;STABBING;THROBBING
DESCRIPTORS: ACHING;STABBING
DESCRIPTORS: ACHING;STABBING;THROBBING
DESCRIPTORS: ACHING;STABBING;THROBBING
DESCRIPTORS: ACHING;THROBBING;STABBING
DESCRIPTORS: ACHING;STABBING;THROBBING

## 2020-01-29 ASSESSMENT — PAIN DESCRIPTION - LOCATION
LOCATION: ABDOMEN

## 2020-01-29 ASSESSMENT — PAIN SCALES - GENERAL
PAINLEVEL_OUTOF10: 7
PAINLEVEL_OUTOF10: 9
PAINLEVEL_OUTOF10: 7
PAINLEVEL_OUTOF10: 9
PAINLEVEL_OUTOF10: 7
PAINLEVEL_OUTOF10: 8
PAINLEVEL_OUTOF10: 9
PAINLEVEL_OUTOF10: 7
PAINLEVEL_OUTOF10: 9
PAINLEVEL_OUTOF10: 9

## 2020-01-29 ASSESSMENT — PAIN DESCRIPTION - FREQUENCY
FREQUENCY: CONTINUOUS

## 2020-01-29 ASSESSMENT — PAIN DESCRIPTION - PROGRESSION
CLINICAL_PROGRESSION: NOT CHANGED

## 2020-01-29 ASSESSMENT — PAIN DESCRIPTION - ONSET
ONSET: ON-GOING

## 2020-01-29 ASSESSMENT — PAIN DESCRIPTION - PAIN TYPE
TYPE: ACUTE PAIN;CHRONIC PAIN

## 2020-01-29 ASSESSMENT — PAIN DESCRIPTION - ORIENTATION
ORIENTATION: LEFT;LOWER
ORIENTATION: LEFT;LOWER
ORIENTATION: LOWER;LEFT
ORIENTATION: LEFT;LOWER

## 2020-01-29 ASSESSMENT — PAIN - FUNCTIONAL ASSESSMENT
PAIN_FUNCTIONAL_ASSESSMENT: ACTIVITIES ARE NOT PREVENTED

## 2020-01-29 NOTE — PROGRESS NOTES
GENERAL SURGERY  DAILY PROGRESS NOTE  1/29/2020    Subjective:  Multiple BMs overnight, diarrhea is stable. No nausea, vomiting, pain.   Tolerating diet    Objective:  /86   Pulse 80   Temp 97.6 °F (36.4 °C) (Temporal)   Resp 16   Ht 6' 2\" (1.88 m)   Wt 182 lb 3.2 oz (82.6 kg)   SpO2 97%   BMI 23.39 kg/m²     GENERAL:  awake, alert, cooperative, no apparent distress  HEAD: Normocephalic, atraumatic  LUNGS:  No increased work of breathing  CARDIOVASCULAR:  Regular rate  ABDOMEN:  Soft, minimal TTP, non-distended    Assessment/Plan:  Acute Crohn's flare - off medication since 8/2019    Sigmoidoscopy by GI 1/28 -- active Crohn's Disease  Defer management to GI service  No acute surgical interventions required at this time  Will sign off    Jose Leonardo DO  Resident, PGY-2  1/29/2020  6:35 AM     The patient was seen and examined  Chart reviewed  Agree with the assessment and plan

## 2020-01-29 NOTE — PROGRESS NOTES
Diarrhea, pain, bleeding +  Maybe some better  Spoke with facility, they had reached out to [facility] and could not get them to write for   Ustekinumab (Stelara) and so no meds given. Said he did not complain until the day he was sent here??? He responded to Infliximab but filed after developing ATI. Should respond to adalimumab  (Humira). Pharmacy at [facility] has samples  totaling 160 mg  In 2 80 mg syringes. I have spoken to [address] [place] [address] Avenue [phone] and she tells me they can arrange for rx with order. HBV and TB testing [facility] 2018 negative and multiple occassions before. Repeat HBV and TB but do not delay Humira. Will start now.

## 2020-01-29 NOTE — PROGRESS NOTES
Admit Date: 1/24/2020      Subjective:     Patient: no acute issues reported overnight  Medication side effects: none    Scheduled Meds:   methylPREDNISolone  30 mg Intravenous Q12H    amLODIPine  10 mg Oral Daily    hydrOXYzine  25 mg Oral BID    sodium chloride flush  10 mL Intravenous 2 times per day    enoxaparin  40 mg Subcutaneous Daily     Continuous Infusions:  PRN Meds:nicotine polacrilex, sodium chloride flush, magnesium hydroxide, ondansetron, morphine, HYDROcodone 5 mg - acetaminophen    Objective:   I/O last 3 completed shifts: In: 4701 [P.O.:1080; I.V.:210]  Out: -   No intake/output data recorded.     /86   Pulse 80   Temp 97.6 °F (36.4 °C) (Temporal)   Resp 16   Ht 6' 2\" (1.88 m)   Wt 182 lb 3.2 oz (82.6 kg)   SpO2 97%   BMI 23.39 kg/m²   General appearance: alert, appears stated age and cooperative  Skin:negative  Heent: negative  Lungs: clear to auscultation bilaterally  Heart: regular rate and rhythm, S1, S2 normal, no murmur, click, rub or gallop  Abdomen: soft, non-tender; bowel sounds normal; no masses,  no organomegaly  Extremities:no edema  Neurologic: Grossly normal    Labs:  CBC with Differential:    Lab Results   Component Value Date    WBC 8.8 01/26/2020    RBC 4.17 01/26/2020    HGB 10.6 01/26/2020    HCT 35.1 01/26/2020     01/26/2020    MCV 84.2 01/26/2020    MCH 25.4 01/26/2020    MCHC 30.2 01/26/2020    RDW 13.6 01/26/2020    NRBC 0.0 07/08/2017    BANDSPCT 1 09/17/2014    METASPCT 1.7 07/08/2017    LYMPHOPCT 16.4 01/26/2020    MONOPCT 11.3 01/26/2020    BASOPCT 0.6 01/26/2020    MONOSABS 0.99 01/26/2020    LYMPHSABS 1.44 01/26/2020    EOSABS 0.10 01/26/2020    BASOSABS 0.05 01/26/2020     BMP:    Lab Results   Component Value Date     01/26/2020    K 4.5 01/26/2020    CL 99 01/26/2020    CO2 22 01/26/2020    BUN 10 01/26/2020    LABALBU 3.1 01/24/2020    LABALBU 3.5 02/05/2011    CREATININE 1.1 01/26/2020    CALCIUM 8.9 01/26/2020    GFRAA >60 01/26/2020    LABGLOM >60 01/26/2020     PT/INR:    Lab Results   Component Value Date    PROTIME 13.4 02/03/2019    PROTIME 11.5 02/05/2011    INR 1.2 02/03/2019     Last 3 Troponin:    Lab Results   Component Value Date    TROPONINI <0.01 01/24/2020    TROPONINI <0.01 12/27/2018    TROPONINI <0.01 11/20/2018     TSH:    Lab Results   Component Value Date    TSH 4.710 10/03/2018      Assessment:     1. Acute flare of Crohn's disease s/p sigmoidoscopy 1-28-20 w/very active disease  2.  H/O HTN  3. H/O bipolar disorder    Plan:       Continue same plan and orders    He will be re-incarcerated upon dc from the hospital

## 2020-01-30 PROCEDURE — 2580000003 HC RX 258: Performed by: HOSPITALIST

## 2020-01-30 PROCEDURE — 6370000000 HC RX 637 (ALT 250 FOR IP): Performed by: INTERNAL MEDICINE

## 2020-01-30 PROCEDURE — 1200000000 HC SEMI PRIVATE

## 2020-01-30 PROCEDURE — 86481 TB AG RESPONSE T-CELL SUSP: CPT

## 2020-01-30 PROCEDURE — 6370000000 HC RX 637 (ALT 250 FOR IP): Performed by: HOSPITALIST

## 2020-01-30 PROCEDURE — 6360000002 HC RX W HCPCS: Performed by: INTERNAL MEDICINE

## 2020-01-30 PROCEDURE — 6360000002 HC RX W HCPCS: Performed by: HOSPITALIST

## 2020-01-30 PROCEDURE — 86704 HEP B CORE ANTIBODY TOTAL: CPT

## 2020-01-30 PROCEDURE — 36415 COLL VENOUS BLD VENIPUNCTURE: CPT

## 2020-01-30 RX ADMIN — SODIUM CHLORIDE, PRESERVATIVE FREE 10 ML: 5 INJECTION INTRAVENOUS at 21:36

## 2020-01-30 RX ADMIN — AMLODIPINE BESYLATE 10 MG: 10 TABLET ORAL at 08:30

## 2020-01-30 RX ADMIN — HYDROXYZINE PAMOATE 25 MG: 25 CAPSULE ORAL at 21:13

## 2020-01-30 RX ADMIN — METHYLPREDNISOLONE SODIUM SUCCINATE 30 MG: 40 INJECTION, POWDER, FOR SOLUTION INTRAMUSCULAR; INTRAVENOUS at 04:48

## 2020-01-30 RX ADMIN — Medication 2 MG: at 17:07

## 2020-01-30 RX ADMIN — HYDROCODONE BITARTRATE AND ACETAMINOPHEN 1 TABLET: 5; 325 TABLET ORAL at 10:04

## 2020-01-30 RX ADMIN — Medication 2 MG: at 08:30

## 2020-01-30 RX ADMIN — HYDROCODONE BITARTRATE AND ACETAMINOPHEN 1 TABLET: 5; 325 TABLET ORAL at 14:03

## 2020-01-30 RX ADMIN — METHYLPREDNISOLONE SODIUM SUCCINATE 30 MG: 40 INJECTION, POWDER, FOR SOLUTION INTRAMUSCULAR; INTRAVENOUS at 17:09

## 2020-01-30 RX ADMIN — HYDROCODONE BITARTRATE AND ACETAMINOPHEN 1 TABLET: 5; 325 TABLET ORAL at 19:07

## 2020-01-30 RX ADMIN — Medication 2 MG: at 21:36

## 2020-01-30 RX ADMIN — Medication 2 MG: at 12:55

## 2020-01-30 RX ADMIN — HYDROCODONE BITARTRATE AND ACETAMINOPHEN 1 TABLET: 5; 325 TABLET ORAL at 04:48

## 2020-01-30 RX ADMIN — ENOXAPARIN SODIUM 40 MG: 40 INJECTION SUBCUTANEOUS at 08:30

## 2020-01-30 RX ADMIN — HYDROXYZINE PAMOATE 25 MG: 25 CAPSULE ORAL at 08:30

## 2020-01-30 RX ADMIN — SODIUM CHLORIDE, PRESERVATIVE FREE 10 ML: 5 INJECTION INTRAVENOUS at 08:31

## 2020-01-30 ASSESSMENT — PAIN DESCRIPTION - DESCRIPTORS
DESCRIPTORS: ACHING;STABBING
DESCRIPTORS: ACHING;JABBING
DESCRIPTORS: ACHING;STABBING

## 2020-01-30 ASSESSMENT — PAIN DESCRIPTION - ONSET: ONSET: GRADUAL

## 2020-01-30 ASSESSMENT — PAIN SCALES - GENERAL
PAINLEVEL_OUTOF10: 9
PAINLEVEL_OUTOF10: 8
PAINLEVEL_OUTOF10: 9
PAINLEVEL_OUTOF10: 3
PAINLEVEL_OUTOF10: 9
PAINLEVEL_OUTOF10: 7
PAINLEVEL_OUTOF10: 10
PAINLEVEL_OUTOF10: 9
PAINLEVEL_OUTOF10: 6
PAINLEVEL_OUTOF10: 9
PAINLEVEL_OUTOF10: 7
PAINLEVEL_OUTOF10: 0
PAINLEVEL_OUTOF10: 0
PAINLEVEL_OUTOF10: 2
PAINLEVEL_OUTOF10: 10
PAINLEVEL_OUTOF10: 4

## 2020-01-30 ASSESSMENT — PAIN DESCRIPTION - LOCATION
LOCATION: ABDOMEN

## 2020-01-30 ASSESSMENT — PAIN DESCRIPTION - ORIENTATION
ORIENTATION: LEFT;LOWER
ORIENTATION: LOWER;LEFT
ORIENTATION: LEFT;LOWER
ORIENTATION: LEFT;LOWER

## 2020-01-30 ASSESSMENT — PAIN DESCRIPTION - PAIN TYPE
TYPE: ACUTE PAIN
TYPE: ACUTE PAIN;CHRONIC PAIN
TYPE: ACUTE PAIN

## 2020-01-30 ASSESSMENT — PAIN DESCRIPTION - FREQUENCY
FREQUENCY: CONTINUOUS
FREQUENCY: INTERMITTENT

## 2020-01-30 ASSESSMENT — PAIN DESCRIPTION - PROGRESSION: CLINICAL_PROGRESSION: NOT CHANGED

## 2020-01-30 NOTE — PLAN OF CARE
Problem: Pain:  Goal: Pain level will decrease  Description  Pain level will decrease  Outcome: Met This Shift     Problem: Pain:  Goal: Pain level will decrease  Description  Pain level will decrease  Outcome: Met This Shift

## 2020-01-31 VITALS
HEART RATE: 63 BPM | BODY MASS INDEX: 22.74 KG/M2 | RESPIRATION RATE: 16 BRPM | TEMPERATURE: 97.4 F | DIASTOLIC BLOOD PRESSURE: 94 MMHG | OXYGEN SATURATION: 97 % | HEIGHT: 74 IN | SYSTOLIC BLOOD PRESSURE: 141 MMHG | WEIGHT: 177.2 LBS

## 2020-01-31 LAB
ALBUMIN SERPL-MCNC: 2.8 G/DL (ref 3.5–5.2)
ALP BLD-CCNC: 88 U/L (ref 40–129)
ALT SERPL-CCNC: 19 U/L (ref 0–40)
ANION GAP SERPL CALCULATED.3IONS-SCNC: 13 MMOL/L (ref 7–16)
AST SERPL-CCNC: 14 U/L (ref 0–39)
BASOPHILS ABSOLUTE: 0.03 E9/L (ref 0–0.2)
BASOPHILS RELATIVE PERCENT: 0.3 % (ref 0–2)
BILIRUB SERPL-MCNC: <0.2 MG/DL (ref 0–1.2)
BUN BLDV-MCNC: 27 MG/DL (ref 6–20)
CALCIUM SERPL-MCNC: 8.9 MG/DL (ref 8.6–10.2)
CHLORIDE BLD-SCNC: 98 MMOL/L (ref 98–107)
CO2: 27 MMOL/L (ref 22–29)
CREAT SERPL-MCNC: 0.8 MG/DL (ref 0.7–1.2)
EOSINOPHILS ABSOLUTE: 0 E9/L (ref 0.05–0.5)
EOSINOPHILS RELATIVE PERCENT: 0 % (ref 0–6)
GFR AFRICAN AMERICAN: >60
GFR NON-AFRICAN AMERICAN: >60 ML/MIN/1.73
GLUCOSE BLD-MCNC: 96 MG/DL (ref 74–99)
HCT VFR BLD CALC: 35.1 % (ref 37–54)
HEMOGLOBIN: 10.6 G/DL (ref 12.5–16.5)
IMMATURE GRANULOCYTES #: 0.4 E9/L
IMMATURE GRANULOCYTES %: 3.5 % (ref 0–5)
LYMPHOCYTES ABSOLUTE: 1.79 E9/L (ref 1.5–4)
LYMPHOCYTES RELATIVE PERCENT: 15.5 % (ref 20–42)
MCH RBC QN AUTO: 25.6 PG (ref 26–35)
MCHC RBC AUTO-ENTMCNC: 30.2 % (ref 32–34.5)
MCV RBC AUTO: 84.8 FL (ref 80–99.9)
MONOCYTES ABSOLUTE: 1.1 E9/L (ref 0.1–0.95)
MONOCYTES RELATIVE PERCENT: 9.5 % (ref 2–12)
NEUTROPHILS ABSOLUTE: 8.23 E9/L (ref 1.8–7.3)
NEUTROPHILS RELATIVE PERCENT: 71.2 % (ref 43–80)
PDW BLD-RTO: 13.9 FL (ref 11.5–15)
PLATELET # BLD: 621 E9/L (ref 130–450)
PMV BLD AUTO: 8.9 FL (ref 7–12)
POTASSIUM SERPL-SCNC: 4.8 MMOL/L (ref 3.5–5)
RBC # BLD: 4.14 E12/L (ref 3.8–5.8)
SODIUM BLD-SCNC: 138 MMOL/L (ref 132–146)
TOTAL PROTEIN: 6 G/DL (ref 6.4–8.3)
WBC # BLD: 11.6 E9/L (ref 4.5–11.5)

## 2020-01-31 PROCEDURE — 85025 COMPLETE CBC W/AUTO DIFF WBC: CPT

## 2020-01-31 PROCEDURE — 0DBN8ZX EXCISION OF SIGMOID COLON, VIA NATURAL OR ARTIFICIAL OPENING ENDOSCOPIC, DIAGNOSTIC: ICD-10-PCS | Performed by: INTERNAL MEDICINE

## 2020-01-31 PROCEDURE — 6370000000 HC RX 637 (ALT 250 FOR IP): Performed by: INTERNAL MEDICINE

## 2020-01-31 PROCEDURE — 6370000000 HC RX 637 (ALT 250 FOR IP): Performed by: HOSPITALIST

## 2020-01-31 PROCEDURE — 2580000003 HC RX 258: Performed by: HOSPITALIST

## 2020-01-31 PROCEDURE — 6370000000 HC RX 637 (ALT 250 FOR IP): Performed by: FAMILY MEDICINE

## 2020-01-31 PROCEDURE — 80053 COMPREHEN METABOLIC PANEL: CPT

## 2020-01-31 PROCEDURE — 36415 COLL VENOUS BLD VENIPUNCTURE: CPT

## 2020-01-31 PROCEDURE — 6360000002 HC RX W HCPCS: Performed by: INTERNAL MEDICINE

## 2020-01-31 PROCEDURE — 6360000002 HC RX W HCPCS: Performed by: HOSPITALIST

## 2020-01-31 RX ORDER — PREDNISONE 10 MG/1
10 TABLET ORAL DAILY
Qty: 7 TABLET | Refills: 0 | Status: SHIPPED | OUTPATIENT
Start: 2020-02-21 | End: 2020-02-28

## 2020-01-31 RX ORDER — PREDNISONE 20 MG/1
20 TABLET ORAL
Qty: 7 TABLET | Refills: 0 | Status: SHIPPED | OUTPATIENT
Start: 2020-02-14 | End: 2020-02-21

## 2020-01-31 RX ORDER — PREDNISONE 20 MG/1
40 TABLET ORAL
Status: DISCONTINUED | OUTPATIENT
Start: 2020-01-31 | End: 2020-01-31 | Stop reason: HOSPADM

## 2020-01-31 RX ORDER — PREDNISONE 10 MG/1
30 TABLET ORAL
Qty: 21 TABLET | Refills: 0 | Status: SHIPPED | OUTPATIENT
Start: 2020-02-07 | End: 2020-02-14

## 2020-01-31 RX ORDER — PREDNISONE 20 MG/1
40 TABLET ORAL DAILY
Status: DISCONTINUED | OUTPATIENT
Start: 2020-01-31 | End: 2020-01-31 | Stop reason: CLARIF

## 2020-01-31 RX ORDER — PREDNISONE 20 MG/1
40 TABLET ORAL
Qty: 14 TABLET | Refills: 0 | Status: SHIPPED | OUTPATIENT
Start: 2020-01-31 | End: 2020-02-07

## 2020-01-31 RX ORDER — PREDNISONE 20 MG/1
20 TABLET ORAL
Status: DISCONTINUED | OUTPATIENT
Start: 2020-02-14 | End: 2020-01-31 | Stop reason: HOSPADM

## 2020-01-31 RX ORDER — PREDNISONE 10 MG/1
10 TABLET ORAL DAILY
Status: DISCONTINUED | OUTPATIENT
Start: 2020-02-21 | End: 2020-01-31 | Stop reason: HOSPADM

## 2020-01-31 RX ADMIN — Medication 2 MG: at 01:35

## 2020-01-31 RX ADMIN — ENOXAPARIN SODIUM 40 MG: 40 INJECTION SUBCUTANEOUS at 08:14

## 2020-01-31 RX ADMIN — Medication 2 MG: at 11:07

## 2020-01-31 RX ADMIN — PREDNISONE 40 MG: 20 TABLET ORAL at 08:12

## 2020-01-31 RX ADMIN — SODIUM CHLORIDE, PRESERVATIVE FREE 10 ML: 5 INJECTION INTRAVENOUS at 08:14

## 2020-01-31 RX ADMIN — HYDROCODONE BITARTRATE AND ACETAMINOPHEN 1 TABLET: 5; 325 TABLET ORAL at 08:13

## 2020-01-31 RX ADMIN — AMLODIPINE BESYLATE 10 MG: 10 TABLET ORAL at 08:12

## 2020-01-31 RX ADMIN — HYDROXYZINE PAMOATE 25 MG: 25 CAPSULE ORAL at 08:12

## 2020-01-31 RX ADMIN — Medication 2 MG: at 06:31

## 2020-01-31 ASSESSMENT — PAIN DESCRIPTION - PAIN TYPE
TYPE: ACUTE PAIN
TYPE: ACUTE PAIN

## 2020-01-31 ASSESSMENT — PAIN DESCRIPTION - LOCATION
LOCATION: ABDOMEN
LOCATION: ABDOMEN

## 2020-01-31 ASSESSMENT — PAIN DESCRIPTION - ORIENTATION
ORIENTATION: LEFT;LOWER
ORIENTATION: LEFT;LOWER

## 2020-01-31 ASSESSMENT — PAIN SCALES - GENERAL
PAINLEVEL_OUTOF10: 9
PAINLEVEL_OUTOF10: 8

## 2020-01-31 ASSESSMENT — PAIN DESCRIPTION - DESCRIPTORS
DESCRIPTORS: ACHING
DESCRIPTORS: ACHING

## 2020-01-31 ASSESSMENT — PAIN DESCRIPTION - FREQUENCY
FREQUENCY: CONTINUOUS
FREQUENCY: CONTINUOUS

## 2020-01-31 NOTE — DISCHARGE INSTR - COC
Continuity of Care Form    Patient Name: Marlene Webster   :  1965  MRN:  [id number]    Admit date:  2020  Discharge date:  ***    Code Status Order: Full Code   Advance Directives:   Advance Care Flowsheet Documentation     Date/Time Healthcare Directive Type of Healthcare Directive Copy in [address] Agent's Name Healthcare Agent's Phone Number    20 1149  No, patient does not have an advance directive for healthcare treatment -- -- -- -- --          Admitting Physician:  Ge Parnell DO  PCP: Ge Parnell DO    Discharging Nurse: [facility]/Room#: 8729/1839-A  Discharging Unit Phone Number: ***    Emergency Contact:   Extended Emergency Contact Information  Primary Emergency Contact: Daysi Olmos  Address: [address] [place] [address] Phone: [phone]  Relation: Spouse   needed? No  Secondary Emergency Contact: Domo Calin [address] Phone: [phone]  Mobile Phone: [phone]  Relation: Parent   needed? No    Past Surgical History:  Past Surgical History:   Procedure Laterality Date    COLONOSCOPY      COLONOSCOPY N/A 2020    COLONOSCOPY WITH BIOPSY performed by Jose Carlos Cisneros MD at [address] [place] [address], COLON, DIAGNOSTIC      INCISION AND DRAINAGE N/A 5/15/2019    I & D SCROTAL ABSCESS WITH EXPLANTATION ARTIFICIAL URINARY SPHINCTER COMPLEX URETHROPLASTY performed by Erma Dowd DO at [facility]  ? deviated septum    PROSTATECTOMY  9/15/2014    laparoscopic robotic assisted.        Immunization History:   Immunization History   Administered Date(s) Administered    Influenza Vaccine, unspecified formulation 2016    Influenza, Quadv, 6 mo and older, IM, PF (Flulaval, Fluarix) 2019    Td, unspecified formulation 2012       Active Problems:  Patient Active Problem List   Diagnosis Code  Drug overdose, multiple drugs T50.911A    Severe episode of recurrent major depressive disorder, with psychotic features ([facility] [address] F33.3    Mood disorder due to a general medical condition F06.30    Suicide attempt [facility]) T14.91XA    Laceration of neck S11.91XA    Laceration of left wrist S61.512A    Bipolar 1 disorder (HCC) F31.9    Crohn disease ([facility] [address] K50.90    GERD (gastroesophageal reflux disease) K21.9    Bipolar 1 disorder (HCC) F31.9    Opioid overdose ([facility] [address] [address] T40.2X1A    Drug-induced tremor G25.1    Orchitis N45.2    Moderate protein-calorie malnutrition (HCC) E44.0    Acute psychosis ([facility] [address] F23    Schizoaffective disorder, bipolar type ([facility] [address] F25.0    H/O carcinoma in situ of prostate Z86.002    Essential hypertension I10    PND (post-nasal drip) R09.82    Scrotal abscess N49.2    Depression with suicidal ideation F32.9, R45.851    Colitis K52.9       Isolation/Infection:   Isolation          No Isolation        Patient Infection Status     Infection Onset Added Last Indicated Last Indicated By Review Planned Expiration Resolved Resolved By    None active    Resolved    C-diff Rule Out  01/25/20 01/25/20 Clostridium difficile EIA (Ordered)   01/26/20 Isolation/Infection  Ip, RN          Nurse Assessment:  Last Vital Signs: /82   Pulse 62   Temp 97.9 °F (36.6 °C) (Temporal)   Resp 16   Ht 6' 2\" (1.88 m)   Wt 176 lb 12.8 oz (80.2 kg)   SpO2 96%   BMI 22.70 kg/m²     Last documented pain score (0-10 scale): Pain Level: 9  Last Weight:   Wt Readings from Last 1 Encounters:   01/30/20 176 lb 12.8 oz (80.2 kg)     Mental Status:  {IP PT MENTAL STATUS:20030}    IV Access:  { ROSSI IV ACCESS:533745914}    Nursing Mobility/ADLs:  Walking   {CHP DME RRHT:102205361}  Transfer  {CHP DME YYHL:460785399}  Bathing  {CHP DME MGGD:696966818}  Dressing  {CHP DME XVXO:664462593}  Toileting  {CHP DME KTVP:963618797}  Feeding  {CHP DME ARKW:456927938}  Med Admin  {CHP DME LDYL:489755798}  Med Delivery   [facility] MED Delivery:743115676}    Wound Care Documentation and Therapy:        Elimination:  Continence:   · Bowel: {YES / ZV:97383}  · Bladder: {YES / ON:62345}  Urinary Catheter: {Urinary Catheter:361348622}   Colostomy/Ileostomy/Ileal Conduit: {YES / LO:81778}       Date of Last BM: ***    Intake/Output Summary (Last 24 hours) at 2020 0551  Last data filed at 2020 1712  Gross per 24 hour   Intake 140 ml   Output --   Net 140 ml     I/O last 3 completed shifts: In: 140 [P.O.:120;  I.V.:20]  Out: -     Safety Concerns:     {[facility] Safety Concerns:055479244}    Impairments/Disabilities:      {[facility] Impairments/Disabilities:894515791}    Nutrition Therapy:  Current Nutrition Therapy:   [address] [place] [address] Diet List:862299674}    Routes of Feeding: {[facility] DME Other Feedings:851660466}  Liquids: {Slp liquid thickness:78151}  Daily Fluid Restriction: {CHP DME Yes amt example:733425757}  Last Modified Barium Swallow with Video (Video Swallowing Test): {Done Not Done MRHC:457471549}    Treatments at the Time of Hospital Discharge:   Respiratory Treatments: ***  Oxygen Therapy:  {Therapy; copd oxygen:38366}  Ventilator:    {[facility] Vent MLBV:208753136}    Rehab Therapies: {THERAPEUTIC INTERVENTION:8547341980}  Weight Bearing Status/Restrictions: [address] [place] [address] Weight Bearin}  Other Medical Equipment (for information only, NOT a DME order):  {EQUIPMENT:837771955}  Other Treatments: ***    Patient's personal belongings (please select all that are sent with patient):  {[facility] DME Belongings:597813834}    RN SIGNATURE:  {Esignature:355010331}    CASE MANAGEMENT/SOCIAL WORK SECTION    Inpatient Status Date: ***    Readmission Risk Assessment Score:  Readmission Risk              Risk of Unplanned Readmission:        16           Discharging to Facility/ Agency   · Name:   · Address:  · Phone:  · Fax:    Dialysis Facility (if applicable)   · Name:  · Address:  · Dialysis Schedule:  · Phone:  · Fax:    /Social Worker signature: {Esignature:689210253}    PHYSICIAN SECTION    Prognosis: {Prognosis:8552163236}    Condition at Discharge: [address] Patient Condition:086940062}    Rehab Potential (if transferring to Rehab): {Prognosis:5856364049}    Recommended Labs or Other Treatments After Discharge: ***    Physician Certification: I certify the above information and transfer of Evgeny Campos  is necessary for the continuing treatment of the diagnosis listed and that he requires {Admit to Appropriate Level of Care:62871} for {GREATER/LESS:177477317} 30 days.      Update Admission H&P: {CHP DME Changes in [facility]:493123725}    PHYSICIAN SIGNATURE:  Skylar Salmon DO

## 2020-01-31 NOTE — PROGRESS NOTES
Bx c/w IBD  CRP 15  First dose adalimumab given yesterday, I brought and gave to nurse the next 80 mg dose to be given in 2 weeks to take back to the [facility].  After that, 40 mg every 2 weeks beginning in 4 weeks    Still cramps diarrhea and bleeding but nontoxic  Will recheck lab but can go back tomorrow on 40 mg prednisone daily and taper at 10 mg per week

## 2020-01-31 NOTE — PROGRESS NOTES
LABGLOM >60 01/26/2020     PT/INR:    Lab Results   Component Value Date    PROTIME 13.4 02/03/2019    PROTIME 11.5 02/05/2011    INR 1.2 02/03/2019     Last 3 Troponin:    Lab Results   Component Value Date    TROPONINI <0.01 01/24/2020    TROPONINI <0.01 12/27/2018    TROPONINI <0.01 11/20/2018     TSH:    Lab Results   Component Value Date    TSH 4.710 10/03/2018      Assessment:     1.  Flare of Crohn's disease    Plan:       Continue same plan and orders    ?dc today

## 2020-02-01 LAB — HEPATITIS B CORE TOTAL ANTIBODY: NONREACTIVE

## 2020-02-01 NOTE — OP NOTE
[address]                  [address] [facility]  [facility]                                OPERATIVE REPORT    PATIENT NAME: Nasrin Rick                       :        1965  MED REC NO:   [id number]                            ROOM:       8421  ACCOUNT NO:   [de-identified]                           ADMIT DATE: 2020  PROVIDER:     Elijah Mera MD    DATE OF PROCEDURE:  2020 (date of procedure corrected from  to )    PROCEDURE:  Colonoscopy plus biopsy. PREOPERATIVE DIAGNOSES:  Idiopathic inflammatory bowel disease, active;  historically Crohn disease. POSTOPERATIVE DIAGNOSES:  1.  No current active perianal disease. 2.  Active inflammatory bowel disease extending to the transverse colon,  unremarkable more proximally, and cecal intubation was achieved. There  was formed stool in the proximal transverse colon and ascending  colon/cecum. INDICATIONS:  This is a gentleman who has a history of Crohn disease. Treated at [facility]. Initially responded to Remicade for 10  years or so. Apparently failed because of the development of  antibodies. Switched to [employer]. Failed that. Went on to be treated  with Stelara and apparently did well. Historically and review of  records, the active disease has been in the lower segment of the colon  and mostly in the rectum. He has had perianal disease and Seton time  placement and perirectal abscess in the past.  Admitted to the hospital  with diarrhea, pain_, and bleeding having been withdrawn from [address] [place] [address]  about eight months ago after incarceration. Preop is monitored anesthesia care. PROCEDURE IN DETAIL:  With the patient in left lateral decubitus  position, he was sedated. A digital rectal exam revealed no perianal  disease. No Setons.   The Olympus PCF-H180AL video colonoscope was  passed into the rectum which was abnormal.  It was granular with  mucopus, but no

## 2020-02-02 LAB
COMMENT: NORMAL
REPORT: NORMAL

## 2020-02-04 ENCOUNTER — PREP FOR PROCEDURE (OUTPATIENT)
Dept: SURGERY | Age: 55
End: 2020-02-04

## 2020-02-25 NOTE — DISCHARGE SUMMARY
[address]                  [address] [place],  [facility]                               DISCHARGE SUMMARY    PATIENT NAME: Mel Lam YOB: 1965  MED REC NO:   [id number]                            ROOM:       8421  ACCOUNT NO:   [de-identified]                           ADMIT DATE: 2020  PROVIDER:     Osiel Slade,                   DISCHARGE DATE:  2020    DICTATED ADMITTING DATE:  2020    FINAL DIAGNOSES:  1. Acute flare of Crohn's disease. 2.  History of hypertension. 3.  History of prostate cancer status post prostatectomy. 4.  History of bipolar disorder. CHIEF COMPLAINT, PRESENTING ILLNESS, AND PHYSICAL FINDINGS:  The patient  is a  male in his 46s with Crohn's disease, hypertension,  history of prostate carcinoma status post prostatectomy and bipolar  disorder, who presents to this hospital emergency room with diarrhea and  abdominal pain. Imaging studies performed revealed the presence of  inflammatory changes involving his bowel. He was admitted. GI  consultation was requested. The patient was started on IV  corticosteroids and IV fluids. PHYSICAL ASSESSMENT:  VITAL SIGNS:  Reveals his temperature and vital signs to be stable. ENT:  Exam is negative. HEART:  Regular. LUNGS:  Clear. ABDOMEN:  Noted to be diffusely tender, positive bowel sounds. EXTREMITIES:  Reveal no evidence for edema and/or cyanosis. HOSPITAL COURSE:  Steroids were weaned as tolerated. Eventually his  diet was advanced. He was started on immunosuppressive therapy which he  had not been taking for quite some time and he was discharged on  2020. PHYSICIANS FOLLOWING THE PATIENT DURING THIS HOSPITAL STAY:  Dr. Ester Herring  from GI services. CONDITION ON DISCHARGE:  Level of function is fair to good. UNREPORTED TEST RESULTS AND INTENDED FOLLOWUP:  None applicable.     LEVEL OF ACTIVITY:  Up ad mady. DISCHARGE DIET:  Includes low-fat, low-cholesterol, 2-gm sodium. DISCHARGE MEDS:  See discharge med reconciliation form. FOLLOWUP:  Will be in the office accordingly.         Oralia Reed DO    D: 02/24/2020 19:00:48       T: 02/24/2020 19:04:07     [email or web address]  Job#: [id number]     Doc#: [id number]    CC:

## 2020-03-11 ENCOUNTER — HOSPITAL ENCOUNTER (EMERGENCY)
Age: 55
Discharge: PSYCHIATRIC OR SUBSTANCE USE FACILITY | End: 2020-03-12
Attending: EMERGENCY MEDICINE
Payer: MEDICARE

## 2020-03-11 LAB
ACETAMINOPHEN LEVEL: <5 MCG/ML (ref 10–30)
AMPHETAMINE SCREEN, URINE: NOT DETECTED
ANION GAP SERPL CALCULATED.3IONS-SCNC: 13 MMOL/L (ref 7–16)
BARBITURATE SCREEN URINE: NOT DETECTED
BASOPHILS ABSOLUTE: 0.04 E9/L (ref 0–0.2)
BASOPHILS RELATIVE PERCENT: 0.6 % (ref 0–2)
BENZODIAZEPINE SCREEN, URINE: NOT DETECTED
BUN BLDV-MCNC: 22 MG/DL (ref 6–20)
CALCIUM SERPL-MCNC: 9 MG/DL (ref 8.6–10.2)
CANNABINOID SCREEN URINE: NOT DETECTED
CHLORIDE BLD-SCNC: 109 MMOL/L (ref 98–107)
CO2: 22 MMOL/L (ref 22–29)
COCAINE METABOLITE SCREEN URINE: NOT DETECTED
CREAT SERPL-MCNC: 1 MG/DL (ref 0.7–1.2)
EOSINOPHILS ABSOLUTE: 0.06 E9/L (ref 0.05–0.5)
EOSINOPHILS RELATIVE PERCENT: 1 % (ref 0–6)
ETHANOL: <10 MG/DL (ref 0–0.08)
FENTANYL SCREEN, URINE: NOT DETECTED
GFR AFRICAN AMERICAN: >60
GFR NON-AFRICAN AMERICAN: >60 ML/MIN/1.73
GLUCOSE BLD-MCNC: 126 MG/DL (ref 74–99)
HCT VFR BLD CALC: 33.9 % (ref 37–54)
HEMOGLOBIN: 10.5 G/DL (ref 12.5–16.5)
IMMATURE GRANULOCYTES #: 0.06 E9/L
IMMATURE GRANULOCYTES %: 1 % (ref 0–5)
LYMPHOCYTES ABSOLUTE: 1.37 E9/L (ref 1.5–4)
LYMPHOCYTES RELATIVE PERCENT: 21.7 % (ref 20–42)
Lab: NORMAL
MCH RBC QN AUTO: 26.1 PG (ref 26–35)
MCHC RBC AUTO-ENTMCNC: 31 % (ref 32–34.5)
MCV RBC AUTO: 84.1 FL (ref 80–99.9)
METHADONE SCREEN, URINE: NOT DETECTED
MONOCYTES ABSOLUTE: 0.59 E9/L (ref 0.1–0.95)
MONOCYTES RELATIVE PERCENT: 9.4 % (ref 2–12)
NEUTROPHILS ABSOLUTE: 4.19 E9/L (ref 1.8–7.3)
NEUTROPHILS RELATIVE PERCENT: 66.3 % (ref 43–80)
OPIATE SCREEN URINE: NOT DETECTED
OXYCODONE URINE: NOT DETECTED
PDW BLD-RTO: 18.5 FL (ref 11.5–15)
PHENCYCLIDINE SCREEN URINE: NOT DETECTED
PLATELET # BLD: 324 E9/L (ref 130–450)
PMV BLD AUTO: 8.8 FL (ref 7–12)
POTASSIUM REFLEX MAGNESIUM: 3.6 MMOL/L (ref 3.5–5)
RBC # BLD: 4.03 E12/L (ref 3.8–5.8)
SALICYLATE, SERUM: <0.3 MG/DL (ref 0–30)
SODIUM BLD-SCNC: 144 MMOL/L (ref 132–146)
TRICYCLIC ANTIDEPRESSANTS SCREEN SERUM: NEGATIVE NG/ML
WBC # BLD: 6.3 E9/L (ref 4.5–11.5)

## 2020-03-11 PROCEDURE — G0480 DRUG TEST DEF 1-7 CLASSES: HCPCS

## 2020-03-11 PROCEDURE — 93005 ELECTROCARDIOGRAM TRACING: CPT | Performed by: EMERGENCY MEDICINE

## 2020-03-11 PROCEDURE — 85025 COMPLETE CBC W/AUTO DIFF WBC: CPT

## 2020-03-11 PROCEDURE — 80307 DRUG TEST PRSMV CHEM ANLYZR: CPT

## 2020-03-11 PROCEDURE — 80048 BASIC METABOLIC PNL TOTAL CA: CPT

## 2020-03-11 PROCEDURE — 99285 EMERGENCY DEPT VISIT HI MDM: CPT

## 2020-03-11 ASSESSMENT — ENCOUNTER SYMPTOMS
NAUSEA: 0
VOMITING: 0
RHINORRHEA: 0
EYE PAIN: 0
DIARRHEA: 0
BACK PAIN: 0
COUGH: 0
SHORTNESS OF BREATH: 0
WHEEZING: 0
ABDOMINAL PAIN: 0
TROUBLE SWALLOWING: 0
BLOOD IN STOOL: 0
CHEST TIGHTNESS: 0

## 2020-03-11 NOTE — ED NOTES
Patient undressed and placed in pt gown and pants. Belongings locked in locker in room 14B.      Nura Darnell RN  03/11/20 8588

## 2020-03-11 NOTE — ED PROVIDER NOTES
[address] [facility]  eMERGENCY dEPARTMENT eNCOUnter      Pt Name: Dianne Brian  MRN: [id number]  [place] 1965  Date of evaluation: 3/11/2020  Provider: Howard Hall MD     CHIEF COMPLAINT       Chief Complaint   Patient presents with    Suicidal     with plans to \"jump out of the car head first or hang myself, or sticking my tongue in electric box\", -HI          HISTORY OF PRESENT ILLNESS   (Location/Symptom, Timing/Onset,Context/Setting, Quality, Duration, Modifying Factors, Severity) Note limiting factors. Patient presents here with a chief complaint of hearing voices and being told that he they are going to kill him. The patient apparently was incarcerated for quite a long time for some arson charges were he and his wife tried to kill himself by turning on a gas in their house. He apparently had some issues when he was in [employer] and was hearing voices saying that 8 people were going to kill him cut his tongue out and then burn him in the trunk of his car, his wife and kill his parents and siblings. he said this started after he complained about 1 of the officers for not getting him new diapers. The patient then went to [facility] today and they would not admit him for detox from Adderall because he has not been taking any Adderall for 9 months. The patient presents here now stating that he is hearing these voices they are saying he they are going to kill him. He apparently is not taking some of the medicine because they did not really discharge him with them. He has not had any other new complaints. He is not actively suicidal currently. He is here with his wife          Dianne Brian is a 49 y.o. male who presents to the emergency department   Nursing Notes were reviewed.     REVIEW OF SYSTEMS    (2+ forlevel 4; 10+ for level 5)      Review of Systems   Constitutional: Negative for appetite change, chills, fatigue, fever and unexpected service: None     Active member of club or organization: None     Attends meetings of clubs or organizations: None     Relationship status: None    Intimate partner violence     Fear of current or ex partner: None     Emotionally abused: None     Physically abused: None     Forced sexual activity: None   Other Topics Concern    None   Social History Narrative    ** Merged History Encounter **            SCREENINGS      @FLOW([id number])@    PHYSICAL EXAM    (5+ for level 4, 8+ for level 5)     ED Triage Vitals   BP Temp Temp src Pulse Resp SpO2 Height Weight   03/11/20 1558 03/11/20 1558 -- 03/11/20 1545 03/11/20 1558 03/11/20 1545 -- 03/11/20 1558   (!) 147/101 98 °F (36.7 °C)  106 16 96 %  177 lb (80.3 kg)       Physical Exam  Vitals signs and nursing note reviewed. Constitutional:       General: He is not in acute distress. Appearance: He is well-developed and normal weight. He is not ill-appearing, toxic-appearing or diaphoretic. HENT:      Head: Normocephalic and atraumatic. Nose: Nose normal.      Mouth/Throat:      Mouth: Mucous membranes are moist.      Pharynx: No oropharyngeal exudate. Eyes:      General: No scleral icterus. Right eye: No discharge. Left eye: No discharge. Conjunctiva/sclera: Conjunctivae normal.      Pupils: Pupils are equal, round, and reactive to light. Neck:      Musculoskeletal: Normal range of motion and neck supple. Thyroid: No thyromegaly. Vascular: No JVD. Trachea: No tracheal deviation. Cardiovascular:      Rate and Rhythm: Normal rate and regular rhythm. Heart sounds: Normal heart sounds. No murmur. No gallop. Pulmonary:      Effort: Pulmonary effort is normal. No respiratory distress. Breath sounds: Normal breath sounds. No stridor. No wheezing or rales. Chest:      Chest wall: No tenderness. Abdominal:      General: There is no distension. Palpations: Abdomen is soft. There is no mass.       Tenderness: SpO2: 96%    Weight:  177 lb (80.3 kg)       Medications - No data to display    MDM  Number of Diagnoses or Management Options  Diagnosis management comments: Presented here for ED mental health screening. He does have a history of schizophrenia as well as previous substance abuse. He presented here with his wife. ED mental health screening was obtained and all unremarkable. At this time he is medically cleared and not felt to require any sort of medical intervention and is cleared for psychiatric treatment as needed. .      CONSULTS:  IP CONSULT TO SOCIAL WORK    PROCEDURES:  Unless otherwise noted below, none     Procedures    FINAL IMPRESSION      1. Depression with suicidal ideation    2. [facility]        DISPOSITION/PLAN   DISPOSITION        PATIENT REFERRED TO:  No follow-up provider specified. DISCHARGE MEDICATIONS:  New Prescriptions    No medications on file          (Please note:  Portions of this note were completed with a voice recognition program.  Efforts were made to edit thedictations but occasionally words and phrases are mis-transcribed.)    Form v2016. J.5-cn    Sunny Medina MD (electronically signed)  Emergency Medicine Provider          Sunny Medina MD  03/11/20 5004

## 2020-03-12 VITALS
SYSTOLIC BLOOD PRESSURE: 137 MMHG | WEIGHT: 177 LBS | DIASTOLIC BLOOD PRESSURE: 84 MMHG | RESPIRATION RATE: 20 BRPM | TEMPERATURE: 98.6 F | HEART RATE: 80 BPM | OXYGEN SATURATION: 97 % | BODY MASS INDEX: 22.73 KG/M2

## 2020-03-12 LAB
EKG ATRIAL RATE: 93 BPM
EKG P AXIS: 61 DEGREES
EKG P-R INTERVAL: 152 MS
EKG Q-T INTERVAL: 366 MS
EKG QRS DURATION: 88 MS
EKG QTC CALCULATION (BAZETT): 455 MS
EKG R AXIS: 48 DEGREES
EKG T AXIS: 50 DEGREES
EKG VENTRICULAR RATE: 93 BPM

## 2020-03-12 PROCEDURE — 93010 ELECTROCARDIOGRAM REPORT: CPT | Performed by: INTERNAL MEDICINE

## 2020-03-12 NOTE — ED NOTES
Report called to [address] [place] [address] at [facility] [phone].  Accepted by  via [address], RN  03/12/20 1663

## 2020-03-12 NOTE — ED NOTES
Attempted to place patient referral with the [facility], call went to voicemail where a HIPAA compliant message was left with return call number. Waiting call back, SARA Ramos Speaker  03/11/20 8921

## 2020-03-12 NOTE — ED NOTES
Per Marylynn Rubinstein at the [address] [place] [address] is requesting the pink slip be faxed to [phone] prior to accepting information being given. [place] slip faxed at [address] successfully. Waiting return call from Noemi Hanna.        Margi Cervantes  03/12/20 0141

## 2020-03-25 PROBLEM — F31.9 BIPOLAR 1 DISORDER (HCC): Status: RESOLVED | Noted: 2017-11-17 | Resolved: 2020-03-24

## 2020-03-26 ENCOUNTER — APPOINTMENT (OUTPATIENT)
Dept: CT IMAGING | Age: 55
End: 2020-03-26
Payer: MEDICARE

## 2020-03-26 ENCOUNTER — HOSPITAL ENCOUNTER (EMERGENCY)
Age: 55
Discharge: HOME | End: 2020-03-26
Payer: MEDICARE

## 2020-03-26 VITALS
TEMPERATURE: 97.2 F | HEIGHT: 74 IN | OXYGEN SATURATION: 99 % | WEIGHT: 170 LBS | BODY MASS INDEX: 21.82 KG/M2 | DIASTOLIC BLOOD PRESSURE: 79 MMHG | SYSTOLIC BLOOD PRESSURE: 114 MMHG | HEART RATE: 86 BPM | RESPIRATION RATE: 18 BRPM

## 2020-03-26 LAB
ALBUMIN SERPL-MCNC: 3.1 G/DL (ref 3.5–5.2)
ALP BLD-CCNC: 75 U/L (ref 40–129)
ALT SERPL-CCNC: <5 U/L (ref 0–40)
ANION GAP SERPL CALCULATED.3IONS-SCNC: 16 MMOL/L (ref 7–16)
ANISOCYTOSIS: ABNORMAL
AST SERPL-CCNC: 7 U/L (ref 0–39)
BACTERIA: NORMAL /HPF
BASOPHILS ABSOLUTE: 0 E9/L (ref 0–0.2)
BASOPHILS RELATIVE PERCENT: 0 % (ref 0–2)
BILIRUB SERPL-MCNC: 0.3 MG/DL (ref 0–1.2)
BILIRUBIN URINE: NEGATIVE
BLOOD, URINE: NORMAL
BUN BLDV-MCNC: 15 MG/DL (ref 6–20)
BURR CELLS: ABNORMAL
CALCIUM SERPL-MCNC: 9 MG/DL (ref 8.6–10.2)
CHLORIDE BLD-SCNC: 91 MMOL/L (ref 98–107)
CLARITY: CLEAR
CO2: 23 MMOL/L (ref 22–29)
COLOR: YELLOW
CREAT SERPL-MCNC: 1.1 MG/DL (ref 0.7–1.2)
EOSINOPHILS ABSOLUTE: 0 E9/L (ref 0.05–0.5)
EOSINOPHILS RELATIVE PERCENT: 0 % (ref 0–6)
GFR AFRICAN AMERICAN: >60
GFR NON-AFRICAN AMERICAN: >60 ML/MIN/1.73
GLUCOSE BLD-MCNC: 136 MG/DL (ref 74–99)
GLUCOSE URINE: NEGATIVE MG/DL
HCT VFR BLD CALC: 39.1 % (ref 37–54)
HEMOGLOBIN: 12.9 G/DL (ref 12.5–16.5)
KETONES, URINE: NEGATIVE MG/DL
LACTIC ACID: 1.9 MMOL/L (ref 0.5–2.2)
LEUKOCYTE ESTERASE, URINE: NEGATIVE
LIPASE: 7 U/L (ref 13–60)
LYMPHOCYTES ABSOLUTE: 1.75 E9/L (ref 1.5–4)
LYMPHOCYTES RELATIVE PERCENT: 16.5 % (ref 20–42)
MCH RBC QN AUTO: 26.9 PG (ref 26–35)
MCHC RBC AUTO-ENTMCNC: 33 % (ref 32–34.5)
MCV RBC AUTO: 81.6 FL (ref 80–99.9)
MONOCYTES ABSOLUTE: 2.78 E9/L (ref 0.1–0.95)
MONOCYTES RELATIVE PERCENT: 27 % (ref 2–12)
NEUTROPHILS ABSOLUTE: 5.87 E9/L (ref 1.8–7.3)
NEUTROPHILS RELATIVE PERCENT: 56.5 % (ref 43–80)
NITRITE, URINE: NEGATIVE
NUCLEATED RED BLOOD CELLS: 0 /100 WBC
OVALOCYTES: ABNORMAL
PDW BLD-RTO: 18.6 FL (ref 11.5–15)
PH UA: 6 (ref 5–9)
PLATELET # BLD: 209 E9/L (ref 130–450)
PMV BLD AUTO: 9.6 FL (ref 7–12)
POIKILOCYTES: ABNORMAL
POLYCHROMASIA: ABNORMAL
POTASSIUM SERPL-SCNC: 4 MMOL/L (ref 3.5–5)
PROTEIN UA: NEGATIVE MG/DL
RBC # BLD: 4.79 E12/L (ref 3.8–5.8)
RBC UA: NORMAL /HPF (ref 0–2)
SODIUM BLD-SCNC: 130 MMOL/L (ref 132–146)
SPECIFIC GRAVITY UA: <=1.005 (ref 1–1.03)
SPHEROCYTES: ABNORMAL
TOTAL PROTEIN: 6.5 G/DL (ref 6.4–8.3)
UROBILINOGEN, URINE: 0.2 E.U./DL
WBC # BLD: 10.3 E9/L (ref 4.5–11.5)
WBC UA: NORMAL /HPF (ref 0–5)

## 2020-03-26 PROCEDURE — 6360000004 HC RX CONTRAST MEDICATION: Performed by: RADIOLOGY

## 2020-03-26 PROCEDURE — 85025 COMPLETE CBC W/AUTO DIFF WBC: CPT

## 2020-03-26 PROCEDURE — 87088 URINE BACTERIA CULTURE: CPT

## 2020-03-26 PROCEDURE — 6360000002 HC RX W HCPCS: Performed by: PHYSICIAN ASSISTANT

## 2020-03-26 PROCEDURE — 96374 THER/PROPH/DIAG INJ IV PUSH: CPT

## 2020-03-26 PROCEDURE — 81001 URINALYSIS AUTO W/SCOPE: CPT

## 2020-03-26 PROCEDURE — 74177 CT ABD & PELVIS W/CONTRAST: CPT

## 2020-03-26 PROCEDURE — 96375 TX/PRO/DX INJ NEW DRUG ADDON: CPT

## 2020-03-26 PROCEDURE — 83690 ASSAY OF LIPASE: CPT

## 2020-03-26 PROCEDURE — 99285 EMERGENCY DEPT VISIT HI MDM: CPT

## 2020-03-26 PROCEDURE — 83605 ASSAY OF LACTIC ACID: CPT

## 2020-03-26 PROCEDURE — 80053 COMPREHEN METABOLIC PANEL: CPT

## 2020-03-26 PROCEDURE — 2580000003 HC RX 258: Performed by: PHYSICIAN ASSISTANT

## 2020-03-26 RX ORDER — PREDNISONE 10 MG/1
40 TABLET ORAL DAILY
Qty: 20 TABLET | Refills: 0 | Status: SHIPPED | OUTPATIENT
Start: 2020-03-26 | End: 2020-03-31

## 2020-03-26 RX ORDER — 0.9 % SODIUM CHLORIDE 0.9 %
1000 INTRAVENOUS SOLUTION INTRAVENOUS ONCE
Status: COMPLETED | OUTPATIENT
Start: 2020-03-26 | End: 2020-03-26

## 2020-03-26 RX ORDER — OXYCODONE HYDROCHLORIDE AND ACETAMINOPHEN 5; 325 MG/1; MG/1
1 TABLET ORAL EVERY 6 HOURS PRN
Qty: 12 TABLET | Refills: 0 | Status: SHIPPED | OUTPATIENT
Start: 2020-03-26 | End: 2020-03-29

## 2020-03-26 RX ORDER — DICYCLOMINE HYDROCHLORIDE 10 MG/1
20 CAPSULE ORAL
Qty: 15 CAPSULE | Refills: 0 | Status: ON HOLD | OUTPATIENT
Start: 2020-03-26 | End: 2020-04-24 | Stop reason: HOSPADM

## 2020-03-26 RX ORDER — MORPHINE SULFATE 4 MG/ML
4 INJECTION, SOLUTION INTRAMUSCULAR; INTRAVENOUS ONCE
Status: COMPLETED | OUTPATIENT
Start: 2020-03-26 | End: 2020-03-26

## 2020-03-26 RX ORDER — METHYLPREDNISOLONE SODIUM SUCCINATE 125 MG/2ML
125 INJECTION, POWDER, LYOPHILIZED, FOR SOLUTION INTRAMUSCULAR; INTRAVENOUS ONCE
Status: COMPLETED | OUTPATIENT
Start: 2020-03-26 | End: 2020-03-26

## 2020-03-26 RX ORDER — ONDANSETRON 2 MG/ML
4 INJECTION INTRAMUSCULAR; INTRAVENOUS ONCE
Status: COMPLETED | OUTPATIENT
Start: 2020-03-26 | End: 2020-03-26

## 2020-03-26 RX ADMIN — SODIUM CHLORIDE 1000 ML: 9 INJECTION, SOLUTION INTRAVENOUS at 15:35

## 2020-03-26 RX ADMIN — IOPAMIDOL 110 ML: 755 INJECTION, SOLUTION INTRAVENOUS at 17:49

## 2020-03-26 RX ADMIN — MORPHINE SULFATE 4 MG: 4 INJECTION, SOLUTION INTRAMUSCULAR; INTRAVENOUS at 15:35

## 2020-03-26 RX ADMIN — ONDANSETRON 4 MG: 2 INJECTION INTRAMUSCULAR; INTRAVENOUS at 15:35

## 2020-03-26 RX ADMIN — METHYLPREDNISOLONE SODIUM SUCCINATE 125 MG: 125 INJECTION, POWDER, FOR SOLUTION INTRAMUSCULAR; INTRAVENOUS at 15:35

## 2020-03-26 ASSESSMENT — PAIN - FUNCTIONAL ASSESSMENT: PAIN_FUNCTIONAL_ASSESSMENT: ACTIVITIES ARE NOT PREVENTED

## 2020-03-26 ASSESSMENT — PAIN SCALES - GENERAL
PAINLEVEL_OUTOF10: 10
PAINLEVEL_OUTOF10: 10

## 2020-03-26 ASSESSMENT — PAIN DESCRIPTION - LOCATION: LOCATION: ABDOMEN

## 2020-03-26 ASSESSMENT — PAIN DESCRIPTION - DESCRIPTORS: DESCRIPTORS: SHARP

## 2020-03-26 ASSESSMENT — PAIN DESCRIPTION - PAIN TYPE: TYPE: ACUTE PAIN

## 2020-03-26 ASSESSMENT — PAIN DESCRIPTION - FREQUENCY: FREQUENCY: CONTINUOUS

## 2020-03-26 ASSESSMENT — PAIN DESCRIPTION - ORIENTATION: ORIENTATION: MID;LOWER

## 2020-03-26 NOTE — ED NOTES
Reviewed discharge instructions with patient, patient verbalized understanding of follow up care and denies any further questions, no acute signs/symptoms of distress upon discharge       Evie Cooks, RN  03/26/20 2142

## 2020-03-26 NOTE — ED PROVIDER NOTES
[facility]     Department of Emergency Medicine   ED  Provider Note  Admit Date/RoomTime: 3/26/2020  3:00 PM  ED Room: 25/25  Chief Complaint     Abdominal Pain (Lower abdominal pain x 3 days, with blood in stool, denies vomiting)    History of Present Illness   Source of history provided by:  patient. History/Exam Limitations: none. Meenu Castaneda is a 49 y.o. old male who has a past medical history of:   Past Medical History:   Diagnosis Date    ADD (attention deficit disorder)     Anxiety     Bipolar 1 disorder ([facility] 11/19/2017    Cancer ([facility] prostate cancer    2014 / treated with surgery    Crohn's disease ([facility]     Depression     GERD (gastroesophageal reflux disease)     Panic attack     Rectal pain     has a fissure    Schizo affective schizophrenia ([facility]     stable      presents to the emergency department by private vehicle, for complaints of gradual onset, still present, constant cramping, stabbing pain in the lower abdomen that is worse on the left side without radiation which began 3 day(s) prior to arrival.  There has been similar episodes in the past due to his Crohn's disease. Since onset the symptoms have been persistent and worsening. The pain is associated with hematochezia and diarrhea. The pain is aggravated by pressing on his abdomen and relieved by nothing. There has been NO back pain, chills, cloudy urine, constipation, dysuria, headache, hematuria, penile discharge, sweating, urinary frequency, urinary incontinence, urinary urgency or vomiting. ROS   Pertinent positives and negatives are stated within HPI, all other systems reviewed and are negative.     Past Surgical History:   Procedure Laterality Date    COLONOSCOPY      COLONOSCOPY N/A 1/28/2020    COLONOSCOPY WITH BIOPSY performed [facility] Niles Cui MD [facility], COLON, DIAGNOSTIC      INCISION AND DRAINAGE N/A 5/15/2019    I & D SCROTAL ABSCESS WITH EXPLANTATION ARTIFICIAL URINARY 4.0 3.5 - 5.0 mmol/L    Chloride 91 (L) 98 - 107 mmol/L    CO2 23 22 - 29 mmol/L    Anion Gap 16 7 - 16 mmol/L    Glucose 136 (H) 74 - 99 mg/dL    BUN 15 6 - 20 mg/dL    CREATININE 1.1 0.7 - 1.2 mg/dL    GFR Non-African American >60 >=60 mL/min/1.73    GFR African American >60     Calcium 9.0 8.6 - 10.2 mg/dL    Total Protein 6.5 6.4 - 8.3 g/dL    Alb 3.1 (L) 3.5 - 5.2 g/dL    Total Bilirubin 0.3 0.0 - 1.2 mg/dL    Alkaline Phosphatase 75 40 - 129 U/L    ALT <5 0 - 40 U/L    AST 7 0 - 39 U/L   CBC Auto Differential   Result Value Ref Range    WBC 10.3 4.5 - 11.5 E9/L    RBC 4.79 3.80 - 5.80 E12/L    Hemoglobin 12.9 12.5 - 16.5 g/dL    Hematocrit 39.1 37.0 - 54.0 %    MCV 81.6 80.0 - 99.9 fL    MCH 26.9 26.0 - 35.0 pg    MCHC 33.0 32.0 - 34.5 %    RDW 18.6 (H) 11.5 - 15.0 fL    Platelets 544 638 - 541 E9/L    MPV 9.6 7.0 - 12.0 fL    Neutrophils % 56.5 43.0 - 80.0 %    Lymphocytes % 16.5 (L) 20.0 - 42.0 %    Monocytes % 27.0 (H) 2.0 - 12.0 %    Eosinophils % 0.0 0.0 - 6.0 %    Basophils % 0.0 0.0 - 2.0 %    Neutrophils Absolute 5.87 1.80 - 7.30 E9/L    Lymphocytes Absolute 1.75 1.50 - 4.00 E9/L    Monocytes Absolute 2.78 (H) 0.10 - 0.95 E9/L    Eosinophils Absolute 0.00 (L) 0.05 - 0.50 E9/L    Basophils Absolute 0.00 0.00 - 0.20 E9/L    nRBC 0.0 /100 WBC    Anisocytosis 1+     Polychromasia 1+     Poikilocytes 3+     Castalia Cells 3+     Ovalocytes 1+     Spherocytes 1+    Lipase   Result Value Ref Range    Lipase 7 (L) 13 - 60 U/L   Urinalysis   Result Value Ref Range    Color, UA Yellow Straw/Yellow    Clarity, UA Clear Clear    Glucose, Ur Negative Negative mg/dL    Bilirubin Urine Negative Negative    Ketones, Urine Negative Negative mg/dL    Specific Gravity, UA <=1.005 1.005 - 1.030    Blood, Urine TRACE-INTACT Negative    pH, UA 6.0 5.0 - 9.0    Protein, UA Negative Negative mg/dL    Urobilinogen, Urine 0.2 <2.0 E.U./dL    Nitrite, Urine Negative Negative    Leukocyte Esterase, Urine Negative Negative   Lactic Acid, Plasma   Result Value Ref Range    Lactic Acid 1.9 0.5 - 2.2 mmol/L   Microscopic Urinalysis   Result Value Ref Range    WBC, UA 0-1 0 - 5 /HPF    RBC, UA 0-1 0 - 2 /HPF    Bacteria, UA NONE SEEN None Seen /HPF     Imaging: All Radiology results interpreted by Radiologist unless otherwise noted. CT ABDOMEN PELVIS W IV CONTRAST Additional Contrast? None   Final Result   Diffuse inflammation/colitis of the descending and rectosigmoid colon   concerning for inflammatory bowel disease like Crohn's disease. There   may be inflammation extending and involving the bladder resulting   cystitis. ALERT:  THIS IS AN ABNORMAL REPORT              ED Course / Medical Decision Making     Medications   0.9 % sodium chloride bolus (0 mLs Intravenous Stopped 3/26/20 1643)   ondansetron (ZOFRAN) injection 4 mg (4 mg Intravenous Given 3/26/20 1535)   morphine sulfate (PF) injection 4 mg (4 mg Intravenous Given 3/26/20 1535)   methylPREDNISolone sodium (SOLU-MEDROL) injection 125 mg (125 mg Intravenous Given 3/26/20 1535)   iopamidol (ISOVUE-370) 76 % injection 110 mL (110 mLs Intravenous Given 3/26/20 1749)      Re-Evaluations:  3/26/20      Time: 1815    Patients symptoms are improving significantly. Results discussed with patient and wife. They are agreeable with the plan. Consultations:             None    Procedures:   none    MDM: Patient presents to the emergency department due to suspected Crohn's flare. His diagnostic work-up supports the same, but is unremarkable for complication other than his rectal bleeding. Patient's hemoglobin is stable. Vital signs are stable. He is well-appearing appropriate discharge and outpatient follow-up with his gastroenterologist, Dr. Larry Linares. Specific conditions for emergent return have been discussed and the patient verbalized understanding to return immediately for new or worsening symptoms.     Counseling:   I have spoken with the patient and discussed todays results, in addition to providing specific details for the plan of care and counseling regarding the diagnosis and prognosis and are agreeable with the plan. Assessment      1. Crohn's disease with rectal bleeding, unspecified gastrointestinal tract location [facility])      This patient's ED course included: a personal history and physicial examination, re-evaluation prior to disposition and IV medications  This patient has remained hemodynamically stable, improved and been closely monitored during their ED course. Plan   Discharge to home. Patient condition is good. New Medications     New Prescriptions    DICYCLOMINE (BENTYL) 10 MG CAPSULE    Take 2 capsules by mouth 4 times daily (before meals and nightly)    OXYCODONE-ACETAMINOPHEN (PERCOCET) 5-325 MG PER TABLET    Take 1 tablet by mouth every 6 hours as needed for Pain for up to 3 days. PREDNISONE (DELTASONE) 10 MG TABLET    Take 4 tablets by mouth daily for 5 days     Electronically signed by Tamera Joseph PA-C   DD: 3/26/20  **This report was transcribed using voice recognition software. Every effort was made to ensure accuracy; however, inadvertent computerized transcription errors may be present.   END OF PROVIDER NOTE        Tamera Joseph PA-C  03/26/20 8817

## 2020-03-28 LAB — URINE CULTURE, ROUTINE: NORMAL

## 2020-04-02 ENCOUNTER — HOSPITAL ENCOUNTER (INPATIENT)
Age: 55
LOS: 1 days | Discharge: HOME | DRG: 387 | End: 2020-04-06
Attending: EMERGENCY MEDICINE | Admitting: FAMILY MEDICINE
Payer: MEDICARE

## 2020-04-02 ENCOUNTER — APPOINTMENT (OUTPATIENT)
Dept: CT IMAGING | Age: 55
DRG: 387 | End: 2020-04-02
Payer: MEDICARE

## 2020-04-02 PROBLEM — K50.911 ACUTE CROHN'S DISEASE WITH RECTAL BLEEDING (HCC): Status: ACTIVE | Noted: 2020-04-02

## 2020-04-02 LAB
ACANTHOCYTES: ABNORMAL
ALBUMIN SERPL-MCNC: 3.2 G/DL (ref 3.5–5.2)
ALP BLD-CCNC: 69 U/L (ref 40–129)
ALT SERPL-CCNC: 6 U/L (ref 0–40)
ANION GAP SERPL CALCULATED.3IONS-SCNC: 16 MMOL/L (ref 7–16)
ANISOCYTOSIS: ABNORMAL
AST SERPL-CCNC: 8 U/L (ref 0–39)
ATYPICAL LYMPHOCYTE RELATIVE PERCENT: 5.2 % (ref 0–4)
BACTERIA: ABNORMAL /HPF
BASOPHILS ABSOLUTE: 0 E9/L (ref 0–0.2)
BASOPHILS RELATIVE PERCENT: 0 % (ref 0–2)
BILIRUB SERPL-MCNC: <0.2 MG/DL (ref 0–1.2)
BILIRUBIN URINE: ABNORMAL
BLOOD, URINE: NEGATIVE
BUN BLDV-MCNC: 22 MG/DL (ref 6–20)
CALCIUM SERPL-MCNC: 8.8 MG/DL (ref 8.6–10.2)
CHLORIDE BLD-SCNC: 96 MMOL/L (ref 98–107)
CLARITY: CLEAR
CO2: 23 MMOL/L (ref 22–29)
COLOR: YELLOW
CREAT SERPL-MCNC: 1 MG/DL (ref 0.7–1.2)
CRYSTALS, UA: ABNORMAL /HPF
EOSINOPHILS ABSOLUTE: 0 E9/L (ref 0.05–0.5)
EOSINOPHILS RELATIVE PERCENT: 0 % (ref 0–6)
GFR AFRICAN AMERICAN: >60
GFR NON-AFRICAN AMERICAN: >60 ML/MIN/1.73
GLUCOSE BLD-MCNC: 174 MG/DL (ref 74–99)
GLUCOSE URINE: NEGATIVE MG/DL
HCT VFR BLD CALC: 40.9 % (ref 37–54)
HEMOGLOBIN: 13.2 G/DL (ref 12.5–16.5)
HYPOCHROMIA: ABNORMAL
KETONES, URINE: 15 MG/DL
LACTIC ACID: 1.6 MMOL/L (ref 0.5–2.2)
LACTIC ACID: 3.7 MMOL/L (ref 0.5–2.2)
LEUKOCYTE ESTERASE, URINE: ABNORMAL
LYMPHOCYTES ABSOLUTE: 2.82 E9/L (ref 1.5–4)
LYMPHOCYTES RELATIVE PERCENT: 5.2 % (ref 20–42)
MCH RBC QN AUTO: 27 PG (ref 26–35)
MCHC RBC AUTO-ENTMCNC: 32.3 % (ref 32–34.5)
MCV RBC AUTO: 83.8 FL (ref 80–99.9)
METAMYELOCYTES RELATIVE PERCENT: 1.7 % (ref 0–1)
MONOCYTES ABSOLUTE: 1.97 E9/L (ref 0.1–0.95)
MONOCYTES RELATIVE PERCENT: 7 % (ref 2–12)
NEUTROPHILS ABSOLUTE: 23.41 E9/L (ref 1.8–7.3)
NEUTROPHILS RELATIVE PERCENT: 80.9 % (ref 43–80)
NITRITE, URINE: NEGATIVE
NUCLEATED RED BLOOD CELLS: 0 /100 WBC
OVALOCYTES: ABNORMAL
PDW BLD-RTO: 20.5 FL (ref 11.5–15)
PH UA: 5.5 (ref 5–9)
PLATELET # BLD: 377 E9/L (ref 130–450)
PMV BLD AUTO: 8.8 FL (ref 7–12)
POIKILOCYTES: ABNORMAL
POLYCHROMASIA: ABNORMAL
POTASSIUM SERPL-SCNC: 4 MMOL/L (ref 3.5–5)
PROTEIN UA: NEGATIVE MG/DL
RBC # BLD: 4.88 E12/L (ref 3.8–5.8)
RBC UA: ABNORMAL /HPF (ref 0–2)
SCHISTOCYTES: ABNORMAL
SODIUM BLD-SCNC: 135 MMOL/L (ref 132–146)
SPECIFIC GRAVITY UA: >=1.03 (ref 1–1.03)
TEAR DROP CELLS: ABNORMAL
TOTAL PROTEIN: 6.2 G/DL (ref 6.4–8.3)
UROBILINOGEN, URINE: 0.2 E.U./DL
WBC # BLD: 28.2 E9/L (ref 4.5–11.5)
WBC UA: ABNORMAL /HPF (ref 0–5)

## 2020-04-02 PROCEDURE — 74177 CT ABD & PELVIS W/CONTRAST: CPT

## 2020-04-02 PROCEDURE — 96361 HYDRATE IV INFUSION ADD-ON: CPT

## 2020-04-02 PROCEDURE — 6370000000 HC RX 637 (ALT 250 FOR IP): Performed by: FAMILY MEDICINE

## 2020-04-02 PROCEDURE — 6360000002 HC RX W HCPCS: Performed by: FAMILY MEDICINE

## 2020-04-02 PROCEDURE — 80053 COMPREHEN METABOLIC PANEL: CPT

## 2020-04-02 PROCEDURE — 94760 N-INVAS EAR/PLS OXIMETRY 1: CPT

## 2020-04-02 PROCEDURE — 6360000002 HC RX W HCPCS: Performed by: EMERGENCY MEDICINE

## 2020-04-02 PROCEDURE — 6360000004 HC RX CONTRAST MEDICATION: Performed by: RADIOLOGY

## 2020-04-02 PROCEDURE — C9113 INJ PANTOPRAZOLE SODIUM, VIA: HCPCS | Performed by: FAMILY MEDICINE

## 2020-04-02 PROCEDURE — 96376 TX/PRO/DX INJ SAME DRUG ADON: CPT

## 2020-04-02 PROCEDURE — 2580000003 HC RX 258: Performed by: FAMILY MEDICINE

## 2020-04-02 PROCEDURE — 2580000003 HC RX 258: Performed by: EMERGENCY MEDICINE

## 2020-04-02 PROCEDURE — 96372 THER/PROPH/DIAG INJ SC/IM: CPT

## 2020-04-02 PROCEDURE — 99285 EMERGENCY DEPT VISIT HI MDM: CPT

## 2020-04-02 PROCEDURE — G0378 HOSPITAL OBSERVATION PER HR: HCPCS

## 2020-04-02 PROCEDURE — 81001 URINALYSIS AUTO W/SCOPE: CPT

## 2020-04-02 PROCEDURE — 96374 THER/PROPH/DIAG INJ IV PUSH: CPT

## 2020-04-02 PROCEDURE — 85025 COMPLETE CBC W/AUTO DIFF WBC: CPT

## 2020-04-02 PROCEDURE — 83605 ASSAY OF LACTIC ACID: CPT

## 2020-04-02 PROCEDURE — 96375 TX/PRO/DX INJ NEW DRUG ADDON: CPT

## 2020-04-02 RX ORDER — HALOPERIDOL 5 MG
10 TABLET ORAL 2 TIMES DAILY
Status: DISCONTINUED | OUTPATIENT
Start: 2020-04-02 | End: 2020-04-06 | Stop reason: HOSPADM

## 2020-04-02 RX ORDER — 0.9 % SODIUM CHLORIDE 0.9 %
1000 INTRAVENOUS SOLUTION INTRAVENOUS ONCE
Status: COMPLETED | OUTPATIENT
Start: 2020-04-02 | End: 2020-04-02

## 2020-04-02 RX ORDER — SODIUM CHLORIDE 0.9 % (FLUSH) 0.9 %
10 SYRINGE (ML) INJECTION PRN
Status: DISCONTINUED | OUTPATIENT
Start: 2020-04-02 | End: 2020-04-06 | Stop reason: HOSPADM

## 2020-04-02 RX ORDER — HYDROXYZINE PAMOATE 50 MG/1
50 CAPSULE ORAL 3 TIMES DAILY PRN
COMMUNITY
End: 2020-07-14 | Stop reason: ALTCHOICE

## 2020-04-02 RX ORDER — MORPHINE SULFATE 4 MG/ML
4 INJECTION, SOLUTION INTRAMUSCULAR; INTRAVENOUS ONCE
Status: COMPLETED | OUTPATIENT
Start: 2020-04-02 | End: 2020-04-02

## 2020-04-02 RX ORDER — DIVALPROEX SODIUM 125 MG/1
500 CAPSULE, COATED PELLETS ORAL EVERY 8 HOURS SCHEDULED
Status: DISCONTINUED | OUTPATIENT
Start: 2020-04-02 | End: 2020-04-06 | Stop reason: HOSPADM

## 2020-04-02 RX ORDER — SODIUM CHLORIDE 9 MG/ML
INJECTION, SOLUTION INTRAVENOUS CONTINUOUS
Status: DISCONTINUED | OUTPATIENT
Start: 2020-04-02 | End: 2020-04-06

## 2020-04-02 RX ORDER — FLUVOXAMINE MALEATE 50 MG/1
50 TABLET, COATED ORAL NIGHTLY
Status: ON HOLD | COMMUNITY
End: 2020-04-24 | Stop reason: HOSPADM

## 2020-04-02 RX ORDER — AMANTADINE HYDROCHLORIDE 100 MG/1
100 CAPSULE, GELATIN COATED ORAL 2 TIMES DAILY
Status: ON HOLD | COMMUNITY
End: 2020-04-24 | Stop reason: HOSPADM

## 2020-04-02 RX ORDER — FLUVOXAMINE MALEATE 50 MG/1
50 TABLET, COATED ORAL NIGHTLY
Status: DISCONTINUED | OUTPATIENT
Start: 2020-04-02 | End: 2020-04-06 | Stop reason: HOSPADM

## 2020-04-02 RX ORDER — MORPHINE SULFATE 2 MG/ML
2 INJECTION, SOLUTION INTRAMUSCULAR; INTRAVENOUS ONCE
Status: COMPLETED | OUTPATIENT
Start: 2020-04-02 | End: 2020-04-02

## 2020-04-02 RX ORDER — AMANTADINE HYDROCHLORIDE 100 MG/1
100 CAPSULE, GELATIN COATED ORAL 2 TIMES DAILY
Status: DISCONTINUED | OUTPATIENT
Start: 2020-04-02 | End: 2020-04-06 | Stop reason: HOSPADM

## 2020-04-02 RX ORDER — SODIUM CHLORIDE 9 MG/ML
10 INJECTION INTRAVENOUS DAILY
Status: DISCONTINUED | OUTPATIENT
Start: 2020-04-02 | End: 2020-04-06 | Stop reason: HOSPADM

## 2020-04-02 RX ORDER — SIMVASTATIN 10 MG
10 TABLET ORAL NIGHTLY
Status: DISCONTINUED | OUTPATIENT
Start: 2020-04-02 | End: 2020-04-06 | Stop reason: HOSPADM

## 2020-04-02 RX ORDER — DIVALPROEX SODIUM 500 MG/1
500 TABLET, DELAYED RELEASE ORAL 3 TIMES DAILY
Status: ON HOLD | COMMUNITY
End: 2020-04-24 | Stop reason: HOSPADM

## 2020-04-02 RX ORDER — AMLODIPINE BESYLATE 10 MG/1
10 TABLET ORAL DAILY
Status: DISCONTINUED | OUTPATIENT
Start: 2020-04-02 | End: 2020-04-06 | Stop reason: HOSPADM

## 2020-04-02 RX ORDER — HYDROXYZINE PAMOATE 25 MG/1
50 CAPSULE ORAL 3 TIMES DAILY PRN
Status: DISCONTINUED | OUTPATIENT
Start: 2020-04-02 | End: 2020-04-06 | Stop reason: HOSPADM

## 2020-04-02 RX ORDER — MORPHINE SULFATE 2 MG/ML
2 INJECTION, SOLUTION INTRAMUSCULAR; INTRAVENOUS EVERY 4 HOURS PRN
Status: DISCONTINUED | OUTPATIENT
Start: 2020-04-02 | End: 2020-04-06 | Stop reason: HOSPADM

## 2020-04-02 RX ORDER — PANTOPRAZOLE SODIUM 40 MG/10ML
40 INJECTION, POWDER, LYOPHILIZED, FOR SOLUTION INTRAVENOUS DAILY
Status: DISCONTINUED | OUTPATIENT
Start: 2020-04-02 | End: 2020-04-06 | Stop reason: HOSPADM

## 2020-04-02 RX ORDER — HALOPERIDOL 5 MG
10 TABLET ORAL 3 TIMES DAILY
Status: ON HOLD | COMMUNITY
End: 2020-04-24 | Stop reason: HOSPADM

## 2020-04-02 RX ORDER — METHYLPREDNISOLONE SODIUM SUCCINATE 40 MG/ML
40 INJECTION, POWDER, LYOPHILIZED, FOR SOLUTION INTRAMUSCULAR; INTRAVENOUS EVERY 12 HOURS
Status: DISCONTINUED | OUTPATIENT
Start: 2020-04-02 | End: 2020-04-03

## 2020-04-02 RX ADMIN — Medication 10 ML: at 10:16

## 2020-04-02 RX ADMIN — FLUVOXAMINE MALEATE 50 MG: 50 TABLET, COATED ORAL at 20:39

## 2020-04-02 RX ADMIN — SODIUM CHLORIDE, PRESERVATIVE FREE 10 ML: 5 INJECTION INTRAVENOUS at 15:48

## 2020-04-02 RX ADMIN — MORPHINE SULFATE 2 MG: 2 INJECTION, SOLUTION INTRAMUSCULAR; INTRAVENOUS at 09:11

## 2020-04-02 RX ADMIN — SODIUM CHLORIDE 1000 ML: 9 INJECTION, SOLUTION INTRAVENOUS at 11:02

## 2020-04-02 RX ADMIN — MORPHINE SULFATE 2 MG: 2 INJECTION, SOLUTION INTRAMUSCULAR; INTRAVENOUS at 21:15

## 2020-04-02 RX ADMIN — DIVALPROEX SODIUM 500 MG: 125 CAPSULE, COATED PELLETS ORAL at 21:30

## 2020-04-02 RX ADMIN — SODIUM CHLORIDE 1000 ML: 9 INJECTION, SOLUTION INTRAVENOUS at 09:10

## 2020-04-02 RX ADMIN — Medication 10 ML: at 15:47

## 2020-04-02 RX ADMIN — AMANTADINE HYDROCHLORIDE 100 MG: 100 CAPSULE, LIQUID FILLED ORAL at 20:39

## 2020-04-02 RX ADMIN — IOPAMIDOL 110 ML: 755 INJECTION, SOLUTION INTRAVENOUS at 10:30

## 2020-04-02 RX ADMIN — Medication 10 ML: at 10:29

## 2020-04-02 RX ADMIN — SIMVASTATIN 10 MG: 10 TABLET, FILM COATED ORAL at 20:39

## 2020-04-02 RX ADMIN — DIVALPROEX SODIUM 500 MG: 125 CAPSULE, COATED PELLETS ORAL at 15:46

## 2020-04-02 RX ADMIN — SODIUM CHLORIDE: 9 INJECTION, SOLUTION INTRAVENOUS at 15:47

## 2020-04-02 RX ADMIN — MORPHINE SULFATE 2 MG: 2 INJECTION, SOLUTION INTRAMUSCULAR; INTRAVENOUS at 16:51

## 2020-04-02 RX ADMIN — MORPHINE SULFATE 4 MG: 4 INJECTION, SOLUTION INTRAMUSCULAR; INTRAVENOUS at 09:52

## 2020-04-02 RX ADMIN — Medication 10 ML: at 16:51

## 2020-04-02 RX ADMIN — METHYLPREDNISOLONE SODIUM SUCCINATE 40 MG: 40 INJECTION, POWDER, LYOPHILIZED, FOR SOLUTION INTRAMUSCULAR; INTRAVENOUS at 15:47

## 2020-04-02 RX ADMIN — HALOPERIDOL 10 MG: 5 TABLET ORAL at 20:39

## 2020-04-02 RX ADMIN — ENOXAPARIN SODIUM 40 MG: 40 INJECTION SUBCUTANEOUS at 15:47

## 2020-04-02 RX ADMIN — HYDROXYZINE PAMOATE 50 MG: 25 CAPSULE ORAL at 18:56

## 2020-04-02 RX ADMIN — PANTOPRAZOLE SODIUM 40 MG: 40 INJECTION, POWDER, FOR SOLUTION INTRAVENOUS at 15:47

## 2020-04-02 ASSESSMENT — PAIN DESCRIPTION - PAIN TYPE
TYPE: ACUTE PAIN

## 2020-04-02 ASSESSMENT — PAIN DESCRIPTION - ORIENTATION
ORIENTATION: LEFT;LOWER
ORIENTATION: LEFT;LOWER

## 2020-04-02 ASSESSMENT — PAIN SCALES - GENERAL
PAINLEVEL_OUTOF10: 9
PAINLEVEL_OUTOF10: 9
PAINLEVEL_OUTOF10: 0
PAINLEVEL_OUTOF10: 9
PAINLEVEL_OUTOF10: 7
PAINLEVEL_OUTOF10: 9
PAINLEVEL_OUTOF10: 0
PAINLEVEL_OUTOF10: 9
PAINLEVEL_OUTOF10: 8

## 2020-04-02 ASSESSMENT — PAIN DESCRIPTION - PROGRESSION
CLINICAL_PROGRESSION: NOT CHANGED
CLINICAL_PROGRESSION: NOT CHANGED

## 2020-04-02 ASSESSMENT — PAIN DESCRIPTION - FREQUENCY
FREQUENCY: INTERMITTENT
FREQUENCY: CONTINUOUS
FREQUENCY: CONTINUOUS

## 2020-04-02 ASSESSMENT — PAIN DESCRIPTION - DESCRIPTORS
DESCRIPTORS: ACHING;CRAMPING
DESCRIPTORS: ACHING;THROBBING
DESCRIPTORS: SHARP;STABBING
DESCRIPTORS: ACHING;CRAMPING
DESCRIPTORS: ACHING;CRAMPING;DISCOMFORT

## 2020-04-02 ASSESSMENT — PAIN DESCRIPTION - LOCATION
LOCATION: ABDOMEN
LOCATION: ABDOMEN;RECTUM
LOCATION: ABDOMEN

## 2020-04-02 ASSESSMENT — PAIN - FUNCTIONAL ASSESSMENT
PAIN_FUNCTIONAL_ASSESSMENT: ACTIVITIES ARE NOT PREVENTED

## 2020-04-02 ASSESSMENT — PAIN DESCRIPTION - ONSET
ONSET: ON-GOING
ONSET: ON-GOING

## 2020-04-02 NOTE — ED NOTES
Faxed SBAR to floor, spoke with Mercedes who received fax. Pt ready to go.      Belinda Motta RN  04/02/20 2350

## 2020-04-02 NOTE — ED PROVIDER NOTES
0.0 - 6.0 %    Basophils % 0.0 0.0 - 2.0 %    Neutrophils Absolute 23.41 (H) 1.80 - 7.30 E9/L    Lymphocytes Absolute 2.82 1.50 - 4.00 E9/L    Monocytes Absolute 1.97 (H) 0.10 - 0.95 E9/L    Eosinophils Absolute 0.00 (L) 0.05 - 0.50 E9/L    Basophils Absolute 0.00 0.00 - 0.20 E9/L    Atypical Lymphocytes Relative 5.2 (H) 0.0 - 4.0 %    Metamyelocytes Relative 1.7 (H) 0.0 - 1.0 %    nRBC 0.0 /100 WBC    Anisocytosis 2+     Polychromasia 1+     Hypochromia 1+     Poikilocytes 1+     Schistocytes 1+     Acanthocytes 1+     Ovalocytes 1+     Tear Drop Cells 1+    Comprehensive Metabolic Panel   Result Value Ref Range    Sodium 135 132 - 146 mmol/L    Potassium 4.0 3.5 - 5.0 mmol/L    Chloride 96 (L) 98 - 107 mmol/L    CO2 23 22 - 29 mmol/L    Anion Gap 16 7 - 16 mmol/L    Glucose 174 (H) 74 - 99 mg/dL    BUN 22 (H) 6 - 20 mg/dL    CREATININE 1.0 0.7 - 1.2 mg/dL    GFR Non-African American >60 >=60 mL/min/1.73    GFR African American >60     Calcium 8.8 8.6 - 10.2 mg/dL    Total Protein 6.2 (L) 6.4 - 8.3 g/dL    Alb 3.2 (L) 3.5 - 5.2 g/dL    Total Bilirubin <0.2 0.0 - 1.2 mg/dL    Alkaline Phosphatase 69 40 - 129 U/L    ALT 6 0 - 40 U/L    AST 8 0 - 39 U/L   Lactic Acid, Plasma   Result Value Ref Range    Lactic Acid 3.7 (H) 0.5 - 2.2 mmol/L   Urinalysis   Result Value Ref Range    Color, UA Yellow Straw/Yellow    Clarity, UA Clear Clear    Glucose, Ur Negative Negative mg/dL    Bilirubin Urine SMALL (A) Negative    Ketones, Urine 15 (A) Negative mg/dL    Specific Gravity, UA >=1.030 1.005 - 1.030    Blood, Urine Negative Negative    pH, UA 5.5 5.0 - 9.0    Protein, UA Negative Negative mg/dL    Urobilinogen, Urine 0.2 <2.0 E.U./dL    Nitrite, Urine Negative Negative    Leukocyte Esterase, Urine TRACE (A) Negative   Microscopic Urinalysis   Result Value Ref Range    WBC, UA 2-5 0 - 5 /HPF    RBC, UA 0-1 0 - 2 /HPF    Bacteria, UA RARE (A) None Seen /HPF    Crystals, UA Rare (A) None Seen /HPF   Lactic Acid, Plasma leukocytosis I do feel patient warrants observation and consultation with surgeon and GI. Case discussed with Dr. Hui Barker for evaluation. This patient's ED course included: a personal history and physicial examination, re-evaluation prior to disposition, IV medications, cardiac monitoring and continuous pulse oximetry    This patient has remained hemodynamically stable and been closely monitored during their ED course. Consultations:                Counseling: The emergency provider has spoken with the patient and discussed todays results, in addition to providing specific details for the plan of care and counseling regarding the diagnosis and prognosis. Questions are answered at this time and they are agreeable with the plan.       --------------------------------- IMPRESSION AND DISPOSITION ---------------------------------    IMPRESSION  1. Abdominal pain, left lower quadrant    2. Acute Crohn's disease with rectal bleeding ([facility]        DISPOSITION  Disposition: Discharge to home  Patient condition is stable    SCRIBE ATTESTATION  4/2/20, 8:56 AM EDT. This note is prepared by Enio Castellanos acting as Scribe for Guy Morgan DO. Guy Morgan DO:  The scribe's documentation has been prepared under my direction and personally reviewed by me in its entirety. I confirm that the note above accurately reflects all work, treatment, procedures, and medical decision making performed by me.              Guy Morgan DO  04/02/20 6184

## 2020-04-03 LAB
ALBUMIN SERPL-MCNC: 2.6 G/DL (ref 3.5–5.2)
ALP BLD-CCNC: 61 U/L (ref 40–129)
ALT SERPL-CCNC: 6 U/L (ref 0–40)
ANION GAP SERPL CALCULATED.3IONS-SCNC: 9 MMOL/L (ref 7–16)
AST SERPL-CCNC: <5 U/L (ref 0–39)
BASOPHILS ABSOLUTE: 0 E9/L (ref 0–0.2)
BASOPHILS RELATIVE PERCENT: 0 % (ref 0–2)
BILIRUB SERPL-MCNC: <0.2 MG/DL (ref 0–1.2)
BUN BLDV-MCNC: 20 MG/DL (ref 6–20)
C DIFF TOXIN/ANTIGEN: NORMAL
CALCIUM SERPL-MCNC: 8.3 MG/DL (ref 8.6–10.2)
CHLORIDE BLD-SCNC: 101 MMOL/L (ref 98–107)
CO2: 25 MMOL/L (ref 22–29)
CREAT SERPL-MCNC: 0.8 MG/DL (ref 0.7–1.2)
EOSINOPHILS ABSOLUTE: 0 E9/L (ref 0.05–0.5)
EOSINOPHILS RELATIVE PERCENT: 0 % (ref 0–6)
GFR AFRICAN AMERICAN: >60
GFR NON-AFRICAN AMERICAN: >60 ML/MIN/1.73
GIARDIA ANTIGEN STOOL: NORMAL
GLUCOSE BLD-MCNC: 108 MG/DL (ref 74–99)
HCT VFR BLD CALC: 37.3 % (ref 37–54)
HEMOGLOBIN: 11.9 G/DL (ref 12.5–16.5)
LYMPHOCYTES ABSOLUTE: 1.31 E9/L (ref 1.5–4)
LYMPHOCYTES RELATIVE PERCENT: 7.7 % (ref 20–42)
MCH RBC QN AUTO: 26.7 PG (ref 26–35)
MCHC RBC AUTO-ENTMCNC: 31.9 % (ref 32–34.5)
MCV RBC AUTO: 83.6 FL (ref 80–99.9)
MONOCYTES ABSOLUTE: 0.49 E9/L (ref 0.1–0.95)
MONOCYTES RELATIVE PERCENT: 2.6 % (ref 2–12)
MYELOCYTE PERCENT: 2.6 % (ref 0–0)
NEUTROPHILS ABSOLUTE: 14.76 E9/L (ref 1.8–7.3)
NEUTROPHILS RELATIVE PERCENT: 87.1 % (ref 43–80)
NUCLEATED RED BLOOD CELLS: 0 /100 WBC
OCCULT BLOOD SCREENING: NORMAL
OVALOCYTES: ABNORMAL
PAPPENHEIMER BODIES: ABNORMAL
PDW BLD-RTO: 20.4 FL (ref 11.5–15)
PLATELET # BLD: 319 E9/L (ref 130–450)
PMV BLD AUTO: 9 FL (ref 7–12)
POIKILOCYTES: ABNORMAL
POLYCHROMASIA: ABNORMAL
POTASSIUM SERPL-SCNC: 5 MMOL/L (ref 3.5–5)
RBC # BLD: 4.46 E12/L (ref 3.8–5.8)
ROTAVIRUS ANTIGEN: NORMAL
SODIUM BLD-SCNC: 135 MMOL/L (ref 132–146)
TEAR DROP CELLS: ABNORMAL
TOTAL PROTEIN: 5.1 G/DL (ref 6.4–8.3)
TOXIC GRANULATION: ABNORMAL
WBC # BLD: 16.4 E9/L (ref 4.5–11.5)

## 2020-04-03 PROCEDURE — 96376 TX/PRO/DX INJ SAME DRUG ADON: CPT

## 2020-04-03 PROCEDURE — 80053 COMPREHEN METABOLIC PANEL: CPT

## 2020-04-03 PROCEDURE — 87045 FECES CULTURE AEROBIC BACT: CPT

## 2020-04-03 PROCEDURE — G0378 HOSPITAL OBSERVATION PER HR: HCPCS

## 2020-04-03 PROCEDURE — 6370000000 HC RX 637 (ALT 250 FOR IP): Performed by: FAMILY MEDICINE

## 2020-04-03 PROCEDURE — 85025 COMPLETE CBC W/AUTO DIFF WBC: CPT

## 2020-04-03 PROCEDURE — 6360000002 HC RX W HCPCS: Performed by: FAMILY MEDICINE

## 2020-04-03 PROCEDURE — 87425 ROTAVIRUS AG IA: CPT

## 2020-04-03 PROCEDURE — 82705 FATS/LIPIDS FECES QUAL: CPT

## 2020-04-03 PROCEDURE — 6360000002 HC RX W HCPCS: Performed by: INTERNAL MEDICINE

## 2020-04-03 PROCEDURE — C9113 INJ PANTOPRAZOLE SODIUM, VIA: HCPCS | Performed by: FAMILY MEDICINE

## 2020-04-03 PROCEDURE — 87329 GIARDIA AG IA: CPT

## 2020-04-03 PROCEDURE — 96361 HYDRATE IV INFUSION ADD-ON: CPT

## 2020-04-03 PROCEDURE — 96372 THER/PROPH/DIAG INJ SC/IM: CPT

## 2020-04-03 PROCEDURE — 2580000003 HC RX 258: Performed by: FAMILY MEDICINE

## 2020-04-03 PROCEDURE — G0328 FECAL BLOOD SCRN IMMUNOASSAY: HCPCS

## 2020-04-03 PROCEDURE — 36415 COLL VENOUS BLD VENIPUNCTURE: CPT

## 2020-04-03 PROCEDURE — 87324 CLOSTRIDIUM AG IA: CPT

## 2020-04-03 PROCEDURE — 87077 CULTURE AEROBIC IDENTIFY: CPT

## 2020-04-03 PROCEDURE — 87449 NOS EACH ORGANISM AG IA: CPT

## 2020-04-03 RX ORDER — METHYLPREDNISOLONE SODIUM SUCCINATE 40 MG/ML
20 INJECTION, POWDER, LYOPHILIZED, FOR SOLUTION INTRAMUSCULAR; INTRAVENOUS EVERY 12 HOURS
Status: DISCONTINUED | OUTPATIENT
Start: 2020-04-03 | End: 2020-04-04

## 2020-04-03 RX ADMIN — ENOXAPARIN SODIUM 40 MG: 40 INJECTION SUBCUTANEOUS at 10:15

## 2020-04-03 RX ADMIN — HALOPERIDOL 10 MG: 5 TABLET ORAL at 20:04

## 2020-04-03 RX ADMIN — DIVALPROEX SODIUM 500 MG: 125 CAPSULE, COATED PELLETS ORAL at 13:58

## 2020-04-03 RX ADMIN — AMANTADINE HYDROCHLORIDE 100 MG: 100 CAPSULE, LIQUID FILLED ORAL at 20:04

## 2020-04-03 RX ADMIN — HYDROXYZINE PAMOATE 50 MG: 25 CAPSULE ORAL at 06:39

## 2020-04-03 RX ADMIN — SODIUM CHLORIDE: 9 INJECTION, SOLUTION INTRAVENOUS at 11:41

## 2020-04-03 RX ADMIN — AMLODIPINE BESYLATE 10 MG: 10 TABLET ORAL at 10:15

## 2020-04-03 RX ADMIN — FLUVOXAMINE MALEATE 50 MG: 50 TABLET, COATED ORAL at 20:04

## 2020-04-03 RX ADMIN — DIVALPROEX SODIUM 500 MG: 125 CAPSULE, COATED PELLETS ORAL at 06:13

## 2020-04-03 RX ADMIN — SIMVASTATIN 10 MG: 10 TABLET, FILM COATED ORAL at 20:05

## 2020-04-03 RX ADMIN — METHYLPREDNISOLONE SODIUM SUCCINATE 40 MG: 40 INJECTION, POWDER, LYOPHILIZED, FOR SOLUTION INTRAMUSCULAR; INTRAVENOUS at 02:30

## 2020-04-03 RX ADMIN — SODIUM CHLORIDE, PRESERVATIVE FREE 10 ML: 5 INJECTION INTRAVENOUS at 10:15

## 2020-04-03 RX ADMIN — SODIUM CHLORIDE: 9 INJECTION, SOLUTION INTRAVENOUS at 01:23

## 2020-04-03 RX ADMIN — MORPHINE SULFATE 2 MG: 2 INJECTION, SOLUTION INTRAMUSCULAR; INTRAVENOUS at 10:20

## 2020-04-03 RX ADMIN — MORPHINE SULFATE 2 MG: 2 INJECTION, SOLUTION INTRAMUSCULAR; INTRAVENOUS at 14:27

## 2020-04-03 RX ADMIN — MORPHINE SULFATE 2 MG: 2 INJECTION, SOLUTION INTRAMUSCULAR; INTRAVENOUS at 19:22

## 2020-04-03 RX ADMIN — METHYLPREDNISOLONE SODIUM SUCCINATE 20 MG: 40 INJECTION, POWDER, LYOPHILIZED, FOR SOLUTION INTRAMUSCULAR; INTRAVENOUS at 14:01

## 2020-04-03 RX ADMIN — HALOPERIDOL 10 MG: 5 TABLET ORAL at 10:15

## 2020-04-03 RX ADMIN — DIVALPROEX SODIUM 500 MG: 125 CAPSULE, COATED PELLETS ORAL at 21:05

## 2020-04-03 RX ADMIN — PANTOPRAZOLE SODIUM 40 MG: 40 INJECTION, POWDER, FOR SOLUTION INTRAVENOUS at 10:15

## 2020-04-03 RX ADMIN — AMANTADINE HYDROCHLORIDE 100 MG: 100 CAPSULE, LIQUID FILLED ORAL at 10:15

## 2020-04-03 RX ADMIN — MORPHINE SULFATE 2 MG: 2 INJECTION, SOLUTION INTRAMUSCULAR; INTRAVENOUS at 04:32

## 2020-04-03 ASSESSMENT — PAIN SCALES - GENERAL
PAINLEVEL_OUTOF10: 0
PAINLEVEL_OUTOF10: 9
PAINLEVEL_OUTOF10: 0
PAINLEVEL_OUTOF10: 8
PAINLEVEL_OUTOF10: 6
PAINLEVEL_OUTOF10: 9
PAINLEVEL_OUTOF10: 9

## 2020-04-03 ASSESSMENT — PAIN DESCRIPTION - PAIN TYPE: TYPE: ACUTE PAIN

## 2020-04-03 ASSESSMENT — PAIN DESCRIPTION - PROGRESSION: CLINICAL_PROGRESSION: GRADUALLY WORSENING

## 2020-04-03 ASSESSMENT — PAIN DESCRIPTION - ONSET: ONSET: GRADUAL

## 2020-04-03 ASSESSMENT — PAIN - FUNCTIONAL ASSESSMENT: PAIN_FUNCTIONAL_ASSESSMENT: ACTIVITIES ARE NOT PREVENTED

## 2020-04-03 ASSESSMENT — PAIN DESCRIPTION - FREQUENCY: FREQUENCY: CONTINUOUS

## 2020-04-03 ASSESSMENT — PAIN DESCRIPTION - LOCATION: LOCATION: ABDOMEN

## 2020-04-03 ASSESSMENT — PAIN DESCRIPTION - DESCRIPTORS: DESCRIPTORS: ACHING;SHARP

## 2020-04-03 ASSESSMENT — PAIN DESCRIPTION - ORIENTATION: ORIENTATION: LEFT;LOWER

## 2020-04-03 NOTE — H&P
[address] [address]                              HISTORY AND PHYSICAL    PATIENT NAME: Mona Orta                       :        1965  MED REC NO:   [id number]                            ROOM:       0518  ACCOUNT NO:   [de-identified]                           ADMIT DATE: 2020  PROVIDER:     Sirisha Montero DO    CHIEF COMPLAINT/REASON FOR THIS HOSPITAL ADMISSION:  Acute flare of  Crohn's disease. HISTORY OF PRESENT ILLNESS:  The patient is a 61-year-old  male  with history of Crohn's disease and psychiatric disorder, hypertension,  who presents to this hospital emergency room with complaint of lower  abdominal pain, which she has had for the last week and a half. He was  originally seen in this emergency room department several days ago,  placed on antibiotic therapy and p.o. steroids with little to no relief  in his symptomatology. He now presents back to the emergency room. His  white blood cell count was 28.2, H and H of 13.2 and 40.9 respectively. Platelets were 752,167. BUN was 22, creatinine 1.0. Potassium was 4.0. Liver functions were within normal limits. UA revealed negative  nitrites, trace leukocyte esterase, negative blood. Lactic acid was  1.2. CAT scan of the abdomen and pelvis reveals inflammatory changes  involving the left colon and rectum compatible with Crohn's colitis. He  was afebrile when he presented. He was admitted as an observation  status. Both GI and surgical consultations were requested. He was made  n.p.o. except for small sips of oral medications. He was placed on IV  corticosteroids. RECENT AND PRESENT MEDICATIONS:  Noted. PAST SURGICAL HISTORY:  Consistent with prior colonoscopy,  prostatectomy. SOCIAL HISTORY:  He has been smoking cigarettes. No use of illicit  drugs or alcohol.     FAMILY HISTORY:  Negative with regards to the above chief complaint. REVIEW OF SYSTEMS:  ALLERGIES ARE TO CIPRO AND NSAIDS. Remainder of  systems is otherwise negative. PHYSICAL ASSESSMENT:  GENERAL:  Reveals a 60-year-old male in no acute distress. VITAL SIGNS:  BP of 112/71, pulse rate of 63, temperature of 97.8,  respiratory rate of 14. ENT:  Exam is negative. HEART:  Regular. LUNGS:  Clear. ABDOMEN:  Noted to be soft. He has some slight left lower quadrant  tenderness. EXTREMITIES:  Reveal no evidence for edema, cyanosis, or clubbing. NEUROLOGICAL:  No appreciable deficits. GENITALIA, PELVIC, AND RECTAL:  Deferred. DIAGNOSTIC IMPRESSION:  1. Acute Crohn's disease with flare. 2.  History of psychiatric disorder. 3.  History of hypertension. PLAN AND TREATMENT:  See orders with appropriate consultations. Discharge plan will be to home when medically stable.         Grupo Mitchell DO    D: 04/03/2020 7:26:29       T: 04/03/2020 7:35:53     [email or web address]  Job#: [id number]     Doc#: [id number]    CC:

## 2020-04-03 NOTE — CARE COORDINATION
Spoke with pt about his diagnosis and discharge plan of care. Pt lives with his spouse and utilizes no outside resources. He does not feel he will need home care. PCP dr Chelsie Villarreal. Pharmacy is [facility]. Will continue to follow-O     The Plan for Transition of Care is related to the following treatment goals: home     The Patient and/or patient representative pt was provided with a choice of provider and agrees   with the discharge plan. [x] Yes [] No    Freedom of choice list was provided with basic dialogue that supports the patient's individualized plan of care/goals, treatment preferences and shares the quality data associated with the providers.  [x] Yes [] No

## 2020-04-03 NOTE — PATIENT CARE CONFERENCE
[facility] Quality Flow/Interdisciplinary Rounds Progress Note        Quality Flow Rounds held on April 3, 2020    Disciplines Attending:  Bedside Nurse, ,  and Nursing Unit Keyona Morejon was admitted on 4/2/2020  8:50 AM    Anticipated Discharge Date:  Expected Discharge Date: 04/05/20    Disposition:    Andre Score:  Andre Scale Score: 21    Readmission Risk              Risk of Unplanned Readmission:        0           Discussed patient goal for the day, patient clinical progression, and barriers to discharge.   The following Goal(s) of the Day/Commitment(s) have been identified:  Diagnostics - Report Results      Bisi Wolf  April 3, 2020

## 2020-04-03 NOTE — CONSULTS
multiple  phone calls to the clinic in the facility with no response as far as the  ability to continue it. He said that he was discharged from senior care  approximately three to four weeks ago. He was seen at the [facility] on 03/20. They decided, after no response to Humira, to  reinitiate Laura and he is scheduled for an infusion and additional  blood work on next Wednesday. He will follow up at that institution. There were discussions about another CT enterography and colonoscopy,  but on hold at this time. He was admitted to the hospital with abdominal pain and bleeding, though  he seems to have little of each right now. CAT scan suggests  inflammatory changes to the left colon. He had a white count of 28,000  without a fever and his white count today is 16.4 without antibiotics. He is hungry and wishes to eat. PAST MEDICAL HISTORY:  That of Crohn's disease. History of prostate  cancer, treated surgically. Acid reflux. Psychiatric disorder listed  variously as bipolar and schizoaffective. PAST SURGICAL HISTORY:  He has had no surgery for his Crohn's disease  other than excision and drainage of abscess and seton placement and  multiple endoscopic procedures. Prostatectomy was performed  laparoscopically at age 51. MEDICATIONS:  Prior to admission were Symmetrel, haloperidol, Depakote,  Luvox, Vistaril Zocor, Norvasc, and dicyclomine, and Humira is listed  but not a currently active drug. SOCIAL HISTORY:  He is unemployed. . Smokes despite his Crohn's  disease. No alcohol. OBJECTIVE:  He is afebrile and nontoxic. He is alert and oriented. He  is not visibly pale. No rash or oral ulcers. Heart and lungs are  normal.  A very benign abdomen. No edema. ASSESSMENT:  Active inflammatory bowel disease. Labeled as Crohn's  disease, probably is much because of the perirectal abscess as anything  else.   The pattern of distribution seems more consistent with ulcerative  colitis. He responded to Stelara. I would have expected a better  response to Humira given his historic response to infliximab. PLAN:  Continuing steroids. Advance his diet. Discharge in a couple of  days to allow him to keep his appointment in [place] where he will be  followed.         Lorie Comer MD    D: 04/03/2020 12:29:51       T: 04/03/2020 12:39:50     [email or web address]  Job#: [id number]     Doc#: [id number]    CC:

## 2020-04-04 LAB
ANISOCYTOSIS: ABNORMAL
BASOPHILS ABSOLUTE: 0 E9/L (ref 0–0.2)
BASOPHILS RELATIVE PERCENT: 0 % (ref 0–2)
BURR CELLS: ABNORMAL
EOSINOPHILS ABSOLUTE: 0 E9/L (ref 0.05–0.5)
EOSINOPHILS RELATIVE PERCENT: 0 % (ref 0–6)
FECAL NEUTRAL FAT: NORMAL
FECAL SPLIT FATS: NORMAL
HCT VFR BLD CALC: 35.1 % (ref 37–54)
HEMOGLOBIN: 11.1 G/DL (ref 12.5–16.5)
LYMPHOCYTES ABSOLUTE: 0.99 E9/L (ref 1.5–4)
LYMPHOCYTES RELATIVE PERCENT: 7 % (ref 20–42)
MCH RBC QN AUTO: 26.6 PG (ref 26–35)
MCHC RBC AUTO-ENTMCNC: 31.6 % (ref 32–34.5)
MCV RBC AUTO: 84.2 FL (ref 80–99.9)
MONOCYTES ABSOLUTE: 0.28 E9/L (ref 0.1–0.95)
MONOCYTES RELATIVE PERCENT: 2 % (ref 2–12)
NEUTROPHILS ABSOLUTE: 12.92 E9/L (ref 1.8–7.3)
NEUTROPHILS RELATIVE PERCENT: 91 % (ref 43–80)
OVALOCYTES: ABNORMAL
PDW BLD-RTO: 20.7 FL (ref 11.5–15)
PLATELET # BLD: 311 E9/L (ref 130–450)
PMV BLD AUTO: 8.6 FL (ref 7–12)
POIKILOCYTES: ABNORMAL
POLYCHROMASIA: ABNORMAL
RBC # BLD: 4.17 E12/L (ref 3.8–5.8)
WBC # BLD: 14.2 E9/L (ref 4.5–11.5)

## 2020-04-04 PROCEDURE — 2580000003 HC RX 258: Performed by: EMERGENCY MEDICINE

## 2020-04-04 PROCEDURE — 85025 COMPLETE CBC W/AUTO DIFF WBC: CPT

## 2020-04-04 PROCEDURE — 96361 HYDRATE IV INFUSION ADD-ON: CPT

## 2020-04-04 PROCEDURE — 6370000000 HC RX 637 (ALT 250 FOR IP): Performed by: FAMILY MEDICINE

## 2020-04-04 PROCEDURE — G0378 HOSPITAL OBSERVATION PER HR: HCPCS

## 2020-04-04 PROCEDURE — 6360000002 HC RX W HCPCS: Performed by: FAMILY MEDICINE

## 2020-04-04 PROCEDURE — 6360000002 HC RX W HCPCS: Performed by: INTERNAL MEDICINE

## 2020-04-04 PROCEDURE — 36415 COLL VENOUS BLD VENIPUNCTURE: CPT

## 2020-04-04 PROCEDURE — C9113 INJ PANTOPRAZOLE SODIUM, VIA: HCPCS | Performed by: FAMILY MEDICINE

## 2020-04-04 PROCEDURE — 96376 TX/PRO/DX INJ SAME DRUG ADON: CPT

## 2020-04-04 PROCEDURE — 96372 THER/PROPH/DIAG INJ SC/IM: CPT

## 2020-04-04 PROCEDURE — 2580000003 HC RX 258: Performed by: FAMILY MEDICINE

## 2020-04-04 RX ORDER — METHYLPREDNISOLONE SODIUM SUCCINATE 40 MG/ML
20 INJECTION, POWDER, LYOPHILIZED, FOR SOLUTION INTRAMUSCULAR; INTRAVENOUS DAILY
Status: DISCONTINUED | OUTPATIENT
Start: 2020-04-05 | End: 2020-04-06

## 2020-04-04 RX ADMIN — METHYLPREDNISOLONE SODIUM SUCCINATE 20 MG: 40 INJECTION, POWDER, LYOPHILIZED, FOR SOLUTION INTRAMUSCULAR; INTRAVENOUS at 01:48

## 2020-04-04 RX ADMIN — MORPHINE SULFATE 2 MG: 2 INJECTION, SOLUTION INTRAMUSCULAR; INTRAVENOUS at 07:06

## 2020-04-04 RX ADMIN — HYDROXYZINE PAMOATE 50 MG: 25 CAPSULE ORAL at 22:23

## 2020-04-04 RX ADMIN — ENOXAPARIN SODIUM 40 MG: 40 INJECTION SUBCUTANEOUS at 08:25

## 2020-04-04 RX ADMIN — SIMVASTATIN 10 MG: 10 TABLET, FILM COATED ORAL at 19:50

## 2020-04-04 RX ADMIN — SODIUM CHLORIDE: 9 INJECTION, SOLUTION INTRAVENOUS at 16:54

## 2020-04-04 RX ADMIN — MORPHINE SULFATE 2 MG: 2 INJECTION, SOLUTION INTRAMUSCULAR; INTRAVENOUS at 20:57

## 2020-04-04 RX ADMIN — Medication 10 ML: at 00:01

## 2020-04-04 RX ADMIN — SODIUM CHLORIDE, PRESERVATIVE FREE 10 ML: 5 INJECTION INTRAVENOUS at 08:25

## 2020-04-04 RX ADMIN — DIVALPROEX SODIUM 500 MG: 125 CAPSULE, COATED PELLETS ORAL at 20:56

## 2020-04-04 RX ADMIN — AMLODIPINE BESYLATE 10 MG: 10 TABLET ORAL at 08:24

## 2020-04-04 RX ADMIN — Medication 10 ML: at 11:57

## 2020-04-04 RX ADMIN — HALOPERIDOL 10 MG: 5 TABLET ORAL at 08:24

## 2020-04-04 RX ADMIN — METHYLPREDNISOLONE SODIUM SUCCINATE 20 MG: 40 INJECTION, POWDER, LYOPHILIZED, FOR SOLUTION INTRAMUSCULAR; INTRAVENOUS at 13:49

## 2020-04-04 RX ADMIN — DIVALPROEX SODIUM 500 MG: 125 CAPSULE, COATED PELLETS ORAL at 13:48

## 2020-04-04 RX ADMIN — AMANTADINE HYDROCHLORIDE 100 MG: 100 CAPSULE, LIQUID FILLED ORAL at 08:24

## 2020-04-04 RX ADMIN — HYDROXYZINE PAMOATE 50 MG: 25 CAPSULE ORAL at 07:05

## 2020-04-04 RX ADMIN — HYDROXYZINE PAMOATE 50 MG: 25 CAPSULE ORAL at 13:50

## 2020-04-04 RX ADMIN — MORPHINE SULFATE 2 MG: 2 INJECTION, SOLUTION INTRAMUSCULAR; INTRAVENOUS at 16:14

## 2020-04-04 RX ADMIN — AMANTADINE HYDROCHLORIDE 100 MG: 100 CAPSULE, LIQUID FILLED ORAL at 19:50

## 2020-04-04 RX ADMIN — Medication 10 ML: at 13:50

## 2020-04-04 RX ADMIN — DIVALPROEX SODIUM 500 MG: 125 CAPSULE, COATED PELLETS ORAL at 07:01

## 2020-04-04 RX ADMIN — MORPHINE SULFATE 2 MG: 2 INJECTION, SOLUTION INTRAMUSCULAR; INTRAVENOUS at 00:01

## 2020-04-04 RX ADMIN — Medication 10 ML: at 16:14

## 2020-04-04 RX ADMIN — PANTOPRAZOLE SODIUM 40 MG: 40 INJECTION, POWDER, FOR SOLUTION INTRAVENOUS at 08:25

## 2020-04-04 RX ADMIN — MORPHINE SULFATE 2 MG: 2 INJECTION, SOLUTION INTRAMUSCULAR; INTRAVENOUS at 11:58

## 2020-04-04 RX ADMIN — FLUVOXAMINE MALEATE 50 MG: 50 TABLET, COATED ORAL at 19:50

## 2020-04-04 RX ADMIN — SODIUM CHLORIDE: 9 INJECTION, SOLUTION INTRAVENOUS at 07:06

## 2020-04-04 RX ADMIN — HALOPERIDOL 10 MG: 5 TABLET ORAL at 19:50

## 2020-04-04 ASSESSMENT — PAIN SCALES - GENERAL
PAINLEVEL_OUTOF10: 5
PAINLEVEL_OUTOF10: 5
PAINLEVEL_OUTOF10: 8
PAINLEVEL_OUTOF10: 0
PAINLEVEL_OUTOF10: 8
PAINLEVEL_OUTOF10: 8
PAINLEVEL_OUTOF10: 9
PAINLEVEL_OUTOF10: 0

## 2020-04-04 ASSESSMENT — PAIN DESCRIPTION - FREQUENCY
FREQUENCY: CONTINUOUS
FREQUENCY: CONTINUOUS

## 2020-04-04 ASSESSMENT — PAIN DESCRIPTION - PROGRESSION
CLINICAL_PROGRESSION: GRADUALLY WORSENING
CLINICAL_PROGRESSION: NOT CHANGED

## 2020-04-04 ASSESSMENT — PAIN DESCRIPTION - ONSET
ONSET: ON-GOING
ONSET: ON-GOING

## 2020-04-04 ASSESSMENT — PAIN DESCRIPTION - ORIENTATION
ORIENTATION: LEFT;LOWER
ORIENTATION: LEFT;LOWER

## 2020-04-04 ASSESSMENT — PAIN DESCRIPTION - PAIN TYPE
TYPE: ACUTE PAIN
TYPE: ACUTE PAIN

## 2020-04-04 ASSESSMENT — PAIN DESCRIPTION - LOCATION
LOCATION: ABDOMEN
LOCATION: ABDOMEN

## 2020-04-04 ASSESSMENT — PAIN - FUNCTIONAL ASSESSMENT
PAIN_FUNCTIONAL_ASSESSMENT: ACTIVITIES ARE NOT PREVENTED
PAIN_FUNCTIONAL_ASSESSMENT: ACTIVITIES ARE NOT PREVENTED

## 2020-04-04 ASSESSMENT — PAIN DESCRIPTION - DESCRIPTORS
DESCRIPTORS: ACHING;SHARP
DESCRIPTORS: SHARP

## 2020-04-05 LAB
BASOPHILS ABSOLUTE: 0.03 E9/L (ref 0–0.2)
BASOPHILS RELATIVE PERCENT: 0.2 % (ref 0–2)
CULTURE, STOOL: NORMAL
EOSINOPHILS ABSOLUTE: 0 E9/L (ref 0.05–0.5)
EOSINOPHILS RELATIVE PERCENT: 0 % (ref 0–6)
HCT VFR BLD CALC: 34.6 % (ref 37–54)
HEMOGLOBIN: 10.8 G/DL (ref 12.5–16.5)
IMMATURE GRANULOCYTES #: 0.6 E9/L
IMMATURE GRANULOCYTES %: 4.4 % (ref 0–5)
LYMPHOCYTES ABSOLUTE: 2.79 E9/L (ref 1.5–4)
LYMPHOCYTES RELATIVE PERCENT: 20.6 % (ref 20–42)
MCH RBC QN AUTO: 26.9 PG (ref 26–35)
MCHC RBC AUTO-ENTMCNC: 31.2 % (ref 32–34.5)
MCV RBC AUTO: 86.3 FL (ref 80–99.9)
MONOCYTES ABSOLUTE: 1.36 E9/L (ref 0.1–0.95)
MONOCYTES RELATIVE PERCENT: 10.1 % (ref 2–12)
NEUTROPHILS ABSOLUTE: 8.74 E9/L (ref 1.8–7.3)
NEUTROPHILS RELATIVE PERCENT: 64.7 % (ref 43–80)
OVALOCYTES: ABNORMAL
PDW BLD-RTO: 21.1 FL (ref 11.5–15)
PLATELET # BLD: 289 E9/L (ref 130–450)
PMV BLD AUTO: 8.6 FL (ref 7–12)
POIKILOCYTES: ABNORMAL
POLYCHROMASIA: ABNORMAL
RBC # BLD: 4.01 E12/L (ref 3.8–5.8)
WBC # BLD: 13.5 E9/L (ref 4.5–11.5)

## 2020-04-05 PROCEDURE — G0378 HOSPITAL OBSERVATION PER HR: HCPCS

## 2020-04-05 PROCEDURE — 6370000000 HC RX 637 (ALT 250 FOR IP): Performed by: FAMILY MEDICINE

## 2020-04-05 PROCEDURE — 6360000002 HC RX W HCPCS: Performed by: INTERNAL MEDICINE

## 2020-04-05 PROCEDURE — 2580000003 HC RX 258: Performed by: FAMILY MEDICINE

## 2020-04-05 PROCEDURE — 36415 COLL VENOUS BLD VENIPUNCTURE: CPT

## 2020-04-05 PROCEDURE — 96376 TX/PRO/DX INJ SAME DRUG ADON: CPT

## 2020-04-05 PROCEDURE — 6360000002 HC RX W HCPCS: Performed by: FAMILY MEDICINE

## 2020-04-05 PROCEDURE — 96372 THER/PROPH/DIAG INJ SC/IM: CPT

## 2020-04-05 PROCEDURE — C9113 INJ PANTOPRAZOLE SODIUM, VIA: HCPCS | Performed by: FAMILY MEDICINE

## 2020-04-05 PROCEDURE — 96361 HYDRATE IV INFUSION ADD-ON: CPT

## 2020-04-05 PROCEDURE — 85025 COMPLETE CBC W/AUTO DIFF WBC: CPT

## 2020-04-05 RX ADMIN — SIMVASTATIN 10 MG: 10 TABLET, FILM COATED ORAL at 21:31

## 2020-04-05 RX ADMIN — SODIUM CHLORIDE: 9 INJECTION, SOLUTION INTRAVENOUS at 01:45

## 2020-04-05 RX ADMIN — MORPHINE SULFATE 2 MG: 2 INJECTION, SOLUTION INTRAMUSCULAR; INTRAVENOUS at 14:01

## 2020-04-05 RX ADMIN — DIVALPROEX SODIUM 500 MG: 125 CAPSULE, COATED PELLETS ORAL at 08:34

## 2020-04-05 RX ADMIN — HYDROXYZINE PAMOATE 50 MG: 25 CAPSULE ORAL at 12:46

## 2020-04-05 RX ADMIN — HALOPERIDOL 10 MG: 5 TABLET ORAL at 08:34

## 2020-04-05 RX ADMIN — SODIUM CHLORIDE: 9 INJECTION, SOLUTION INTRAVENOUS at 21:38

## 2020-04-05 RX ADMIN — HYDROXYZINE PAMOATE 50 MG: 25 CAPSULE ORAL at 08:34

## 2020-04-05 RX ADMIN — AMANTADINE HYDROCHLORIDE 100 MG: 100 CAPSULE, LIQUID FILLED ORAL at 21:32

## 2020-04-05 RX ADMIN — METHYLPREDNISOLONE SODIUM SUCCINATE 20 MG: 40 INJECTION, POWDER, LYOPHILIZED, FOR SOLUTION INTRAMUSCULAR; INTRAVENOUS at 08:35

## 2020-04-05 RX ADMIN — DIVALPROEX SODIUM 500 MG: 125 CAPSULE, COATED PELLETS ORAL at 14:04

## 2020-04-05 RX ADMIN — FLUVOXAMINE MALEATE 50 MG: 50 TABLET, COATED ORAL at 21:31

## 2020-04-05 RX ADMIN — DIVALPROEX SODIUM 500 MG: 125 CAPSULE, COATED PELLETS ORAL at 21:32

## 2020-04-05 RX ADMIN — PANTOPRAZOLE SODIUM 40 MG: 40 INJECTION, POWDER, FOR SOLUTION INTRAVENOUS at 08:35

## 2020-04-05 RX ADMIN — MORPHINE SULFATE 2 MG: 2 INJECTION, SOLUTION INTRAMUSCULAR; INTRAVENOUS at 09:23

## 2020-04-05 RX ADMIN — SODIUM CHLORIDE, PRESERVATIVE FREE 10 ML: 5 INJECTION INTRAVENOUS at 08:35

## 2020-04-05 RX ADMIN — MORPHINE SULFATE 2 MG: 2 INJECTION, SOLUTION INTRAMUSCULAR; INTRAVENOUS at 18:18

## 2020-04-05 RX ADMIN — HYDROXYZINE PAMOATE 50 MG: 25 CAPSULE ORAL at 21:31

## 2020-04-05 RX ADMIN — AMANTADINE HYDROCHLORIDE 100 MG: 100 CAPSULE, LIQUID FILLED ORAL at 08:34

## 2020-04-05 RX ADMIN — MORPHINE SULFATE 2 MG: 2 INJECTION, SOLUTION INTRAMUSCULAR; INTRAVENOUS at 22:30

## 2020-04-05 RX ADMIN — ENOXAPARIN SODIUM 40 MG: 40 INJECTION SUBCUTANEOUS at 08:34

## 2020-04-05 RX ADMIN — MORPHINE SULFATE 2 MG: 2 INJECTION, SOLUTION INTRAMUSCULAR; INTRAVENOUS at 01:45

## 2020-04-05 RX ADMIN — HALOPERIDOL 10 MG: 5 TABLET ORAL at 21:31

## 2020-04-05 RX ADMIN — AMLODIPINE BESYLATE 10 MG: 10 TABLET ORAL at 08:34

## 2020-04-05 ASSESSMENT — PAIN DESCRIPTION - FREQUENCY
FREQUENCY: CONTINUOUS
FREQUENCY: INTERMITTENT
FREQUENCY: CONTINUOUS

## 2020-04-05 ASSESSMENT — PAIN DESCRIPTION - DESCRIPTORS
DESCRIPTORS: DISCOMFORT
DESCRIPTORS: SHARP
DESCRIPTORS: SORE

## 2020-04-05 ASSESSMENT — PAIN DESCRIPTION - PROGRESSION
CLINICAL_PROGRESSION: GRADUALLY WORSENING
CLINICAL_PROGRESSION: NOT CHANGED

## 2020-04-05 ASSESSMENT — PAIN DESCRIPTION - PAIN TYPE
TYPE: ACUTE PAIN

## 2020-04-05 ASSESSMENT — PAIN - FUNCTIONAL ASSESSMENT
PAIN_FUNCTIONAL_ASSESSMENT: ACTIVITIES ARE NOT PREVENTED

## 2020-04-05 ASSESSMENT — PAIN DESCRIPTION - LOCATION
LOCATION: ABDOMEN

## 2020-04-05 ASSESSMENT — PAIN DESCRIPTION - ORIENTATION
ORIENTATION: RIGHT;LEFT
ORIENTATION: LEFT;LOWER
ORIENTATION: RIGHT;LEFT
ORIENTATION: RIGHT;LEFT

## 2020-04-05 ASSESSMENT — PAIN DESCRIPTION - ONSET
ONSET: ON-GOING
ONSET: GRADUAL
ONSET: GRADUAL
ONSET: ON-GOING
ONSET: ON-GOING

## 2020-04-05 ASSESSMENT — PAIN SCALES - GENERAL
PAINLEVEL_OUTOF10: 0
PAINLEVEL_OUTOF10: 9
PAINLEVEL_OUTOF10: 9
PAINLEVEL_OUTOF10: 3
PAINLEVEL_OUTOF10: 0
PAINLEVEL_OUTOF10: 8
PAINLEVEL_OUTOF10: 3
PAINLEVEL_OUTOF10: 9
PAINLEVEL_OUTOF10: 9

## 2020-04-06 VITALS
DIASTOLIC BLOOD PRESSURE: 67 MMHG | SYSTOLIC BLOOD PRESSURE: 111 MMHG | HEIGHT: 74 IN | BODY MASS INDEX: 23.1 KG/M2 | WEIGHT: 179.99 LBS | OXYGEN SATURATION: 96 % | TEMPERATURE: 97.6 F | HEART RATE: 74 BPM | RESPIRATION RATE: 16 BRPM

## 2020-04-06 LAB
ANISOCYTOSIS: ABNORMAL
BASOPHILS ABSOLUTE: 0.02 E9/L (ref 0–0.2)
BASOPHILS RELATIVE PERCENT: 0.1 % (ref 0–2)
EOSINOPHILS ABSOLUTE: 0.01 E9/L (ref 0.05–0.5)
EOSINOPHILS RELATIVE PERCENT: 0.1 % (ref 0–6)
HCT VFR BLD CALC: 33.9 % (ref 37–54)
HEMOGLOBIN: 10.7 G/DL (ref 12.5–16.5)
HYPOCHROMIA: ABNORMAL
IMMATURE GRANULOCYTES #: 0.32 E9/L
IMMATURE GRANULOCYTES %: 2 % (ref 0–5)
LYMPHOCYTES ABSOLUTE: 2.7 E9/L (ref 1.5–4)
LYMPHOCYTES RELATIVE PERCENT: 16.5 % (ref 20–42)
MCH RBC QN AUTO: 26.7 PG (ref 26–35)
MCHC RBC AUTO-ENTMCNC: 31.6 % (ref 32–34.5)
MCV RBC AUTO: 84.5 FL (ref 80–99.9)
MONOCYTES ABSOLUTE: 1.6 E9/L (ref 0.1–0.95)
MONOCYTES RELATIVE PERCENT: 9.8 % (ref 2–12)
NEUTROPHILS ABSOLUTE: 11.7 E9/L (ref 1.8–7.3)
NEUTROPHILS RELATIVE PERCENT: 71.5 % (ref 43–80)
OVALOCYTES: ABNORMAL
PDW BLD-RTO: 21.1 FL (ref 11.5–15)
PLATELET # BLD: 265 E9/L (ref 130–450)
PMV BLD AUTO: 8.2 FL (ref 7–12)
POIKILOCYTES: ABNORMAL
POLYCHROMASIA: ABNORMAL
RBC # BLD: 4.01 E12/L (ref 3.8–5.8)
WBC # BLD: 16.4 E9/L (ref 4.5–11.5)

## 2020-04-06 PROCEDURE — 6360000002 HC RX W HCPCS: Performed by: FAMILY MEDICINE

## 2020-04-06 PROCEDURE — 2580000003 HC RX 258

## 2020-04-06 PROCEDURE — C9113 INJ PANTOPRAZOLE SODIUM, VIA: HCPCS | Performed by: FAMILY MEDICINE

## 2020-04-06 PROCEDURE — 2580000003 HC RX 258: Performed by: FAMILY MEDICINE

## 2020-04-06 PROCEDURE — 1200000000 HC SEMI PRIVATE

## 2020-04-06 PROCEDURE — 96376 TX/PRO/DX INJ SAME DRUG ADON: CPT

## 2020-04-06 PROCEDURE — 36415 COLL VENOUS BLD VENIPUNCTURE: CPT

## 2020-04-06 PROCEDURE — 6360000002 HC RX W HCPCS: Performed by: INTERNAL MEDICINE

## 2020-04-06 PROCEDURE — 85025 COMPLETE CBC W/AUTO DIFF WBC: CPT

## 2020-04-06 PROCEDURE — 96372 THER/PROPH/DIAG INJ SC/IM: CPT

## 2020-04-06 PROCEDURE — 6370000000 HC RX 637 (ALT 250 FOR IP): Performed by: FAMILY MEDICINE

## 2020-04-06 PROCEDURE — 96361 HYDRATE IV INFUSION ADD-ON: CPT

## 2020-04-06 RX ORDER — PREDNISONE 20 MG/1
20 TABLET ORAL DAILY
Qty: 20 TABLET | Refills: 0 | Status: SHIPPED | OUTPATIENT
Start: 2020-04-07 | End: 2020-04-27

## 2020-04-06 RX ORDER — PREDNISONE 20 MG/1
20 TABLET ORAL DAILY
Status: DISCONTINUED | OUTPATIENT
Start: 2020-04-07 | End: 2020-04-06 | Stop reason: HOSPADM

## 2020-04-06 RX ADMIN — HYDROXYZINE PAMOATE 50 MG: 25 CAPSULE ORAL at 07:40

## 2020-04-06 RX ADMIN — WATER 10 ML: 1 INJECTION INTRAMUSCULAR; INTRAVENOUS; SUBCUTANEOUS at 09:48

## 2020-04-06 RX ADMIN — DIVALPROEX SODIUM 500 MG: 125 CAPSULE, COATED PELLETS ORAL at 06:20

## 2020-04-06 RX ADMIN — SODIUM CHLORIDE: 9 INJECTION, SOLUTION INTRAVENOUS at 06:22

## 2020-04-06 RX ADMIN — PANTOPRAZOLE SODIUM 40 MG: 40 INJECTION, POWDER, FOR SOLUTION INTRAVENOUS at 09:48

## 2020-04-06 RX ADMIN — SODIUM CHLORIDE, PRESERVATIVE FREE 10 ML: 5 INJECTION INTRAVENOUS at 09:48

## 2020-04-06 RX ADMIN — ENOXAPARIN SODIUM 40 MG: 40 INJECTION SUBCUTANEOUS at 09:47

## 2020-04-06 RX ADMIN — AMANTADINE HYDROCHLORIDE 100 MG: 100 CAPSULE, LIQUID FILLED ORAL at 09:47

## 2020-04-06 RX ADMIN — MORPHINE SULFATE 2 MG: 2 INJECTION, SOLUTION INTRAMUSCULAR; INTRAVENOUS at 06:31

## 2020-04-06 RX ADMIN — METHYLPREDNISOLONE SODIUM SUCCINATE 20 MG: 40 INJECTION, POWDER, LYOPHILIZED, FOR SOLUTION INTRAMUSCULAR; INTRAVENOUS at 09:48

## 2020-04-06 RX ADMIN — HALOPERIDOL 10 MG: 5 TABLET ORAL at 09:47

## 2020-04-06 ASSESSMENT — PAIN SCALES - GENERAL: PAINLEVEL_OUTOF10: 9

## 2020-04-07 ENCOUNTER — CARE COORDINATION (OUTPATIENT)
Dept: CARE COORDINATION | Age: 55
End: 2020-04-07

## 2020-04-07 PROCEDURE — 1111F DSCHRG MED/CURRENT MED MERGE: CPT | Performed by: FAMILY MEDICINE

## 2020-04-13 ENCOUNTER — HOSPITAL ENCOUNTER (INPATIENT)
Age: 55
LOS: 11 days | Discharge: HOME | DRG: 885 | End: 2020-04-24
Attending: EMERGENCY MEDICINE | Admitting: PSYCHIATRY & NEUROLOGY
Payer: MEDICARE

## 2020-04-13 LAB
ACETAMINOPHEN LEVEL: <5 MCG/ML (ref 10–30)
ALBUMIN SERPL-MCNC: 3.5 G/DL (ref 3.5–5.2)
ALP BLD-CCNC: 72 U/L (ref 40–129)
ALT SERPL-CCNC: <5 U/L (ref 0–40)
AMPHETAMINE SCREEN, URINE: NOT DETECTED
ANION GAP SERPL CALCULATED.3IONS-SCNC: 17 MMOL/L (ref 7–16)
AST SERPL-CCNC: 7 U/L (ref 0–39)
BARBITURATE SCREEN URINE: NOT DETECTED
BASOPHILS ABSOLUTE: 0 E9/L (ref 0–0.2)
BASOPHILS RELATIVE PERCENT: 0.1 % (ref 0–2)
BENZODIAZEPINE SCREEN, URINE: NOT DETECTED
BILIRUB SERPL-MCNC: 0.6 MG/DL (ref 0–1.2)
BUN BLDV-MCNC: 21 MG/DL (ref 6–20)
BURR CELLS: ABNORMAL
CALCIUM SERPL-MCNC: 9 MG/DL (ref 8.6–10.2)
CANNABINOID SCREEN URINE: NOT DETECTED
CHLORIDE BLD-SCNC: 94 MMOL/L (ref 98–107)
CO2: 24 MMOL/L (ref 22–29)
COCAINE METABOLITE SCREEN URINE: NOT DETECTED
CREAT SERPL-MCNC: 0.9 MG/DL (ref 0.7–1.2)
EOSINOPHILS ABSOLUTE: 0 E9/L (ref 0.05–0.5)
EOSINOPHILS RELATIVE PERCENT: 0 % (ref 0–6)
ETHANOL: <10 MG/DL (ref 0–0.08)
FENTANYL SCREEN, URINE: POSITIVE
GFR AFRICAN AMERICAN: >60
GFR NON-AFRICAN AMERICAN: >60 ML/MIN/1.73
GLUCOSE BLD-MCNC: 121 MG/DL (ref 74–99)
HCT VFR BLD CALC: 35.1 % (ref 37–54)
HEMOGLOBIN: 11.2 G/DL (ref 12.5–16.5)
HYPOCHROMIA: ABNORMAL
LYMPHOCYTES ABSOLUTE: 0.28 E9/L (ref 1.5–4)
LYMPHOCYTES RELATIVE PERCENT: 1.8 % (ref 20–42)
Lab: ABNORMAL
MCH RBC QN AUTO: 26.4 PG (ref 26–35)
MCHC RBC AUTO-ENTMCNC: 31.9 % (ref 32–34.5)
MCV RBC AUTO: 82.6 FL (ref 80–99.9)
METHADONE SCREEN, URINE: NOT DETECTED
MONOCYTES ABSOLUTE: 0.28 E9/L (ref 0.1–0.95)
MONOCYTES RELATIVE PERCENT: 1.8 % (ref 2–12)
NEUTROPHILS ABSOLUTE: 13.68 E9/L (ref 1.8–7.3)
NEUTROPHILS RELATIVE PERCENT: 96.5 % (ref 43–80)
OPIATE SCREEN URINE: NOT DETECTED
OVALOCYTES: ABNORMAL
OXYCODONE URINE: POSITIVE
PDW BLD-RTO: 20.8 FL (ref 11.5–15)
PHENCYCLIDINE SCREEN URINE: NOT DETECTED
PLATELET # BLD: 271 E9/L (ref 130–450)
PMV BLD AUTO: 9.1 FL (ref 7–12)
POIKILOCYTES: ABNORMAL
POLYCHROMASIA: ABNORMAL
POTASSIUM SERPL-SCNC: 3.9 MMOL/L (ref 3.5–5)
RBC # BLD: 4.25 E12/L (ref 3.8–5.8)
SALICYLATE, SERUM: <0.3 MG/DL (ref 0–30)
SODIUM BLD-SCNC: 135 MMOL/L (ref 132–146)
TOTAL PROTEIN: 6.2 G/DL (ref 6.4–8.3)
TRICYCLIC ANTIDEPRESSANTS SCREEN SERUM: NEGATIVE NG/ML
VALPROIC ACID LEVEL: 54 MCG/ML (ref 50–100)
WBC # BLD: 14.1 E9/L (ref 4.5–11.5)

## 2020-04-13 PROCEDURE — 36415 COLL VENOUS BLD VENIPUNCTURE: CPT

## 2020-04-13 PROCEDURE — 80307 DRUG TEST PRSMV CHEM ANLYZR: CPT

## 2020-04-13 PROCEDURE — 1240000000 HC EMOTIONAL WELLNESS R&B

## 2020-04-13 PROCEDURE — 80164 ASSAY DIPROPYLACETIC ACD TOT: CPT

## 2020-04-13 PROCEDURE — 85025 COMPLETE CBC W/AUTO DIFF WBC: CPT

## 2020-04-13 PROCEDURE — G0480 DRUG TEST DEF 1-7 CLASSES: HCPCS

## 2020-04-13 PROCEDURE — 99285 EMERGENCY DEPT VISIT HI MDM: CPT

## 2020-04-13 PROCEDURE — 93005 ELECTROCARDIOGRAM TRACING: CPT | Performed by: PSYCHIATRY & NEUROLOGY

## 2020-04-13 PROCEDURE — 80053 COMPREHEN METABOLIC PANEL: CPT

## 2020-04-13 RX ORDER — MAGNESIUM HYDROXIDE/ALUMINUM HYDROXICE/SIMETHICONE 120; 1200; 1200 MG/30ML; MG/30ML; MG/30ML
30 SUSPENSION ORAL PRN
Status: DISCONTINUED | OUTPATIENT
Start: 2020-04-13 | End: 2020-04-24 | Stop reason: HOSPADM

## 2020-04-13 RX ORDER — DIVALPROEX SODIUM 250 MG/1
500 TABLET, DELAYED RELEASE ORAL ONCE
Status: COMPLETED | OUTPATIENT
Start: 2020-04-14 | End: 2020-04-14

## 2020-04-13 RX ORDER — ACETAMINOPHEN 325 MG/1
650 TABLET ORAL EVERY 6 HOURS PRN
Status: DISCONTINUED | OUTPATIENT
Start: 2020-04-13 | End: 2020-04-24 | Stop reason: HOSPADM

## 2020-04-13 RX ORDER — HALOPERIDOL 5 MG/ML
5 INJECTION INTRAMUSCULAR EVERY 6 HOURS PRN
Status: DISCONTINUED | OUTPATIENT
Start: 2020-04-13 | End: 2020-04-24 | Stop reason: HOSPADM

## 2020-04-13 RX ORDER — NICOTINE 21 MG/24HR
1 PATCH, TRANSDERMAL 24 HOURS TRANSDERMAL DAILY
Status: DISCONTINUED | OUTPATIENT
Start: 2020-04-14 | End: 2020-04-24 | Stop reason: HOSPADM

## 2020-04-13 RX ORDER — METRONIDAZOLE 500 MG/1
500 TABLET ORAL 3 TIMES DAILY
Status: ON HOLD | COMMUNITY
Start: 2020-04-08 | End: 2020-04-24 | Stop reason: HOSPADM

## 2020-04-13 RX ORDER — HYDROXYZINE PAMOATE 50 MG/1
50 CAPSULE ORAL 3 TIMES DAILY PRN
Status: DISCONTINUED | OUTPATIENT
Start: 2020-04-13 | End: 2020-04-24 | Stop reason: HOSPADM

## 2020-04-13 RX ORDER — TRAZODONE HYDROCHLORIDE 50 MG/1
50 TABLET ORAL NIGHTLY PRN
Status: DISCONTINUED | OUTPATIENT
Start: 2020-04-13 | End: 2020-04-24 | Stop reason: HOSPADM

## 2020-04-13 RX ORDER — HALOPERIDOL 5 MG
5 TABLET ORAL EVERY 6 HOURS PRN
Status: DISCONTINUED | OUTPATIENT
Start: 2020-04-13 | End: 2020-04-24 | Stop reason: HOSPADM

## 2020-04-13 RX ORDER — PALIPERIDONE 3 MG/1
3 TABLET, EXTENDED RELEASE ORAL 2 TIMES DAILY
Status: DISCONTINUED | OUTPATIENT
Start: 2020-04-14 | End: 2020-04-14

## 2020-04-13 ASSESSMENT — SLEEP AND FATIGUE QUESTIONNAIRES
AVERAGE NUMBER OF SLEEP HOURS: 12
DO YOU HAVE DIFFICULTY SLEEPING: NO
DO YOU USE A SLEEP AID: NO

## 2020-04-13 ASSESSMENT — PAIN SCALES - GENERAL: PAINLEVEL_OUTOF10: 0

## 2020-04-13 NOTE — ED PROVIDER NOTES
Department of Emergency Medicine   ED  Provider Note  Admit Date/RoomTime: 4/13/2020 11:12 AM  ED Room: [facility] [place]          History of Present Illness:  4/13/20, Time: 11:55 AM EDT  Chief Complaint   Patient presents with    Paranoid     with hallucinations, states that he wants off his haldol because it's making him shake                Kaykojo Hendrickson is a 49 y.o. male presenting to the ED for tremors, beginning approximately 1 month ago. The complaint has been persistent, moderate in severity, and worsened by movements of his extremities. Patient states that over the past month he has been having worsening tremors to his hands, less so to his lower extremities. He states that he feels it is due to his Haldol. He states that several months ago he stopped taking the Haldol and the tremors went away. However he was placed back on Haldol and the tremors are now reoccurring. Patient states it is difficult for him to hold things, drink from a cup or eat. He states that the tremor worsens with activities like this. He denies any headaches, no recent head trauma. He denies any worsening tremor activity at rest.  Patient states that he always hears voices, but denies any voices that are telling him to harm himself or others. Denies any suicidal ideation, no homicidal ideation. Review of Systems:   Pertinent positives and negatives are stated within HPI, all other systems reviewed and are negative.        --------------------------------------------- PAST HISTORY ---------------------------------------------  Past Medical History:  has a past medical history of ADD (attention deficit disorder), Anxiety, Bipolar 1 disorder ([facility] [address] Cancer ([facility] [address] Crohn's disease ([facility] [address] Depression, GERD (gastroesophageal reflux disease), Panic attack, Rectal pain, and Schizo affective schizophrenia ([facility] [address] Past Surgical History:  has a past surgical history that includes Colonoscopy; Nose surgery (2002?);  Prostatectomy hallucinations at baseline, no homicidal or suicidal ideations          -------------------------------------------------- RESULTS -------------------------------------------------  I have personally reviewed all laboratory and imaging results for this patient. Results are listed below.      LABS: (Lab results interpreted by me)  Results for orders placed or performed during the hospital encounter of 04/13/20   Comprehensive Metabolic Panel   Result Value Ref Range    Sodium 135 132 - 146 mmol/L    Potassium 3.9 3.5 - 5.0 mmol/L    Chloride 94 (L) 98 - 107 mmol/L    CO2 24 22 - 29 mmol/L    Anion Gap 17 (H) 7 - 16 mmol/L    Glucose 121 (H) 74 - 99 mg/dL    BUN 21 (H) 6 - 20 mg/dL    CREATININE 0.9 0.7 - 1.2 mg/dL    GFR Non-African American >60 >=60 mL/min/1.73    GFR African American >60     Calcium 9.0 8.6 - 10.2 mg/dL    Total Protein 6.2 (L) 6.4 - 8.3 g/dL    Alb 3.5 3.5 - 5.2 g/dL    Total Bilirubin 0.6 0.0 - 1.2 mg/dL    Alkaline Phosphatase 72 40 - 129 U/L    ALT <5 0 - 40 U/L    AST 7 0 - 39 U/L   CBC Auto Differential   Result Value Ref Range    WBC 14.1 (H) 4.5 - 11.5 E9/L    RBC 4.25 3.80 - 5.80 E12/L    Hemoglobin 11.2 (L) 12.5 - 16.5 g/dL    Hematocrit 35.1 (L) 37.0 - 54.0 %    MCV 82.6 80.0 - 99.9 fL    MCH 26.4 26.0 - 35.0 pg    MCHC 31.9 (L) 32.0 - 34.5 %    RDW 20.8 (H) 11.5 - 15.0 fL    Platelets 015 792 - 008 E9/L    MPV 9.1 7.0 - 12.0 fL    Neutrophils % 96.5 (H) 43.0 - 80.0 %    Lymphocytes % 1.8 (L) 20.0 - 42.0 %    Monocytes % 1.8 (L) 2.0 - 12.0 %    Eosinophils % 0.0 0.0 - 6.0 %    Basophils % 0.1 0.0 - 2.0 %    Neutrophils Absolute 13.68 (H) 1.80 - 7.30 E9/L    Lymphocytes Absolute 0.28 (L) 1.50 - 4.00 E9/L    Monocytes Absolute 0.28 0.10 - 0.95 E9/L    Eosinophils Absolute 0.00 (L) 0.05 - 0.50 E9/L    Basophils Absolute 0.00 0.00 - 0.20 E9/L    Polychromasia 1+     Hypochromia 1+     Poikilocytes 1+     Norton Cells 1+     Ovalocytes 1+    Serum Drug Screen   Result Value Ref Range    Ethanol Lvl <10 mg/dL    Acetaminophen Level <5.0 (L) 10.0 - 91.6 mcg/mL    Salicylate, Serum <9.0 0.0 - 30.0 mg/dL    TCA Scrn NEGATIVE Cutoff:300 ng/mL   Urine Drug Screen   Result Value Ref Range    Amphetamine Screen, Urine NOT DETECTED Negative <1000 ng/mL    Barbiturate Screen, Ur NOT DETECTED Negative < 200 ng/mL    Benzodiazepine Screen, Urine NOT DETECTED Negative < 200 ng/mL    Cannabinoid Scrn, Ur NOT DETECTED Negative < 50ng/mL    Cocaine Metabolite Screen, Urine NOT DETECTED Negative < 300 ng/mL    Opiate Scrn, Ur NOT DETECTED Negative < 300ng/mL    PCP Screen, Urine NOT DETECTED Negative < 25 ng/mL    Methadone Screen, Urine NOT DETECTED Negative <300 ng/mL    Oxycodone Urine POSITIVE (A) Negative <100 ng/mL    FENTANYL SCREEN, URINE POSITIVE (A) Negative <1 ng/mL    Drug Screen Comment: see below    ,       RADIOLOGY:  Interpreted by Radiologist unless otherwise specified  No orders to display           ------------------------- NURSING NOTES AND VITALS REVIEWED ---------------------------   The nursing notes within the ED encounter and vital signs as below have been reviewed by myself  /82   Pulse 106   Temp 97.4 °F (36.3 °C) (Temporal)   Resp 17   SpO2 95%     Oxygen Saturation Interpretation: Normal    The patients available past medical records and past encounters were reviewed. ------------------------------ ED COURSE/MEDICAL DECISION MAKING----------------------  Medications - No data to display            Medical Decision Making:     I, Dr. Florencio Hutchison, am the primary provider of record    80-year-old male presenting with tremors. The tremor appears to be an essential tremor that worsens with directed movement and tasks. Concerned that this may be related to his antipsychotics, likely Haldol. He is otherwise well-appearing. He has no suicidal or homicidal ideations. He states that his auditory hallucinations are normal and at baseline.   He otherwise looks well, hemodynamically

## 2020-04-14 PROBLEM — F11.10 OPIATE ABUSE, EPISODIC (HCC): Status: ACTIVE | Noted: 2020-04-14

## 2020-04-14 LAB
EKG ATRIAL RATE: 94 BPM
EKG P AXIS: 45 DEGREES
EKG P-R INTERVAL: 146 MS
EKG Q-T INTERVAL: 352 MS
EKG QRS DURATION: 74 MS
EKG QTC CALCULATION (BAZETT): 440 MS
EKG R AXIS: 45 DEGREES
EKG T AXIS: 52 DEGREES
EKG VENTRICULAR RATE: 94 BPM

## 2020-04-14 PROCEDURE — 6370000000 HC RX 637 (ALT 250 FOR IP): Performed by: PSYCHIATRY & NEUROLOGY

## 2020-04-14 PROCEDURE — 6370000000 HC RX 637 (ALT 250 FOR IP): Performed by: NURSE PRACTITIONER

## 2020-04-14 PROCEDURE — 99222 1ST HOSP IP/OBS MODERATE 55: CPT | Performed by: NURSE PRACTITIONER

## 2020-04-14 PROCEDURE — 1240000000 HC EMOTIONAL WELLNESS R&B

## 2020-04-14 PROCEDURE — 93010 ELECTROCARDIOGRAM REPORT: CPT | Performed by: INTERNAL MEDICINE

## 2020-04-14 RX ORDER — DIVALPROEX SODIUM 250 MG/1
250 TABLET, DELAYED RELEASE ORAL EVERY 12 HOURS SCHEDULED
Status: DISCONTINUED | OUTPATIENT
Start: 2020-04-14 | End: 2020-04-14

## 2020-04-14 RX ORDER — METRONIDAZOLE 500 MG/1
500 TABLET ORAL EVERY 8 HOURS SCHEDULED
Status: COMPLETED | OUTPATIENT
Start: 2020-04-14 | End: 2020-04-15

## 2020-04-14 RX ORDER — LANOLIN ALCOHOL/MO/W.PET/CERES
9 CREAM (GRAM) TOPICAL NIGHTLY PRN
COMMUNITY
End: 2020-07-14 | Stop reason: ALTCHOICE

## 2020-04-14 RX ORDER — TRIFLUOPERAZINE HYDROCHLORIDE 2 MG/1
1 TABLET, FILM COATED ORAL 2 TIMES DAILY
Status: DISCONTINUED | OUTPATIENT
Start: 2020-04-14 | End: 2020-04-17

## 2020-04-14 RX ORDER — PREDNISONE 20 MG/1
20 TABLET ORAL DAILY
Status: DISCONTINUED | OUTPATIENT
Start: 2020-04-14 | End: 2020-04-24 | Stop reason: HOSPADM

## 2020-04-14 RX ORDER — AMLODIPINE BESYLATE 10 MG/1
10 TABLET ORAL DAILY
Status: DISCONTINUED | OUTPATIENT
Start: 2020-04-14 | End: 2020-04-24 | Stop reason: HOSPADM

## 2020-04-14 RX ORDER — BENZTROPINE MESYLATE 1 MG/1
1 TABLET ORAL 2 TIMES DAILY
Status: DISCONTINUED | OUTPATIENT
Start: 2020-04-14 | End: 2020-04-17

## 2020-04-14 RX ORDER — ATORVASTATIN CALCIUM 10 MG/1
10 TABLET, FILM COATED ORAL DAILY
Status: DISCONTINUED | OUTPATIENT
Start: 2020-04-14 | End: 2020-04-24 | Stop reason: HOSPADM

## 2020-04-14 RX ORDER — DIVALPROEX SODIUM 500 MG/1
500 TABLET, DELAYED RELEASE ORAL EVERY 12 HOURS SCHEDULED
Status: DISCONTINUED | OUTPATIENT
Start: 2020-04-14 | End: 2020-04-24 | Stop reason: HOSPADM

## 2020-04-14 RX ADMIN — PALIPERIDONE 3 MG: 3 TABLET, EXTENDED RELEASE ORAL at 08:55

## 2020-04-14 RX ADMIN — TRAZODONE HYDROCHLORIDE 50 MG: 50 TABLET ORAL at 21:31

## 2020-04-14 RX ADMIN — TRIFLUOPERAZINE HYDROCHLORIDE 1 MG: 2 TABLET, FILM COATED ORAL at 21:33

## 2020-04-14 RX ADMIN — DIVALPROEX SODIUM 500 MG: 250 TABLET, DELAYED RELEASE ORAL at 00:15

## 2020-04-14 RX ADMIN — DIVALPROEX SODIUM 250 MG: 250 TABLET, DELAYED RELEASE ORAL at 10:46

## 2020-04-14 RX ADMIN — TRAZODONE HYDROCHLORIDE 50 MG: 50 TABLET ORAL at 00:15

## 2020-04-14 RX ADMIN — AMLODIPINE BESYLATE 10 MG: 10 TABLET ORAL at 21:32

## 2020-04-14 RX ADMIN — BENZTROPINE MESYLATE 1 MG: 1 TABLET ORAL at 10:47

## 2020-04-14 RX ADMIN — DIVALPROEX SODIUM 500 MG: 500 TABLET, DELAYED RELEASE ORAL at 21:31

## 2020-04-14 RX ADMIN — PREDNISONE 20 MG: 20 TABLET ORAL at 21:31

## 2020-04-14 RX ADMIN — TRIFLUOPERAZINE HYDROCHLORIDE 1 MG: 2 TABLET, FILM COATED ORAL at 10:46

## 2020-04-14 RX ADMIN — BENZTROPINE MESYLATE 1 MG: 1 TABLET ORAL at 21:31

## 2020-04-14 RX ADMIN — METRONIDAZOLE 500 MG: 500 TABLET, FILM COATED ORAL at 21:31

## 2020-04-14 RX ADMIN — ATORVASTATIN CALCIUM 10 MG: 10 TABLET, FILM COATED ORAL at 21:32

## 2020-04-14 ASSESSMENT — PAIN SCALES - GENERAL: PAINLEVEL_OUTOF10: 0

## 2020-04-14 ASSESSMENT — PATIENT HEALTH QUESTIONNAIRE - PHQ9: SUM OF ALL RESPONSES TO PHQ QUESTIONS 1-9: 25

## 2020-04-14 ASSESSMENT — LIFESTYLE VARIABLES: HISTORY_ALCOHOL_USE: NO

## 2020-04-14 NOTE — GROUP NOTE
Group Therapy Note    Date: 4/14/2020    Group Start Time: 1120  Group End Time: 1200  Group Topic: Cognitive Skills    [facility]    Ly BATSHEVA Oscar        Group Therapy Note    Attendees: 16         Patient's Goal: Pt will discuss what they were taught about anger as children. Pt will be able to identify benefits of expressing a range of emotions. Notes:  PT was active in group discussion and paricipated in game where they expressed a feeling in words. Status After Intervention:  Improved    Participation Level:  Active Listener and Interactive    Participation Quality: Appropriate, Attentive, Sharing and Supportive      Speech:  normal      Thought Process/Content: Logical      Affective Functioning: Blunted      Mood: depressed      Level of consciousness:  Alert, Oriented x4 and Attentive      Response to Learning: Able to verbalize current knowledge/experience, Able to verbalize/acknowledge new learning and Progressing to goal      Endings: None Reported    Modes of Intervention: Education, Support, Socialization, Exploration and Activity      Discipline Responsible:       Signature:  BATSHEVA Bonds
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
## 2020-04-14 NOTE — PROGRESS NOTES
Nutrition Assessment    Type and Reason for Visit: Initial, Positive Nutrition Screen    Nutrition Recommendations: Continue current diet, Start Ensure TID    Nutrition Assessment: Pt is nutritionally at risk w/ noted decreased PO intakes and subjective weight loss PTA 2/2 psychological cause. Will add Ensure TID to optimize PO intakes and monitor. Malnutrition Assessment:  · Malnutrition Status: At risk for malnutrition  · Context: Chronic illness  · Findings of the 6 clinical characteristics of malnutrition (Minimum of 2 out of 6 clinical characteristics is required to make the diagnosis of moderate or severe Protein Calorie Malnutrition based on AND/ASPEN Guidelines):  1. Energy Intake-Less than or equal to 75% of estimated energy requirement, Greater than or equal to 3 months    2. Weight Loss-Unable to assess,    3. Fat Loss-Unable to assess,    4. Muscle Loss-Unable to assess,    5. Fluid Accumulation-No significant fluid accumulation,    6.  Strength-Not measured    Nutrition Risk Level:  Moderate    Nutrient Needs:  · Estimated Daily Total Kcal: 1637-8611(MSJ 1693 x 1.2 SF)  · Estimated Daily Protein (g): (1.2-1.4 )  · Estimated Daily Total Fluid (ml/day): 1039-6716    Nutrition Diagnosis:   · Problem: Inadequate oral intake  · Etiology: related to Psychological cause/life stress     Signs and symptoms:  as evidenced by Diet history of poor intake    Objective Information:  · Nutrition-Focused Physical Findings: pt alert, abd tenderness, soft abd, no noted edema  · Wound Type: None  · Current Nutrition Therapies:  · Oral Diet Orders: General   · Oral Diet intake: Unable to assess(no intakes documented at this time, however poor PO PTA)  · Oral Nutrition Supplement (ONS) Orders: None  · Anthropometric Measures:  · Ht: 6' 3\" (190.5 cm)   · Current Body Wt: 167 lb (75.8 kg)(4/14 no method, UTO updated wt at this time)  · Admission Body Wt: 179 lb (81.2 kg)(4/2/20 bed scale, per EMR)  · Usual Body Wt: 250 lb (113.4 kg)(5/2019 bed scale, per EMR)  · % Weight Change: pt reports 15# wt loss x 1 month, however noted possible 28% wt loss x past 11 months.  unable to properly assess wts at this time d/t UTO updated wt currently  · Ideal Body Wt: 196 lb (88.9 kg), % Ideal Body 85%  · BMI Classification: BMI 18.5 - 24.9 Normal Weight    Nutrition Interventions:   Continue current diet, Start ONS(Ensure TID)  Continued Inpatient Monitoring, Education not appropriate at this time    Nutrition Evaluation:   · Evaluation: Goals set   · Goals: pt to consume >75% meals/ONS    · Monitoring: Meal Intake, Supplement Intake, Diet Tolerance, Skin Integrity, I&O, Mental Status/Confusion, Weight, Pertinent Labs, Monitor Bowel Function      Electronically signed by Jennifer Lanier, MS, RD, LD on 4/14/20 at 11:32 AM EDT    Contact Number: [phone]
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
## 2020-04-15 PROCEDURE — 99232 SBSQ HOSP IP/OBS MODERATE 35: CPT | Performed by: NURSE PRACTITIONER

## 2020-04-15 PROCEDURE — 6370000000 HC RX 637 (ALT 250 FOR IP): Performed by: NURSE PRACTITIONER

## 2020-04-15 PROCEDURE — 1240000000 HC EMOTIONAL WELLNESS R&B

## 2020-04-15 PROCEDURE — 6370000000 HC RX 637 (ALT 250 FOR IP): Performed by: PSYCHIATRY & NEUROLOGY

## 2020-04-15 RX ORDER — OLANZAPINE 5 MG/1
10 TABLET, ORALLY DISINTEGRATING ORAL ONCE
Status: COMPLETED | OUTPATIENT
Start: 2020-04-15 | End: 2020-04-15

## 2020-04-15 RX ADMIN — BENZTROPINE MESYLATE 1 MG: 1 TABLET ORAL at 09:48

## 2020-04-15 RX ADMIN — TRIFLUOPERAZINE HYDROCHLORIDE 1 MG: 2 TABLET, FILM COATED ORAL at 20:35

## 2020-04-15 RX ADMIN — DIVALPROEX SODIUM 500 MG: 500 TABLET, DELAYED RELEASE ORAL at 09:48

## 2020-04-15 RX ADMIN — BENZTROPINE MESYLATE 1 MG: 1 TABLET ORAL at 20:35

## 2020-04-15 RX ADMIN — OLANZAPINE 10 MG: 5 TABLET, ORALLY DISINTEGRATING ORAL at 02:59

## 2020-04-15 RX ADMIN — ATORVASTATIN CALCIUM 10 MG: 10 TABLET, FILM COATED ORAL at 09:48

## 2020-04-15 RX ADMIN — PREDNISONE 20 MG: 20 TABLET ORAL at 09:48

## 2020-04-15 RX ADMIN — AMLODIPINE BESYLATE 10 MG: 10 TABLET ORAL at 09:48

## 2020-04-15 RX ADMIN — METRONIDAZOLE 500 MG: 500 TABLET, FILM COATED ORAL at 06:49

## 2020-04-15 RX ADMIN — DIVALPROEX SODIUM 500 MG: 500 TABLET, DELAYED RELEASE ORAL at 20:35

## 2020-04-15 RX ADMIN — TRIFLUOPERAZINE HYDROCHLORIDE 1 MG: 2 TABLET, FILM COATED ORAL at 09:48

## 2020-04-15 RX ADMIN — METRONIDAZOLE 500 MG: 500 TABLET, FILM COATED ORAL at 15:47

## 2020-04-15 RX ADMIN — TRAZODONE HYDROCHLORIDE 50 MG: 50 TABLET ORAL at 20:35

## 2020-04-15 NOTE — PROGRESS NOTES
Patient given Zyprexa at this time . Patient is actively hallucinating talking to unseen others , thinking that staff is calling his name. Patient states that he has been up for 4 days and thinks that is why he cannot sleep \" I'm just gonna sit out here and read my book oh is this for my stomach no wait that's not what that is for. I think that me taking Cipro caused me to have insomnia . You know what never mind I don't what is going on. Do you know that I smoke 4 packs of cigarettes a day , Im glad I can't in here because I don't like to stink.  That's one good thing about not smoking\"

## 2020-04-15 NOTE — PROGRESS NOTES
change      Diagnosis:   Active Problems:    Schizoaffective disorder, bipolar type (HCC)    Opiate abuse, episodic (HCC)  Resolved Problems:    * No resolved hospital problems. *      LABS:    Recent Labs     04/13/20  1155   WBC 14.1*   HGB 11.2*        Recent Labs     04/13/20  1155      K 3.9   CL 94*   CO2 24   BUN 21*   CREATININE 0.9   GLUCOSE 121*     Recent Labs     04/13/20  1155   BILITOT 0.6   ALKPHOS 72   AST 7   ALT <5     Lab Results   Component Value Date    LABAMPH NOT DETECTED 04/13/2020    LABAMPH NOT DETECTED 02/03/2011    BARBSCNU NOT DETECTED 04/13/2020    LABBENZ NOT DETECTED 04/13/2020    LABBENZ SEE BELOW 12/08/2014    CANNAB NOT DETECTED  08/13/2012    COCAINESCRN NOT DETECTED 08/13/2012    LABMETH NOT DETECTED 04/13/2020    OPIATESCREENURINE NOT DETECTED 04/13/2020    PHENCYCLIDINESCREENURINE NOT DETECTED 04/13/2020    PPXUR NOT DETECTED 08/13/2012    ETOH <10 04/13/2020     Lab Results   Component Value Date    TSH 4.710 10/03/2018     Lab Results   Component Value Date    LITHIUM <0.10 (L) 10/24/2018     Lab Results   Component Value Date    VALPROATE 54 04/13/2020     Treatment Plan:  Reviewed current Medications with the patient. Risks, benefits, side effects, drug-to-drug interactions and alternatives to treatment were discussed. Collateral information:   CD evaluation  Encourage patient to attend group and other milieu activities.   Discharge planning discussed with the patient and treatment team.    Continue Stelazine 1 mg twice daily for psychosis  Continue Cogentin 1 mg twice daily for side effects  Continue Depakote 500 mg twice daily for mood stabilization    PSYCHOTHERAPY/COUNSELING:  [x] Therapeutic interview  [x] Supportive  [] CBT  [] Ongoing  [] Other    [x] Patient continues to need, on a daily basis, active treatment furnished directly by or requiring the supervision of inpatient psychiatric personnel      Anticipated Length of stay: 5-7

## 2020-04-15 NOTE — GROUP NOTE
Date: 4/15/2020    Group Start Time: 1015  Group End Time: 1100  Group Topic: Psychoeducation    [facility]  1    [place], [place]                                                                        Group Therapy Note    Date: 4/15/2020  Type of Group: Psychoeducation    Wellness Binder Information  Module Name:  my safety plan   Session Number:  na    Patient's Goal: patient will be able to id demarco factors to developing a positive safety plan once pt leaves hospital.     Notes: patient pleasant and engaged in group able to share when prompted. Status After Intervention:  Improved    Participation Level:  Active Listener and Interactive    Participation Quality: Appropriate, Attentive, Sharing, and Supportive      Speech:  normal       Thought Process/Content: Logical      Affective Functioning: Congruent      Mood: euthymic      Level of consciousness:  Alert, Oriented x4, and Attentive      Response to Learning: Able to verbalize/acknowledge new learning, Able to retain information, and Progressing to goal      Endings: None Reported    Modes of Intervention: Education, Support, Socialization, Exploration, and Problem-solving      Discipline Responsible: Psychoeducational Specialist      Signature:  General Moots

## 2020-04-15 NOTE — PROGRESS NOTES
Group Therapy Note     Date: 4/15/2020     Group Start Time: 1115  Group End Time: 1150  Group Topic: Cognitive Skills     [facility]    SUSHANT Rangel            Group Therapy Note     Attendees: 12           Patient's Goal: Pt will discuss what the various types of communication styles there are, and how they can enhance their communication skills.     Notes:  PT was active in group discussion     Status After Intervention:  Improved     Participation Level:  Active Listener and Interactive     Participation Quality: Appropriate, Attentive, Sharing and Supportive        Speech:  normal        Thought Process/Content: Logical        Affective Functioning: Blunted        Mood: depressed        Level of consciousness:  Alert, Oriented x4 and Attentive        Response to Learning: Able to verbalize current knowledge/experience, Able to verbalize/acknowledge new learning and Progressing to goal        Endings: None Reported     Modes of Intervention: Education, Support, Socialization, and Exploration         Discipline Responsible:         Signature:  SUSHANT Rangel

## 2020-04-16 PROCEDURE — 6370000000 HC RX 637 (ALT 250 FOR IP): Performed by: PSYCHIATRY & NEUROLOGY

## 2020-04-16 PROCEDURE — 6370000000 HC RX 637 (ALT 250 FOR IP): Performed by: NURSE PRACTITIONER

## 2020-04-16 PROCEDURE — 1240000000 HC EMOTIONAL WELLNESS R&B

## 2020-04-16 PROCEDURE — 99232 SBSQ HOSP IP/OBS MODERATE 35: CPT | Performed by: NURSE PRACTITIONER

## 2020-04-16 RX ADMIN — BENZTROPINE MESYLATE 1 MG: 1 TABLET ORAL at 08:48

## 2020-04-16 RX ADMIN — TRIFLUOPERAZINE HYDROCHLORIDE 1 MG: 2 TABLET, FILM COATED ORAL at 21:00

## 2020-04-16 RX ADMIN — PREDNISONE 20 MG: 20 TABLET ORAL at 08:46

## 2020-04-16 RX ADMIN — AMLODIPINE BESYLATE 10 MG: 10 TABLET ORAL at 08:46

## 2020-04-16 RX ADMIN — BENZTROPINE MESYLATE 1 MG: 1 TABLET ORAL at 20:58

## 2020-04-16 RX ADMIN — HYDROXYZINE PAMOATE 50 MG: 50 CAPSULE ORAL at 16:38

## 2020-04-16 RX ADMIN — ATORVASTATIN CALCIUM 10 MG: 10 TABLET, FILM COATED ORAL at 08:46

## 2020-04-16 RX ADMIN — TRIFLUOPERAZINE HYDROCHLORIDE 1 MG: 2 TABLET, FILM COATED ORAL at 08:44

## 2020-04-16 RX ADMIN — DIVALPROEX SODIUM 500 MG: 500 TABLET, DELAYED RELEASE ORAL at 20:59

## 2020-04-16 RX ADMIN — TRAZODONE HYDROCHLORIDE 50 MG: 50 TABLET ORAL at 21:00

## 2020-04-16 RX ADMIN — DIVALPROEX SODIUM 500 MG: 500 TABLET, DELAYED RELEASE ORAL at 08:46

## 2020-04-16 NOTE — BH NOTE
Processes: Circumstantial  Thought Content:Normal: No  Thought Content: Paranoia  Hallucinations: Auditory (Comment)(commands to hurt others and self. )  Delusions: No  Delusions: Other(See Comment)  Memory:Normal: Yes  Insight and Judgment: No  Insight and Judgment: Poor Insight, Poor Judgment  Present Suicidal Ideation: No  Present Homicidal Ideation: No    Daily Assessment Last Entry:   Daily Sleep (WDL): Within Defined Limits         Patient Currently in Pain: Other (comment)(Resting in bed apparently asleep)  Daily Nutrition (WDL): Within Defined Limits    Patient Monitoring:  Frequency of Checks: 4 times per hour, close    Psychiatric Symptoms:   Depression Symptoms  Depression Symptoms: Feelings of helplessness, Isolative  Anxiety Symptoms  Anxiety Symptoms: Generalized  Hortencia Symptoms  Hortencia Symptoms: No problems reported or observed. Psychosis Symptoms  Delusion Type: No problems reported or observed. Suicide Risk CSSR-S:  1) Within the past month, have you wished you were dead or wished you could go to sleep and not wake up? : Yes  2) Have you actually had any thoughts of killing yourself? : No  6) Have you ever done anything, started to do anything, or prepared to do anything to end your life?: Yes  Change in Result: yes. Change in Plan of care: pt reports hallucinations, denies suicidal / homicidal ideations. Continue to assess pt needs and coordinate as required. EDUCATION:   Learner Progress Toward Treatment Goals: Reviewed results and recommendations of this team    Method: Small group    Outcome: Verbalized understanding    PATIENT GOALS: \"To not eat junk food\" pr pt. PLAN/TREATMENT RECOMMENDATIONS UPDATE: Continue course and adjust as needed. GOALS UPDATE:   Time frame for Short-Term Goals: Daily re assessment.        Patricia Richter RN

## 2020-04-16 NOTE — PROGRESS NOTES
Gait - steady  Medication side effects(SE): Denies    Mental Status Examination:    Level of consciousness:  within normal limits   Appearance:  fair grooming and fair hygiene  Behavior/Motor:  no abnormalities noted  Attitude toward examiner:  cooperative  Speech:  spontaneous, normal rate and normal volume   Mood: euthymic  Affect:  mood congruent  Thought processes:  linear   Thought content: Reports paranoia and auditory hallucinations  Cognition:  oriented to person, place, and time   Concentration distractible  Insight fair   Judgement fair     ASSESSMENT:   Patient symptoms are:  [] Well controlled  [] Improving  [] Worsening  [x] No change      Diagnosis:   Active Problems:    Schizoaffective disorder, bipolar type ([facility]    [facility]  Resolved Problems:    * No resolved hospital problems. *      LABS:    No results for input(s): WBC, HGB, PLT in the last 72 hours. No results for input(s): NA, K, CL, CO2, BUN, CREATININE, GLUCOSE in the last 72 hours. No results for input(s): BILITOT, ALKPHOS, AST, ALT in the last 72 hours. Lab Results   Component Value Date    LABAMPH NOT DETECTED 04/13/2020    LABAMPH NOT DETECTED 02/03/2011    BARBSCNU NOT DETECTED 04/13/2020    LABBENZ NOT DETECTED 04/13/2020    LABBENZ SEE BELOW 12/08/2014    CANNAB NOT DETECTED  08/13/2012    COCAINESCRN NOT DETECTED 08/13/2012    LABMETH NOT DETECTED 04/13/2020    OPIATESCREENURINE NOT DETECTED 04/13/2020    PHENCYCLIDINESCREENURINE NOT DETECTED 04/13/2020    PPXUR NOT DETECTED 08/13/2012    ETOH <10 04/13/2020     Lab Results   Component Value Date    TSH 4.710 10/03/2018     Lab Results   Component Value Date    LITHIUM <0.10 (L) 10/24/2018     Lab Results   Component Value Date    VALPROATE 54 04/13/2020     Treatment Plan:  Reviewed current Medications with the patient. Risks, benefits, side effects, drug-to-drug interactions and alternatives to treatment were discussed.   Collateral information:   TONI

## 2020-04-16 NOTE — GROUP NOTE
Group Therapy Note    Date: 4/15/2020    Group Start Time: 1945  Group End Time: 2015  Group Topic: Healthy Living/Wellness    [facility]  [id number], RN        Group Therapy Note    Attendees: 15

## 2020-04-17 PROCEDURE — 6370000000 HC RX 637 (ALT 250 FOR IP): Performed by: NURSE PRACTITIONER

## 2020-04-17 PROCEDURE — 6370000000 HC RX 637 (ALT 250 FOR IP): Performed by: PSYCHIATRY & NEUROLOGY

## 2020-04-17 PROCEDURE — 99232 SBSQ HOSP IP/OBS MODERATE 35: CPT | Performed by: NURSE PRACTITIONER

## 2020-04-17 PROCEDURE — 1240000000 HC EMOTIONAL WELLNESS R&B

## 2020-04-17 RX ORDER — TRIFLUOPERAZINE HYDROCHLORIDE 2 MG/1
2 TABLET, FILM COATED ORAL 2 TIMES DAILY
Status: DISCONTINUED | OUTPATIENT
Start: 2020-04-17 | End: 2020-04-19

## 2020-04-17 RX ORDER — BENZTROPINE MESYLATE 2 MG/1
2 TABLET ORAL 2 TIMES DAILY
Status: DISCONTINUED | OUTPATIENT
Start: 2020-04-17 | End: 2020-04-24 | Stop reason: HOSPADM

## 2020-04-17 RX ADMIN — PREDNISONE 20 MG: 20 TABLET ORAL at 09:19

## 2020-04-17 RX ADMIN — TRAZODONE HYDROCHLORIDE 50 MG: 50 TABLET ORAL at 21:03

## 2020-04-17 RX ADMIN — ATORVASTATIN CALCIUM 10 MG: 10 TABLET, FILM COATED ORAL at 09:19

## 2020-04-17 RX ADMIN — DIVALPROEX SODIUM 500 MG: 500 TABLET, DELAYED RELEASE ORAL at 21:03

## 2020-04-17 RX ADMIN — BENZTROPINE MESYLATE 2 MG: 2 TABLET ORAL at 21:03

## 2020-04-17 RX ADMIN — TRIFLUOPERAZINE HYDROCHLORIDE 1 MG: 2 TABLET, FILM COATED ORAL at 09:19

## 2020-04-17 RX ADMIN — HYDROXYZINE PAMOATE 50 MG: 50 CAPSULE ORAL at 21:03

## 2020-04-17 RX ADMIN — AMLODIPINE BESYLATE 10 MG: 10 TABLET ORAL at 09:19

## 2020-04-17 RX ADMIN — DIVALPROEX SODIUM 500 MG: 500 TABLET, DELAYED RELEASE ORAL at 09:19

## 2020-04-17 RX ADMIN — TRIFLUOPERAZINE HYDROCHLORIDE 2 MG: 2 TABLET, FILM COATED ORAL at 21:55

## 2020-04-17 RX ADMIN — BENZTROPINE MESYLATE 1 MG: 1 TABLET ORAL at 09:19

## 2020-04-17 ASSESSMENT — PAIN SCALES - GENERAL
PAINLEVEL_OUTOF10: 0

## 2020-04-17 NOTE — PROGRESS NOTES
BEHAVIORAL HEALTH FOLLOW-UP NOTE     4/17/2020     Patient was seen and examined in person, Chart reviewed   Patient's case discussed with staff/team    Chief Complaint: The audible hallucinations have decreased  Interim History: The patient had a reported of 8 hours of sleep last night. The patient is taking his medications and going to group. I informed the patient that we increased his Stelazine and he was okay with this decision. The patient denies suicidal ideation but states that he is depressed. The patient states that he is shaking due to long-term Haldol use and wants to isolate himself because of the embarrassment. The patient endorses voices but states that they are they are in his head. He states that the voices are telling him to hurt himself.   Appetite:   [x] Normal/Unchanged  [] Increased  [] Decreased      Sleep:       [x] Normal/Unchanged  [] Fair       [] Poor              Energy:    [x] Normal/Unchanged  [] Increased  [] Decreased        SI [] Present  [x] Absent    HI  []Present  [x] Absent     Aggression:  [] yes  [x] no    Patient is [x] able  [] unable to CONTRACT FOR SAFETY     PAST MEDICAL/PSYCHIATRIC HISTORY:   Past Medical History:   Diagnosis Date    ADD (attention deficit disorder)     Anxiety     [facility]    [facility] prostate cancer    2014 / treated with surgery    [facility] ([facility]     Depression     GERD (gastroesophageal reflux disease)     Panic attack     Rectal pain     has a fissure    Schizo affective schizophrenia ([facility]     stable       FAMILY/SOCIAL HISTORY:  Family History   Problem Relation Age of Onset    Mental Illness Mother     Mental Illness Father     Mental Illness Sister     Mental Illness Brother     Heart Disease Mother     Depression Father      Social History     Socioeconomic History    Marital status:      Spouse name: ZENAIDA    Number of children: 0    Years of education: 12 +8    Highest to need, on a daily basis, active treatment furnished directly by or requiring the supervision of inpatient psychiatric personnel      Anticipated Length of stay: 5-7 days            Electronically signed by KAM Cao CNP on 4/17/2020 at 4:00 PM

## 2020-04-17 NOTE — GROUP NOTE
Group Therapy Note    Date: 4/17/2020    Group Start Time: 1115  Group End Time: 4229  Group Topic: Psychotherapy    [facility]    FABRIZIO Jacome, SUSHANT        Group Therapy Note    Attendees: 13         Patient's Goal:  Working through trauma       Notes:  Patient gained knowledge through group     Status After Intervention:  Improved    Participation Level:  Active Listener    Participation Quality: Appropriate and Attentive      Speech:  normal      Thought Process/Content: Logical      Affective Functioning: Congruent      Mood: euthymic      Level of consciousness:  Alert and Attentive      Response to Learning: Able to verbalize current knowledge/experience, Able to verbalize/acknowledge new learning, Able to retain information, Capable of insight, Able to change behavior and Progressing to goal      Endings: None Reported    Modes of Intervention: Education and Support      Discipline Responsible: /Counselor      Signature:  FABRIZIO Jacome, SUSHANT

## 2020-04-17 NOTE — PROGRESS NOTES
Karsten Felder denies any SI, HI, but states has auditory hallucinations. Patient states he hears voices to hurt others in his past. Patient also states he hears people talking and has conversations with them at night. Patient stated he hears music in his left ear all the time that keeps him awake at night. Karsten Felder stated depression 8/10 and anxiety 10/10 related to his OCD. Karsten Felder is calm and cooperative during assessment. Patient is taking his meds but states does not need his nicotine patch. Groups are offered and encouraged. Will continue to monitor.

## 2020-04-18 LAB — METER GLUCOSE: 78 MG/DL (ref 74–99)

## 2020-04-18 PROCEDURE — 1240000000 HC EMOTIONAL WELLNESS R&B

## 2020-04-18 PROCEDURE — 6370000000 HC RX 637 (ALT 250 FOR IP): Performed by: NURSE PRACTITIONER

## 2020-04-18 PROCEDURE — 6370000000 HC RX 637 (ALT 250 FOR IP): Performed by: PSYCHIATRY & NEUROLOGY

## 2020-04-18 PROCEDURE — 99232 SBSQ HOSP IP/OBS MODERATE 35: CPT | Performed by: NURSE PRACTITIONER

## 2020-04-18 PROCEDURE — 82962 GLUCOSE BLOOD TEST: CPT

## 2020-04-18 RX ADMIN — BENZTROPINE MESYLATE 2 MG: 2 TABLET ORAL at 09:21

## 2020-04-18 RX ADMIN — AMLODIPINE BESYLATE 10 MG: 10 TABLET ORAL at 09:21

## 2020-04-18 RX ADMIN — ATORVASTATIN CALCIUM 10 MG: 10 TABLET, FILM COATED ORAL at 09:21

## 2020-04-18 RX ADMIN — DIVALPROEX SODIUM 500 MG: 500 TABLET, DELAYED RELEASE ORAL at 21:02

## 2020-04-18 RX ADMIN — TRAZODONE HYDROCHLORIDE 50 MG: 50 TABLET ORAL at 21:05

## 2020-04-18 RX ADMIN — PREDNISONE 20 MG: 20 TABLET ORAL at 09:21

## 2020-04-18 RX ADMIN — BENZTROPINE MESYLATE 2 MG: 2 TABLET ORAL at 21:02

## 2020-04-18 RX ADMIN — HYDROXYZINE PAMOATE 50 MG: 50 CAPSULE ORAL at 21:03

## 2020-04-18 RX ADMIN — TRIFLUOPERAZINE HYDROCHLORIDE 2 MG: 2 TABLET, FILM COATED ORAL at 21:01

## 2020-04-18 RX ADMIN — DIVALPROEX SODIUM 500 MG: 500 TABLET, DELAYED RELEASE ORAL at 09:21

## 2020-04-18 RX ADMIN — TRIFLUOPERAZINE HYDROCHLORIDE 2 MG: 2 TABLET, FILM COATED ORAL at 09:21

## 2020-04-18 ASSESSMENT — PAIN SCALES - GENERAL
PAINLEVEL_OUTOF10: 0
PAINLEVEL_OUTOF10: 0

## 2020-04-18 NOTE — GROUP NOTE
Group Therapy Note    Date: 4/18/2020    Group Start Time: 1115  Group End Time: 1200  Group Topic: Cognitive Skills    [facility]    BATSHEVA Wise        Group Therapy Note    Attendees: 14         Patient's Goal:  Pt will be able to identify negative coping skills that brought them into hospital and identify more positive coping skills they can utilize upon d/c. Maximo [place] Notes; Pt participated in class. Status After Intervention:  Improved    Participation Level:  Active Listener and Interactive    Participation Quality: Appropriate, Attentive, Sharing and Supportive      Speech:  normal      Thought Process/Content: Logical      Affective Functioning: Blunted      Mood: depressed      Level of consciousness:  Alert, Oriented x4 and Attentive      Response to Learning: Able to verbalize current knowledge/experience, Able to verbalize/acknowledge new learning and Progressing to goal      Endings: None Reported    Modes of Intervention: Education, Support, Socialization and Problem-solving      Discipline Responsible: /Counselor      Signature:  BATSHEVA Clemens

## 2020-04-18 NOTE — PROGRESS NOTES
BEHAVIORAL HEALTH FOLLOW-UP NOTE     4/18/2020     Patient was seen and examined in person, Chart reviewed   Patient's case discussed with staff/team    Chief Complaint: The audible hallucinations have decreased  Interim History: The patient had a reported of 8 hours of sleep last night. The patient is taking his medications and going to group. I informed the patient that we increased his Stelazine and he was okay with this decision. The patient denies suicidal ideation but states that he is depressed. The patient states that he is shaking due to long-term Haldol use and wants to isolate himself because of the embarrassment. The patient endorses voices but states that they are they are in his head. He states that the voices are telling him to hurt himself.   Appetite:   [x] Normal/Unchanged  [] Increased  [] Decreased      Sleep:       [x] Normal/Unchanged  [] Fair       [] Poor              Energy:    [x] Normal/Unchanged  [] Increased  [] Decreased        SI [] Present  [x] Absent    HI  []Present  [x] Absent     Aggression:  [] yes  [x] no    Patient is [x] able  [] unable to CONTRACT FOR SAFETY     PAST MEDICAL/PSYCHIATRIC HISTORY:   Past Medical History:   Diagnosis Date    ADD (attention deficit disorder)     Anxiety     [facility]    [facility] ([facility] prostate cancer    2014 / treated with surgery    Cro[facility]'s disease ([facility]     Depression     GERD (gastroesophageal reflux disease)     Panic attack     Rectal pain     has a fissure    Schizo affective schizophrenia ([facility]     stable       FAMILY/SOCIAL HISTORY:  Family History   Problem Relation Age of Onset    Mental Illness Mother     Mental Illness Father     Mental Illness Sister     Mental Illness Brother     Heart Disease Mother     Depression Father      Social History     Socioeconomic History    Marital status:      Spouse name: ZENAIDA    Number of children: 0    Years of education: 12 +8    Highest education level: Not on file   Occupational History    Occupation: [employer]     Employer: UNEMPLOYED     Comment: SSDI   Social Needs    Financial resource strain: Not on file    Food insecurity     Worry: Not on file     Inability: Not on file   [employer] needs     Medical: Not on file     Non-medical: Not on file   Tobacco Use    Smoking status: Heavy Tobacco Smoker     Packs/day: 2.00     Types: Cigarettes     Start date: 1/1/1978    Smokeless tobacco: Never Used    Tobacco comment: states quitting tomorrow 4/3/2020   Substance and Sexual Activity    Alcohol use: No     Comment: no alcohol in5 yrs    Drug use: No     Comment: last use 1997 / marijuana    Sexual activity: Not Currently     Partners: Female   Lifestyle    Physical activity     Days per week: Not on file     Minutes per session: Not on file    Stress: Not on file   Relationships    Social connections     Talks on phone: Not on file     Gets together: Not on file     Attends [employer] service: Not on file     Active member of club or organization: Not on file     Attends meetings of clubs or organizations: Not on file     Relationship status: Not on file    Intimate partner violence     Fear of current or ex partner: Not on file     Emotionally abused: Not on file     Physically abused: Not on file     Forced sexual activity: Not on file   Other Topics Concern    Not on file   Social History Narrative    ** Merged History Encounter **                ROS:  [x] All negative/unchanged except if checked.  Explain positive(checked items) below:  [] Constitutional  [] Eyes  [] Ear/Nose/Mouth/Throat  [] Respiratory  [] CV  [] GI  []   [] Musculoskeletal  [] Skin/Breast  [] Neurological  [] Endocrine  [] Heme/Lymph  [] Allergic/Immunologic    Explanation:     MEDICATIONS:    Current Facility-Administered Medications:     benztropine (COGENTIN) tablet 2 mg, 2 mg, Oral, BID, KAM Borjas CNP, 2 mg at 04/18/20 0921    trifluoperazine (STELAZINE) tablet 2 mg, 2 mg, Oral, BID, Bessy Casanovar, APRN - CNP, 2 mg at 04/18/20 4508    divalproex (DEPAKOTE) DR tablet 500 mg, 500 mg, Oral, 2 times per day, Gabbijulio Vargas, APRN - CNP, 525 mg at 04/18/20 1050    atorvastatin (LIPITOR) tablet 10 mg, 10 mg, Oral, Daily, Lianne Burciaga MD, 10 mg at 04/18/20 0921    amLODIPine (NORVASC) tablet 10 mg, 10 mg, Oral, Daily, Lianne Burciaga MD, 10 mg at 04/18/20 6638    predniSONE (DELTASONE) tablet 20 mg, 20 mg, Oral, Daily, Lianne Burciaga MD, 20 mg at 04/18/20 5570    acetaminophen (TYLENOL) tablet 650 mg, 650 mg, Oral, Q6H PRN, Robbin Ramon MD    hydrOXYzine (VISTARIL) capsule 50 mg, 50 mg, Oral, TID PRN, Robbin Ramon MD, 50 mg at 04/17/20 2103    haloperidol lactate (HALDOL) injection 5 mg, 5 mg, Intramuscular, Q6H PRN **OR** haloperidol (HALDOL) tablet 5 mg, 5 mg, Oral, Q6H PRN, Robbin Ramon MD    traZODone (DESYREL) tablet 50 mg, 50 mg, Oral, Nightly PRN, Robbin Ramon MD, 50 mg at 04/17/20 2103    magnesium hydroxide (MILK OF MAGNESIA) 400 MG/5ML suspension 30 mL, 30 mL, Oral, Daily PRN, Robbin Ramon MD    aluminum & magnesium hydroxide-simethicone (MAALOX) 200-200-20 MG/5ML suspension 30 mL, 30 mL, Oral, PRN, Robbin Ramon MD    nicotine (NICODERM CQ) 21 MG/24HR 1 patch, 1 patch, Transdermal, Daily, Robbin Ramon MD      Examination:  /67   Pulse 79   Temp 98 °F (36.7 °C)   Resp 16   Ht 6' 3\" (1.905 m)   Wt 167 lb (75.8 kg)   SpO2 95%   BMI 20.87 kg/m²   Gait - steady  Medication side effects(SE): Denies    Mental Status Examination:    Level of consciousness:  within normal limits   Appearance:  fair grooming and fair hygiene  Behavior/Motor:  no abnormalities noted  Attitude toward examiner:  cooperative  Speech:  spontaneous, normal rate and normal volume   Mood: euthymic  Affect:  mood congruent  Thought processes:  linear   Thought content: Reports paranoia and auditory hallucinations  Cognition:  oriented to person, place, and time   Concentration distractible  Insight fair   Judgement fair     ASSESSMENT:   Patient symptoms are:  [] Well controlled  [] Improving  [] Worsening  [x] No change      Diagnosis:   Active Problems:    Schizoaffective disorder, bipolar type ([facility]    Opiate abuse, episodic ([facility]  Resolved Problems:    * No resolved hospital problems. *      LABS:    No results for input(s): WBC, HGB, PLT in the last 72 hours. No results for input(s): NA, K, CL, CO2, BUN, CREATININE, GLUCOSE in the last 72 hours. No results for input(s): BILITOT, ALKPHOS, AST, ALT in the last 72 hours. Lab Results   Component Value Date    LABAMPH NOT DETECTED 04/13/2020    LABAMPH NOT DETECTED 02/03/2011    BARBSCNU NOT DETECTED 04/13/2020    LABBENZ NOT DETECTED 04/13/2020    LABBENZ SEE BELOW 12/08/2014    CANNAB NOT DETECTED  08/13/2012    COCAINESCRN NOT DETECTED 08/13/2012    LABMETH NOT DETECTED 04/13/2020    OPIATESCREENURINE NOT DETECTED 04/13/2020    PHENCYCLIDINESCREENURINE NOT DETECTED 04/13/2020    PPXUR NOT DETECTED 08/13/2012    ETOH <10 04/13/2020     Lab Results   Component Value Date    TSH 4.710 10/03/2018     Lab Results   Component Value Date    LITHIUM <0.10 (L) 10/24/2018     Lab Results   Component Value Date    VALPROATE 54 04/13/2020     Treatment Plan:  Reviewed current Medications with the patient. Risks, benefits, side effects, drug-to-drug interactions and alternatives to treatment were discussed. Collateral information:   CD evaluation  Encourage patient to attend group and other milieu activities.   Discharge planning discussed with the patient and treatment team.    Continue Stelazine 2 mg twice daily for psychosis  Continue Cogentin 2 mg twice daily for side effects  Continue Depakote 500 mg twice daily for mood stabilization    PSYCHOTHERAPY/COUNSELING:  [x] Therapeutic interview  [x] Supportive  [] CBT  [] Ongoing  []

## 2020-04-19 PROCEDURE — 1240000000 HC EMOTIONAL WELLNESS R&B

## 2020-04-19 PROCEDURE — 6370000000 HC RX 637 (ALT 250 FOR IP): Performed by: PSYCHIATRY & NEUROLOGY

## 2020-04-19 PROCEDURE — 6370000000 HC RX 637 (ALT 250 FOR IP): Performed by: NURSE PRACTITIONER

## 2020-04-19 PROCEDURE — 99232 SBSQ HOSP IP/OBS MODERATE 35: CPT | Performed by: NURSE PRACTITIONER

## 2020-04-19 RX ORDER — TRIFLUOPERAZINE HYDROCHLORIDE 2 MG/1
5 TABLET, FILM COATED ORAL 2 TIMES DAILY
Status: DISCONTINUED | OUTPATIENT
Start: 2020-04-19 | End: 2020-04-24 | Stop reason: HOSPADM

## 2020-04-19 RX ADMIN — ATORVASTATIN CALCIUM 10 MG: 10 TABLET, FILM COATED ORAL at 09:25

## 2020-04-19 RX ADMIN — PREDNISONE 20 MG: 20 TABLET ORAL at 09:25

## 2020-04-19 RX ADMIN — BENZTROPINE MESYLATE 2 MG: 2 TABLET ORAL at 20:35

## 2020-04-19 RX ADMIN — DIVALPROEX SODIUM 500 MG: 500 TABLET, DELAYED RELEASE ORAL at 20:35

## 2020-04-19 RX ADMIN — TRAZODONE HYDROCHLORIDE 50 MG: 50 TABLET ORAL at 20:35

## 2020-04-19 RX ADMIN — BENZTROPINE MESYLATE 2 MG: 2 TABLET ORAL at 09:25

## 2020-04-19 RX ADMIN — TRIFLUOPERAZINE HYDROCHLORIDE 5 MG: 2 TABLET, FILM COATED ORAL at 20:35

## 2020-04-19 RX ADMIN — DIVALPROEX SODIUM 500 MG: 500 TABLET, DELAYED RELEASE ORAL at 09:25

## 2020-04-19 RX ADMIN — ALUMINUM HYDROXIDE, MAGNESIUM HYDROXIDE, AND SIMETHICONE 30 ML: 200; 200; 20 SUSPENSION ORAL at 17:47

## 2020-04-19 RX ADMIN — TRIFLUOPERAZINE HYDROCHLORIDE 2 MG: 2 TABLET, FILM COATED ORAL at 09:26

## 2020-04-19 RX ADMIN — AMLODIPINE BESYLATE 10 MG: 10 TABLET ORAL at 09:25

## 2020-04-19 ASSESSMENT — PAIN SCALES - GENERAL
PAINLEVEL_OUTOF10: 0
PAINLEVEL_OUTOF10: 0

## 2020-04-19 NOTE — PROGRESS NOTES
BEHAVIORAL HEALTH FOLLOW-UP NOTE     4/19/2020     Patient was seen and examined in person, Chart reviewed   Patient's case discussed with staff/team    Chief Complaint: The audible hallucinations have decreased  Interim History: Met with the patient in his room today. The patient is seen to isolate at times. The patient endorses audible hallucinations and states that he hears a plot to kill him and his family and additionally endorses hearing music. The patient endorses seeing shadows and figures but he states that his hallucinations have diminished lately. The patient is taking his medications and going to groups. The patient additionally endorses having bizarre dreams but states that he would rather have dreams and not have them at all. The patient does endorse having shaking in his hands stating that this is from long-term Haldol use in the past but when observed today the patient had no shaking whatsoever.   Appetite:   [x] Normal/Unchanged  [] Increased  [] Decreased      Sleep:       [x] Normal/Unchanged  [] Fair       [] Poor              Energy:    [x] Normal/Unchanged  [] Increased  [] Decreased        SI [] Present  [x] Absent    HI  []Present  [x] Absent     Aggression:  [] yes  [x] no    Patient is [x] able  [] unable to CONTRACT FOR SAFETY     PAST MEDICAL/PSYCHIATRIC HISTORY:   Past Medical History:   Diagnosis Date    ADD (attention deficit disorder)     Anxiety     B[facility] ([facility] 11/19/2017    Cancer ([facility] prostate cancer    2014 / treated with surgery    Crohn's disease ([facility]     Depression     GERD (gastroesophageal reflux disease)     Panic attack     Rectal pain     has a fissure    Schizo affective schizophrenia ([facility]     stable       FAMILY/SOCIAL HISTORY:  Family History   Problem Relation Age of Onset    Mental Illness Mother     Mental Illness Father     Mental Illness Sister     Mental Illness Brother     Heart Disease Mother     Depression Father      Social

## 2020-04-19 NOTE — PROGRESS NOTES
Patient resting quiet to self at this time, respirations are even and unlabored, no signs or symptoms of distress or discomfort. PRN medications given this shift for anxiety and sleep. Staff will continue to conduct environmental rounds to ensure the safety of everyone on the unit. Staff will provide support and interventions as requested or required.

## 2020-04-20 PROCEDURE — 1240000000 HC EMOTIONAL WELLNESS R&B

## 2020-04-20 PROCEDURE — 6370000000 HC RX 637 (ALT 250 FOR IP): Performed by: PSYCHIATRY & NEUROLOGY

## 2020-04-20 PROCEDURE — 99232 SBSQ HOSP IP/OBS MODERATE 35: CPT | Performed by: NURSE PRACTITIONER

## 2020-04-20 PROCEDURE — 6370000000 HC RX 637 (ALT 250 FOR IP): Performed by: NURSE PRACTITIONER

## 2020-04-20 RX ADMIN — DIVALPROEX SODIUM 500 MG: 500 TABLET, DELAYED RELEASE ORAL at 21:20

## 2020-04-20 RX ADMIN — DIVALPROEX SODIUM 500 MG: 500 TABLET, DELAYED RELEASE ORAL at 08:40

## 2020-04-20 RX ADMIN — BENZTROPINE MESYLATE 2 MG: 2 TABLET ORAL at 21:20

## 2020-04-20 RX ADMIN — PREDNISONE 20 MG: 20 TABLET ORAL at 08:40

## 2020-04-20 RX ADMIN — TRAZODONE HYDROCHLORIDE 50 MG: 50 TABLET ORAL at 21:20

## 2020-04-20 RX ADMIN — ATORVASTATIN CALCIUM 10 MG: 10 TABLET, FILM COATED ORAL at 08:40

## 2020-04-20 RX ADMIN — AMLODIPINE BESYLATE 10 MG: 10 TABLET ORAL at 08:40

## 2020-04-20 RX ADMIN — BENZTROPINE MESYLATE 2 MG: 2 TABLET ORAL at 08:40

## 2020-04-20 RX ADMIN — TRIFLUOPERAZINE HYDROCHLORIDE 5 MG: 2 TABLET, FILM COATED ORAL at 08:43

## 2020-04-20 RX ADMIN — TRIFLUOPERAZINE HYDROCHLORIDE 5 MG: 2 TABLET, FILM COATED ORAL at 21:20

## 2020-04-20 ASSESSMENT — PAIN SCALES - GENERAL
PAINLEVEL_OUTOF10: 0

## 2020-04-20 NOTE — PROGRESS NOTES
Patient resting quiet to self at this time, respirations are even and unlabored, no signs or symptoms of distress or discomfort PRN medications given this shift for sleep. Staff will continue to conduct environmental rounds to ensure the safety of everyone on the unit. Staff will provide support and interventions as requested or required.

## 2020-04-20 NOTE — PROGRESS NOTES
BEHAVIORAL HEALTH FOLLOW-UP NOTE     4/20/2020     Patient was seen and examined in person, Chart reviewed   Patient's case discussed with staff/team    Chief Complaint: \"I am still hearing voices\"    Interim History: Met with the patient in his room today. The patient is seen to isolate at times. He continues to report multiple auditory hallucinations but denies any visual hallucinations. He is isolative not attending groups and socializing with peers. He states that his Crohn's is bothering him today and that is why he is staying in bed. He denies any suicidal homicidal ideations intent or plan he states his mood is \"good. \"  Affect is flat and blunted.     Appetite:   [x] Normal/Unchanged  [] Increased  [] Decreased      Sleep:       [x] Normal/Unchanged  [] Fair       [] Poor              Energy:    [x] Normal/Unchanged  [] Increased  [] Decreased        SI [] Present  [x] Absent    HI  []Present  [x] Absent     Aggression:  [] yes  [x] no    Patient is [x] able  [] unable to CONTRACT FOR SAFETY     PAST MEDICAL/PSYCHIATRIC HISTORY:   Past Medical History:   Diagnosis Date    ADD (attention deficit disorder)     Anxiety     Bipolar 1 disorder ([facility] 11/19/2017    Cancer ([facility] prostate cancer    2014 / treated with surgery    [facility]     Depression     GERD (gastroesophageal reflux disease)     Panic attack     Rectal pain     has a fissure    Schizo affective schizophrenia ([facility]     stable       FAMILY/SOCIAL HISTORY:  Family History   Problem Relation Age of Onset    Mental Illness Mother     Mental Illness Father     Mental Illness Sister     Mental Illness Brother     Heart Disease Mother     Depression Father      Social History     Socioeconomic History    Marital status:      Spouse name: ZENAIDA    Number of children: 0    Years of education: 12 +8    Highest education level: Not on file   Occupational History    Occupation: [employer] Employer: UNEMPLOYED     Comment: SSDI   Social Needs    Financial resource strain: Not on file    Food insecurity     Worry: Not on file     Inability: Not on file   [employer] needs     Medical: Not on file     Non-medical: Not on file   Tobacco Use    Smoking status: Heavy Tobacco Smoker     Packs/day: 2.00     Types: Cigarettes     Start date: 1/1/1978    Smokeless tobacco: Never Used    Tobacco comment: states quitting tomorrow 4/3/2020   Substance and Sexual Activity    Alcohol use: No     Comment: no alcohol in5 yrs    Drug use: No     Comment: last use 1997 / marijuana    Sexual activity: Not Currently     Partners: Female   Lifestyle    Physical activity     Days per week: Not on file     Minutes per session: Not on file    Stress: Not on file   Relationships    Social connections     Talks on phone: Not on file     Gets together: Not on file     Attends Christian service: Not on file     Active member of club or organization: Not on file     Attends meetings of clubs or organizations: Not on file     Relationship status: Not on file    Intimate partner violence     Fear of current or ex partner: Not on file     Emotionally abused: Not on file     Physically abused: Not on file     Forced sexual activity: Not on file   Other Topics Concern    Not on file   Social History Narrative    ** Merged History Encounter **                ROS:  [x] All negative/unchanged except if checked.  Explain positive(checked items) below:  [] Constitutional  [] Eyes  [] Ear/Nose/Mouth/Throat  [] Respiratory  [] CV  [] GI  []   [] Musculoskeletal  [] Skin/Breast  [] Neurological  [] Endocrine  [] Heme/Lymph  [] Allergic/Immunologic    Explanation:     MEDICATIONS:    Current Facility-Administered Medications:     trifluoperazine (STELAZINE) tablet 5 mg, 5 mg, Oral, BID, KAM Yeboah - CNP, 5 mg at 04/20/20 0843    benztropine (COGENTIN) tablet 2 mg, 2 mg, Oral, BID, KAM Yeboah - CNP, 2 directly by or requiring the supervision of inpatient psychiatric personnel      Anticipated Length of stay: 5-7 days            Electronically signed by KAM Peters CNP on 3/38/9734 at 3:12 PM

## 2020-04-21 LAB — VALPROIC ACID LEVEL: 49 MCG/ML (ref 50–100)

## 2020-04-21 PROCEDURE — 6370000000 HC RX 637 (ALT 250 FOR IP): Performed by: NURSE PRACTITIONER

## 2020-04-21 PROCEDURE — 1240000000 HC EMOTIONAL WELLNESS R&B

## 2020-04-21 PROCEDURE — 99232 SBSQ HOSP IP/OBS MODERATE 35: CPT | Performed by: NURSE PRACTITIONER

## 2020-04-21 PROCEDURE — 80164 ASSAY DIPROPYLACETIC ACD TOT: CPT

## 2020-04-21 PROCEDURE — 6370000000 HC RX 637 (ALT 250 FOR IP): Performed by: PSYCHIATRY & NEUROLOGY

## 2020-04-21 PROCEDURE — 36415 COLL VENOUS BLD VENIPUNCTURE: CPT

## 2020-04-21 RX ADMIN — TRIFLUOPERAZINE HYDROCHLORIDE 5 MG: 2 TABLET, FILM COATED ORAL at 21:04

## 2020-04-21 RX ADMIN — PREDNISONE 20 MG: 20 TABLET ORAL at 08:08

## 2020-04-21 RX ADMIN — ATORVASTATIN CALCIUM 10 MG: 10 TABLET, FILM COATED ORAL at 08:08

## 2020-04-21 RX ADMIN — DIVALPROEX SODIUM 500 MG: 500 TABLET, DELAYED RELEASE ORAL at 08:08

## 2020-04-21 RX ADMIN — DIVALPROEX SODIUM 500 MG: 500 TABLET, DELAYED RELEASE ORAL at 21:41

## 2020-04-21 RX ADMIN — BENZTROPINE MESYLATE 2 MG: 2 TABLET ORAL at 08:08

## 2020-04-21 RX ADMIN — BENZTROPINE MESYLATE 2 MG: 2 TABLET ORAL at 21:02

## 2020-04-21 RX ADMIN — TRIFLUOPERAZINE HYDROCHLORIDE 5 MG: 2 TABLET, FILM COATED ORAL at 08:20

## 2020-04-21 RX ADMIN — HYDROXYZINE PAMOATE 50 MG: 50 CAPSULE ORAL at 21:02

## 2020-04-21 RX ADMIN — TRAZODONE HYDROCHLORIDE 50 MG: 50 TABLET ORAL at 21:02

## 2020-04-21 RX ADMIN — AMLODIPINE BESYLATE 10 MG: 10 TABLET ORAL at 08:08

## 2020-04-21 ASSESSMENT — PAIN SCALES - GENERAL
PAINLEVEL_OUTOF10: 0
PAINLEVEL_OUTOF10: 0

## 2020-04-21 NOTE — PROGRESS NOTES
at 04/21/20 0808    divalproex (DEPAKOTE) DR tablet 500 mg, 500 mg, Oral, 2 times per day, Liliya Singletary, APRN - CNP, 451 mg at 04/21/20 0808    atorvastatin (LIPITOR) tablet 10 mg, 10 mg, Oral, Daily, Christopher Morales MD, 10 mg at 04/21/20 0808    amLODIPine (NORVASC) tablet 10 mg, 10 mg, Oral, Daily, Christopher Morales MD, 10 mg at 04/21/20 4790    predniSONE (DELTASONE) tablet 20 mg, 20 mg, Oral, Daily, Christopher Morales MD, 20 mg at 04/21/20 0808    acetaminophen (TYLENOL) tablet 650 mg, 650 mg, Oral, Q6H PRN, Blanca Gonzalez MD    hydrOXYzine (VISTARIL) capsule 50 mg, 50 mg, Oral, TID PRN, Blanca Gonzalez MD, 50 mg at 04/18/20 2103    haloperidol lactate (HALDOL) injection 5 mg, 5 mg, Intramuscular, Q6H PRN **OR** haloperidol (HALDOL) tablet 5 mg, 5 mg, Oral, Q6H PRN, Blanca Gonzalez MD    traZODone (DESYREL) tablet 50 mg, 50 mg, Oral, Nightly PRN, Blanca Gonzalez MD, 50 mg at 04/20/20 2120    magnesium hydroxide (MILK OF MAGNESIA) 400 MG/5ML suspension 30 mL, 30 mL, Oral, Daily PRN, Blanca Gonzalez MD    aluminum & magnesium hydroxide-simethicone (MAALOX) 200-200-20 MG/5ML suspension 30 mL, 30 mL, Oral, PRN, Blanca Gonzalez MD, 30 mL at 04/19/20 1747    nicotine (NICODERM CQ) 21 MG/24HR 1 patch, 1 patch, Transdermal, Daily, Blanca Gonzalez MD      Examination:  /78   Pulse 64   Temp 98 °F (36.7 °C) (Temporal)   Resp 16   Ht 6' 3\" (1.905 m)   Wt 167 lb (75.8 kg)   SpO2 96%   BMI 20.87 kg/m²   Gait - steady  Medication side effects(SE): Denies    Mental Status Examination:    Level of consciousness:  within normal limits   Appearance:  fair grooming and fair hygiene  Behavior/Motor:  no abnormalities noted  Attitude toward examiner:  cooperative  Speech:  spontaneous, normal rate and normal volume   Mood: euthymic  Affect:  Flat and blunted  Thought processes:  linear   Thought content: Reports auditory hallucinations  Cognition:  oriented to person, place, and time

## 2020-04-21 NOTE — CARE COORDINATION
Sw went to sit down to see how the pt was doing, but pt was asleep. Sw will check back at a later time.

## 2020-04-22 PROCEDURE — 6370000000 HC RX 637 (ALT 250 FOR IP): Performed by: NURSE PRACTITIONER

## 2020-04-22 PROCEDURE — 6370000000 HC RX 637 (ALT 250 FOR IP): Performed by: PSYCHIATRY & NEUROLOGY

## 2020-04-22 PROCEDURE — 1240000000 HC EMOTIONAL WELLNESS R&B

## 2020-04-22 PROCEDURE — 99232 SBSQ HOSP IP/OBS MODERATE 35: CPT | Performed by: NURSE PRACTITIONER

## 2020-04-22 RX ADMIN — AMLODIPINE BESYLATE 10 MG: 10 TABLET ORAL at 10:20

## 2020-04-22 RX ADMIN — BENZTROPINE MESYLATE 2 MG: 2 TABLET ORAL at 10:20

## 2020-04-22 RX ADMIN — DIVALPROEX SODIUM 500 MG: 500 TABLET, DELAYED RELEASE ORAL at 22:25

## 2020-04-22 RX ADMIN — TRIFLUOPERAZINE HYDROCHLORIDE 5 MG: 2 TABLET, FILM COATED ORAL at 22:21

## 2020-04-22 RX ADMIN — ATORVASTATIN CALCIUM 10 MG: 10 TABLET, FILM COATED ORAL at 10:20

## 2020-04-22 RX ADMIN — BENZTROPINE MESYLATE 2 MG: 2 TABLET ORAL at 22:22

## 2020-04-22 RX ADMIN — PREDNISONE 20 MG: 20 TABLET ORAL at 10:20

## 2020-04-22 RX ADMIN — DIVALPROEX SODIUM 500 MG: 500 TABLET, DELAYED RELEASE ORAL at 10:21

## 2020-04-22 RX ADMIN — TRAZODONE HYDROCHLORIDE 50 MG: 50 TABLET ORAL at 22:22

## 2020-04-22 RX ADMIN — TRIFLUOPERAZINE HYDROCHLORIDE 5 MG: 2 TABLET, FILM COATED ORAL at 10:31

## 2020-04-22 ASSESSMENT — PAIN SCALES - GENERAL
PAINLEVEL_OUTOF10: 0
PAINLEVEL_OUTOF10: 0

## 2020-04-22 NOTE — PROGRESS NOTES
times per day, Suzy Williamson, APRN - CNP, 724 mg at 04/22/20 1021    atorvastatin (LIPITOR) tablet 10 mg, 10 mg, Oral, Daily, Zak Sweeney MD, 10 mg at 04/22/20 1020    amLODIPine (NORVASC) tablet 10 mg, 10 mg, Oral, Daily, Zak Sweeney MD, 10 mg at 04/22/20 1020    predniSONE (DELTASONE) tablet 20 mg, 20 mg, Oral, Daily, Zak Sweeney MD, 20 mg at 04/22/20 1020    acetaminophen (TYLENOL) tablet 650 mg, 650 mg, Oral, Q6H PRN, Stefania Mares MD    hydrOXYzine (VISTARIL) capsule 50 mg, 50 mg, Oral, TID PRN, Stefania Mares MD, 50 mg at 04/21/20 2102    haloperidol lactate (HALDOL) injection 5 mg, 5 mg, Intramuscular, Q6H PRN **OR** haloperidol (HALDOL) tablet 5 mg, 5 mg, Oral, Q6H PRN, Stefania Mares MD    traZODone (DESYREL) tablet 50 mg, 50 mg, Oral, Nightly PRN, Stefania Mares MD, 50 mg at 04/21/20 2102    magnesium hydroxide (MILK OF MAGNESIA) 400 MG/5ML suspension 30 mL, 30 mL, Oral, Daily PRN, Stefania Mares MD    aluminum & magnesium hydroxide-simethicone (MAALOX) 200-200-20 MG/5ML suspension 30 mL, 30 mL, Oral, PRN, Stefania Mares MD, 30 mL at 04/19/20 1747    nicotine (NICODERM CQ) 21 MG/24HR 1 patch, 1 patch, Transdermal, Daily, Stefania Mares MD      Examination:  /74   Pulse 75   Temp 97.8 °F (36.6 °C) (Temporal)   Resp 17   Ht 6' 3\" (1.905 m)   Wt 167 lb (75.8 kg)   SpO2 95%   BMI 20.87 kg/m²   Gait - steady  Medication side effects(SE): Denies    Mental Status Examination:    Level of consciousness:  within normal limits   Appearance:  fair grooming and fair hygiene  Behavior/Motor:  no abnormalities noted  Attitude toward examiner:  cooperative  Speech:  spontaneous, normal rate and normal volume   Mood: euthymic  Affect:  Flat and blunted  Thought processes:  linear   Thought content: Reports auditory hallucinations states they are getting better  Cognition:  oriented to person, place, and time   Concentration distractible  Insight fair Judgement fair     ASSESSMENT:   Patient symptoms are:  [] Well controlled  [x] Improving  [] Worsening  [] No change      Diagnosis:   Active Problems:    Schizoaffective disorder, bipolar type ([address]    Opiate abuse, episodic (HCC)  Resolved Problems:    * No resolved hospital problems. *      LABS:    No results for input(s): WBC, HGB, PLT in the last 72 hours. No results for input(s): NA, K, CL, CO2, BUN, CREATININE, GLUCOSE in the last 72 hours. No results for input(s): BILITOT, ALKPHOS, AST, ALT in the last 72 hours. Lab Results   Component Value Date    LABAMPH NOT DETECTED 04/13/2020    LABAMPH NOT DETECTED 02/03/2011    BARBSCNU NOT DETECTED 04/13/2020    LABBENZ NOT DETECTED 04/13/2020    LABBENZ SEE BELOW 12/08/2014    CANNAB NOT DETECTED  08/13/2012    COCAINESCRN NOT DETECTED 08/13/2012    LABMETH NOT DETECTED 04/13/2020    OPIATESCREENURINE NOT DETECTED 04/13/2020    PHENCYCLIDINESCREENURINE NOT DETECTED 04/13/2020    PPXUR NOT DETECTED 08/13/2012    ETOH <10 04/13/2020     Lab Results   Component Value Date    TSH 4.710 10/03/2018     Lab Results   Component Value Date    LITHIUM <0.10 (L) 10/24/2018     Lab Results   Component Value Date    VALPROATE 49 (L) 04/21/2020     Treatment Plan:  Reviewed current Medications with the patient. Risks, benefits, side effects, drug-to-drug interactions and alternatives to treatment were discussed. Collateral information:   CD evaluation  Encourage patient to attend group and other milieu activities.   Discharge planning discussed with the patient and treatment team.    Continue Stelazine 5 mg twice daily for psychosis  Continue Cogentin 2 mg twice daily for side effects  Continue Depakote 500 mg twice daily for mood stabilization   Consult medical for Chrones    PSYCHOTHERAPY/COUNSELING:  [x] Therapeutic interview  [x] Supportive  [] CBT  [] Ongoing  [] Other    [x] Patient continues to need, on a daily basis, active treatment furnished directly by or

## 2020-04-22 NOTE — CARE COORDINATION
SW note: d/c planning: SW met with pt by bedside to encourage groups and discuss d/c planning. He was lying in bed stating that he doesn't feel well and is sick to his stomach. He states that he feels mentally he is doing better. He states that he feels less depressed. He reports some paranoia which has decreased though and even though he still is hearing voices, he reports that they are less intrusive and are much less frequent now. SW spoke with wife on phone for collateral. She feels he is doing better and is glad he will be coming home soon. She states she is worried about his health as he has chrone's disease and he has reported to her that he isn't eating right now. She has no safety concerns for him presently and states there are no weapons in the home.

## 2020-04-23 PROCEDURE — 6370000000 HC RX 637 (ALT 250 FOR IP): Performed by: PSYCHIATRY & NEUROLOGY

## 2020-04-23 PROCEDURE — 6370000000 HC RX 637 (ALT 250 FOR IP): Performed by: NURSE PRACTITIONER

## 2020-04-23 PROCEDURE — 99232 SBSQ HOSP IP/OBS MODERATE 35: CPT | Performed by: NURSE PRACTITIONER

## 2020-04-23 PROCEDURE — 1240000000 HC EMOTIONAL WELLNESS R&B

## 2020-04-23 RX ADMIN — PREDNISONE 20 MG: 20 TABLET ORAL at 09:35

## 2020-04-23 RX ADMIN — BENZTROPINE MESYLATE 2 MG: 2 TABLET ORAL at 21:31

## 2020-04-23 RX ADMIN — TRIFLUOPERAZINE HYDROCHLORIDE 5 MG: 2 TABLET, FILM COATED ORAL at 21:31

## 2020-04-23 RX ADMIN — DIVALPROEX SODIUM 500 MG: 500 TABLET, DELAYED RELEASE ORAL at 21:31

## 2020-04-23 RX ADMIN — DIVALPROEX SODIUM 500 MG: 500 TABLET, DELAYED RELEASE ORAL at 09:35

## 2020-04-23 RX ADMIN — ATORVASTATIN CALCIUM 10 MG: 10 TABLET, FILM COATED ORAL at 09:36

## 2020-04-23 RX ADMIN — BENZTROPINE MESYLATE 2 MG: 2 TABLET ORAL at 09:35

## 2020-04-23 RX ADMIN — TRIFLUOPERAZINE HYDROCHLORIDE 5 MG: 2 TABLET, FILM COATED ORAL at 09:33

## 2020-04-23 RX ADMIN — AMLODIPINE BESYLATE 10 MG: 10 TABLET ORAL at 09:36

## 2020-04-23 RX ADMIN — TRAZODONE HYDROCHLORIDE 50 MG: 50 TABLET ORAL at 21:31

## 2020-04-23 ASSESSMENT — PAIN SCALES - GENERAL
PAINLEVEL_OUTOF10: 0

## 2020-04-23 NOTE — PROGRESS NOTES
Admit Date: 4/13/2020      Subjective:     Patient: asked to see re: medical management; no acute issues reported overnight  Medication side effects: none    Scheduled Meds:   trifluoperazine  5 mg Oral BID    benztropine  2 mg Oral BID    divalproex  500 mg Oral 2 times per day    atorvastatin  10 mg Oral Daily    amLODIPine  10 mg Oral Daily    predniSONE  20 mg Oral Daily    nicotine  1 patch Transdermal Daily     Continuous Infusions:  PRN Meds:acetaminophen, hydrOXYzine, haloperidol lactate **OR** haloperidol, traZODone, magnesium hydroxide, aluminum & magnesium hydroxide-simethicone    Objective:   No intake/output data recorded. No intake/output data recorded.     BP 91/60   Pulse 82   Temp 97.9 °F (36.6 °C)   Resp 14   Ht 6' 3\" (1.905 m)   Wt 167 lb (75.8 kg)   SpO2 95%   BMI 20.87 kg/m²   General appearance: no distress  Skin:negative  Heent:negative  Lungs: clear to auscultation bilaterally  Heart: regular rate and rhythm, S1, S2 normal, no murmur, click, rub or gallop  Abdomen: soft, non-tender; bowel sounds normal; no masses,  no organomegaly  Extremities:no edema  Neurologic: Grossly normal    Labs:  CBC with Differential:    Lab Results   Component Value Date    WBC 14.1 04/13/2020    RBC 4.25 04/13/2020    HGB 11.2 04/13/2020    HCT 35.1 04/13/2020     04/13/2020    MCV 82.6 04/13/2020    MCH 26.4 04/13/2020    MCHC 31.9 04/13/2020    RDW 20.8 04/13/2020    NRBC 0.0 04/03/2020    BANDSPCT 1 09/17/2014    METASPCT 1.7 04/02/2020    LYMPHOPCT 1.8 04/13/2020    MONOPCT 1.8 04/13/2020    MYELOPCT 2.6 04/03/2020    BASOPCT 0.1 04/13/2020    MONOSABS 0.28 04/13/2020    LYMPHSABS 0.28 04/13/2020    EOSABS 0.00 04/13/2020    BASOSABS 0.00 04/13/2020     BMP:    Lab Results   Component Value Date     04/13/2020    K 3.9 04/13/2020    K 3.6 03/11/2020    CL 94 04/13/2020    CO2 24 04/13/2020    BUN 21 04/13/2020    LABALBU 3.5 04/13/2020    LABALBU 3.5 02/05/2011    CREATININE 0.9

## 2020-04-23 NOTE — PROGRESS NOTES
BEHAVIORAL HEALTH FOLLOW-UP NOTE     4/23/2020     Patient was seen and examined in person, Chart reviewed   Patient's case discussed with staff/team    Chief Complaint: \" The hallucinations are gone. \"    Interim History: I saw patient in his room he denies any suicidal or homicidal ideations intent or plan. He states his auditory and visual hallucinations are \"gone. \"  He denies any paranoia. He is very focused on his Crohn's disease and was seen by the medical doctor this morning. He states he is feeling much better on this medication. He is appreciative the help that he is received here. He is eating well sleeping well there are no neurovegetative signs symptoms of depression. He states he feels like he is ready to go home to his wife.       Appetite:   [x] Normal/Unchanged  [] Increased  [] Decreased      Sleep:       [x] Normal/Unchanged  [] Fair       [] Poor              Energy:    [x] Normal/Unchanged  [] Increased  [] Decreased        SI [] Present  [x] Absent    HI  []Present  [x] Absent     Aggression:  [] yes  [x] no    Patient is [x] able  [] unable to CONTRACT FOR SAFETY     PAST MEDICAL/PSYCHIATRIC HISTORY:   Past Medical History:   Diagnosis Date    ADD (attention deficit disorder)     Anxiety     [facility]ipolar 1 disorder ([facility] 11/19/2017    Cancer ([facility] prostate cancer    2014 / treated with surgery    [facility]     Depression     GERD (gastroesophageal reflux disease)     Panic attack     Rectal pain     has a fissure    [facility]     stable       FAMILY/SOCIAL HISTORY:  Family History   Problem Relation Age of Onset    Mental Illness Mother     Mental Illness Father     Mental Illness Sister     Mental Illness Brother     Heart Disease Mother     Depression Father      Social History     Socioeconomic History    Marital status:      Spouse name: ZENAIDA    Number of children: 0    Years of education: 12 +8    Highest education level:

## 2020-04-24 VITALS
BODY MASS INDEX: 20.76 KG/M2 | HEART RATE: 89 BPM | TEMPERATURE: 97.5 F | DIASTOLIC BLOOD PRESSURE: 89 MMHG | HEIGHT: 75 IN | RESPIRATION RATE: 16 BRPM | WEIGHT: 167 LBS | OXYGEN SATURATION: 100 % | SYSTOLIC BLOOD PRESSURE: 132 MMHG

## 2020-04-24 PROCEDURE — 99239 HOSP IP/OBS DSCHRG MGMT >30: CPT | Performed by: NURSE PRACTITIONER

## 2020-04-24 PROCEDURE — 6370000000 HC RX 637 (ALT 250 FOR IP): Performed by: NURSE PRACTITIONER

## 2020-04-24 PROCEDURE — 6370000000 HC RX 637 (ALT 250 FOR IP): Performed by: PSYCHIATRY & NEUROLOGY

## 2020-04-24 RX ORDER — ATORVASTATIN CALCIUM 10 MG/1
10 TABLET, FILM COATED ORAL DAILY
Qty: 30 TABLET | Refills: 3 | Status: SHIPPED | OUTPATIENT
Start: 2020-04-25 | End: 2020-07-17 | Stop reason: SDUPTHER

## 2020-04-24 RX ORDER — BENZTROPINE MESYLATE 2 MG/1
2 TABLET ORAL 2 TIMES DAILY
Qty: 60 TABLET | Refills: 0 | Status: SHIPPED | OUTPATIENT
Start: 2020-04-24 | End: 2020-07-14 | Stop reason: ALTCHOICE

## 2020-04-24 RX ORDER — TRIFLUOPERAZINE HYDROCHLORIDE 5 MG/1
5 TABLET, FILM COATED ORAL 2 TIMES DAILY
Qty: 60 TABLET | Refills: 0 | Status: SHIPPED | OUTPATIENT
Start: 2020-04-24 | End: 2020-07-14 | Stop reason: ALTCHOICE

## 2020-04-24 RX ORDER — DIVALPROEX SODIUM 500 MG/1
500 TABLET, DELAYED RELEASE ORAL EVERY 12 HOURS SCHEDULED
Qty: 60 TABLET | Refills: 0 | Status: ON HOLD | OUTPATIENT
Start: 2020-04-24 | End: 2021-04-28 | Stop reason: HOSPADM

## 2020-04-24 RX ADMIN — PREDNISONE 20 MG: 20 TABLET ORAL at 08:52

## 2020-04-24 RX ADMIN — DIVALPROEX SODIUM 500 MG: 500 TABLET, DELAYED RELEASE ORAL at 08:52

## 2020-04-24 RX ADMIN — TRIFLUOPERAZINE HYDROCHLORIDE 5 MG: 2 TABLET, FILM COATED ORAL at 08:53

## 2020-04-24 RX ADMIN — ATORVASTATIN CALCIUM 10 MG: 10 TABLET, FILM COATED ORAL at 08:52

## 2020-04-24 RX ADMIN — BENZTROPINE MESYLATE 2 MG: 2 TABLET ORAL at 08:52

## 2020-04-24 RX ADMIN — AMLODIPINE BESYLATE 10 MG: 10 TABLET ORAL at 08:52

## 2020-04-24 ASSESSMENT — PAIN SCALES - GENERAL
PAINLEVEL_OUTOF10: 0
PAINLEVEL_OUTOF10: 0

## 2020-04-24 NOTE — PROGRESS NOTES
[facility]  Day 3 Interdisciplinary Treatment Plan NOTE    Review Date & Time: 04/24/2020 8705     Patient was in treatment team    Admission Type:   Admission Type: Involuntary    Reason for admission:  Reason for Admission: \"to get off Haldol to get started on something else\"  Estimated Length of Stay Update:  5-7 days   Estimated Discharge Date Update: 04/28/2020    PATIENT STRENGTHS:  Patient Strengths Strengths: Medication Compliance, Positive Support, Connection to output provider  Patient Strengths and Limitations:Limitations: Difficult relationships / poor social skills, Tendency to isolate self, Lacks leisure interests  Addictive Behavior:Addictive Behavior  In the past 3 months, have you felt or has someone told you that you have a problem with:  : None  Do you have a history of Chemical Use?: No  Do you have a history of Alcohol Use?: No  Do you have a history of Street Drug Abuse?: No  Histroy of Prescripton Drug Abuse?: No  Medical Problems:  Past Medical History:   Diagnosis Date    ADD (attention deficit disorder)     Anxiety     Bipolar 1 disorder ([facility] 11/19/2017    Cancer ([facility] prostate cancer    2014 / treated with surgery    Crohn's disease ([facility]     Depression     GERD (gastroesophageal reflux disease)     Panic attack     Rectal pain     has a fissure    Schizo affective schizophrenia ([facility]     stable       Risk:  Fall RiskTotal: 89  Andre Scale Andre Scale Score: 22  BVC Total: 0  Change in scores none.  Changes to plan of Care  none    Status EXAM:   Status and Exam  Normal: No  Facial Expression: Flat, Sad  Affect: Constricted  Level of Consciousness: Alert  Mood:Normal: No  Mood: Anxious  Motor Activity:Normal: No  Motor Activity: Decreased  Interview Behavior: Cooperative  Preception: [place] to Person, [place] to Time, [place] to Place, [place] to Situation  Attention:Normal: Yes  Attention: Distractible  Thought Processes: (organized)  Thought Content:Normal:

## 2020-04-24 NOTE — CARE COORDINATION
In order to ensure appropriate transition and discharge planning is in place, the following documents have been transmitted to [facility], as the new outpatient provider:     The d/c diagnosis was transmitted to the next care provider   The reason for hospitalization was transmitted to the next care provider   The d/c medications (dosage and indication) were transmitted to the next care provider    The continuing care plan was transmitted to the next care provider

## 2020-04-24 NOTE — CARE COORDINATION
SARA note: D/c planning: SARA met with pt re: d/c planning. He is declining crisis unit and plans to return home. Collateral done with wife who is glad pt will be d/c . SARA faxed info to [facility] and talked with staff. They have to take crisis call so will call sw back with appt.

## 2020-04-24 NOTE — DISCHARGE SUMMARY
stabilization, and treatment. Upon initial assessment the patient stated that his current medication was Haldol and it was not working for him very well and it was causing him to have tremors and he refuses to take it any longer. He continued to endorse having audible hallucinations and paranoia believing that people are coming to get him at his house. The patient was initiated on Depakote, Cogentin, and Stelazine and these medications were titrated per clinical effectiveness. The patient agreed to medication treatment, gave consent to treatment, was compliant with medication therapy. While on the unit the patient was calm cooperative and pleasant. The patient was actively participating in groups but at times due to his Crohn's disease the patient was unable to attend group. After the medication started take effect and the patient became more stable the patient stated that he was not having auditory hallucinations anymore and that the voices were coming from his own internal consciousness. The patient's paranoia started to significantly decrease to the point of discharge the patient was not paranoid anymore. At the time of discharge the patient exhibited no overt or covert signs and symptoms of psychosis. The patient vehemently denied suicidal ideation and homicidal ideation or intent to harm. The patient was seen being social and pleasant on the unit associating with other peers. At time of discharge the patient had been actively attending to his ADLs and was recently showered shaved because he wanted to look good for his wife. The patient was appreciative for the help that he received while on the unit stating that we gave him a new lease on life and that we have significantly helped him. The patient additionally stated that he learned his significant amount from groups and that he will continue to use the information given to improve the quality of his life.   The patient stated that the help that he received here will improve the quality of his life and that he feels like a whole new person. Social work obtain collateral from the wife and the patient's wife feels that he is ready to come home and it was confirmed that the patient has no access to guns or any weapons. The patient has no more suicidal, homicidal, psychotic, or manic symptomology. The patient received the required treatment with medication, participated in group milieu, remained engaged in unit activities, and learned appropriate coping skills. The patient was seen watching TV, playing games, socializing with peers, and calling friends and family. There were no mention or gestures of self-harm or harm to others. The patient's mental status returned to baseline. Treatment team felt that the patient has obtained maximal benefit out of this hospitalization does not meet the criteria for inpatient hospitalization anymore. However the patient will continue to benefit from outpatient and follow-up treatment to maintain stability. Collateral information was obtained and reconciled, there were no concerns about the patient's safety. The patient has no access to guns or weapons at this time. The patient was discharged home with wife and family. The patient was referred to outpatient/inpatient substance abuse rehabilitation program.  The patient was educated multiple times during the hospitalization that if the patient chooses to continue to use drugs or alcohol, the patient may potentially act out impulsively, resulting in serious harm to self or others, even though unintentional.  The patient was also educated that mental health treatment cannot be optimized with ongoing use of drugs, the patient demonstrated understanding and has the capacity to understand what I have said.     Patient was monitored closely with suicide precaution  Patient was discharged on   Depakote  mg every 12 hours  Stelazine 5 mg every 12 hours  Cogentin 2 mg twice daily  Was encouraged to participate in group and other milieu activity  Patient started to feel better with this combination of treatment. Significant progress in the symptoms since admission. Denies AVH or paranoid thoughts  Denies hopeless or worthless feeling  No active SI/HI  Appetite:  [x] Normal  [] Increased  [] Decreased    Sleep:       [x] Normal  [] Fair       [] Poor            Energy:    [x] Normal  [] Increased  [] Decreased     SI [] Present  [x] Absent  HI  []Present  [x] Absent   Aggression:  [] yes  [x] no  Patient is [x] able  [] unable to CONTRACT FOR SAFETY   Medication side effects(SE):  [x] None(Psych. Meds.) [] Other      Mental Status Examination on discharge:    Level of consciousness:  within normal limits   Appearance:  well-appearing  Behavior/Motor:  no abnormalities noted  Attitude toward examiner:  attentive and good eye contact  Speech:  spontaneous, normal rate and normal volume   Mood: euthymic  Affect:  mood congruent  Thought processes:  linear, goal directed and coherent   Thought content: Without delusions or paranoia  Cognition:  oriented to person, place, and time   Concentration intact  Memory intact  Insight good   Judgement fair   Fund of Knowledge adequate      ASSESSMENT:  Patient symptoms are:  [x] Well controlled  [x] Improving  [] Worsening  [] No change      Diagnosis:  Principal Problem:    Schizoaffective disorder, bipolar type ([facility]  Active Problems:    Opiate abuse, episodic ([facility]  Resolved Problems:    * No resolved hospital problems. *      LABS:    No results for input(s): WBC, HGB, PLT in the last 72 hours. No results for input(s): NA, K, CL, CO2, BUN, CREATININE, GLUCOSE in the last 72 hours. No results for input(s): BILITOT, ALKPHOS, AST, ALT in the last 72 hours.   Lab Results   Component Value Date    LABAMPH NOT DETECTED 04/13/2020    [address] NOT DETECTED 02/03/2011    BARBSCNU NOT DETECTED 04/13/2020    LABBENZ NOT DETECTED 04/13/2020    LABBENZ dicyclomine 10 MG capsule  Commonly known as:  Bentyl     fluvoxaMINE 50 MG tablet  Commonly known as:  LUVOX     haloperidol 5 MG tablet  Commonly known as:  HALDOL     metroNIDAZOLE 500 MG tablet  Commonly known as:  FLAGYL     simvastatin 10 MG tablet  Commonly known as:  ZOCOR        ASK your doctor about these medications    amLODIPine 10 MG tablet  Commonly known as:  NORVASC  Take 1 tablet by mouth daily  Ask about: Which instructions should I use?            Where to Get Your Medications      These medications were sent to [place] [address]    Phone:  [phone]   · amLODIPine 10 MG tablet     These medications were sent to [facility] [place] [address]    Phone:  [phone]   · atorvastatin 10 MG tablet  · benztropine 2 MG tablet  · divalproex 500 MG DR tablet  · trifluoperazine 5 MG tablet           TIME SPEND - 35 MINUTES TO COMPLETE THE EVALUATION, DISCHARGE SUMMARY, MEDICATION RECONCILIATION AND FOLLOW UP CARE     Signed:  Devika Recinos  4/24/2020  2:32 PM

## 2020-04-24 NOTE — PLAN OF CARE
Patient resting quiet to self at this time, respirations are even and unlabored, no signs or symptoms of distress or discomfort. PRN medications given this shift. for anxiety and sleep Staff will continue to conduct environmental rounds to ensure the safety of everyone on the unit. Staff will provide support and interventions as requested or required.
Patient was isolative to his room , Denies thoughts of suicide or thoughts of harming others. States that he hears voices of people plotting to kill him and his family all day long. I also hear music in left ear playing the song ,\"I wouldn't want to be like you. \" Also see figures just a little but unaware whom they are. MY sleep has improved, but my appetite is poor. Patient is medications compliant and attends groups , no unit problems or behaviors. Safety rounds continue.
Patient was isolative to his room. States that he is embarrassed because he can't hold anything because of haldol. In the 1st 20 years I took it helped ,now it only makes me have tremors. 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 Something happened and I ask guard won't do nothing so I went to supervisor ,so then I was known as a snitch. So the rest of the inmates called me a snitch and they want to kill me. In my left ear the past 6 months I hear music it plays, \"I wouldn't want to be lijke you. \"  My anxiety is 9 1/2 out of 10 I stake things a certain way and it must be precise. Depression is 7 1/2 having to put up with OCD and chron's disease. As for my sleep I either sleep way over or none at all. Appetite is poor. Patient is compliant with medications and attends group, no unit problems or behaviors. Safety rounds continue.
Problem: Altered Mood, Depressive Behavior:  Goal: Able to verbalize acceptance of life and situations over which he or she has no control  Description: Able to verbalize acceptance of life and situations over which he or she has no control  4/20/2020 1330 by Maribeth Nguyen RN  Outcome: Ongoing  4/20/2020 0037 by Tony Marie RN  Outcome: Ongoing  Goal: Able to verbalize and/or display a decrease in depressive symptoms  Description: Able to verbalize and/or display a decrease in depressive symptoms  4/20/2020 1330 by Maribeth Nguyen RN  Outcome: Ongoing  4/20/2020 0037 by Tony Marie RN  Outcome: Ongoing     Problem: Depressive Behavior With or Without Suicide Precautions:  Goal: Able to verbalize and/or display a decrease in depressive symptoms  Description: Able to verbalize and/or display a decrease in depressive symptoms  4/20/2020 1330 by Maribeth Nguyen RN  Outcome: Ongoing  4/20/2020 0037 by Tony Marie RN  Outcome: Ongoing  Goal: Ability to disclose and discuss suicidal ideas will improve  Description: Ability to disclose and discuss suicidal ideas will improve  4/20/2020 1330 by Maribeth Nguyen RN  Outcome: Ongoing  4/20/2020 0037 by Tony Marie RN  Outcome: Ongoing
Problem: Depressive Behavior With or Without Suicide Precautions:  Goal: Able to verbalize and/or display a decrease in depressive symptoms  Description: Able to verbalize and/or display a decrease in depressive symptoms  Outcome: Ongoing  Goal: Ability to disclose and discuss suicidal ideas will improve  Description: Ability to disclose and discuss suicidal ideas will improve  4/24/2020 1024 by Elva Hankins RN  Outcome: Ongoing  4/23/2020 2114 by Vanessa Thorne RN  Outcome: Met This Shift
Problem: Inadequate oral food/beverage intake (NI-2.1)  Goal: Food and/or Nutrient Delivery  Description: Individualized approach for food/nutrient provision.   Outcome: Met This Shift
Pt. Denies SI, HI, and hallucinations this shift. Pt. Denies physical complaints currently.
Pt. Denies SI, HI, and hallucinations this shift. Pt. Denies physical complaints currently.
comment)  Thought Content:Normal: No  Thought Content: Poverty of Content  Hallucinations: None  Delusions: No  Delusions: Obsessions  Memory:Normal: No  Memory: Poor Recent, Poor Remote  Insight and Judgment: No  Insight and Judgment: Poor Judgment, Poor Insight, Unmotivated  Present Suicidal Ideation: No  Present Homicidal Ideation: No    Daily Assessment Last Entry:   Daily Sleep (WDL): Within Defined Limits         Patient Currently in Pain: Denies  Daily Nutrition (WDL): Exceptions to WDL  Appetite Change: Decreased  Barriers to Nutrition: Food intolerance  Level of Assistance: Independent/Self    Patient Monitoring:  Frequency of Checks: 4 times per hour, close    Psychiatric Symptoms:   Depression Symptoms  Depression Symptoms: Isolative  Anxiety Symptoms  Anxiety Symptoms: Generalized  Hortencia Symptoms  Hortencia Symptoms: Poor judgment     Psychosis Symptoms  Delusion Type: No problems reported or observed. Suicide Risk CSSR-S:  1) Within the past month, have you wished you were dead or wished you could go to sleep and not wake up? : Yes  2) Have you actually had any thoughts of killing yourself? : No  6) Have you ever done anything, started to do anything, or prepared to do anything to end your life?: Yes  Change in Result No Change in Plan of care No    EDUCATION:   Learner Progress Toward Treatment Goals: Reviewed results and recommendations of this team, Reviewed group plan and strategies, Reviewed signs, symptoms and risk of self harm and violent behavior and Reviewed goals and plan of care    Method: Individual    Outcome: Verbalized understanding and Demonstrated Understanding    PATIENT GOALS: \"Pay more attention\"    PLAN/TREATMENT RECOMMENDATIONS UPDATE:  Offer and encourage groups and provide emotional support.      GOALS UPDATE:  Time frame for Short-Term Goals: one week      Bubba Hansen RN

## 2020-06-24 ENCOUNTER — HOSPITAL ENCOUNTER (EMERGENCY)
Age: 55
Discharge: PSYCHIATRIC OR SUBSTANCE USE FACILITY | End: 2020-06-25
Attending: EMERGENCY MEDICINE
Payer: MEDICARE

## 2020-06-24 LAB
ACETAMINOPHEN LEVEL: <5 MCG/ML (ref 10–30)
ALBUMIN SERPL-MCNC: 3.5 G/DL (ref 3.5–5.2)
ALP BLD-CCNC: 77 U/L (ref 40–129)
ALT SERPL-CCNC: 10 U/L (ref 0–40)
AMPHETAMINE SCREEN, URINE: NOT DETECTED
ANION GAP SERPL CALCULATED.3IONS-SCNC: 11 MMOL/L (ref 7–16)
AST SERPL-CCNC: 10 U/L (ref 0–39)
BARBITURATE SCREEN URINE: NOT DETECTED
BASOPHILS ABSOLUTE: 0.06 E9/L (ref 0–0.2)
BASOPHILS RELATIVE PERCENT: 0.5 % (ref 0–2)
BENZODIAZEPINE SCREEN, URINE: NOT DETECTED
BILIRUB SERPL-MCNC: <0.2 MG/DL (ref 0–1.2)
BUN BLDV-MCNC: 18 MG/DL (ref 6–20)
CALCIUM SERPL-MCNC: 9.8 MG/DL (ref 8.6–10.2)
CANNABINOID SCREEN URINE: NOT DETECTED
CHLORIDE BLD-SCNC: 106 MMOL/L (ref 98–107)
CO2: 23 MMOL/L (ref 22–29)
COCAINE METABOLITE SCREEN URINE: NOT DETECTED
CREAT SERPL-MCNC: 0.9 MG/DL (ref 0.7–1.2)
EOSINOPHILS ABSOLUTE: 0.7 E9/L (ref 0.05–0.5)
EOSINOPHILS RELATIVE PERCENT: 6 % (ref 0–6)
ETHANOL: <10 MG/DL (ref 0–0.08)
FENTANYL SCREEN, URINE: NOT DETECTED
GFR AFRICAN AMERICAN: >60
GFR NON-AFRICAN AMERICAN: >60 ML/MIN/1.73
GLUCOSE BLD-MCNC: 92 MG/DL (ref 74–99)
HCT VFR BLD CALC: 40.2 % (ref 37–54)
HEMOGLOBIN: 12.7 G/DL (ref 12.5–16.5)
IMMATURE GRANULOCYTES #: 0.05 E9/L
IMMATURE GRANULOCYTES %: 0.4 % (ref 0–5)
LYMPHOCYTES ABSOLUTE: 1.85 E9/L (ref 1.5–4)
LYMPHOCYTES RELATIVE PERCENT: 15.9 % (ref 20–42)
Lab: NORMAL
MCH RBC QN AUTO: 27.4 PG (ref 26–35)
MCHC RBC AUTO-ENTMCNC: 31.6 % (ref 32–34.5)
MCV RBC AUTO: 86.8 FL (ref 80–99.9)
METHADONE SCREEN, URINE: NOT DETECTED
MONOCYTES ABSOLUTE: 1.14 E9/L (ref 0.1–0.95)
MONOCYTES RELATIVE PERCENT: 9.8 % (ref 2–12)
NEUTROPHILS ABSOLUTE: 7.84 E9/L (ref 1.8–7.3)
NEUTROPHILS RELATIVE PERCENT: 67.4 % (ref 43–80)
OPIATE SCREEN URINE: NOT DETECTED
OXYCODONE URINE: NOT DETECTED
PDW BLD-RTO: 16.1 FL (ref 11.5–15)
PHENCYCLIDINE SCREEN URINE: NOT DETECTED
PLATELET # BLD: 331 E9/L (ref 130–450)
PMV BLD AUTO: 8.6 FL (ref 7–12)
POTASSIUM SERPL-SCNC: 4.1 MMOL/L (ref 3.5–5)
RBC # BLD: 4.63 E12/L (ref 3.8–5.8)
SALICYLATE, SERUM: <0.3 MG/DL (ref 0–30)
SODIUM BLD-SCNC: 140 MMOL/L (ref 132–146)
TOTAL PROTEIN: 6.8 G/DL (ref 6.4–8.3)
TRICYCLIC ANTIDEPRESSANTS SCREEN SERUM: NEGATIVE NG/ML
WBC # BLD: 11.6 E9/L (ref 4.5–11.5)

## 2020-06-24 PROCEDURE — 80053 COMPREHEN METABOLIC PANEL: CPT

## 2020-06-24 PROCEDURE — 6360000002 HC RX W HCPCS: Performed by: EMERGENCY MEDICINE

## 2020-06-24 PROCEDURE — 99285 EMERGENCY DEPT VISIT HI MDM: CPT

## 2020-06-24 PROCEDURE — 96372 THER/PROPH/DIAG INJ SC/IM: CPT

## 2020-06-24 PROCEDURE — 85025 COMPLETE CBC W/AUTO DIFF WBC: CPT

## 2020-06-24 PROCEDURE — 80307 DRUG TEST PRSMV CHEM ANLYZR: CPT

## 2020-06-24 PROCEDURE — G0480 DRUG TEST DEF 1-7 CLASSES: HCPCS

## 2020-06-24 RX ORDER — DIPHENHYDRAMINE HYDROCHLORIDE 50 MG/ML
50 INJECTION INTRAMUSCULAR; INTRAVENOUS ONCE
Status: COMPLETED | OUTPATIENT
Start: 2020-06-24 | End: 2020-06-24

## 2020-06-24 RX ORDER — LORAZEPAM 2 MG/ML
2 INJECTION INTRAMUSCULAR ONCE
Status: COMPLETED | OUTPATIENT
Start: 2020-06-24 | End: 2020-06-24

## 2020-06-24 RX ORDER — ZIPRASIDONE MESYLATE 20 MG/ML
20 INJECTION, POWDER, LYOPHILIZED, FOR SOLUTION INTRAMUSCULAR ONCE
Status: COMPLETED | OUTPATIENT
Start: 2020-06-24 | End: 2020-06-24

## 2020-06-24 RX ADMIN — ZIPRASIDONE MESYLATE 20 MG: 20 INJECTION, POWDER, LYOPHILIZED, FOR SOLUTION INTRAMUSCULAR at 22:03

## 2020-06-24 RX ADMIN — DIPHENHYDRAMINE HYDROCHLORIDE 50 MG: 50 INJECTION, SOLUTION INTRAMUSCULAR; INTRAVENOUS at 22:03

## 2020-06-24 RX ADMIN — LORAZEPAM 2 MG: 2 INJECTION INTRAMUSCULAR; INTRAVENOUS at 21:36

## 2020-06-25 VITALS
TEMPERATURE: 97.1 F | RESPIRATION RATE: 18 BRPM | SYSTOLIC BLOOD PRESSURE: 144 MMHG | HEART RATE: 101 BPM | DIASTOLIC BLOOD PRESSURE: 92 MMHG | OXYGEN SATURATION: 96 %

## 2020-06-25 PROCEDURE — 96372 THER/PROPH/DIAG INJ SC/IM: CPT

## 2020-06-25 PROCEDURE — 6360000002 HC RX W HCPCS: Performed by: EMERGENCY MEDICINE

## 2020-06-25 PROCEDURE — 2580000003 HC RX 258

## 2020-06-25 PROCEDURE — 6360000002 HC RX W HCPCS

## 2020-06-25 RX ORDER — LORAZEPAM 2 MG/ML
2 INJECTION INTRAMUSCULAR ONCE
Status: COMPLETED | OUTPATIENT
Start: 2020-06-25 | End: 2020-06-25

## 2020-06-25 RX ORDER — DIPHENHYDRAMINE HYDROCHLORIDE 50 MG/ML
INJECTION INTRAMUSCULAR; INTRAVENOUS
Status: COMPLETED
Start: 2020-06-25 | End: 2020-06-25

## 2020-06-25 RX ORDER — ZIPRASIDONE MESYLATE 20 MG/ML
20 INJECTION, POWDER, LYOPHILIZED, FOR SOLUTION INTRAMUSCULAR ONCE
Status: COMPLETED | OUTPATIENT
Start: 2020-06-25 | End: 2020-06-25

## 2020-06-25 RX ORDER — ZIPRASIDONE MESYLATE 20 MG/ML
INJECTION, POWDER, LYOPHILIZED, FOR SOLUTION INTRAMUSCULAR
Status: COMPLETED
Start: 2020-06-25 | End: 2020-06-25

## 2020-06-25 RX ORDER — DIPHENHYDRAMINE HYDROCHLORIDE 50 MG/ML
50 INJECTION INTRAMUSCULAR; INTRAVENOUS ONCE
Status: COMPLETED | OUTPATIENT
Start: 2020-06-25 | End: 2020-06-25

## 2020-06-25 RX ADMIN — ZIPRASIDONE MESYLATE 20 MG: 20 INJECTION, POWDER, LYOPHILIZED, FOR SOLUTION INTRAMUSCULAR at 12:12

## 2020-06-25 RX ADMIN — WATER 2.4 ML: 1 INJECTION INTRAMUSCULAR; INTRAVENOUS; SUBCUTANEOUS at 12:14

## 2020-06-25 RX ADMIN — LORAZEPAM 2 MG: 2 INJECTION INTRAMUSCULAR; INTRAVENOUS at 12:41

## 2020-06-25 RX ADMIN — DIPHENHYDRAMINE HYDROCHLORIDE 50 MG: 50 INJECTION, SOLUTION INTRAMUSCULAR; INTRAVENOUS at 12:41

## 2020-06-25 RX ADMIN — DIPHENHYDRAMINE HYDROCHLORIDE 50 MG: 50 INJECTION INTRAMUSCULAR; INTRAVENOUS at 12:41

## 2020-06-25 NOTE — ED NOTES
Pt asking for a onelil and to have this ostomy emptied. Pt informed that he will not be getting oneill. Pt ambulated to restroom to tend to ostomy. Pt placed back in bed and back on monitor. CO at bedside.       Tana Mcgraw RN  06/24/20 4839

## 2020-06-25 NOTE — ED NOTES
Patient has been accepted to [facility] by Dr. Benito Boyd    Patient to go to the Adult Unit, Rm #[id number]    N2N: [phone]    Physicians ETA 1220    Ambulance packet is complete     Rukhsana Billing, FABRIZIO [place]  06/25/20 3171

## 2020-06-25 NOTE — ED PROVIDER NOTES
HPI:  6/25/20, Time: 12:59 AM EDT         Jack Coles is a 49 y.o. male presenting to the ED for psychiatric evaluation. Patient was pink slip prior to arrival.  He made comments of wife that he wants to kill himself. He says he does not want to live anymore as he is sick of having a colostomy, no one appreciate so much pain he is in. He did cover himself with the contents of his colostomy bag prior to arrival.  Denies any homicidal ideation. Denies any other symptoms or complaints. Review of Systems:   Pertinent positives and negatives are stated within HPI, all other systems reviewed and are negative.          --------------------------------------------- PAST HISTORY ---------------------------------------------  Past Medical History:  has a past medical history of ADD (attention deficit disorder), Anxiety, Bipolar 1 disorder ([facility] Cancer ([facility] Crohn's disease ([facility] Depression, GERD (gastroesophageal reflux disease), Panic attack, Rectal pain, and Schizo affective schizophrenia ([facility] Past Surgical History:  has a past surgical history that includes Colonoscopy; Nose surgery (2002?); Prostatectomy (9/15/2014); Endoscopy, colon, diagnostic; incision and drainage (N/A, 5/15/2019); and Colonoscopy (N/A, 1/28/2020). Social History:  reports that he has been smoking cigarettes. He started smoking about 42 years ago. He has been smoking about 2.00 packs per day. He has never used smokeless tobacco. He reports that he does not drink alcohol or use drugs. Family History: family history includes Depression in his father; Heart Disease in his mother; Mental Illness in his brother, father, mother, and sister. The patients home medications have been reviewed.     Allergies: Ciprofloxacin hcl and Nsaids    -------------------------------------------------- RESULTS -------------------------------------------------  All laboratory and radiology results have been personally reviewed by myself LABS:  Results for orders placed or performed during the hospital encounter of 06/24/20   CBC Auto Differential   Result Value Ref Range    WBC 11.6 (H) 4.5 - 11.5 E9/L    RBC 4.63 3.80 - 5.80 E12/L    Hemoglobin 12.7 12.5 - 16.5 g/dL    Hematocrit 40.2 37.0 - 54.0 %    MCV 86.8 80.0 - 99.9 fL    MCH 27.4 26.0 - 35.0 pg    MCHC 31.6 (L) 32.0 - 34.5 %    RDW 16.1 (H) 11.5 - 15.0 fL    Platelets 201 076 - 494 E9/L    MPV 8.6 7.0 - 12.0 fL    Neutrophils % 67.4 43.0 - 80.0 %    Immature Granulocytes % 0.4 0.0 - 5.0 %    Lymphocytes % 15.9 (L) 20.0 - 42.0 %    Monocytes % 9.8 2.0 - 12.0 %    Eosinophils % 6.0 0.0 - 6.0 %    Basophils % 0.5 0.0 - 2.0 %    Neutrophils Absolute 7.84 (H) 1.80 - 7.30 E9/L    Immature Granulocytes # 0.05 E9/L    Lymphocytes Absolute 1.85 1.50 - 4.00 E9/L    Monocytes Absolute 1.14 (H) 0.10 - 0.95 E9/L    Eosinophils Absolute 0.70 (H) 0.05 - 0.50 E9/L    Basophils Absolute 0.06 0.00 - 0.20 E9/L   Comprehensive Metabolic Panel   Result Value Ref Range    Sodium 140 132 - 146 mmol/L    Potassium 4.1 3.5 - 5.0 mmol/L    Chloride 106 98 - 107 mmol/L    CO2 23 22 - 29 mmol/L    Anion Gap 11 7 - 16 mmol/L    Glucose 92 74 - 99 mg/dL    BUN 18 6 - 20 mg/dL    CREATININE 0.9 0.7 - 1.2 mg/dL    GFR Non-African American >60 >=60 mL/min/1.73    GFR African American >60     Calcium 9.8 8.6 - 10.2 mg/dL    Total Protein 6.8 6.4 - 8.3 g/dL    Alb 3.5 3.5 - 5.2 g/dL    Total Bilirubin <0.2 0.0 - 1.2 mg/dL    Alkaline Phosphatase 77 40 - 129 U/L    ALT 10 0 - 40 U/L    AST 10 0 - 39 U/L   Serum Drug Screen   Result Value Ref Range    Ethanol Lvl <10 mg/dL    Acetaminophen Level <5.0 (L) 10.0 - 96.2 mcg/mL    Salicylate, Serum <3.1 0.0 - 30.0 mg/dL    TCA Scrn NEGATIVE Cutoff:300 ng/mL   Urine Drug Screen   Result Value Ref Range    Amphetamine Screen, Urine NOT DETECTED Negative <1000 ng/mL    Barbiturate Screen, Ur NOT DETECTED Negative < 200 ng/mL    Benzodiazepine Screen, Urine NOT DETECTED Negative < 200 ng/mL    Cannabinoid Scrn, Ur NOT DETECTED Negative < 50ng/mL    Cocaine Metabolite Screen, Urine NOT DETECTED Negative < 300 ng/mL    Opiate Scrn, Ur NOT DETECTED Negative < 300ng/mL    PCP Screen, Urine NOT DETECTED Negative < 25 ng/mL    Methadone Screen, Urine NOT DETECTED Negative <300 ng/mL    Oxycodone Urine NOT DETECTED Negative <100 ng/mL    FENTANYL SCREEN, URINE NOT DETECTED Negative <1 ng/mL    Drug Screen Comment: see below        RADIOLOGY:  Interpreted by Radiologist.  No orders to display       ------------------------- NURSING NOTES AND VITALS REVIEWED ---------------------------   The nursing notes within the ED encounter and vital signs as below have been reviewed. BP (!) 133/102   Pulse 102   Temp 97.5 °F (36.4 °C)   Resp 18   SpO2 96%   Oxygen Saturation Interpretation: Normal      ---------------------------------------------------PHYSICAL EXAM--------------------------------------      Constitutional/General: Alert and oriented x3, well appearing, non toxic in NAD  Head: Normocephalic and atraumatic  Eyes: PERRL, EOMI  Mouth: Oropharynx clear, handling secretions, no trismus  Neck: Supple, full ROM,   Pulmonary: Lungs clear to auscultation bilaterally, no wheezes, rales, or rhonchi. Not in respiratory distress  Cardiovascular:  Regular rate and rhythm, no murmurs, gallops, or rubs. 2+ distal pulses  Abdomen: Soft, non tender, non distended, colostomy intact  Extremities: Moves all extremities x 4.  Warm and well perfused  Skin: warm and dry without rash  Neurologic: GCS 15,  Psych: Normal Affect      ------------------------------ ED COURSE/MEDICAL DECISION MAKING----------------------  Medications   sterile water injection (has no administration in time range)   LORazepam (ATIVAN) injection 2 mg (2 mg Intramuscular Given 6/24/20 2136)   ziprasidone (GEODON) injection 20 mg (20 mg Intramuscular Given 6/24/20 2203)   diphenhydrAMINE (BENADRYL) injection 50 mg (50 mg Intramuscular Given

## 2020-06-25 NOTE — ED NOTES
I was asked by  to review wife's concern involving patient's recent total colectomy. Wife stated that he has been complaining of abdominal pain intermittently and nausea since he had a total colectomy on [address] 3 at [facility]. Patient does have a ileostomy he states that the output has been increasing. He also states that he has been passing flatus and stool per rectum he has had no melena hematochezia. He currently has no abdominal pain. On initial exam patient was sleeping soundly I woke him up to examine him. His colostomy appears to be intact with a bag full of brown stool. Little liquid. There is no gross bleeding. His abdomen was soft nontender all 4 quadrants. Patient is currently in the emergency department being held for suicidal ideation. He does have a history of schizoaffective disorder depression and suicide ideation. Patient is afebrile vital signs are stable.      Patricia Moreno, DO  06/25/20 9196

## 2020-06-25 NOTE — ED NOTES
Assessment Completed    Pt is pink slipped by PD, Pt stated he wanted to kill himself, Pt got feces on his fingers and licked them to kill himself. Pt told his wife he would slice his throat if authorities were called. Pt has a MH hx of schizo-affective d/o, depression, anxiety and ADD. Pt treats with [address] [place] [address], and has been off meds for 2 days. Collateral information obtained by wife, Marium Randolph:    Pt has an Ostomy bag ( will need changed on 06/27/2020 and every 4 days there after) placed 3 weeks ago and then emergency surgery due to fluid on stomach. Pt was then d/c to home on pain meds. Pt ran out of pain meds yesterday and is still in a lot of pain and is not able to get into see a doctor until July 10th. Pt believes if he eats his feces it will kill him. VANESSA SARA reviewed chart with ED Doc, Pt in need of inpatient admission to ensure safety and stabilization.       VANESSA to pursue inpatient admission      FABRIZIO Esparza, [facility]  06/25/20 [facility]  06/25/20 7013

## 2020-06-25 NOTE — ED NOTES
Notified staffing of C/O needed. Orders placed.  C/O papers faxed     Justin Maldonado RN  06/24/20 0122

## 2020-09-04 ENCOUNTER — HOSPITAL ENCOUNTER (OUTPATIENT)
Age: 55
Setting detail: OBSERVATION
Discharge: HOME | End: 2020-09-05
Attending: EMERGENCY MEDICINE | Admitting: FAMILY MEDICINE
Payer: MEDICARE

## 2020-09-04 ENCOUNTER — APPOINTMENT (OUTPATIENT)
Dept: CT IMAGING | Age: 55
End: 2020-09-04
Payer: MEDICARE

## 2020-09-04 PROBLEM — N17.9 AKI (ACUTE KIDNEY INJURY) (HCC): Status: ACTIVE | Noted: 2020-09-04

## 2020-09-04 LAB
ALBUMIN SERPL-MCNC: 3.8 G/DL (ref 3.5–5.2)
ALP BLD-CCNC: 76 U/L (ref 40–129)
ALT SERPL-CCNC: 15 U/L (ref 0–40)
ANION GAP SERPL CALCULATED.3IONS-SCNC: 8 MMOL/L (ref 7–16)
AST SERPL-CCNC: 13 U/L (ref 0–39)
BASOPHILS ABSOLUTE: 0.08 E9/L (ref 0–0.2)
BASOPHILS RELATIVE PERCENT: 0.8 % (ref 0–2)
BILIRUB SERPL-MCNC: <0.2 MG/DL (ref 0–1.2)
BILIRUBIN URINE: NEGATIVE
BLOOD, URINE: NEGATIVE
BUN BLDV-MCNC: 29 MG/DL (ref 6–20)
CALCIUM SERPL-MCNC: 9.1 MG/DL (ref 8.6–10.2)
CHLORIDE BLD-SCNC: 107 MMOL/L (ref 98–107)
CLARITY: CLEAR
CO2: 28 MMOL/L (ref 22–29)
COLOR: YELLOW
CREAT SERPL-MCNC: 2 MG/DL (ref 0.7–1.2)
EOSINOPHILS ABSOLUTE: 0.21 E9/L (ref 0.05–0.5)
EOSINOPHILS RELATIVE PERCENT: 2.1 % (ref 0–6)
GFR AFRICAN AMERICAN: 42
GFR NON-AFRICAN AMERICAN: 35 ML/MIN/1.73
GLUCOSE BLD-MCNC: 97 MG/DL (ref 74–99)
GLUCOSE URINE: NEGATIVE MG/DL
HCT VFR BLD CALC: 41.5 % (ref 37–54)
HEMOGLOBIN: 13 G/DL (ref 12.5–16.5)
IMMATURE GRANULOCYTES #: 0.26 E9/L
IMMATURE GRANULOCYTES %: 2.6 % (ref 0–5)
KETONES, URINE: ABNORMAL MG/DL
LACTIC ACID, SEPSIS: 2.1 MMOL/L (ref 0.5–1.9)
LEUKOCYTE ESTERASE, URINE: NEGATIVE
LIPASE: 44 U/L (ref 13–60)
LYMPHOCYTES ABSOLUTE: 2.05 E9/L (ref 1.5–4)
LYMPHOCYTES RELATIVE PERCENT: 20.3 % (ref 20–42)
MCH RBC QN AUTO: 26.4 PG (ref 26–35)
MCHC RBC AUTO-ENTMCNC: 31.3 % (ref 32–34.5)
MCV RBC AUTO: 84.3 FL (ref 80–99.9)
MONOCYTES ABSOLUTE: 0.83 E9/L (ref 0.1–0.95)
MONOCYTES RELATIVE PERCENT: 8.2 % (ref 2–12)
NEUTROPHILS ABSOLUTE: 6.66 E9/L (ref 1.8–7.3)
NEUTROPHILS RELATIVE PERCENT: 66 % (ref 43–80)
NITRITE, URINE: NEGATIVE
PDW BLD-RTO: 17.6 FL (ref 11.5–15)
PH UA: 6.5 (ref 5–9)
PLATELET # BLD: 288 E9/L (ref 130–450)
PMV BLD AUTO: 8.9 FL (ref 7–12)
POTASSIUM REFLEX MAGNESIUM: 4.3 MMOL/L (ref 3.5–5)
PRO-BNP: 19 PG/ML (ref 0–125)
PROTEIN UA: NEGATIVE MG/DL
RBC # BLD: 4.92 E12/L (ref 3.8–5.8)
SODIUM BLD-SCNC: 143 MMOL/L (ref 132–146)
SPECIFIC GRAVITY UA: 1.02 (ref 1–1.03)
TOTAL PROTEIN: 6.4 G/DL (ref 6.4–8.3)
TROPONIN: <0.01 NG/ML (ref 0–0.03)
UROBILINOGEN, URINE: 0.2 E.U./DL
WBC # BLD: 10.1 E9/L (ref 4.5–11.5)

## 2020-09-04 PROCEDURE — 84484 ASSAY OF TROPONIN QUANT: CPT

## 2020-09-04 PROCEDURE — 83605 ASSAY OF LACTIC ACID: CPT

## 2020-09-04 PROCEDURE — 80053 COMPREHEN METABOLIC PANEL: CPT

## 2020-09-04 PROCEDURE — 6360000002 HC RX W HCPCS: Performed by: EMERGENCY MEDICINE

## 2020-09-04 PROCEDURE — 99282 EMERGENCY DEPT VISIT SF MDM: CPT

## 2020-09-04 PROCEDURE — 87040 BLOOD CULTURE FOR BACTERIA: CPT

## 2020-09-04 PROCEDURE — 96374 THER/PROPH/DIAG INJ IV PUSH: CPT

## 2020-09-04 PROCEDURE — 83690 ASSAY OF LIPASE: CPT

## 2020-09-04 PROCEDURE — 83880 ASSAY OF NATRIURETIC PEPTIDE: CPT

## 2020-09-04 PROCEDURE — 81003 URINALYSIS AUTO W/O SCOPE: CPT

## 2020-09-04 PROCEDURE — 85025 COMPLETE CBC W/AUTO DIFF WBC: CPT

## 2020-09-04 PROCEDURE — 36415 COLL VENOUS BLD VENIPUNCTURE: CPT

## 2020-09-04 PROCEDURE — 74176 CT ABD & PELVIS W/O CONTRAST: CPT

## 2020-09-04 PROCEDURE — 96376 TX/PRO/DX INJ SAME DRUG ADON: CPT

## 2020-09-04 PROCEDURE — G0378 HOSPITAL OBSERVATION PER HR: HCPCS

## 2020-09-04 PROCEDURE — 96375 TX/PRO/DX INJ NEW DRUG ADDON: CPT

## 2020-09-04 PROCEDURE — 2580000003 HC RX 258: Performed by: EMERGENCY MEDICINE

## 2020-09-04 PROCEDURE — 99281 EMR DPT VST MAYX REQ PHY/QHP: CPT

## 2020-09-04 RX ORDER — DIVALPROEX SODIUM 250 MG/1
500 TABLET, DELAYED RELEASE ORAL EVERY 8 HOURS SCHEDULED
Status: DISCONTINUED | OUTPATIENT
Start: 2020-09-04 | End: 2020-09-05 | Stop reason: HOSPADM

## 2020-09-04 RX ORDER — FENTANYL CITRATE 50 UG/ML
100 INJECTION, SOLUTION INTRAMUSCULAR; INTRAVENOUS EVERY 6 HOURS PRN
Status: DISCONTINUED | OUTPATIENT
Start: 2020-09-04 | End: 2020-09-05 | Stop reason: HOSPADM

## 2020-09-04 RX ORDER — CHLORPROMAZINE HYDROCHLORIDE 25 MG/1
100 TABLET, FILM COATED ORAL 4 TIMES DAILY
Status: DISCONTINUED | OUTPATIENT
Start: 2020-09-04 | End: 2020-09-05 | Stop reason: HOSPADM

## 2020-09-04 RX ORDER — ATORVASTATIN CALCIUM 10 MG/1
10 TABLET, FILM COATED ORAL DAILY
Status: DISCONTINUED | OUTPATIENT
Start: 2020-09-05 | End: 2020-09-05 | Stop reason: HOSPADM

## 2020-09-04 RX ORDER — SODIUM CHLORIDE 9 MG/ML
INJECTION, SOLUTION INTRAVENOUS CONTINUOUS
Status: DISCONTINUED | OUTPATIENT
Start: 2020-09-04 | End: 2020-09-05 | Stop reason: HOSPADM

## 2020-09-04 RX ORDER — FENTANYL CITRATE 50 UG/ML
100 INJECTION, SOLUTION INTRAMUSCULAR; INTRAVENOUS ONCE
Status: COMPLETED | OUTPATIENT
Start: 2020-09-04 | End: 2020-09-04

## 2020-09-04 RX ORDER — 0.9 % SODIUM CHLORIDE 0.9 %
1000 INTRAVENOUS SOLUTION INTRAVENOUS ONCE
Status: COMPLETED | OUTPATIENT
Start: 2020-09-04 | End: 2020-09-04

## 2020-09-04 RX ORDER — MORPHINE SULFATE 4 MG/ML
4 INJECTION, SOLUTION INTRAMUSCULAR; INTRAVENOUS ONCE
Status: DISCONTINUED | OUTPATIENT
Start: 2020-09-04 | End: 2020-09-05 | Stop reason: HOSPADM

## 2020-09-04 RX ORDER — MIRTAZAPINE 15 MG/1
15 TABLET, FILM COATED ORAL NIGHTLY
Status: DISCONTINUED | OUTPATIENT
Start: 2020-09-04 | End: 2020-09-05 | Stop reason: HOSPADM

## 2020-09-04 RX ORDER — CLOTRIMAZOLE AND BETAMETHASONE DIPROPIONATE 10; .5 MG/ML; MG/ML
LOTION TOPICAL 2 TIMES DAILY
Status: DISCONTINUED | OUTPATIENT
Start: 2020-09-04 | End: 2020-09-05 | Stop reason: HOSPADM

## 2020-09-04 RX ADMIN — FENTANYL CITRATE 100 MCG: 50 INJECTION, SOLUTION INTRAMUSCULAR; INTRAVENOUS at 20:12

## 2020-09-04 RX ADMIN — SODIUM CHLORIDE 1000 ML: 9 INJECTION, SOLUTION INTRAVENOUS at 20:12

## 2020-09-04 ASSESSMENT — ENCOUNTER SYMPTOMS
SORE THROAT: 0
COUGH: 0
FACIAL SWELLING: 0
NAUSEA: 0
CONSTIPATION: 0
ALLERGIC/IMMUNOLOGIC NEGATIVE: 1
SHORTNESS OF BREATH: 0
DIARRHEA: 0
EYE PAIN: 0
PHOTOPHOBIA: 0
EYE REDNESS: 0
CHEST TIGHTNESS: 0
VOMITING: 0
ABDOMINAL PAIN: 1

## 2020-09-04 ASSESSMENT — PAIN DESCRIPTION - ORIENTATION: ORIENTATION: LEFT

## 2020-09-04 ASSESSMENT — PAIN DESCRIPTION - ONSET: ONSET: ON-GOING

## 2020-09-04 ASSESSMENT — PAIN - FUNCTIONAL ASSESSMENT: PAIN_FUNCTIONAL_ASSESSMENT: PREVENTS OR INTERFERES SOME ACTIVE ACTIVITIES AND ADLS

## 2020-09-04 ASSESSMENT — PAIN DESCRIPTION - FREQUENCY: FREQUENCY: CONTINUOUS

## 2020-09-04 ASSESSMENT — PAIN DESCRIPTION - PAIN TYPE: TYPE: ACUTE PAIN

## 2020-09-04 ASSESSMENT — PAIN DESCRIPTION - PROGRESSION: CLINICAL_PROGRESSION: NOT CHANGED

## 2020-09-04 ASSESSMENT — PAIN DESCRIPTION - LOCATION: LOCATION: ABDOMEN

## 2020-09-04 ASSESSMENT — PAIN SCALES - GENERAL
PAINLEVEL_OUTOF10: 9
PAINLEVEL_OUTOF10: 6
PAINLEVEL_OUTOF10: 9
PAINLEVEL_OUTOF10: 9

## 2020-09-04 NOTE — ED PROVIDER NOTES
Patient is a 52-year-old male presents emergency department with a history of Crohn's disease with ostomy bag placed and May and the sudden onset of abdominal pain today sharp in nature 10 out of 10 in severity. Patient states when he began he was worried that he with another small bowel obstruction similar to what happened previously so he decided come emergency department to be evaluated. On arrival the patient is uncomfortable appearing pulse of 125 with a blood pressure 184/104. Denies any nausea or vomiting or diarrhea states his ostomy in place normal output no blood through the ostomy. He also denies any fever, chills. The history is provided by the patient. No  was used. Symptoms are worsened by palpation of the mid abdomen       Symptoms are improved by nothing    Denies any associated nausea, vomiting, diarrhea, fever, chills    Review of Systems   Constitutional: Negative for chills, fatigue and fever. HENT: Negative for congestion, facial swelling, hearing loss and sore throat. Eyes: Negative for photophobia, pain and redness. Respiratory: Negative for cough, chest tightness and shortness of breath. Cardiovascular: Negative for chest pain and palpitations. Gastrointestinal: Positive for abdominal pain. Negative for constipation, diarrhea, nausea and vomiting. Endocrine: Negative for cold intolerance, polydipsia and polyuria. Genitourinary: Negative for dysuria, flank pain and frequency. Musculoskeletal: Negative for arthralgias, joint swelling and myalgias. Skin: Negative. Allergic/Immunologic: Negative. Neurological: Negative. Physical Exam  Vitals signs and nursing note reviewed. Constitutional:       General: He is not in acute distress. Appearance: He is well-developed. He is not ill-appearing. HENT:      Head: Normocephalic and atraumatic. Eyes:      Pupils: Pupils are equal, round, and reactive to light.    Neck: Musculoskeletal: Normal range of motion and neck supple. Cardiovascular:      Rate and Rhythm: Regular rhythm. Tachycardia present. Heart sounds: Normal heart sounds. No murmur. No friction rub. No gallop. Pulmonary:      Effort: Pulmonary effort is normal. No respiratory distress. Breath sounds: Normal breath sounds. No wheezing or rales. Abdominal:      General: Bowel sounds are normal.      Palpations: Abdomen is soft. Tenderness: There is abdominal tenderness in the periumbilical area. There is guarding. There is no rebound. Hernia: No hernia is present. Comments: Patient is ostomy bag in place in the right aspect abdomen has per report been in place since May. There is some stool in the ostomy bag. No visualized blood in the ostomy bag. Patient also has several well-healing surgical scars on the abdomen. Skin:     General: Skin is warm and dry. Neurological:      Mental Status: He is alert and oriented to person, place, and time. Cranial Nerves: No cranial nerve deficit. Coordination: Coordination normal.          Procedures           MDM  Number of Diagnoses or Management Options  Abdominal pain, epigastric:   LILLY (acute kidney injury) ([facility] [address]):   Hx of Crohn's disease:   Diagnosis management comments: Patient found to have worsening epigastric nominal pain with acute kidney history and a history of Crohn's. Due to these compounding problems the patient given IV fluids and admitted to the hospital for further evaluation and treatment. Patient was stable while in the emergency department his pain was adequately controlled and he was closely monitored. ED Course as of Sep 06 2133   Fri Sep 04, 2020   2137 ATTENDING PROVIDER ATTESTATION:     I have personally performed and/or participated in the history, exam, medical decision making, and procedures and agree with all pertinent clinical information unless otherwise noted.     I have also reviewed and agree with the past medical, family and social history unless otherwise noted. I have discussed this patient in detail with the resident and provided the instruction and education regarding the evidence-based evaluation and treatment of epigastric abd pain    My plan: Symptomatic and supportive care. Patient seen and examined. Concern for abdominal pathology. Pain medicine IV fluids given. On work-up abdomen CT shows no evidence of any intra-abdominal pathology though he does show evidence of chronic Crohn's disease. Renal function has doubled since last labs were drawn and review of his [facility] clinic labs show he was normal kidney function in June. Is felt patient warrants admission for acute kidney injury Case discussed with Dr. Valentina Myles covering for Dr. Quincy Mitchell and patient will be admitted to telemetry bed. Electronically signed by Juliette Lorenzo DO on 9/4/20 at 9:37 PM EDT          [CF]      ED Course User Index  [CF] Juliette Lorenzo DO        ED Course as of Sep 06 2134   Fri Sep 04, 2020   2137 ATTENDING PROVIDER ATTESTATION:     I have personally performed and/or participated in the history, exam, medical decision making, and procedures and agree with all pertinent clinical information unless otherwise noted. I have also reviewed and agree with the past medical, family and social history unless otherwise noted. I have discussed this patient in detail with the resident and provided the instruction and education regarding the evidence-based evaluation and treatment of epigastric abd pain    My plan: Symptomatic and supportive care. Patient seen and examined. Concern for abdominal pathology. Pain medicine IV fluids given. On work-up abdomen CT shows no evidence of any intra-abdominal pathology though he does show evidence of chronic Crohn's disease. Renal function has doubled since last labs were drawn and review of his [facility] clinic labs show he was normal kidney function in June.   Is felt patient warrants admission for acute kidney injury Case discussed with Dr. Giorgio Ge covering for Dr. Jessie Long and patient will be admitted to telemetry bed. Electronically signed by Gayla Llanos DO on 9/4/20 at 9:37 PM EDT          [CF]      ED Course User Index  [CF] Gayla Llanos DO       --------------------------------------------- PAST HISTORY ---------------------------------------------  Past Medical History:  has a past medical history of ADD (attention deficit disorder), Anxiety, Bipolar 1 disorder ([facility] [address] [facility] Crohn's disease ([facility] Depression, GERD (gastroesophageal reflux disease), Panic attack, Rectal pain, and Schizo affective schizophrenia ([facility] Past Surgical History:  has a past surgical history that includes Colonoscopy; Nose surgery (2002?); Prostatectomy (9/15/2014); Endoscopy, colon, diagnostic; incision and drainage (N/A, 5/15/2019); and Colonoscopy (N/A, 1/28/2020). Social History:  reports that he has been smoking cigarettes. He started smoking about 42 years ago. He has been smoking about 2.00 packs per day. He has never used smokeless tobacco. He reports that he does not drink alcohol or use drugs. Family History: family history includes Depression in his father; Heart Disease in his mother; Mental Illness in his brother, father, mother, and sister. The patients home medications have been reviewed.     Allergies: Ciprofloxacin hcl; Morphine; and Nsaids    -------------------------------------------------- RESULTS -------------------------------------------------    LABS:  Results for orders placed or performed during the hospital encounter of 09/04/20   Culture, Blood 1    Specimen: Blood   Result Value Ref Range    Blood Culture, Routine 24 Hours no growth    Culture, Blood 2    Specimen: Blood   Result Value Ref Range    Culture, Blood 2 24 Hours no growth    CBC Auto Differential   Result Value Ref Range    WBC 10.1 4.5 - 11.5 E9/L    RBC 4.92 3.80 - 5.80 Urobilinogen, Urine 0.2 <2.0 E.U./dL    Nitrite, Urine Negative Negative    Leukocyte Esterase, Urine Negative Negative   Lactate, Sepsis   Result Value Ref Range    Lactic Acid, Sepsis 2.1 (H) 0.5 - 1.9 mmol/L   BASIC METABOLIC PANEL   Result Value Ref Range    Sodium 141 132 - 146 mmol/L    Potassium 4.5 3.5 - 5.0 mmol/L    Chloride 105 98 - 107 mmol/L    CO2 28 22 - 29 mmol/L    Anion Gap 8 7 - 16 mmol/L    Glucose 93 74 - 99 mg/dL    BUN 23 (H) 6 - 20 mg/dL    CREATININE 1.1 0.7 - 1.2 mg/dL    GFR Non-African American >60 >=60 mL/min/1.73    GFR African American >60     Calcium 9.3 8.6 - 10.2 mg/dL       RADIOLOGY:  CT ABDOMEN PELVIS WO CONTRAST Additional Contrast? None   Final Result      NO ACUTE PATHOLOGY SEEN IN ABDOMEN OR PELVIS. Inflammatory bowel disease i.e. Crohn's colitis which appears to be   chronic.                   ------------------------- NURSING NOTES AND VITALS REVIEWED ---------------------------  Date / Time Roomed:  9/4/2020  6:55 PM  ED Bed Assignment:  [id number]    The nursing notes within the ED encounter and vital signs as below have been reviewed. No data found. Oxygen Saturation Interpretation: Normal    ------------------------------------------ PROGRESS NOTES ------------------------------------------  Re-evaluation(s):  Time: 2000  Patients symptoms show no change  Repeat physical examination is not changed    Counseling:  I have spoken with the patient and discussed todays results, in addition to providing specific details for the plan of care and counseling regarding the diagnosis and prognosis. Their questions are answered at this time and they are agreeable with the plan of admission.    --------------------------------- ADDITIONAL PROVIDER NOTES ---------------------------------  Consultations:  Time: 2131. Spoke with Dr. Nolvia Santacruz. Discussed case. They will admit the patient.   This patient's ED course included: a personal history and physicial examination, re-evaluation prior to disposition, multiple bedside re-evaluations, IV medications, cardiac monitoring, continuous pulse oximetry and complex medical decision making and emergency management    This patient has remained hemodynamically stable during their ED course. Diagnosis:  1. Abdominal pain, epigastric    2. LILLY (acute kidney injury) ([facility] [address]    3. Hx of Crohn's disease        Disposition:  Patient's disposition: Admit to telemetry  Patient's condition is stable.            Luz Steele DO  Resident  09/06/20 [id number]

## 2020-09-05 VITALS
WEIGHT: 209.25 LBS | HEIGHT: 75 IN | DIASTOLIC BLOOD PRESSURE: 86 MMHG | RESPIRATION RATE: 18 BRPM | BODY MASS INDEX: 26.02 KG/M2 | SYSTOLIC BLOOD PRESSURE: 150 MMHG | HEART RATE: 73 BPM | OXYGEN SATURATION: 95 % | TEMPERATURE: 97.4 F

## 2020-09-05 LAB
ANION GAP SERPL CALCULATED.3IONS-SCNC: 8 MMOL/L (ref 7–16)
BUN BLDV-MCNC: 23 MG/DL (ref 6–20)
CALCIUM SERPL-MCNC: 9.3 MG/DL (ref 8.6–10.2)
CHLORIDE BLD-SCNC: 105 MMOL/L (ref 98–107)
CO2: 28 MMOL/L (ref 22–29)
CREAT SERPL-MCNC: 1.1 MG/DL (ref 0.7–1.2)
GFR AFRICAN AMERICAN: >60
GFR NON-AFRICAN AMERICAN: >60 ML/MIN/1.73
GLUCOSE BLD-MCNC: 93 MG/DL (ref 74–99)
POTASSIUM SERPL-SCNC: 4.5 MMOL/L (ref 3.5–5)
SODIUM BLD-SCNC: 141 MMOL/L (ref 132–146)

## 2020-09-05 PROCEDURE — 6370000000 HC RX 637 (ALT 250 FOR IP): Performed by: INTERNAL MEDICINE

## 2020-09-05 PROCEDURE — 36415 COLL VENOUS BLD VENIPUNCTURE: CPT

## 2020-09-05 PROCEDURE — G0378 HOSPITAL OBSERVATION PER HR: HCPCS

## 2020-09-05 PROCEDURE — 6360000002 HC RX W HCPCS: Performed by: INTERNAL MEDICINE

## 2020-09-05 PROCEDURE — 80048 BASIC METABOLIC PNL TOTAL CA: CPT

## 2020-09-05 PROCEDURE — 2580000003 HC RX 258: Performed by: INTERNAL MEDICINE

## 2020-09-05 RX ADMIN — CHLORPROMAZINE HYDROCHLORIDE 100 MG: 25 TABLET, SUGAR COATED ORAL at 00:28

## 2020-09-05 RX ADMIN — CLOTRIMAZOLE AND BETAMETHASONE DIPROPIONATE: 10; .5 LOTION TOPICAL at 08:32

## 2020-09-05 RX ADMIN — SODIUM CHLORIDE: 9 INJECTION, SOLUTION INTRAVENOUS at 00:28

## 2020-09-05 RX ADMIN — CHLORPROMAZINE HYDROCHLORIDE 100 MG: 25 TABLET, SUGAR COATED ORAL at 08:26

## 2020-09-05 RX ADMIN — FENTANYL CITRATE 100 MCG: 50 INJECTION, SOLUTION INTRAMUSCULAR; INTRAVENOUS at 02:21

## 2020-09-05 RX ADMIN — DIVALPROEX SODIUM 500 MG: 250 TABLET, DELAYED RELEASE ORAL at 06:42

## 2020-09-05 RX ADMIN — ATORVASTATIN CALCIUM 10 MG: 10 TABLET, FILM COATED ORAL at 08:26

## 2020-09-05 RX ADMIN — DIVALPROEX SODIUM 500 MG: 250 TABLET, DELAYED RELEASE ORAL at 00:28

## 2020-09-05 RX ADMIN — MIRTAZAPINE 15 MG: 15 TABLET, FILM COATED ORAL at 00:28

## 2020-09-05 ASSESSMENT — PAIN DESCRIPTION - LOCATION: LOCATION: ABDOMEN

## 2020-09-05 ASSESSMENT — PAIN SCALES - GENERAL
PAINLEVEL_OUTOF10: 9
PAINLEVEL_OUTOF10: 3
PAINLEVEL_OUTOF10: 5

## 2020-09-05 ASSESSMENT — PAIN DESCRIPTION - PAIN TYPE: TYPE: ACUTE PAIN

## 2020-09-05 NOTE — H&P
Vega Schrader is a 49 y.o. male presents emergency department with a history of Crohn's disease with ostomy bag placed and May and the sudden onset of abdominal pain today sharp in nature 10 out of 10 in severity. Patient states when he began he was worried that he with another small bowel obstruction similar to what happened previously so he decided come emergency department to be evaluated. On arrival the patient is uncomfortable appearing pulse of 125 with a blood pressure 184/104. Denies any nausea or vomiting or diarrhea states his ostomy in place normal output no blood through the ostomy. He also denies any fever, chills. was in LILLY Creat 2.2 ,admitted observation to monitor     Past Medical History:   Diagnosis Date    ADD (attention deficit disorder)     Anxiety     Bipolar 1 disorder ([facility] [address] 11/19/2017    Cancer ([facility] [address] prostate cancer    2014 / treated with surgery    Crohn's disease ([facility]     Depression     GERD (gastroesophageal reflux disease)     Panic attack     Rectal pain     has a fissure    Schizo affective schizophrenia ([facility] [address]     stable       Past Surgical History:   Procedure Laterality Date    COLONOSCOPY      COLONOSCOPY N/A 1/28/2020    COLONOSCOPY WITH BIOPSY performed by Kenzie Messina MD at [address], [place], DIAGNOSTIC      INCISION AND DRAINAGE N/A 5/15/2019    I & D SCROTAL ABSCESS WITH EXPLANTATION ARTIFICIAL URINARY SPHINCTER COMPLEX URETHROPLASTY performed by Ambar Dowd DO at [facility]  2002? deviated septum    PROSTATECTOMY  9/15/2014    laparoscopic robotic assisted. Family History   Problem Relation Age of Onset    Mental Illness Mother     Mental Illness Father     Mental Illness Sister     Mental Illness Brother     Heart Disease Mother     Depression Father        Prior to Admission medications    Medication Sig Start Date End Date Taking?  Authorizing Provider   amLODIPine (NORVASC) 10 MG tablet Take 1 tablet by mouth daily 8/26/20  Yes Skylar Buctatiana, DO   atorvastatin (LIPITOR) 10 MG tablet Take 1 tablet by mouth daily 7/17/20  Yes Skylar Bucburto, DO   chlorproMAZINE (THORAZINE) 25 MG tablet Take 100 mg by mouth 4 times daily    Yes Historical Provider, MD   mirtazapine (REMERON) 15 MG tablet Take 15 mg by mouth nightly   Yes Historical Provider, MD   clotrimazole-betamethasone (LOTRISONE) 1-0.05 % cream Apply topically 2 times daily Apply topically 2 times daily. 7/14/20  Yes Skylar Buctatiana, DO   divalproex (DEPAKOTE) 500 MG DR tablet Take 1 tablet by mouth every 12 hours  Patient taking differently: Take 500 mg by mouth 3 times daily  4/24/20 3/1/23 Yes Trisha Marrero, APRN - CNP        Allergies: Ciprofloxacin hcl; Morphine; and Nsaids    Social History     Tobacco Use    Smoking status: Heavy Tobacco Smoker     Packs/day: 2.00     Types: Cigarettes     Start date: 1/1/1978    Smokeless tobacco: Never Used    Tobacco comment: states quitting tomorrow 4/3/2020   Substance Use Topics    Alcohol use: No     Comment: no alcohol in5 yrs        Review of Systems:  Respiratory: negative for cough and hemoptysis  Cardiovascular: negative for chest pain and dyspnea  Gastrointestinal: pos. for abdominal pain, no diarrhea, nausea or vomiting  Genitourinary:negative for dysuria and hematuria  Derm: negative for rash and skin lesion(s)  Neurological: negative for seizures and tremors  Endocrine: negative for diabetic symptoms including polydipsia and polyuria    Physical Exam:  Vitals:    09/05/20 0815   BP: (!) 150/86   Pulse: 73   Resp: 18   Temp: 97.4 °F (36.3 °C)   SpO2: 95%      Skin:  Warm and dry. No rash or bruises  HEENT:  PERRLA, EOMI  Neck:  No JVD, No thyromegaly, No carotid bruit  Cardiac:  RRR, No gallop or murmur  Lungs:  CTA, Normal percussion  Abdomen: Normal bowel sounds, no HSM, non-tender,colostomy   Extremities:  No clubbing, edema or cyanosis  Neurological:  Moves all extremities.     Labs:    CBC with Differential:    Lab Results   Component Value Date    WBC 10.1 09/04/2020    RBC 4.92 09/04/2020    HGB 13.0 09/04/2020    HCT 41.5 09/04/2020     09/04/2020    MCV 84.3 09/04/2020    MCH 26.4 09/04/2020    MCHC 31.3 09/04/2020    RDW 17.6 09/04/2020    NRBC 0.0 04/03/2020    BANDSPCT 1 09/17/2014    METASPCT 1.7 04/02/2020    LYMPHOPCT 20.3 09/04/2020    MONOPCT 8.2 09/04/2020    MYELOPCT 2.6 04/03/2020    BASOPCT 0.8 09/04/2020    MONOSABS 0.83 09/04/2020    LYMPHSABS 2.05 09/04/2020    EOSABS 0.21 09/04/2020    BASOSABS 0.08 09/04/2020     CMP:    Lab Results   Component Value Date     09/05/2020    K 4.5 09/05/2020    K 4.3 09/04/2020     09/05/2020    CO2 28 09/05/2020    BUN 23 09/05/2020    CREATININE 1.1 09/05/2020    GFRAA >60 09/05/2020    LABGLOM >60 09/05/2020    GLUCOSE 93 09/05/2020    GLUCOSE 118 02/05/2011    PROT 6.4 09/04/2020    LABALBU 3.8 09/04/2020    LABALBU 3.5 02/05/2011    CALCIUM 9.3 09/05/2020    BILITOT <0.2 09/04/2020    ALKPHOS 76 09/04/2020    AST 13 09/04/2020    ALT 15 09/04/2020          Assessment and Plan:    Patient Active Problem List   Diagnosis    LILLY    Cro[facility] disease ([facility] [address] [address]    GERD (gastroesophageal reflux disease)    Schizoaffective disorder, bipolar type ([facility]    Essential hypertension    Opiate abuse, episodic ([facility] [address] [address]

## 2020-09-09 LAB
BLOOD CULTURE, ROUTINE: NORMAL
CULTURE, BLOOD 2: NORMAL

## 2020-09-29 ENCOUNTER — HOSPITAL ENCOUNTER (EMERGENCY)
Age: 55
Discharge: HOME | DRG: 372 | End: 2020-09-29
Attending: EMERGENCY MEDICINE
Payer: MEDICARE

## 2020-09-29 ENCOUNTER — APPOINTMENT (OUTPATIENT)
Dept: CT IMAGING | Age: 55
DRG: 372 | End: 2020-09-29
Payer: MEDICARE

## 2020-09-29 VITALS
RESPIRATION RATE: 16 BRPM | TEMPERATURE: 97.3 F | DIASTOLIC BLOOD PRESSURE: 92 MMHG | WEIGHT: 212 LBS | HEART RATE: 68 BPM | OXYGEN SATURATION: 96 % | HEIGHT: 75 IN | SYSTOLIC BLOOD PRESSURE: 139 MMHG | BODY MASS INDEX: 26.36 KG/M2

## 2020-09-29 LAB
ALBUMIN SERPL-MCNC: 4 G/DL (ref 3.5–5.2)
ALP BLD-CCNC: 105 U/L (ref 40–129)
ALT SERPL-CCNC: 12 U/L (ref 0–40)
ANION GAP SERPL CALCULATED.3IONS-SCNC: 11 MMOL/L (ref 7–16)
AST SERPL-CCNC: 19 U/L (ref 0–39)
BACTERIA: ABNORMAL /HPF
BASOPHILS ABSOLUTE: 0.06 E9/L (ref 0–0.2)
BASOPHILS RELATIVE PERCENT: 0.6 % (ref 0–2)
BILIRUB SERPL-MCNC: <0.2 MG/DL (ref 0–1.2)
BILIRUBIN URINE: NEGATIVE
BLOOD, URINE: NEGATIVE
BUN BLDV-MCNC: 16 MG/DL (ref 6–20)
CALCIUM SERPL-MCNC: 9.6 MG/DL (ref 8.6–10.2)
CHLORIDE BLD-SCNC: 102 MMOL/L (ref 98–107)
CLARITY: CLEAR
CO2: 23 MMOL/L (ref 22–29)
COLOR: YELLOW
CREAT SERPL-MCNC: 1 MG/DL (ref 0.7–1.2)
EOSINOPHILS ABSOLUTE: 0.38 E9/L (ref 0.05–0.5)
EOSINOPHILS RELATIVE PERCENT: 3.9 % (ref 0–6)
GFR AFRICAN AMERICAN: >60
GFR NON-AFRICAN AMERICAN: >60 ML/MIN/1.73
GLUCOSE BLD-MCNC: 116 MG/DL (ref 74–99)
GLUCOSE URINE: NEGATIVE MG/DL
HCT VFR BLD CALC: 45.4 % (ref 37–54)
HEMOGLOBIN: 14.8 G/DL (ref 12.5–16.5)
IMMATURE GRANULOCYTES #: 0.03 E9/L
IMMATURE GRANULOCYTES %: 0.3 % (ref 0–5)
KETONES, URINE: NEGATIVE MG/DL
LACTIC ACID: 2.3 MMOL/L (ref 0.5–2.2)
LEUKOCYTE ESTERASE, URINE: ABNORMAL
LIPASE: 22 U/L (ref 13–60)
LYMPHOCYTES ABSOLUTE: 1.66 E9/L (ref 1.5–4)
LYMPHOCYTES RELATIVE PERCENT: 16.9 % (ref 20–42)
MCH RBC QN AUTO: 27 PG (ref 26–35)
MCHC RBC AUTO-ENTMCNC: 32.6 % (ref 32–34.5)
MCV RBC AUTO: 82.7 FL (ref 80–99.9)
MONOCYTES ABSOLUTE: 1.33 E9/L (ref 0.1–0.95)
MONOCYTES RELATIVE PERCENT: 13.5 % (ref 2–12)
NEUTROPHILS ABSOLUTE: 6.36 E9/L (ref 1.8–7.3)
NEUTROPHILS RELATIVE PERCENT: 64.8 % (ref 43–80)
NITRITE, URINE: NEGATIVE
PDW BLD-RTO: 19.1 FL (ref 11.5–15)
PH UA: 6 (ref 5–9)
PLATELET # BLD: 328 E9/L (ref 130–450)
PMV BLD AUTO: 9.2 FL (ref 7–12)
POTASSIUM REFLEX MAGNESIUM: 4.6 MMOL/L (ref 3.5–5)
PROTEIN UA: NEGATIVE MG/DL
RBC # BLD: 5.49 E12/L (ref 3.8–5.8)
RBC UA: ABNORMAL /HPF (ref 0–2)
SODIUM BLD-SCNC: 136 MMOL/L (ref 132–146)
SPECIFIC GRAVITY UA: 1.01 (ref 1–1.03)
TOTAL PROTEIN: 7.2 G/DL (ref 6.4–8.3)
UROBILINOGEN, URINE: 0.2 E.U./DL
WBC # BLD: 9.8 E9/L (ref 4.5–11.5)
WBC UA: >20 /HPF (ref 0–5)

## 2020-09-29 PROCEDURE — 80053 COMPREHEN METABOLIC PANEL: CPT

## 2020-09-29 PROCEDURE — 83690 ASSAY OF LIPASE: CPT

## 2020-09-29 PROCEDURE — 81001 URINALYSIS AUTO W/SCOPE: CPT

## 2020-09-29 PROCEDURE — 83605 ASSAY OF LACTIC ACID: CPT

## 2020-09-29 PROCEDURE — 36415 COLL VENOUS BLD VENIPUNCTURE: CPT

## 2020-09-29 PROCEDURE — 74177 CT ABD & PELVIS W/CONTRAST: CPT

## 2020-09-29 PROCEDURE — 6360000002 HC RX W HCPCS: Performed by: PHYSICIAN ASSISTANT

## 2020-09-29 PROCEDURE — 99284 EMERGENCY DEPT VISIT MOD MDM: CPT

## 2020-09-29 PROCEDURE — 96365 THER/PROPH/DIAG IV INF INIT: CPT

## 2020-09-29 PROCEDURE — 2580000003 HC RX 258: Performed by: RADIOLOGY

## 2020-09-29 PROCEDURE — 96375 TX/PRO/DX INJ NEW DRUG ADDON: CPT

## 2020-09-29 PROCEDURE — 6370000000 HC RX 637 (ALT 250 FOR IP): Performed by: PHYSICIAN ASSISTANT

## 2020-09-29 PROCEDURE — 85025 COMPLETE CBC W/AUTO DIFF WBC: CPT

## 2020-09-29 PROCEDURE — 6360000004 HC RX CONTRAST MEDICATION: Performed by: RADIOLOGY

## 2020-09-29 PROCEDURE — 2580000003 HC RX 258: Performed by: PHYSICIAN ASSISTANT

## 2020-09-29 PROCEDURE — 96376 TX/PRO/DX INJ SAME DRUG ADON: CPT

## 2020-09-29 RX ORDER — SODIUM CHLORIDE 0.9 % (FLUSH) 0.9 %
10 SYRINGE (ML) INJECTION 2 TIMES DAILY
Status: DISCONTINUED | OUTPATIENT
Start: 2020-09-29 | End: 2020-09-29 | Stop reason: HOSPADM

## 2020-09-29 RX ORDER — OXYCODONE HYDROCHLORIDE AND ACETAMINOPHEN 5; 325 MG/1; MG/1
1 TABLET ORAL EVERY 6 HOURS PRN
Qty: 10 TABLET | Refills: 0 | Status: ON HOLD | OUTPATIENT
Start: 2020-09-29 | End: 2020-10-02 | Stop reason: ALTCHOICE

## 2020-09-29 RX ORDER — ONDANSETRON 2 MG/ML
4 INJECTION INTRAMUSCULAR; INTRAVENOUS ONCE
Status: COMPLETED | OUTPATIENT
Start: 2020-09-29 | End: 2020-09-29

## 2020-09-29 RX ORDER — METRONIDAZOLE 500 MG/1
500 TABLET ORAL 3 TIMES DAILY
Qty: 30 TABLET | Refills: 0 | Status: ON HOLD | OUTPATIENT
Start: 2020-09-29 | End: 2020-10-05 | Stop reason: HOSPADM

## 2020-09-29 RX ORDER — METRONIDAZOLE 500 MG/1
500 TABLET ORAL ONCE
Status: COMPLETED | OUTPATIENT
Start: 2020-09-29 | End: 2020-09-29

## 2020-09-29 RX ORDER — CEFDINIR 300 MG/1
300 CAPSULE ORAL 2 TIMES DAILY
Qty: 20 CAPSULE | Refills: 0 | Status: ON HOLD | OUTPATIENT
Start: 2020-09-29 | End: 2020-10-05 | Stop reason: HOSPADM

## 2020-09-29 RX ORDER — PREDNISONE 10 MG/1
40 TABLET ORAL DAILY
Qty: 20 TABLET | Refills: 0 | Status: ON HOLD | OUTPATIENT
Start: 2020-09-29 | End: 2020-10-05 | Stop reason: HOSPADM

## 2020-09-29 RX ORDER — ONDANSETRON 4 MG/1
4 TABLET, ORALLY DISINTEGRATING ORAL EVERY 8 HOURS PRN
Qty: 24 TABLET | Refills: 0 | Status: SHIPPED | OUTPATIENT
Start: 2020-09-29 | End: 2020-10-21 | Stop reason: ALTCHOICE

## 2020-09-29 RX ORDER — 0.9 % SODIUM CHLORIDE 0.9 %
1000 INTRAVENOUS SOLUTION INTRAVENOUS ONCE
Status: COMPLETED | OUTPATIENT
Start: 2020-09-29 | End: 2020-09-29

## 2020-09-29 RX ADMIN — WATER 1 G: 1 INJECTION INTRAMUSCULAR; INTRAVENOUS; SUBCUTANEOUS at 04:09

## 2020-09-29 RX ADMIN — Medication 10 ML: at 03:12

## 2020-09-29 RX ADMIN — ONDANSETRON 4 MG: 2 INJECTION INTRAMUSCULAR; INTRAVENOUS at 01:50

## 2020-09-29 RX ADMIN — HYDROMORPHONE HYDROCHLORIDE 0.5 MG: 1 INJECTION, SOLUTION INTRAMUSCULAR; INTRAVENOUS; SUBCUTANEOUS at 04:39

## 2020-09-29 RX ADMIN — HYDROMORPHONE HYDROCHLORIDE 0.5 MG: 1 INJECTION, SOLUTION INTRAMUSCULAR; INTRAVENOUS; SUBCUTANEOUS at 01:49

## 2020-09-29 RX ADMIN — IOPAMIDOL 100 ML: 755 INJECTION, SOLUTION INTRAVENOUS at 03:14

## 2020-09-29 RX ADMIN — METRONIDAZOLE 500 MG: 500 TABLET, FILM COATED ORAL at 04:38

## 2020-09-29 RX ADMIN — SODIUM CHLORIDE 1000 ML: 9 INJECTION, SOLUTION INTRAVENOUS at 04:09

## 2020-09-29 ASSESSMENT — PAIN DESCRIPTION - PAIN TYPE: TYPE: ACUTE PAIN

## 2020-09-29 ASSESSMENT — PAIN SCALES - WONG BAKER: WONGBAKER_NUMERICALRESPONSE: 0

## 2020-09-29 ASSESSMENT — PAIN SCALES - GENERAL
PAINLEVEL_OUTOF10: 5
PAINLEVEL_OUTOF10: 9
PAINLEVEL_OUTOF10: 10
PAINLEVEL_OUTOF10: 8

## 2020-09-29 ASSESSMENT — PAIN DESCRIPTION - LOCATION: LOCATION: ABDOMEN

## 2020-09-29 NOTE — ED PROVIDER NOTES
ED Attending  CC: No           Department of Emergency Medicine   ED  Provider Note  Admit Date/RoomTime: 9/29/2020 12:41 AM  ED Room: 22/22  Chief Complaint     Abdominal Pain (mid center hx chrones)    History of Present Illness   Source of history provided by:  patient. History/Exam Limitations: none. Angela Kelly is a 48 y.o. old male who has a past medical history of:   Past Medical History:   Diagnosis Date    ADD (attention deficit disorder)     Anxiety     Bipolar 1 disorder ([facility] [address] 11/19/2017    Cancer ([facility] [address] prostate cancer    2014 / treated with surgery    Crohn's disease ([facility] [address]     Depression     GERD (gastroesophageal reflux disease)     Panic attack     Rectal pain     has a fissure    Schizo affective schizophrenia ([facility] [address]     stable    presents to the emergency department by private vehicle, for complaints of sudden onset aching pain in the periumbilical area and right around his colostomy bag without radiation which began 1 hour(s) prior to arrival.  There has been no similar episodes in the past (patient reports he has had lots of episodes of belly pain in the past but never 1 like this, and since having his ostomy never right at the ostomy site. ). Since onset the symptoms have been persistent. The pain is associated with nothing additional.  The pain is aggravated by movement and relieved by nothing. There has been NO back pain, cloudy urine, dysuria, hematuria, penile discharge, vomiting or fever. Pt surgeons are at [facility]. .  ROS   Pertinent positives and negatives are stated within HPI, all other systems reviewed and are negative.     Past Surgical History:   Procedure Laterality Date    COLONOSCOPY      COLONOSCOPY N/A 1/28/2020    COLONOSCOPY WITH BIOPSY performed by Kori Castrejon MD at [address] [place] [address], COLON, DIAGNOSTIC      INCISION AND DRAINAGE N/A 5/15/2019    I & D SCROTAL ABSCESS WITH EXPLANTATION ARTIFICIAL URINARY SPHINCTER COMPLEX URETHROPLASTY performed by Greg Dowd DO at [facility] SURGERY  2002? deviated septum    PROSTATECTOMY  9/15/2014    laparoscopic robotic assisted. Social History:  reports that he has been smoking cigarettes. He started smoking about 42 years ago. He has been smoking about 1.00 pack per day. He has never used smokeless tobacco. He reports that he does not drink alcohol or use drugs. Family History: family history includes Depression in his father; Heart Disease in his mother; Mental Illness in his brother, father, mother, and sister. Allergies: Ciprofloxacin hcl; Morphine; and Nsaids    Physical Exam           ED Triage Vitals   BP Temp Temp Source Pulse Resp SpO2 Height Weight   09/29/20 0013 09/29/20 0009 09/29/20 0009 09/29/20 0009 09/29/20 0009 09/29/20 0009 09/29/20 0009 09/29/20 0009   123/85 96.9 °F (36.1 °C) Infrared 110 16 96 % 6' 3\" (1.905 m) 212 lb (96.2 kg)      Oxygen Saturation Interpretation: Normal.    · General Appearance/Constitutional:  Alert, development consistent with age. · HEENT:  NC/NT. PERRLA. Airway patent. Oral mucosa moist.   · Neck:  Supple. No lymphadenopathy. · Respiratory: Lungs Clear to auscultation and breath sounds equal.  · CV:  Regular rate and rhythm. · GI:  General Appearance: normal.         Bowel sounds: normal bowel sounds. Distension:  None. Tenderness: mild tenderness is present in the periumbilical area and just above the rlq ostomy, no rebound tenderness, no guarding, abdominal rigidity is absent. Liver: non-tender. Spleen:  non-tender. Pulsatile Mass: absent. Hernia:  No herniation of ostomy or other noted ventral wall hernias around the site. · Back: CVA Tenderness: No.  · Integument:  Normal turgor. Warm, dry, without visible rash, unless noted elsewhere. · Neurological:  Orientation age-appropriate. Motor functions intact.     Lab / Imaging Results   (All laboratory and radiology results have been personally reviewed by myself)  Labs:  Results for orders placed or performed during the hospital encounter of 09/29/20   Comprehensive Metabolic Panel w/ Reflex to MG   Result Value Ref Range    Sodium 136 132 - 146 mmol/L    Potassium reflex Magnesium 4.6 3.5 - 5.0 mmol/L    Chloride 102 98 - 107 mmol/L    CO2 23 22 - 29 mmol/L    Anion Gap 11 7 - 16 mmol/L    Glucose 116 (H) 74 - 99 mg/dL    BUN 16 6 - 20 mg/dL    CREATININE 1.0 0.7 - 1.2 mg/dL    GFR Non-African American >60 >=60 mL/min/1.73    GFR African American >60     Calcium 9.6 8.6 - 10.2 mg/dL    Total Protein 7.2 6.4 - 8.3 g/dL    Alb 4.0 3.5 - 5.2 g/dL    Total Bilirubin <0.2 0.0 - 1.2 mg/dL    Alkaline Phosphatase 105 40 - 129 U/L    ALT 12 0 - 40 U/L    AST 19 0 - 39 U/L   CBC Auto Differential   Result Value Ref Range    WBC 9.8 4.5 - 11.5 E9/L    RBC 5.49 3.80 - 5.80 E12/L    Hemoglobin 14.8 12.5 - 16.5 g/dL    Hematocrit 45.4 37.0 - 54.0 %    MCV 82.7 80.0 - 99.9 fL    MCH 27.0 26.0 - 35.0 pg    MCHC 32.6 32.0 - 34.5 %    RDW 19.1 (H) 11.5 - 15.0 fL    Platelets 152 728 - 413 E9/L    MPV 9.2 7.0 - 12.0 fL    Neutrophils % 64.8 43.0 - 80.0 %    Immature Granulocytes % 0.3 0.0 - 5.0 %    Lymphocytes % 16.9 (L) 20.0 - 42.0 %    Monocytes % 13.5 (H) 2.0 - 12.0 %    Eosinophils % 3.9 0.0 - 6.0 %    Basophils % 0.6 0.0 - 2.0 %    Neutrophils Absolute 6.36 1.80 - 7.30 E9/L    Immature Granulocytes # 0.03 E9/L    Lymphocytes Absolute 1.66 1.50 - 4.00 E9/L    Monocytes Absolute 1.33 (H) 0.10 - 0.95 E9/L    Eosinophils Absolute 0.38 0.05 - 0.50 E9/L    Basophils Absolute 0.06 0.00 - 0.20 E9/L   Lipase   Result Value Ref Range    Lipase 22 13 - 60 U/L   Lactic Acid, Plasma   Result Value Ref Range    Lactic Acid 2.3 (H) 0.5 - 2.2 mmol/L   Urinalysis with Microscopic   Result Value Ref Range    Color, UA Yellow Straw/Yellow    Clarity, UA Clear Clear    Glucose, Ur Negative Negative mg/dL    Bilirubin Urine Negative Negative Ketones, Urine Negative Negative mg/dL    Specific Gravity, UA 1.010 1.005 - 1.030    Blood, Urine Negative Negative    pH, UA 6.0 5.0 - 9.0    Protein, UA Negative Negative mg/dL    Urobilinogen, Urine 0.2 <2.0 E.U./dL    Nitrite, Urine Negative Negative    Leukocyte Esterase, Urine MODERATE (A) Negative    WBC, UA >20 (A) 0 - 5 /HPF    RBC, UA NONE 0 - 2 /HPF    Bacteria, UA RARE (A) None Seen /HPF     Imaging: All Radiology results interpreted by Radiologist unless otherwise noted. CT ABDOMEN PELVIS W IV CONTRAST Additional Contrast? None   Final Result   Findings consistent with patient's history of Crohn's disease and right lower    quadrant ostomy again noted. Peristomal hernia is stable. New 2.5 x 1.2 x 2.9 cm fluid collection is seen within the bowel mesentery of    the right lower quadrant. This finding is suspicious for an abscess. A fistula    is less likely however cannot be excluded without oral contrast agent. Thickening of the urinary bladder is noted. If indicated clinically recommend    correlation with urine analysis to assess for urinary tract infection.        This report has been electronically signed by Aubrey Bear MD.          ED Course / Medical Decision Making     Medications   HYDROmorphone (DILAUDID) injection 0.5 mg (0.5 mg Intravenous Given 9/29/20 0149)   ondansetron (ZOFRAN) injection 4 mg (4 mg Intravenous Given 9/29/20 0150)   iopamidol (ISOVUE-370) 76 % injection 100 mL (100 mLs Intravenous Given 9/29/20 0314)   cefTRIAXone (ROCEPHIN) 1 g in sterile water 10 mL IV syringe (0 g Intravenous Stopped 9/29/20 0429)   0.9 % sodium chloride bolus (0 mLs Intravenous Stopped 9/29/20 0553)   metroNIDAZOLE (FLAGYL) tablet 500 mg (500 mg Oral Given 9/29/20 0438)   HYDROmorphone (DILAUDID) injection 0.5 mg (0.5 mg Intravenous Given 9/29/20 0439)        Re-Evaluations:  9/29/20      Time:0245 Improved with pain medicATION,   0345 Dr. Anita Pedro and I reevaluated and discussed finding with patient. CT finding of small mesenteric abscess and stable appearance of peristomal hernia. Pt feels well enough for home discharge and personal follow up with his surgeons at [facility]. He has no focal tenderness in the RLQ. Pain is completely controlled. We are starting antibiotics in ED and prescriptions for home, he is advised to return here if worsening. Otherwise he is going to call his [facility] surgeon tomorrow and arrange visit for further evaluation within the next 1-2 days. Consultations:             None    Procedures:   none    MDM: She is presents to emergency with sudden onset of pain superior to his right lower quadrant colostomy. Pain controlled in department with Dilaudid. Patient received Rocephin and Flagyl in ED. Findings on CT show a stable parastomal hernia and small fluid collection in the right lower quadrant mesentery suspicious for an abscess. Patient does not have pain in the right lower quadrant. Patient is agreeable to plan of home with antibiotics and follow-up with his surgeons at [facility] clinic within the next 1 to 2 days. Return to emergency with any worsening symptoms. Counseling:   I have spoken with the patient and discussed todays results, in addition to providing specific details for the plan of care and counseling regarding the diagnosis and prognosis and are agreeable with the plan. Assessment      1. Peristomal hernia    2. Mesenteric abscess [facility])      This patient's ED course included: a personal history and physicial examination, multiple bedside re-evaluations and IV medications  This patient has remained hemodynamically stable and improved during their ED course. Plan   Discharge to home. Patient condition is stable.     New Medications     Discharge Medication List as of 9/29/2020  4:28 AM      START taking these medications    Details   oxyCODONE-acetaminophen (PERCOCET) 5-325 MG per tablet Take 1 tablet by mouth every 6 hours as needed for Pain for up to 3 days. , Disp-10 tablet,R-0Print      ondansetron (ZOFRAN ODT) 4 MG disintegrating tablet Take 1 tablet by mouth every 8 hours as needed for Nausea or Vomiting, Disp-24 tablet,R-0Print      cefdinir (OMNICEF) 300 MG capsule Take 1 capsule by mouth 2 times daily for 10 days, Disp-20 capsule,R-0Print      metroNIDAZOLE (FLAGYL) 500 MG tablet Take 1 tablet by mouth 3 times daily for 10 days, Disp-30 tablet,R-0Print      predniSONE (DELTASONE) 10 MG tablet Take 4 tablets by mouth daily for 5 days, Disp-20 tablet,R-0Print           Electronically signed by Paulette Camacho PA-C   DD: 9/29/20  **This report was transcribed using voice recognition software. Every effort was made to ensure accuracy; however, inadvertent computerized transcription errors may be present.   END OF PROVIDER NOTE       Paulette Camacho PA-C  09/29/20 [id number]

## 2020-10-01 ENCOUNTER — HOSPITAL ENCOUNTER (INPATIENT)
Age: 55
LOS: 3 days | Discharge: HOME | DRG: 372 | End: 2020-10-05
Attending: EMERGENCY MEDICINE | Admitting: NEUROMUSCULOSKELETAL MEDICINE & OMM
Payer: MEDICARE

## 2020-10-01 ENCOUNTER — APPOINTMENT (OUTPATIENT)
Dept: CT IMAGING | Age: 55
DRG: 372 | End: 2020-10-01
Payer: MEDICARE

## 2020-10-01 LAB
ALBUMIN SERPL-MCNC: 4.1 G/DL (ref 3.5–5.2)
ALP BLD-CCNC: 110 U/L (ref 40–129)
ALT SERPL-CCNC: 11 U/L (ref 0–40)
ANION GAP SERPL CALCULATED.3IONS-SCNC: 10 MMOL/L (ref 7–16)
ANISOCYTOSIS: ABNORMAL
AST SERPL-CCNC: 13 U/L (ref 0–39)
BASOPHILS ABSOLUTE: 0.05 E9/L (ref 0–0.2)
BASOPHILS RELATIVE PERCENT: 0.5 % (ref 0–2)
BILIRUB SERPL-MCNC: <0.2 MG/DL (ref 0–1.2)
BILIRUBIN URINE: NEGATIVE
BLOOD, URINE: NEGATIVE
BUN BLDV-MCNC: 19 MG/DL (ref 6–20)
CALCIUM SERPL-MCNC: 9.9 MG/DL (ref 8.6–10.2)
CHLORIDE BLD-SCNC: 99 MMOL/L (ref 98–107)
CLARITY: CLEAR
CO2: 27 MMOL/L (ref 22–29)
COLOR: YELLOW
CREAT SERPL-MCNC: 1.2 MG/DL (ref 0.7–1.2)
EKG ATRIAL RATE: 84 BPM
EKG P AXIS: 52 DEGREES
EKG P-R INTERVAL: 140 MS
EKG Q-T INTERVAL: 342 MS
EKG QRS DURATION: 84 MS
EKG QTC CALCULATION (BAZETT): 404 MS
EKG R AXIS: 62 DEGREES
EKG T AXIS: 68 DEGREES
EKG VENTRICULAR RATE: 84 BPM
EOSINOPHILS ABSOLUTE: 0.01 E9/L (ref 0.05–0.5)
EOSINOPHILS RELATIVE PERCENT: 0.1 % (ref 0–6)
GFR AFRICAN AMERICAN: >60
GFR NON-AFRICAN AMERICAN: >60 ML/MIN/1.73
GLUCOSE BLD-MCNC: 193 MG/DL (ref 74–99)
GLUCOSE URINE: NEGATIVE MG/DL
HCT VFR BLD CALC: 44.3 % (ref 37–54)
HEMOGLOBIN: 14.4 G/DL (ref 12.5–16.5)
IMMATURE GRANULOCYTES #: 0.07 E9/L
IMMATURE GRANULOCYTES %: 0.7 % (ref 0–5)
KETONES, URINE: ABNORMAL MG/DL
LACTIC ACID: 1.9 MMOL/L (ref 0.5–2.2)
LEUKOCYTE ESTERASE, URINE: NEGATIVE
LIPASE: 21 U/L (ref 13–60)
LYMPHOCYTES ABSOLUTE: 0.48 E9/L (ref 1.5–4)
LYMPHOCYTES RELATIVE PERCENT: 4.8 % (ref 20–42)
MCH RBC QN AUTO: 27.1 PG (ref 26–35)
MCHC RBC AUTO-ENTMCNC: 32.5 % (ref 32–34.5)
MCV RBC AUTO: 83.4 FL (ref 80–99.9)
MONOCYTES ABSOLUTE: 0.16 E9/L (ref 0.1–0.95)
MONOCYTES RELATIVE PERCENT: 1.6 % (ref 2–12)
NEUTROPHILS ABSOLUTE: 9.3 E9/L (ref 1.8–7.3)
NEUTROPHILS RELATIVE PERCENT: 92.3 % (ref 43–80)
NITRITE, URINE: NEGATIVE
PDW BLD-RTO: 18.7 FL (ref 11.5–15)
PH UA: 6 (ref 5–9)
PLATELET # BLD: 352 E9/L (ref 130–450)
PMV BLD AUTO: 9.1 FL (ref 7–12)
POTASSIUM SERPL-SCNC: 4.4 MMOL/L (ref 3.5–5)
PROTEIN UA: NEGATIVE MG/DL
RBC # BLD: 5.31 E12/L (ref 3.8–5.8)
SODIUM BLD-SCNC: 136 MMOL/L (ref 132–146)
SPECIFIC GRAVITY UA: 1.02 (ref 1–1.03)
TOTAL PROTEIN: 7.1 G/DL (ref 6.4–8.3)
UROBILINOGEN, URINE: 0.2 E.U./DL
WBC # BLD: 10.1 E9/L (ref 4.5–11.5)

## 2020-10-01 PROCEDURE — 6360000002 HC RX W HCPCS: Performed by: STUDENT IN AN ORGANIZED HEALTH CARE EDUCATION/TRAINING PROGRAM

## 2020-10-01 PROCEDURE — 85025 COMPLETE CBC W/AUTO DIFF WBC: CPT

## 2020-10-01 PROCEDURE — 80053 COMPREHEN METABOLIC PANEL: CPT

## 2020-10-01 PROCEDURE — 96376 TX/PRO/DX INJ SAME DRUG ADON: CPT

## 2020-10-01 PROCEDURE — 2580000003 HC RX 258: Performed by: STUDENT IN AN ORGANIZED HEALTH CARE EDUCATION/TRAINING PROGRAM

## 2020-10-01 PROCEDURE — 96374 THER/PROPH/DIAG INJ IV PUSH: CPT

## 2020-10-01 PROCEDURE — 2580000003 HC RX 258: Performed by: RADIOLOGY

## 2020-10-01 PROCEDURE — 81003 URINALYSIS AUTO W/O SCOPE: CPT

## 2020-10-01 PROCEDURE — 83690 ASSAY OF LIPASE: CPT

## 2020-10-01 PROCEDURE — 83605 ASSAY OF LACTIC ACID: CPT

## 2020-10-01 PROCEDURE — 96361 HYDRATE IV INFUSION ADD-ON: CPT

## 2020-10-01 PROCEDURE — 74177 CT ABD & PELVIS W/CONTRAST: CPT

## 2020-10-01 PROCEDURE — 6360000004 HC RX CONTRAST MEDICATION: Performed by: RADIOLOGY

## 2020-10-01 PROCEDURE — 99283 EMERGENCY DEPT VISIT LOW MDM: CPT

## 2020-10-01 PROCEDURE — 93010 ELECTROCARDIOGRAM REPORT: CPT | Performed by: INTERNAL MEDICINE

## 2020-10-01 PROCEDURE — 93005 ELECTROCARDIOGRAM TRACING: CPT | Performed by: EMERGENCY MEDICINE

## 2020-10-01 RX ORDER — SODIUM CHLORIDE 0.9 % (FLUSH) 0.9 %
10 SYRINGE (ML) INJECTION PRN
Status: DISCONTINUED | OUTPATIENT
Start: 2020-10-01 | End: 2020-10-05 | Stop reason: HOSPADM

## 2020-10-01 RX ORDER — 0.9 % SODIUM CHLORIDE 0.9 %
1000 INTRAVENOUS SOLUTION INTRAVENOUS ONCE
Status: COMPLETED | OUTPATIENT
Start: 2020-10-01 | End: 2020-10-01

## 2020-10-01 RX ADMIN — Medication 10 ML: at 20:27

## 2020-10-01 RX ADMIN — SODIUM CHLORIDE 1000 ML: 9 INJECTION, SOLUTION INTRAVENOUS at 20:13

## 2020-10-01 RX ADMIN — IOPAMIDOL 75 ML: 755 INJECTION, SOLUTION INTRAVENOUS at 20:29

## 2020-10-01 RX ADMIN — HYDROMORPHONE HYDROCHLORIDE 1 MG: 1 INJECTION, SOLUTION INTRAMUSCULAR; INTRAVENOUS; SUBCUTANEOUS at 20:13

## 2020-10-01 RX ADMIN — HYDROMORPHONE HYDROCHLORIDE 1 MG: 1 INJECTION, SOLUTION INTRAMUSCULAR; INTRAVENOUS; SUBCUTANEOUS at 22:16

## 2020-10-01 ASSESSMENT — PAIN SCALES - GENERAL
PAINLEVEL_OUTOF10: 9
PAINLEVEL_OUTOF10: 10
PAINLEVEL_OUTOF10: 10

## 2020-10-01 NOTE — ED NOTES
EKG completed , and presented to provider,. And transmitted.  PT provided urine cup and aware specimen is needed     Lilli Lindsey RN  10/01/20 1956

## 2020-10-02 PROBLEM — K65.1 INTRA-ABDOMINAL ABSCESS (HCC): Status: ACTIVE | Noted: 2020-10-02

## 2020-10-02 LAB
ALBUMIN SERPL-MCNC: 3.5 G/DL (ref 3.5–5.2)
ALP BLD-CCNC: 91 U/L (ref 40–129)
ALT SERPL-CCNC: 7 U/L (ref 0–40)
ANION GAP SERPL CALCULATED.3IONS-SCNC: 10 MMOL/L (ref 7–16)
AST SERPL-CCNC: 9 U/L (ref 0–39)
BASOPHILS ABSOLUTE: 0.06 E9/L (ref 0–0.2)
BASOPHILS RELATIVE PERCENT: 0.6 % (ref 0–2)
BILIRUB SERPL-MCNC: <0.2 MG/DL (ref 0–1.2)
BUN BLDV-MCNC: 19 MG/DL (ref 6–20)
CALCIUM SERPL-MCNC: 9.2 MG/DL (ref 8.6–10.2)
CHLORIDE BLD-SCNC: 106 MMOL/L (ref 98–107)
CO2: 24 MMOL/L (ref 22–29)
CREAT SERPL-MCNC: 1 MG/DL (ref 0.7–1.2)
EOSINOPHILS ABSOLUTE: 0.01 E9/L (ref 0.05–0.5)
EOSINOPHILS RELATIVE PERCENT: 0.1 % (ref 0–6)
GFR AFRICAN AMERICAN: >60
GFR NON-AFRICAN AMERICAN: >60 ML/MIN/1.73
GLUCOSE BLD-MCNC: 101 MG/DL (ref 74–99)
HCT VFR BLD CALC: 37.7 % (ref 37–54)
HEMOGLOBIN: 12.2 G/DL (ref 12.5–16.5)
IMMATURE GRANULOCYTES #: 0.04 E9/L
IMMATURE GRANULOCYTES %: 0.4 % (ref 0–5)
LYMPHOCYTES ABSOLUTE: 1.52 E9/L (ref 1.5–4)
LYMPHOCYTES RELATIVE PERCENT: 15.8 % (ref 20–42)
MCH RBC QN AUTO: 27.1 PG (ref 26–35)
MCHC RBC AUTO-ENTMCNC: 32.4 % (ref 32–34.5)
MCV RBC AUTO: 83.6 FL (ref 80–99.9)
MONOCYTES ABSOLUTE: 0.89 E9/L (ref 0.1–0.95)
MONOCYTES RELATIVE PERCENT: 9.2 % (ref 2–12)
NEUTROPHILS ABSOLUTE: 7.12 E9/L (ref 1.8–7.3)
NEUTROPHILS RELATIVE PERCENT: 73.9 % (ref 43–80)
PDW BLD-RTO: 18.3 FL (ref 11.5–15)
PLATELET # BLD: 322 E9/L (ref 130–450)
PMV BLD AUTO: 9.3 FL (ref 7–12)
POTASSIUM REFLEX MAGNESIUM: 4.1 MMOL/L (ref 3.5–5)
RBC # BLD: 4.51 E12/L (ref 3.8–5.8)
SODIUM BLD-SCNC: 140 MMOL/L (ref 132–146)
TOTAL PROTEIN: 6 G/DL (ref 6.4–8.3)
WBC # BLD: 9.6 E9/L (ref 4.5–11.5)

## 2020-10-02 PROCEDURE — 6370000000 HC RX 637 (ALT 250 FOR IP): Performed by: NEUROMUSCULOSKELETAL MEDICINE & OMM

## 2020-10-02 PROCEDURE — 86360 T CELL ABSOLUTE COUNT/RATIO: CPT

## 2020-10-02 PROCEDURE — 36415 COLL VENOUS BLD VENIPUNCTURE: CPT

## 2020-10-02 PROCEDURE — 82784 ASSAY IGA/IGD/IGG/IGM EACH: CPT

## 2020-10-02 PROCEDURE — 1200000000 HC SEMI PRIVATE

## 2020-10-02 PROCEDURE — 85025 COMPLETE CBC W/AUTO DIFF WBC: CPT

## 2020-10-02 PROCEDURE — 6360000002 HC RX W HCPCS: Performed by: SPECIALIST

## 2020-10-02 PROCEDURE — 2500000003 HC RX 250 WO HCPCS: Performed by: NEUROMUSCULOSKELETAL MEDICINE & OMM

## 2020-10-02 PROCEDURE — 86359 T CELLS TOTAL COUNT: CPT

## 2020-10-02 PROCEDURE — 80053 COMPREHEN METABOLIC PANEL: CPT

## 2020-10-02 PROCEDURE — 6370000000 HC RX 637 (ALT 250 FOR IP): Performed by: SPECIALIST

## 2020-10-02 PROCEDURE — 2580000003 HC RX 258: Performed by: STUDENT IN AN ORGANIZED HEALTH CARE EDUCATION/TRAINING PROGRAM

## 2020-10-02 PROCEDURE — 2580000003 HC RX 258: Performed by: NEUROMUSCULOSKELETAL MEDICINE & OMM

## 2020-10-02 PROCEDURE — 2580000003 HC RX 258: Performed by: SPECIALIST

## 2020-10-02 PROCEDURE — 6360000002 HC RX W HCPCS: Performed by: STUDENT IN AN ORGANIZED HEALTH CARE EDUCATION/TRAINING PROGRAM

## 2020-10-02 PROCEDURE — 2500000003 HC RX 250 WO HCPCS: Performed by: STUDENT IN AN ORGANIZED HEALTH CARE EDUCATION/TRAINING PROGRAM

## 2020-10-02 RX ORDER — SODIUM CHLORIDE 9 MG/ML
INJECTION, SOLUTION INTRAVENOUS EVERY 8 HOURS
Status: DISCONTINUED | OUTPATIENT
Start: 2020-10-02 | End: 2020-10-05 | Stop reason: HOSPADM

## 2020-10-02 RX ORDER — NICOTINE 21 MG/24HR
1 PATCH, TRANSDERMAL 24 HOURS TRANSDERMAL DAILY
Status: DISCONTINUED | OUTPATIENT
Start: 2020-10-02 | End: 2020-10-05 | Stop reason: HOSPADM

## 2020-10-02 RX ORDER — HEPARIN SODIUM (PORCINE) LOCK FLUSH IV SOLN 100 UNIT/ML 100 UNIT/ML
3 SOLUTION INTRAVENOUS EVERY 12 HOURS SCHEDULED
Status: DISCONTINUED | OUTPATIENT
Start: 2020-10-02 | End: 2020-10-05 | Stop reason: HOSPADM

## 2020-10-02 RX ORDER — HYDROCODONE BITARTRATE AND ACETAMINOPHEN 10; 325 MG/1; MG/1
1 TABLET ORAL EVERY 6 HOURS PRN
Status: DISCONTINUED | OUTPATIENT
Start: 2020-10-02 | End: 2020-10-05 | Stop reason: HOSPADM

## 2020-10-02 RX ORDER — LIDOCAINE HYDROCHLORIDE 10 MG/ML
5 INJECTION, SOLUTION EPIDURAL; INFILTRATION; INTRACAUDAL; PERINEURAL ONCE
Status: COMPLETED | OUTPATIENT
Start: 2020-10-02 | End: 2020-10-03

## 2020-10-02 RX ORDER — MIRTAZAPINE 15 MG/1
15 TABLET, FILM COATED ORAL NIGHTLY
Status: DISCONTINUED | OUTPATIENT
Start: 2020-10-02 | End: 2020-10-05 | Stop reason: HOSPADM

## 2020-10-02 RX ORDER — SODIUM CHLORIDE 9 MG/ML
INJECTION, SOLUTION INTRAVENOUS CONTINUOUS
Status: DISCONTINUED | OUTPATIENT
Start: 2020-10-02 | End: 2020-10-05 | Stop reason: HOSPADM

## 2020-10-02 RX ORDER — ATORVASTATIN CALCIUM 10 MG/1
10 TABLET, FILM COATED ORAL DAILY
Status: DISCONTINUED | OUTPATIENT
Start: 2020-10-02 | End: 2020-10-05 | Stop reason: HOSPADM

## 2020-10-02 RX ORDER — CHLORPROMAZINE HYDROCHLORIDE 25 MG/1
100 TABLET, FILM COATED ORAL 4 TIMES DAILY
Status: DISCONTINUED | OUTPATIENT
Start: 2020-10-02 | End: 2020-10-05 | Stop reason: HOSPADM

## 2020-10-02 RX ORDER — AMLODIPINE BESYLATE 10 MG/1
10 TABLET ORAL DAILY
Status: DISCONTINUED | OUTPATIENT
Start: 2020-10-02 | End: 2020-10-05 | Stop reason: HOSPADM

## 2020-10-02 RX ORDER — HEPARIN SODIUM (PORCINE) LOCK FLUSH IV SOLN 100 UNIT/ML 100 UNIT/ML
3 SOLUTION INTRAVENOUS PRN
Status: DISCONTINUED | OUTPATIENT
Start: 2020-10-02 | End: 2020-10-05 | Stop reason: HOSPADM

## 2020-10-02 RX ORDER — DIVALPROEX SODIUM 250 MG/1
500 TABLET, DELAYED RELEASE ORAL 3 TIMES DAILY
Status: DISCONTINUED | OUTPATIENT
Start: 2020-10-02 | End: 2020-10-05 | Stop reason: HOSPADM

## 2020-10-02 RX ORDER — ACETAMINOPHEN 325 MG/1
650 TABLET ORAL EVERY 6 HOURS PRN
Status: DISCONTINUED | OUTPATIENT
Start: 2020-10-02 | End: 2020-10-05 | Stop reason: HOSPADM

## 2020-10-02 RX ORDER — CLINDAMYCIN PHOSPHATE 300 MG/50ML
300 INJECTION INTRAVENOUS EVERY 8 HOURS
Status: DISCONTINUED | OUTPATIENT
Start: 2020-10-02 | End: 2020-10-02

## 2020-10-02 RX ORDER — SODIUM CHLORIDE 0.9 % (FLUSH) 0.9 %
10 SYRINGE (ML) INJECTION PRN
Status: DISCONTINUED | OUTPATIENT
Start: 2020-10-02 | End: 2020-10-05 | Stop reason: HOSPADM

## 2020-10-02 RX ORDER — PREDNISONE 20 MG/1
40 TABLET ORAL DAILY
Status: DISCONTINUED | OUTPATIENT
Start: 2020-10-02 | End: 2020-10-05 | Stop reason: HOSPADM

## 2020-10-02 RX ADMIN — CHLORPROMAZINE HYDROCHLORIDE 100 MG: 25 TABLET, SUGAR COATED ORAL at 12:52

## 2020-10-02 RX ADMIN — MIRTAZAPINE 15 MG: 15 TABLET, FILM COATED ORAL at 22:00

## 2020-10-02 RX ADMIN — SODIUM CHLORIDE: 9 INJECTION, SOLUTION INTRAVENOUS at 17:32

## 2020-10-02 RX ADMIN — PIPERACILLIN SODIUM AND TAZOBACTAM SODIUM 3.38 G: 3; .375 INJECTION, POWDER, LYOPHILIZED, FOR SOLUTION INTRAVENOUS at 12:52

## 2020-10-02 RX ADMIN — METRONIDAZOLE 500 MG: 500 INJECTION, SOLUTION INTRAVENOUS at 01:06

## 2020-10-02 RX ADMIN — DIVALPROEX SODIUM 500 MG: 250 TABLET, DELAYED RELEASE ORAL at 14:54

## 2020-10-02 RX ADMIN — DIVALPROEX SODIUM 500 MG: 250 TABLET, DELAYED RELEASE ORAL at 21:59

## 2020-10-02 RX ADMIN — PIPERACILLIN SODIUM AND TAZOBACTAM SODIUM 3.38 G: 3; .375 INJECTION, POWDER, LYOPHILIZED, FOR SOLUTION INTRAVENOUS at 21:18

## 2020-10-02 RX ADMIN — SODIUM CHLORIDE: 9 INJECTION, SOLUTION INTRAVENOUS at 17:08

## 2020-10-02 RX ADMIN — HYDROCODONE BITARTRATE AND ACETAMINOPHEN 1 TABLET: 10; 325 TABLET ORAL at 08:42

## 2020-10-02 RX ADMIN — AMLODIPINE BESYLATE 10 MG: 10 TABLET ORAL at 07:52

## 2020-10-02 RX ADMIN — CHLORPROMAZINE HYDROCHLORIDE 100 MG: 25 TABLET, SUGAR COATED ORAL at 08:02

## 2020-10-02 RX ADMIN — SODIUM CHLORIDE: 9 INJECTION, SOLUTION INTRAVENOUS at 03:29

## 2020-10-02 RX ADMIN — CHLORPROMAZINE HYDROCHLORIDE 100 MG: 25 TABLET, SUGAR COATED ORAL at 22:00

## 2020-10-02 RX ADMIN — WATER 1 G: 1 INJECTION INTRAMUSCULAR; INTRAVENOUS; SUBCUTANEOUS at 01:05

## 2020-10-02 RX ADMIN — FLUCONAZOLE 400 MG: 150 TABLET ORAL at 12:56

## 2020-10-02 RX ADMIN — ATORVASTATIN CALCIUM 10 MG: 10 TABLET, FILM COATED ORAL at 07:52

## 2020-10-02 RX ADMIN — DIVALPROEX SODIUM 500 MG: 250 TABLET, DELAYED RELEASE ORAL at 07:53

## 2020-10-02 RX ADMIN — HYDROCODONE BITARTRATE AND ACETAMINOPHEN 1 TABLET: 10; 325 TABLET ORAL at 16:29

## 2020-10-02 RX ADMIN — CLINDAMYCIN IN 5 PERCENT DEXTROSE 300 MG: 6 INJECTION, SOLUTION INTRAVENOUS at 11:09

## 2020-10-02 RX ADMIN — PREDNISONE 40 MG: 20 TABLET ORAL at 07:52

## 2020-10-02 RX ADMIN — CHLORPROMAZINE HYDROCHLORIDE 100 MG: 25 TABLET, SUGAR COATED ORAL at 16:28

## 2020-10-02 RX ADMIN — CLINDAMYCIN IN 5 PERCENT DEXTROSE 300 MG: 6 INJECTION, SOLUTION INTRAVENOUS at 03:30

## 2020-10-02 ASSESSMENT — PAIN SCALES - GENERAL
PAINLEVEL_OUTOF10: 6
PAINLEVEL_OUTOF10: 9
PAINLEVEL_OUTOF10: 7
PAINLEVEL_OUTOF10: 9
PAINLEVEL_OUTOF10: 9

## 2020-10-02 ASSESSMENT — ENCOUNTER SYMPTOMS
VOMITING: 0
SHORTNESS OF BREATH: 0
BACK PAIN: 0
EYE DISCHARGE: 0
WHEEZING: 0
COUGH: 0
DIARRHEA: 0
ABDOMINAL PAIN: 0
NAUSEA: 0
EYE PAIN: 0
SINUS PRESSURE: 0
EYE REDNESS: 0
COLOR CHANGE: 0
SORE THROAT: 0

## 2020-10-02 ASSESSMENT — PAIN DESCRIPTION - ONSET
ONSET: ON-GOING

## 2020-10-02 ASSESSMENT — PAIN DESCRIPTION - LOCATION
LOCATION: ABDOMEN

## 2020-10-02 ASSESSMENT — PAIN DESCRIPTION - FREQUENCY
FREQUENCY: CONTINUOUS

## 2020-10-02 ASSESSMENT — PAIN DESCRIPTION - PROGRESSION
CLINICAL_PROGRESSION: NOT CHANGED
CLINICAL_PROGRESSION: GRADUALLY IMPROVING

## 2020-10-02 ASSESSMENT — PAIN - FUNCTIONAL ASSESSMENT
PAIN_FUNCTIONAL_ASSESSMENT: PREVENTS OR INTERFERES SOME ACTIVE ACTIVITIES AND ADLS

## 2020-10-02 ASSESSMENT — PAIN DESCRIPTION - DESCRIPTORS
DESCRIPTORS: DISCOMFORT;CONSTANT;SHARP
DESCRIPTORS: DISCOMFORT;CONSTANT

## 2020-10-02 ASSESSMENT — PAIN DESCRIPTION - PAIN TYPE
TYPE: ACUTE PAIN

## 2020-10-02 NOTE — H&P
[address] [address]                              HISTORY AND PHYSICAL    PATIENT NAME: Greg Palacios                       :        1965  MED REC NO:   [id number]                            ROOM:       6094  ACCOUNT NO:   [de-identified]                           ADMIT DATE: 10/01/2020  PROVIDER:     Larry Vegas DO    CHIEF COMPLAINT:  Abdominal pain. HISTORY OF CHIEF COMPLAINT:  A 72-year-old male patient of Dr. Jona Bass,  whom I am covering for, presents emergency room for evaluation of  intra-abdominal abscess. The patient states that he has had severe  abdominal cramping for the last several days. His normal ostomy output  is intact. He denies any nausea or vomiting. The patient has an  extensive history of Crohn's disease and prostate CA. He does most of  his GI following up at [facility]. He was last seen  there five months ago. He is on chronic prednisone for his Crohn's. The patient has a history of prostatectomy, partial colectomy, and small  bowel resection in 2020, secondary to his of prostate CA. Workup in  the ER showed; CAT scan of the abdomen and pelvis with IV contrast  showed small developing abscess within the right lower quadrant  measuring 2.2 x 2.0 x 1.1 cm. There is adjacent mesenteric induration  noted. The abscess is completely surrounded by multiple loops of small  bowel given its dislocation and size. It is not amendable to CT-guided  drainage. Also of note is his right lower quadrant ostomy with  parastomal hernia. Blood workup showed that he was afebrile. His white  count was 10.1. Electrolytes unremarkable except for a glucose of 193. His lactic acid was 1.9. BUN and creatinine 19 and 1.2. His H and H  was 14.4 and 44.3. Urinalysis was negative for nitrites and leukocytes.   The patient will be admitted under my service to a general medical  floor. We will consult Infectious Disease and Surgery. We will start  on IV fluids and continue IV antibiotics. He offers no other complaints  at this time. PAST MEDICAL HISTORY:  1.  Schizoaffective disorder. 2.  Panic attacks. 3.  GERD. 4.  Depression. 5.  Crohn's disease. 6.  Prostate cancer diagnosed in 2014.  7.  Bipolar disorder. 8.  Attention-deficit disorder. PAST SURGICAL HISTORY:  1.  Prostatectomy in 09/2014, laparoscopic robotic-assisted. 2.  Nose surgery in 2002. 3.  Incision and drainage of scrotal abscess in 05/2019.  4.  Colonoscopy x2, last one in 01/2020 performed by Dr. Reinaldo Kruger. ALLERGIES:  He has an allergy to:  1. MORPHINE. 2.  ADVERSE REACTION TO CIPRO. 3.  ADVERSE REACTION TO NSAIDS. MEDICATIONS PRIOR TO ADMISSION:  The patient was on:  1. Zofran 4 mg one every eight hours p.r.n. nausea and vomiting. 2.  Omnicef 300 mg one by mouth twice a day. 3.  Flagyl 500 mg one by mouth three times a day. 4.  Prednisone 40 mg daily. 5.  Norvasc 10 mg daily. 6.  Lipitor 10 mg daily. 7.  Thorazine 100 mg one by mouth four times a day. 8.  Remeron 15 mg one by mouth at night. 9.  Depakote 500 mg one by mouth twice a day. SOCIAL HISTORY:  Heavy smoker. He smokes at least a pack a day for the  last several years. Nondrinker. No history of illicit drug use. The  patient does not vape. Occupation:  He is on disability. He is  . FAMILY HISTORY:  Mother and father alive, both with history of mental  illness. Mom with a history of heart disease and dad with history of  depression. He has one brother and one sister with a history of mental  illness. REVIEW OF SYSTEMS:  As mentioned in chief complaint, otherwise  unremarkable. PHYSICAL EXAMINATION:  VITAL SIGNS:  On admission; temperature 98, pulse 126, respiration 18,  blood pressure 138/91, pulse ox 97% on room air. Height 6 feet 3 inches  and weight 206 pounds.   BMI is

## 2020-10-02 NOTE — CONSULTS
GENERAL SURGERY  CONSULT NOTE  10/2/2020    Physician Consulted: Dr. Braun [facility]  Reason for Consult: intra-abdominal abscess  Referring Physician: Dr. Capo Abarca    \[facility]\""  Delwin Fothergill is a 48 y.o. male with history of Crohn's who presents for evaluation of intra-abdominal abscess. Patient complains of severe cramping abdominal pain. Normal ostomy output. No nausea/vomiting. Patient has a PSHx of prostatectomy, partial colectomy, and SBR in May of 2020 for prostate CA. Not taking any Crohn's maintenance therapy. Has been on many past Crohn's medicines. Follows with [facility] GI docs. Last seen 5 mo ago. On prednisone for Crohn's flare this week (Monday). Past Medical History:   Diagnosis Date    ADD (attention deficit disorder)     Anxiety     Bipolar 1 disorder ([address] 11/19/2017    Cancer ([facility] [address] prostate cancer    2014 / treated with surgery    Crohn's disease ([facility] [address]     Depression     GERD (gastroesophageal reflux disease)     Panic attack     Rectal pain     has a fissure    Schizo affective schizophrenia ([facility] [address]     stable       Past Surgical History:   Procedure Laterality Date    COLONOSCOPY      COLONOSCOPY N/A 1/28/2020    COLONOSCOPY WITH BIOPSY performed by Itzel Byrd MD at [address] [facility], COLON, DIAGNOSTIC      INCISION AND DRAINAGE N/A 5/15/2019    I & D SCROTAL ABSCESS WITH EXPLANTATION ARTIFICIAL URINARY SPHINCTER COMPLEX URETHROPLASTY performed by Greg Dowd DO at [facility] NOSE SURGERY  2002? deviated septum    PROSTATECTOMY  9/15/2014    laparoscopic robotic assisted. Medications Prior to Admission:    Prior to Admission medications    Medication Sig Start Date End Date Taking?  Authorizing Provider   ondansetron (ZOFRAN ODT) 4 MG disintegrating tablet Take 1 tablet by mouth every 8 hours as needed for Nausea or Vomiting 9/29/20  Yes Desmond Grissom PA-C   cefdinir (OMNICEF) 300 MG capsule Take 1 capsule by mouth 2 times daily for 10 days 9/29/20 10/9/20 Yes Delgado CARRANZA ANTHONY [facility]   metroNIDAZOLE (FLAGYL) 500 MG tablet Take 1 tablet by mouth 3 times daily for 10 days 9/29/20 10/9/20 Yes Jodie [facility] PA-C   predniSONE (DELTASONE) 10 MG tablet Take 4 tablets by mouth daily for 5 days 9/29/20 10/4/20 Yes Jodie [facility] PA-C   amLODIPine (NORVASC) 10 MG tablet Take 1 tablet by mouth daily 8/26/20  Yes Skylar Buccino, DO   atorvastatin (LIPITOR) 10 MG tablet Take 1 tablet by mouth daily 7/17/20  Yes Skylar Buccino, DO   chlorproMAZINE (THORAZINE) 25 MG tablet Take 100 mg by mouth 4 times daily    Yes Historical Provider, MD   mirtazapine (REMERON) 15 MG tablet Take 15 mg by mouth nightly   Yes Historical Provider, MD   divalproex (DEPAKOTE) 500 MG DR tablet Take 1 tablet by mouth every 12 hours  Patient taking differently: Take 500 mg by mouth 3 times daily  4/24/20 57/3/25 Yes KAM Mora - CNP       Allergies   Allergen Reactions    Ciprofloxacin Hcl      constipation    Morphine      \"it does nothing for me\"    Nsaids Other (See Comments)     Per pt's physician Dipak Powers) he is not to take NSAIDS due to HX of Crohn's disease.        Family History   Problem Relation Age of Onset    Mental Illness Mother     Mental Illness Father     Mental Illness Sister     Mental Illness Brother     Heart Disease Mother     Depression Father        Social History     Tobacco Use    Smoking status: Heavy Tobacco Smoker     Packs/day: 1.00     Types: Cigarettes     Start date: 1/1/1978    Smokeless tobacco: Never Used    Tobacco comment: states quitting tomorrow 4/3/2020   Substance Use Topics    Alcohol use: No     Comment: no alcohol in5 yrs    Drug use: No     Comment: last use 1997 / marijuana         Review of Systems   General ROS: positive for  - fever and malaise  Hematological and Lymphatic ROS: negative  Respiratory ROS: no cough, shortness of breath, or wheezing  Cardiovascular ROS: no chest pain or dyspnea on exertion  Gastrointestinal ROS: positive for - abdominal pain  Genito-Urinary ROS: no dysuria, trouble voiding, or hematuria  Musculoskeletal ROS: negative      PHYSICAL EXAM:    Vitals:    10/02/20 0210   BP: 133/89   Pulse: 84   Resp: 18   Temp: 98.4 °F (36.9 °C)   SpO2: 94%       General Appearance:  in mild distress  Skin:  Skin color, texture, turgor normal. No rashes or lesions. Head/face:  NCAT  Eyes:  No gross abnormalities. Lungs:  Normal expansion. Clear to auscultation. No rales, rhonchi, or wheezing. Heart:  Heart regular rate and rhythm  Abdomen:  Soft, TTP moderately, non distended, no r/r/g   Extremities: Extremities warm to touch, pink, with no edema. LABS:    CBC  Recent Labs     10/01/20  1826   WBC 10.1   HGB 14.4   HCT 44.3        BMP  Recent Labs     10/01/20  1826      K 4.4   CL 99   CO2 27   BUN 19   CREATININE 1.2   CALCIUM 9.9     Liver Function  Recent Labs     10/01/20  1826   LIPASE 21   BILITOT <0.2   AST 13   ALT 11   ALKPHOS 110   PROT 7.1   LABALBU 4.1     No results for input(s): LACTATE in the last 72 hours. No results for input(s): INR, PTT in the last 72 hours. Invalid input(s): PT    RADIOLOGY    Ct Abdomen Pelvis W Iv Contrast Additional Contrast? None    Result Date: 10/1/2020  EXAMINATION: CT OF THE ABDOMEN AND PELVIS WITH CONTRAST 10/1/2020 8:28 pm TECHNIQUE: CT of the abdomen and pelvis was performed with the administration of intravenous contrast. Multiplanar reformatted images are provided for review. Dose modulation, iterative reconstruction, and/or weight based adjustment of the mA/kV was utilized to reduce the radiation dose to as low as reasonably achievable. COMPARISON: 09/29/2020 HISTORY: ORDERING SYSTEM PROVIDED HISTORY: abdominal pain TECHNOLOGIST PROVIDED HISTORY: Reason for exam:->abdominal pain Additional Contrast?->None What reading provider will be dictating this exam?->CRC FINDINGS: Lower Chest:  Visualized portion of the lower chest demonstrates no acute abnormality.  Organs:

## 2020-10-02 NOTE — ED PROVIDER NOTES
18-year-old male with past medical history of Crohn's disease, status post diagnostic laparoscopy, lysis of adhesions, implantation of rectal stump at a level of fascia, and drain placement on 6/1/2028 at [facility] clinic presents with complaints of dull lower abdominal pain. Describes as dull, constant, and nonradiating located at his umbilicus. Exacerbated with movement. Alleviated with pain medication. Able to tolerate food and liquids. Denies fevers, chills, nausea, vomiting, chest pain, shortness of breath, flank pain, dysuria, hematuria, diarrhea, constipation, rashes or sores. Review of Systems   Constitutional: Negative for chills and fever. HENT: Negative for ear pain, sinus pressure and sore throat. Eyes: Negative for pain, discharge and redness. Respiratory: Negative for cough, shortness of breath and wheezing. Cardiovascular: Negative for chest pain. Gastrointestinal: Negative for abdominal pain, diarrhea, nausea and vomiting. Genitourinary: Negative for dysuria and frequency. Musculoskeletal: Negative for arthralgias and back pain. Skin: Negative for color change, pallor, rash and wound. Neurological: Negative for weakness and headaches. Hematological: Negative for adenopathy. Psychiatric/Behavioral: Negative for agitation. All other systems reviewed and are negative. Physical Exam  Constitutional:       General: He is not in acute distress. Appearance: He is well-developed. He is not ill-appearing or diaphoretic. HENT:      Head: Normocephalic and atraumatic. Mouth/Throat:      Mouth: Mucous membranes are moist.      Pharynx: Oropharynx is clear. No oropharyngeal exudate. Eyes:      Extraocular Movements: Extraocular movements intact. Cardiovascular:      Rate and Rhythm: Regular rhythm. Tachycardia present. Heart sounds: Normal heart sounds. No murmur. No friction rub. No gallop. Pulmonary:      Effort: No respiratory distress. been fluid resuscitated, given Rocephin and Flagyl, and made n.p.o. His pain was alleviated with Dilaudid. Surgery has been consulted. Patient will be admitted to the hospital.          --------------------------------------------- PAST HISTORY ---------------------------------------------  Past Medical History:  has a past medical history of ADD (attention deficit disorder), Anxiety, [facility] Cancer ([facility] Crohn's disease ([facility] Depression, GERD (gastroesophageal reflux disease), Panic attack, Rectal pain, and Schizo affective schizophrenia ([facility] 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 Social History:  reports that he has been smoking cigarettes. He started smoking about 42 years ago. He has been smoking about 1.00 pack per day. He has never used smokeless tobacco. He reports that he does not drink alcohol or use drugs. Family History: family history includes Depression in his father; Heart Disease in his mother; Mental Illness in his brother, father, mother, and sister. The patients home medications have been reviewed.     Allergies: Ciprofloxacin hcl; Morphine; and Nsaids    -------------------------------------------------- RESULTS -------------------------------------------------    LABS:  Results for orders placed or performed during the hospital encounter of 10/01/20   CBC auto differential   Result Value Ref Range    WBC 10.1 4.5 - 11.5 E9/L    RBC 5.31 3.80 - 5.80 E12/L    Hemoglobin 14.4 12.5 - 16.5 g/dL    Hematocrit 44.3 37.0 - 54.0 %    MCV 83.4 80.0 - 99.9 fL    MCH 27.1 26.0 - 35.0 pg    MCHC 32.5 32.0 - 34.5 %    RDW 18.7 (H) 11.5 - 15.0 fL    Platelets 483 432 - 529 E9/L    MPV 9.1 7.0 - 12.0 fL    Neutrophils % 92.3 (H) 43.0 - 80.0 % Immature Granulocytes % 0.7 0.0 - 5.0 %    Lymphocytes % 4.8 (L) 20.0 - 42.0 %    Monocytes % 1.6 (L) 2.0 - 12.0 %    Eosinophils % 0.1 0.0 - 6.0 %    Basophils % 0.5 0.0 - 2.0 %    Neutrophils Absolute 9.30 (H) 1.80 - 7.30 E9/L    Immature Granulocytes # 0.07 E9/L    Lymphocytes Absolute 0.48 (L) 1.50 - 4.00 E9/L    Monocytes Absolute 0.16 0.10 - 0.95 E9/L    Eosinophils Absolute 0.01 (L) 0.05 - 0.50 E9/L    Basophils Absolute 0.05 0.00 - 0.20 E9/L    Anisocytosis 1+    Comprehensive metabolic panel   Result Value Ref Range    Sodium 136 132 - 146 mmol/L    Potassium 4.4 3.5 - 5.0 mmol/L    Chloride 99 98 - 107 mmol/L    CO2 27 22 - 29 mmol/L    Anion Gap 10 7 - 16 mmol/L    Glucose 193 (H) 74 - 99 mg/dL    BUN 19 6 - 20 mg/dL    CREATININE 1.2 0.7 - 1.2 mg/dL    GFR Non-African American >60 >=60 mL/min/1.73    GFR African American >60     Calcium 9.9 8.6 - 10.2 mg/dL    Total Protein 7.1 6.4 - 8.3 g/dL    Alb 4.1 3.5 - 5.2 g/dL    Total Bilirubin <0.2 0.0 - 1.2 mg/dL    Alkaline Phosphatase 110 40 - 129 U/L    ALT 11 0 - 40 U/L    AST 13 0 - 39 U/L   Lipase   Result Value Ref Range    Lipase 21 13 - 60 U/L   URINALYSIS   Result Value Ref Range    Color, UA Yellow Straw/Yellow    Clarity, UA Clear Clear    Glucose, Ur Negative Negative mg/dL    Bilirubin Urine Negative Negative    Ketones, Urine TRACE (A) Negative mg/dL    Specific Gravity, UA 1.020 1.005 - 1.030    Blood, Urine Negative Negative    pH, UA 6.0 5.0 - 9.0    Protein, UA Negative Negative mg/dL    Urobilinogen, Urine 0.2 <2.0 E.U./dL    Nitrite, Urine Negative Negative    Leukocyte Esterase, Urine Negative Negative   Lactic Acid, Plasma   Result Value Ref Range    Lactic Acid 1.9 0.5 - 2.2 mmol/L   CBC WITH AUTO DIFFERENTIAL   Result Value Ref Range    WBC 9.6 4.5 - 11.5 E9/L    RBC 4.51 3.80 - 5.80 E12/L    Hemoglobin 12.2 (L) 12.5 - 16.5 g/dL    Hematocrit 37.7 37.0 - 54.0 %    MCV 83.6 80.0 - 99.9 fL    MCH 27.1 26.0 - 35.0 pg    MCHC 32.4 32.0 - 34.5 %    RDW 18.3 (H) 11.5 - 15.0 fL    Platelets 420 954 - 139 E9/L    MPV 9.3 7.0 - 12.0 fL    Neutrophils % 73.9 43.0 - 80.0 %    Immature Granulocytes % 0.4 0.0 - 5.0 %    Lymphocytes % 15.8 (L) 20.0 - 42.0 %    Monocytes % 9.2 2.0 - 12.0 %    Eosinophils % 0.1 0.0 - 6.0 %    Basophils % 0.6 0.0 - 2.0 %    Neutrophils Absolute 7.12 1.80 - 7.30 E9/L    Immature Granulocytes # 0.04 E9/L    Lymphocytes Absolute 1.52 1.50 - 4.00 E9/L    Monocytes Absolute 0.89 0.10 - 0.95 E9/L    Eosinophils Absolute 0.01 (L) 0.05 - 0.50 E9/L    Basophils Absolute 0.06 0.00 - 0.20 E9/L   Comprehensive Metabolic Panel w/ Reflex to MG   Result Value Ref Range    Sodium 140 132 - 146 mmol/L    Potassium reflex Magnesium 4.1 3.5 - 5.0 mmol/L    Chloride 106 98 - 107 mmol/L    CO2 24 22 - 29 mmol/L    Anion Gap 10 7 - 16 mmol/L    Glucose 101 (H) 74 - 99 mg/dL    BUN 19 6 - 20 mg/dL    CREATININE 1.0 0.7 - 1.2 mg/dL    GFR Non-African American >60 >=60 mL/min/1.73    GFR African American >60     Calcium 9.2 8.6 - 10.2 mg/dL    Total Protein 6.0 (L) 6.4 - 8.3 g/dL    Alb 3.5 3.5 - 5.2 g/dL    Total Bilirubin <0.2 0.0 - 1.2 mg/dL    Alkaline Phosphatase 91 40 - 129 U/L    ALT 7 0 - 40 U/L    AST 9 0 - 39 U/L   IgG, IgA, IgM   Result Value Ref Range    IgA 143 70 - 400 mg/dL    IgG 578 (L) 700 - 1600 mg/dL    IgM 96 40 - 230 mg/dL   CBC Auto Differential   Result Value Ref Range    WBC 10.6 4.5 - 11.5 E9/L    RBC 4.60 3.80 - 5.80 E12/L    Hemoglobin 12.2 (L) 12.5 - 16.5 g/dL    Hematocrit 38.6 37.0 - 54.0 %    MCV 83.9 80.0 - 99.9 fL    MCH 26.5 26.0 - 35.0 pg    MCHC 31.6 (L) 32.0 - 34.5 %    RDW 18.5 (H) 11.5 - 15.0 fL    Platelets 694 146 - 314 E9/L    MPV 9.2 7.0 - 12.0 fL    Neutrophils % 68.8 43.0 - 80.0 %    Immature Granulocytes % 0.8 0.0 - 5.0 %    Lymphocytes % 22.0 20.0 - 42.0 %    Monocytes % 7.7 2.0 - 12.0 %    Eosinophils % 0.3 0.0 - 6.0 %    Basophils % 0.4 0.0 - 2.0 %    Neutrophils Absolute 7.31 (H) 1.80 - 7.30 E9/L Immature Granulocytes # 0.09 E9/L    Lymphocytes Absolute 2.34 1.50 - 4.00 E9/L    Monocytes Absolute 0.82 0.10 - 0.95 E9/L    Eosinophils Absolute 0.03 (L) 0.05 - 0.50 E9/L    Basophils Absolute 0.04 0.00 - 0.20 E9/L   COMPREHENSIVE METABOLIC PANEL   Result Value Ref Range    Sodium 139 132 - 146 mmol/L    Potassium 3.9 3.5 - 5.0 mmol/L    Chloride 106 98 - 107 mmol/L    CO2 26 22 - 29 mmol/L    Anion Gap 7 7 - 16 mmol/L    Glucose 87 74 - 99 mg/dL    BUN 15 6 - 20 mg/dL    CREATININE 0.9 0.7 - 1.2 mg/dL    GFR Non-African American >60 >=60 mL/min/1.73    GFR African American >60     Calcium 9.0 8.6 - 10.2 mg/dL    Total Protein 5.6 (L) 6.4 - 8.3 g/dL    Alb 3.3 (L) 3.5 - 5.2 g/dL    Total Bilirubin <0.2 0.0 - 1.2 mg/dL    Alkaline Phosphatase 77 40 - 129 U/L    ALT 6 0 - 40 U/L    AST 6 0 - 39 U/L   Sedimentation Rate   Result Value Ref Range    Sed Rate 3 0 - 15 mm/Hr   EKG 12 Lead   Result Value Ref Range    Ventricular Rate 84 BPM    Atrial Rate 84 BPM    P-R Interval 140 ms    QRS Duration 84 ms    Q-T Interval 342 ms    QTc Calculation (Bazett) 404 ms    P Axis 52 degrees    R Axis 62 degrees    T Axis 68 degrees       RADIOLOGY:  CT ABDOMEN PELVIS W IV CONTRAST Additional Contrast? None   Final Result   1. Small developing abscess within the right lower quadrant measuring 2.2 by   2.0 x 1.1 cm. There is adjacent mesenteric induration. This developing   abscess is completely surrounded by multiple loops of small bowel and given   its location and size is not amendable to CT-guided drainage. 2. Right lower quadrant ostomy with peristomal hernia. EKG:  This EKG is signed and interpreted by me.     Rate: 84  Rhythm: Sinus  Interpretation: no acute changes  Comparison: stable as compared to patient's most recent EKG      ------------------------- NURSING NOTES AND VITALS REVIEWED ---------------------------  Date / Time Roomed:  10/1/2020  6:37 PM  ED Bed Assignment:  [id number]    The nursing notes within the ED encounter and vital signs as below have been reviewed. Patient Vitals for the past 24 hrs:   BP Temp Temp src Pulse Resp SpO2 Weight   10/05/20 0745 (!) 150/92 97.7 °F (36.5 °C) Oral 98 18 96 % --   10/05/20 0019 121/72 98 °F (36.7 °C) Oral 79 18 -- 214 lb 7 oz (97.3 kg)   10/04/20 2000 125/76 98.1 °F (36.7 °C) Oral 89 18 97 % --       Oxygen Saturation Interpretation: Normal    ------------------------------------------ PROGRESS NOTES ------------------------------------------  Re-evaluation(s):  Time: 0000  Patients symptoms are improving  Repeat physical examination is improved    Counseling:  I have spoken with the patient and discussed todays results, in addition to providing specific details for the plan of care and counseling regarding the diagnosis and prognosis. Their questions are answered at this time and they are agreeable with the plan of admission.    --------------------------------- ADDITIONAL PROVIDER NOTES ---------------------------------  Consultations:  Spoke with Dr. Alison Hanson he will consult on patient  Spoke with Dr. Keke Pennington. Discussed case. They will admit the patient. This patient's ED course included: a personal history and physicial examination, re-evaluation prior to disposition, multiple bedside re-evaluations, IV medications, cardiac monitoring and continuous pulse oximetry    This patient has remained hemodynamically stable during their ED course. Diagnosis:  1. Intra-abdominal abscess ([facility]        Disposition:  Patient's disposition: Admit to med/surg floor  Patient's condition is good.          Dinorah Grant MD  Resident  10/05/20 8391

## 2020-10-02 NOTE — CONSULTS
[address] [place] [address] [facility]    Consultation Note     Admit Date: 10/1/2020  6:37 PM    Reason for Consult:   Intraabdominal abcess    Attending Physician:  Humble Pelletier DO     Chief Complaint: abdominal pain     HISTORY OF PRESENT ILLNESS:   The patient is a 48 y.o.  man known to the Infectious Diseases service. Last seen by ID in May 2019 for infected left sphincter device with cellulitis & left scrotal abcess. Patient has a history of robotic assisted laparoscopic prostatectomy in 2014 & insertion of artificial urinary sphincter r/t stress incontience in 2017. He was treated with ceftriaxone and linezolid & sphincter device was removed. The patient is admitted through the ER for increased abdominal pain. He was seen in the ER 9/26 for the same & sent home on omnicef, flagyl, and prednisone with instruction to follow up with his surgeon in [address] [place] [address]. The pain persisted so he returned to the ER. He has a history of Crohn's & ulcerative colitis - managed by GI at [facility]. He had no fevers, chills, nausea, vomiting, or changes to his ostomy output. The pain is located in the mid abdomen with cramping. CT of abdomen was reviewed - small abscess in RLQ measuring 2.2 x 2.0 x 1.1 & per the radioloigst is not amenable to CT guided drainage due to size & location. He was started on rocephin, flagyl, and clindamycin. WBC 9.8, hgb 14.8, BUN 16, creatinine 1.0. ID was asked to see.     Microbiology:   10/2018: urine cx: e coli        Past Medical History:        Diagnosis Date    ADD (attention deficit disorder)     Anxiety     Bipolar 1 disorder ([address] 11/19/2017    Cancer ([facility] prostate cancer    2014 / treated with surgery    Crohn's disease ([facility]     Depression     GERD (gastroesophageal reflux disease)     Panic attack     Rectal pain     has a fissure    Schizo affective schizophrenia ([address]     stable     Past Surgical History:        Procedure Laterality Date    COLONOSCOPY      COLONOSCOPY N/A 1/28/2020    COL[facility] WITH BIOPSY [facility] [place], COLON, DIAGNOSTIC      INCISION AND DRAINAGE N/A 5/15/2019    I & D SCROTAL ABSCESS WITH EXPLANTATION ARTIFICIAL URINARY SPHINCTER COMPLEX URETHROPLASTY performed by Debbie Dowd DO at [facility] SURGERY  2002? deviated septum    PROSTATECTOMY  9/15/2014    laparoscopic robotic assisted.      Current Medications:   Scheduled Meds:   amLODIPine  10 mg Oral Daily    atorvastatin  10 mg Oral Daily    chlorproMAZINE  100 mg Oral 4x Daily    divalproex  500 mg Oral TID    mirtazapine  15 mg Oral Nightly    predniSONE  40 mg Oral Daily    nicotine  1 patch Transdermal Daily    [START ON 10/3/2020] cefTRIAXone (ROCEPHIN) IV  1 g Intravenous Q24H    clindamycin (CLEOCIN) IV  300 mg Intravenous Q8H     Continuous Infusions:   sodium chloride 80 mL/hr at 10/02/20 0329     PRN Meds:acetaminophen, HYDROcodone-acetaminophen, sodium chloride flush    Allergies:  Ciprofloxacin hcl; Morphine; and Nsaids    Social History:   Social History     Socioeconomic History    Marital status:      Spouse name: ZENAIDA    Number of children: 0    Years of education: 12 +8    Highest education level: None   Occupational History    Occupation: [employer]     Employer: UNEMPLOYED     Comment: SSDI   Social Needs    Financial resource strain: None    Food insecurity     Worry: None     Inability: None    Transportation needs     Medical: None     Non-medical: None   Tobacco Use    Smoking status: Heavy Tobacco Smoker     Packs/day: 1.00     Types: Cigarettes     Start date: 1/1/1978    Smokeless tobacco: Never Used    Tobacco comment: states quitting tomorrow 4/3/2020   Substance and Sexual Activity    Alcohol use: No     Comment: no alcohol in5 yrs    Drug use: No     Comment: last use 1997 / marijuana    Sexual activity: Not Currently Partners: Female   Lifestyle    Physical activity     Days per week: None     Minutes per session: None    Stress: None   Relationships    Social connections     Talks on phone: None     Gets together: None     Attends Rastafarian service: None     Active member of club or organization: None     Attends meetings of clubs or organizations: None     Relationship status: None    Intimate partner violence     Fear of current or ex partner: None     Emotionally abused: None     Physically abused: None     Forced sexual activity: None   Other Topics Concern    None   Social History Narrative    ** Merged History Encounter **          Tobacco: yes  Alcohol: No  Pets: no  Travel: No    Family History:       Problem Relation Age of Onset    Mental Illness Mother     Mental Illness Father     Mental Illness Sister     Mental Illness Brother     Heart Disease Mother     Depression Father    . Otherwise non-pertinent to the chief complaint. REVIEW OF SYSTEMS:    CONSTITUTIONAL:  No chills, fevers or night sweats. No loss of weight. EYES:  No double vision or drainage from eyes, ears or throat. HEENT:  No neck stiffness. No dysphagia. No drainage from eyes, ears or throat  RESPIRATORY:  No cough, productive sputum or hemoptysis. CARDIOVASCULAR:  No chest pain, palpitations, orthopnea or dyspnea on exertion. GASTROINTESTINAL:  No nausea, vomiting, no change to ostomy output epigastric pain that is been chronic different than the left lower quadrant pain he had previously  GENITOURINARY: positive frequency no burning dysuria or hematuria. INTEGUMENT/BREAST:  No rash or breast masses. HEMATOLOGIC/LYMPHATIC:  No lymphadenopathy or blood dyscrasics. ALLERGIC/IMMUNOLOGIC:  No anaphylaxis. ENDOCRINE:  No polyuria or polydipsia or temperature intolerance. MUSCULOSKELETAL:  No myalgia or arthralgia. Full ROM. NEUROLOGICAL:  No focal motor sensory deficit. BEHAVIOR/PSYCH:  No psychosis.      PHYSICAL EXAM: Vitals:    BP (!) 157/96   Pulse 98   Temp 97.8 °F (36.6 °C) (Oral)   Resp 18   Ht 6' 3\" (1.905 m)   Wt 206 lb 14.4 oz (93.8 kg)   SpO2 95%   BMI 25.86 kg/m²   Constitutional: The patient is awake, alert, and oriented. Eating lunch. Skin: Warm and dry. No rashes were noted. No jaundice. HEENT: Eyes show round, and reactive pupils. Moist mucous membranes, no ulcerations, no thrush. Neck: Supple to movements. No lymphadenopathy. Chest: No use of accessory muscles to breathe. Symmetrical expansion. Auscultation reveals no wheezing, crackles, or rhonchi. diminshed throughout   Cardiovascular: S1 and S2 are rhythmic and regular. No murmurs appreciated. Abdomen: Positive bowel sounds to auscultation. tenderness to palpation midline. No masses felt. No hepatosplenomegaly. Ostomy right lower quadrant; soft brown stool in bag  Genitourinary: male  Extremities: No clubbing, no cyanosis, no edema.   Musculoskeletal: equal and symmetrical  Neurological: no focal   Lines: peripheral      CBC+dif:  Recent Labs     10/01/20  1826 10/02/20  0507   WBC 10.1 9.6   HGB 14.4 12.2*   HCT 44.3 37.7   MCV 83.4 83.6    322   NEUTROABS 9.30* 7.12     Lab Results   Component Value Date    CRP 15.1 (H) 01/26/2020    CRP 1.3 (H) 03/05/2019    CRP 3.3 (H) 02/06/2019     No results found for: CRPHS  No results found for: SEDRATE  Lab Results   Component Value Date    ALT 7 10/02/2020    AST 9 10/02/2020    ALKPHOS 91 10/02/2020    BILITOT <0.2 10/02/2020     Lab Results   Component Value Date     10/02/2020    K 4.1 10/02/2020     10/02/2020    CO2 24 10/02/2020    BUN 19 10/02/2020    CREATININE 1.0 10/02/2020    GFRAA >60 10/02/2020    LABGLOM >60 10/02/2020    GLUCOSE 101 10/02/2020    GLUCOSE 118 02/05/2011    PROT 6.0 10/02/2020    LABALBU 3.5 10/02/2020    LABALBU 3.5 02/05/2011    CALCIUM 9.2 10/02/2020    BILITOT <0.2 10/02/2020    ALKPHOS 91 10/02/2020    AST 9 10/02/2020    ALT 7 10/02/2020       Lab Results   Component Value Date    PROTIME 13.4 02/03/2019    PROTIME 11.5 02/05/2011    INR 1.2 02/03/2019       Lab Results   Component Value Date    TSH 4.710 10/03/2018       Lab Results   Component Value Date    COLORU Yellow 10/01/2020    PHUR 6.0 10/01/2020    LABCAST RARE 03/06/2018    WBCUA >20 09/29/2020    RBCUA NONE 09/29/2020    MUCUS Present 05/13/2019    YEAST MODERATE 05/13/2019    BACTERIA RARE 09/29/2020    CLARITYU Clear 10/01/2020    SPECGRAV 1.020 10/01/2020    LEUKOCYTESUR Negative 10/01/2020    UROBILINOGEN 0.2 10/01/2020    BILIRUBINUR Negative 10/01/2020    BLOODU Negative 10/01/2020    GLUCOSEU Negative 10/01/2020    AMORPHOUS FEW 10/24/2018       No results found for: JXI0KBO, BEART, D6CSNKJJ, PHART, THGBART, AZK2NRN, PO2ART, WEV5NPN  Radiology:  CT ABDOMEN PELVIS W IV CONTRAST Additional Contrast? None   Final Result   1. Small developing abscess within the right lower quadrant measuring 2.2 by   2.0 x 1.1 cm. There is adjacent mesenteric induration. This developing   abscess is completely surrounded by multiple loops of small bowel and given   its location and size is not amendable to CT-guided drainage. 2. Right lower quadrant ostomy with peristomal hernia. Microbiology:  Pending  No results for input(s): BC in the last 72 hours. No results for input(s): ORG in the last 72 hours. No results for input(s): Joselin Reading in the last 72 hours. No results for input(s): STREPNEUMAGU in the last 72 hours. No results for input(s): LP1UAG in the last 72 hours. No results for input(s): ASO in the last 72 hours. No results for input(s): CULTRESP in the last 72 hours.     Assessment:  · Intraabdominal abscess with failure of outpatient oral antibiotics   · Crohn's disease  · Ulcerative colitis  · Immunocompromised host r/t hx of immunosuppressive treatments for Crohn's      Plan:    · Stop clinda & flagyl /Rocephin  · Start zosyn & diflucan; PICC line  · Check cultures  · Check immunoglobulins, T & B lymphocytes   · Baseline ESR, CRP  · Monitor labs  · Will follow with you    Thank you for having us see this patient in consultation. I will be discussing this case with the treating physicians.       Electronically signed by Dodie Cano MD on 10/2/2020 at 11:34 AM

## 2020-10-02 NOTE — PATIENT CARE CONFERENCE
[facility] Quality Flow/Interdisciplinary Rounds Progress Note        Quality Flow Rounds held on October 2, 2020    Disciplines Attending:  Bedside Nurse, ,  and Nursing Unit Keyona Morejon was admitted on 10/1/2020  6:37 PM    Anticipated Discharge Date:  Expected Discharge Date: 10/05/20    Disposition:    Andre Score:  Andre Scale Score: 21    Readmission Risk              Risk of Unplanned Readmission:        30           Discussed patient goal for the day, patient clinical progression, and barriers to discharge.   The following Goal(s) of the Day/Commitment(s) have been identified:  Labs - Report Results      Regla Salcedo  October 2, 2020

## 2020-10-03 LAB
IGA: 143 MG/DL (ref 70–400)
IGG: 578 MG/DL (ref 700–1600)
IGM: 96 MG/DL (ref 40–230)

## 2020-10-03 PROCEDURE — 2580000003 HC RX 258: Performed by: NEUROMUSCULOSKELETAL MEDICINE & OMM

## 2020-10-03 PROCEDURE — C1751 CATH, INF, PER/CENT/MIDLINE: HCPCS

## 2020-10-03 PROCEDURE — 76937 US GUIDE VASCULAR ACCESS: CPT

## 2020-10-03 PROCEDURE — 36569 INSJ PICC 5 YR+ W/O IMAGING: CPT

## 2020-10-03 PROCEDURE — 6360000002 HC RX W HCPCS: Performed by: NEUROMUSCULOSKELETAL MEDICINE & OMM

## 2020-10-03 PROCEDURE — 2500000003 HC RX 250 WO HCPCS: Performed by: SPECIALIST

## 2020-10-03 PROCEDURE — 6370000000 HC RX 637 (ALT 250 FOR IP): Performed by: SPECIALIST

## 2020-10-03 PROCEDURE — 2580000003 HC RX 258: Performed by: SPECIALIST

## 2020-10-03 PROCEDURE — 6370000000 HC RX 637 (ALT 250 FOR IP): Performed by: NEUROMUSCULOSKELETAL MEDICINE & OMM

## 2020-10-03 PROCEDURE — 6360000002 HC RX W HCPCS: Performed by: SPECIALIST

## 2020-10-03 PROCEDURE — 02HV33Z INSERTION OF INFUSION DEVICE INTO SUPERIOR VENA CAVA, PERCUTANEOUS APPROACH: ICD-10-PCS | Performed by: NEUROMUSCULOSKELETAL MEDICINE & OMM

## 2020-10-03 PROCEDURE — 1200000000 HC SEMI PRIVATE

## 2020-10-03 RX ADMIN — PIPERACILLIN SODIUM AND TAZOBACTAM SODIUM 3.38 G: 3; .375 INJECTION, POWDER, LYOPHILIZED, FOR SOLUTION INTRAVENOUS at 13:02

## 2020-10-03 RX ADMIN — ENOXAPARIN SODIUM 40 MG: 40 INJECTION SUBCUTANEOUS at 13:02

## 2020-10-03 RX ADMIN — SODIUM CHLORIDE: 9 INJECTION, SOLUTION INTRAVENOUS at 09:01

## 2020-10-03 RX ADMIN — SODIUM CHLORIDE: 9 INJECTION, SOLUTION INTRAVENOUS at 17:05

## 2020-10-03 RX ADMIN — CHLORPROMAZINE HYDROCHLORIDE 100 MG: 25 TABLET, SUGAR COATED ORAL at 17:04

## 2020-10-03 RX ADMIN — CHLORPROMAZINE HYDROCHLORIDE 100 MG: 25 TABLET, SUGAR COATED ORAL at 21:17

## 2020-10-03 RX ADMIN — DIVALPROEX SODIUM 500 MG: 250 TABLET, DELAYED RELEASE ORAL at 08:44

## 2020-10-03 RX ADMIN — PREDNISONE 40 MG: 20 TABLET ORAL at 08:45

## 2020-10-03 RX ADMIN — LIDOCAINE HYDROCHLORIDE 0.5 ML: 10 INJECTION, SOLUTION EPIDURAL; INFILTRATION; INTRACAUDAL; PERINEURAL at 15:04

## 2020-10-03 RX ADMIN — AMLODIPINE BESYLATE 10 MG: 10 TABLET ORAL at 08:45

## 2020-10-03 RX ADMIN — DIVALPROEX SODIUM 500 MG: 250 TABLET, DELAYED RELEASE ORAL at 13:53

## 2020-10-03 RX ADMIN — CHLORPROMAZINE HYDROCHLORIDE 100 MG: 25 TABLET, SUGAR COATED ORAL at 13:02

## 2020-10-03 RX ADMIN — FLUCONAZOLE 400 MG: 150 TABLET ORAL at 13:02

## 2020-10-03 RX ADMIN — DIVALPROEX SODIUM 500 MG: 250 TABLET, DELAYED RELEASE ORAL at 21:17

## 2020-10-03 RX ADMIN — CHLORPROMAZINE HYDROCHLORIDE 100 MG: 25 TABLET, SUGAR COATED ORAL at 08:45

## 2020-10-03 RX ADMIN — SODIUM CHLORIDE: 9 INJECTION, SOLUTION INTRAVENOUS at 18:56

## 2020-10-03 RX ADMIN — ATORVASTATIN CALCIUM 10 MG: 10 TABLET, FILM COATED ORAL at 08:45

## 2020-10-03 RX ADMIN — PIPERACILLIN SODIUM AND TAZOBACTAM SODIUM 3.38 G: 3; .375 INJECTION, POWDER, LYOPHILIZED, FOR SOLUTION INTRAVENOUS at 05:29

## 2020-10-03 RX ADMIN — PIPERACILLIN SODIUM AND TAZOBACTAM SODIUM 3.38 G: 3; .375 INJECTION, POWDER, LYOPHILIZED, FOR SOLUTION INTRAVENOUS at 21:18

## 2020-10-03 RX ADMIN — MIRTAZAPINE 15 MG: 15 TABLET, FILM COATED ORAL at 21:18

## 2020-10-03 ASSESSMENT — PAIN DESCRIPTION - ONSET: ONSET: GRADUAL

## 2020-10-03 ASSESSMENT — PAIN DESCRIPTION - PROGRESSION: CLINICAL_PROGRESSION: NOT CHANGED

## 2020-10-03 ASSESSMENT — PAIN DESCRIPTION - LOCATION: LOCATION: ABDOMEN

## 2020-10-03 ASSESSMENT — PAIN DESCRIPTION - ORIENTATION: ORIENTATION: MID

## 2020-10-03 ASSESSMENT — PAIN DESCRIPTION - DESCRIPTORS: DESCRIPTORS: ACHING

## 2020-10-03 ASSESSMENT — PAIN SCALES - GENERAL: PAINLEVEL_OUTOF10: 6

## 2020-10-03 ASSESSMENT — PAIN - FUNCTIONAL ASSESSMENT: PAIN_FUNCTIONAL_ASSESSMENT: ACTIVITIES ARE NOT PREVENTED

## 2020-10-03 ASSESSMENT — PAIN DESCRIPTION - FREQUENCY: FREQUENCY: CONTINUOUS

## 2020-10-03 ASSESSMENT — PAIN DESCRIPTION - PAIN TYPE: TYPE: ACUTE PAIN

## 2020-10-03 NOTE — PLAN OF CARE
Problem: Pain:  Description: Pain management should include both nonpharmacologic and pharmacologic interventions. Goal: Pain level will decrease  Description: Pain level will decrease  10/2/2020 2354 by Duglas Bowman, RN  Outcome: Met This Shift     Problem: Pain:  Description: Pain management should include both nonpharmacologic and pharmacologic interventions.   Goal: Control of acute pain  Description: Control of acute pain  10/2/2020 2354 by Duglas Bowman, RN  Outcome: Met This Shift

## 2020-10-03 NOTE — CONSULTS
[address] [address]                                  CONSULTATION    PATIENT NAME: Lisa Esqueda                       :        1965  MED REC NO:   [id number]                            ROOM:       4903  ACCOUNT NO:   [de-identified]                           ADMIT DATE: 10/01/2020  PROVIDER:     Shaan Mckeon MD    CONSULT DATE:  10/03/2020    REASON FOR CONSULTATION:  Crohn's disease with small intra-abdominal  abscess. HISTORY OF PRESENT ILLNESS:  This is a gentleman known to me from  earlier in the year. He has a longstanding history of Crohn's disease  and he has been managed mostly through the [facility] for years. I first saw him in January. He had historically been on Remicade for  approximately 10 years and did well. Relapse in symptoms prompted a  switch to [facility]. Apparently, antibodies had developed in response to  the infliximab. He did not respond to [facility], and when he was  hospitalized at [id number] [place] [address], he was incarcerated at [facility]. Sigmoidoscopy at that time revealed evidence of disease in  the rectum and extending to the transverse colon. At that time, he was  treated with steroids with disappointing results. I initiated Humira,  but once he was discharged back to the residential, despite efforts to stay in  contact and arrange for additional treatment, the system failed. At any  rate, he returned to the [facility] at which point they switched  him from Humira to [place], which he had apparently been on prior to his  incarceration and it was terminated because of his incarceration. He  failed to respond to that and quickly in the end of May underwent a  laparoscopic subtotal colectomy with a rectal stump. As far as I could  tell by reviewing the operative report, there was no evidence of small  bowel disease. An end-ileostomy was created.   It was the intent to  eventually remove the rectal stump. Because of persistent pain  postoperatively and a pneumoperitoneum, he was returned to surgery. Lysis of adhesions and repositioning of the rectal stump. He was  discharged to home. Over the course of the next three months, he gained  more than 50 pounds in weight. He has not yet returned to the clinic  primarily because of the coronavirus restrictions. He had contacted  [facility] early part of September with complaints of pain. He had a CT  scan of the abdomen done in September at this hospital.  There were no  acute changes. He returned to the emergency room on 09/29. There was  evidence of a new fluid collection, small in size, in the mesentery just  right of the midline. He was given antibiotics and told to follow up in  [facility]. Pain remained and he was admitted here on 10/02 confirming  slightly increased fluid collection. He has had no fever, chills,  nausea, or vomiting, and his ileostomy has continued to function. He is  currently receiving Zosyn and Diflucan. He does have scheduled  appointments with his colorectal surgeon on 10/27 and GI on 10/30. He  is currently comfortable with minimal pain. Pain as he describes it is  just proximal to the umbilicus. PAST MEDICAL HISTORY:  Mostly is Crohn's disease. There is a history of  bipolar disorder, schizoaffective disorder, and panic attack. He has  been evaluated endoscopically on several occasions. He underwent a  prostatectomy for prostate cancer at the age of 51. An artificial  sphincteric mechanism was placed for incontinence, but became infected  and had to be removed. Recently completed a colectomy with  end-ileostomy. MEDICATIONS PRIOR TO ADMISSION:  Omnicef; metronidazole; prednisone,  which had been initiated in the emergency room on 09/29; Norvasc;  Lipitor; Thorazine; Remeron; Depakote. ALLERGIES:  There are allergy listed to QUINOLONES.     SOCIAL HISTORY:  He is . He continues to smoke, but denies  alcohol or substance abuse. PHYSICAL EXAMINATION:  GENERAL:  He is afebrile, lying comfortably. He is alert and oriented. His lunch tray is empty. SHEENT:  He is not pale. No rash or adenopathy. LUNGS:  Clear. HEART:  Tones are normal.  ABDOMEN:  A functioning ileostomy in the right lower quadrant. Mildly  tender supraumbilically. EXTREMITIES:  Unremarkable. There is a small fluid collection, which is new since 09/04 and perhaps  a little bit larger marginally from 09/29 to 10/02. A small abscess  seems probable, but its origins are unclear because as far as I know he  has only a rectum remaining and no small bowel disease. A PICC line for  antibiotics will be placed. He has follow up with GI and Surgery in  [facility] in about three weeks' time.         Boni Ramsey MD    D: 10/03/2020 11:56:35       T: 10/03/2020 12:09:07     [email or web address]  Job#: [id number]     Doc#: [id number]    CC:

## 2020-10-03 NOTE — PROGRESS NOTES
Department of Surgery - Adult  General Surgery  Dr. Schuyler Dickens's Progress Note      SUBJECTIVE: Overall, the patient is doing quite well. The patient denies abdominal pain. The patient denies fevers and chills. OBJECTIVE      Physical    VITALS:  /72   Pulse 94   Temp 97.8 °F (36.6 °C) (Oral)   Resp 18   Ht 6' 3\" (1.905 m)   Wt 211 lb 4.8 oz (95.8 kg)   SpO2 94%   BMI 26.41 kg/m²   INTAKE/OUTPUT:      Intake/Output Summary (Last 24 hours) at 10/3/2020 0843  Last data filed at 10/3/2020 0531  Gross per 24 hour   Intake 2742 ml   Output 350 ml   Net 2392 ml     TEMPERATURE:  Current - Temp: 97.8 °F (36.6 °C); Max - Temp  Av.2 °F (36.8 °C)  Min: 97.8 °F (36.6 °C)  Max: 99 °F (37.2 °C)  RESPIRATIONS RANGE: Resp  Av.5  Min: 16  Max: 19  PULSE RANGE: Pulse  Av.3  Min: 71  Max: 106  BLOOD PRESSURE RANGE:  Systolic (81BOQ), AMV:605 , Min:116 , XIP:790   ; Diastolic (77NFC), QRJ:73, Min:72, Max:84    PULSE OXIMETRY RANGE: SpO2  Av %  Min: 92 %  Max: 94 %  CONSTITUTIONAL:  awake, alert, cooperative, no apparent distress, and appears stated age  ABDOMEN: On examination: The abdomen is soft nontender and nondistended. The colostomy is functioning well.   Data  CBC with Differential:    Lab Results   Component Value Date    WBC 9.6 10/02/2020    RBC 4.51 10/02/2020    HGB 12.2 10/02/2020    HCT 37.7 10/02/2020     10/02/2020    MCV 83.6 10/02/2020    MCH 27.1 10/02/2020    MCHC 32.4 10/02/2020    RDW 18.3 10/02/2020    NRBC 0.0 2020    BANDSPCT 1 2014    METASPCT 1.7 2020    LYMPHOPCT 15.8 10/02/2020    MONOPCT 9.2 10/02/2020    MYELOPCT 2.6 2020    BASOPCT 0.6 10/02/2020    MONOSABS 0.89 10/02/2020    LYMPHSABS 1.52 10/02/2020    EOSABS 0.01 10/02/2020    BASOSABS 0.06 10/02/2020     CMP:    Lab Results   Component Value Date     10/02/2020    K 4.1 10/02/2020     10/02/2020    CO2 24 10/02/2020    BUN 19 10/02/2020    CREATININE 1.0 10/02/2020 GFRAA >60 10/02/2020    LABGLOM >60 10/02/2020    GLUCOSE 101 10/02/2020    GLUCOSE 118 02/05/2011    PROT 6.0 10/02/2020    LABALBU 3.5 10/02/2020    LABALBU 3.5 02/05/2011    CALCIUM 9.2 10/02/2020    BILITOT <0.2 10/02/2020    ALKPHOS 91 10/02/2020    AST 9 10/02/2020    ALT 7 10/02/2020     BMP:  Hepatic Function Panel:  Ionized Calcium:  No results found for: IONCA  Magnesium:    Lab Results   Component Value Date    MG 2.1 01/26/2020     Phosphorus:    Lab Results   Component Value Date    PHOS 3.4 10/03/2018       Current Inpatient Medications    Current Facility-Administered Medications: [START ON 10/4/2020] influenza quadrivalent split vaccine (FLUZONE;FLUARIX;FLULAVAL;AFLURIA) injection 0.5 mL, 0.5 mL, Intramuscular, Prior to discharge  amLODIPine (NORVASC) tablet 10 mg, 10 mg, Oral, Daily  atorvastatin (LIPITOR) tablet 10 mg, 10 mg, Oral, Daily  chlorproMAZINE (THORAZINE) tablet 100 mg, 100 mg, Oral, 4x Daily  divalproex (DEPAKOTE) DR tablet 500 mg, 500 mg, Oral, TID  mirtazapine (REMERON) tablet 15 mg, 15 mg, Oral, Nightly  predniSONE (DELTASONE) tablet 40 mg, 40 mg, Oral, Daily  0.9 % sodium chloride infusion, , Intravenous, Continuous  acetaminophen (TYLENOL) tablet 650 mg, 650 mg, Oral, Q6H PRN  nicotine (NICODERM CQ) 21 MG/24HR 1 patch, 1 patch, Transdermal, Daily  HYDROcodone-acetaminophen (NORCO)  MG per tablet 1 tablet, 1 tablet, Oral, Q6H PRN  fluconazole (DIFLUCAN) tablet 400 mg, 400 mg, Oral, Daily  piperacillin-tazobactam (ZOSYN) 3.375 g in dextrose 5 % 50 mL IVPB extended infusion (mini-bag), 3.375 g, Intravenous, Q8H **AND** 0.9 % sodium chloride infusion, , Intravenous, Q8H  lidocaine PF 1 % injection 5 mL, 5 mL, Intradermal, Once  sodium chloride flush 0.9 % injection 10 mL, 10 mL, Intravenous, PRN  heparin flush 100 UNIT/ML injection 300 Units, 3 mL, Intravenous, 2 times per day  heparin flush 100 UNIT/ML injection 300 Units, 3 mL, Intracatheter, PRN  sodium chloride flush 0.9 % injection 10 mL, 10 mL, Intravenous, PRN    ASSESSMENT:    48 y.o. male with small intra-abdominal abscess. Again, the patient has a significant history for Crohn's disease. PLAN:    Continue to monitor the patient's clinical status. Repeat the CT of the abdomen and pelvis after completion of a course of antibiotics.     Per Linares 10/3/27098:43 AM

## 2020-10-03 NOTE — PROCEDURES
PICC    Catheter insertion date 10/3/2020     Product Number:  Ref [facility][note 3] [id number]   Lot No: [id number]   Gauge: 18   Lumen: 4FR, single   R Basilic    Vein Diameter : 0.44cm   Upper arm circumference (10CM ABOVE AC): 0.44cm   Catheter Length : 50cm   Internal Length: 50cm   External Catheter Length: 0cm   Ultrasound Used:yes  VPS [employer] [place] confirms PICC tip is placed in the lower 1/3 of the SVC or at the Cavoatrial junction. Floor nurse notified PICC is okay to use.    : MANOJ Wright RN  Assistant: TRE Rebolledo

## 2020-10-03 NOTE — PLAN OF CARE
Problem: Pain:  Goal: Pain level will decrease  Description: Pain level will decrease  10/3/2020 1348 by Kandy Sharpe RN  Outcome: Met This Shift  10/2/2020 2354 by Riddhi Jewell RN  Outcome: Met This Shift  Goal: Control of acute pain  Description: Control of acute pain  10/3/2020 1348 by Kandy Sharpe RN  Outcome: Met This Shift  10/2/2020 2354 by Riddhi Jewell RN  Outcome: Met This Shift  Goal: Control of chronic pain  Description: Control of chronic pain  Outcome: Met This Shift

## 2020-10-03 NOTE — PROGRESS NOTES
General Progress Note  10/3/2020 12:42 PM  Subjective:   Admit Date: 10/1/2020  PCP: Skylar Salmon DO  Interval History: no acute issues overnight. Clinically doing well. Surgery on case. Diet: DIET GENERAL;  Pain is:None  Nausea:None  Bowel Movement/Flatus yes    Data:   Scheduled Meds:   [START ON 10/4/2020] influenza virus vaccine  0.5 mL Intramuscular Prior to discharge    enoxaparin  40 mg Subcutaneous Daily    amLODIPine  10 mg Oral Daily    atorvastatin  10 mg Oral Daily    chlorproMAZINE  100 mg Oral 4x Daily    divalproex  500 mg Oral TID    mirtazapine  15 mg Oral Nightly    predniSONE  40 mg Oral Daily    nicotine  1 patch Transdermal Daily    fluconazole  400 mg Oral Daily    piperacillin-tazobactam  3.375 g Intravenous Q8H    lidocaine PF  5 mL Intradermal Once    heparin flush  3 mL Intravenous 2 times per day     Continuous Infusions:   sodium chloride 80 mL/hr at 10/02/20 2117    sodium chloride 12.5 mL/hr at 10/03/20 0901     PRN Meds:acetaminophen, HYDROcodone-acetaminophen, sodium chloride flush, heparin flush, sodium chloride flush  I/O last 3 completed shifts: In: 2982 [P.O.:840;  I.V.:2092; IV Piggyback:50]  Out: 350 [Stool:350]  I/O this shift:  In: 120 [P.O.:120]  Out: -     Intake/Output Summary (Last 24 hours) at 10/3/2020 1242  Last data filed at 10/3/2020 0857  Gross per 24 hour   Intake 2622 ml   Output 350 ml   Net 2272 ml       CBC with Differential:    Lab Results   Component Value Date    WBC 9.6 10/02/2020    RBC 4.51 10/02/2020    HGB 12.2 10/02/2020    HCT 37.7 10/02/2020     10/02/2020    MCV 83.6 10/02/2020    MCH 27.1 10/02/2020    MCHC 32.4 10/02/2020    RDW 18.3 10/02/2020    NRBC 0.0 04/03/2020    BANDSPCT 1 09/17/2014    METASPCT 1.7 04/02/2020    LYMPHOPCT 15.8 10/02/2020    MONOPCT 9.2 10/02/2020    MYELOPCT 2.6 04/03/2020    BASOPCT 0.6 10/02/2020    MONOSABS 0.89 10/02/2020    LYMPHSABS 1.52 10/02/2020    EOSABS 0.01 10/02/2020    BASOSABS 0.06 10/02/2020     CMP:    Lab Results   Component Value Date     10/02/2020    K 4.1 10/02/2020     10/02/2020    CO2 24 10/02/2020    BUN 19 10/02/2020    CREATININE 1.0 10/02/2020    GFRAA >60 10/02/2020    LABGLOM >60 10/02/2020    GLUCOSE 101 10/02/2020    GLUCOSE 118 02/05/2011    PROT 6.0 10/02/2020    LABALBU 3.5 10/02/2020    LABALBU 3.5 02/05/2011    CALCIUM 9.2 10/02/2020    BILITOT <0.2 10/02/2020    ALKPHOS 91 10/02/2020    AST 9 10/02/2020    ALT 7 10/02/2020     Magnesium:    Lab Results   Component Value Date    MG 2.1 01/26/2020     Phosphorus:    Lab Results   Component Value Date    PHOS 3.4 10/03/2018     PT/INR:    Lab Results   Component Value Date    PROTIME 13.4 02/03/2019    PROTIME 11.5 02/05/2011    INR 1.2 02/03/2019     Last 3 Troponin:    Lab Results   Component Value Date    TROPONINI <0.01 09/04/2020    TROPONINI <0.01 01/24/2020    TROPONINI <0.01 12/27/2018     U/A:    Lab Results   Component Value Date    COLORU Yellow 10/01/2020    PHUR 6.0 10/01/2020    LABCAST RARE 03/06/2018    WBCUA >20 09/29/2020    RBCUA NONE 09/29/2020    MUCUS Present 05/13/2019    YEAST MODERATE 05/13/2019    BACTERIA RARE 09/29/2020    CLARITYU Clear 10/01/2020    SPECGRAV 1.020 10/01/2020    LEUKOCYTESUR Negative 10/01/2020    UROBILINOGEN 0.2 10/01/2020    BILIRUBINUR Negative 10/01/2020    BLOODU Negative 10/01/2020    GLUCOSEU Negative 10/01/2020    AMORPHOUS FEW 10/24/2018     HgBA1c:  No components found for: HGBA1C  TSH:    Lab Results   Component Value Date    TSH 4.710 10/03/2018     -----------------------------------------------------------------    Objective:   Vitals: /72   Pulse 94   Temp 97.8 °F (36.6 °C) (Oral)   Resp 18   Ht 6' 3\" (1.905 m)   Wt 211 lb 4.8 oz (95.8 kg)   SpO2 94%   BMI 26.41 kg/m²   General appearance: alert, appears stated age and cooperative  Skin: Skin color, texture, turgor normal. No rashes or lesions  HEENT: Head: Normal, normocephalic, atraumatic. Neck: no adenopathy, no carotid bruit, no JVD, supple, symmetrical, trachea midline and thyroid not enlarged, symmetric, no tenderness/mass/nodules  Lungs: clear to auscultation bilaterally  Heart: regular rate and rhythm, S1, S2 normal, no murmur, click, rub or gallop  Abdomen: soft, non-tender; bowel sounds normal; no masses,  no organomegaly  Extremities: extremities normal, atraumatic, no cyanosis or edema  Neurologic: Mental status: Alert, oriented, thought content appropriate    Assessment:   Principal Problem:    Intra-abdominal abscess (HCC)  Active Problems:    Crohn disease (HCC)    GERD (gastroesophageal reflux disease)    Bipolar 1 disorder (HCC)    [facility]oaffective disorder, [facility] ([facility]    H/O carcinoma in situ of prostate    Essential hypertension    Colitis  Resolved Problems:    * No resolved hospital problems. *    Plan:   1. Follow clinical course with CT scan of abdomen/pelvis  2. Continue present care with IV antibiotics per ID.  3. Will follow.     Regina Galan D.O.  12:42 PM  10/3/2020

## 2020-10-03 NOTE — PROGRESS NOTES
[address] [place] [address] [facility]  Progress Note    CC: no new issues  Face to face encounter   SUBJECTIVE:  Patient is tolerating medications. No reported adverse drug reactions. ROS: No nausea, vomiting, has ostomy- loose stools. No fever. Abdominal pain ok. Medications:  Scheduled Meds:   [START ON 10/4/2020] influenza virus vaccine  0.5 mL Intramuscular Prior to discharge    enoxaparin  40 mg Subcutaneous Daily    amLODIPine  10 mg Oral Daily    atorvastatin  10 mg Oral Daily    chlorproMAZINE  100 mg Oral 4x Daily    divalproex  500 mg Oral TID    mirtazapine  15 mg Oral Nightly    predniSONE  40 mg Oral Daily    nicotine  1 patch Transdermal Daily    fluconazole  400 mg Oral Daily    piperacillin-tazobactam  3.375 g Intravenous Q8H    heparin flush  3 mL Intravenous 2 times per day     Continuous Infusions:   sodium chloride 80 mL/hr at 10/02/20 2117    sodium chloride 12.5 mL/hr at 10/03/20 0901     PRN Meds:acetaminophen, HYDROcodone-acetaminophen, sodium chloride flush, heparin flush, sodium chloride flush  OBJECTIVE:  Patient Vitals for the past 24 hrs:   BP Temp Temp src Pulse Resp SpO2 Weight   10/03/20 1300 124/82 97.9 °F (36.6 °C) Oral 81 16 96 % --   10/03/20 0825 132/72 97.8 °F (36.6 °C) Oral 94 18 -- --   10/03/20 0529 -- -- -- -- -- -- 211 lb 4.8 oz (95.8 kg)   10/03/20 0424 129/83 98 °F (36.7 °C) Oral 72 18 -- --   10/03/20 0015 123/76 97.9 °F (36.6 °C) Oral 71 19 -- --   10/02/20 2115 116/76 97.9 °F (36.6 °C) Oral 75 18 94 % --     Constitutional: The patient is awake, alert, and oriented. Ambulating in room  Skin: Warm and dry. No rashes were noted. Head: Eyes show round, and reactive pupils. No jaundice. Mouth: Moist mucous membranes, no ulcerations, no thrush. Neck: Supple to movements. No lymphadenopathy. Chest: No use of accessory muscles to breathe. Symmetrical expansion. Auscultation reveals no wheezing, crackles, or rhonchi.    Cardiovascular: S1 and S2 are rhythmic and regular. No murmurs appreciated. Abdomen: Positive bowel sounds to auscultation. Benign to palpation. Has ostomy with liquid stools. No abdominal pain. Extremities: No clubbing, no cyanosis, no edema. Right upper arm with PICC- placed- 10/3/2020  PIV    Laboratory and Tests Review:  Lab Results   Component Value Date    WBC 9.6 10/02/2020    WBC 10.1 10/01/2020    WBC 9.8 09/29/2020    HGB 12.2 (L) 10/02/2020    HCT 37.7 10/02/2020    MCV 83.6 10/02/2020     10/02/2020     Lab Results   Component Value Date    NEUTROABS 7.12 10/02/2020    NEUTROABS 9.30 (H) 10/01/2020    NEUTROABS 6.36 09/29/2020     Lab Results   Component Value Date    CRP 15.1 (H) 01/26/2020    CRP 1.3 (H) 03/05/2019    CRP 3.3 (H) 02/06/2019     No results found for: SEDRATE  Lab Results   Component Value Date    ALT 7 10/02/2020    AST 9 10/02/2020    ALKPHOS 91 10/02/2020    BILITOT <0.2 10/02/2020     Lab Results   Component Value Date     10/02/2020    K 4.1 10/02/2020     10/02/2020    CO2 24 10/02/2020    BUN 19 10/02/2020    CREATININE 1.0 10/02/2020    GFRAA >60 10/02/2020    LABGLOM >60 10/02/2020    GLUCOSE 101 10/02/2020    GLUCOSE 118 02/05/2011    PROT 6.0 10/02/2020    LABALBU 3.5 10/02/2020    LABALBU 3.5 02/05/2011    CALCIUM 9.2 10/02/2020    BILITOT <0.2 10/02/2020    ALKPHOS 91 10/02/2020    AST 9 10/02/2020    ALT 7 10/02/2020     Radiology:  CT A/P  Impression:          1. Small developing abscess within the right lower quadrant measuring 2.2 by   2.0 x 1.1 cm.  There is adjacent mesenteric induration.  This developing   abscess is completely surrounded by multiple loops of small bowel and given   its location and size is not amendable to CT-guided drainage.    2. 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 · General Surgery following- will need repeat CT prior to stopping atbs. Aleisha Hodge   10/3/2020     Pt seen and examined. I discussed and co-formulated the plan with advanced practice nurse. Labs, cultures, and radiographs reviewed. Face to Face encounter occurred. Changes made as necessary by me.      Carlos Carballo MD  Infectious Disease

## 2020-10-04 PROBLEM — D80.3 IGG DEFICIENCY (HCC): Status: ACTIVE | Noted: 2020-10-04

## 2020-10-04 PROCEDURE — 2580000003 HC RX 258: Performed by: NEUROMUSCULOSKELETAL MEDICINE & OMM

## 2020-10-04 PROCEDURE — 6360000002 HC RX W HCPCS: Performed by: NEUROMUSCULOSKELETAL MEDICINE & OMM

## 2020-10-04 PROCEDURE — 6360000002 HC RX W HCPCS: Performed by: SPECIALIST

## 2020-10-04 PROCEDURE — 2580000003 HC RX 258: Performed by: SPECIALIST

## 2020-10-04 PROCEDURE — 6370000000 HC RX 637 (ALT 250 FOR IP): Performed by: NEUROMUSCULOSKELETAL MEDICINE & OMM

## 2020-10-04 PROCEDURE — 6370000000 HC RX 637 (ALT 250 FOR IP): Performed by: SPECIALIST

## 2020-10-04 PROCEDURE — 1200000000 HC SEMI PRIVATE

## 2020-10-04 RX ORDER — FLUCONAZOLE 100 MG/1
400 TABLET ORAL DAILY
Status: DISCONTINUED | OUTPATIENT
Start: 2020-10-05 | End: 2020-10-05 | Stop reason: HOSPADM

## 2020-10-04 RX ADMIN — SODIUM CHLORIDE: 9 INJECTION, SOLUTION INTRAVENOUS at 17:09

## 2020-10-04 RX ADMIN — SODIUM CHLORIDE: 9 INJECTION, SOLUTION INTRAVENOUS at 17:36

## 2020-10-04 RX ADMIN — CHLORPROMAZINE HYDROCHLORIDE 100 MG: 25 TABLET, SUGAR COATED ORAL at 08:25

## 2020-10-04 RX ADMIN — SODIUM CHLORIDE: 9 INJECTION, SOLUTION INTRAVENOUS at 08:27

## 2020-10-04 RX ADMIN — HYDROCODONE BITARTRATE AND ACETAMINOPHEN 1 TABLET: 10; 325 TABLET ORAL at 08:34

## 2020-10-04 RX ADMIN — HYDROCODONE BITARTRATE AND ACETAMINOPHEN 1 TABLET: 10; 325 TABLET ORAL at 21:24

## 2020-10-04 RX ADMIN — DIVALPROEX SODIUM 500 MG: 250 TABLET, DELAYED RELEASE ORAL at 20:15

## 2020-10-04 RX ADMIN — ATORVASTATIN CALCIUM 10 MG: 10 TABLET, FILM COATED ORAL at 08:26

## 2020-10-04 RX ADMIN — HYDROCODONE BITARTRATE AND ACETAMINOPHEN 1 TABLET: 10; 325 TABLET ORAL at 14:46

## 2020-10-04 RX ADMIN — PIPERACILLIN SODIUM AND TAZOBACTAM SODIUM 3.38 G: 3; .375 INJECTION, POWDER, LYOPHILIZED, FOR SOLUTION INTRAVENOUS at 05:50

## 2020-10-04 RX ADMIN — PREDNISONE 40 MG: 20 TABLET ORAL at 08:26

## 2020-10-04 RX ADMIN — DIVALPROEX SODIUM 500 MG: 250 TABLET, DELAYED RELEASE ORAL at 08:25

## 2020-10-04 RX ADMIN — Medication 300 UNITS: at 21:24

## 2020-10-04 RX ADMIN — CHLORPROMAZINE HYDROCHLORIDE 100 MG: 25 TABLET, SUGAR COATED ORAL at 13:13

## 2020-10-04 RX ADMIN — FLUCONAZOLE 400 MG: 150 TABLET ORAL at 13:12

## 2020-10-04 RX ADMIN — HYDROCODONE BITARTRATE AND ACETAMINOPHEN 1 TABLET: 10; 325 TABLET ORAL at 00:18

## 2020-10-04 RX ADMIN — AMLODIPINE BESYLATE 10 MG: 10 TABLET ORAL at 08:26

## 2020-10-04 RX ADMIN — PIPERACILLIN SODIUM AND TAZOBACTAM SODIUM 3.38 G: 3; .375 INJECTION, POWDER, LYOPHILIZED, FOR SOLUTION INTRAVENOUS at 20:16

## 2020-10-04 RX ADMIN — MIRTAZAPINE 15 MG: 15 TABLET, FILM COATED ORAL at 20:16

## 2020-10-04 RX ADMIN — CHLORPROMAZINE HYDROCHLORIDE 100 MG: 25 TABLET, SUGAR COATED ORAL at 17:36

## 2020-10-04 RX ADMIN — CHLORPROMAZINE HYDROCHLORIDE 100 MG: 25 TABLET, SUGAR COATED ORAL at 20:15

## 2020-10-04 RX ADMIN — DIVALPROEX SODIUM 500 MG: 250 TABLET, DELAYED RELEASE ORAL at 14:46

## 2020-10-04 RX ADMIN — PIPERACILLIN SODIUM AND TAZOBACTAM SODIUM 3.38 G: 3; .375 INJECTION, POWDER, LYOPHILIZED, FOR SOLUTION INTRAVENOUS at 13:09

## 2020-10-04 RX ADMIN — SODIUM CHLORIDE: 9 INJECTION, SOLUTION INTRAVENOUS at 00:11

## 2020-10-04 RX ADMIN — ENOXAPARIN SODIUM 40 MG: 40 INJECTION SUBCUTANEOUS at 08:26

## 2020-10-04 RX ADMIN — ACETAMINOPHEN 650 MG: 325 TABLET ORAL at 13:09

## 2020-10-04 ASSESSMENT — PAIN DESCRIPTION - LOCATION
LOCATION: ABDOMEN

## 2020-10-04 ASSESSMENT — PAIN DESCRIPTION - DESCRIPTORS
DESCRIPTORS: ACHING
DESCRIPTORS: ACHING;DISCOMFORT
DESCRIPTORS: ACHING;DISCOMFORT
DESCRIPTORS: ACHING

## 2020-10-04 ASSESSMENT — PAIN DESCRIPTION - PAIN TYPE
TYPE: ACUTE PAIN

## 2020-10-04 ASSESSMENT — PAIN DESCRIPTION - FREQUENCY
FREQUENCY: CONTINUOUS

## 2020-10-04 ASSESSMENT — PAIN DESCRIPTION - ORIENTATION
ORIENTATION: MID

## 2020-10-04 ASSESSMENT — PAIN DESCRIPTION - PROGRESSION
CLINICAL_PROGRESSION: NOT CHANGED

## 2020-10-04 ASSESSMENT — PAIN SCALES - GENERAL
PAINLEVEL_OUTOF10: 2
PAINLEVEL_OUTOF10: 7
PAINLEVEL_OUTOF10: 3
PAINLEVEL_OUTOF10: 8
PAINLEVEL_OUTOF10: 9

## 2020-10-04 ASSESSMENT — PAIN - FUNCTIONAL ASSESSMENT
PAIN_FUNCTIONAL_ASSESSMENT: ACTIVITIES ARE NOT PREVENTED

## 2020-10-04 ASSESSMENT — PAIN DESCRIPTION - ONSET
ONSET: ON-GOING
ONSET: GRADUAL
ONSET: ON-GOING
ONSET: GRADUAL

## 2020-10-04 NOTE — PROGRESS NOTES
[address] [place] [address] [facility]  Progress Note    CC: no new issues  Face to face encounter   SUBJECTIVE:  Patient is tolerating medications. No reported adverse drug reactions. ROS: No nausea, vomiting, has ostomy- loose stools. No fever. Abdominal pain ok. Wife at bedside and updated today. Medications:  Scheduled Meds:   influenza virus vaccine  0.5 mL Intramuscular Prior to discharge    enoxaparin  40 mg Subcutaneous Daily    amLODIPine  10 mg Oral Daily    atorvastatin  10 mg Oral Daily    chlorproMAZINE  100 mg Oral 4x Daily    divalproex  500 mg Oral TID    mirtazapine  15 mg Oral Nightly    predniSONE  40 mg Oral Daily    nicotine  1 patch Transdermal Daily    fluconazole  400 mg Oral Daily    piperacillin-tazobactam  3.375 g Intravenous Q8H    heparin flush  3 mL Intravenous 2 times per day     Continuous Infusions:   sodium chloride 80 mL/hr at 10/03/20 1856    sodium chloride 12.5 mL/hr at 10/04/20 0827     PRN Meds:acetaminophen, HYDROcodone-acetaminophen, sodium chloride flush, heparin flush, sodium chloride flush  OBJECTIVE:  Patient Vitals for the past 24 hrs:   BP Temp Temp src Pulse Resp SpO2 Weight   10/04/20 0834 128/76 97.6 °F (36.4 °C) Oral 85 16 94 % --   10/04/20 0645 -- -- -- -- -- -- 210 lb 1.6 oz (95.3 kg)   10/04/20 0543 115/72 97.5 °F (36.4 °C) Oral 76 16 -- --   10/04/20 0018 -- -- -- -- -- 93 % --   10/04/20 0000 110/77 97.8 °F (36.6 °C) Oral 84 16 -- --   10/03/20 2116 124/84 98.4 °F (36.9 °C) Oral 84 16 94 % --   10/03/20 1631 139/85 98.1 °F (36.7 °C) Oral 90 16 -- --   10/03/20 1300 124/82 97.9 °F (36.6 °C) Oral 81 16 96 % --     Constitutional: The patient is awake, alert, and oriented. Resting in bed. Skin: Warm and dry. No rashes were noted. Head: Eyes show round, and reactive pupils. No jaundice. Mouth: Moist mucous membranes, no ulcerations, no thrush. Neck: Supple to movements. No lymphadenopathy.    Chest: No use of accessory muscles to breathe. Symmetrical expansion. Auscultation reveals no wheezing, crackles, or rhonchi. Cardiovascular: S1 and S2 are rhythmic and regular. No murmurs appreciated. Abdomen: Positive bowel sounds to auscultation. Benign to palpation. Has ostomy with liquid stools. No abdominal pain. Extremities: No clubbing, no cyanosis, no edema. Right upper arm with PICC- placed- 10/3/2020    Laboratory and Tests Review:  Lab Results   Component Value Date    WBC 9.6 10/02/2020    WBC 10.1 10/01/2020    WBC 9.8 09/29/2020    HGB 12.2 (L) 10/02/2020    HCT 37.7 10/02/2020    MCV 83.6 10/02/2020     10/02/2020     Lab Results   Component Value Date    NEUTROABS 7.12 10/02/2020    NEUTROABS 9.30 (H) 10/01/2020    NEUTROABS 6.36 09/29/2020     Lab Results   Component Value Date    CRP 15.1 (H) 01/26/2020    CRP 1.3 (H) 03/05/2019    CRP 3.3 (H) 02/06/2019     No results found for: SEDRATE  Lab Results   Component Value Date    ALT 7 10/02/2020    AST 9 10/02/2020    ALKPHOS 91 10/02/2020    BILITOT <0.2 10/02/2020     Lab Results   Component Value Date     10/02/2020    K 4.1 10/02/2020     10/02/2020    CO2 24 10/02/2020    BUN 19 10/02/2020    CREATININE 1.0 10/02/2020    GFRAA >60 10/02/2020    LABGLOM >60 10/02/2020    GLUCOSE 101 10/02/2020    GLUCOSE 118 02/05/2011    PROT 6.0 10/02/2020    LABALBU 3.5 10/02/2020    LABALBU 3.5 02/05/2011    CALCIUM 9.2 10/02/2020    BILITOT <0.2 10/02/2020    ALKPHOS 91 10/02/2020    AST 9 10/02/2020    ALT 7 10/02/2020     Radiology:  CT A/P  Impression:          1. Small developing abscess within the right lower quadrant measuring 2.2 by   2.0 x 1.1 cm.  There is adjacent mesenteric induration.  This developing   abscess is completely surrounded by multiple loops of small bowel and given   its location and size is not amendable to CT-guided drainage.    2. Right lower quadrant ostomy with peristomal hernia     Microbiology:   No new cultures    ASSESSMENT:  · Intraabdominal abscess with failure of outpatient oral antibiotics   · Crohn's disease  · Ulcerative colitis  · Immunocompromised host r/t hx of immunosuppressive treatments for Crohn's    · IGG deficiency     PLAN:  · Continue zosyn and diflucan   · Social work to check into cost of home IV on Monday - order placed   · Check cultures  · Monitor labs- reviewed  · Will check labs in morning   · IGG low- 578- will need to repeat. · General Surgery following- will need repeat CT prior to stopping atbs. Jes Solders   10/4/2020     Pt seen and examined. I discussed and co-formulated the plan with advanced practice nurse. Labs, cultures, and radiographs reviewed. Face to Face encounter occurred. Changes made as necessary by me.      Yamilex Taylor MD  Infectious Disease

## 2020-10-04 NOTE — PLAN OF CARE
Problem: Pain:  Goal: Pain level will decrease  Description: Pain level will decrease  10/4/2020 0254 by Shiraz Noguera RN  Outcome: Met This Shift  10/3/2020 1348 by Lilli Goetz RN  Outcome: Met This Shift  Goal: Control of acute pain  Description: Control of acute pain  10/4/2020 0254 by Shiraz Noguera RN  Outcome: Met This Shift  10/3/2020 1348 by Lilli Goetz RN  Outcome: Met This Shift  Goal: Control of chronic pain  Description: Control of chronic pain  10/4/2020 0254 by Shiraz Noguera RN  Outcome: Met This Shift  10/3/2020 1348 by Lilli Goetz RN  Outcome: Met This Shift

## 2020-10-04 NOTE — PROGRESS NOTES
General Progress Note  10/4/2020 10:57 AM  Subjective:   Admit Date: 10/1/2020  PCP: Paul Salmon DO  Interval History: patient continues to improve. No fever, sweats, or chills reported. Improving abdominal pain. Diet: DIET GENERAL;  Pain is:None  Nausea:None  Bowel Movement/Flatus yes    Data:   Scheduled Meds:   influenza virus vaccine  0.5 mL Intramuscular Prior to discharge    enoxaparin  40 mg Subcutaneous Daily    amLODIPine  10 mg Oral Daily    atorvastatin  10 mg Oral Daily    chlorproMAZINE  100 mg Oral 4x Daily    divalproex  500 mg Oral TID    mirtazapine  15 mg Oral Nightly    predniSONE  40 mg Oral Daily    nicotine  1 patch Transdermal Daily    fluconazole  400 mg Oral Daily    piperacillin-tazobactam  3.375 g Intravenous Q8H    heparin flush  3 mL Intravenous 2 times per day     Continuous Infusions:   sodium chloride 80 mL/hr at 10/03/20 1856    sodium chloride 12.5 mL/hr at 10/04/20 0827     PRN Meds:acetaminophen, HYDROcodone-acetaminophen, sodium chloride flush, heparin flush, sodium chloride flush  I/O last 3 completed shifts: In: 1370 [P.O.:720; I.V.:600; IV Piggyback:50]  Out: -   I/O this shift:   In: 480 [P.O.:480]  Out: -     Intake/Output Summary (Last 24 hours) at 10/4/2020 1057  Last data filed at 10/4/2020 0805  Gross per 24 hour   Intake 1730 ml   Output --   Net 1730 ml       CBC with Differential:    Lab Results   Component Value Date    WBC 9.6 10/02/2020    RBC 4.51 10/02/2020    HGB 12.2 10/02/2020    HCT 37.7 10/02/2020     10/02/2020    MCV 83.6 10/02/2020    MCH 27.1 10/02/2020    MCHC 32.4 10/02/2020    RDW 18.3 10/02/2020    NRBC 0.0 04/03/2020    BANDSPCT 1 09/17/2014    METASPCT 1.7 04/02/2020    LYMPHOPCT 15.8 10/02/2020    MONOPCT 9.2 10/02/2020    MYELOPCT 2.6 04/03/2020    BASOPCT 0.6 10/02/2020    MONOSABS 0.89 10/02/2020    LYMPHSABS 1.52 10/02/2020    EOSABS 0.01 10/02/2020    BASOSABS 0.06 10/02/2020     CMP:    Lab Results   Component

## 2020-10-05 VITALS
OXYGEN SATURATION: 96 % | HEIGHT: 75 IN | DIASTOLIC BLOOD PRESSURE: 74 MMHG | WEIGHT: 214.44 LBS | HEART RATE: 84 BPM | TEMPERATURE: 98.4 F | BODY MASS INDEX: 26.66 KG/M2 | SYSTOLIC BLOOD PRESSURE: 129 MMHG | RESPIRATION RATE: 18 BRPM

## 2020-10-05 LAB
ABSOLUTE CD 3: 292 CELLS/UL (ref 570–2400)
ABSOLUTE CD 4 HELPER: 160 CELLS/UL (ref 430–1800)
ABSOLUTE CD 8 (SUPP): 120 CELLS/UL (ref 210–1200)
ALBUMIN SERPL-MCNC: 3.3 G/DL (ref 3.5–5.2)
ALP BLD-CCNC: 77 U/L (ref 40–129)
ALT SERPL-CCNC: 6 U/L (ref 0–40)
ANION GAP SERPL CALCULATED.3IONS-SCNC: 7 MMOL/L (ref 7–16)
AST SERPL-CCNC: 6 U/L (ref 0–39)
BASOPHILS ABSOLUTE: 0.04 E9/L (ref 0–0.2)
BASOPHILS RELATIVE PERCENT: 0.4 % (ref 0–2)
BILIRUB SERPL-MCNC: <0.2 MG/DL (ref 0–1.2)
BUN BLDV-MCNC: 15 MG/DL (ref 6–20)
CALCIUM SERPL-MCNC: 9 MG/DL (ref 8.6–10.2)
CD4/CD8 RATIO: 1.33 RATIO (ref 0.8–3.9)
CHLORIDE BLD-SCNC: 106 MMOL/L (ref 98–107)
CO2: 26 MMOL/L (ref 22–29)
CREAT SERPL-MCNC: 0.9 MG/DL (ref 0.7–1.2)
EOSINOPHILS ABSOLUTE: 0.03 E9/L (ref 0.05–0.5)
EOSINOPHILS RELATIVE PERCENT: 0.3 % (ref 0–6)
GFR AFRICAN AMERICAN: >60
GFR NON-AFRICAN AMERICAN: >60 ML/MIN/1.73
GLUCOSE BLD-MCNC: 87 MG/DL (ref 74–99)
HCT VFR BLD CALC: 38.6 % (ref 37–54)
HEMOGLOBIN: 12.2 G/DL (ref 12.5–16.5)
IMMATURE GRANULOCYTES #: 0.09 E9/L
IMMATURE GRANULOCYTES %: 0.8 % (ref 0–5)
LYMPH SUBSET INFORMATION: ABNORMAL
LYMPHOCYTES ABSOLUTE: 2.34 E9/L (ref 1.5–4)
LYMPHOCYTES RELATIVE PERCENT: 22 % (ref 20–42)
MCH RBC QN AUTO: 26.5 PG (ref 26–35)
MCHC RBC AUTO-ENTMCNC: 31.6 % (ref 32–34.5)
MCV RBC AUTO: 83.9 FL (ref 80–99.9)
MONOCYTES ABSOLUTE: 0.82 E9/L (ref 0.1–0.95)
MONOCYTES RELATIVE PERCENT: 7.7 % (ref 2–12)
NEUTROPHILS ABSOLUTE: 7.31 E9/L (ref 1.8–7.3)
NEUTROPHILS RELATIVE PERCENT: 68.8 % (ref 43–80)
PDW BLD-RTO: 18.5 FL (ref 11.5–15)
PLATELET # BLD: 342 E9/L (ref 130–450)
PMV BLD AUTO: 9.2 FL (ref 7–12)
POTASSIUM SERPL-SCNC: 3.9 MMOL/L (ref 3.5–5)
RBC # BLD: 4.6 E12/L (ref 3.8–5.8)
SEDIMENTATION RATE, ERYTHROCYTE: 3 MM/HR (ref 0–15)
SODIUM BLD-SCNC: 139 MMOL/L (ref 132–146)
TOTAL PROTEIN: 5.6 G/DL (ref 6.4–8.3)
WBC # BLD: 10.6 E9/L (ref 4.5–11.5)

## 2020-10-05 PROCEDURE — 6360000002 HC RX W HCPCS: Performed by: NEUROMUSCULOSKELETAL MEDICINE & OMM

## 2020-10-05 PROCEDURE — 85025 COMPLETE CBC W/AUTO DIFF WBC: CPT

## 2020-10-05 PROCEDURE — 6370000000 HC RX 637 (ALT 250 FOR IP): Performed by: SPECIALIST

## 2020-10-05 PROCEDURE — 90686 IIV4 VACC NO PRSV 0.5 ML IM: CPT | Performed by: NEUROMUSCULOSKELETAL MEDICINE & OMM

## 2020-10-05 PROCEDURE — 6360000002 HC RX W HCPCS: Performed by: SPECIALIST

## 2020-10-05 PROCEDURE — 6370000000 HC RX 637 (ALT 250 FOR IP): Performed by: NEUROMUSCULOSKELETAL MEDICINE & OMM

## 2020-10-05 PROCEDURE — 2580000003 HC RX 258: Performed by: SPECIALIST

## 2020-10-05 PROCEDURE — G0008 ADMIN INFLUENZA VIRUS VAC: HCPCS | Performed by: NEUROMUSCULOSKELETAL MEDICINE & OMM

## 2020-10-05 PROCEDURE — 85651 RBC SED RATE NONAUTOMATED: CPT

## 2020-10-05 PROCEDURE — 36415 COLL VENOUS BLD VENIPUNCTURE: CPT

## 2020-10-05 PROCEDURE — 36592 COLLECT BLOOD FROM PICC: CPT

## 2020-10-05 PROCEDURE — 80053 COMPREHEN METABOLIC PANEL: CPT

## 2020-10-05 PROCEDURE — 2580000003 HC RX 258: Performed by: NEUROMUSCULOSKELETAL MEDICINE & OMM

## 2020-10-05 RX ORDER — PIPERACILLIN SODIUM, TAZOBACTAM SODIUM 3; .375 G/15ML; G/15ML
3.38 INJECTION, POWDER, LYOPHILIZED, FOR SOLUTION INTRAVENOUS EVERY 8 HOURS
Qty: 303.75 G | Refills: 1 | Status: SHIPPED | OUTPATIENT
Start: 2020-10-05 | End: 2020-10-19

## 2020-10-05 RX ORDER — FLUCONAZOLE 200 MG/1
400 TABLET ORAL DAILY
Qty: 28 TABLET | Refills: 0 | Status: SHIPPED | OUTPATIENT
Start: 2020-10-06 | End: 2020-10-20

## 2020-10-05 RX ADMIN — PREDNISONE 40 MG: 20 TABLET ORAL at 08:35

## 2020-10-05 RX ADMIN — CHLORPROMAZINE HYDROCHLORIDE 100 MG: 25 TABLET, SUGAR COATED ORAL at 16:55

## 2020-10-05 RX ADMIN — DIVALPROEX SODIUM 500 MG: 250 TABLET, DELAYED RELEASE ORAL at 08:35

## 2020-10-05 RX ADMIN — DIVALPROEX SODIUM 500 MG: 250 TABLET, DELAYED RELEASE ORAL at 14:36

## 2020-10-05 RX ADMIN — SODIUM CHLORIDE: 9 INJECTION, SOLUTION INTRAVENOUS at 08:46

## 2020-10-05 RX ADMIN — PIPERACILLIN SODIUM AND TAZOBACTAM SODIUM 3.38 G: 3; .375 INJECTION, POWDER, LYOPHILIZED, FOR SOLUTION INTRAVENOUS at 08:36

## 2020-10-05 RX ADMIN — ATORVASTATIN CALCIUM 10 MG: 10 TABLET, FILM COATED ORAL at 08:35

## 2020-10-05 RX ADMIN — PIPERACILLIN SODIUM AND TAZOBACTAM SODIUM 3.38 G: 3; .375 INJECTION, POWDER, LYOPHILIZED, FOR SOLUTION INTRAVENOUS at 16:21

## 2020-10-05 RX ADMIN — INFLUENZA A VIRUS A/VICTORIA/2454/2019 IVR-207 (H1N1) ANTIGEN (PROPIOLACTONE INACTIVATED), INFLUENZA A VIRUS A/HONG KONG/2671/2019 IVR-208 (H3N2) ANTIGEN (PROPIOLACTONE INACTIVATED), INFLUENZA B VIRUS B/VICTORIA/705/2018 BVR-11 ANTIGEN (PROPIOLACTONE INACTIVATED), INFLUENZA B VIRUS B/PHUKET/3073/2013 BVR-1B ANTIGEN (PROPIOLACTONE INACTIVATED) 0.5 ML: 15; 15; 15; 15 INJECTION, SUSPENSION INTRAMUSCULAR at 17:00

## 2020-10-05 RX ADMIN — HYDROCODONE BITARTRATE AND ACETAMINOPHEN 1 TABLET: 10; 325 TABLET ORAL at 14:35

## 2020-10-05 RX ADMIN — CHLORPROMAZINE HYDROCHLORIDE 100 MG: 25 TABLET, SUGAR COATED ORAL at 08:35

## 2020-10-05 RX ADMIN — PIPERACILLIN SODIUM AND TAZOBACTAM SODIUM 3.38 G: 3; .375 INJECTION, POWDER, LYOPHILIZED, FOR SOLUTION INTRAVENOUS at 04:33

## 2020-10-05 RX ADMIN — SODIUM CHLORIDE: 9 INJECTION, SOLUTION INTRAVENOUS at 01:06

## 2020-10-05 RX ADMIN — FLUCONAZOLE 400 MG: 100 TABLET ORAL at 13:36

## 2020-10-05 RX ADMIN — AMLODIPINE BESYLATE 10 MG: 10 TABLET ORAL at 08:35

## 2020-10-05 RX ADMIN — CHLORPROMAZINE HYDROCHLORIDE 100 MG: 25 TABLET, SUGAR COATED ORAL at 13:36

## 2020-10-05 RX ADMIN — HYDROCODONE BITARTRATE AND ACETAMINOPHEN 1 TABLET: 10; 325 TABLET ORAL at 08:35

## 2020-10-05 RX ADMIN — ENOXAPARIN SODIUM 40 MG: 40 INJECTION SUBCUTANEOUS at 08:35

## 2020-10-05 ASSESSMENT — PAIN DESCRIPTION - PAIN TYPE: TYPE: ACUTE PAIN

## 2020-10-05 ASSESSMENT — PAIN DESCRIPTION - DESCRIPTORS: DESCRIPTORS: ACHING;SORE;DISCOMFORT

## 2020-10-05 ASSESSMENT — PAIN SCALES - GENERAL
PAINLEVEL_OUTOF10: 9
PAINLEVEL_OUTOF10: 8
PAINLEVEL_OUTOF10: 5

## 2020-10-05 ASSESSMENT — PAIN DESCRIPTION - ONSET: ONSET: ON-GOING

## 2020-10-05 ASSESSMENT — PAIN DESCRIPTION - FREQUENCY: FREQUENCY: CONTINUOUS

## 2020-10-05 ASSESSMENT — PAIN DESCRIPTION - LOCATION: LOCATION: ABDOMEN

## 2020-10-05 ASSESSMENT — PAIN DESCRIPTION - ORIENTATION: ORIENTATION: MID

## 2020-10-05 NOTE — PROGRESS NOTES
CLINICAL PHARMACY NOTE: MEDS TO [address] Select Patient?: Yes  Total # of Prescriptions Filled: 1   The following medications were delivered to the patient:  · Fluconazole 200 mg   Total # of Interventions Completed: 3  Time Spent (min): 30    Additional Documentation:

## 2020-10-05 NOTE — PROGRESS NOTES
[address] [place] [address] [facility]  Progress Note    CC: no new issues  Face to face encounter   SUBJECTIVE:  Patient is tolerating medications. No reported adverse drug reactions. ROS: No nausea, vomiting, has ostomy- loose stools. No fever. Abdominal pain ok. Wife at bedside and updated today. Medications:  Scheduled Meds:   fluconazole  400 mg Oral Daily    influenza virus vaccine  0.5 mL Intramuscular Prior to discharge    enoxaparin  40 mg Subcutaneous Daily    amLODIPine  10 mg Oral Daily    atorvastatin  10 mg Oral Daily    chlorproMAZINE  100 mg Oral 4x Daily    divalproex  500 mg Oral TID    mirtazapine  15 mg Oral Nightly    predniSONE  40 mg Oral Daily    nicotine  1 patch Transdermal Daily    piperacillin-tazobactam  3.375 g Intravenous Q8H    heparin flush  3 mL Intravenous 2 times per day     Continuous Infusions:   sodium chloride 80 mL/hr at 10/05/20 0846    sodium chloride 12.5 mL/hr at 10/05/20 0106     PRN Meds:acetaminophen, HYDROcodone-acetaminophen, sodium chloride flush, heparin flush, sodium chloride flush  OBJECTIVE:  Patient Vitals for the past 24 hrs:   BP Temp Temp src Pulse Resp SpO2 Weight   10/05/20 0745 (!) 150/92 97.7 °F (36.5 °C) Oral 98 18 96 % --   10/05/20 0019 121/72 98 °F (36.7 °C) Oral 79 18 -- 214 lb 7 oz (97.3 kg)   10/04/20 2000 125/76 98.1 °F (36.7 °C) Oral 89 18 97 % --     Constitutional: The patient is awake, alert, and oriented. Resting in bed. Skin: Warm and dry. No rashes were noted. Head: Eyes show round, and reactive pupils. No jaundice. Mouth: Moist mucous membranes, no ulcerations, no thrush. Neck: Supple to movements. No lymphadenopathy. Chest: No use of accessory muscles to breathe. Symmetrical expansion. Auscultation reveals no wheezing, crackles, or rhonchi. Cardiovascular: S1 and S2 are rhythmic and regular. No murmurs appreciated. Abdomen: Positive bowel sounds to auscultation. Benign to palpation.  Has deficiency     PLAN:  · Continue zosyn and diflucan   · Social work to check into cost of home IV on Monday - order placed   · Check cultures  · Monitor labs- reviewed  · Will check labs in morning   · IGG low- 578- will need to repeat outpatient  · General Surgery following- will need repeat CT prior to stopping atbs. Carlos Manuel Parkinson  CNP  10/5/2020     Patient seen and examined. I had a face to face encounter with the patient. Agree with exam.  Assessment and plan as outlined above and directed by me. Addition and corrections were done as deemed appropriate. My exam and plan include: The patient is doing significantly better. He is still having some slight suprapubic abdominal discomfort. He has an ileostomy that is putting out a fair amount of stools. He has a PEG in place. He has made significant improvement and can be discharged home with a minimum of 2 weeks of IV antibiotics. The final stop date will be reassessed once he follows up with us. He was instructed to call the office and make a follow-up appointment.     Jose August  10/5/2020  3:02 PM

## 2020-10-05 NOTE — CARE COORDINATION
Referral was made to [facility] on Friday. Per Carmelina Thorne at [facility] she spoke with patient and pt is agreeable to co-pay of $3.60 weekly. Met with patient and confirmed this with patient. Pt is agreeable to [facility] and IV abx cost. Pt anxious to discharge and is anticipating discharge home soon. [facility] orders needed. Case management and social work will continue to follow to assist with the transition of care.      1548: Signed ABX script sent via fax to [facility]- [phone]

## 2020-10-05 NOTE — PROGRESS NOTES
Department of Surgery - Adult  General Surgery  Dr. Debbie Dickens's Progress Note    CC: [facility]    SUBJECTIVE: Doing well. Having good bowel function     OBJECTIVE      Physical    VITALS:  /72   Pulse 79   Temp 98 °F (36.7 °C) (Oral)   Resp 18   Ht 6' 3\" (1.905 m)   Wt 214 lb 7 oz (97.3 kg)   SpO2 97%   BMI 26.80 kg/m²   INTAKE/OUTPUT:      Intake/Output Summary (Last 24 hours) at 10/5/2020 0513  Last data filed at 10/4/2020 1657  Gross per 24 hour   Intake 6590 ml   Output --   Net 6590 ml     TEMPERATURE:  Current - Temp: 98 °F (36.7 °C); Max - Temp  Av.8 °F (36.6 °C)  Min: 97.5 °F (36.4 °C)  Max: 98.1 °F (36.7 °C)  RESPIRATIONS RANGE: Resp  Av  Min: 16  Max: 18  PULSE RANGE: Pulse  Av.3  Min: 76  Max: 89  BLOOD PRESSURE RANGE:  Systolic (74SNL), PGH:843 , Min:115 , ZGN:496   ; Diastolic (33WVG), ODZ:37, Min:72, Max:76    PULSE OXIMETRY RANGE: SpO2  Av.5 %  Min: 94 %  Max: 97 %  CONSTITUTIONAL:  awake, alert, cooperative, no apparent distress, and appears stated age  ABDOMEN: On examination: The abdomen is soft nontender and nondistended. The colostomy is functioning well.   Data  CBC with Differential:    Lab Results   Component Value Date    WBC 10.6 10/05/2020    RBC 4.60 10/05/2020    HGB 12.2 10/05/2020    HCT 38.6 10/05/2020     10/05/2020    MCV 83.9 10/05/2020    MCH 26.5 10/05/2020    MCHC 31.6 10/05/2020    RDW 18.5 10/05/2020    NRBC 0.0 2020    BANDSPCT 1 2014    METASPCT 1.7 2020    LYMPHOPCT 22.0 10/05/2020    MONOPCT 7.7 10/05/2020    MYELOPCT 2.6 2020    BASOPCT 0.4 10/05/2020    MONOSABS 0.82 10/05/2020    LYMPHSABS 2.34 10/05/2020    EOSABS 0.03 10/05/2020    BASOSABS 0.04 10/05/2020     CMP:    Lab Results   Component Value Date     10/02/2020    K 4.1 10/02/2020     10/02/2020    CO2 24 10/02/2020    BUN 19 10/02/2020    CREATININE 1.0 10/02/2020    GFRAA >60 10/02/2020    LABGLOM >60 10/02/2020    GLUCOSE 101 10/02/2020 GLUCOSE 118 02/05/2011    PROT 6.0 10/02/2020    LABALBU 3.5 10/02/2020    LABALBU 3.5 02/05/2011    CALCIUM 9.2 10/02/2020    BILITOT <0.2 10/02/2020    ALKPHOS 91 10/02/2020    AST 9 10/02/2020    ALT 7 10/02/2020     BMP:  Hepatic Function Panel:  Ionized Calcium:  No results found for: IONCA  Magnesium:    Lab Results   Component Value Date    MG 2.1 01/26/2020     Phosphorus:    Lab Results   Component Value Date    PHOS 3.4 10/03/2018       Current Inpatient Medications    Current Facility-Administered Medications: fluconazole (DIFLUCAN) tablet 400 mg, 400 mg, Oral, Daily  influenza quadrivalent split vaccine (FLUZONE;FLUARIX;FLULAVAL;AFLURIA) injection 0.5 mL, 0.5 mL, Intramuscular, Prior to discharge  enoxaparin (LOVENOX) injection 40 mg, 40 mg, Subcutaneous, Daily  amLODIPine (NORVASC) tablet 10 mg, 10 mg, Oral, Daily  atorvastatin (LIPITOR) tablet 10 mg, 10 mg, Oral, Daily  chlorproMAZINE (THORAZINE) tablet 100 mg, 100 mg, Oral, 4x Daily  divalproex (DEPAKOTE) DR tablet 500 mg, 500 mg, Oral, TID  mirtazapine (REMERON) tablet 15 mg, 15 mg, Oral, Nightly  predniSONE (DELTASONE) tablet 40 mg, 40 mg, Oral, Daily  0.9 % sodium chloride infusion, , Intravenous, Continuous  acetaminophen (TYLENOL) tablet 650 mg, 650 mg, Oral, Q6H PRN  nicotine (NICODERM CQ) 21 MG/24HR 1 patch, 1 patch, Transdermal, Daily  HYDROcodone-acetaminophen (NORCO)  MG per tablet 1 tablet, 1 tablet, Oral, Q6H PRN  piperacillin-tazobactam (ZOSYN) 3.375 g in dextrose 5 % 50 mL IVPB extended infusion (mini-bag), 3.375 g, Intravenous, Q8H **AND** 0.9 % sodium chloride infusion, , Intravenous, Q8H  sodium chloride flush 0.9 % injection 10 mL, 10 mL, Intravenous, PRN  heparin flush 100 UNIT/ML injection 300 Units, 3 mL, Intravenous, 2 times per day  heparin flush 100 UNIT/ML injection 300 Units, 3 mL, Intracatheter, PRN  sodium chloride flush 0.9 % injection 10 mL, 10 mL, Intravenous, PRN    ASSESSMENT:    48 y.o. male with small intra-abdominal abscess. Again, the patient has a significant history for Crohn's disease. PLAN:    GI team consulted for Crohn's management   Continue to monitor the patient's clinical status.   Repeat the CT of the abdomen and pelvis after completion of a course of antibiotics   - will discuss CT A/P timing on rounds this am    Electronically signed by Glen Fallon MD on 10/5/2020 at 6:37 AM

## 2020-10-05 NOTE — PROGRESS NOTES
General Progress Note  10/5/2020 8:03 AM  Subjective:   Admit Date: 10/1/2020  PCP: Skylar Salmon DO  Interval History: no acute issues overnight. Feeling better, no emesis or nausea reported. Diet: DIET GENERAL;  Pain is:Mild  Nausea:None  Bowel Movement/Flatus yes    Data:   Scheduled Meds:   fluconazole  400 mg Oral Daily    influenza virus vaccine  0.5 mL Intramuscular Prior to discharge    enoxaparin  40 mg Subcutaneous Daily    amLODIPine  10 mg Oral Daily    atorvastatin  10 mg Oral Daily    chlorproMAZINE  100 mg Oral 4x Daily    divalproex  500 mg Oral TID    mirtazapine  15 mg Oral Nightly    predniSONE  40 mg Oral Daily    nicotine  1 patch Transdermal Daily    piperacillin-tazobactam  3.375 g Intravenous Q8H    heparin flush  3 mL Intravenous 2 times per day     Continuous Infusions:   sodium chloride 80 mL/hr at 10/04/20 1736    sodium chloride 12.5 mL/hr at 10/05/20 0106     PRN Meds:acetaminophen, HYDROcodone-acetaminophen, sodium chloride flush, heparin flush, sodium chloride flush  I/O last 3 completed shifts: In: 6996 [P.O.:5940]  Out: -   No intake/output data recorded.     Intake/Output Summary (Last 24 hours) at 10/5/2020 0803  Last data filed at 10/4/2020 1657  Gross per 24 hour   Intake 5940 ml   Output --   Net 5940 ml       CBC with Differential:    Lab Results   Component Value Date    WBC 10.6 10/05/2020    RBC 4.60 10/05/2020    HGB 12.2 10/05/2020    HCT 38.6 10/05/2020     10/05/2020    MCV 83.9 10/05/2020    MCH 26.5 10/05/2020    MCHC 31.6 10/05/2020    RDW 18.5 10/05/2020    NRBC 0.0 04/03/2020    BANDSPCT 1 09/17/2014    METASPCT 1.7 04/02/2020    LYMPHOPCT 22.0 10/05/2020    MONOPCT 7.7 10/05/2020    MYELOPCT 2.6 04/03/2020    BASOPCT 0.4 10/05/2020    MONOSABS 0.82 10/05/2020    LYMPHSABS 2.34 10/05/2020    EOSABS 0.03 10/05/2020    BASOSABS 0.04 10/05/2020     CMP:    Lab Results   Component Value Date     10/05/2020    K 3.9 10/05/2020    K 4.1 10/02/2020     10/05/2020    CO2 26 10/05/2020    BUN 15 10/05/2020    CREATININE 0.9 10/05/2020    GFRAA >60 10/05/2020    LABGLOM >60 10/05/2020    GLUCOSE 87 10/05/2020    GLUCOSE 118 02/05/2011    PROT 5.6 10/05/2020    LABALBU 3.3 10/05/2020    LABALBU 3.5 02/05/2011    CALCIUM 9.0 10/05/2020    BILITOT <0.2 10/05/2020    ALKPHOS 77 10/05/2020    AST 6 10/05/2020    ALT 6 10/05/2020     Magnesium:    Lab Results   Component Value Date    MG 2.1 01/26/2020     Phosphorus:    Lab Results   Component Value Date    PHOS 3.4 10/03/2018     PT/INR:    Lab Results   Component Value Date    PROTIME 13.4 02/03/2019    PROTIME 11.5 02/05/2011    INR 1.2 02/03/2019     Last 3 Troponin:    Lab Results   Component Value Date    TROPONINI <0.01 09/04/2020    TROPONINI <0.01 01/24/2020    TROPONINI <0.01 12/27/2018     U/A:    Lab Results   Component Value Date    COLORU Yellow 10/01/2020    PHUR 6.0 10/01/2020    LABCAST RARE 03/06/2018    WBCUA >20 09/29/2020    RBCUA NONE 09/29/2020    MUCUS Present 05/13/2019    YEAST MODERATE 05/13/2019    BACTERIA RARE 09/29/2020    CLARITYU Clear 10/01/2020    SPECGRAV 1.020 10/01/2020    LEUKOCYTESUR Negative 10/01/2020    UROBILINOGEN 0.2 10/01/2020    BILIRUBINUR Negative 10/01/2020    BLOODU Negative 10/01/2020    GLUCOSEU Negative 10/01/2020    AMORPHOUS FEW 10/24/2018     HgBA1c:  No components found for: HGBA1C  TSH:    Lab Results   Component Value Date    TSH 4.710 10/03/2018     -----------------------------------------------------------------    Objective:   Vitals: BP (!) 150/92   Pulse 98   Temp 97.7 °F (36.5 °C) (Oral)   Resp 18   Ht 6' 3\" (1.905 m)   Wt 214 lb 7 oz (97.3 kg)   SpO2 96%   BMI 26.80 kg/m²   General appearance: alert, appears stated age and cooperative  Skin: Skin color, texture, turgor normal. No rashes or lesions  HEENT: Head: Normal, normocephalic, atraumatic.   Neck: no adenopathy, no carotid bruit, no JVD, supple, symmetrical, trachea

## 2020-10-05 NOTE — PLAN OF CARE
Problem: Pain:  Goal: Pain level will decrease  Description: Pain level will decrease  10/5/2020 0303 by Peggy Suarez RN  Outcome: Met This Shift     Problem: Pain:  Goal: Control of acute pain  Description: Control of acute pain  10/5/2020 0303 by Peggy Suarez RN  Outcome: Met This Shift     Problem: Pain:  Goal: Control of chronic pain  Description: Control of chronic pain  10/5/2020 0303 by Peggy Suarez RN  Outcome: Met This Shift

## 2020-10-06 ENCOUNTER — CARE COORDINATION (OUTPATIENT)
Dept: CASE MANAGEMENT | Age: 55
End: 2020-10-06

## 2020-10-06 NOTE — CARE COORDINATION
10/5/2020. Notified Carrie Morning that patient's spouse thinks patient may have a bladder infection and provides no further details. Discussed Carrie Morning will f/u with nurse from [facility]. Healthcare Decision Maker updated. Kinza Walker is hurried on this phone call. Reports patient has a PEG and she needs to learn how to use it. Reports patient's ileostomy with liquid stool. CTN provided emotional support; Kinza Walker ended the phone call. Follow Up  No future appointments.     Harvey Pearce RN

## 2020-10-07 NOTE — DISCHARGE SUMMARY
Family Practice Discharge Summary    Fawad Goldsmith  :  1965  MRN:  [id number]    Admit date:  10/1/2020  Discharge date:  10/5/2020    Admitting Physician:  Laura Bartlett DO    Discharge Diagnoses:    Patient Active Problem List   Diagnosis    Drug overdose, multiple drugs    Severe episode of recurrent major depressive disorder, with psychotic features ([address]    Mood disorder due to a general medical condition    Suicide attempt ([address]    Laceration of neck    Laceration of left wrist    Crohn disease ([address]    GERD (gastroesophageal reflux disease)    Bipolar 1 disorder ([address]    Opioid overdose ([address]    Drug-induced tremor    Orchitis    Moderate protein-calorie malnutrition ([address]    Acute psychosis ([address]    Schizoaffective disorder, bipolar type ([address]    H/O carcinoma in situ of prostate    Essential hypertension    PND (post-nasal drip)    Scrotal abscess    Depression with suicidal ideation    Colitis    Acute Crohn's disease with rectal bleeding ([address]    Opiate abuse, episodic ([address]    LILLY (acute kidney injury) ([address]    Intra-abdominal abscess ([address]    IgG deficiency ([address]       Admission Condition:  fair    Discharged Condition:  fair    Hospital Course:   A 50-year-old male patient of Dr. Payam Hernandez,  whom I am covering for, presents emergency room for evaluation of  intra-abdominal abscess. The patient states that he has had severe  abdominal cramping for the last several days. His normal ostomy output  is intact. He denies any nausea or vomiting. The patient has an  extensive history of Crohn's disease and prostate CA. He does most of  his GI following [facility] at [facility]. He was last seen  there five months ago. He is on chronic prednisone for his Crohn's. The patient has a history of prostatectomy, partial colectomy, and small  bowel resection in 2020, secondary to his of prostate CA.   Workup in  the ER showed; CAT scan of the abdomen and pelvis with IV contrast  showed small developing abscess within the right lower quadrant  measuring 2.2 x 2.0 x 1.1 cm. There is adjacent mesenteric induration  noted. The abscess is completely surrounded by multiple loops of small  bowel given its dislocation and size. It is not amendable to CT-guided  drainage. Also of note is his right lower quadrant ostomy with  parastomal hernia. Blood workup showed that he was afebrile. His white  count was 10.1. Electrolytes unremarkable except for a glucose of 193. His lactic acid was 1.9. BUN and creatinine 19 and 1.2. His H and H  was 14.4 and 44.3. Urinalysis was negative for nitrites and leukocytes. The patient will be admitted under my service to a general medical  floor. We will consult Infectious Disease and Surgery. We will start  on IV fluids and continue IV antibiotics. He offers no other complaints  at this time. Discharge Medications:    See medication reconciliation under discharge insructions. Consults:  ID, GI and general surgery    Significant Diagnostic Studies:  radiology: CT scan:   CT ABDOMEN PELVIS W IV CONTRAST Additional Contrast? None [[id number]]  Collected: 10/01/20 2048        Order Status: Completed  Updated: 10/01/20 2100       Narrative:         EXAMINATION:   CT OF THE ABDOMEN AND PELVIS WITH CONTRAST 10/1/2020 8:28 pm     TECHNIQUE:   CT of the abdomen and pelvis was performed with the administration of   intravenous contrast. Multiplanar reformatted images are provided for review. Dose modulation, iterative reconstruction, and/or weight based adjustment of   the mA/kV was utilized to reduce the radiation dose to as low as reasonably   achievable.      COMPARISON:   09/29/2020     HISTORY:   ORDERING SYSTEM PROVIDED HISTORY: abdominal pain   TECHNOLOGIST PROVIDED HISTORY:   Reason for exam:->abdominal pain   Additional Contrast?->None   What reading provider will be dictating this exam?->CRC     FINDINGS:   Lower Chest:  Visualized portion of the lower chest demonstrates no acute   abnormality. Organs:  Liver enhances normally without evidence of intrahepatic biliary   ductal dilatation. The spleen, pancreas and adrenal glands are unremarkable. The kidneys enhance symmetrically without evidence of hydronephrosis.  Period   bilateral simple renal cysts are noted.  The largest is seen within the   inferior pole of the right kidney and measures 1.7 cm. GI/Bowel:  The gallbladder is unremarkable.  The patient has undergone   previous colonic resection with ostomy within the right lower quadrant. There is a peristomal hernia.  The peristomal hernia is unchanged. Within the mesentery of the right hemiabdomen/right lower quadrant there is   induration. Lashmeet Wetzel is a small developing abscess within the mesentery   measuring 2.0 x 2.2 x 1.1 cm.  The small developing abscess is completely   sound rounded by a loops of small bowel and is not amendable to CT-guided   drainage given its location and size. There is no intra-free air. Pelvis:  Bladder is unremarkable in appearance. Peritoneum/Retroperitoneum:  No evidence of retroperitoneal lymphadenopathy. The abdominal aorta is normal in caliber.  There is no aneurysm or dissection. Bones/Soft Tissues:  Age related degenerative changes of the visualized   osseous structures without focal destructive lesion.       Impression:         1. Small developing abscess within the right lower quadrant measuring 2.2 by   2.0 x 1.1 cm.  There is adjacent mesenteric induration.  This developing   abscess is completely surrounded by multiple loops of small bowel and given   its location and size is not amendable to CT-guided drainage.    2. Right lower quadrant ostomy with peristomal hernia.               Treatments:   antibiotics: Zosyn and Diflucan    Discharge Exam:  /74   Pulse 84   Temp 98.4 °F (36.9 °C) (Oral)   Resp 18   Ht 6' 3\" (1.905 m)   Wt 214 lb 7 oz (97.3 kg)   SpO2 96%   BMI 26.80 kg/m²     General Appearance:    Alert, cooperative, no distress, appears stated age   Head:    Normocephalic, without obvious abnormality, atraumatic   Eyes:    PERRL, conjunctiva/corneas clear, EOM's intact, fundi     benign, both eyes        Ears:    Normal TM's and external ear canals, both ears   Nose:   Nares normal, septum midline, mucosa normal, no drainage    or sinus tenderness   Throat:   Lips, mucosa, and tongue normal; teeth and gums normal   Neck:   Supple, symmetrical, trachea midline, no adenopathy;        thyroid:  No enlargement/tenderness/nodules; no carotid    bruit or JVD   Back:     Symmetric, no curvature, ROM normal, no CVA tenderness   Lungs:     Clear to auscultation bilaterally, respirations unlabored   Chest wall:    No tenderness or deformity   Heart:    Regular rate and rhythm, S1 and S2 normal, no murmur, rub   or gallop   Abdomen:     Soft, non-tender, bowel sounds active all four quadrants,     no masses, no organomegaly   Genitalia:    Normal male without lesion, discharge or tenderness   Rectal:    Normal tone, normal prostate, no masses or tenderness;    guaiac negative stool   Extremities:   Extremities normal, atraumatic, no cyanosis or edema   Pulses:   2+ and symmetric all extremities   Skin:   Skin color, texture, turgor normal, no rashes or lesions   Lymph nodes:   Cervical, supraclavicular, and axillary nodes normal   Neurologic:   CNII-XII intact.  Normal strength, sensation and reflexes       throughout       Disposition:   home       Medication List      START taking these medications    fluconazole 200 MG tablet  Commonly known as:  DIFLUCAN  Take 2 tablets by mouth daily for 14 days     piperacillin-tazobactam 3.375 (3-0.375) g injection  Commonly known as:  Zosyn  Infuse 3.375 g intravenously every 8 hours for 14 days        CHANGE how you take these medications    divalproex 500 MG DR tablet  Commonly known as:  DEPAKOTE  Take 1

## 2020-10-13 ENCOUNTER — CARE COORDINATION (OUTPATIENT)
Dept: CASE MANAGEMENT | Age: 55
End: 2020-10-13

## 2020-10-13 NOTE — CARE COORDINATION
[facility] Transitions Follow Up Call    10/13/2020    Patient: Marjorie Ruiz  Patient : 1965   MRN: [id number]  Reason for Admission: Intra-abdominal abscess  Discharge Date: 10/5/20 RARS: Readmission Risk Score: 34      Needs to be reviewed by the provider   Additional needs identified to be addressed with provider; patient to follow up with PCP today regarding urine specimen/results. Method of communication with provider : chart routing    Care Transition Nurse (CTN) contacted the patient by telephone to follow up after admission on 10/1/2020. Verified name and  with patient as identifiers. Addressed changes since last contact:   Discharged needs reviewed: none  Follow up appointment completed? No; patient reports he has follow up appointment with PCP on 10/20/2020. Advance Care Planning:   Does patient have an Advance Directive:  decision maker updated. CTN reviewed discharge instructions, medical action plan and red flags with patient and discussed any barriers to care and/or understanding of plan of care after discharge. Discussed appropriate site of care based on symptoms and resources available to patient including: PCP and Specialist. The patient agrees to contact the PCP office for questions related to their healthcare. Patients top risk factors for readmission: medical condition  Interventions to address risk factors: Discussed results of urine specimen pending; patient to call office, and CTN will forward note to PCP. Discussed follow-up appointments. If no appointment was previously scheduled, appointment scheduling offered: N/A Is follow up appointment scheduled within 7 days of discharge? No      Plan for follow-up call in 7-10 days based on severity of symptoms and risk factors. Plan for next call: referral to ambulatory care manager  CTN provided contact information for future needs. Spoke with: Patient, Tom Prajapati.       Care Transitions Subsequent and Final Call    Subsequent and Final Calls  Do you have any ongoing symptoms?:  Yes  Patient-reported symptoms:  Pain  -Patient reports abdominal pain, rates at 9/10 , and describes as\"bruising pain\". -Patient reports increased pain with movement and/or coughing.   -Patient reports he takes Tylenol for pain and states \"it does not do much\". -Patient reports he rests in bed most of the time to manage pain.   -Patient reports burning with urination; reports provided urine specimen to Dr. Courtney Fletcher office on 10/9/2020 and will follow up with Dr. Keke Pennington today.   -swelling in abdomen; reports unchanged  Do you have any questions related to your medications?:  No  Do you currently have any active services?:  Yes  Are you currently active with any services?:  [facility], continues with IV antibiotics  Do you have any needs or concerns that I can assist you with?:  No  Identified Barriers:  Stress  Care Transitions Interventions  No Identified Needs    Patient confirms he has a PICC line in right arm and not a PEG tube as reported by spouse.   -patient reports he is eating and drinking without difficulty. -patient reports he is taking prescribed medications as ordered  -reports ileostomy with liquid stool  -follow up appointment with Jarrett Zamudio on 10/20/2020    -follow up appointment with Dr. Shubham Diaz on 10/21/2020 regarding swelling in abdomen  -patient reports spouse provides transportation to appointments    Supportive listening and emotional support provided. Encouraged patient to call with any questions or concerns; CTN contact information provided.      Follow Up  Future Appointments   Date Time Provider John Walter   10/20/2020  9:30 AM Timbo Berry MD [facility]   10/21/2020  1:00 PM DO Viky Plunkett RN

## 2020-10-16 ENCOUNTER — HOSPITAL ENCOUNTER (OUTPATIENT)
Age: 55
Discharge: HOME | End: 2020-10-18
Payer: MEDICARE

## 2020-10-16 LAB
ALBUMIN SERPL-MCNC: 3.9 G/DL (ref 3.5–5.2)
ALP BLD-CCNC: 80 U/L (ref 40–129)
ALT SERPL-CCNC: 18 U/L (ref 0–40)
AST SERPL-CCNC: 16 U/L (ref 0–39)
BASOPHILS ABSOLUTE: 0.1 E9/L (ref 0–0.2)
BASOPHILS RELATIVE PERCENT: 1 % (ref 0–2)
BILIRUB SERPL-MCNC: <0.2 MG/DL (ref 0–1.2)
BILIRUBIN DIRECT: <0.2 MG/DL (ref 0–0.3)
BILIRUBIN, INDIRECT: ABNORMAL MG/DL (ref 0–1)
BUN BLDV-MCNC: 14 MG/DL (ref 6–20)
CREAT SERPL-MCNC: 1.3 MG/DL (ref 0.7–1.2)
EOSINOPHILS ABSOLUTE: 0.56 E9/L (ref 0.05–0.5)
EOSINOPHILS RELATIVE PERCENT: 5.6 % (ref 0–6)
GFR AFRICAN AMERICAN: >60
GFR NON-AFRICAN AMERICAN: 58 ML/MIN/1.73
HCT VFR BLD CALC: 44.6 % (ref 37–54)
HEMOGLOBIN: 13.9 G/DL (ref 12.5–16.5)
IMMATURE GRANULOCYTES #: 0.09 E9/L
IMMATURE GRANULOCYTES %: 0.9 % (ref 0–5)
LYMPHOCYTES ABSOLUTE: 1.67 E9/L (ref 1.5–4)
LYMPHOCYTES RELATIVE PERCENT: 16.6 % (ref 20–42)
MCH RBC QN AUTO: 26.3 PG (ref 26–35)
MCHC RBC AUTO-ENTMCNC: 31.2 % (ref 32–34.5)
MCV RBC AUTO: 84.5 FL (ref 80–99.9)
MONOCYTES ABSOLUTE: 0.93 E9/L (ref 0.1–0.95)
MONOCYTES RELATIVE PERCENT: 9.3 % (ref 2–12)
NEUTROPHILS ABSOLUTE: 6.69 E9/L (ref 1.8–7.3)
NEUTROPHILS RELATIVE PERCENT: 66.6 % (ref 43–80)
PDW BLD-RTO: 19.8 FL (ref 11.5–15)
PLATELET # BLD: 267 E9/L (ref 130–450)
PMV BLD AUTO: 10 FL (ref 7–12)
RBC # BLD: 5.28 E12/L (ref 3.8–5.8)
TOTAL PROTEIN: 6.1 G/DL (ref 6.4–8.3)
WBC # BLD: 10 E9/L (ref 4.5–11.5)

## 2020-10-16 PROCEDURE — 80076 HEPATIC FUNCTION PANEL: CPT

## 2020-10-16 PROCEDURE — 85025 COMPLETE CBC W/AUTO DIFF WBC: CPT

## 2020-10-16 PROCEDURE — 82565 ASSAY OF CREATININE: CPT

## 2020-10-16 PROCEDURE — 84520 ASSAY OF UREA NITROGEN: CPT

## 2020-10-21 ENCOUNTER — CARE COORDINATION (OUTPATIENT)
Dept: CASE MANAGEMENT | Age: 55
End: 2020-10-21

## 2020-10-21 PROBLEM — K50.114 CROHN'S DISEASE OF LARGE INTESTINE WITH ABSCESS (HCC): Status: ACTIVE | Noted: 2020-10-21

## 2020-10-21 PROBLEM — Z93.2 ILEOSTOMY IN PLACE (HCC): Status: ACTIVE | Noted: 2020-10-21

## 2020-10-21 PROBLEM — C61 PROSTATE CANCER (HCC): Status: ACTIVE | Noted: 2020-10-21

## 2020-10-21 NOTE — CARE COORDINATION
[facility] Transitions Follow Up Call    10/21/2020    Patient: Arlene Menjivar  Patient : 1965   MRN: [id number]  Reason for Admission: Intra-abdominal abscess  Discharge Date: 10/5/20 RARS: Readmission Risk Score: 34      Attempted to reach patient by phone for post hospital discharge follow up; phone rang multiple times, no answer. Will attempt to contact patient at a later time.       Jl Nolasco RN

## 2020-10-29 ENCOUNTER — CARE COORDINATION (OUTPATIENT)
Dept: CASE MANAGEMENT | Age: 55
End: 2020-10-29

## 2020-10-29 NOTE — CARE COORDINATION
[facility] Trans[facility] Follow Up Call    10/29/2020    Patient: Duane Cea  Patient : 1965   MRN: [id number]  Reason for Admission: Intra-abdominal abscess  Discharge Date: 10/5/20 RARS: Readmission Risk Score: 34       Attempted to reach patient by phone for post hospital discharge follow up; patient unavailable for phone call. Will attempt to contact patient at a later time.      Aime Liz RN

## 2020-11-04 ENCOUNTER — CARE COORDINATION (OUTPATIENT)
Dept: CASE MANAGEMENT | Age: 55
End: 2020-11-04

## 2020-11-04 NOTE — CARE COORDINATION
[facility] Trans[facility] Follow Up Call    2020    Patient: Marjorie Ruiz  Patient : 1965   MRN: [id number]  Reason for Admission: Intra-abdominal abscess  Discharge Date: 10/5/20 RARS: Readmission Risk Score: 34      Attempted to reach patient by phone for follow up. HIPAA compliant message left on patient's voicemail; CTN contact information provided. Noted in chart:   -patient had follow up OV with ID on 10/20/2020  -patient had follow up OV with PCP on 10/21/2020    CTN note from 10/13/2020; patient reported he had follow up appointment with Dr. Bridget Lee on 10/21/2020    CTN spoke with Sage Hunter, Nurse Manager at [facility], and confirmed patient no longer active with services. Final call; no further outreach. CTN signing off.        Venus Sousa RN

## 2020-11-24 ENCOUNTER — HOSPITAL ENCOUNTER (EMERGENCY)
Age: 55
Discharge: HOME | End: 2020-11-25
Payer: MEDICARE

## 2020-11-24 VITALS
HEIGHT: 75 IN | SYSTOLIC BLOOD PRESSURE: 126 MMHG | RESPIRATION RATE: 16 BRPM | DIASTOLIC BLOOD PRESSURE: 89 MMHG | HEART RATE: 122 BPM | WEIGHT: 204 LBS | TEMPERATURE: 98 F | BODY MASS INDEX: 25.36 KG/M2 | OXYGEN SATURATION: 98 %

## 2020-11-24 PROCEDURE — 99282 EMERGENCY DEPT VISIT SF MDM: CPT

## 2020-11-25 NOTE — ED NOTES
Ostomy appliance changed. Pt and spouse educated step by step on replacing bag, burping bag, excoriation care. Questions and concerns addressed. Pt discharged and educated to follow up with doctor at [facility]. Pt departed ED ambulatory in no apparent distress with wife. Personal belongings taken.       Curtis Farmer, RN  11/24/20 7503

## 2020-11-25 NOTE — ED PROVIDER NOTES
FIRST PROVIDER CONTACT ASSESSMENT NOTE      Department of Emergency Medicine   ED  First Provider Note   11/24/20  11:33 PM EST    Chief Complaint: Other (pt needs education on attaching a colostomy bag)      History of Present Illness:    Fracisco Herring is a 48 y.o. male who presents to the ED by private car for education on colostomy bag placement  Focused Screening Exam:  Constitutional:  Alert, appears stated age and is in no distress.   Heart regular rate and rhythm  Lungs clear  *ALLERGIES*     Ciprofloxacin hcl; Morphine; and Nsaids     ED Triage Vitals [11/24/20 2127]   BP Temp Temp Source Pulse Resp SpO2 Height Weight   126/89 98 °F (36.7 °C) Temporal 122 16 98 % 6' 3\" (1.905 m) 204 lb (92.5 kg)        Initial Plan of Care:  Initiate Treatment-Testing, Proceed toTreatment Area When Bed Available for ED Attending/MLP to Continue Care    -----------------END OF FIRST PROVIDER CONTACT ASSESSMENT NOTE--------------  Electronically signed by RICHAR Amador   DD: 11/24/20     RICHAR Amador  11/24/20 [id number] Sister Rosa BurgessRICHAR Melchor  11/24/20 0269

## 2020-11-25 NOTE — ED NOTES
FIRST PROVIDER CONTACT ASSESSMENT NOTE      Department of Emergency Medicine   ED  First Provider Note   11/24/20  9:25 PM EST    Chief Complaint: No chief complaint on file. History of Present Illness:    Arlene Menjivar is a 48 y.o. male who presents to the ED by private car for needs supplies for colostomy Bag   Focused Screening Exam:  Constitutional:  Alert, appears stated age and is in no distress.   Heart rrr   Lungs clear     *ALLERGIES*     Ciprofloxacin hcl; Morphine; and Nsaids     ED Triage Vitals   BP Temp Temp src Pulse Resp SpO2 Height Weight   -- -- -- -- -- -- -- --        Initial Plan of Care:  Initiate Treatment-Testing, Proceed toTreatment Area When Bed Available for ED Attending/MLP to Continue Care    -----------------END OF FIRST PROVIDER CONTACT ASSESSMENT NOTE--------------  Electronically signed by RICHAR Caba   DD: 11/24/20     RICHAR Caba  11/24/20 [id number]

## 2020-11-25 NOTE — ED PROVIDER NOTES
Independent MLP  HPI:  11/24/20, Time: 11:36 PM CALEB Menjivar is a 48 y.o. male presenting to the ED for colostomy bag leaking beginning today the complaint has been persistent, mild in severity, and worsened by nothing. Patient comes in requesting instruction on replacing his colostomy bag. He had surgery at the [facility]. Is having drainage from around the colostomy bag. States he only has 1 bag for supplies at this time      Review of Systems:   A complete review of systems was performed and pertinent positives and negatives are stated within HPI, all other systems reviewed and are negative.          --------------------------------------------- PAST HISTORY ---------------------------------------------  Past Medical History:  has a past medical history of ADD (attention deficit disorder), Anxiety, Bipolar 1 disorder ([facility] Cancer ([facility] Crohn's disease ([facility] Depression, GERD (gastroesophageal reflux disease), Panic attack, Rectal pain, and Schizo affective schizophrenia ([facility] 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 Social History:  reports that he quit smoking about 5 weeks ago. His smoking use included cigarettes. He started smoking about 42 years ago. He smoked 1.00 pack per day. He has never used smokeless tobacco. He reports that he does not drink alcohol or use drugs. Family History: family history includes Depression in his father; Heart Disease in his mother; Mental Illness in his brother, father, mother, and sister. The patients home medications have been reviewed.     Allergies: Ciprofloxacin hcl; Morphine; and Nsaids    -------------------------------------------------- RESULTS -------------------------------------------------  All laboratory and radiology results have been personally reviewed by myself   LABS:  No results found for this visit on 11/24/20. RADIOLOGY:  Interpreted by Radiologist.  No orders to display       ------------------------- NURSING NOTES AND VITALS REVIEWED ---------------------------   The nursing notes within the ED encounter and vital signs as below have been reviewed. /89   Pulse 122   Temp 98 °F (36.7 °C) (Temporal)   Resp 16   Ht 6' 3\" (1.905 m)   Wt 204 lb (92.5 kg)   SpO2 98%   BMI 25.50 kg/m²   Oxygen Saturation Interpretation: Abnormal      ---------------------------------------------------PHYSICAL EXAM--------------------------------------      Constitutional/General: Alert and oriented x3, well appearing, non toxic in NAD  Head: Normocephalic and atraumatic  Eyes: PERRL, EOMI  Mouth: Oropharynx clear, handling secretions, no trismus  Neck: Supple, full ROM,   Pulmonary: Lungs clear to auscultation bilaterally, no wheezes, rales, or rhonchi. Not in respiratory distress  Cardiovascular:  Regular rate and rhythm, no murmurs, gallops, or rubs. 2+ distal pulses  Abdomen: Soft, non tender, non distended, colostomy  Extremities: Moves all extremities x 4. Warm and well perfused  Skin: warm and dry without rash  Neurologic: GCS 15,  Psych: Normal Affect      ------------------------------ ED COURSE/MEDICAL DECISION MAKING----------------------  Medications - No data to display      ED COURSE:       Medical Decision Making:    Patient was instructed by nursing on placement of the colostomy bag. He is to follow-up with [facility] clinic as instructed  Counseling: The emergency provider has spoken with the patient and discussed todays results, in addition to providing specific details for the plan of care and counseling regarding the diagnosis and prognosis.   Questions are answered at this time and they are agreeable with the plan.      --------------------------------- IMPRESSION AND DISPOSITION ---------------------------------    IMPRESSION  1. Colostomy care [facility])        DISPOSITION  Disposition: Discharge to home  Patient condition is good      NOTE: This report was transcribed using voice recognition software.  Every effort was made to ensure accuracy; however, inadvertent computerized transcription errors may be present     Nadiya Palaciosma  11/25/20 0014

## 2020-12-02 PROBLEM — L30.9 DERMATITIS: Status: ACTIVE | Noted: 2020-12-02

## 2021-01-08 ENCOUNTER — HOSPITAL ENCOUNTER (EMERGENCY)
Age: 56
Discharge: HOME | End: 2021-01-08
Attending: EMERGENCY MEDICINE
Payer: MEDICARE

## 2021-01-08 ENCOUNTER — APPOINTMENT (OUTPATIENT)
Dept: GENERAL RADIOLOGY | Age: 56
End: 2021-01-08
Payer: MEDICARE

## 2021-01-08 VITALS
HEIGHT: 75 IN | OXYGEN SATURATION: 96 % | BODY MASS INDEX: 25.49 KG/M2 | SYSTOLIC BLOOD PRESSURE: 128 MMHG | RESPIRATION RATE: 16 BRPM | TEMPERATURE: 97.8 F | WEIGHT: 205 LBS | HEART RATE: 90 BPM | DIASTOLIC BLOOD PRESSURE: 84 MMHG

## 2021-01-08 DIAGNOSIS — S99.922A INJURY OF LEFT FOOT, INITIAL ENCOUNTER: ICD-10-CM

## 2021-01-08 DIAGNOSIS — M25.462 EFFUSION OF LEFT KNEE: ICD-10-CM
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
## 2021-01-08 PROCEDURE — 73560 X-RAY EXAM OF KNEE 1 OR 2: CPT

## 2021-01-08 PROCEDURE — 99284 EMERGENCY DEPT VISIT MOD MDM: CPT

## 2021-01-08 PROCEDURE — 73610 X-RAY EXAM OF ANKLE: CPT

## 2021-01-08 PROCEDURE — 6370000000 HC RX 637 (ALT 250 FOR IP): Performed by: NURSE PRACTITIONER

## 2021-01-08 PROCEDURE — 73590 X-RAY EXAM OF LOWER LEG: CPT

## 2021-01-08 PROCEDURE — 73630 X-RAY EXAM OF FOOT: CPT

## 2021-01-08 RX ORDER — HYDROCODONE BITARTRATE AND ACETAMINOPHEN 5; 325 MG/1; MG/1
1 TABLET ORAL ONCE
Status: COMPLETED | OUTPATIENT
Start: 2021-01-08 | End: 2021-01-08

## 2021-01-08 RX ORDER — HYDROCODONE BITARTRATE AND ACETAMINOPHEN 5; 325 MG/1; MG/1
1 TABLET ORAL EVERY 12 HOURS PRN
Qty: 4 TABLET | Refills: 0 | Status: SHIPPED | OUTPATIENT
Start: 2021-01-08 | End: 2021-01-11

## 2021-01-08 RX ORDER — ACETAMINOPHEN 325 MG/1
650 TABLET ORAL ONCE
Status: COMPLETED | OUTPATIENT
Start: 2021-01-08 | End: 2021-01-08

## 2021-01-08 RX ADMIN — ACETAMINOPHEN 650 MG: 325 TABLET ORAL at 19:35

## 2021-01-08 RX ADMIN — HYDROCODONE BITARTRATE AND ACETAMINOPHEN 1 TABLET: 5; 325 TABLET ORAL at 21:02

## 2021-01-08 ASSESSMENT — PAIN SCALES - GENERAL
PAINLEVEL_OUTOF10: 10
PAINLEVEL_OUTOF10: 10

## 2021-01-09 NOTE — ED PROVIDER NOTES
Social History:  reports that he has been smoking cigarettes. He started smoking about 43 years ago. He has been smoking about 1.00 pack per day. He has never used smokeless tobacco. He reports that he does not drink alcohol or use drugs. Family History: family history includes Depression in his father; Heart Disease in his mother; Mental Illness in his brother, father, mother, and sister. Allergies: Ciprofloxacin hcl, Morphine, and Nsaids    Physical Exam   Oxygen Saturation Interpretation: Normal.        ED Triage Vitals [01/08/21 1904]   BP Temp Temp Source Pulse Resp SpO2 Height Weight   128/84 97.8 °F (36.6 °C) Oral 107 16 96 % 6' 3\" (1.905 m) 205 lb (93 kg)         Physical Exam  Constitutional:  Alert, development consistent with age. HEENT:  NC/NT. Airway patent. Neck:  No midline or paravertebral tenderness. Normal ROM. Supple. Chest:  Symmetrical without visible rash or tenderness. Respiratory:  Lungs Clear to auscultation and breath sounds equal.  CV:  Regular rate and rhythm, normal heart sounds, without pathological murmurs, ectopy, gallops, or rubs. GI:  Abdomen Soft, nontender, good bowel sounds. No firm or pulsatile mass. Colostomy noted in the left lower quadrant. Pelvis:  Stable, nontender to palpation. Back:  No midline or paravertebral tenderness. No costovertebral tenderness. Ankle:  Left Anterior and Lateral:              Tenderness:  mild. Swelling: Mild. Deformity: no.             ROM: diminished range with pain. Skin:  no erythema, rash or wounds noted. Neurovascular: Motor deficit: none. Sensory deficit:   none. Pulse deficit: none. Capillary refill: normal.  Knee:              Tenderness:  mild. Swelling: mild              Deformity: no.             ROM: full range with pain. Skin:  no erythema, rash or wounds noted.    Foot: Tenderness:  moderate. Swelling: Moderate. Deformity: no.             ROM: diminished range with pain. Skin: Ecchymosis noted to the dorsal aspect. No wounds or abrasion noted. Integument:  Normal turgor. Warm, dry, without visible rash, unless noted elsewhere. Lymphatic: no lymphadenopathy noted  Neurological:  Oriented x3, GCS 15. Motor functions intact. Lab / Imaging Results   (All laboratory and radiology results have been personally reviewed by myself)  Labs:  No results found for this visit on 01/08/21. Imaging: All Radiology results interpreted by Radiologist unless otherwise noted. XR TIBIA FIBULA LEFT (2 VIEWS)   Final Result   1. Subtle osseous irregularities to the distal aspects of the left 3rd   through 5th metatarsals with regional soft tissue swelling. Possibly reflect   underlying acute fractures. Please correlate with point tenderness in this   area. 2.  No acute osseous findings in the left knee, left tibia/fibula, nor left   ankle. 3.  Minimal left knee osteoarthritis and small left suprapatellar joint   effusion. XR ANKLE LEFT (MIN 3 VIEWS)   Final Result   1. Subtle osseous irregularities to the distal aspects of the left 3rd   through 5th metatarsals with regional soft tissue swelling. Possibly reflect   underlying acute fractures. Please correlate with point tenderness in this   area. 2.  No acute osseous findings in the left knee, left tibia/fibula, nor left   ankle. 3.  Minimal left knee osteoarthritis and small left suprapatellar joint   effusion. XR FOOT LEFT (MIN 3 VIEWS)   Final Result   1. Subtle osseous irregularities to the distal aspects of the left 3rd   through 5th metatarsals with regional soft tissue swelling. Possibly reflect   underlying acute fractures. Please correlate with point tenderness in this   area.    2.  No acute osseous findings in the left knee, left tibia/fibula, nor left MDM:   Patient presents to the ED for fall causing left leg I njury. Differential diagnoses included but not limited to fracture versus strain versus dislocation. Workup in the ED revealed x-ray of the tib-fib, knee, ankle and foot revealed septal osseous irregularities to the distal aspect of the third through fifth metatarsals with regional soft tissue swelling. Possible reflect underlying acute fractures. No acute osseous findings in the left knee, tib-fib or ankle. Minimal left knee osteoarthritis and small left supra patellar joint effusion noted. There is no neurological compromise or deficits. Patient was given Tylenol Norco and ice for their symptoms with moderate improvement. Patient continues to be non-toxic on re-evaluation. Findings were discussed with the patient and reasons to immediately return to the ED were articulated to them. They will follow-up with their PMD and orthopedics and podiatry. OARRS and patient's history was reviewed. Discussed appropriate use of Norco and potential side effects including sedation and addiction. Patient states he took [address] [facility] for his abdominal surgery and tolerated. Patient will only receive 4 Norcos for short term pain due to patients history. Plan of Care/Counseling:  Emergency Attending Physician and I reviewed today's visit with the patient in addition to providing specific details for the plan of care and counseling regarding the diagnosis and prognosis. Questions are answered at this time and are agreeable with the plan. Assessment      1. Fall, initial encounter    2. Effusion of left knee    3. Injury of left foot, initial encounter      Plan   Discharged home. Patient condition is stable    New Medications     Discharge Medication List as of 1/8/2021  8:58 PM      START taking these medications    Details   Misc.  Devices [facility]) MISC DAILY Starting Fri 1/8/2021, Disp-1 each, R-0, PrintDX- left foot injury

## 2021-01-15 ENCOUNTER — APPOINTMENT (OUTPATIENT)
Dept: GENERAL RADIOLOGY | Age: 56
End: 2021-01-15
Payer: MEDICARE

## 2021-01-15 ENCOUNTER — APPOINTMENT (OUTPATIENT)
Dept: CT IMAGING | Age: 56
End: 2021-01-15
Payer: MEDICARE

## 2021-01-15 ENCOUNTER — HOSPITAL ENCOUNTER (EMERGENCY)
Age: 56
Discharge: HOME | End: 2021-01-15
Attending: EMERGENCY MEDICINE
Payer: MEDICARE

## 2021-01-15 VITALS
DIASTOLIC BLOOD PRESSURE: 81 MMHG | OXYGEN SATURATION: 97 % | RESPIRATION RATE: 17 BRPM | WEIGHT: 205 LBS | BODY MASS INDEX: 25.62 KG/M2 | TEMPERATURE: 97.8 F | SYSTOLIC BLOOD PRESSURE: 116 MMHG | HEART RATE: 88 BPM

## 2021-01-15 DIAGNOSIS — E86.0 DEHYDRATION: ICD-10-CM

## 2021-01-15 DIAGNOSIS — N30.00 ACUTE CYSTITIS WITHOUT HEMATURIA: ICD-10-CM

## 2021-01-15 DIAGNOSIS — R55 SYNCOPE AND COLLAPSE: Primary | ICD-10-CM

## 2021-01-15 DIAGNOSIS — I95.1 ORTHOSTASIS: ICD-10-CM

## 2021-01-15 LAB
ACETAMINOPHEN LEVEL: <5 MCG/ML (ref 10–30)
ALBUMIN SERPL-MCNC: 3.7 G/DL (ref 3.5–5.2)
ALP BLD-CCNC: 83 U/L (ref 40–129)
ALT SERPL-CCNC: 9 U/L (ref 0–40)
AMPHETAMINE SCREEN, URINE: NOT DETECTED
ANION GAP SERPL CALCULATED.3IONS-SCNC: 14 MMOL/L (ref 7–16)
AST SERPL-CCNC: 10 U/L (ref 0–39)
BACTERIA: ABNORMAL /HPF
BARBITURATE SCREEN URINE: NOT DETECTED
BASOPHILS ABSOLUTE: 0.03 E9/L (ref 0–0.2)
BASOPHILS RELATIVE PERCENT: 0.4 % (ref 0–2)
BENZODIAZEPINE SCREEN, URINE: NOT DETECTED
BILIRUB SERPL-MCNC: 0.2 MG/DL (ref 0–1.2)
BILIRUBIN URINE: NEGATIVE
BLOOD, URINE: ABNORMAL
BUN BLDV-MCNC: 28 MG/DL (ref 6–20)
BURR CELLS: ABNORMAL
CALCIUM SERPL-MCNC: 9.2 MG/DL (ref 8.6–10.2)
CANNABINOID SCREEN URINE: NOT DETECTED
CHLORIDE BLD-SCNC: 107 MMOL/L (ref 98–107)
CLARITY: ABNORMAL
CO2: 22 MMOL/L (ref 22–29)
COCAINE METABOLITE SCREEN URINE: NOT DETECTED
COLOR: YELLOW
CREAT SERPL-MCNC: 1.2 MG/DL (ref 0.7–1.2)
D DIMER: 786 NG/ML DDU
EOSINOPHILS ABSOLUTE: 0.01 E9/L (ref 0.05–0.5)
EOSINOPHILS RELATIVE PERCENT: 0.1 % (ref 0–6)
ETHANOL: <10 MG/DL (ref 0–0.08)
FENTANYL SCREEN, URINE: POSITIVE
GFR AFRICAN AMERICAN: >60
GFR NON-AFRICAN AMERICAN: >60 ML/MIN/1.73
GLUCOSE BLD-MCNC: 125 MG/DL (ref 74–99)
GLUCOSE URINE: NEGATIVE MG/DL
HCT VFR BLD CALC: 40.3 % (ref 37–54)
HEMOGLOBIN: 13.1 G/DL (ref 12.5–16.5)
IMMATURE GRANULOCYTES #: 0.05 E9/L
IMMATURE GRANULOCYTES %: 0.6 % (ref 0–5)
KETONES, URINE: ABNORMAL MG/DL
LEUKOCYTE ESTERASE, URINE: ABNORMAL
LYMPHOCYTES ABSOLUTE: 0.41 E9/L (ref 1.5–4)
LYMPHOCYTES RELATIVE PERCENT: 5 % (ref 20–42)
Lab: ABNORMAL
MCH RBC QN AUTO: 29.3 PG (ref 26–35)
MCHC RBC AUTO-ENTMCNC: 32.5 % (ref 32–34.5)
MCV RBC AUTO: 90.2 FL (ref 80–99.9)
METHADONE SCREEN, URINE: NOT DETECTED
MONOCYTES ABSOLUTE: 0.11 E9/L (ref 0.1–0.95)
MONOCYTES RELATIVE PERCENT: 1.3 % (ref 2–12)
NEUTROPHILS ABSOLUTE: 7.64 E9/L (ref 1.8–7.3)
NEUTROPHILS RELATIVE PERCENT: 92.6 % (ref 43–80)
NITRITE, URINE: NEGATIVE
OPIATE SCREEN URINE: NOT DETECTED
OVALOCYTES: ABNORMAL
OXYCODONE URINE: NOT DETECTED
PDW BLD-RTO: 15.9 FL (ref 11.5–15)
PH UA: 5.5 (ref 5–9)
PHENCYCLIDINE SCREEN URINE: NOT DETECTED
PLATELET # BLD: 267 E9/L (ref 130–450)
PMV BLD AUTO: 9.3 FL (ref 7–12)
POIKILOCYTES: ABNORMAL
POLYCHROMASIA: ABNORMAL
POTASSIUM SERPL-SCNC: 4.3 MMOL/L (ref 3.5–5)
PROTEIN UA: NEGATIVE MG/DL
RBC # BLD: 4.47 E12/L (ref 3.8–5.8)
RBC UA: ABNORMAL /HPF (ref 0–2)
SALICYLATE, SERUM: <0.3 MG/DL (ref 0–30)
SODIUM BLD-SCNC: 143 MMOL/L (ref 132–146)
SPECIFIC GRAVITY UA: >=1.03 (ref 1–1.03)
TOTAL PROTEIN: 6.3 G/DL (ref 6.4–8.3)
TRICYCLIC ANTIDEPRESSANTS SCREEN SERUM: NEGATIVE NG/ML
TROPONIN: <0.01 NG/ML (ref 0–0.03)
UROBILINOGEN, URINE: 0.2 E.U./DL
WBC # BLD: 8.3 E9/L (ref 4.5–11.5)
WBC UA: >20 /HPF (ref 0–5)

## 2021-01-15 PROCEDURE — 80053 COMPREHEN METABOLIC PANEL: CPT

## 2021-01-15 PROCEDURE — 84484 ASSAY OF TROPONIN QUANT: CPT

## 2021-01-15 PROCEDURE — 85025 COMPLETE CBC W/AUTO DIFF WBC: CPT

## 2021-01-15 PROCEDURE — 93005 ELECTROCARDIOGRAM TRACING: CPT | Performed by: EMERGENCY MEDICINE

## 2021-01-15 PROCEDURE — 6370000000 HC RX 637 (ALT 250 FOR IP): Performed by: EMERGENCY MEDICINE

## 2021-01-15 PROCEDURE — 87088 URINE BACTERIA CULTURE: CPT

## 2021-01-15 PROCEDURE — 2580000003 HC RX 258: Performed by: RADIOLOGY

## 2021-01-15 PROCEDURE — 71275 CT ANGIOGRAPHY CHEST: CPT

## 2021-01-15 PROCEDURE — 6360000004 HC RX CONTRAST MEDICATION: Performed by: RADIOLOGY

## 2021-01-15 PROCEDURE — 96374 THER/PROPH/DIAG INJ IV PUSH: CPT

## 2021-01-15 PROCEDURE — 80307 DRUG TEST PRSMV CHEM ANLYZR: CPT

## 2021-01-15 PROCEDURE — G0480 DRUG TEST DEF 1-7 CLASSES: HCPCS

## 2021-01-15 PROCEDURE — 71045 X-RAY EXAM CHEST 1 VIEW: CPT

## 2021-01-15 PROCEDURE — 81001 URINALYSIS AUTO W/SCOPE: CPT

## 2021-01-15 PROCEDURE — 99285 EMERGENCY DEPT VISIT HI MDM: CPT

## 2021-01-15 PROCEDURE — 2580000003 HC RX 258: Performed by: EMERGENCY MEDICINE

## 2021-01-15 PROCEDURE — 96361 HYDRATE IV INFUSION ADD-ON: CPT

## 2021-01-15 PROCEDURE — 85378 FIBRIN DEGRADE SEMIQUANT: CPT

## 2021-01-15 PROCEDURE — 6360000002 HC RX W HCPCS: Performed by: EMERGENCY MEDICINE

## 2021-01-15 RX ORDER — OXYCODONE HYDROCHLORIDE AND ACETAMINOPHEN 5; 325 MG/1; MG/1
1 TABLET ORAL ONCE
Status: COMPLETED | OUTPATIENT
Start: 2021-01-15 | End: 2021-01-15

## 2021-01-15 RX ORDER — SODIUM CHLORIDE 0.9 % (FLUSH) 0.9 %
10 SYRINGE (ML) INJECTION
Status: COMPLETED | OUTPATIENT
Start: 2021-01-15 | End: 2021-01-15

## 2021-01-15 RX ORDER — 0.9 % SODIUM CHLORIDE 0.9 %
1000 INTRAVENOUS SOLUTION INTRAVENOUS ONCE
Status: COMPLETED | OUTPATIENT
Start: 2021-01-15 | End: 2021-01-15

## 2021-01-15 RX ORDER — FENTANYL CITRATE 50 UG/ML
50 INJECTION, SOLUTION INTRAMUSCULAR; INTRAVENOUS ONCE
Status: COMPLETED | OUTPATIENT
Start: 2021-01-15 | End: 2021-01-15

## 2021-01-15 RX ORDER — CEFDINIR 300 MG/1
300 CAPSULE ORAL 2 TIMES DAILY
Qty: 14 CAPSULE | Refills: 0 | Status: SHIPPED | OUTPATIENT
Start: 2021-01-15 | End: 2021-01-22

## 2021-01-15 RX ORDER — CEFDINIR 300 MG/1
300 CAPSULE ORAL EVERY 12 HOURS SCHEDULED
Status: DISCONTINUED | OUTPATIENT
Start: 2021-01-15 | End: 2021-01-15 | Stop reason: HOSPADM

## 2021-01-15 RX ADMIN — FENTANYL CITRATE 50 MCG: 50 INJECTION, SOLUTION INTRAMUSCULAR; INTRAVENOUS at 16:25

## 2021-01-15 RX ADMIN — CEFDINIR 300 MG: 300 CAPSULE ORAL at 20:59

## 2021-01-15 RX ADMIN — SODIUM CHLORIDE 1000 ML: 9 INJECTION, SOLUTION INTRAVENOUS at 16:26

## 2021-01-15 RX ADMIN — OXYCODONE AND ACETAMINOPHEN 1 TABLET: 5; 325 TABLET ORAL at 21:00

## 2021-01-15 RX ADMIN — Medication 10 ML: at 19:55

## 2021-01-15 RX ADMIN — SODIUM CHLORIDE 1000 ML: 9 INJECTION, SOLUTION INTRAVENOUS at 18:04

## 2021-01-15 RX ADMIN — IOPAMIDOL 60 ML: 755 INJECTION, SOLUTION INTRAVENOUS at 19:55

## 2021-01-15 NOTE — ED PROVIDER NOTES
[facility]  Department of Emergency Medicine   ED  Encounter Note  Admit Date/RoomTime: 1/15/2021  2:55 PM  [facility]    NAME: Christen Casanova  : 1965  MRN: [id number]     Chief Complaint:  Dizziness (Started 3 days ago with syncopal episodes. )    History of Present Illness       Christen Casanova is a 48 y.o. old male who presents to the emergency department for evaluation of dizziness and syncope. He states that he has been dizzy for the past 3 days. He had a syncopal event today while walking through his house. He did not fall the way to the ground or strike his head. He actually recently broke his left foot from falling. He has history of Crohn disease and has an ostomy bag. He states he tries to keep up on hydration but sometimes does have high output. He has had no chest pain but does complain of intermittent shortness of breath. He denies sick contacts or known COVID-19 exposures. He has had no nausea, vomiting or abdominal pain. Syncopal Phase:     []   At Rest     [x]   With Exetion     [x]   With Standing     []   Incontinence     []   Seizure Activity           Post-Syncopal / Recovery Phase:  rapid. ..  ROS   Pertinent positives and negatives are stated within HPI, all other systems reviewed and are negative. Past Medical History:  has a past medical history of ADD (attention deficit disorder), Anxiety, Bipolar 1 disorder ([address] Cancer ([address] Crohn's disease ([address] Depression, GERD (gastroesophageal reflux disease), Panic attack, Rectal pain, and Schizo affective schizophrenia ([address] 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 Social History:  reports that he has been smoking cigarettes. He started smoking about 43 years ago. He has been smoking about 1.00 pack per day. He has never used smokeless tobacco. He reports that he does not drink alcohol or use drugs. Family History: family history includes Depression in his father; Heart Disease in his mother; Mental Illness in his brother, father, mother, and sister. Allergies: Ciprofloxacin hcl, Morphine, and Nsaids    Physical Exam   Oxygen Saturation Interpretation: Normal.        ED Triage Vitals [01/15/21 1459]   BP Temp Temp Source Pulse Resp SpO2 Height Weight   116/81 97.8 °F (36.6 °C) Temporal 119 18 93 % -- 205 lb (93 kg)         Constitutional:  Alert, development consistent with age. HEENT:  NC/NT. Airway patent. Neck:  Normal ROM. Supple. Respiratory:  Clear to auscultation and breath sounds equal.  CV:  Regular  Rhythm,  Tachycardic,  normal heart sounds, without pathological murmurs, ectopy, gallops, or rubs. GI:  Abdomen Soft, nontender, good bowel sounds. No firm or pulsatile mass. Ostomy bag present, no blood  Back:  No costovertebral tenderness. Integument:  Normal turgor. Warm, dry, without visible rash, unless noted elsewhere. Walking boot to L foot   Lymphatics: No lymphangitis or adenopathy noted. Neurological:  Oriented. Motor functions intact.     Lab / Imaging Results   (All laboratory and radiology results have been personally reviewed by myself)  Labs:  Results for orders placed or performed during the hospital encounter of 01/15/21   CBC Auto Differential   Result Value Ref Range    WBC 8.3 4.5 - 11.5 E9/L    RBC 4.47 3.80 - 5.80 E12/L    Hemoglobin 13.1 12.5 - 16.5 g/dL    Hematocrit 40.3 37.0 - 54.0 %    MCV 90.2 80.0 - 99.9 fL    MCH 29.3 26.0 - 35.0 pg    MCHC 32.5 32.0 - 34.5 %    RDW 15.9 (H) 11.5 - 15.0 fL    Platelets 924 405 - 636 E9/L    MPV 9.3 7.0 - 12.0 fL    Neutrophils % 92.6 (H) 43.0 - 80.0 % Immature Granulocytes % 0.6 0.0 - 5.0 %    Lymphocytes % 5.0 (L) 20.0 - 42.0 %    Monocytes % 1.3 (L) 2.0 - 12.0 %    Eosinophils % 0.1 0.0 - 6.0 %    Basophils % 0.4 0.0 - 2.0 %    Neutrophils Absolute 7.64 (H) 1.80 - 7.30 E9/L    Immature Granulocytes # 0.05 E9/L    Lymphocytes Absolute 0.41 (L) 1.50 - 4.00 E9/L    Monocytes Absolute 0.11 0.10 - 0.95 E9/L    Eosinophils Absolute 0.01 (L) 0.05 - 0.50 E9/L    Basophils Absolute 0.03 0.00 - 0.20 E9/L    Polychromasia 2+     Poikilocytes 1+     Livingston Cells 1+     Ovalocytes 1+    Comprehensive Metabolic Panel   Result Value Ref Range    Sodium 143 132 - 146 mmol/L    Potassium 4.3 3.5 - 5.0 mmol/L    Chloride 107 98 - 107 mmol/L    CO2 22 22 - 29 mmol/L    Anion Gap 14 7 - 16 mmol/L    Glucose 125 (H) 74 - 99 mg/dL    BUN 28 (H) 6 - 20 mg/dL    CREATININE 1.2 0.7 - 1.2 mg/dL    GFR Non-African American >60 >=60 mL/min/1.73    GFR African American >60     Calcium 9.2 8.6 - 10.2 mg/dL    Total Protein 6.3 (L) 6.4 - 8.3 g/dL    Alb 3.7 3.5 - 5.2 g/dL    Total Bilirubin 0.2 0.0 - 1.2 mg/dL    Alkaline Phosphatase 83 40 - 129 U/L    ALT 9 0 - 40 U/L    AST 10 0 - 39 U/L   Troponin   Result Value Ref Range    Troponin <0.01 0.00 - 0.03 ng/mL   D-Dimer, Quantitative   Result Value Ref Range    D-Dimer, Quant 786 ng/mL DDU   Urinalysis   Result Value Ref Range    Color, UA Yellow Straw/Yellow    Clarity, UA CLOUDY (A) Clear    Glucose, Ur Negative Negative mg/dL    Bilirubin Urine Negative Negative    Ketones, Urine TRACE (A) Negative mg/dL    Specific Gravity, UA >=1.030 1.005 - 1.030    Blood, Urine TRACE-INTACT Negative    pH, UA 5.5 5.0 - 9.0    Protein, UA Negative Negative mg/dL    Urobilinogen, Urine 0.2 <2.0 E.U./dL    Nitrite, Urine Negative Negative    Leukocyte Esterase, Urine MODERATE (A) Negative   Serum Drug Screen   Result Value Ref Range    Ethanol Lvl <10 mg/dL    Acetaminophen Level <5.0 (L) 10.0 - 81.4 mcg/mL    Salicylate, Serum <8.4 0.0 - 30.0 mg/dL TCA Scrn NEGATIVE Cutoff:300 ng/mL   Urine Drug Screen   Result Value Ref Range    Amphetamine Screen, Urine NOT DETECTED Negative <1000 ng/mL    Barbiturate Screen, Ur NOT DETECTED Negative < 200 ng/mL    Benzodiazepine Screen, Urine NOT DETECTED Negative < 200 ng/mL    Cannabinoid Scrn, Ur NOT DETECTED Negative < 50ng/mL    Cocaine Metabolite Screen, Urine NOT DETECTED Negative < 300 ng/mL    Opiate Scrn, Ur NOT DETECTED Negative < 300ng/mL    PCP Screen, Urine NOT DETECTED Negative < 25 ng/mL    Methadone Screen, Urine NOT DETECTED Negative <300 ng/mL    Oxycodone Urine NOT DETECTED Negative <100 ng/mL    FENTANYL SCREEN, URINE POSITIVE (A) Negative <1 ng/mL    Drug Screen Comment: see below    Microscopic Urinalysis   Result Value Ref Range    WBC, UA >20 (A) 0 - 5 /HPF    RBC, UA 0-1 0 - 2 /HPF    Bacteria, UA FEW (A) None Seen /HPF     Imaging: All Radiology results interpreted by Radiologist unless otherwise noted. CTA CHEST W CONTRAST   Final Result   1. Mild peribronchial thickening suggestive of inflammation with mild mucous   plugging within posterior segmental bronchi of right and left lower lobes. No evidence of pneumonia or pleural effusion. 2.  Redemonstration of hypoattenuating lesion associated with left kidney   likely representing a cyst.  Ultrasound could be helpful for further   characterization. 3.  Bilateral gynecomastia. Clinical correlation recommended. XR CHEST PORTABLE   Final Result   No acute cardiopulmonary disease in the visualized chest.        EKG #1:  Interpreted by emergency department physician unless otherwise noted. Time:  15:43    Rate: 114 bpm  Rhythm: Sinus Tachycardia. Interpretation: Sinus tachycardia. Comparison: There are new findings on today's ECG when compared to prior tracings.     ED Course / Medical Decision Making     Medications   0.9 % sodium chloride bolus (0 mLs Intravenous Stopped 1/15/21 6173) fentaNYL (SUBLIMAZE) injection 50 mcg (50 mcg Intravenous Given 1/15/21 1625)   0.9 % sodium chloride bolus (0 mLs Intravenous Stopped 1/15/21 2101)   iopamidol (ISOVUE-370) 76 % injection 60 mL (60 mLs Intravenous Given 1/15/21 1955)   sodium chloride flush 0.9 % injection 10 mL (10 mLs Intravenous Given 1/15/21 1955)   oxyCODONE-acetaminophen (PERCOCET) 5-325 MG per tablet 1 tablet (1 tablet Oral Given 1/15/21 2100)        Re-Evaluations:  1/15/21      Patients condition is improving after treatment. Consultations:             None    Procedures:   none    MDM: Patient presents to the ED for evaluation of dizziness and syncope. He has had some intermittent shortness of breath as well. EKG showed sinus tachycardia. Troponin negative. D-dimer was elevated and patient was sent for CTA of the chest.  Fortunately, this was negative for pulmonary embolism. Patient was orthostatic positive and was treated with IV fluids. On reevaluation, his symptoms were significantly improved. Heart rate normalized. He was found to have a urinary tract infection, urine culture obtained and he was placed on antibiotics. He was given pain medication for pain related to his foot injury. Patient advised to follow-up with his specialist at [facility] and to increase his fluid intake. Return precautions to the ED discussed. Plan of Care/Counseling:  I reviewed today's visit with the patient in addition to providing specific details for the plan of care and counseling regarding the diagnosis and prognosis. Questions are answered at this time and are agreeable with the plan. Assessment      1. Syncope and collapse    2. Orthostasis    3. Acute cystitis without hematuria    4. Dehydration      This patient's ED course included: a personal history and physicial examination  This patient has remained hemodynamically stable during their ED course. Plan   Discharged home. Patient condition is stable. New Medications     Discharge Medication List as of 1/15/2021  8:48 PM      START taking these medications    Details   cefdinir (OMNICEF) 300 MG capsule Take 1 capsule by mouth 2 times daily for 7 days, Disp-14 capsule, R-0Print           Electronically signed by Sera Bah DO   DD: 1/15/21  **This report was transcribed using voice recognition software. Every effort was made to ensure accuracy; however, inadvertent computerized transcription errors may be present.   END OF PROVIDER NOTE        Sera Bah DO  01/16/21 0023

## 2021-01-16 LAB
EKG ATRIAL RATE: 114 BPM
EKG P AXIS: 58 DEGREES
EKG P-R INTERVAL: 146 MS
EKG Q-T INTERVAL: 318 MS
EKG QRS DURATION: 82 MS
EKG QTC CALCULATION (BAZETT): 438 MS
EKG R AXIS: 58 DEGREES
EKG T AXIS: 45 DEGREES
EKG VENTRICULAR RATE: 114 BPM

## 2021-01-17 LAB — URINE CULTURE, ROUTINE: NORMAL

## 2021-01-25 ENCOUNTER — APPOINTMENT (OUTPATIENT)
Dept: CT IMAGING | Age: 56
End: 2021-01-25
Payer: MEDICARE

## 2021-01-25 ENCOUNTER — HOSPITAL ENCOUNTER (OUTPATIENT)
Age: 56
Setting detail: OBSERVATION
Discharge: LEFT BEFORE DISCHARGE | End: 2021-01-26
Attending: EMERGENCY MEDICINE | Admitting: INTERNAL MEDICINE
Payer: MEDICARE

## 2021-01-25 ENCOUNTER — APPOINTMENT (OUTPATIENT)
Dept: GENERAL RADIOLOGY | Age: 56
End: 2021-01-25
Payer: MEDICARE

## 2021-01-25 DIAGNOSIS — S92.301A CLOSED DISPLACED FRACTURE OF METATARSAL BONE OF RIGHT FOOT, UNSPECIFIED METATARSAL, INITIAL ENCOUNTER: ICD-10-CM

## 2021-01-25 DIAGNOSIS — R55 SYNCOPE AND COLLAPSE: Primary | ICD-10-CM

## 2021-01-25 LAB
ALBUMIN SERPL-MCNC: 3.8 G/DL (ref 3.5–5.2)
ALP BLD-CCNC: 137 U/L (ref 40–129)
ALT SERPL-CCNC: 7 U/L (ref 0–40)
AMMONIA: 31.2 UMOL/L (ref 16–60)
ANION GAP SERPL CALCULATED.3IONS-SCNC: 8 MMOL/L (ref 7–16)
AST SERPL-CCNC: 8 U/L (ref 0–39)
ATYPICAL LYMPHOCYTE RELATIVE PERCENT: 1.7 % (ref 0–4)
BASOPHILS ABSOLUTE: 0.09 E9/L (ref 0–0.2)
BASOPHILS RELATIVE PERCENT: 0.9 % (ref 0–2)
BILIRUB SERPL-MCNC: 0.5 MG/DL (ref 0–1.2)
BUN BLDV-MCNC: 26 MG/DL (ref 6–20)
CALCIUM SERPL-MCNC: 9.5 MG/DL (ref 8.6–10.2)
CHLORIDE BLD-SCNC: 100 MMOL/L (ref 98–107)
CO2: 28 MMOL/L (ref 22–29)
CREAT SERPL-MCNC: 1.2 MG/DL (ref 0.7–1.2)
D DIMER: 465 NG/ML DDU
EOSINOPHILS ABSOLUTE: 0.25 E9/L (ref 0.05–0.5)
EOSINOPHILS RELATIVE PERCENT: 2.6 % (ref 0–6)
GFR AFRICAN AMERICAN: >60
GFR NON-AFRICAN AMERICAN: >60 ML/MIN/1.73
GLUCOSE BLD-MCNC: 123 MG/DL (ref 74–99)
HCT VFR BLD CALC: 43.7 % (ref 37–54)
HEMOGLOBIN: 13.8 G/DL (ref 12.5–16.5)
LACTIC ACID: 1.2 MMOL/L (ref 0.5–2.2)
LYMPHOCYTES ABSOLUTE: 0.39 E9/L (ref 1.5–4)
LYMPHOCYTES RELATIVE PERCENT: 2.6 % (ref 20–42)
MCH RBC QN AUTO: 29.4 PG (ref 26–35)
MCHC RBC AUTO-ENTMCNC: 31.6 % (ref 32–34.5)
MCV RBC AUTO: 93.2 FL (ref 80–99.9)
MONOCYTES ABSOLUTE: 0.39 E9/L (ref 0.1–0.95)
MONOCYTES RELATIVE PERCENT: 3.5 % (ref 2–12)
MYELOCYTE PERCENT: 0.9 % (ref 0–0)
NEUTROPHILS ABSOLUTE: 8.62 E9/L (ref 1.8–7.3)
NEUTROPHILS RELATIVE PERCENT: 86.9 % (ref 43–80)
NUCLEATED RED BLOOD CELLS: 0 /100 WBC
OVALOCYTES: ABNORMAL
PDW BLD-RTO: 15.9 FL (ref 11.5–15)
PLATELET # BLD: 278 E9/L (ref 130–450)
PMV BLD AUTO: 9.1 FL (ref 7–12)
POIKILOCYTES: ABNORMAL
POLYCHROMASIA: ABNORMAL
POTASSIUM REFLEX MAGNESIUM: 4.4 MMOL/L (ref 3.5–5)
PROMYELOCYTES PERCENT: 0.9 % (ref 0–0)
RBC # BLD: 4.69 E12/L (ref 3.8–5.8)
SMUDGE CELLS: ABNORMAL
SODIUM BLD-SCNC: 136 MMOL/L (ref 132–146)
TOTAL PROTEIN: 6.7 G/DL (ref 6.4–8.3)
TOXIC GRANULATION: ABNORMAL
TROPONIN: <0.01 NG/ML (ref 0–0.03)
VALPROIC ACID LEVEL: 25 MCG/ML (ref 50–100)
WBC # BLD: 9.8 E9/L (ref 4.5–11.5)

## 2021-01-25 PROCEDURE — G0378 HOSPITAL OBSERVATION PER HR: HCPCS

## 2021-01-25 PROCEDURE — 73630 X-RAY EXAM OF FOOT: CPT

## 2021-01-25 PROCEDURE — 84484 ASSAY OF TROPONIN QUANT: CPT

## 2021-01-25 PROCEDURE — 93005 ELECTROCARDIOGRAM TRACING: CPT | Performed by: PHYSICIAN ASSISTANT

## 2021-01-25 PROCEDURE — 2580000003 HC RX 258: Performed by: PHYSICIAN ASSISTANT

## 2021-01-25 PROCEDURE — 6360000004 HC RX CONTRAST MEDICATION: Performed by: RADIOLOGY

## 2021-01-25 PROCEDURE — 80053 COMPREHEN METABOLIC PANEL: CPT

## 2021-01-25 PROCEDURE — 73560 X-RAY EXAM OF KNEE 1 OR 2: CPT

## 2021-01-25 PROCEDURE — 96374 THER/PROPH/DIAG INJ IV PUSH: CPT

## 2021-01-25 PROCEDURE — 85025 COMPLETE CBC W/AUTO DIFF WBC: CPT

## 2021-01-25 PROCEDURE — 82140 ASSAY OF AMMONIA: CPT

## 2021-01-25 PROCEDURE — 96361 HYDRATE IV INFUSION ADD-ON: CPT

## 2021-01-25 PROCEDURE — 80164 ASSAY DIPROPYLACETIC ACD TOT: CPT

## 2021-01-25 PROCEDURE — 85378 FIBRIN DEGRADE SEMIQUANT: CPT

## 2021-01-25 PROCEDURE — 71275 CT ANGIOGRAPHY CHEST: CPT

## 2021-01-25 PROCEDURE — 6360000002 HC RX W HCPCS: Performed by: STUDENT IN AN ORGANIZED HEALTH CARE EDUCATION/TRAINING PROGRAM

## 2021-01-25 PROCEDURE — 99285 EMERGENCY DEPT VISIT HI MDM: CPT

## 2021-01-25 PROCEDURE — 29515 APPLICATION SHORT LEG SPLINT: CPT

## 2021-01-25 PROCEDURE — 83605 ASSAY OF LACTIC ACID: CPT

## 2021-01-25 RX ORDER — 0.9 % SODIUM CHLORIDE 0.9 %
1000 INTRAVENOUS SOLUTION INTRAVENOUS ONCE
Status: COMPLETED | OUTPATIENT
Start: 2021-01-25 | End: 2021-01-25

## 2021-01-25 RX ORDER — FENTANYL CITRATE 50 UG/ML
50 INJECTION, SOLUTION INTRAMUSCULAR; INTRAVENOUS ONCE
Status: COMPLETED | OUTPATIENT
Start: 2021-01-25 | End: 2021-01-25

## 2021-01-25 RX ORDER — FENTANYL CITRATE 50 UG/ML
50 INJECTION, SOLUTION INTRAMUSCULAR; INTRAVENOUS ONCE
Status: COMPLETED | OUTPATIENT
Start: 2021-01-26 | End: 2021-01-26

## 2021-01-25 RX ADMIN — SODIUM CHLORIDE 1000 ML: 9 INJECTION, SOLUTION INTRAVENOUS at 18:51

## 2021-01-25 RX ADMIN — FENTANYL CITRATE 50 MCG: 50 INJECTION, SOLUTION INTRAMUSCULAR; INTRAVENOUS at 18:52

## 2021-01-25 RX ADMIN — IOPAMIDOL 75 ML: 755 INJECTION, SOLUTION INTRAVENOUS at 20:08

## 2021-01-25 ASSESSMENT — PAIN DESCRIPTION - FREQUENCY: FREQUENCY: CONTINUOUS

## 2021-01-25 ASSESSMENT — ENCOUNTER SYMPTOMS
VOMITING: 0
ANAL BLEEDING: 0
NAUSEA: 0
CONSTIPATION: 0
SINUS PRESSURE: 0
BACK PAIN: 0
DIARRHEA: 0
RHINORRHEA: 0
ABDOMINAL PAIN: 0
SHORTNESS OF BREATH: 0
CHEST TIGHTNESS: 0
COUGH: 0
EYE DISCHARGE: 0
SINUS PAIN: 0
ABDOMINAL DISTENTION: 0

## 2021-01-25 ASSESSMENT — PAIN DESCRIPTION - LOCATION: LOCATION: FOOT

## 2021-01-25 ASSESSMENT — PAIN SCALES - GENERAL
PAINLEVEL_OUTOF10: 0
PAINLEVEL_OUTOF10: 10

## 2021-01-25 NOTE — ED TRIAGE NOTES
FIRST PROVIDER CONTACT ASSESSMENT NOTE      Department of Emergency Medicine   ED  First Provider Note   1/25/21  5:16 PM EST    Chief Complaint: Foot Injury (right after fall, broke multiple toes on left foot last week and is currently wearing ortho boot on left foot) and Fall (pt states multiple falls recently, states gets dizzy and \"everything goes black\" when he stands up and then he falls )      History of Present Illness:    Cynthia Vickers is a 48 y.o. male who presents to the ED by private car for double falls. Patient over the last 3 weeks has had his blood pressure medication adjusted and his Norvasc went from 10 mg to 15 mg. Patient states since then every time he stands he passes out. Patient states \"everything goes black and then I fall. \"  Patient states he broke his foot a few weeks ago and now he thinks he broke his right foot. Patient states he has not hit his head is not any blood thinners. Patient states he is on multiple psychiatric medications and is compliant with taking them. Focused Screening Exam:  Constitutional:  Alert, appears stated age and is in no distress.       *ALLERGIES*     Ciprofloxacin hcl, Morphine, and Nsaids     ED Triage Vitals [01/25/21 1713]   BP Temp Temp Source Pulse Resp SpO2 Height Weight   91/65 97.9 °F (36.6 °C) Temporal 124 16 98 % 6' 3\" (1.905 m) 198 lb (89.8 kg)        Initial Plan of Care:  Initiate Treatment-Testing, Proceed toTreatment Area When Bed Available for ED Attending/MLP to Continue Care    -----------------END OF FIRST PROVIDER CONTACT ASSESSMENT NOTE--------------  Electronically signed by Ellen Palacios PA-C   DD: 1/25/21

## 2021-01-25 NOTE — ED NOTES
Bed:  HALL-09  Expected date:   Expected time:   Means of arrival:   Comments:  Romaine Ravi RN  01/25/21 8212

## 2021-01-25 NOTE — ED PROVIDER NOTES
HPI     Patient is a 48 y.o. Cameron Smaller a past medical history of Crohn's, bipolar disorder, schizophrenia, ostomy bag who presents with a chief complaint of falls. This has been occurring for 3 weeks. Patient states that it gets better with nothing. Patient states that it gets worse with standing. Patient states that it is severe in severity. Symptoms are intermittent. Patient stated that he has had intermittent falls and has broken his left foot 3 weeks ago. Patient noted that since that time. He has been wearing a boot. Patient states that he will stand up and then get lightheaded and \"everything goes black\" then he will fall. Patient denies ever hitting his head. Patient states that most the time he has been falling on his right foot. Patient states he has had significant pain in the right foot. Patient denies any previous history of blood clots. Patient denies any leg pain. Patient states that he is injured nothing else. Patient denies any fevers, chills, nausea, vomiting, chest pain, shortness of breath, abdominal pain. Patient has an ostomy bag in place that will intermittently leak. Patient states that he has chronic erythema around the ostomy bag but has been taking prednisone for and has been following with a general surgeon for. Notes that his had no change recently. Patient denies any recent abdominal tenderness. Patient denies any change in urinary habits. Review of Systems   Constitutional: Negative for activity change, appetite change, fatigue and fever. HENT: Negative for congestion, rhinorrhea, sinus pressure and sinus pain. Eyes: Negative for discharge. Respiratory: Negative for cough, chest tightness and shortness of breath. Cardiovascular: Negative for chest pain and palpitations. Gastrointestinal: Negative for abdominal distention, abdominal pain, anal bleeding, constipation, diarrhea, nausea and vomiting. Endocrine: Negative for polydipsia and polyuria. Genitourinary: Negative for decreased urine volume, difficulty urinating, enuresis, flank pain, frequency and urgency. Musculoskeletal: Positive for gait problem and joint swelling. Negative for arthralgias, back pain and neck stiffness. Skin: Negative for rash and wound. Neurological: Positive for light-headedness. Negative for dizziness, facial asymmetry, weakness, numbness and headaches. Psychiatric/Behavioral: Negative for agitation, behavioral problems and confusion. Physical Exam  Vitals signs and nursing note reviewed. Constitutional:       General: He is not in acute distress. Appearance: Normal appearance. He is well-developed. He is not ill-appearing. HENT:      Head: Normocephalic and atraumatic. Eyes:      Conjunctiva/sclera: Conjunctivae normal.   Neck:      Musculoskeletal: Normal range of motion and neck supple. Cardiovascular:      Rate and Rhythm: Normal rate and regular rhythm. Heart sounds: Normal heart sounds. No murmur. Pulmonary:      Effort: Pulmonary effort is normal. No respiratory distress. Breath sounds: Normal breath sounds. No wheezing or rales. Abdominal:      General: Bowel sounds are normal.      Palpations: Abdomen is soft. Tenderness: There is no abdominal tenderness. There is no guarding or rebound. Musculoskeletal: Normal range of motion. General: Tenderness and signs of injury present. No swelling or deformity. Comments: Sniffing and tenderness to the mid right foot. Able to wiggle toes. Sensation intact. Pulses intact. He has a small abrasion over the right knee. Skin:     General: Skin is warm and dry. Capillary Refill: Capillary refill takes less than 2 seconds. Findings: Bruising present. Comments: Patient is overlying bruise of the right foot. Neurological:      General: No focal deficit present. Mental Status: He is alert and oriented to person, place, and time. Mental status is at baseline. Cranial Nerves: No cranial nerve deficit. Sensory: No sensory deficit. Motor: No weakness. Coordination: Coordination normal.   Psychiatric:         Mood and Affect: Mood normal.         Behavior: Behavior normal.          Procedures   EKG read and interpreted by me. Rate 120 bpm.  Normal axis. Normal sinus rhythm. Normal VT interval.  Normal QRS interval.  Nonspecific T wave abnormality in the precordial leads. sinus tachycardia  [facility]     ED Course as of Jan 25 2137 Mon Jan 25, 2021   1756 Was informed of fractured foot. Patient is agreeable to possible admission. [JM]   1926 Patient reevaluated and stated that he is unsure of the podiatrist that he saw for his foot but it may be Dr. Jenny Eaton    []   2131 Dr. Silvana Gonzalez agreed to admit patient. [JM]      ED Course User Index  [JM] Johanne See MD      Patient is a 48 y.o. male presenting with falls and foot pain. Patient had a cardiac work-up. Patient's labs were benign. Patient noted to have a swollen right foot. Appears to be fractured. Patient's x-ray indicated a fracture of the middle 3 metacarpals. Patient sensation and neuro intact on that foot. Patient was tachycardic in the ED. Patient had a D-dimer drawn. Patient's D-dimer was positive. Patient had a CTA. CTA was negative. Patient will have orthostatics done here. Patient is taking amlodipine at home. Patient will have amlodipine held. Patient will be admitted for syncope. Patient was seen and evaluated by myself and my attending [employer], DO. Assessment and Plan discussed with attending provider, please see attestation for final plan of care. This note was done using dictation software and there may be some grammatical errors associated with this.     Johanne See MD     ED Course as of Jan 25 2137 Mon Jan 25, 2021 36 Was informed of fractured foot. Patient is agreeable to possible admission. []   1926 Patient reevaluated and stated that he is unsure of the podiatrist that he saw for his foot but it may be Dr. Mirta Pickard    []   2131 Dr. Emigdio Gramajo agreed to admit patient. []      ED Course User Index  [] Woo Crockett MD       --------------------------------------------- PAST HISTORY ---------------------------------------------  Past Medical History:  has a past medical history of ADD (attention deficit disorder), Anxiety, Bipolar 1 disorder ([facility] Cancer ([facility] Crohn's disease ([facility] [facility] Depression, GERD (gastroesophageal reflux disease), Panic attack, Rectal pain, and Schizo affective schizophrenia ([facility] [facility] 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 Social History:  reports that he has been smoking cigarettes. He started smoking about 43 years ago. He has been smoking about 1.00 pack per day. He has never used smokeless tobacco. He reports that he does not drink alcohol or use drugs. Family History: family history includes Depression in his father; Heart Disease in his mother; Mental Illness in his brother, father, mother, and sister. The patients home medications have been reviewed.     Allergies: Ciprofloxacin hcl, Morphine, and Nsaids    -------------------------------------------------- RESULTS -------------------------------------------------    LABS:  Results for orders placed or performed during the hospital encounter of 01/25/21   CBC auto differential   Result Value Ref Range    WBC 9.8 4.5 - 11.5 E9/L    RBC 4.69 3.80 - 5.80 E12/L    Hemoglobin 13.8 12.5 - 16.5 g/dL    Hematocrit 43.7 37.0 - 54.0 %    MCV 93.2 80.0 - 99.9 fL    MCH 29.4 26.0 - 35.0 pg MCHC 31.6 (L) 32.0 - 34.5 %    RDW 15.9 (H) 11.5 - 15.0 fL    Platelets 254 099 - 751 E9/L    MPV 9.1 7.0 - 12.0 fL    Neutrophils % 86.9 (H) 43.0 - 80.0 %    Lymphocytes % 2.6 (L) 20.0 - 42.0 %    Monocytes % 3.5 2.0 - 12.0 %    Eosinophils % 2.6 0.0 - 6.0 %    Basophils % 0.9 0.0 - 2.0 %    Neutrophils Absolute 8.62 (H) 1.80 - 7.30 E9/L    Lymphocytes Absolute 0.39 (L) 1.50 - 4.00 E9/L    Monocytes Absolute 0.39 0.10 - 0.95 E9/L    Eosinophils Absolute 0.25 0.05 - 0.50 E9/L    Basophils Absolute 0.09 0.00 - 0.20 E9/L    Atypical Lymphocytes Relative 1.7 0.0 - 4.0 %    Myelocyte Percent 0.9 0 - 0 %    Promyelocytes Percent 0.9 0 - 0 %    nRBC 0.0 /100 WBC    Toxic Granulation 1+     Smudge Cells 1+     Polychromasia 1+     Poikilocytes 1+     Ovalocytes 1+    Comprehensive Metabolic Panel w/ Reflex to MG   Result Value Ref Range    Sodium 136 132 - 146 mmol/L    Potassium reflex Magnesium 4.4 3.5 - 5.0 mmol/L    Chloride 100 98 - 107 mmol/L    CO2 28 22 - 29 mmol/L    Anion Gap 8 7 - 16 mmol/L    Glucose 123 (H) 74 - 99 mg/dL    BUN 26 (H) 6 - 20 mg/dL    CREATININE 1.2 0.7 - 1.2 mg/dL    GFR Non-African American >60 >=60 mL/min/1.73    GFR African American >60     Calcium 9.5 8.6 - 10.2 mg/dL    Total Protein 6.7 6.4 - 8.3 g/dL    Alb 3.8 3.5 - 5.2 g/dL    Total Bilirubin 0.5 0.0 - 1.2 mg/dL    Alkaline Phosphatase 137 (H) 40 - 129 U/L    ALT 7 0 - 40 U/L    AST 8 0 - 39 U/L   Troponin   Result Value Ref Range    Troponin <0.01 0.00 - 0.03 ng/mL   Lactic Acid, Plasma   Result Value Ref Range    Lactic Acid 1.2 0.5 - 2.2 mmol/L   Valproic acid level, total   Result Value Ref Range    Valproic Acid Lvl 25 (L) 50 - 100 mcg/mL   Ammonia   Result Value Ref Range    Ammonia 31.2 16.0 - 60.0 umol/L   D-dimer, quantitative   Result Value Ref Range    D-Dimer, Quant 465 ng/mL DDU   EKG 12 Lead   Result Value Ref Range    Ventricular Rate 128 BPM    Atrial Rate 128 BPM    P-R Interval 130 ms    QRS Duration 84 ms Q-T Interval 290 ms    QTc Calculation (Bazett) 423 ms    P Axis 70 degrees    R Axis 75 degrees    T Axis 56 degrees       RADIOLOGY:  CTA PULMONARY W CONTRAST   Final Result   1. No evidence of pulmonary embolism or acute pulmonary abnormality. 2.  Few cysts associated with left kidney. Few of the cysts are hyperdense   as seen on prior unenhanced CT abdomen. Ultrasound could be helpful for   further characterization and follow-up. XR FOOT RIGHT (MIN 3 VIEWS)   Final Result   Mildly displaced and angulated fractures at the distal end/neck of the 2nd,   3rd, and 4th metatarsals. XR KNEE RIGHT (1-2 VIEWS)   Final Result   No fracture or dislocation. Joint spaces are intact.                ------------------------- NURSING NOTES AND VITALS REVIEWED ---------------------------  Date / Time Roomed:  1/25/2021  5:18 PM  ED Bed Assignment:  [id number]    The nursing notes within the ED encounter and vital signs as below have been reviewed. Patient Vitals for the past 24 hrs:   BP Temp Temp src Pulse Resp SpO2 Height Weight   01/25/21 1713 91/65 97.9 °F (36.6 °C) Temporal 124 16 98 % 6' 3\" (1.905 m) 198 lb (89.8 kg)       Oxygen Saturation Interpretation: Normal    ------------------------------------------ PROGRESS NOTES ------------------------------------------  Re-evaluation(s):  Time: see ed course  Patients symptoms show no change  Repeat physical examination is not changed    Counseling:  I have spoken with the patient and discussed todays results, in addition to providing specific details for the plan of care and counseling regarding the diagnosis and prognosis. Their questions are answered at this time and they are agreeable with the plan of admission.    --------------------------------- ADDITIONAL PROVIDER NOTES ---------------------------------  Consultations:   Spoke with Dr. Clarissa Piña. Discussed case. They will admit the patient. This patient's ED course included: a personal history and physicial examination, re-evaluation prior to disposition, multiple bedside re-evaluations, IV medications, cardiac monitoring and continuous pulse oximetry    This patient has remained hemodynamically stable during their ED course. Diagnosis:  1. Syncope and collapse    2. Closed displaced fracture of metatarsal bone of right foot, unspecified metatarsal, initial encounter        Disposition:  Patient's disposition: Admit to telemetry  Patient's condition is stable. Frances Hanley MD  Resident  01/25/21 3307      ATTENDING PROVIDER ATTESTATION:     Artemio Mireles presented to the emergency department for evaluation of Foot Injury (right after fall, broke multiple toes on left foot last week and is currently wearing ortho boot on left foot) and Fall (pt states multiple falls recently, states gets dizzy and \"everything goes black\" when he stands up and then he falls )    I have reviewed and discussed the case, including pertinent history (medical, surgical, family and social) and exam findings with the Resident and the Nurse assigned to Artemio Mireles. I have personally performed and/or participated in the history, exam, medical decision making, and procedures and agree with all pertinent clinical information. I have reviewed my findings and recommendations with Artemio Mireles and members of family present at the time of disposition. I, Dr. Bessy Trammell am the primary physician of record for this patient.     MDM: The patient is 48 y.o. male  with a past medical history of       Diagnosis Date    ADD (attention deficit disorder)     Anxiety     Bipolar 1 disorder ([facility] 11/19/2017    Cancer ([facility] [address] prostate cancer    2014 / treated with surgery    Crohn's disease ([facility] [facility]     Depression     GERD (gastroesophageal reflux disease)     Panic attack     Rectal pain     has a fissure    Schizo affective schizophrenia ([facility] [address]     stable presenting to the emergency department with a chief complaint of falls differential diagnosis includes but not limited to, electrolyte derangement, acute kidney injury, orthostatic hypotension the patient did have labs and imaging which were reviewed. The patient was treated symptomatically. Patient did have a CBC, CMP and troponin which were fairly unremarkable, D-dimer mildly elevated, CTA chest unremarkable for any pulmonary embolus x-ray of the foot did demonstrate mildly displaced angulated fractures of the distal end and neck of the second third and fourth metatarsals. Patient with right lower extremity neurovascular intact. Patient did have recurrent syncope so will be admitted for further treatment and evaluation. My findings/plan: The primary encounter diagnosis was Syncope and collapse. A diagnosis of Closed displaced fracture of metatarsal bone of right foot, unspecified metatarsal, initial encounter was also pertinent to this visit.   New Prescriptions    No medications on file     Orville Rubio, [address] [facility],   01/25/21 0184

## 2021-01-26 ENCOUNTER — APPOINTMENT (OUTPATIENT)
Dept: GENERAL RADIOLOGY | Age: 56
End: 2021-01-26
Payer: MEDICARE

## 2021-01-26 VITALS
BODY MASS INDEX: 24.62 KG/M2 | WEIGHT: 198 LBS | HEIGHT: 75 IN | RESPIRATION RATE: 16 BRPM | TEMPERATURE: 98.2 F | OXYGEN SATURATION: 96 % | DIASTOLIC BLOOD PRESSURE: 87 MMHG | HEART RATE: 92 BPM | SYSTOLIC BLOOD PRESSURE: 128 MMHG

## 2021-01-26 LAB
EKG ATRIAL RATE: 128 BPM
EKG P AXIS: 70 DEGREES
EKG P-R INTERVAL: 130 MS
EKG Q-T INTERVAL: 290 MS
EKG QRS DURATION: 84 MS
EKG QTC CALCULATION (BAZETT): 423 MS
EKG R AXIS: 75 DEGREES
EKG T AXIS: 56 DEGREES
EKG VENTRICULAR RATE: 128 BPM

## 2021-01-26 PROCEDURE — 6360000002 HC RX W HCPCS: Performed by: EMERGENCY MEDICINE

## 2021-01-26 PROCEDURE — 2580000003 HC RX 258: Performed by: INTERNAL MEDICINE

## 2021-01-26 PROCEDURE — G0378 HOSPITAL OBSERVATION PER HR: HCPCS

## 2021-01-26 PROCEDURE — 6360000002 HC RX W HCPCS: Performed by: STUDENT IN AN ORGANIZED HEALTH CARE EDUCATION/TRAINING PROGRAM

## 2021-01-26 PROCEDURE — 96376 TX/PRO/DX INJ SAME DRUG ADON: CPT

## 2021-01-26 PROCEDURE — 6370000000 HC RX 637 (ALT 250 FOR IP): Performed by: INTERNAL MEDICINE

## 2021-01-26 PROCEDURE — 93010 ELECTROCARDIOGRAM REPORT: CPT | Performed by: INTERNAL MEDICINE

## 2021-01-26 RX ORDER — FENTANYL CITRATE 50 UG/ML
50 INJECTION, SOLUTION INTRAMUSCULAR; INTRAVENOUS ONCE
Status: COMPLETED | OUTPATIENT
Start: 2021-01-26 | End: 2021-01-26

## 2021-01-26 RX ORDER — CHLORPROMAZINE HYDROCHLORIDE 25 MG/1
100 TABLET, FILM COATED ORAL 4 TIMES DAILY
Status: DISCONTINUED | OUTPATIENT
Start: 2021-01-26 | End: 2021-01-26 | Stop reason: HOSPADM

## 2021-01-26 RX ORDER — CLOMIPRAMINE HYDROCHLORIDE 25 MG/1
25 CAPSULE ORAL 2 TIMES DAILY
Status: DISCONTINUED | OUTPATIENT
Start: 2021-01-26 | End: 2021-01-26 | Stop reason: HOSPADM

## 2021-01-26 RX ORDER — ATORVASTATIN CALCIUM 10 MG/1
10 TABLET, FILM COATED ORAL DAILY
Status: DISCONTINUED | OUTPATIENT
Start: 2021-01-26 | End: 2021-01-26 | Stop reason: HOSPADM

## 2021-01-26 RX ORDER — SODIUM CHLORIDE 0.9 % (FLUSH) 0.9 %
SYRINGE (ML) INJECTION
Status: DISPENSED
Start: 2021-01-26 | End: 2021-01-26

## 2021-01-26 RX ORDER — TRAMADOL HYDROCHLORIDE 50 MG/1
50 TABLET ORAL EVERY 6 HOURS PRN
Status: DISCONTINUED | OUTPATIENT
Start: 2021-01-26 | End: 2021-01-26 | Stop reason: HOSPADM

## 2021-01-26 RX ORDER — PREDNISONE 10 MG/1
10 TABLET ORAL DAILY
COMMUNITY
End: 2021-02-05 | Stop reason: ALTCHOICE

## 2021-01-26 RX ORDER — SODIUM CHLORIDE 9 MG/ML
INJECTION, SOLUTION INTRAVENOUS CONTINUOUS
Status: DISCONTINUED | OUTPATIENT
Start: 2021-01-26 | End: 2021-01-26 | Stop reason: HOSPADM

## 2021-01-26 RX ADMIN — CLOMIPRAMINE HYDROCHLORIDE 25 MG: 25 CAPSULE ORAL at 12:49

## 2021-01-26 RX ADMIN — FENTANYL CITRATE 50 MCG: 50 INJECTION, SOLUTION INTRAMUSCULAR; INTRAVENOUS at 10:43

## 2021-01-26 RX ADMIN — FENTANYL CITRATE 50 MCG: 50 INJECTION, SOLUTION INTRAMUSCULAR; INTRAVENOUS at 00:05

## 2021-01-26 RX ADMIN — FENTANYL CITRATE 50 MCG: 50 INJECTION, SOLUTION INTRAMUSCULAR; INTRAVENOUS at 04:53

## 2021-01-26 RX ADMIN — SODIUM CHLORIDE: 9 INJECTION, SOLUTION INTRAVENOUS at 10:55

## 2021-01-26 RX ADMIN — CHLORPROMAZINE HYDROCHLORIDE 100 MG: 25 TABLET, SUGAR COATED ORAL at 12:48

## 2021-01-26 RX ADMIN — ATORVASTATIN CALCIUM 10 MG: 10 TABLET, FILM COATED ORAL at 12:48

## 2021-01-26 ASSESSMENT — PAIN SCALES - GENERAL
PAINLEVEL_OUTOF10: 9
PAINLEVEL_OUTOF10: 7
PAINLEVEL_OUTOF10: 7

## 2021-01-26 ASSESSMENT — PAIN DESCRIPTION - DESCRIPTORS: DESCRIPTORS: CONSTANT;THROBBING

## 2021-01-26 ASSESSMENT — PAIN DESCRIPTION - PAIN TYPE: TYPE: ACUTE PAIN

## 2021-01-26 NOTE — H&P
Manuel Ngo is a 48 y.o. male who presents to the ED for double falls. Patient over the last 3 weeks has had his blood pressure medication adjusted and his Norvasc went from 10 mg to 15 mg. Patient states since then every time he stands he passes out. Patient states \"everything goes black and then I fall. \"  Patient states he broke his left  foot a few weeks ago and now he thinks he broke his right foot. Patient states he has not hit his head is not any blood thinners. Patient states he is on multiple psychiatric medications and is compliant with taking them. Found with fracture right foot BP low admitted observation to monitor     Past Medical History:   Diagnosis Date    ADD (attention deficit disorder)     Anxiety     Bipolar 1 disorder ([facility] 11/19/2017    Cancer ([facility] [address] prostate cancer    2014 / treated with surgery    Crohn's disease ([facility] [facility]     Depression     GERD (gastroesophageal reflux disease)     Panic attack     Rectal pain     has a fissure    Schizo affective schizophrenia ([facility] [address]     stable       Past Surgical History:   Procedure Laterality Date    COLONOSCOPY      COLONOSCOPY N/A 1/28/2020    COLONOSCOPY WITH BIOPSY performed by Namita Monahan MD at [address] [place] [address], DIAGNOSTIC      INCISION AND DRAINAGE N/A 5/15/2019    I & D SCROTAL ABSCESS WITH EXPLANTATION ARTIFICIAL URINARY SPHINCTER COMPLEX URETHROPLASTY performed by Niranjan Zheng Memo, DO at [facility]  2002? deviated septum    PICC LINE INSERTION NURSE  10/3/2020         PROSTATECTOMY  9/15/2014    laparoscopic robotic assisted. Family History   Problem Relation Age of Onset    Mental Illness Mother     Mental Illness Father     Mental Illness Sister     Mental Illness Brother     Heart Disease Mother     Depression Father        Prior to Admission medications    Medication Sig Start Date End Date Taking?  Authorizing Provider Component Value Date    WBC 9.8 01/25/2021    RBC 4.69 01/25/2021    HGB 13.8 01/25/2021    HCT 43.7 01/25/2021     01/25/2021    MCV 93.2 01/25/2021    MCH 29.4 01/25/2021    MCHC 31.6 01/25/2021    RDW 15.9 01/25/2021    NRBC 0.0 01/25/2021    BANDSPCT 1 09/17/2014    METASPCT 1.7 04/02/2020    LYMPHOPCT 2.6 01/25/2021    PROMYELOPCT 0.9 01/25/2021    MONOPCT 3.5 01/25/2021    MYELOPCT 0.9 01/25/2021    BASOPCT 0.9 01/25/2021    MONOSABS 0.39 01/25/2021    LYMPHSABS 0.39 01/25/2021    EOSABS 0.25 01/25/2021    BASOSABS 0.09 01/25/2021     CMP:    Lab Results   Component Value Date     01/25/2021    K 4.4 01/25/2021     01/25/2021    CO2 28 01/25/2021    BUN 26 01/25/2021    CREATININE 1.2 01/25/2021    GFRAA >60 01/25/2021    LABGLOM >60 01/25/2021    GLUCOSE 123 01/25/2021    GLUCOSE 118 02/05/2011    PROT 6.7 01/25/2021    LABALBU 3.8 01/25/2021    LABALBU 3.5 02/05/2011    CALCIUM 9.5 01/25/2021    BILITOT 0.5 01/25/2021    ALKPHOS 137 01/25/2021    AST 8 01/25/2021    ALT 7 01/25/2021          Assessment and Plan:    Patient Active Problem List   Diagnosis    Syncope and fall most likely orthostatic hypotension     Fracture right foot due to fall     Mood disorder due to a general medical condition    Crohn disease ([facility] [address]    [facility]

## 2021-01-26 NOTE — ED NOTES
Patient requesting to use bathroom, colostomy bag changed & emptied.       Sheryl Adame RN  01/26/21 5985

## 2021-01-26 NOTE — ED NOTES
Patient colostomy bag exploded. Patient was cleaned up and bag was changed. Stool was soft/liquid light brown in color. Patient denies any pain with stoma. Had episode of incontinence. Brief placed on the patient.      Anette Beckett RN  01/25/21 1114

## 2021-02-05 DIAGNOSIS — R30.0 DYSURIA: ICD-10-CM

## 2021-02-05 PROBLEM — E78.49 OTHER HYPERLIPIDEMIA: Status: ACTIVE | Noted: 2021-02-05

## 2021-02-22 ENCOUNTER — APPOINTMENT (OUTPATIENT)
Dept: GENERAL RADIOLOGY | Age: 56
End: 2021-02-22
Payer: MEDICARE

## 2021-02-22 ENCOUNTER — HOSPITAL ENCOUNTER (EMERGENCY)
Age: 56
Discharge: HOME | End: 2021-02-22
Payer: MEDICARE

## 2021-02-22 VITALS
DIASTOLIC BLOOD PRESSURE: 78 MMHG | OXYGEN SATURATION: 96 % | HEIGHT: 75 IN | BODY MASS INDEX: 24.62 KG/M2 | HEART RATE: 114 BPM | WEIGHT: 198 LBS | SYSTOLIC BLOOD PRESSURE: 110 MMHG | RESPIRATION RATE: 18 BRPM | TEMPERATURE: 97.5 F

## 2021-02-22 DIAGNOSIS — S82.832A CLOSED FRACTURE FIBULA, HEAD, LEFT, INITIAL ENCOUNTER: Primary | ICD-10-CM

## 2021-02-22 PROCEDURE — 73560 X-RAY EXAM OF KNEE 1 OR 2: CPT

## 2021-02-22 PROCEDURE — 99284 EMERGENCY DEPT VISIT MOD MDM: CPT

## 2021-02-22 PROCEDURE — 6370000000 HC RX 637 (ALT 250 FOR IP): Performed by: PHYSICIAN ASSISTANT

## 2021-02-22 RX ORDER — HYDROCODONE BITARTRATE AND ACETAMINOPHEN 5; 325 MG/1; MG/1
1 TABLET ORAL EVERY 6 HOURS PRN
Qty: 12 TABLET | Refills: 0 | Status: SHIPPED | OUTPATIENT
Start: 2021-02-22 | End: 2021-02-25

## 2021-02-22 RX ORDER — HYDROCODONE BITARTRATE AND ACETAMINOPHEN 5; 325 MG/1; MG/1
1 TABLET ORAL EVERY 6 HOURS PRN
Qty: 12 TABLET | Refills: 0 | Status: SHIPPED | OUTPATIENT
Start: 2021-02-22 | End: 2021-02-22 | Stop reason: SDUPTHER

## 2021-02-22 RX ORDER — HYDROCODONE BITARTRATE AND ACETAMINOPHEN 5; 325 MG/1; MG/1
1 TABLET ORAL ONCE
Status: COMPLETED | OUTPATIENT
Start: 2021-02-22 | End: 2021-02-22

## 2021-02-22 RX ADMIN — HYDROCODONE BITARTRATE AND ACETAMINOPHEN 1 TABLET: 5; 325 TABLET ORAL at 19:58

## 2021-02-22 ASSESSMENT — PAIN SCALES - GENERAL: PAINLEVEL_OUTOF10: 10

## 2021-02-22 ASSESSMENT — PAIN DESCRIPTION - DESCRIPTORS: DESCRIPTORS: SHARP;STABBING

## 2021-02-22 ASSESSMENT — PAIN DESCRIPTION - FREQUENCY: FREQUENCY: CONTINUOUS

## 2021-02-23 DIAGNOSIS — R30.0 DYSURIA: ICD-10-CM

## 2021-02-23 DIAGNOSIS — F25.0 SCHIZOAFFECTIVE DISORDER, BIPOLAR TYPE (HCC): ICD-10-CM

## 2021-02-23 LAB — VALPROIC ACID LEVEL: 62 MCG/ML (ref 50–100)

## 2021-02-23 NOTE — ED NOTES
Discharge instructions given, medications and follow up instructions reviewed. Instructed not to drive while on prescribed pain medication. Patient verbalized understanding, no other noted or stated problems at this time. Patient will follow up with physicians as directed.         Maico Napoles RN  02/22/21 2126

## 2021-02-23 NOTE — ED PROVIDER NOTES
[facility]  Department of Emergency Medicine   ED  Encounter Note  Admit Date/RoomTime: 2021  7:46 PM  ED Room: 35/    NAME: Iban Kenny  : 1965  MRN: [id number]     Chief Complaint:  Knee Injury (left, twisted iton the ice 2 hr PTA)    History of Present Illness       Iban Kenny is a 48 y.o. old male who presents to the emergency department by private vehicle, for non-traumatic pain located medial to left knee  which occured 2 hour(s) prior to arrival.  The complaint is due to a twisting injury while at home shoveling ice. Since onset the symptoms have been persistent. Patient has no prior history of pain/injury with regards to today's visit. His pain is aggraveated by bending or walking and relieved by nothing, as no treatment has been provided prior to this visit. His weight bearing ability:  normal.  Tetanus Status: up to date. ROS   Pertinent positives and negatives are stated within HPI, all other systems reviewed and are negative. Past Medical History:  has a past medical history of ADD (attention deficit disorder), Anxiety, Bipolar 1 disorder ([address] Cancer ([address] Crohn's disease ([facility] Depression, GERD (gastroesophageal reflux disease), Panic attack, Rectal pain, and Schizo affective schizophrenia ([address] 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 Social History:  reports that he has been smoking cigarettes. He started smoking about 43 years ago. He has been smoking about 1.00 pack per day. He has never used smokeless tobacco. He reports that he does not drink alcohol or use drugs. Family History: family history includes Depression in his father; Heart Disease in his mother; Mental Illness in his brother, father, mother, and sister. Allergies: Ciprofloxacin hcl, Morphine, and Nsaids    Physical Exam   Oxygen Saturation Interpretation: Normal.        ED Triage Vitals [02/22/21 1918]   BP Temp Temp Source Pulse Resp SpO2 Height Weight   110/78 97.5 °F (36.4 °C) Oral 114 18 96 % 6' 3\" (1.905 m) 198 lb (89.8 kg)         Constitutional:  Alert, development consistent with age. Neck:  Normal ROM. Supple. Knee:  Left medial proximal tibia,             Tenderness:  Mild. Swelling/Effusion: None. Deformity: no.              ROM: full range with pain. Skin:  normal exam; no wounds, erythema, or swelling. Drawer's:  Negative. Lachman's: Negative. Apley's: Not Tested. Haylee's: Not Tested. Valgus/Varus Stress: Negative. Crepitus: Negative. Hip:            Tenderness:  none. Swelling: None. Deformity: no.              ROM: full range of motion. Skin:  normal exam; no wounds, erythema, or swelling. Joint(s) Below: ankle. Tenderness:  none. Swelling: No.              Deformity: no.             ROM: full range of motion. Skin:  normal exam; no wounds, erythema, or swelling. Neurovascular: Motor deficit: none. Sensory deficit: none. Pulse deficit: none. Capillary refill: normal.  Gait:  normal.  Lymphatics: No lymphangitis or adenopathy noted. Neurological:  Oriented. Motor functions intact. Lab / Imaging Results   (All laboratory and radiology results have been personally reviewed by myself)  Labs:  No results found for this visit on 02/22/21. Imaging: All Radiology results interpreted by Radiologist unless otherwise noted.   XR KNEE LEFT (1-2 VIEWS)   Final Result Nondisplaced fracture involving proximal fibular head. ED Course / Medical Decision Making     Medications   HYDROcodone-acetaminophen (NORCO) 5-325 MG per tablet 1 tablet (1 tablet Oral Given 2/22/21 1958)        Consult(s):   None    Procedure(s):   none. MDM:     Imaging was obtained based on low suspicion for fracture / bony abnormality as per history/physical findings. Pt had not pain with palpation over the fibular head after seeing xray I  Reexamined the area and there is no pain at that site. Plan is subsequently for symptom control, limited use and  immobilization with appropriate outpatient follow-up. Plan of Care/Counseling:  I reviewed today's visit with the patient in addition to providing specific details for the plan of care and counseling regarding the diagnosis and prognosis. Questions are answered at this time and are agreeable with the plan. Assessment      1. Closed fracture fibula, head, left, initial encounter      Plan   Discharge home. Patient condition is good    New Medications     Discharge Medication List as of 2/22/2021  9:11 PM        Electronically signed by Mandeep Guadarrama PA-C   DD: 2/23/21  **This report was transcribed using voice recognition software. Every effort was made to ensure accuracy; however, inadvertent computerized transcription errors may be present.   END OF ED PROVIDER NOTE       Mandeep Guadarrama PA-C  02/23/21 6919

## 2021-02-25 ENCOUNTER — CARE COORDINATION (OUTPATIENT)
Dept: CARE COORDINATION | Age: 56
End: 2021-02-25

## 2021-03-03 ENCOUNTER — APPOINTMENT (OUTPATIENT)
Dept: GENERAL RADIOLOGY | Age: 56
End: 2021-03-03
Payer: MEDICARE

## 2021-03-03 ENCOUNTER — APPOINTMENT (OUTPATIENT)
Dept: CT IMAGING | Age: 56
End: 2021-03-03
Payer: MEDICARE

## 2021-03-03 ENCOUNTER — HOSPITAL ENCOUNTER (EMERGENCY)
Age: 56
Discharge: HOME | End: 2021-03-03
Attending: EMERGENCY MEDICINE
Payer: MEDICARE

## 2021-03-03 VITALS
TEMPERATURE: 97.7 F | RESPIRATION RATE: 15 BRPM | SYSTOLIC BLOOD PRESSURE: 142 MMHG | DIASTOLIC BLOOD PRESSURE: 96 MMHG | BODY MASS INDEX: 24.62 KG/M2 | HEART RATE: 90 BPM | OXYGEN SATURATION: 96 % | WEIGHT: 198 LBS | HEIGHT: 75 IN

## 2021-03-03 DIAGNOSIS — S92.002A CLOSED NONDISPLACED FRACTURE OF LEFT CALCANEUS, UNSPECIFIED PORTION OF CALCANEUS, INITIAL ENCOUNTER: Primary | ICD-10-CM
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
## 2021-03-03 DIAGNOSIS — S20.412A ABRASION OF LEFT SIDE OF BACK, INITIAL ENCOUNTER: ICD-10-CM

## 2021-03-03 DIAGNOSIS — S39.012A STRAIN OF LUMBAR REGION, INITIAL ENCOUNTER: ICD-10-CM

## 2021-03-03 LAB
ALBUMIN SERPL-MCNC: 3.8 G/DL (ref 3.5–5.2)
ALP BLD-CCNC: 112 U/L (ref 40–129)
ALT SERPL-CCNC: <5 U/L (ref 0–40)
ANION GAP SERPL CALCULATED.3IONS-SCNC: 10 MMOL/L (ref 7–16)
AST SERPL-CCNC: 10 U/L (ref 0–39)
BACTERIA: ABNORMAL /HPF
BASOPHILS ABSOLUTE: 0.06 E9/L (ref 0–0.2)
BASOPHILS RELATIVE PERCENT: 0.8 % (ref 0–2)
BILIRUB SERPL-MCNC: 0.2 MG/DL (ref 0–1.2)
BILIRUBIN URINE: NEGATIVE
BLOOD, URINE: NEGATIVE
BUN BLDV-MCNC: 29 MG/DL (ref 6–20)
CALCIUM SERPL-MCNC: 9.6 MG/DL (ref 8.6–10.2)
CHLORIDE BLD-SCNC: 103 MMOL/L (ref 98–107)
CLARITY: CLEAR
CO2: 26 MMOL/L (ref 22–29)
COLOR: YELLOW
CREAT SERPL-MCNC: 1.1 MG/DL (ref 0.7–1.2)
EOSINOPHILS ABSOLUTE: 0.11 E9/L (ref 0.05–0.5)
EOSINOPHILS RELATIVE PERCENT: 1.5 % (ref 0–6)
GFR AFRICAN AMERICAN: >60
GFR NON-AFRICAN AMERICAN: >60 ML/MIN/1.73
GLUCOSE BLD-MCNC: 99 MG/DL (ref 74–99)
GLUCOSE URINE: NEGATIVE MG/DL
HCT VFR BLD CALC: 40.2 % (ref 37–54)
HEMOGLOBIN: 12.6 G/DL (ref 12.5–16.5)
IMMATURE GRANULOCYTES #: 0.02 E9/L
IMMATURE GRANULOCYTES %: 0.3 % (ref 0–5)
KETONES, URINE: NEGATIVE MG/DL
LEUKOCYTE ESTERASE, URINE: ABNORMAL
LYMPHOCYTES ABSOLUTE: 1.06 E9/L (ref 1.5–4)
LYMPHOCYTES RELATIVE PERCENT: 14.7 % (ref 20–42)
MCH RBC QN AUTO: 29.7 PG (ref 26–35)
MCHC RBC AUTO-ENTMCNC: 31.3 % (ref 32–34.5)
MCV RBC AUTO: 94.8 FL (ref 80–99.9)
MONOCYTES ABSOLUTE: 0.72 E9/L (ref 0.1–0.95)
MONOCYTES RELATIVE PERCENT: 10 % (ref 2–12)
NEUTROPHILS ABSOLUTE: 5.25 E9/L (ref 1.8–7.3)
NEUTROPHILS RELATIVE PERCENT: 72.7 % (ref 43–80)
NITRITE, URINE: NEGATIVE
PDW BLD-RTO: 15.2 FL (ref 11.5–15)
PH UA: 5.5 (ref 5–9)
PLATELET # BLD: 220 E9/L (ref 130–450)
PMV BLD AUTO: 9.4 FL (ref 7–12)
POTASSIUM REFLEX MAGNESIUM: 4.7 MMOL/L (ref 3.5–5)
PROTEIN UA: NEGATIVE MG/DL
RBC # BLD: 4.24 E12/L (ref 3.8–5.8)
RBC UA: ABNORMAL /HPF (ref 0–2)
SODIUM BLD-SCNC: 139 MMOL/L (ref 132–146)
SPECIFIC GRAVITY UA: >=1.03 (ref 1–1.03)
TOTAL PROTEIN: 6.8 G/DL (ref 6.4–8.3)
UROBILINOGEN, URINE: 0.2 E.U./DL
WBC # BLD: 7.2 E9/L (ref 4.5–11.5)
WBC UA: ABNORMAL /HPF (ref 0–5)

## 2021-03-03 PROCEDURE — 96374 THER/PROPH/DIAG INJ IV PUSH: CPT

## 2021-03-03 PROCEDURE — 80053 COMPREHEN METABOLIC PANEL: CPT

## 2021-03-03 PROCEDURE — 29515 APPLICATION SHORT LEG SPLINT: CPT

## 2021-03-03 PROCEDURE — 6370000000 HC RX 637 (ALT 250 FOR IP): Performed by: NURSE PRACTITIONER

## 2021-03-03 PROCEDURE — 99285 EMERGENCY DEPT VISIT HI MDM: CPT

## 2021-03-03 PROCEDURE — 6360000002 HC RX W HCPCS: Performed by: NURSE PRACTITIONER

## 2021-03-03 PROCEDURE — 85025 COMPLETE CBC W/AUTO DIFF WBC: CPT

## 2021-03-03 PROCEDURE — 2580000003 HC RX 258: Performed by: NURSE PRACTITIONER

## 2021-03-03 PROCEDURE — 74177 CT ABD & PELVIS W/CONTRAST: CPT

## 2021-03-03 PROCEDURE — 81001 URINALYSIS AUTO W/SCOPE: CPT

## 2021-03-03 PROCEDURE — 72131 CT LUMBAR SPINE W/O DYE: CPT

## 2021-03-03 PROCEDURE — 6360000004 HC RX CONTRAST MEDICATION: Performed by: RADIOLOGY

## 2021-03-03 PROCEDURE — 73610 X-RAY EXAM OF ANKLE: CPT

## 2021-03-03 PROCEDURE — 73630 X-RAY EXAM OF FOOT: CPT

## 2021-03-03 RX ORDER — 0.9 % SODIUM CHLORIDE 0.9 %
1000 INTRAVENOUS SOLUTION INTRAVENOUS ONCE
Status: COMPLETED | OUTPATIENT
Start: 2021-03-03 | End: 2021-03-03

## 2021-03-03 RX ORDER — FENTANYL CITRATE 50 UG/ML
50 INJECTION, SOLUTION INTRAMUSCULAR; INTRAVENOUS ONCE
Status: COMPLETED | OUTPATIENT
Start: 2021-03-03 | End: 2021-03-03

## 2021-03-03 RX ORDER — HYDROCODONE BITARTRATE AND ACETAMINOPHEN 5; 325 MG/1; MG/1
1 TABLET ORAL ONCE
Status: COMPLETED | OUTPATIENT
Start: 2021-03-03 | End: 2021-03-03

## 2021-03-03 RX ADMIN — IOPAMIDOL 75 ML: 755 INJECTION, SOLUTION INTRAVENOUS at 12:51

## 2021-03-03 RX ADMIN — HYDROCODONE BITARTRATE AND ACETAMINOPHEN 1 TABLET: 5; 325 TABLET ORAL at 11:33

## 2021-03-03 RX ADMIN — SODIUM CHLORIDE 1000 ML: 9 INJECTION, SOLUTION INTRAVENOUS at 11:48

## 2021-03-03 RX ADMIN — FENTANYL CITRATE 50 MCG: 50 INJECTION, SOLUTION INTRAMUSCULAR; INTRAVENOUS at 13:46

## 2021-03-03 ASSESSMENT — PAIN DESCRIPTION - PROGRESSION: CLINICAL_PROGRESSION: GRADUALLY WORSENING

## 2021-03-03 ASSESSMENT — PAIN SCALES - GENERAL: PAINLEVEL_OUTOF10: 9

## 2021-03-03 ASSESSMENT — PAIN DESCRIPTION - ONSET: ONSET: GRADUAL

## 2021-03-03 ASSESSMENT — PAIN DESCRIPTION - FREQUENCY: FREQUENCY: CONTINUOUS

## 2021-03-03 ASSESSMENT — PAIN DESCRIPTION - LOCATION: LOCATION: ANKLE;FOOT

## 2021-03-03 ASSESSMENT — PAIN DESCRIPTION - ORIENTATION: ORIENTATION: LEFT

## 2021-03-03 NOTE — ED PROVIDER NOTES
ED Attending  CC: Raquel         [facility]  Department of Emergency Medicine   ED  Encounter Note  Admit Date/RoomTime: 3/3/2021 11:07 AM  ED Room: DIANA/DIANA    NAME: Vaishali Paul  : 1965  MRN: [id number]     Chief Complaint:  Back Pain (left lumbar, states fells down 6 stairs yesterday) and Ankle Pain (and foot pain, left )    History of Present Illness       Vaishali Paul is a 48 y.o. old male who presents to the emergency department by private vehicle, for a mechanical fall which occured 1 day(s) prior to arrival. He reportedly walking up the steps carrying a heavy box when her fell while at home prior to incident with complaints of left back pain and left ankle pain. The patients tetanus status is up to date. Since onset the symptoms have been remaining constant. His pain is aggraveated by any movement, any use of or pressure on or palpation of and relieved by nothing, as no treatment has been provided prior to this visit. He denies any head injury, headache, loss of consciousness, confusion, dizziness, neck pain, chest pain, abdominal pain, numbness, weakness, blurred vision, nausea or vomiting. He takes no blood thinning agents. .  ROS   Pertinent positives and negatives are stated within HPI, all other systems reviewed and are negative. Past Medical History:  has a past medical history of ADD (attention deficit disorder), Anxiety, Bipolar 1 disorder ([address] Cancer ([address] Crohn's disease ([address] Depression, GERD (gastroesophageal reflux disease), Panic attack, Rectal pain, and Schizo affective schizophrenia ([address] 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 Social History:  reports that he has been smoking cigarettes. He started smoking about 43 years ago. He has been smoking about 1.00 pack per day. He has never used smokeless tobacco. He reports that he does not drink alcohol or use drugs. Family History: family history includes Depression in his father; Heart Disease in his mother; Mental Illness in his brother, father, mother, and sister. Allergies: Ciprofloxacin hcl, Morphine, and Nsaids    Physical Exam   Oxygen Saturation Interpretation: Normal.        ED Triage Vitals [03/03/21 1107]   BP Temp Temp src Pulse Resp SpO2 Height Weight   (!) 147/95 97.7 °F (36.5 °C) -- 118 18 97 % 6' 3\" (1.905 m) 198 lb (89.8 kg)         Physical Exam  Constitutional:  Alert, development consistent with age. HEENT:  NC/NT. Airway patent. Neck:  No midline or paravertebral tenderness. Normal ROM. Supple. Chest:  Symmetrical without visible rash or tenderness. Respiratory:  Lungs Clear to auscultation and breath sounds equal.  CV:  Regular rate and rhythm, normal heart sounds, without pathological murmurs, ectopy, gallops, or rubs. GI:  Abdomen Soft, nontender, good bowel sounds. No firm or pulsatile mass. Pelvis:  Stable, nontender to palpation. Back: No midline or CVA tenderness. Left-sided abrasion and ecchymosis noted. Extremities: Left ankle and foot moderate swelling and bruising noted. Limited range of motion due to pain. No obvious deformity. No abrasions or wounds noted. Full sensation to light touch. Pulses intact. Integument:  Normal turgor. Warm, dry, without visible rash, unless noted elsewhere. Lymphatic: no lymphadenopathy noted  Neurological:  Oriented x3, GCS 15. Motor functions intact.      Lab / Imaging Results   (All laboratory and radiology results have been personally reviewed by myself)  Labs:  Results for orders placed or performed during the hospital encounter of 03/03/21   CBC Auto Differential   Result Value Ref Range    WBC 7.2 4.5 - 11.5 E9/L    RBC 4.24 3.80 - 5.80 E12/L    Hemoglobin 12.6 12.5 - 16.5 g/dL    Hematocrit 40.2 37.0 - 54.0 %    MCV 94.8 80.0 - 99.9 fL    MCH 29.7 26.0 - 35.0 pg    MCHC 31.3 (L) 32.0 - 34.5 %    RDW 15.2 (H) 11.5 - 15.0 fL    Platelets 175 239 - 432 E9/L    MPV 9.4 7.0 - 12.0 fL    Neutrophils % 72.7 43.0 - 80.0 %    Immature Granulocytes % 0.3 0.0 - 5.0 %    Lymphocytes % 14.7 (L) 20.0 - 42.0 %    Monocytes % 10.0 2.0 - 12.0 %    Eosinophils % 1.5 0.0 - 6.0 %    Basophils % 0.8 0.0 - 2.0 %    Neutrophils Absolute 5.25 1.80 - 7.30 E9/L    Immature Granulocytes # 0.02 E9/L    Lymphocytes Absolute 1.06 (L) 1.50 - 4.00 E9/L    Monocytes Absolute 0.72 0.10 - 0.95 E9/L    Eosinophils Absolute 0.11 0.05 - 0.50 E9/L    Basophils Absolute 0.06 0.00 - 0.20 E9/L   Comprehensive Metabolic Panel w/ Reflex to MG   Result Value Ref Range    Sodium 139 132 - 146 mmol/L    Potassium reflex Magnesium 4.7 3.5 - 5.0 mmol/L    Chloride 103 98 - 107 mmol/L    CO2 26 22 - 29 mmol/L    Anion Gap 10 7 - 16 mmol/L    Glucose 99 74 - 99 mg/dL    BUN 29 (H) 6 - 20 mg/dL    CREATININE 1.1 0.7 - 1.2 mg/dL    GFR Non-African American >60 >=60 mL/min/1.73    GFR African American >60     Calcium 9.6 8.6 - 10.2 mg/dL    Total Protein 6.8 6.4 - 8.3 g/dL    Albumin 3.8 3.5 - 5.2 g/dL    Total Bilirubin 0.2 0.0 - 1.2 mg/dL    Alkaline Phosphatase 112 40 - 129 U/L    ALT <5 0 - 40 U/L    AST 10 0 - 39 U/L   Urinalysis, reflex to microscopic   Result Value Ref Range    Color, UA Yellow Straw/Yellow    Clarity, UA Clear Clear    Glucose, Ur Negative Negative mg/dL    Bilirubin Urine Negative Negative    Ketones, Urine Negative Negative mg/dL    Specific Gravity, UA >=1.030 1.005 - 1.030    Blood, Urine Negative Negative    pH, UA 5.5 5.0 - 9.0    Protein, UA Negative Negative mg/dL    Urobilinogen, Urine 0.2 <2.0 E.U./dL    Nitrite, Urine Negative Negative    Leukocyte Esterase, Urine SMALL (A) Negative   Microscopic Urinalysis   Result Value Ref Range    WBC, UA 2-5 0 - 5 /HPF    RBC, UA 0-1 0 - 2 /HPF    Bacteria, UA RARE (A) None Seen /HPF noted. Patient states he feels comfortable in splint. Discussed nonweightbearing and follow-up with orthopedic surgeon. Patient states he has Norco at home. Structure not to drive at discharge receiving pain medications in the ER. Patients symptoms are improving. Consult(s):   None    Procedure(s):   none    MDM:   Patient presents to the ED for fall down a few steps due to carrying a heavy box. Differential diagnoses included but not limited to fracture versus dislocation versus strain versus hematoma versus abrasion. Workup in the ED revealed CBC was unremarkable. CMP was unremarkable. Urinalysis was negative for blood. CT of the abdomen pelvis revealed right-sided colostomy and multiple kidney cysts which was discussed this incidental find to follow-up with his PCP for ultrasound. There is no acute fractures of the thoracic spine lumbar spine pelvic or hips and joint. CT lumbar spine reveals no fracture identified. X-ray of the left foot and ankle revealed acute nondisplaced fracture through the calcaneus. Old healing fracture of the distal left second third fourth and fifth metatarsal bones. Patient was placed in a short leg posterior with stirrups. There was no neurovascular compromise noted before or after splint application. Patient states he feels comfortable using crutches at home and displaced this is increased risk for falls. Patient had abrasions on the left lower lumbar region. Patient was given Norco and fentanyl for their symptoms with moderate improvement. Patient continues to be non-toxic on re-evaluation. Findings were discussed with the patient and reasons to immediately return to the ED were articulated to them. They will follow-up with their PMD and orthopedics. .      Plan of Care/Counseling:  I reviewed today's visit with the patient in addition to providing specific details for the plan of care and counseling regarding the diagnosis and prognosis.   Questions are answered at this time and are agreeable with the plan. Assessment      1. Closed nondisplaced fracture of left calcaneus, unspecified portion of calcaneus, initial encounter    2. Abrasion of left side of back, initial encounter    3. Fall down steps, initial encounter    4. Strain of lumbar region, initial encounter      Plan   Discharge home. Patient condition is stable    New Medications     New Prescriptions    No medications on file     Electronically signed by KAM Lackey CNP   DD: 3/3/21  **This report was transcribed using voice recognition software. Every effort was made to ensure accuracy; however, inadvertent computerized transcription errors may be present.   END OF ED PROVIDER NOTE       KAM Lackey CNP  03/03/21 0729

## 2021-03-12 ENCOUNTER — APPOINTMENT (OUTPATIENT)
Dept: CT IMAGING | Age: 56
End: 2021-03-12
Payer: MEDICARE

## 2021-03-12 ENCOUNTER — HOSPITAL ENCOUNTER (EMERGENCY)
Age: 56
Discharge: HOME | End: 2021-03-12
Attending: EMERGENCY MEDICINE
Payer: MEDICARE

## 2021-03-12 VITALS
HEART RATE: 79 BPM | TEMPERATURE: 96.8 F | SYSTOLIC BLOOD PRESSURE: 124 MMHG | WEIGHT: 200 LBS | HEIGHT: 75 IN | RESPIRATION RATE: 16 BRPM | DIASTOLIC BLOOD PRESSURE: 69 MMHG | OXYGEN SATURATION: 99 % | BODY MASS INDEX: 24.87 KG/M2

## 2021-03-12 DIAGNOSIS — K94.19 ILEOSTOMY PROLAPSE (HCC): Primary | ICD-10-CM

## 2021-03-12 LAB
ALBUMIN SERPL-MCNC: 3.8 G/DL (ref 3.5–5.2)
ALP BLD-CCNC: 125 U/L (ref 40–129)
ALT SERPL-CCNC: 6 U/L (ref 0–40)
ANION GAP SERPL CALCULATED.3IONS-SCNC: 10 MMOL/L (ref 7–16)
AST SERPL-CCNC: 10 U/L (ref 0–39)
BASOPHILS ABSOLUTE: 0.05 E9/L (ref 0–0.2)
BASOPHILS RELATIVE PERCENT: 0.7 % (ref 0–2)
BILIRUB SERPL-MCNC: 0.2 MG/DL (ref 0–1.2)
BILIRUBIN URINE: NEGATIVE
BLOOD, URINE: NEGATIVE
BUN BLDV-MCNC: 23 MG/DL (ref 6–20)
CALCIUM SERPL-MCNC: 9.7 MG/DL (ref 8.6–10.2)
CHLORIDE BLD-SCNC: 103 MMOL/L (ref 98–107)
CLARITY: CLEAR
CO2: 27 MMOL/L (ref 22–29)
COLOR: YELLOW
CREAT SERPL-MCNC: 1 MG/DL (ref 0.7–1.2)
EOSINOPHILS ABSOLUTE: 0.37 E9/L (ref 0.05–0.5)
EOSINOPHILS RELATIVE PERCENT: 5.1 % (ref 0–6)
GFR AFRICAN AMERICAN: >60
GFR NON-AFRICAN AMERICAN: >60 ML/MIN/1.73
GLUCOSE BLD-MCNC: 124 MG/DL (ref 74–99)
GLUCOSE URINE: NEGATIVE MG/DL
HCT VFR BLD CALC: 41.4 % (ref 37–54)
HEMOGLOBIN: 13.2 G/DL (ref 12.5–16.5)
IMMATURE GRANULOCYTES #: 0.02 E9/L
IMMATURE GRANULOCYTES %: 0.3 % (ref 0–5)
INR BLD: 1.1
KETONES, URINE: NEGATIVE MG/DL
LACTIC ACID, SEPSIS: 2.6 MMOL/L (ref 0.5–1.9)
LEUKOCYTE ESTERASE, URINE: NEGATIVE
LIPASE: 16 U/L (ref 13–60)
LYMPHOCYTES ABSOLUTE: 1.08 E9/L (ref 1.5–4)
LYMPHOCYTES RELATIVE PERCENT: 15 % (ref 20–42)
MCH RBC QN AUTO: 30.1 PG (ref 26–35)
MCHC RBC AUTO-ENTMCNC: 31.9 % (ref 32–34.5)
MCV RBC AUTO: 94.5 FL (ref 80–99.9)
MONOCYTES ABSOLUTE: 0.46 E9/L (ref 0.1–0.95)
MONOCYTES RELATIVE PERCENT: 6.4 % (ref 2–12)
NEUTROPHILS ABSOLUTE: 5.22 E9/L (ref 1.8–7.3)
NEUTROPHILS RELATIVE PERCENT: 72.5 % (ref 43–80)
NITRITE, URINE: NEGATIVE
PDW BLD-RTO: 14.9 FL (ref 11.5–15)
PH UA: 6 (ref 5–9)
PLATELET # BLD: 308 E9/L (ref 130–450)
PMV BLD AUTO: 9 FL (ref 7–12)
POTASSIUM SERPL-SCNC: 4.2 MMOL/L (ref 3.5–5)
PROTEIN UA: NEGATIVE MG/DL
PROTHROMBIN TIME: 12.2 SEC (ref 9.3–12.4)
RBC # BLD: 4.38 E12/L (ref 3.8–5.8)
SODIUM BLD-SCNC: 140 MMOL/L (ref 132–146)
SPECIFIC GRAVITY UA: 1.02 (ref 1–1.03)
TOTAL PROTEIN: 6.7 G/DL (ref 6.4–8.3)
UROBILINOGEN, URINE: 0.2 E.U./DL
WBC # BLD: 7.2 E9/L (ref 4.5–11.5)

## 2021-03-12 PROCEDURE — 80053 COMPREHEN METABOLIC PANEL: CPT

## 2021-03-12 PROCEDURE — 2580000003 HC RX 258: Performed by: EMERGENCY MEDICINE

## 2021-03-12 PROCEDURE — 96376 TX/PRO/DX INJ SAME DRUG ADON: CPT

## 2021-03-12 PROCEDURE — 85610 PROTHROMBIN TIME: CPT

## 2021-03-12 PROCEDURE — 83605 ASSAY OF LACTIC ACID: CPT

## 2021-03-12 PROCEDURE — 6360000004 HC RX CONTRAST MEDICATION: Performed by: RADIOLOGY

## 2021-03-12 PROCEDURE — 83690 ASSAY OF LIPASE: CPT

## 2021-03-12 PROCEDURE — 74177 CT ABD & PELVIS W/CONTRAST: CPT

## 2021-03-12 PROCEDURE — 6360000002 HC RX W HCPCS: Performed by: STUDENT IN AN ORGANIZED HEALTH CARE EDUCATION/TRAINING PROGRAM

## 2021-03-12 PROCEDURE — 99284 EMERGENCY DEPT VISIT MOD MDM: CPT

## 2021-03-12 PROCEDURE — 81003 URINALYSIS AUTO W/O SCOPE: CPT

## 2021-03-12 PROCEDURE — 6360000002 HC RX W HCPCS: Performed by: EMERGENCY MEDICINE

## 2021-03-12 PROCEDURE — 85025 COMPLETE CBC W/AUTO DIFF WBC: CPT

## 2021-03-12 PROCEDURE — 96374 THER/PROPH/DIAG INJ IV PUSH: CPT

## 2021-03-12 RX ORDER — 0.9 % SODIUM CHLORIDE 0.9 %
1000 INTRAVENOUS SOLUTION INTRAVENOUS ONCE
Status: COMPLETED | OUTPATIENT
Start: 2021-03-12 | End: 2021-03-12

## 2021-03-12 RX ADMIN — IOPAMIDOL 75 ML: 755 INJECTION, SOLUTION INTRAVENOUS at 11:22

## 2021-03-12 RX ADMIN — HYDROMORPHONE HYDROCHLORIDE 0.5 MG: 1 INJECTION, SOLUTION INTRAMUSCULAR; INTRAVENOUS; SUBCUTANEOUS at 13:33

## 2021-03-12 RX ADMIN — HYDROMORPHONE HYDROCHLORIDE 0.5 MG: 1 INJECTION, SOLUTION INTRAMUSCULAR; INTRAVENOUS; SUBCUTANEOUS at 10:41

## 2021-03-12 RX ADMIN — SODIUM CHLORIDE 1000 ML: 9 INJECTION, SOLUTION INTRAVENOUS at 10:40

## 2021-03-12 ASSESSMENT — PAIN SCALES - GENERAL: PAINLEVEL_OUTOF10: 10

## 2021-03-12 NOTE — CONSULTS
GENERAL SURGERY  CONSULT NOTE  3/12/2021    Physician Consulted: Dr. Oj Pacheco  Reason for Consult: crohn's history of ostomy and prolapse  Referring Physician: Dr. Odessa Joseph    HPI  Katlin Anand is a 48 y.o. male who presents for evaluation of pain at ileostomy and further prolapse. Patient states he has noted that his ileostomy has been more prolapsed than normal for the past 3 days and this has been associated with some increased pain around the stoma as well as some superficial bleeding from the prolapsed areas. He states he has never had his ileostomy prolapse to this degree. He states he noted more bleeding after he was scratching the area of the stoma vigorously. He has still had output, slightly less than normal and has had continued gas output. He denies nausea, vomiting, fevers, chills. Electrolytes are WNL, slight lactic acidosis to 2.6, no leukocytosis, WBC 7.2. Past Medical History:   Diagnosis Date    ADD (attention deficit disorder)     Anxiety     Bipolar 1 disorder ([facility] 11/19/2017    Cancer ([facility] prostate cancer    2014 / treated with surgery    Crohn's disease ([facility]     Depression     GERD (gastroesophageal reflux disease)     Panic attack     Rectal pain     has a fissure    Schizo affective schizophrenia ([facility]     stable       Past Surgical History:   Procedure Laterality Date    COLONOSCOPY      COLONOSCOPY N/A 1/28/2020    COLONOSCOPY WITH BIOPSY performed by Jalyn Hernandez MD at [facility], DIAGNOSTIC      INCISION AND DRAINAGE N/A 5/15/2019    I & D SCROTAL ABSCESS WITH EXPLANTATION ARTIFICIAL URINARY SPHINCTER COMPLEX URETHROPLASTY performed [facility] Lili Dowd DO [facility]  2002? deviated septum    PICC LINE INSERTION NURSE  10/3/2020         PROSTATECTOMY  9/15/2014    laparoscopic robotic assisted. Medications Prior to Admission:    Prior to Admission medications    Medication Sig Start Date End Date Taking?  Authorizing Provider   chlorproMAZINE (THORAZINE) 100 MG tablet Take 100 mg by mouth 4 times daily  1/15/21   Historical Provider, MD   clomiPRAMINE (ANAFRANIL) 50 MG capsule Take 50 mg by mouth daily  1/15/21   Historical Provider, MD   atorvastatin (LIPITOR) 10 MG tablet Take 1 tablet by mouth daily 2/5/21   Skylar Salmon DO   divalproex (DEPAKOTE) 500 MG DR tablet Take 1 tablet by mouth every 12 hours  Patient taking differently: Take 500 mg by mouth 3 times daily  4/24/20 97/3/27  Rhonda Marrero, APRN - CNP       Allergies   Allergen Reactions    Ciprofloxacin Hcl      constipation    Morphine      \"it does nothing for me\"    Nsaids Other (See Comments)     Per pt's physician Andrew Elaine) he is not to take NSAIDS due to HX of Crohn's disease. Family History   Problem Relation Age of Onset    Mental Illness Mother     Mental Illness Father     Mental Illness Sister     Mental Illness Brother     Heart Disease Mother     Depression Father        Social History     Tobacco Use    Smoking status: Current Every Day Smoker     Packs/day: 1.00     Types: Cigarettes     Start date: 1/1/1978    Smokeless tobacco: Never Used   Substance Use Topics    Alcohol use: No     Comment: no alcohol in5 yrs    Drug use: No     Comment: last use 1997 / marijuana         Review of Systems   General ROS: negative  Hematological and Lymphatic ROS: negative  Respiratory ROS: negative  Cardiovascular ROS: negative  Gastrointestinal ROS: see HPI  Genito-Urinary ROS: negative  Musculoskeletal ROS: negative      PHYSICAL EXAM:    Vitals:    03/12/21 1048   BP: 134/70   Pulse: 94   Resp: 16   Temp:    SpO2: 98%       General Appearance:  awake, alert, oriented, in no acute distress  Lungs:  No increased work of breathing on room air  Heart:  RRR  Abdomen:  Soft, mildly tender around stoma, parastomal hernia palpated, ND.  Approximately 12 cm of prolapse, liquid stool and gas in bag  Extremities: pulses present in all extremities LABS:    CBC  Recent Labs     03/12/21  1016   WBC 7.2   HGB 13.2   HCT 41.4        BMP  Recent Labs     03/12/21  1016      K 4.2      CO2 27   BUN 23*   CREATININE 1.0   CALCIUM 9.7     Liver Function  Recent Labs     03/12/21  1016   LIPASE 16   BILITOT 0.2   AST 10   ALT 6   ALKPHOS 125   PROT 6.7   LABALBU 3.8     No results for input(s): LACTATE in the last 72 hours. Recent Labs     03/12/21  1016   INR 1.1       RADIOLOGY    Ct Abdomen Pelvis W Iv Contrast Additional Contrast? None    Result Date: 3/12/2021  EXAMINATION: CT OF THE ABDOMEN AND PELVIS WITH CONTRAST 3/12/2021 11:15 am TECHNIQUE: CT of the abdomen and pelvis was performed with the administration of intravenous contrast. Multiplanar reformatted images are provided for review. Dose modulation, iterative reconstruction, and/or weight based adjustment of the mA/kV was utilized to reduce the radiation dose to as low as reasonably achievable. COMPARISON: 03/03/2021 HISTORY: ORDERING SYSTEM PROVIDED HISTORY: stoma pain and swelling with bleeding TECHNOLOGIST PROVIDED HISTORY: Additional Contrast?->None Reason for exam:->stoma pain and swelling with bleeding Decision Support Exception->Emergency Medical Condition (MA) History of Crohn's disease, GERD, and prostate cancer. Prostatectomy 2014. Onset of abdominal pain 2 days ago. Right abdominal ostomy placed at the [facility] clinic for Crohn's disease in May 2020. FINDINGS: Healed anterolateral right rib fractures. Degenerative change in the regional skeleton. No acute fracture or vertebral compression. No definite osseous blastic lesion. Normal cardiac size without pericardial effusion. Normal-appearing distal esophagus. No significant lung base findings. Nonspecific slight thickening of the diaphragm bilaterally may be developmental variation and appears unchanged. Liver enlarged with sagittal mention of 20.2 cm. No focal liver lesion.  Normal-appearing gallbladder, adrenals, and was able to reduce the prolapse. It then prolapsed once again and I reduced it again. Patient has a large parastomal hernia, if prolapse remains an issue recommend patient follow up at [facility] where he has had his surgeries. Patient is having good stool and gas output so no indication for admission at this time. OK to discharge home.   - Discussed with Dr. Santa Trujillo    Electronically signed by Althea Richter MD on 3/12/21 at 12:44 PM EST

## 2021-03-12 NOTE — ED PROVIDER NOTES
history includes Depression in his father; Heart Disease in his mother; Mental Illness in his brother, father, mother, and sister. The patients home medications have been reviewed. Allergies: Ciprofloxacin hcl, Morphine, and Nsaids        ---------------------------------------------------PHYSICAL EXAM--------------------------------------    Constitutional/General: Alert and oriented x3, well appearing, non toxic in NAD  Head: Normocephalic and atraumatic  Eyes: PERRL, EOMI, conjunctive normal, sclera non icteric  Mouth: Oropharynx clear, handling secretions, no trismus, no asymmetry of the posterior oropharynx or uvular edema  Neck: Supple, full ROM, non tender to palpation in the midline, no stridor, no crepitus, no meningeal signs  Respiratory: Lungs clear to auscultation bilaterally, no wheezes, rales, or rhonchi. Not in respiratory distress  Cardiovascular:  Regular rate. Regular rhythm. No murmurs, gallops, or rubs. 2+ distal pulses  Chest: No chest wall tenderness  GI:  Abdomen Soft, mild tenderness; Non distended. +BS. No organomegaly, no palpable masses,  No rebound, guarding, or rigidity. Patient does have a stoma that is slightly swollen and enlarged about 3 inches beyond the skin line. It is pink and well perfused. Small amount of bloody stool in the ostomy bag. Musculoskeletal: Moves all extremities x 4. Warm and well perfused, no clubbing, cyanosis, or edema. Capillary refill <3 seconds  Integument: skin warm and dry. No rashes. Lymphatic: no lymphadenopathy noted  Neurologic: GCS 15, no focal deficits, symmetric strength 5/5 in the upper and lower extremities bilaterally  Psychiatric: Normal Affect    -------------------------------------------------- RESULTS -------------------------------------------------  I have personally reviewed all laboratory and imaging results for this patient. Results are listed below.      LABS:  Results for orders placed or performed during the hospital encounter of 03/12/21   Comprehensive Metabolic Panel   Result Value Ref Range    Sodium 140 132 - 146 mmol/L    Potassium 4.2 3.5 - 5.0 mmol/L    Chloride 103 98 - 107 mmol/L    CO2 27 22 - 29 mmol/L    Anion Gap 10 7 - 16 mmol/L    Glucose 124 (H) 74 - 99 mg/dL    BUN 23 (H) 6 - 20 mg/dL    CREATININE 1.0 0.7 - 1.2 mg/dL    GFR Non-African American >60 >=60 mL/min/1.73    GFR African American >60     Calcium 9.7 8.6 - 10.2 mg/dL    Total Protein 6.7 6.4 - 8.3 g/dL    Albumin 3.8 3.5 - 5.2 g/dL    Total Bilirubin 0.2 0.0 - 1.2 mg/dL    Alkaline Phosphatase 125 40 - 129 U/L    ALT 6 0 - 40 U/L    AST 10 0 - 39 U/L   Lipase   Result Value Ref Range    Lipase 16 13 - 60 U/L   CBC Auto Differential   Result Value Ref Range    WBC 7.2 4.5 - 11.5 E9/L    RBC 4.38 3.80 - 5.80 E12/L    Hemoglobin 13.2 12.5 - 16.5 g/dL    Hematocrit 41.4 37.0 - 54.0 %    MCV 94.5 80.0 - 99.9 fL    MCH 30.1 26.0 - 35.0 pg    MCHC 31.9 (L) 32.0 - 34.5 %    RDW 14.9 11.5 - 15.0 fL    Platelets 311 027 - 817 E9/L    MPV 9.0 7.0 - 12.0 fL    Neutrophils % 72.5 43.0 - 80.0 %    Immature Granulocytes % 0.3 0.0 - 5.0 %    Lymphocytes % 15.0 (L) 20.0 - 42.0 %    Monocytes % 6.4 2.0 - 12.0 %    Eosinophils % 5.1 0.0 - 6.0 %    Basophils % 0.7 0.0 - 2.0 %    Neutrophils Absolute 5.22 1.80 - 7.30 E9/L    Immature Granulocytes # 0.02 E9/L    Lymphocytes Absolute 1.08 (L) 1.50 - 4.00 E9/L    Monocytes Absolute 0.46 0.10 - 0.95 E9/L    Eosinophils Absolute 0.37 0.05 - 0.50 E9/L    Basophils Absolute 0.05 0.00 - 0.20 E9/L   Urinalysis   Result Value Ref Range    Color, UA Yellow Straw/Yellow    Clarity, UA Clear Clear    Glucose, Ur Negative Negative mg/dL    Bilirubin Urine Negative Negative    Ketones, Urine Negative Negative mg/dL    Specific Gravity, UA 1.025 1.005 - 1.030    Blood, Urine Negative Negative    pH, UA 6.0 5.0 - 9.0    Protein, UA Negative Negative mg/dL    Urobilinogen, Urine 0.2 <2.0 E.U./dL    Nitrite, Urine Negative Negative Leukocyte Esterase, Urine Negative Negative   Lactate, Sepsis   Result Value Ref Range    Lactic Acid, Sepsis 2.6 (H) 0.5 - 1.9 mmol/L   Protime-INR   Result Value Ref Range    Protime 12.2 9.3 - 12.4 sec    INR 1.1        RADIOLOGY:  Interpreted by Radiologist.  CT ABDOMEN PELVIS W IV CONTRAST Additional Contrast? None   Final Result   Once again, the right lower quadrant ileostomy contains parostial herniated   mesenteric fat and small intestine. Ostomy protrusion through the abdominal   wall is suggestive of partial prolapse. No evidence of intestinal   obstruction. Hepatomegaly without focal lesion      Multiple non-obstructing right renal calculi      Status post colectomy for Crohn's disease. Fatty mural stratification in the   rectal stump is likely from Crohn's disease      Nonspecific distention of the urinary bladder in patient who is status post   prostatectomy for prostate cancer                   ------------------------- NURSING NOTES AND VITALS REVIEWED ---------------------------   The nursing notes within the ED encounter and vital signs as below have been reviewed by myself. /69   Pulse 79   Temp 96.8 °F (36 °C) (Tympanic)   Resp 16   Ht 6' 3\" (1.905 m)   Wt 200 lb (90.7 kg)   SpO2 99%   BMI 25.00 kg/m²   Oxygen Saturation Interpretation: Normal    The patients available past medical records and past encounters were reviewed. ------------------------------ ED COURSE/MEDICAL DECISION MAKING----------------------  Medications   0.9 % sodium chloride bolus (0 mLs Intravenous Stopped 3/12/21 1301)   HYDROmorphone (DILAUDID) injection 0.5 mg (0.5 mg Intravenous Given 3/12/21 1041)   iopamidol (ISOVUE-370) 76 % injection 75 mL (75 mLs Intravenous Given 3/12/21 1122)   HYDROmorphone (DILAUDID) injection 0.5 mg (0.5 mg Intravenous Given 3/12/21 1333)         ED COURSE:  ED Course as of Mar 14 0027   Fri Mar 12, 2021   1237 I spoke to Dr. Jose Flores on-call for general surgery.   He Re-evaluation. Patients symptoms are improving. Patient states he is feeling more comfortable now that the stoma has been reduced. He is comfortable following up outpatient with his surgeon at [facility] clinic. Re-examination  3/12/21   10:14 AM EST          Vital Signs:   Vitals:    03/12/21 0948 03/12/21 1048 03/12/21 1301 03/12/21 1524   BP: 137/87 134/70 127/78 124/69   Pulse: 111 94 87 79   Resp: 16 16 16 16   Temp: 96.8 °F (36 °C)      TempSrc: Tympanic      SpO2: 98% 98% 99% 99%   Weight: 200 lb (90.7 kg)      Height: 6' 3\" (1.905 m)                Consultations:             Dr. Jody Estrada was consulted for general surgery. His resident came down and was able to reduce the prolapsed stoma. Dr. Jody Estrada states the patient is cleared for discharge home can follow-up outpatient with his surgeon [facility] clinic. Patient is comfortable this plan. Counseling: The emergency provider has spoken with the patient and discussed todays results, in addition to providing specific details for the plan of care and counseling regarding the diagnosis and prognosis. Questions are answered at this time and they are agreeable with the plan.       --------------------------------- IMPRESSION AND DISPOSITION ---------------------------------    IMPRESSION  1. Ileostomy prolapse ([address] Improving       DISPOSITION  Disposition: Discharge to home  Patient condition is stable    NOTE: This report was transcribed using voice recognition software.  Every effort was made to ensure accuracy; however, inadvertent computerized transcription errors may be present        Jose A Quan MD  03/14/21 0032

## 2021-03-14 ENCOUNTER — APPOINTMENT (OUTPATIENT)
Dept: CT IMAGING | Age: 56
End: 2021-03-14
Payer: MEDICARE

## 2021-03-14 ENCOUNTER — HOSPITAL ENCOUNTER (OUTPATIENT)
Age: 56
Setting detail: OBSERVATION
Discharge: HOSPICE | End: 2021-03-16
Attending: EMERGENCY MEDICINE | Admitting: INTERNAL MEDICINE
Payer: MEDICARE

## 2021-03-14 ENCOUNTER — APPOINTMENT (OUTPATIENT)
Dept: ULTRASOUND IMAGING | Age: 56
End: 2021-03-14
Payer: MEDICARE

## 2021-03-14 DIAGNOSIS — I95.1 ORTHOSTATIC SYNCOPE: ICD-10-CM

## 2021-03-14 DIAGNOSIS — R55 SYNCOPE AND COLLAPSE: Primary | ICD-10-CM

## 2021-03-14 DIAGNOSIS — K94.19 ILEOSTOMY PROLAPSE (HCC): ICD-10-CM

## 2021-03-14 LAB
ALBUMIN SERPL-MCNC: 3.3 G/DL (ref 3.5–5.2)
ALP BLD-CCNC: 126 U/L (ref 40–129)
ALT SERPL-CCNC: 8 U/L (ref 0–40)
ANION GAP SERPL CALCULATED.3IONS-SCNC: 10 MMOL/L (ref 7–16)
APTT: 29.1 SEC (ref 24.5–35.1)
AST SERPL-CCNC: 25 U/L (ref 0–39)
BASOPHILS ABSOLUTE: 0.05 E9/L (ref 0–0.2)
BASOPHILS RELATIVE PERCENT: 0.6 % (ref 0–2)
BILIRUB SERPL-MCNC: <0.2 MG/DL (ref 0–1.2)
BILIRUBIN DIRECT: <0.2 MG/DL (ref 0–0.3)
BILIRUBIN, INDIRECT: ABNORMAL MG/DL (ref 0–1)
BUN BLDV-MCNC: 25 MG/DL (ref 6–20)
CALCIUM SERPL-MCNC: 8.7 MG/DL (ref 8.6–10.2)
CHLORIDE BLD-SCNC: 103 MMOL/L (ref 98–107)
CO2: 23 MMOL/L (ref 22–29)
CREAT SERPL-MCNC: 1.1 MG/DL (ref 0.7–1.2)
EOSINOPHILS ABSOLUTE: 0.36 E9/L (ref 0.05–0.5)
EOSINOPHILS RELATIVE PERCENT: 4 % (ref 0–6)
GFR AFRICAN AMERICAN: >60
GFR NON-AFRICAN AMERICAN: >60 ML/MIN/1.73
GLUCOSE BLD-MCNC: 147 MG/DL (ref 74–99)
HCT VFR BLD CALC: 37.7 % (ref 37–54)
HEMOGLOBIN: 12.3 G/DL (ref 12.5–16.5)
IMMATURE GRANULOCYTES #: 0.03 E9/L
IMMATURE GRANULOCYTES %: 0.3 % (ref 0–5)
INR BLD: 1.2
LYMPHOCYTES ABSOLUTE: 1.36 E9/L (ref 1.5–4)
LYMPHOCYTES RELATIVE PERCENT: 15.2 % (ref 20–42)
MCH RBC QN AUTO: 30 PG (ref 26–35)
MCHC RBC AUTO-ENTMCNC: 32.6 % (ref 32–34.5)
MCV RBC AUTO: 92 FL (ref 80–99.9)
MONOCYTES ABSOLUTE: 0.83 E9/L (ref 0.1–0.95)
MONOCYTES RELATIVE PERCENT: 9.3 % (ref 2–12)
NEUTROPHILS ABSOLUTE: 6.34 E9/L (ref 1.8–7.3)
NEUTROPHILS RELATIVE PERCENT: 70.6 % (ref 43–80)
PDW BLD-RTO: 14.9 FL (ref 11.5–15)
PLATELET # BLD: 295 E9/L (ref 130–450)
PMV BLD AUTO: 9.5 FL (ref 7–12)
POTASSIUM SERPL-SCNC: 5.1 MMOL/L (ref 3.5–5)
PRO-BNP: 63 PG/ML (ref 0–125)
PROTHROMBIN TIME: 12.5 SEC (ref 9.3–12.4)
RBC # BLD: 4.1 E12/L (ref 3.8–5.8)
SODIUM BLD-SCNC: 136 MMOL/L (ref 132–146)
TOTAL PROTEIN: 6.1 G/DL (ref 6.4–8.3)
TROPONIN: <0.01 NG/ML (ref 0–0.03)
WBC # BLD: 9 E9/L (ref 4.5–11.5)

## 2021-03-14 PROCEDURE — 85610 PROTHROMBIN TIME: CPT

## 2021-03-14 PROCEDURE — 85025 COMPLETE CBC W/AUTO DIFF WBC: CPT

## 2021-03-14 PROCEDURE — 84484 ASSAY OF TROPONIN QUANT: CPT

## 2021-03-14 PROCEDURE — 80048 BASIC METABOLIC PNL TOTAL CA: CPT

## 2021-03-14 PROCEDURE — 99284 EMERGENCY DEPT VISIT MOD MDM: CPT

## 2021-03-14 PROCEDURE — 74177 CT ABD & PELVIS W/CONTRAST: CPT

## 2021-03-14 PROCEDURE — 93971 EXTREMITY STUDY: CPT

## 2021-03-14 PROCEDURE — 36415 COLL VENOUS BLD VENIPUNCTURE: CPT

## 2021-03-14 PROCEDURE — 85730 THROMBOPLASTIN TIME PARTIAL: CPT

## 2021-03-14 PROCEDURE — 83880 ASSAY OF NATRIURETIC PEPTIDE: CPT

## 2021-03-14 PROCEDURE — 71275 CT ANGIOGRAPHY CHEST: CPT

## 2021-03-14 PROCEDURE — 80076 HEPATIC FUNCTION PANEL: CPT

## 2021-03-14 RX ORDER — SODIUM CHLORIDE 0.9 % (FLUSH) 0.9 %
10 SYRINGE (ML) INJECTION PRN
Status: DISCONTINUED | OUTPATIENT
Start: 2021-03-14 | End: 2021-03-16 | Stop reason: HOSPADM

## 2021-03-14 ASSESSMENT — ENCOUNTER SYMPTOMS
DIARRHEA: 0
SINUS PRESSURE: 0
BACK PAIN: 0
EYE DISCHARGE: 0
NAUSEA: 0
ABDOMINAL PAIN: 0
SHORTNESS OF BREATH: 0
SORE THROAT: 0
DIFFICULTY BREATHING: 0
COUGH: 0
EYE PAIN: 0
EYE REDNESS: 0
WHEEZING: 0
VOMITING: 0

## 2021-03-15 LAB
EKG ATRIAL RATE: 70 BPM
EKG P AXIS: 63 DEGREES
EKG P-R INTERVAL: 162 MS
EKG Q-T INTERVAL: 448 MS
EKG QRS DURATION: 96 MS
EKG QTC CALCULATION (BAZETT): 483 MS
EKG R AXIS: 57 DEGREES
EKG T AXIS: 63 DEGREES
EKG VENTRICULAR RATE: 70 BPM

## 2021-03-15 PROCEDURE — 93005 ELECTROCARDIOGRAM TRACING: CPT | Performed by: EMERGENCY MEDICINE

## 2021-03-15 PROCEDURE — G0378 HOSPITAL OBSERVATION PER HR: HCPCS

## 2021-03-15 PROCEDURE — 93010 ELECTROCARDIOGRAM REPORT: CPT | Performed by: INTERNAL MEDICINE

## 2021-03-15 PROCEDURE — 2580000003 HC RX 258: Performed by: EMERGENCY MEDICINE

## 2021-03-15 PROCEDURE — 6360000004 HC RX CONTRAST MEDICATION: Performed by: RADIOLOGY

## 2021-03-15 PROCEDURE — 74177 CT ABD & PELVIS W/CONTRAST: CPT

## 2021-03-15 PROCEDURE — 71275 CT ANGIOGRAPHY CHEST: CPT

## 2021-03-15 PROCEDURE — 96374 THER/PROPH/DIAG INJ IV PUSH: CPT

## 2021-03-15 PROCEDURE — 6360000002 HC RX W HCPCS: Performed by: EMERGENCY MEDICINE

## 2021-03-15 PROCEDURE — 6370000000 HC RX 637 (ALT 250 FOR IP): Performed by: INTERNAL MEDICINE

## 2021-03-15 PROCEDURE — 2580000003 HC RX 258: Performed by: INTERNAL MEDICINE

## 2021-03-15 RX ORDER — DIVALPROEX SODIUM 250 MG/1
500 TABLET, DELAYED RELEASE ORAL 3 TIMES DAILY
Status: DISCONTINUED | OUTPATIENT
Start: 2021-03-15 | End: 2021-03-16 | Stop reason: HOSPADM

## 2021-03-15 RX ORDER — CLOMIPRAMINE HYDROCHLORIDE 50 MG/1
50 CAPSULE ORAL DAILY
Status: DISCONTINUED | OUTPATIENT
Start: 2021-03-15 | End: 2021-03-16 | Stop reason: HOSPADM

## 2021-03-15 RX ORDER — ATORVASTATIN CALCIUM 10 MG/1
10 TABLET, FILM COATED ORAL DAILY
Status: DISCONTINUED | OUTPATIENT
Start: 2021-03-15 | End: 2021-03-16 | Stop reason: HOSPADM

## 2021-03-15 RX ORDER — ACETAMINOPHEN 325 MG/1
650 TABLET ORAL EVERY 4 HOURS PRN
Status: DISCONTINUED | OUTPATIENT
Start: 2021-03-15 | End: 2021-03-16 | Stop reason: HOSPADM

## 2021-03-15 RX ORDER — SODIUM CHLORIDE 9 MG/ML
INJECTION, SOLUTION INTRAVENOUS CONTINUOUS
Status: DISCONTINUED | OUTPATIENT
Start: 2021-03-15 | End: 2021-03-16 | Stop reason: HOSPADM

## 2021-03-15 RX ORDER — 0.9 % SODIUM CHLORIDE 0.9 %
1000 INTRAVENOUS SOLUTION INTRAVENOUS ONCE
Status: COMPLETED | OUTPATIENT
Start: 2021-03-15 | End: 2021-03-15

## 2021-03-15 RX ORDER — FENTANYL CITRATE 50 UG/ML
50 INJECTION, SOLUTION INTRAMUSCULAR; INTRAVENOUS ONCE
Status: COMPLETED | OUTPATIENT
Start: 2021-03-15 | End: 2021-03-15

## 2021-03-15 RX ADMIN — FENTANYL CITRATE 50 MCG: 50 INJECTION, SOLUTION INTRAMUSCULAR; INTRAVENOUS at 01:42

## 2021-03-15 RX ADMIN — DIVALPROEX SODIUM 500 MG: 250 TABLET, DELAYED RELEASE ORAL at 15:40

## 2021-03-15 RX ADMIN — SODIUM CHLORIDE: 9 INJECTION, SOLUTION INTRAVENOUS at 15:42

## 2021-03-15 RX ADMIN — DIVALPROEX SODIUM 500 MG: 250 TABLET, DELAYED RELEASE ORAL at 20:56

## 2021-03-15 RX ADMIN — CLOMIPRAMINE HYDROCHLORIDE 50 MG: 50 CAPSULE ORAL at 11:17

## 2021-03-15 RX ADMIN — ACETAMINOPHEN 650 MG: 325 TABLET ORAL at 11:17

## 2021-03-15 RX ADMIN — DIVALPROEX SODIUM 500 MG: 250 TABLET, DELAYED RELEASE ORAL at 11:20

## 2021-03-15 RX ADMIN — IOPAMIDOL 75 ML: 755 INJECTION, SOLUTION INTRAVENOUS at 00:24

## 2021-03-15 RX ADMIN — SODIUM CHLORIDE 1000 ML: 9 INJECTION, SOLUTION INTRAVENOUS at 01:41

## 2021-03-15 RX ADMIN — ATORVASTATIN CALCIUM 10 MG: 10 TABLET, FILM COATED ORAL at 11:17

## 2021-03-15 RX ADMIN — SODIUM CHLORIDE: 9 INJECTION, SOLUTION INTRAVENOUS at 03:42

## 2021-03-15 ASSESSMENT — PAIN DESCRIPTION - FREQUENCY
FREQUENCY: INTERMITTENT
FREQUENCY: INTERMITTENT
FREQUENCY: CONTINUOUS

## 2021-03-15 ASSESSMENT — PAIN SCALES - GENERAL: PAINLEVEL_OUTOF10: 9

## 2021-03-15 ASSESSMENT — PAIN DESCRIPTION - ONSET
ONSET: GRADUAL
ONSET: ON-GOING

## 2021-03-15 ASSESSMENT — PAIN - FUNCTIONAL ASSESSMENT: PAIN_FUNCTIONAL_ASSESSMENT: PREVENTS OR INTERFERES SOME ACTIVE ACTIVITIES AND ADLS

## 2021-03-15 ASSESSMENT — PAIN DESCRIPTION - DESCRIPTORS: DESCRIPTORS: DISCOMFORT;SHARP

## 2021-03-15 ASSESSMENT — PAIN DESCRIPTION - PAIN TYPE: TYPE: ACUTE PAIN

## 2021-03-15 ASSESSMENT — PAIN DESCRIPTION - ORIENTATION: ORIENTATION: RIGHT

## 2021-03-15 NOTE — CONSULTS
GENERAL SURGERY  CONSULT NOTE  3/15/2021    Physician Consulted: Dr. Razia Moreno  Reason for Consult: ileostomy prolapse  Referring Physician: Dr. Jose Lu    HPI  Iban Kenny is a 48 y.o. male with history of Crohn's TAC w/ EI done at [facility], who presents for evaluation of syncope, ileostomy prolapse. Patient was in the ED on Friday secondary to pain around the ileostomy, ileostomy prolapse. It was reduced at bedside at that time. He states it did prolapse again but his output has not changed. His main complaint has been recent fainting spells that he attributes to a recent change in his blood pressure medication. He is not currently complaining of abdominal pain, nausea, vomiting. He has been hemodynamically stable since admission, no leukocytosis. Past Medical History:   Diagnosis Date    ADD (attention deficit disorder)     Anxiety     Bipolar 1 disorder ([facility] 11/19/2017    Cancer ([facility] [address] prostate cancer    2014 / treated with surgery    Crohn's disease ([facility]     Depression     GERD (gastroesophageal reflux disease)     Panic attack     Rectal pain     has a fissure    Schizo affective schizophrenia ([facility] [address]     stable       Past Surgical History:   Procedure Laterality Date    COLONOSCOPY      COLONOSCOPY N/A 1/28/2020    COLONOSCOPY WITH BIOPSY performed by Shelia Cunningham MD at [facility], DIAGNOSTIC      INCISION AND DRAINAGE N/A 5/15/2019    I & D SCROTAL ABSCESS WITH EXPLANTATION ARTIFICIAL URINARY SPHINCTER COMPLEX URETHROPLASTY performed by Camila Dowd DO at [facility] 22 NOSE SURGERY  2002? deviated septum    PICC LINE INSERTION NURSE  10/3/2020         PROSTATECTOMY  9/15/2014    laparoscopic robotic assisted. Medications Prior to Admission:    Prior to Admission medications    Medication Sig Start Date End Date Taking?  Authorizing Provider   chlorproMAZINE (THORAZINE) 100 MG tablet Take 100 mg by mouth 4 times daily  1/15/21   Historical Provider, MD clomiPRAMINE (ANAFRANIL) 50 MG capsule Take 50 mg by mouth daily  1/15/21   Historical Provider, MD   atorvastatin (LIPITOR) 10 MG tablet Take 1 tablet by mouth daily 2/5/21   Skylar Salmon DO   divalproex (DEPAKOTE) 500 MG DR tablet Take 1 tablet by mouth every 12 hours  Patient taking differently: Take 500 mg by mouth 3 times daily  4/24/20 76/5/18  Darnell Marrero, APRN - CNP       Allergies   Allergen Reactions    Ciprofloxacin Hcl      constipation    Morphine      \"it does nothing for me\"    Nsaids Other (See Comments)     Per pt's physician Chelsea Bow) he is not to take NSAIDS due to HX of Crohn's disease. Family History   Problem Relation Age of Onset    Mental Illness Mother     Mental Illness Father     Mental Illness Sister     Mental Illness Brother     Heart Disease Mother     Depression Father        Social History     Tobacco Use    Smoking status: Current Every Day Smoker     Packs/day: 1.00     Types: Cigarettes     Start date: 1/1/1978    Smokeless tobacco: Never Used   Substance Use Topics    Alcohol use: No     Comment: no alcohol in5 yrs    Drug use: No     Comment: last use 1997 / marijuana         Review of Systems   General ROS: fainting  Hematological and Lymphatic ROS: negative  Respiratory ROS: negative  Cardiovascular ROS: negative  Gastrointestinal ROS: see HPI  Genito-Urinary ROS: negative  Musculoskeletal ROS: negative      PHYSICAL EXAM:    Vitals:    03/15/21 0245   BP: 99/76   Pulse: 86   Resp: 16   Temp: 98.4 °F (36.9 °C)   SpO2: 100%       General Appearance:  awake, alert, oriented, in no acute distress  Lungs:  No increased work of breathing  Heart:  Heart regular rate and rhythm  Abdomen:  Soft, mildly tender around ileostomy, parastomal hernia, ND.  Ileostomy with liquid stool   Extremities: pulses present in all extremities    LABS:    CBC  Recent Labs     03/14/21 2153   WBC 9.0   HGB 12.3*   HCT 37.7        BMP  Recent Labs     03/14/21 2153   NA 136   K 5.1*      CO2 23   BUN 25*   CREATININE 1.1   CALCIUM 8.7     Liver Function  Recent Labs     03/12/21  1016 03/14/21 2153   LIPASE 16  --    BILITOT 0.2 <0.2   BILIDIR  --  <0.2   AST 10 25   ALT 6 8   ALKPHOS 125 126   PROT 6.7 6.1*   LABALBU 3.8 3.3*     No results for input(s): LACTATE in the last 72 hours. Recent Labs     03/14/21 2153   INR 1.2       RADIOLOGY    Ct Abdomen Pelvis W Iv Contrast Additional Contrast? None    Result Date: 3/15/2021  EXAMINATION: CT OF THE ABDOMEN AND PELVIS WITH CONTRAST 3/14/2021 11:28 pm TECHNIQUE: CT of the abdomen and pelvis was performed with the administration of intravenous contrast. Multiplanar reformatted images are provided for review. Dose modulation, iterative reconstruction, and/or weight based adjustment of the mA/kV was utilized to reduce the radiation dose to as low as reasonably achievable. COMPARISON: None. HISTORY: ORDERING SYSTEM PROVIDED HISTORY: abdominal pain, prolapsed ileostomy TECHNOLOGIST PROVIDED HISTORY: Reason for exam:->abdominal pain, prolapsed ileostomy Additional Contrast?->None Decision Support Exception->Emergency Medical Condition (MA) FINDINGS: Lower Chest: The lung bases are unremarkable. Organs: The liver, spleen, adrenal glands, pancreas gallbladder are unremarkable. Subcentimeter bilateral nonobstructing renal calculi. Bilateral renal cysts. GI/Bowel: Status post colectomy for Crohn's disease. Right lower quadrant peristomal herniation of bowel loops and mesenteric fat with no evidence of small-bowel obstruction. Submucosal fatty infiltration of the rectosigmoid stump consistent with chronic bowel inflammation and most likely related to the patient's known Crohn's disease. Pelvis: Diffuse urinary bladder wall thickening. History of prostatectomy. Peritoneum/Retroperitoneum: No free air or free fluid. Bones/Soft Tissues: Small fat containing umbilical hernia. Grossly normal osseous structures.      Status post colectomy for Crohn's disease. Right lower quadrant peristomal herniation of bowel loops mesenteric fat with no evidence of small-bowel obstruction. Diffuse urinary bladder wall thickening. History of prostatectomy. Bilateral subcentimeter nonobstructing renal calculi. Cta Pulmonary W Contrast    Result Date: 3/15/2021  EXAMINATION: CTA OF THE CHEST 3/14/2021 11:28 pm TECHNIQUE: CTA of the chest was performed after the administration of intravenous contrast.  Multiplanar reformatted images are provided for review. MIP images are provided for review. Dose modulation, iterative reconstruction, and/or weight based adjustment of the mA/kV was utilized to reduce the radiation dose to as low as reasonably achievable. COMPARISON: 01/21/2021 HISTORY: ORDERING SYSTEM PROVIDED HISTORY: PE workup TECHNOLOGIST PROVIDED HISTORY: Reason for exam:->PE workup Decision Support Exception->Emergency Medical Condition (MA) FINDINGS: Pulmonary Arteries: Pulmonary arteries are adequately opacified for evaluation. No evidence of intraluminal filling defect to suggest pulmonary embolism. Main pulmonary artery is normal in caliber. Mediastinum: No evidence of mediastinal lymphadenopathy. The heart and pericardium demonstrate no acute abnormality. There is no acute abnormality of the thoracic aorta. Lungs/pleura: The lungs are without acute process. No focal consolidation or pulmonary edema. No evidence of pleural effusion or pneumothorax. Upper Abdomen: Limited images of the upper abdomen are unremarkable. Soft Tissues/Bones: No acute bone or soft tissue abnormality. Degenerative changes thoracic spine. No evidence of pulmonary embolism or acute pulmonary abnormality.      Us Dup Lower Extremity Left [place]    Result Date: 3/14/2021  EXAMINATION: DUPLEX VENOUS ULTRASOUND OF THE LEFT LOWER EXTREMITY 3/14/2021 10:03 pm TECHNIQUE: Duplex ultrasound using B-mode/gray scaled imaging and Doppler spectral analysis and color flow was obtained of the left lower extremity. COMPARISON: None. HISTORY: ORDERING SYSTEM PROVIDED HISTORY: Discomfort TECHNOLOGIST PROVIDED HISTORY: Reason for exam:->Discomfort What reading provider will be dictating this exam?->CRC FINDINGS: The visualized veins of the left lower extremity are patent and free of echogenic thrombus. The veins demonstrate good compressibility with normal color flow study and spectral analysis. No evidence of DVT in the left lower extremity. ASSESSMENT:  48 y.o. male with syncope, prolapsed ileostomy. PLAN:  - Patient states the output has not changed, was reduced Friday and has recurred. No plan to reduce ileostomy as he is not obstructed. - Recommend patient follow up at [facility] if prolapse persists or becomes symptomatic  - No plan for surgical intervention, please call with questions or concerns.      Electronically signed by Shaina Garcia MD on 3/15/21 at 9:43 AM EDT

## 2021-03-15 NOTE — H&P
Ziggy King is a 48 y.o. male with  history of Crohn's disease with ileostomy  who was seen 2 days ago for prolapse of his colostomy presents to the emergency department with bleeding and prolapse of his ileostomy  again. He also states he had 3 or 4 syncopal episodes over the last 2 weeks and patient was recently treated for a fracture of his left foot. He states no fevers no chills he states no other complaints at this time. He states his most recent syncopal episode was while sitting on his toilet. BP was low given fluid and admitted to monitor     Past Medical History:   Diagnosis Date    ADD (attention deficit disorder)     Anxiety     Bipolar 1 disorder ([address] 11/19/2017    Cancer ([facility] [address] prostate cancer    2014 / treated with surgery    Crohn's disease ([facility] [address]     Depression     GERD (gastroesophageal reflux disease)     Panic attack     Rectal pain     has a fissure    Schizo affective schizophrenia ([facility] [address]     stable       Past Surgical History:   Procedure Laterality Date    COLONOSCOPY      COLONOSCOPY N/A 1/28/2020    COLONOSCOPY WITH BIOPSY performed by Sarahy Kauffman MD at [facility], COLON, DIAGNOSTIC      INCISION AND DRAINAGE N/A 5/15/2019    I & D SCROTAL ABSCESS WITH EXPLANTATION ARTIFICIAL URINARY SPHINCTER COMPLEX URETHROPLASTY performed by Jo-Ann Dowd,  at [facility] 22 NOSE SURGERY  2002? deviated septum    PICC LINE INSERTION NURSE  10/3/2020         PROSTATECTOMY  9/15/2014    laparoscopic robotic assisted. Family History   Problem Relation Age of Onset    Mental Illness Mother     Mental Illness Father     Mental Illness Sister     Mental Illness Brother     Heart Disease Mother     Depression Father        Prior to Admission medications    Medication Sig Start Date End Date Taking?  Authorizing Provider   chlorproMAZINE (THORAZINE) 100 MG tablet Take 100 mg by mouth 4 times daily  1/15/21   Historical Provider, MD   clomiPRAMINE (ANAFRANIL) 50 MG capsule Take 50 mg by mouth daily  1/15/21   Historical Provider, MD   atorvastatin (LIPITOR) 10 MG tablet Take 1 tablet by mouth daily 2/5/21   Skylar Salmon DO   divalproex (DEPAKOTE) 500 MG DR tablet Take 1 tablet by mouth every 12 hours  Patient taking differently: Take 500 mg by mouth 3 times daily  4/24/20 63/9/01  Afia Marrero, APRN - CNP        Allergies: Ciprofloxacin hcl, Morphine, and Nsaids    Social History     Tobacco Use    Smoking status: Current Every Day Smoker     Packs/day: 1.00     Types: Cigarettes     Start date: 1/1/1978    Smokeless tobacco: Never Used   Substance Use Topics    Alcohol use: No     Comment: no alcohol in5 yrs        Review of Systems:  Respiratory: negative for cough and hemoptysis  Cardiovascular: negative for chest pain and dyspnea  Gastrointestinal: pos. for abdominal pain,no  diarrhea, nausea and vomiting  Genitourinary:negative for dysuria and hematuria  Derm: negative for rash and skin lesion(s)  Neurological: negative for seizures and tremors  Endocrine: negative for diabetic symptoms including polydipsia and polyuria    Physical Exam:  Vitals:    03/15/21 0245   BP: 99/76   Pulse: 86   Resp: 16   Temp: 98.4 °F (36.9 °C)   SpO2: 100%      Skin:  Warm and dry. No rash or bruises  HEENT:  PERRLA, EOMI  Neck:  No JVD, No thyromegaly, No carotid bruit  Cardiac:  RRR, No gallop or murmur  Lungs:  CTA, Normal percussion  Abdomen: Normal bowel sounds, no HSM, prolapse ileostomy RLQ tenderness around   Extremities:  No clubbing, edema or cyanosis  Neurological:  Moves all extremities.     Labs:    CBC with Differential:    Lab Results   Component Value Date    WBC 9.0 03/14/2021    RBC 4.10 03/14/2021    HGB 12.3 03/14/2021    HCT 37.7 03/14/2021     03/14/2021    MCV 92.0 03/14/2021    MCH 30.0 03/14/2021    MCHC 32.6 03/14/2021    RDW 14.9 03/14/2021    NRBC 0.0 01/25/2021    BANDSPCT 1 09/17/2014    METASPCT 1.7 04/02/2020    LYMPHOPCT 15.2 03/14/2021 PROMYELOPCT 0.9 01/25/2021    MONOPCT 9.3 03/14/2021    MYELOPCT 0.9 01/25/2021    BASOPCT 0.6 03/14/2021    MONOSABS 0.83 03/14/2021    LYMPHSABS 1.36 03/14/2021    EOSABS 0.36 03/14/2021    BASOSABS 0.05 03/14/2021     CMP:    Lab Results   Component Value Date     03/14/2021    K 5.1 03/14/2021    K 4.7 03/03/2021     03/14/2021    CO2 23 03/14/2021    BUN 25 03/14/2021    CREATININE 1.1 03/14/2021    GFRAA >60 03/14/2021    LABGLOM >60 03/14/2021    GLUCOSE 147 03/14/2021    GLUCOSE 118 02/05/2011    PROT 6.1 03/14/2021    LABALBU 3.3 03/14/2021    LABALBU 3.5 02/05/2011    CALCIUM 8.7 03/14/2021    BILITOT <0.2 03/14/2021    ALKPHOS 126 03/14/2021    AST 25 03/14/2021    ALT 8 03/14/2021      Imaging:Ct scan abdomen:  Status post colectomy for Crohn's disease.  Right lower quadrant peristomal   herniation of bowel loops mesenteric fat with no evidence of small-bowel   obstruction.       Diffuse urinary bladder wall thickening.  History of prostatectomy.       Bilateral subcentimeter nonobstructing renal calculi.            Assessment and Plan:    Patient Active Problem List   Diagnosis    Syncope due to orthostatic hypotension     Ileostomy prolapse     Cro[facility] disease ([facility]    Bipolar 1 disorder ([facility])    Schizoaffective disorder, bipolar type ([facility]    Opiate abuse, episodic ([facility]

## 2021-03-15 NOTE — ED NOTES
SBAR report faxed to 6th floor. Verified receipt with Perlita Keene.      Juan R Ferrell RN  03/15/21 2268

## 2021-03-15 NOTE — PROGRESS NOTES
Initial ET Nurse  Admit Date: 3/14/2021  9:05 PM    Reason for consult:  Colostomy site  Significant history:  Ileostomy may/george 57 for crohns at [facility]  Adm with prolapse  Hx ADD, bipolar, schizoaffective disorder  Stoma assessment:  Prolapses 7 cm. Stoma rubbing on wafer. Changed to 2 piece flex with barrier ring to allow for expansion. Stoma dark red. Moderate amount loose brown stool, flatus    Wife present, they care for stoma together  Plan: surgery saw, no interventions. Patient and wife do not understand why they have to follow up in [facility]. Will follow  Mary Parada.  YUKI Urbina, [address] 3/15/2021 3:39 PM

## 2021-03-15 NOTE — ED PROVIDER NOTES
81-year-old male history of Crohn's disease with colostomy who was seen 2 days ago for prolapse of his colostomy presents to the emergency department with bleeding and prolapse of his colostomy again. He also states he had 3 or 4 syncopal episodes over the last 2 weeks and patient was recently treated for a fracture of his left foot. He states no fevers no chills he states no other complaints at this time. He states his most recent syncopal episode was while sitting on his toilet. The history is provided by the patient. Loss of Consciousness  Episode history:  Multiple  Timing:  Intermittent  Progression:  Waxing and waning  Chronicity:  New  Relieved by:  Nothing  Worsened by:  Nothing  Ineffective treatments:  None tried  Associated symptoms: no anxiety, no chest pain, no confusion, no diaphoresis, no difficulty breathing, no dizziness, no fever, no headaches, no malaise/fatigue, no nausea, no palpitations, no recent fall, no recent injury, no recent surgery, no seizures, no shortness of breath, no vomiting and no weakness         Review of Systems   Constitutional: Negative for chills, diaphoresis, fever and malaise/fatigue. HENT: Negative for ear pain, sinus pressure and sore throat. Eyes: Negative for pain, discharge and redness. Respiratory: Negative for cough, shortness of breath and wheezing. Cardiovascular: Positive for leg swelling and syncope. Negative for chest pain and palpitations. Gastrointestinal: Negative for abdominal pain, diarrhea, nausea and vomiting. Genitourinary: Negative for dysuria and frequency. Musculoskeletal: Negative for arthralgias and back pain. Skin: Negative for rash and wound. Neurological: Negative for dizziness, seizures, weakness and headaches. Hematological: Negative for adenopathy. Psychiatric/Behavioral: Negative for confusion. All other systems reviewed and are negative.        Physical Exam  Constitutional:       Appearance: Normal (2002?); Prostatectomy (9/15/2014); Endoscopy, colon, diagnostic; incision and drainage (N/A, 5/15/2019); Colonoscopy (N/A, 1/28/2020); and picc line insertion nurse (10/3/2020). Social History:  reports that he has been smoking cigarettes. He started smoking about 43 years ago. He has been smoking about 1.00 pack per day. He has never used smokeless tobacco. He reports that he does not drink alcohol or use drugs. Family History: family history includes Depression in his father; Heart Disease in his mother; Mental Illness in his brother, father, mother, and sister. The patients home medications have been reviewed.     Allergies: Ciprofloxacin hcl, Morphine, and Nsaids    -------------------------------------------------- RESULTS -------------------------------------------------    LABS:  Results for orders placed or performed during the hospital encounter of 03/14/21   CBC Auto Differential   Result Value Ref Range    WBC 9.0 4.5 - 11.5 E9/L    RBC 4.10 3.80 - 5.80 E12/L    Hemoglobin 12.3 (L) 12.5 - 16.5 g/dL    Hematocrit 37.7 37.0 - 54.0 %    MCV 92.0 80.0 - 99.9 fL    MCH 30.0 26.0 - 35.0 pg    MCHC 32.6 32.0 - 34.5 %    RDW 14.9 11.5 - 15.0 fL    Platelets 527 679 - 302 E9/L    MPV 9.5 7.0 - 12.0 fL    Neutrophils % 70.6 43.0 - 80.0 %    Immature Granulocytes % 0.3 0.0 - 5.0 %    Lymphocytes % 15.2 (L) 20.0 - 42.0 %    Monocytes % 9.3 2.0 - 12.0 %    Eosinophils % 4.0 0.0 - 6.0 %    Basophils % 0.6 0.0 - 2.0 %    Neutrophils Absolute 6.34 1.80 - 7.30 E9/L    Immature Granulocytes # 0.03 E9/L    Lymphocytes Absolute 1.36 (L) 1.50 - 4.00 E9/L    Monocytes Absolute 0.83 0.10 - 0.95 E9/L    Eosinophils Absolute 0.36 0.05 - 0.50 E9/L    Basophils Absolute 0.05 0.00 - 0.20 V2/K   Basic Metabolic Panel   Result Value Ref Range    Sodium 136 132 - 146 mmol/L    Potassium 5.1 (H) 3.5 - 5.0 mmol/L    Chloride 103 98 - 107 mmol/L    CO2 23 22 - 29 mmol/L    Anion Gap 10 7 - 16 mmol/L    Glucose 147 (H) 74 - 99 mg/dL    BUN 25 (H) 6 - 20 mg/dL    CREATININE 1.1 0.7 - 1.2 mg/dL    GFR Non-African American >60 >=60 mL/min/1.73    GFR African American >60     Calcium 8.7 8.6 - 10.2 mg/dL   Hepatic Function Panel   Result Value Ref Range    Total Protein 6.1 (L) 6.4 - 8.3 g/dL    Albumin 3.3 (L) 3.5 - 5.2 g/dL    Alkaline Phosphatase 126 40 - 129 U/L    ALT 8 0 - 40 U/L    AST 25 0 - 39 U/L    Total Bilirubin <0.2 0.0 - 1.2 mg/dL    Bilirubin, Direct <0.2 0.0 - 0.3 mg/dL    Bilirubin, Indirect see below 0.0 - 1.0 mg/dL   Protime-INR   Result Value Ref Range    Protime 12.5 (H) 9.3 - 12.4 sec    INR 1.2    APTT   Result Value Ref Range    aPTT 29.1 24.5 - 35.1 sec   Troponin   Result Value Ref Range    Troponin <0.01 0.00 - 0.03 ng/mL   Brain Natriuretic Peptide   Result Value Ref Range    Pro-BNP 63 0 - 125 pg/mL       RADIOLOGY:  Xr Knee Left (1-2 Views)    Result Date: 2/22/2021  EXAMINATION: TWO XRAY VIEWS OF THE LEFT KNEE 2/22/2021 7:49 pm COMPARISON: 01/08/2021 HISTORY: ORDERING SYSTEM PROVIDED HISTORY: twist knee on ice medial proximal tibia TECHNOLOGIST PROVIDED HISTORY: Reason for exam:->twist knee on ice medial proximal tibia FINDINGS: Nondisplaced fracture involving fibular head this fracture appears new compared to prior. No synovial effusion. Patella is in normal position. Nondisplaced fracture involving proximal fibular head. Xr Ankle Left (min 3 Views)    Result Date: 3/3/2021  EXAMINATION: THREE XRAY VIEWS OF THE LEFT FOOT; THREE XRAY VIEWS OF THE LEFT ANKLE 3/3/2021 12:28 pm COMPARISON: None. HISTORY: ORDERING SYSTEM PROVIDED HISTORY: fall/injury TECHNOLOGIST PROVIDED HISTORY: Reason for exam:->fall/injury FINDINGS: Radiographs of the left foot reveal acute nondisplaced fracture extending from the plantar surface of the calcaneus, likely to the posterior subtalar joint. Subacute healing fractures are seen along the head and neck regions of the 2nd, 3rd, 4th and 5th metatarsal bones.   No acute fracture is seen. The distal tibia and fibula are intact. The ankle joint is intact. 1. Acute nondisplaced fractures through the calcaneus. 2. Old, healing fractures of the distal left 2nd, 3rd, 4th and 5th metatarsal bones. Xr Foot Left (min 3 Views)    Result Date: 3/3/2021  EXAMINATION: THREE XRAY VIEWS OF THE LEFT FOOT; THREE XRAY VIEWS OF THE LEFT ANKLE 3/3/2021 12:28 pm COMPARISON: None. HISTORY: ORDERING SYSTEM PROVIDED HISTORY: fall/injury TECHNOLOGIST PROVIDED HISTORY: Reason for exam:->fall/injury FINDINGS: Radiographs of the left foot reveal acute nondisplaced fracture extending from the plantar surface of the calcaneus, likely to the posterior subtalar joint. Subacute healing fractures are seen along the head and neck regions of the 2nd, 3rd, 4th and 5th metatarsal bones. No acute fracture is seen. The distal tibia and fibula are intact. The ankle joint is intact. 1. Acute nondisplaced fractures through the calcaneus. 2. Old, healing fractures of the distal left 2nd, 3rd, 4th and 5th metatarsal bones. Ct Lumbar Spine Wo Contrast    Result Date: 3/3/2021  EXAMINATION: CT OF THE LUMBAR SPINE WITHOUT CONTRAST  3/3/2021 TECHNIQUE: CT of the lumbar spine was performed without the administration of intravenous contrast. Multiplanar reformatted images are provided for review. Dose modulation, iterative reconstruction, and/or weight based adjustment of the mA/kV was utilized to reduce the radiation dose to as low as reasonably achievable. COMPARISON: Lumbar spine x-ray 12/22/2018 HISTORY: ORDERING SYSTEM PROVIDED HISTORY: fall TECHNOLOGIST PROVIDED HISTORY: Reason for exam:->fall Decision Support Exception->Emergency Medical Condition (MA) FINDINGS: BONES/ALIGNMENT: No acute fracture or traumatic malalignment. DEGENERATIVE CHANGES: Vacuum disc phenomenon is present at L4-S1 with moderate narrowing of the L5-S1 disc space.   There is mild extension of vacuum phenomenon into the proximal right neural foramen at L5-S1. Mild degenerative changes are present elsewhere. No evidence of significant spinal canal stenosis. There is multilevel neural foraminal narrowing in the lumbar spine with effacement of the normal fat attenuation surrounding the exiting nerve roots on the left at L3-L4 and on the right at L3-L5. There is narrowing of the lower lumbar interspinous spaces, compatible with developing Baastrup's disease. SOFT TISSUES/RETROPERITONEUM: The abdomen and pelvis are discussed in a separate report. Increased density partially imaged in the soft tissues of the left lower back is compatible with contusion. 1.  No acute fracture is identified. 2. Degenerative changes are present. Ct Abdomen Pelvis W Iv Contrast Additional Contrast? None    Result Date: 3/12/2021  EXAMINATION: CT OF THE ABDOMEN AND PELVIS WITH CONTRAST 3/12/2021 11:15 am TECHNIQUE: CT of the abdomen and pelvis was performed with the administration of intravenous contrast. Multiplanar reformatted images are provided for review. Dose modulation, iterative reconstruction, and/or weight based adjustment of the mA/kV was utilized to reduce the radiation dose to as low as reasonably achievable. COMPARISON: 03/03/2021 HISTORY: ORDERING SYSTEM PROVIDED HISTORY: stoma pain and swelling with bleeding TECHNOLOGIST PROVIDED HISTORY: Additional Contrast?->None Reason for exam:->stoma pain and swelling with bleeding Decision Support Exception->Emergency Medical Condition (MA) History of Crohn's disease, GERD, and prostate cancer. Prostatectomy 2014. Onset of abdominal pain 2 days ago. Right abdominal ostomy placed at the [facility] clinic for Crohn's disease in May 2020. FINDINGS: Healed anterolateral right rib fractures. Degenerative change in the regional skeleton. No acute fracture or vertebral compression. No definite osseous blastic lesion. Normal cardiac size without pericardial effusion. Normal-appearing distal esophagus.   No significant lung base findings. Nonspecific slight thickening of the diaphragm bilaterally may be developmental variation and appears unchanged. Liver enlarged with sagittal mention of 20.2 cm. No focal liver lesion. Normal-appearing gallbladder, adrenals, pancreas, and spleen. Intact normal caliber abdominal aorta with slight calcified atherosclerotic plaque. Normal caliber IVC. Non-specific, smoothly marginated, simple appearing, low-density left renal lesions are statistically most likely cysts. Multiple non-obstructing right renal calculi again noted, largest 5 mm. No hydronephrosis. No ureteral calculus or distention. No umbilical hernia. No definite inguinal hernia or adenopathy. Slight prominence of fat in the inguinal canal bilaterally. Right lower abdominal/pelvic ileostomy again noted. The ostomy again contains herniated mesenteric fat and small intestine, to include slight intestinal and mesenteric prolapse through the abdominal wall. This appears much the same as comparison exam 9 days ago. Nonspecific intestinal fatty mural stratification, likely from Crohn's disease, is again present in the otherwise normal-appearing rectal stump. Patient is status post colectomy for Crohn's disease. No mesenteric mass or adenopathy. No retroperitoneal adenopathy. Normal-appearing stomach and duodenum. No small bowel distention in the abdomen and pelvis. No evidence of small bowel mass. No pelvic cul-de-sac free fluid. Prostate is surgically absent. No gross ascites or free air. Nonspecific distention of the urinary bladder. Once again, the right lower quadrant ileostomy contains parostial herniated mesenteric fat and small intestine. Ostomy protrusion through the abdominal wall is suggestive of partial prolapse. No evidence of intestinal obstruction. Hepatomegaly without focal lesion Multiple non-obstructing right renal calculi Status post colectomy for Crohn's disease.   Fatty mural stratification in the rectal stump is likely from Crohn's disease Nonspecific distention of the urinary bladder in patient who is status post prostatectomy for prostate cancer     Ct Abdomen Pelvis W Iv Contrast Additional Contrast? None    Result Date: 3/3/2021  EXAMINATION: CT OF THE ABDOMEN AND PELVIS WITH CONTRAST 3/3/2021 12:52 pm TECHNIQUE: CT of the abdomen and pelvis was performed with the administration of intravenous contrast. Multiplanar reformatted images are provided for review. Dose modulation, iterative reconstruction, and/or weight based adjustment of the mA/kV was utilized to reduce the radiation dose to as low as reasonably achievable. COMPARISON: CT abdomen pelvis October 1, 2020 HISTORY: ORDERING SYSTEM PROVIDED HISTORY: fall, left side/back/hip pain, Included lumbar in read please TECHNOLOGIST PROVIDED HISTORY: Additional Contrast?->None Reason for exam:->fall, left side/back/hip pain, Included lumbar in read please Decision Support Exception->Emergency Medical Condition (MA) FINDINGS: There is previous extensive colectomy. There is a patent ileostomy opening in the right-side of the abdomen with a ileostomy hernia like formation, with several looping distal ileal segments in between the of crossing of the right anterior rectus abdominal muscle and the superficial opening of the ileostomy. This was seen on the previous study. There are more prominent number of loops present in the ileostomy hernia sac like. There is some stranding of the fat planes of the mesentery crossing the opening through the right anterior rectus abdominal muscle presently. In the other segments of the omental mesenteric fat planes there are no inflammatory like changes observed. There is normal size enhancement for the liver spleen and pancreas. The gallbladder is normally distended, biliary tree and pancreatic ductal systems are not dilated. Kidneys are preserved size and cortical thickness.   The do IV contrast in more late phase as above discussed. Ultrasound will be helpful to monitor these cysts as well. No conspicuous demonstration of acute fracture in the visualized lower thoracic spine, lumbar spine, pelvic bones including hip joints and sacral wings. Us Dup Lower Extremity Left [place]    Result Date: 3/14/2021  EXAMINATION: DUPLEX VENOUS ULTRASOUND OF THE LEFT LOWER EXTREMITY 3/14/2021 10:03 pm TECHNIQUE: Duplex ultrasound using B-mode/gray scaled imaging and Doppler spectral analysis and color flow was obtained of the left lower extremity. COMPARISON: None. HISTORY: ORDERING SYSTEM PROVIDED HISTORY: Discomfort TECHNOLOGIST PROVIDED HISTORY: Reason for exam:->Discomfort What reading provider will be dictating this exam?->CRC FINDINGS: The visualized veins of the left lower extremity are patent and free of echogenic thrombus. The veins demonstrate good compressibility with normal color flow study and spectral analysis. No evidence of DVT in the left lower extremity. EKG: This EKG is signed and interpreted by me. Rate:  70  Rhythm: Sinus  Interpretation: prolonged QT interval  Comparison: stable as compared to patient's most recent EKG      ------------------------- NURSING NOTES AND VITALS REVIEWED ---------------------------  Date / Time Roomed:  3/14/2021  9:05 PM  ED Bed Assignment:  09/09    The nursing notes within the ED encounter and vital signs as below have been reviewed.      Patient Vitals for the past 24 hrs:   BP Temp Temp src Pulse Resp SpO2 Height Weight   03/14/21 2151 103/76 96.9 °F (36.1 °C) Oral 91 14 96 %     03/14/21 2104 94/70          03/14/21 2054  96.6 °F (35.9 °C) Tympanic 110 16 97 % 6' 3\" (1.905 m) 200 lb (90.7 kg)       Oxygen Saturation Interpretation: Normal    ------------------------------------------ PROGRESS NOTES   Counseling:  I have spoken with the patient and discussed todays results, in addition to providing specific details for the plan of care and counseling regarding the diagnosis and prognosis. Their questions are answered at this time and they are agreeable with the plan of admission.    --------------------------------- ADDITIONAL PROVIDER NOTES ---------------------------------  This patient's ED course included: a personal history and physicial examination, re-evaluation prior to disposition, multiple bedside re-evaluations, IV medications, cardiac monitoring and continuous pulse oximetry    This patient has remained hemodynamically stable during their ED course. Diagnosis:  1. Syncope and collapse    2. Ileostomy prolapse ([address]    3. Orthostatic syncope        Disposition:  Patient's disposition: Admit to telemetry  Patient's condition is fair.            Judge Malinda DO  03/15/21 [id number]

## 2021-03-16 VITALS
BODY MASS INDEX: 24.68 KG/M2 | TEMPERATURE: 98.6 F | DIASTOLIC BLOOD PRESSURE: 85 MMHG | WEIGHT: 198.5 LBS | RESPIRATION RATE: 18 BRPM | HEART RATE: 89 BPM | OXYGEN SATURATION: 99 % | SYSTOLIC BLOOD PRESSURE: 119 MMHG | HEIGHT: 75 IN

## 2021-03-16 PROCEDURE — 2580000003 HC RX 258: Performed by: EMERGENCY MEDICINE

## 2021-03-16 PROCEDURE — 6370000000 HC RX 637 (ALT 250 FOR IP): Performed by: INTERNAL MEDICINE

## 2021-03-16 PROCEDURE — G0378 HOSPITAL OBSERVATION PER HR: HCPCS

## 2021-03-16 PROCEDURE — 2580000003 HC RX 258: Performed by: INTERNAL MEDICINE

## 2021-03-16 RX ADMIN — DIVALPROEX SODIUM 500 MG: 250 TABLET, DELAYED RELEASE ORAL at 09:51

## 2021-03-16 RX ADMIN — ATORVASTATIN CALCIUM 10 MG: 10 TABLET, FILM COATED ORAL at 09:51

## 2021-03-16 RX ADMIN — SODIUM CHLORIDE, PRESERVATIVE FREE 10 ML: 5 INJECTION INTRAVENOUS at 09:52

## 2021-03-16 RX ADMIN — SODIUM CHLORIDE: 9 INJECTION, SOLUTION INTRAVENOUS at 00:17

## 2021-03-16 RX ADMIN — CLOMIPRAMINE HYDROCHLORIDE 50 MG: 50 CAPSULE ORAL at 09:51

## 2021-03-16 ASSESSMENT — PAIN SCALES - GENERAL: PAINLEVEL_OUTOF10: 6

## 2021-03-16 ASSESSMENT — PAIN DESCRIPTION - FREQUENCY: FREQUENCY: INTERMITTENT

## 2021-03-16 ASSESSMENT — PAIN DESCRIPTION - PAIN TYPE: TYPE: ACUTE PAIN

## 2021-03-16 NOTE — PROGRESS NOTES
Pt discharged per order. IV lock removed from right arm, DSD in place. Discharge instructions explained, pt verbalized understanding. Tele pack monitor removed, cleaned, and placed in storage room. Wife at bedside, pt does not want to wait for wheelchair transport. Prefers to walk to car for discharge.

## 2021-03-17 NOTE — DISCHARGE SUMMARY
FinalSodium                                        Date: 03/14/2021Value: 136         Ref range: 132 - 146 mmol/L   Status: FinalPotassium                                     Date: 03/14/2021Value: 5.1*        Ref range: 3.5 - 5.0 mmol/L   Status: Final              Comment: Specimen is moderately Hemolyzed. Result may be artificially increased. Chloride                                      Date: 03/14/2021Value: 103         Ref range: 98 - 107 mmol/L    Status: FinalCO2                                           Date: 03/14/2021Value: 23          Ref range: 22 - 29 mmol/L     Status: FinalAnion Gap                                     Date: 03/14/2021Value: 10          Ref range: 7 - 16 mmol/L      Status: FinalGlucose                                       Date: 03/14/2021Value: 147*        Ref range: 74 - 99 mg/dL      Status: FinalBUN                                           Date: 03/14/2021Value: 25*         Ref range: 6 - 20 mg/dL       Status: FinalCREATININE                                    Date: 03/14/2021Value: 1.1         Ref range: 0.7 - 1.2 mg/dL    Status: FinalGFR Non-                      Date: 03/14/2021Value: >60         Ref range: >=60 mL/min/1.73   Status: Final              Comment: Chronic Kidney Disease: less than 60 ml/min/1.73 sq.m. Kidney Failure: less than 15 ml/min/1.73 sq. m. Results valid for patients 18 years and older. GFR                           Date: 03/14/2021Value: >60           Status: FinalCalcium                                       Date: 03/14/2021Value: 8.7         Ref range: 8.6 - 10.2 mg/dL   Status: FinalTotal Protein                                 Date: 03/14/2021Value: 6.1*        Ref range: 6.4 - 8.3 g/dL     Status: FinalAlbumin                                       Date: 03/14/2021Value: 3.3*        Ref range: 3.5 - 5.2 g/dL     Status: FinalAlkaline Phosphatase                          Date: 03/14/2021Value: 126         Ref range: 40 - 129 U/L       Status: Final              Comment: Specimen is moderately Hemolyzed. Result may be artificially decreased. ALT                                           Date: 03/14/2021Value: 8           Ref range: 0 - 40 U/L         Status: Final              Comment: Specimen is moderately Hemolyzed. Result may be artificially increased. AST                                           Date: 03/14/2021Value: 25          Ref range: 0 - 39 U/L         Status: Final              Comment: Specimen is moderately Hemolyzed. Result may be artificially increased. Total Bilirubin                               Date: 03/14/2021Value: <0.2        Ref range: 0.0 - 1.2 mg/dL    Status: FinalBilirubin, Direct                             Date: 03/14/2021Value: <0.2        Ref range: 0.0 - 0.3 mg/dL    Status: Final              Comment: Specimen is moderately Hemolyzed. Result may be artificially increased. Bilirubin, Indirect                           Date: 03/14/2021Value: see below   Ref range: 0.0 - 1.0 mg/dL    Status: Final              Comment: Indirect Bilirubin cannot be calculated since Total Bilirubinand/or Direct Bilirubin is below measurable range. Protime                                       Date: 03/14/2021Value: 12.5*       Ref range: 9.3 - 12.4 sec     Status: FinalINR                                           Date: 03/14/2021Value: 1.2           Status: FinalaPTT                                          Date: 03/14/2021Value: 29.1        Ref range: 24.5 - 35.1 sec    Status: FinalTroponin                                      Date: 03/14/2021Value: <0.01       Ref range: 0.00 - 0.03 ng/mL  Status: Final              Comment: SPECIMEN IS MODERATELY HEMOLYZED. Result may be artificially decreased. TROPONIN T BLOOD LEVELS:       0.03 ng/mL     Upper Reference Limit0. 04 - 0.09 ng/mL     Possible myocardial injury    >= 0.10 ng/mL     Myocardial injuryPro-BNP                                       Date: 03/14/2021Value: 63          Ref range: 0 - 125 pg/mL      Status: FinalVentricular Rate                              Date: 03/15/2021Value: 70          Ref range: BPM                Status: FinalAtrial Rate                                   Date: 03/15/2021Value: 70          Ref range: BPM                Status: FinalP-R Interval                                  Date: 03/15/2021Value: 162         Ref range: ms                 Status: FinalQRS Duration                                  Date: 03/15/2021Value: 96          Ref range: ms                 Status: FinalQ-T Interval                                  Date: 03/15/2021Value: 448         Ref range: ms                 Status: FinalQTc Calculation (Bazett)                      Date: 03/15/2021Value: 483         Ref range: ms                 Status: FinalP Axis                                        Date: 03/15/2021Value: 63          Ref range: degrees            Status: FinalR Axis                                        Date: 03/15/2021Value: 57          Ref range: degrees            Status: FinalT Axis                                        Date: 03/15/2021Value: 63          Ref range: degrees            Status: Final------------    Radiology last 7 days:  Ct Abdomen Pelvis W Iv Contrast Additional Contrast? NoneResult Date: 3/15/2021Status post colectomy for Crohn's disease. Right lower quadrant peristomal herniation of bowel loops mesenteric fat with no evidence of small-bowel obstruction. Diffuse urinary bladder wall thickening. History of prostatectomy. Bilateral subcentimeter nonobstructing renal calculi. Ct Abdomen Pelvis W Iv Contrast Additional Contrast? NoneResult Date: 3/12/2021Once again, the right lower quadrant ileostomy contains parostial herniated mesenteric fat and small intestine. Ostomy protrusion through the abdominal wall is suggestive of partial prolapse. No evidence of intestinal obstruction.  Hepatomegaly without focal lesion Multiple non-obstructing right renal calculi Status post colectomy for Crohn's disease. Fatty mural stratification in the rectal stump is likely from Crohn's disease Nonspecific distention of the urinary bladder in patient who is status post prostatectomy for prostate cancer [facility]ta Pulmonary W ContrastResult Date: 3/15/2021No evidence of pulmonary embolism or acute pulmonary abnormality. Us Dup Lower Extremity Left VenResult Date: 3/14/2021No evidence of DVT in the left lower extremity. [unfilled]    Discharge Medications    Discharge Medication List as of 3/16/2021 10:27 AM    Discharge Medication List as of 3/16/2021 10:27 AM    Discharge Medication List as of 3/16/2021 10:27 Jasmin Strange these medications which have NOT CHANGEDclomiPRAMINE (ANAFRANIL) 50 MG capsuleTake 50 mg by mouth daily Historical Medatorvastatin (LIPITOR) 10 MG tabletTake 1 tablet by mouth daily, Disp-90 tablet, R-1Normaldivalproex (DEPAKOTE) 500 MG DR tabletTake 1 tablet by mouth every 12 hours, Disp-60 tablet,R-0Normal    Discharge Medication List as of 3/16/2021 10:27 AMSTOP taking these medicationschlorproMAZINE (THORAZINE) 100 MG tabletComments:Reason for Stopping:    Time Spent on Discharge:3E] minutes were spent in patient examination, evaluation, counseling as well as medication reconciliation, prescriptions for required medications, discharge plan, and follow up.     Electronically signed by Ondina Reed MD on 3/17/21 at 7:45 AM EDT

## 2021-03-26 ENCOUNTER — HOSPITAL ENCOUNTER (EMERGENCY)
Age: 56
Discharge: HOME | End: 2021-03-26
Attending: EMERGENCY MEDICINE
Payer: MEDICARE

## 2021-03-26 ENCOUNTER — APPOINTMENT (OUTPATIENT)
Dept: CT IMAGING | Age: 56
End: 2021-03-26
Payer: MEDICARE

## 2021-03-26 ENCOUNTER — APPOINTMENT (OUTPATIENT)
Dept: GENERAL RADIOLOGY | Age: 56
End: 2021-03-26
Payer: MEDICARE

## 2021-03-26 VITALS
SYSTOLIC BLOOD PRESSURE: 130 MMHG | OXYGEN SATURATION: 98 % | RESPIRATION RATE: 14 BRPM | DIASTOLIC BLOOD PRESSURE: 88 MMHG | HEART RATE: 72 BPM | BODY MASS INDEX: 24.87 KG/M2 | WEIGHT: 200 LBS | TEMPERATURE: 98.5 F | HEIGHT: 75 IN

## 2021-03-26 DIAGNOSIS — K94.19 INTESTINAL STOMA PROLAPSE (HCC): Primary | ICD-10-CM

## 2021-03-26 LAB
ALBUMIN SERPL-MCNC: 3.5 G/DL (ref 3.5–5.2)
ALP BLD-CCNC: 134 U/L (ref 40–129)
ALT SERPL-CCNC: 7 U/L (ref 0–40)
ANION GAP SERPL CALCULATED.3IONS-SCNC: 7 MMOL/L (ref 7–16)
AST SERPL-CCNC: 9 U/L (ref 0–39)
BASOPHILS ABSOLUTE: 0.06 E9/L (ref 0–0.2)
BASOPHILS RELATIVE PERCENT: 0.8 % (ref 0–2)
BILIRUB SERPL-MCNC: <0.2 MG/DL (ref 0–1.2)
BILIRUBIN URINE: NEGATIVE
BLOOD, URINE: NEGATIVE
BUN BLDV-MCNC: 19 MG/DL (ref 6–20)
CALCIUM SERPL-MCNC: 8.4 MG/DL (ref 8.6–10.2)
CHLORIDE BLD-SCNC: 103 MMOL/L (ref 98–107)
CLARITY: CLEAR
CO2: 26 MMOL/L (ref 22–29)
COLOR: YELLOW
CREAT SERPL-MCNC: 1 MG/DL (ref 0.7–1.2)
EKG ATRIAL RATE: 117 BPM
EKG P AXIS: 63 DEGREES
EKG P-R INTERVAL: 162 MS
EKG Q-T INTERVAL: 320 MS
EKG QRS DURATION: 86 MS
EKG QTC CALCULATION (BAZETT): 446 MS
EKG R AXIS: 67 DEGREES
EKG T AXIS: 64 DEGREES
EKG VENTRICULAR RATE: 117 BPM
EOSINOPHILS ABSOLUTE: 0.24 E9/L (ref 0.05–0.5)
EOSINOPHILS RELATIVE PERCENT: 3.2 % (ref 0–6)
GFR AFRICAN AMERICAN: >60
GFR NON-AFRICAN AMERICAN: >60 ML/MIN/1.73
GLUCOSE BLD-MCNC: 104 MG/DL (ref 74–99)
GLUCOSE URINE: NEGATIVE MG/DL
HCT VFR BLD CALC: 42 % (ref 37–54)
HEMOGLOBIN: 13.7 G/DL (ref 12.5–16.5)
IMMATURE GRANULOCYTES #: 0.02 E9/L
IMMATURE GRANULOCYTES %: 0.3 % (ref 0–5)
INR BLD: 1
KETONES, URINE: NEGATIVE MG/DL
LEUKOCYTE ESTERASE, URINE: NEGATIVE
LYMPHOCYTES ABSOLUTE: 1.61 E9/L (ref 1.5–4)
LYMPHOCYTES RELATIVE PERCENT: 21.4 % (ref 20–42)
MCH RBC QN AUTO: 30.2 PG (ref 26–35)
MCHC RBC AUTO-ENTMCNC: 32.6 % (ref 32–34.5)
MCV RBC AUTO: 92.7 FL (ref 80–99.9)
MONOCYTES ABSOLUTE: 0.57 E9/L (ref 0.1–0.95)
MONOCYTES RELATIVE PERCENT: 7.6 % (ref 2–12)
NEUTROPHILS ABSOLUTE: 5.04 E9/L (ref 1.8–7.3)
NEUTROPHILS RELATIVE PERCENT: 66.7 % (ref 43–80)
NITRITE, URINE: NEGATIVE
PDW BLD-RTO: 14.6 FL (ref 11.5–15)
PH UA: 6 (ref 5–9)
PLATELET # BLD: 360 E9/L (ref 130–450)
PMV BLD AUTO: 10.4 FL (ref 7–12)
POTASSIUM SERPL-SCNC: 3.7 MMOL/L (ref 3.5–5)
PROTEIN UA: NEGATIVE MG/DL
PROTHROMBIN TIME: 11.1 SEC (ref 9.3–12.4)
RBC # BLD: 4.53 E12/L (ref 3.8–5.8)
REASON FOR REJECTION: NORMAL
REJECTED TEST: NORMAL
SODIUM BLD-SCNC: 136 MMOL/L (ref 132–146)
SPECIFIC GRAVITY UA: 1.02 (ref 1–1.03)
TOTAL PROTEIN: 6 G/DL (ref 6.4–8.3)
UROBILINOGEN, URINE: 0.2 E.U./DL
WBC # BLD: 7.5 E9/L (ref 4.5–11.5)

## 2021-03-26 PROCEDURE — 71045 X-RAY EXAM CHEST 1 VIEW: CPT

## 2021-03-26 PROCEDURE — 93005 ELECTROCARDIOGRAM TRACING: CPT | Performed by: STUDENT IN AN ORGANIZED HEALTH CARE EDUCATION/TRAINING PROGRAM

## 2021-03-26 PROCEDURE — 85025 COMPLETE CBC W/AUTO DIFF WBC: CPT

## 2021-03-26 PROCEDURE — 80053 COMPREHEN METABOLIC PANEL: CPT

## 2021-03-26 PROCEDURE — 93010 ELECTROCARDIOGRAM REPORT: CPT | Performed by: INTERNAL MEDICINE

## 2021-03-26 PROCEDURE — 6360000002 HC RX W HCPCS: Performed by: EMERGENCY MEDICINE

## 2021-03-26 PROCEDURE — 99284 EMERGENCY DEPT VISIT MOD MDM: CPT

## 2021-03-26 PROCEDURE — 81003 URINALYSIS AUTO W/O SCOPE: CPT

## 2021-03-26 PROCEDURE — 6360000002 HC RX W HCPCS: Performed by: STUDENT IN AN ORGANIZED HEALTH CARE EDUCATION/TRAINING PROGRAM

## 2021-03-26 PROCEDURE — 2580000003 HC RX 258: Performed by: STUDENT IN AN ORGANIZED HEALTH CARE EDUCATION/TRAINING PROGRAM

## 2021-03-26 PROCEDURE — 74177 CT ABD & PELVIS W/CONTRAST: CPT

## 2021-03-26 PROCEDURE — 85610 PROTHROMBIN TIME: CPT

## 2021-03-26 PROCEDURE — 6360000004 HC RX CONTRAST MEDICATION: Performed by: RADIOLOGY

## 2021-03-26 PROCEDURE — 96374 THER/PROPH/DIAG INJ IV PUSH: CPT

## 2021-03-26 PROCEDURE — 87088 URINE BACTERIA CULTURE: CPT

## 2021-03-26 PROCEDURE — 96375 TX/PRO/DX INJ NEW DRUG ADDON: CPT

## 2021-03-26 PROCEDURE — 96376 TX/PRO/DX INJ SAME DRUG ADON: CPT

## 2021-03-26 RX ORDER — 0.9 % SODIUM CHLORIDE 0.9 %
1000 INTRAVENOUS SOLUTION INTRAVENOUS ONCE
Status: DISCONTINUED | OUTPATIENT
Start: 2021-03-26 | End: 2021-03-27 | Stop reason: HOSPADM

## 2021-03-26 RX ORDER — FENTANYL CITRATE 50 UG/ML
50 INJECTION, SOLUTION INTRAMUSCULAR; INTRAVENOUS ONCE
Status: COMPLETED | OUTPATIENT
Start: 2021-03-26 | End: 2021-03-26

## 2021-03-26 RX ORDER — ONDANSETRON 2 MG/ML
4 INJECTION INTRAMUSCULAR; INTRAVENOUS ONCE
Status: COMPLETED | OUTPATIENT
Start: 2021-03-26 | End: 2021-03-26

## 2021-03-26 RX ORDER — FENTANYL CITRATE 50 UG/ML
75 INJECTION, SOLUTION INTRAMUSCULAR; INTRAVENOUS ONCE
Status: COMPLETED | OUTPATIENT
Start: 2021-03-26 | End: 2021-03-26

## 2021-03-26 RX ORDER — 0.9 % SODIUM CHLORIDE 0.9 %
1000 INTRAVENOUS SOLUTION INTRAVENOUS ONCE
Status: COMPLETED | OUTPATIENT
Start: 2021-03-26 | End: 2021-03-26

## 2021-03-26 RX ADMIN — FENTANYL CITRATE 75 MCG: 50 INJECTION, SOLUTION INTRAMUSCULAR; INTRAVENOUS at 22:58

## 2021-03-26 RX ADMIN — FENTANYL CITRATE 50 MCG: 50 INJECTION, SOLUTION INTRAMUSCULAR; INTRAVENOUS at 22:11

## 2021-03-26 RX ADMIN — FENTANYL CITRATE 50 MCG: 50 INJECTION, SOLUTION INTRAMUSCULAR; INTRAVENOUS at 16:32

## 2021-03-26 RX ADMIN — IOPAMIDOL 75 ML: 755 INJECTION, SOLUTION INTRAVENOUS at 18:15

## 2021-03-26 RX ADMIN — ONDANSETRON 4 MG: 2 INJECTION INTRAMUSCULAR; INTRAVENOUS at 16:33

## 2021-03-26 RX ADMIN — SODIUM CHLORIDE 1000 ML: 9 INJECTION, SOLUTION INTRAVENOUS at 16:33

## 2021-03-26 ASSESSMENT — ENCOUNTER SYMPTOMS
SORE THROAT: 0
BACK PAIN: 0
VOMITING: 1
EYE PAIN: 0
NAUSEA: 1
DIARRHEA: 0
WHEEZING: 0
SINUS PRESSURE: 0
COUGH: 0
ABDOMINAL PAIN: 1
SHORTNESS OF BREATH: 0

## 2021-03-26 ASSESSMENT — PAIN SCALES - GENERAL: PAINLEVEL_OUTOF10: 10

## 2021-03-26 NOTE — ED PROVIDER NOTES
Patient is a 52-year-old male presenting emergency department for stoma swelling, pain, he noted that there was blood in the bag yesterday coming from around the stoma site due to irritation, was not coming from the stoma itself. He said this happened a couple weeks ago when he went up to [facility] to get seen about it where they placed some clean sugar on it and reduce his stoma. He says this feels similar to the previous event, he has not tried anything at home for it, nothing makes it better or worse, has been persistent, the pain is 6 out of 10 in severity, and burning-like in nature. He denies any fevers, chills, denies any abdominal pain besides the stomal site, denies any chest pain, shortness of breath. Does note he has some dysuria, he says this is a chronic issue for him that he sees urology for due to him having urinary retention that he has to wear diapers for. He denies any diarrhea or constipation, says he does not have any decrease in appetite or weight changes. Review of Systems   Constitutional: Negative for chills and fever. HENT: Negative for ear pain, sinus pressure and sore throat. Eyes: Negative for pain. Respiratory: Negative for cough, shortness of breath and wheezing. Cardiovascular: Negative for chest pain. Gastrointestinal: Positive for abdominal pain (Stoma site pain), nausea and vomiting (One-time episode of vomiting earlier today, no longer nauseous). Negative for diarrhea. Genitourinary: Negative for dysuria and frequency. Musculoskeletal: Negative for arthralgias and back pain. Skin: Positive for wound (Stoma prolapse, and irritation). Negative for rash. Neurological: Negative for weakness and headaches. Hematological: Negative for adenopathy. All other systems reviewed and are negative. Physical Exam  Vitals signs and nursing note reviewed. Constitutional:       Appearance: He is well-developed.    HENT:      Head: Normocephalic and atraumatic. Eyes:      Conjunctiva/sclera: Conjunctivae normal.   Neck:      Musculoskeletal: Normal range of motion and neck supple. Cardiovascular:      Rate and Rhythm: Regular rhythm. Tachycardia present. Heart sounds: Normal heart sounds. No murmur. Pulmonary:      Effort: Pulmonary effort is normal. No respiratory distress. Breath sounds: Normal breath sounds. No wheezing or rales. Abdominal:      General: Bowel sounds are normal.      Palpations: Abdomen is soft. Tenderness: There is abdominal tenderness (Numbness around the stoma site, but other abdominal areas are nontender). There is no guarding or rebound. Comments: Stoma prolapse present, stoma site also appears to be irritated, sitting in gastric contents   Musculoskeletal:         General: No tenderness or deformity. Skin:     General: Skin is warm and dry. Capillary Refill: Capillary refill takes less than 2 seconds. Neurological:      Mental Status: He is alert and oriented to person, place, and time. Cranial Nerves: No cranial nerve deficit. Procedures     MDM     ED Course as of Mar 27 0013   Fri Mar 26, 2021   2043 Patient's labs within his baseline, does not appear to have a urinary tract infection, CT scan does not not appear changed or show any significant pathology compared to previous. Will place in sugar on patient's stoma see if that improves the edema    [JG]   2210 Is in the room to change the patient's ostomy site    [JG]   2255 This patient's pain medication, and reapplied cane sugar as the original application of sugar was not on the stoma itself it is on the ring around the stoma    [JG]   2310 Stoma reduced significantly, still has somewhat of a prolapse, after application of sugar and pressure. Labs look within his baseline, does not appear to have a urinary tract infection, will cancel urine culture.  CT scan did not show any acute intra-abdominal pathology, will discharge patient home. With his pain is improved with fentanyl, he said he would follow-up with the surgeon shortly afterwards. Instructed him that he is allowed to place cane sugar on it to help with the swelling at home, as well as using pressure on the stoma site did not help reduce it if this happens again at home. He is agreeable to this, given return precautions that his symptoms worsen, or he starts developing large amount of bleeding, or has infection present at the site that he should return emergency department immediately, he is agreeable to this. [JG]      ED Course User Index  [JG] Payam Gan MD      Patient initially presented for stoma prolapse, irritation, and some bleeding. Bleeding he said was due to irritation around the stoma and not from the stoma itself, did not appear to be significantly anemic on his labs. Tachycardia improved with fluids and rest.  Pain was controlled with fentanyl in the emergency department, work-up did not show anything revealing. Pain sugar and pressure was applied to the stoma site, this reduced it significantly, patient was instructed that he could use this technique at home as well, he said he would follow-up with his general surgeon at the [facility]. Return precautions were given if he were to develop signs of infection, worsening bleeding from the site.          --------------------------------------------- PAST HISTORY ---------------------------------------------  Past Medical History:  has a past medical history of ADD (attention deficit disorder), Anxiety, Bipolar 1 disorder ([facility] Cancer ([facility] Crohn's disease ([facility] Depression, GERD (gastroesophageal reflux disease), Panic attack, Rectal pain, and Schizo affective schizophrenia ([facility] Past Surgical History:  has a past surgical history that includes Colonoscopy; Nose surgery (2002?); Prostatectomy (9/15/2014); Endoscopy, colon, diagnostic; incision and drainage (N/A, 5/15/2019);  Colonoscopy (N/A, 1/28/2020); and picc line insertion nurse (10/3/2020). Social History:  reports that he has been smoking cigarettes. He started smoking about 43 years ago. He has been smoking about 1.00 pack per day. He has never used smokeless tobacco. He reports that he does not drink alcohol or use drugs. Family History: family history includes Depression in his father; Heart Disease in his mother; Mental Illness in his brother, father, mother, and sister. The patients home medications have been reviewed.     Allergies: Ciprofloxacin hcl, Morphine, and Nsaids    -------------------------------------------------- RESULTS -------------------------------------------------  Labs:  Results for orders placed or performed during the hospital encounter of 03/26/21   CBC Auto Differential   Result Value Ref Range    WBC 7.5 4.5 - 11.5 E9/L    RBC 4.53 3.80 - 5.80 E12/L    Hemoglobin 13.7 12.5 - 16.5 g/dL    Hematocrit 42.0 37.0 - 54.0 %    MCV 92.7 80.0 - 99.9 fL    MCH 30.2 26.0 - 35.0 pg    MCHC 32.6 32.0 - 34.5 %    RDW 14.6 11.5 - 15.0 fL    Platelets 544 322 - 157 E9/L    MPV 10.4 7.0 - 12.0 fL    Neutrophils % 66.7 43.0 - 80.0 %    Immature Granulocytes % 0.3 0.0 - 5.0 %    Lymphocytes % 21.4 20.0 - 42.0 %    Monocytes % 7.6 2.0 - 12.0 %    Eosinophils % 3.2 0.0 - 6.0 %    Basophils % 0.8 0.0 - 2.0 %    Neutrophils Absolute 5.04 1.80 - 7.30 E9/L    Immature Granulocytes # 0.02 E9/L    Lymphocytes Absolute 1.61 1.50 - 4.00 E9/L    Monocytes Absolute 0.57 0.10 - 0.95 E9/L    Eosinophils Absolute 0.24 0.05 - 0.50 E9/L    Basophils Absolute 0.06 0.00 - 0.20 E9/L   Protime-INR   Result Value Ref Range    Protime 11.1 9.3 - 12.4 sec    INR 1.0    Urinalysis, reflex to microscopic   Result Value Ref Range    Color, UA Yellow Straw/Yellow    Clarity, UA Clear Clear    Glucose, Ur Negative Negative mg/dL    Bilirubin Urine Negative Negative    Ketones, Urine Negative Negative mg/dL    Specific Gravity, UA 1.020 1.005 - 1.030 Blood, Urine Negative Negative    pH, UA 6.0 5.0 - 9.0    Protein, UA Negative Negative mg/dL    Urobilinogen, Urine 0.2 <2.0 E.U./dL    Nitrite, Urine Negative Negative    Leukocyte Esterase, Urine Negative Negative   SPECIMEN REJECTION   Result Value Ref Range    Rejected Test chem     Reason for Rejection see below    Comprehensive Metabolic Panel   Result Value Ref Range    Sodium 136 132 - 146 mmol/L    Potassium 3.7 3.5 - 5.0 mmol/L    Chloride 103 98 - 107 mmol/L    CO2 26 22 - 29 mmol/L    Anion Gap 7 7 - 16 mmol/L    Glucose 104 (H) 74 - 99 mg/dL    BUN 19 6 - 20 mg/dL    CREATININE 1.0 0.7 - 1.2 mg/dL    GFR Non-African American >60 >=60 mL/min/1.73    GFR African American >60     Calcium 8.4 (L) 8.6 - 10.2 mg/dL    Total Protein 6.0 (L) 6.4 - 8.3 g/dL    Albumin 3.5 3.5 - 5.2 g/dL    Total Bilirubin <0.2 0.0 - 1.2 mg/dL    Alkaline Phosphatase 134 (H) 40 - 129 U/L    ALT 7 0 - 40 U/L    AST 9 0 - 39 U/L   EKG 12 Lead   Result Value Ref Range    Ventricular Rate 117 BPM    Atrial Rate 117 BPM    P-R Interval 162 ms    QRS Duration 86 ms    Q-T Interval 320 ms    QTc Calculation (Bazett) 446 ms    P Axis 63 degrees    R Axis 67 degrees    T Axis 64 degrees       Radiology:  CT ABDOMEN PELVIS W IV CONTRAST Additional Contrast? None   Final Result   1. Redemonstration of postsurgical changes related to colectomy and   right-sided ostomy with peristomal  containing hernia. 2.  No obstruction or strangulation at site of hernia. 3.  Generalized thickened appearance of urinary bladder wall which could   indicate cystitis. 4.  Nonobstructing right renal calculi. XR CHEST PORTABLE   Final Result   No pneumonia or pleural effusion.             ------------------------- NURSING NOTES AND VITALS REVIEWED ---------------------------  Date / Time Roomed:  3/26/2021  3:19 PM  ED Bed Assignment:  19/19    The nursing notes within the ED encounter and vital signs as below have been reviewed.    BP 130/88   Pulse 72   Temp 98.5 °F (36.9 °C) (Oral)   Resp 14   Ht 6' 3\" (1.905 m)   Wt 200 lb (90.7 kg)   SpO2 98%   BMI 25.00 kg/m²   Oxygen Saturation Interpretation: Normal      ------------------------------------------ PROGRESS NOTES ------------------------------------------  12:14 AM EDT  I have spoken with the patient and discussed todays results, in addition to providing specific details for the plan of care and counseling regarding the diagnosis and prognosis. Their questions are answered at this time and they are agreeable with the plan. I discussed at length with them reasons for immediate return here for re evaluation. They will followup with their general surgeon by calling their office on Monday.      --------------------------------- ADDITIONAL PROVIDER NOTES ---------------------------------  At this time the patient is without objective evidence of an acute process requiring hospitalization or inpatient management. They have remained hemodynamically stable throughout their entire ED visit and are stable for discharge with outpatient follow-up. The plan has been discussed in detail and they are aware of the specific conditions for emergent return, as well as the importance of follow-up. Discharge Medication List as of 3/26/2021 11:13 PM          Diagnosis:  1. Intestinal stoma prolapse (HCC)        Disposition:  Patient's disposition: Discharge to home  Patient's condition is stable.     Juan Ramon Pereyra MD PGY-1  3/27/2021 12:15 AM         Payam Gan MD  Resident  03/27/21 7122

## 2021-03-29 LAB — URINE CULTURE, ROUTINE: NORMAL

## 2021-04-10 ENCOUNTER — APPOINTMENT (OUTPATIENT)
Dept: CT IMAGING | Age: 56
End: 2021-04-10
Payer: MEDICARE

## 2021-04-10 ENCOUNTER — HOSPITAL ENCOUNTER (EMERGENCY)
Age: 56
Discharge: HOME | End: 2021-04-10
Attending: EMERGENCY MEDICINE
Payer: MEDICARE

## 2021-04-10 VITALS
TEMPERATURE: 97.3 F | RESPIRATION RATE: 14 BRPM | HEIGHT: 75 IN | SYSTOLIC BLOOD PRESSURE: 115 MMHG | DIASTOLIC BLOOD PRESSURE: 76 MMHG | HEART RATE: 120 BPM | OXYGEN SATURATION: 98 % | WEIGHT: 200 LBS | BODY MASS INDEX: 24.87 KG/M2

## 2021-04-10 DIAGNOSIS — K94.19 ILEOSTOMY PROLAPSE (HCC): Primary | ICD-10-CM

## 2021-04-10 LAB
ABO/RH: NORMAL
ANION GAP SERPL CALCULATED.3IONS-SCNC: 10 MMOL/L (ref 7–16)
ANTIBODY SCREEN: NORMAL
BUN BLDV-MCNC: 18 MG/DL (ref 6–20)
CALCIUM SERPL-MCNC: 9.6 MG/DL (ref 8.6–10.2)
CHLORIDE BLD-SCNC: 104 MMOL/L (ref 98–107)
CO2: 25 MMOL/L (ref 22–29)
CREAT SERPL-MCNC: 1.2 MG/DL (ref 0.7–1.2)
GFR AFRICAN AMERICAN: >60
GFR NON-AFRICAN AMERICAN: >60 ML/MIN/1.73
GLUCOSE BLD-MCNC: 85 MG/DL (ref 74–99)
HCT VFR BLD CALC: 42.8 % (ref 37–54)
HEMOGLOBIN: 13.8 G/DL (ref 12.5–16.5)
LACTIC ACID: 1.4 MMOL/L (ref 0.5–2.2)
MCH RBC QN AUTO: 29.7 PG (ref 26–35)
MCHC RBC AUTO-ENTMCNC: 32.2 % (ref 32–34.5)
MCV RBC AUTO: 92 FL (ref 80–99.9)
PDW BLD-RTO: 14.1 FL (ref 11.5–15)
PLATELET # BLD: 235 E9/L (ref 130–450)
PMV BLD AUTO: 9.6 FL (ref 7–12)
POTASSIUM SERPL-SCNC: 4.2 MMOL/L (ref 3.5–5)
RBC # BLD: 4.65 E12/L (ref 3.8–5.8)
SODIUM BLD-SCNC: 139 MMOL/L (ref 132–146)
WBC # BLD: 7.1 E9/L (ref 4.5–11.5)

## 2021-04-10 PROCEDURE — 85027 COMPLETE CBC AUTOMATED: CPT

## 2021-04-10 PROCEDURE — 99284 EMERGENCY DEPT VISIT MOD MDM: CPT

## 2021-04-10 PROCEDURE — 86900 BLOOD TYPING SEROLOGIC ABO: CPT

## 2021-04-10 PROCEDURE — 74177 CT ABD & PELVIS W/CONTRAST: CPT

## 2021-04-10 PROCEDURE — 86901 BLOOD TYPING SEROLOGIC RH(D): CPT

## 2021-04-10 PROCEDURE — 6360000004 HC RX CONTRAST MEDICATION: Performed by: RADIOLOGY

## 2021-04-10 PROCEDURE — 96374 THER/PROPH/DIAG INJ IV PUSH: CPT

## 2021-04-10 PROCEDURE — 36415 COLL VENOUS BLD VENIPUNCTURE: CPT

## 2021-04-10 PROCEDURE — 2580000003 HC RX 258: Performed by: EMERGENCY MEDICINE

## 2021-04-10 PROCEDURE — 80048 BASIC METABOLIC PNL TOTAL CA: CPT

## 2021-04-10 PROCEDURE — 83605 ASSAY OF LACTIC ACID: CPT

## 2021-04-10 PROCEDURE — 6360000002 HC RX W HCPCS: Performed by: EMERGENCY MEDICINE

## 2021-04-10 PROCEDURE — 86850 RBC ANTIBODY SCREEN: CPT

## 2021-04-10 RX ORDER — CHLORPROMAZINE HYDROCHLORIDE 100 MG/1
100 TABLET, FILM COATED ORAL 4 TIMES DAILY
Status: ON HOLD | COMMUNITY
End: 2021-04-28 | Stop reason: HOSPADM

## 2021-04-10 RX ORDER — 0.9 % SODIUM CHLORIDE 0.9 %
1000 INTRAVENOUS SOLUTION INTRAVENOUS ONCE
Status: COMPLETED | OUTPATIENT
Start: 2021-04-10 | End: 2021-04-10

## 2021-04-10 RX ORDER — OXYCODONE HYDROCHLORIDE AND ACETAMINOPHEN 5; 325 MG/1; MG/1
1 TABLET ORAL EVERY 6 HOURS PRN
Qty: 10 TABLET | Refills: 0 | Status: SHIPPED | OUTPATIENT
Start: 2021-04-10 | End: 2021-04-13

## 2021-04-10 RX ORDER — FENTANYL CITRATE 50 UG/ML
50 INJECTION, SOLUTION INTRAMUSCULAR; INTRAVENOUS ONCE
Status: COMPLETED | OUTPATIENT
Start: 2021-04-10 | End: 2021-04-10

## 2021-04-10 RX ADMIN — FENTANYL CITRATE 50 MCG: 50 INJECTION, SOLUTION INTRAMUSCULAR; INTRAVENOUS at 16:55

## 2021-04-10 RX ADMIN — IOPAMIDOL 75 ML: 755 INJECTION, SOLUTION INTRAVENOUS at 17:56

## 2021-04-10 RX ADMIN — IOHEXOL 50 ML: 240 INJECTION, SOLUTION INTRATHECAL; INTRAVASCULAR; INTRAVENOUS; ORAL at 17:56

## 2021-04-10 RX ADMIN — SODIUM CHLORIDE 1000 ML: 9 INJECTION, SOLUTION INTRAVENOUS at 16:40

## 2021-04-10 ASSESSMENT — PAIN DESCRIPTION - ORIENTATION: ORIENTATION: RIGHT

## 2021-04-10 ASSESSMENT — PAIN SCALES - GENERAL: PAINLEVEL_OUTOF10: 4

## 2021-04-10 NOTE — ED PROVIDER NOTES
HPI:  4/10/21,   Time: 4:26 PM EDT         Jarrett Lewis is a 48 y.o. male presenting to the ED for bleeding around his ostomy site, beginning several hours ago. The complaint has been persistent, mild in severity, and worsened by nothing. Patient is history of Crohn's disease states he has had a total abdominal colectomy, with an ileostomy and subperitoneal implantation, attachment the rectal stump [facility] last year. States that he was cleaning his ileostomy and noticed some bleeding around the site. Also noticed that his ileostomy has prolapsed approximately 10 cm. He has had prolapse of his small bowel in the past.  He has no nausea vomiting fevers or chills. He has no urinary complaints. He states this is happened before and had to be reduced in the emergency department. He reports normal stooling. No melena reported. ROS:   Pertinent positives and negatives are stated within HPI, all other systems reviewed and are negative.  --------------------------------------------- PAST HISTORY ---------------------------------------------  Past Medical History:  has a past medical history of ADD (attention deficit disorder), Anxiety, Bipolar 1 disorder ([facility] Cancer ([facility] Crohn's disease ([facility] Depression, GERD (gastroesophageal reflux disease), Panic attack, Rectal pain, and Schizo affective schizophrenia ([facility] 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 Social History:  reports that he has been smoking cigarettes. He started smoking about 43 years ago. He has been smoking about 1.00 pack per day. He has never used smokeless tobacco. He reports that he does not drink alcohol or use drugs.     Family History: family history includes Depression in his father; Heart Disease in his mother; Mental Illness in his brother, father, mother, and sister. The patients home medications have been reviewed. Allergies: Ciprofloxacin hcl, Morphine, and Nsaids    -------------------------------------------------- RESULTS -------------------------------------------------  All laboratory and radiology results have been personally reviewed by myself   LABS:  Results for orders placed or performed during the hospital encounter of 04/10/21   CBC   Result Value Ref Range    WBC 7.1 4.5 - 11.5 E9/L    RBC 4.65 3.80 - 5.80 E12/L    Hemoglobin 13.8 12.5 - 16.5 g/dL    Hematocrit 42.8 37.0 - 54.0 %    MCV 92.0 80.0 - 99.9 fL    MCH 29.7 26.0 - 35.0 pg    MCHC 32.2 32.0 - 34.5 %    RDW 14.1 11.5 - 15.0 fL    Platelets 722 334 - 830 E9/L    MPV 9.6 7.0 - 12.0 fL   Basic Metabolic Panel   Result Value Ref Range    Sodium 139 132 - 146 mmol/L    Potassium 4.2 3.5 - 5.0 mmol/L    Chloride 104 98 - 107 mmol/L    CO2 25 22 - 29 mmol/L    Anion Gap 10 7 - 16 mmol/L    Glucose 85 74 - 99 mg/dL    BUN 18 6 - 20 mg/dL    CREATININE 1.2 0.7 - 1.2 mg/dL    GFR Non-African American >60 >=60 mL/min/1.73    GFR African American >60     Calcium 9.6 8.6 - 10.2 mg/dL   Lactic Acid, Plasma   Result Value Ref Range    Lactic Acid 1.4 0.5 - 2.2 mmol/L   TYPE AND SCREEN   Result Value Ref Range    ABO/Rh A POS     Antibody Screen NEG        RADIOLOGY:  Interpreted by Radiologist.  CT ABDOMEN PELVIS W IV CONTRAST Additional Contrast? Oral   Final Result   1. Redemonstration of postsurgical changes related to colectomy with   right-sided ostomy and stable fat containing right peristomal hernia. 2.  No bowel obstruction or strangulation. 3.  Mild generalized thickened appearance of wall of urinary bladder could   indicate cystitis. 4.  Redemonstration of non obstructing 5 mm right renal calculus.              ------------------------- NURSING NOTES AND VITALS REVIEWED ---------------------------   The nursing notes within the ED encounter and vital signs as below have been reviewed. /76   Pulse 120   Temp 97.3 °F (36.3 °C) (Temporal)   Resp 14   Ht 6' 3\" (1.905 m)   Wt 200 lb (90.7 kg)   SpO2 98%   BMI 25.00 kg/m²   Oxygen Saturation Interpretation: Normal      ---------------------------------------------------PHYSICAL EXAM--------------------------------------      Constitutional/General: Alert and oriented x3, well appearing, non toxic in NAD  Head: NC/AT  Eyes: PERRL, EOMI  Mouth: Oropharynx clear, handling secretions, no trismus  Neck: Supple, full ROM, no meningeal signs  Pulmonary: Lungs clear to auscultation bilaterally, no wheezes, rales, or rhonchi. Not in respiratory distress  Cardiovascular:  Regular rate and rhythm, no murmurs, gallops, or rubs. 2+ distal pulses  Abdomen: Soft, patient has a tenderness around the stomal site. his ileum prolapses out from the stoma site approximately 10 cm. It appears well perfused its pink. He does have some excoriations and some mild bleeding. He is stooling. There is no melena  noted. There is a small amount of bloody liquid in the bag. Extremities: Moves all extremities x 4. Warm and well perfused  Skin: warm and dry without rash  Neurologic: GCS 15,  Psych: Normal Affect      ------------------------------ ED COURSE/MEDICAL DECISION MAKING----------------------  Medications   0.9 % sodium chloride bolus (0 mLs Intravenous Stopped 4/10/21 1926)   fentaNYL (SUBLIMAZE) injection 50 mcg (50 mcg Intravenous Given 4/10/21 2515)   iopamidol (ISOVUE-370) 76 % injection 75 mL (75 mLs Intravenous Given 4/10/21 3136)   iohexol (OMNIPAQUE 240) injection 50 mL (50 mLs Oral Given 4/10/21 2814)         Medical Decision Making:    Patient CBC is normal.  I did place some granulated sugar on the stoma and attempted to reduce it manually without success. Counseling:    The emergency provider has spoken with the patient and spouse/SO and discussed todays results, in addition to providing specific details for the plan of care and counseling regarding the diagnosis and prognosis. Questions are answered at this time and they are agreeable with the plan.      --------------------------------- IMPRESSION AND DISPOSITION ---------------------------------    IMPRESSION  1.  Ileostomy prolapse ([facility]        DISPOSITION  Disposition: Discharge to home  Patient condition is stable                Bari Phipps DO  04/10/21 8991

## 2021-04-13 ENCOUNTER — HOSPITAL ENCOUNTER (EMERGENCY)
Age: 56
Discharge: HOME | End: 2021-04-13
Attending: EMERGENCY MEDICINE
Payer: MEDICARE

## 2021-04-13 VITALS
DIASTOLIC BLOOD PRESSURE: 80 MMHG | HEART RATE: 105 BPM | RESPIRATION RATE: 26 BRPM | WEIGHT: 200 LBS | OXYGEN SATURATION: 100 % | SYSTOLIC BLOOD PRESSURE: 155 MMHG | TEMPERATURE: 98 F | BODY MASS INDEX: 24.87 KG/M2 | HEIGHT: 75 IN

## 2021-04-13 DIAGNOSIS — K43.5 PARASTOMAL HERNIA WITHOUT OBSTRUCTION OR GANGRENE: Primary | ICD-10-CM

## 2021-04-13 DIAGNOSIS — E86.0 DEHYDRATION: ICD-10-CM

## 2021-04-13 LAB
ALBUMIN SERPL-MCNC: 4.5 G/DL (ref 3.5–5.2)
ALP BLD-CCNC: 163 U/L (ref 40–129)
ALT SERPL-CCNC: 6 U/L (ref 0–40)
ANION GAP SERPL CALCULATED.3IONS-SCNC: 21 MMOL/L (ref 7–16)
AST SERPL-CCNC: 8 U/L (ref 0–39)
BASOPHILS ABSOLUTE: 0.04 E9/L (ref 0–0.2)
BASOPHILS RELATIVE PERCENT: 0.3 % (ref 0–2)
BILIRUB SERPL-MCNC: 0.4 MG/DL (ref 0–1.2)
BUN BLDV-MCNC: 15 MG/DL (ref 6–20)
CALCIUM SERPL-MCNC: 11 MG/DL (ref 8.6–10.2)
CHLORIDE BLD-SCNC: 98 MMOL/L (ref 98–107)
CO2: 18 MMOL/L (ref 22–29)
CREAT SERPL-MCNC: 1.2 MG/DL (ref 0.7–1.2)
EOSINOPHILS ABSOLUTE: 0.02 E9/L (ref 0.05–0.5)
EOSINOPHILS RELATIVE PERCENT: 0.2 % (ref 0–6)
GFR AFRICAN AMERICAN: >60
GFR NON-AFRICAN AMERICAN: >60 ML/MIN/1.73
GLUCOSE BLD-MCNC: 138 MG/DL (ref 74–99)
HCT VFR BLD CALC: 49 % (ref 37–54)
HEMOGLOBIN: 16.6 G/DL (ref 12.5–16.5)
IMMATURE GRANULOCYTES #: 0.05 E9/L
IMMATURE GRANULOCYTES %: 0.4 % (ref 0–5)
LACTIC ACID: 5.9 MMOL/L (ref 0.5–2.2)
LIPASE: 26 U/L (ref 13–60)
LYMPHOCYTES ABSOLUTE: 1.11 E9/L (ref 1.5–4)
LYMPHOCYTES RELATIVE PERCENT: 9.4 % (ref 20–42)
MCH RBC QN AUTO: 30.2 PG (ref 26–35)
MCHC RBC AUTO-ENTMCNC: 33.9 % (ref 32–34.5)
MCV RBC AUTO: 89.1 FL (ref 80–99.9)
MONOCYTES ABSOLUTE: 0.71 E9/L (ref 0.1–0.95)
MONOCYTES RELATIVE PERCENT: 6 % (ref 2–12)
NEUTROPHILS ABSOLUTE: 9.87 E9/L (ref 1.8–7.3)
NEUTROPHILS RELATIVE PERCENT: 83.7 % (ref 43–80)
PDW BLD-RTO: 13.8 FL (ref 11.5–15)
PLATELET # BLD: 321 E9/L (ref 130–450)
PMV BLD AUTO: 9.6 FL (ref 7–12)
POTASSIUM REFLEX MAGNESIUM: 4.7 MMOL/L (ref 3.5–5)
RBC # BLD: 5.5 E12/L (ref 3.8–5.8)
SODIUM BLD-SCNC: 137 MMOL/L (ref 132–146)
TOTAL PROTEIN: 7.9 G/DL (ref 6.4–8.3)
TROPONIN: <0.01 NG/ML (ref 0–0.03)
WBC # BLD: 11.8 E9/L (ref 4.5–11.5)

## 2021-04-13 PROCEDURE — 83690 ASSAY OF LIPASE: CPT

## 2021-04-13 PROCEDURE — 80053 COMPREHEN METABOLIC PANEL: CPT

## 2021-04-13 PROCEDURE — 96375 TX/PRO/DX INJ NEW DRUG ADDON: CPT

## 2021-04-13 PROCEDURE — 99283 EMERGENCY DEPT VISIT LOW MDM: CPT

## 2021-04-13 PROCEDURE — 96374 THER/PROPH/DIAG INJ IV PUSH: CPT

## 2021-04-13 PROCEDURE — 96361 HYDRATE IV INFUSION ADD-ON: CPT

## 2021-04-13 PROCEDURE — 6360000002 HC RX W HCPCS: Performed by: EMERGENCY MEDICINE

## 2021-04-13 PROCEDURE — 85025 COMPLETE CBC W/AUTO DIFF WBC: CPT

## 2021-04-13 PROCEDURE — 83605 ASSAY OF LACTIC ACID: CPT

## 2021-04-13 PROCEDURE — 2580000003 HC RX 258: Performed by: EMERGENCY MEDICINE

## 2021-04-13 PROCEDURE — 84484 ASSAY OF TROPONIN QUANT: CPT

## 2021-04-13 PROCEDURE — 93005 ELECTROCARDIOGRAM TRACING: CPT | Performed by: EMERGENCY MEDICINE

## 2021-04-13 RX ORDER — ONDANSETRON 2 MG/ML
4 INJECTION INTRAMUSCULAR; INTRAVENOUS ONCE
Status: COMPLETED | OUTPATIENT
Start: 2021-04-13 | End: 2021-04-13

## 2021-04-13 RX ORDER — FENTANYL CITRATE 50 UG/ML
50 INJECTION, SOLUTION INTRAMUSCULAR; INTRAVENOUS ONCE
Status: COMPLETED | OUTPATIENT
Start: 2021-04-13 | End: 2021-04-13

## 2021-04-13 RX ORDER — 0.9 % SODIUM CHLORIDE 0.9 %
1000 INTRAVENOUS SOLUTION INTRAVENOUS ONCE
Status: COMPLETED | OUTPATIENT
Start: 2021-04-13 | End: 2021-04-13

## 2021-04-13 RX ORDER — 0.9 % SODIUM CHLORIDE 0.9 %
1000 INTRAVENOUS SOLUTION INTRAVENOUS ONCE
Status: DISCONTINUED | OUTPATIENT
Start: 2021-04-13 | End: 2021-04-13 | Stop reason: HOSPADM

## 2021-04-13 RX ADMIN — ONDANSETRON 4 MG: 2 INJECTION INTRAMUSCULAR; INTRAVENOUS at 12:32

## 2021-04-13 RX ADMIN — SODIUM CHLORIDE 1000 ML: 9 INJECTION, SOLUTION INTRAVENOUS at 12:32

## 2021-04-13 RX ADMIN — FENTANYL CITRATE 50 MCG: 50 INJECTION, SOLUTION INTRAMUSCULAR; INTRAVENOUS at 13:12

## 2021-04-13 ASSESSMENT — PAIN DESCRIPTION - PAIN TYPE: TYPE: ACUTE PAIN

## 2021-04-13 NOTE — ED NOTES
Bed:  [place] [address]  Expected date:   Expected time:   Means of arrival:   Comments:  Samaria Pierre RN  04/13/21 3933

## 2021-04-13 NOTE — ED PROVIDER NOTES
(H) 74 - 99 mg/dL    BUN 15 6 - 20 mg/dL    CREATININE 1.2 0.7 - 1.2 mg/dL    GFR Non-African American >60 >=60 mL/min/1.73    GFR African American >60     Calcium 11.0 (H) 8.6 - 10.2 mg/dL    Total Protein 7.9 6.4 - 8.3 g/dL    Albumin 4.5 3.5 - 5.2 g/dL    Total Bilirubin 0.4 0.0 - 1.2 mg/dL    Alkaline Phosphatase 163 (H) 40 - 129 U/L    ALT 6 0 - 40 U/L    AST 8 0 - 39 U/L   Lactic Acid, Plasma   Result Value Ref Range    Lactic Acid 5.9 (HH) 0.5 - 2.2 mmol/L   Troponin   Result Value Ref Range    Troponin <0.01 0.00 - 0.03 ng/mL   Lipase   Result Value Ref Range    Lipase 26 13 - 60 U/L   EKG 12 Lead   Result Value Ref Range    Ventricular Rate 134 BPM    Atrial Rate 134 BPM    P-R Interval 126 ms    QRS Duration 84 ms    Q-T Interval 300 ms    QTc Calculation (Bazett) 448 ms    P Axis 74 degrees    R Axis 66 degrees    T Axis 85 degrees       RADIOLOGY:  Interpreted by Radiologist.  No orders to display       ------------------------- NURSING NOTES AND VITALS REVIEWED ---------------------------   The nursing notes within the ED encounter and vital signs as below have been reviewed. BP (!) 155/80   Pulse 105   Temp 98 °F (36.7 °C) (Oral)   Resp 26   Ht 6' 3\" (1.905 m)   Wt 200 lb (90.7 kg)   SpO2 100%   BMI 25.00 kg/m²   Oxygen Saturation Interpretation: Normal      ---------------------------------------------------PHYSICAL EXAM--------------------------------------      Constitutional/General: Alert and oriented x3, appears uncomfortable, non toxic in NAD  Head: Normocephalic and atraumatic  Eyes: PERRL, EOMI  Mouth: Oropharynx clear, handling secretions, no trismus  Neck: Supple, full ROM,   Pulmonary: Lungs clear to auscultation bilaterally, no wheezes, rales, or rhonchi. Not in respiratory distress  Cardiovascular:  Regular rhythm, tachycardic, no murmurs, gallops, or rubs.  2+ distal pulses  Abdomen: Soft, nnon distended, large parastomal hernia, bowel pink, large amount of watery brown stool in bag.  Extremities: Moves all extremities x 4. Warm and well perfused  Skin: warm and dry without rash  Neurologic: GCS 15, no focal motor or sensory deficits   Psych: Normal Affect. Behavior normal.      ------------------------------ ED COURSE/MEDICAL DECISION MAKING----------------------  Medications   0.9 % sodium chloride bolus (has no administration in time range)   0.9 % sodium chloride bolus (0 mLs Intravenous Stopped 4/13/21 1333)   ondansetron (ZOFRAN) injection 4 mg (4 mg Intravenous Given 4/13/21 1232)   fentaNYL (SUBLIMAZE) injection 50 mcg (50 mcg Intravenous Given 4/13/21 1312)       Medical Decision Making/ED COURSE:   Patient is a 41-year-old male with history of Crohn's disease status post colectomy stenting with parastomal hernia. In the ED, patient was tachycardic but otherwise hemodynamically stable and afebrile. Labs obtained. Patient administered IVF, fentanyl, and zofran. I reviewed and interpreted labs. Labs were significant for hemoglobin of 16.6 which appears to be secondary to hemoconcentration. He had a lactic acid of 5.9. He was treated symptomatically with good improvement of his symptoms. Tachycardia markedly improved. He was tolerating po. No clinical signs of obstruction. Patient had recent abdominal CT for same symptoms with no acute findings. Due to his lactic acidosis of 5.9, I did recommend an additional liter of IV fluids and repeat lactic acid to ensure that it was improving. Patient is refusing any further fluids, and he states he would like to be discharged home. Advised that he follow-up with his general surgeon in [facility] [employer] for further treatment of his parastomal hernia. Strict ED return precautions discussed. Patient remained hemodynamically stable throughout ED course. ED Course as of Apr 13 1626   Tue Apr 13, 2021   1420 Patient is feeling improved. He is requesting discharge.     [JA]   6768 Patient is walking up to the desk requesting to be discharged home. I discussed that his lactic acid is 5.9. I recommended an additional liter of IV fluids and repeat lactic acid to ensure it is improving. Patient states that he feels fine and would like to be discharged. States she will drink water when he gets home. He has a nonsurgical abdomen on reevaluation. [JA]      ED Course User Index  [JA] Serge Ramos MD     Counseling: The emergency provider has spoken with the patient and discussed todays results, in addition to providing specific details for the plan of care and counseling regarding the diagnosis and prognosis. Questions are answered at this time and they are agreeable with the plan.      --------------------------------- IMPRESSION AND DISPOSITION ---------------------------------    IMPRESSION  1. Parastomal hernia without obstruction or gangrene    2. Dehydration        DISPOSITION  Disposition: Discharge to home  Patient condition is stable      NOTE: This report was transcribed using voice recognition software.  Every effort was made to ensure accuracy; however, inadvertent computerized transcription errors may be present    I, Serge Ramos MD, am the primary provider of this record       Serge Ramos MD  04/13/21 0219

## 2021-04-14 LAB
EKG ATRIAL RATE: 134 BPM
EKG P AXIS: 74 DEGREES
EKG P-R INTERVAL: 126 MS
EKG Q-T INTERVAL: 300 MS
EKG QRS DURATION: 84 MS
EKG QTC CALCULATION (BAZETT): 448 MS
EKG R AXIS: 66 DEGREES
EKG T AXIS: 85 DEGREES
EKG VENTRICULAR RATE: 134 BPM

## 2021-04-21 DIAGNOSIS — R11.2 INTRACTABLE VOMITING WITH NAUSEA: ICD-10-CM

## 2021-04-21 PROBLEM — F25.0 SCHIZOAFFECTIVE DISORDER, BIPOLAR TYPE (HCC): Status: RESOLVED | Noted: 2018-11-21 | Resolved: 2021-04-21

## 2021-04-21 PROBLEM — C61 PROSTATE CANCER (HCC): Status: RESOLVED | Noted: 2020-10-21 | Resolved: 2021-04-21

## 2021-04-21 PROBLEM — D80.3 IGG DEFICIENCY (HCC): Status: RESOLVED | Noted: 2020-10-04 | Resolved: 2021-04-21

## 2021-04-21 PROBLEM — K50.90 CROHN DISEASE (HCC): Status: RESOLVED | Noted: 2017-11-18 | Resolved: 2021-04-21

## 2021-04-21 PROBLEM — E44.0 MODERATE PROTEIN-CALORIE MALNUTRITION (HCC): Chronic | Status: RESOLVED | Noted: 2018-10-26 | Resolved: 2021-04-21

## 2021-04-21 PROBLEM — K43.5 PARASTOMAL HERNIA WITHOUT OBSTRUCTION OR GANGRENE: Status: ACTIVE | Noted: 2021-04-21

## 2021-04-21 LAB
ALBUMIN SERPL-MCNC: 4.4 G/DL (ref 3.5–5.2)
ALP BLD-CCNC: 118 U/L (ref 40–129)
ALT SERPL-CCNC: 7 U/L (ref 0–40)
ANION GAP SERPL CALCULATED.3IONS-SCNC: 15 MMOL/L (ref 7–16)
AST SERPL-CCNC: 11 U/L (ref 0–39)
BASOPHILS ABSOLUTE: 0.05 E9/L (ref 0–0.2)
BASOPHILS RELATIVE PERCENT: 0.5 % (ref 0–2)
BILIRUB SERPL-MCNC: 0.4 MG/DL (ref 0–1.2)
BUN BLDV-MCNC: 29 MG/DL (ref 6–20)
CALCIUM SERPL-MCNC: 10.2 MG/DL (ref 8.6–10.2)
CHLORIDE BLD-SCNC: 92 MMOL/L (ref 98–107)
CO2: 24 MMOL/L (ref 22–29)
CREAT SERPL-MCNC: 1.3 MG/DL (ref 0.7–1.2)
EOSINOPHILS ABSOLUTE: 0.12 E9/L (ref 0.05–0.5)
EOSINOPHILS RELATIVE PERCENT: 1.2 % (ref 0–6)
GFR AFRICAN AMERICAN: >60
GFR NON-AFRICAN AMERICAN: 57 ML/MIN/1.73
GLUCOSE BLD-MCNC: 133 MG/DL (ref 74–99)
HCT VFR BLD CALC: 54.2 % (ref 37–54)
HEMOGLOBIN: 17.7 G/DL (ref 12.5–16.5)
IMMATURE GRANULOCYTES #: 0.13 E9/L
IMMATURE GRANULOCYTES %: 1.3 % (ref 0–5)
LYMPHOCYTES ABSOLUTE: 1.57 E9/L (ref 1.5–4)
LYMPHOCYTES RELATIVE PERCENT: 15.1 % (ref 20–42)
MCH RBC QN AUTO: 29.2 PG (ref 26–35)
MCHC RBC AUTO-ENTMCNC: 32.7 % (ref 32–34.5)
MCV RBC AUTO: 89.3 FL (ref 80–99.9)
MONOCYTES ABSOLUTE: 0.94 E9/L (ref 0.1–0.95)
MONOCYTES RELATIVE PERCENT: 9.1 % (ref 2–12)
NEUTROPHILS ABSOLUTE: 7.57 E9/L (ref 1.8–7.3)
NEUTROPHILS RELATIVE PERCENT: 72.8 % (ref 43–80)
PDW BLD-RTO: 13.2 FL (ref 11.5–15)
PLATELET # BLD: 448 E9/L (ref 130–450)
PMV BLD AUTO: 9.6 FL (ref 7–12)
POTASSIUM SERPL-SCNC: 4.7 MMOL/L (ref 3.5–5)
RBC # BLD: 6.07 E12/L (ref 3.8–5.8)
SODIUM BLD-SCNC: 131 MMOL/L (ref 132–146)
TOTAL PROTEIN: 8 G/DL (ref 6.4–8.3)
WBC # BLD: 10.4 E9/L (ref 4.5–11.5)

## 2021-04-23 ENCOUNTER — HOSPITAL ENCOUNTER (INPATIENT)
Age: 56
LOS: 5 days | Discharge: HOME | DRG: 885 | End: 2021-04-28
Attending: EMERGENCY MEDICINE | Admitting: PSYCHIATRY & NEUROLOGY
Payer: MEDICARE

## 2021-04-23 DIAGNOSIS — E16.2 HYPOGLYCEMIA: ICD-10-CM

## 2021-04-23 DIAGNOSIS — N30.00 ACUTE CYSTITIS WITHOUT HEMATURIA: ICD-10-CM

## 2021-04-23 DIAGNOSIS — F32.A DEPRESSION WITH SUICIDAL IDEATION: Primary | ICD-10-CM

## 2021-04-23 DIAGNOSIS — R45.851 DEPRESSION WITH SUICIDAL IDEATION: Primary | ICD-10-CM

## 2021-04-23 LAB
ACETAMINOPHEN LEVEL: <5 MCG/ML (ref 10–30)
ALBUMIN SERPL-MCNC: 4.3 G/DL (ref 3.5–5.2)
ALP BLD-CCNC: 119 U/L (ref 40–129)
ALT SERPL-CCNC: 9 U/L (ref 0–40)
AMPHETAMINE SCREEN, URINE: NOT DETECTED
ANION GAP SERPL CALCULATED.3IONS-SCNC: 19 MMOL/L (ref 7–16)
AST SERPL-CCNC: 12 U/L (ref 0–39)
BACTERIA: ABNORMAL /HPF
BARBITURATE SCREEN URINE: NOT DETECTED
BASOPHILS ABSOLUTE: 0.08 E9/L (ref 0–0.2)
BASOPHILS RELATIVE PERCENT: 0.6 % (ref 0–2)
BENZODIAZEPINE SCREEN, URINE: NOT DETECTED
BILIRUB SERPL-MCNC: 0.3 MG/DL (ref 0–1.2)
BILIRUBIN URINE: NEGATIVE
BLOOD, URINE: ABNORMAL
BUN BLDV-MCNC: 25 MG/DL (ref 6–20)
CALCIUM SERPL-MCNC: 10.1 MG/DL (ref 8.6–10.2)
CANNABINOID SCREEN URINE: NOT DETECTED
CHLORIDE BLD-SCNC: 94 MMOL/L (ref 98–107)
CLARITY: ABNORMAL
CO2: 23 MMOL/L (ref 22–29)
COCAINE METABOLITE SCREEN URINE: NOT DETECTED
COLOR: YELLOW
CREAT SERPL-MCNC: 1.3 MG/DL (ref 0.7–1.2)
EOSINOPHILS ABSOLUTE: 0.19 E9/L (ref 0.05–0.5)
EOSINOPHILS RELATIVE PERCENT: 1.5 % (ref 0–6)
ETHANOL: <10 MG/DL (ref 0–0.08)
FENTANYL SCREEN, URINE: NOT DETECTED
GFR AFRICAN AMERICAN: >60
GFR NON-AFRICAN AMERICAN: 57 ML/MIN/1.73
GLUCOSE BLD-MCNC: 72 MG/DL (ref 74–99)
GLUCOSE URINE: NEGATIVE MG/DL
HCT VFR BLD CALC: 52.6 % (ref 37–54)
HEMOGLOBIN: 17 G/DL (ref 12.5–16.5)
IMMATURE GRANULOCYTES #: 0.1 E9/L
IMMATURE GRANULOCYTES %: 0.8 % (ref 0–5)
KETONES, URINE: NEGATIVE MG/DL
LEUKOCYTE ESTERASE, URINE: ABNORMAL
LYMPHOCYTES ABSOLUTE: 2.99 E9/L (ref 1.5–4)
LYMPHOCYTES RELATIVE PERCENT: 22.9 % (ref 20–42)
Lab: NORMAL
MCH RBC QN AUTO: 29.1 PG (ref 26–35)
MCHC RBC AUTO-ENTMCNC: 32.3 % (ref 32–34.5)
MCV RBC AUTO: 90.1 FL (ref 80–99.9)
METER GLUCOSE: 114 MG/DL (ref 74–99)
METHADONE SCREEN, URINE: NOT DETECTED
MONOCYTES ABSOLUTE: 1.1 E9/L (ref 0.1–0.95)
MONOCYTES RELATIVE PERCENT: 8.4 % (ref 2–12)
NEUTROPHILS ABSOLUTE: 8.57 E9/L (ref 1.8–7.3)
NEUTROPHILS RELATIVE PERCENT: 65.8 % (ref 43–80)
NITRITE, URINE: NEGATIVE
OPIATE SCREEN URINE: NOT DETECTED
OXYCODONE URINE: NOT DETECTED
PDW BLD-RTO: 13.1 FL (ref 11.5–15)
PH UA: 6 (ref 5–9)
PHENCYCLIDINE SCREEN URINE: NOT DETECTED
PLATELET # BLD: 441 E9/L (ref 130–450)
PMV BLD AUTO: 9 FL (ref 7–12)
POTASSIUM SERPL-SCNC: 3.8 MMOL/L (ref 3.5–5)
PROTEIN UA: ABNORMAL MG/DL
RBC # BLD: 5.84 E12/L (ref 3.8–5.8)
RBC UA: ABNORMAL /HPF (ref 0–2)
SALICYLATE, SERUM: <0.3 MG/DL (ref 0–30)
SARS-COV-2, NAAT: NOT DETECTED
SODIUM BLD-SCNC: 136 MMOL/L (ref 132–146)
SPECIFIC GRAVITY UA: 1.02 (ref 1–1.03)
TOTAL PROTEIN: 7.9 G/DL (ref 6.4–8.3)
TRICYCLIC ANTIDEPRESSANTS SCREEN SERUM: NEGATIVE NG/ML
TSH SERPL DL<=0.05 MIU/L-ACNC: 2.24 UIU/ML (ref 0.27–4.2)
UROBILINOGEN, URINE: 0.2 E.U./DL
WBC # BLD: 13 E9/L (ref 4.5–11.5)
WBC UA: >20 /HPF (ref 0–5)

## 2021-04-23 PROCEDURE — 1240000000 HC EMOTIONAL WELLNESS R&B

## 2021-04-23 PROCEDURE — 80053 COMPREHEN METABOLIC PANEL: CPT

## 2021-04-23 PROCEDURE — 80143 DRUG ASSAY ACETAMINOPHEN: CPT

## 2021-04-23 PROCEDURE — 96372 THER/PROPH/DIAG INJ SC/IM: CPT

## 2021-04-23 PROCEDURE — 87088 URINE BACTERIA CULTURE: CPT

## 2021-04-23 PROCEDURE — 93005 ELECTROCARDIOGRAM TRACING: CPT | Performed by: EMERGENCY MEDICINE

## 2021-04-23 PROCEDURE — 81001 URINALYSIS AUTO W/SCOPE: CPT

## 2021-04-23 PROCEDURE — 82962 GLUCOSE BLOOD TEST: CPT

## 2021-04-23 PROCEDURE — 87635 SARS-COV-2 COVID-19 AMP PRB: CPT

## 2021-04-23 PROCEDURE — 82077 ASSAY SPEC XCP UR&BREATH IA: CPT

## 2021-04-23 PROCEDURE — 80307 DRUG TEST PRSMV CHEM ANLYZR: CPT

## 2021-04-23 PROCEDURE — 84443 ASSAY THYROID STIM HORMONE: CPT

## 2021-04-23 PROCEDURE — 80179 DRUG ASSAY SALICYLATE: CPT

## 2021-04-23 PROCEDURE — 85025 COMPLETE CBC W/AUTO DIFF WBC: CPT

## 2021-04-23 PROCEDURE — 6360000002 HC RX W HCPCS

## 2021-04-23 PROCEDURE — 99284 EMERGENCY DEPT VISIT MOD MDM: CPT

## 2021-04-23 PROCEDURE — 6370000000 HC RX 637 (ALT 250 FOR IP): Performed by: EMERGENCY MEDICINE

## 2021-04-23 RX ORDER — DEXTROSE MONOHYDRATE 50 MG/ML
100 INJECTION, SOLUTION INTRAVENOUS PRN
Status: DISCONTINUED | OUTPATIENT
Start: 2021-04-23 | End: 2021-04-28 | Stop reason: HOSPADM

## 2021-04-23 RX ORDER — CEFDINIR 300 MG/1
300 CAPSULE ORAL ONCE
Status: COMPLETED | OUTPATIENT
Start: 2021-04-23 | End: 2021-04-23

## 2021-04-23 RX ORDER — LORAZEPAM 2 MG/ML
INJECTION INTRAMUSCULAR
Status: COMPLETED
Start: 2021-04-23 | End: 2021-04-23

## 2021-04-23 RX ORDER — ZIPRASIDONE MESYLATE 20 MG/ML
INJECTION, POWDER, LYOPHILIZED, FOR SOLUTION INTRAMUSCULAR
Status: COMPLETED
Start: 2021-04-23 | End: 2021-04-23

## 2021-04-23 RX ORDER — LORAZEPAM 2 MG/ML
2 INJECTION INTRAMUSCULAR ONCE
Status: COMPLETED | OUTPATIENT
Start: 2021-04-23 | End: 2021-04-23

## 2021-04-23 RX ORDER — DEXTROSE MONOHYDRATE 25 G/50ML
12.5 INJECTION, SOLUTION INTRAVENOUS PRN
Status: DISCONTINUED | OUTPATIENT
Start: 2021-04-23 | End: 2021-04-28 | Stop reason: HOSPADM

## 2021-04-23 RX ORDER — NICOTINE POLACRILEX 4 MG
15 LOZENGE BUCCAL PRN
Status: DISCONTINUED | OUTPATIENT
Start: 2021-04-23 | End: 2021-04-28 | Stop reason: HOSPADM

## 2021-04-23 RX ORDER — ZIPRASIDONE MESYLATE 20 MG/ML
20 INJECTION, POWDER, LYOPHILIZED, FOR SOLUTION INTRAMUSCULAR ONCE
Status: COMPLETED | OUTPATIENT
Start: 2021-04-23 | End: 2021-04-23

## 2021-04-23 RX ADMIN — ZIPRASIDONE MESYLATE 20 MG: 20 INJECTION, POWDER, LYOPHILIZED, FOR SOLUTION INTRAMUSCULAR at 20:30

## 2021-04-23 RX ADMIN — CEFDINIR 300 MG: 300 CAPSULE ORAL at 20:49

## 2021-04-23 RX ADMIN — LORAZEPAM 2 MG: 2 INJECTION INTRAMUSCULAR; INTRAVENOUS at 20:30

## 2021-04-23 RX ADMIN — LORAZEPAM 2 MG: 2 INJECTION INTRAMUSCULAR at 20:30

## 2021-04-23 ASSESSMENT — ENCOUNTER SYMPTOMS
SHORTNESS OF BREATH: 0
COUGH: 0
RHINORRHEA: 0
ABDOMINAL PAIN: 0
NAUSEA: 0
DIARRHEA: 0
TROUBLE SWALLOWING: 0
COLOR CHANGE: 0
VOMITING: 0
BLOOD IN STOOL: 0

## 2021-04-23 NOTE — ED PROVIDER NOTES
ED PROVIDER NOTE    Chief Complaint   Patient presents with    Psychiatric Evaluation     SELF HARM, CUT LEFT ARM, DENIES SI STATES THAT HE DOES IT TO FEEL IT, STATES THAT HE HASN'T TAKEN MEDS IN MONTHS       HPI:  4/23/21,   Time: 5:15 PM EDT       Ramy Rasheed is a 48 y.o. male presenting to the ED for suicidal ideation and self harm. Cut his left forearm earlier today with scissors, says it was because he likes his blood. Wasn't trying to kill himself but has been feeling suicidal. Wife called police due to concern for self harm. Off his psych meds for a long time because of side effects. Occasional AH. No HI/VH. No drug/etoh use. No recent injury or illness but has had decreased appetite which he says is due to his crohns disease. Complaint has been gradual onset, persistent, moderate in severity, no aggravating/alleviating factors. Chart review: hx of schizoaffective disorder, crohn's disease, colostomy    Review of Systems:     Review of Systems   Constitutional: Negative for appetite change, chills and fever. HENT: Negative for congestion, rhinorrhea and trouble swallowing. Eyes: Negative for visual disturbance. Respiratory: Negative for cough and shortness of breath. Cardiovascular: Negative for chest pain and leg swelling. Gastrointestinal: Negative for abdominal pain, blood in stool, diarrhea, nausea and vomiting. Genitourinary: Negative for decreased urine volume, difficulty urinating, dysuria, frequency, hematuria and urgency. Musculoskeletal: Negative for myalgias, neck pain and neck stiffness. Skin: Negative for color change. Neurological: Negative for dizziness, syncope, weakness, light-headedness, numbness and headaches. Psychiatric/Behavioral: Positive for dysphoric mood, hallucinations, self-injury and suicidal ideas.          --------------------------------------------- PAST HISTORY ---------------------------------------------  Past Medical History:   Past Medical History:   Diagnosis Date    ADD (attention deficit disorder)     Anxiety     B[facility] ([facility] 2017    Cancer ([facility] prostate cancer     / treated with surgery    [facility]     Depression     GERD (gastroesophageal reflux disease)     Panic attack     Rectal pain     has a fissure    Schizo affective schizophrenia ([facility]     stable       Past Surgical History:   Past Surgical History:   Procedure Laterality Date    COLONOSCOPY      COLONOSCOPY N/A 2020    COLONOSCOPY WITH BIOPSY performed by Jenny Edwards MD at [address] [place] [address], COLON, DIAGNOSTIC      INCISION AND DRAINAGE N/A 5/15/2019    I & D SCROTAL ABSCESS WITH EXPLANTATION ARTIFICIAL URINARY SPHINCTER COMPLEX URETHROPLASTY performed by Gloria Dowd DO at [place] [address] NOSE SURGERY  ? deviated septum    PICC LINE INSERTION NURSE  10/3/2020         PROSTATECTOMY  9/15/2014    laparoscopic robotic assisted.        Social History:   Social History     Socioeconomic History    Marital status:      Spouse name: Campos Villalpando Number of children: 0    Years of education: 12 +8    Highest education level: None   Occupational History    Occupation: [employer]     Employer: UNEMPLOYED     Comment: SSDI   Social Needs    Financial resource strain: None    Food insecurity     Worry: None     Inability: None    Transportation needs     Medical: None     Non-medical: None   Tobacco Use    Smoking status: Former Smoker     Packs/day: 1.00     Years: 41.00     Pack years: 41.00     Types: Cigarettes     Start date: 1978     Quit date: 2021     Years since quittin.0    Smokeless tobacco: Never Used   Substance and Sexual Activity    Alcohol use: No     Comment: no alcohol in5 yrs    Drug use: No     Comment: last use  / marijuana    Sexual activity: Not Currently     Partners: Female   Lifestyle    Physical activity     Days per week: None     Minutes per session: None    Stress: None   Relationships    Social connections     Talks on phone: None     Gets together: None     Attends Denominational service: None     Active member of club or organization: None     Attends meetings of clubs or organizations: None     Relationship status: None    Intimate partner violence     Fear of current or ex partner: None     Emotionally abused: None     Physically abused: None     Forced sexual activity: None   Other Topics Concern    None   Social History Narrative    ** Merged History Encounter **            Family History:   Family History   Problem Relation Age of Onset    Mental Illness Mother     Mental Illness Father     Mental Illness Sister     Mental Illness Brother     Heart Disease Mother     Depression Father        The patients home medications have been reviewed. Allergies: Allergies   Allergen Reactions    Ciprofloxacin Hcl      constipation    Morphine      \"it does nothing for me\"    Nsaids Other (See Comments)     Per pt's physician Fawad Short) he is not to take NSAIDS due to HX of Crohn's disease.           ---------------------------------------------------PHYSICAL EXAM--------------------------------------    BP (!) 134/98   Pulse 117   Temp 96.7 °F (35.9 °C) (Temporal)   Resp 16   Ht 6' 3\" (1.905 m)   Wt 190 lb (86.2 kg)   SpO2 99%   BMI 23.75 kg/m²     Physical Exam  Vitals signs and nursing note reviewed. Constitutional:       General: He is not in acute distress. Appearance: He is not toxic-appearing. HENT:      Mouth/Throat:      Mouth: Mucous membranes are moist.   Eyes:      General: No scleral icterus. Extraocular Movements: Extraocular movements intact. Pupils: Pupils are equal, round, and reactive to light. Neck:      Musculoskeletal: Normal range of motion and neck supple. No neck rigidity or muscular tenderness. Cardiovascular:      Rate and Rhythm: Normal rate and regular rhythm.       Pulses: Normal pulses. Heart sounds: Normal heart sounds. No murmur. Pulmonary:      Effort: Pulmonary effort is normal. No respiratory distress. Breath sounds: Normal breath sounds. No wheezing or rales. Abdominal:      General: There is no distension. Palpations: Abdomen is soft. Tenderness: There is no abdominal tenderness. There is no guarding or rebound. Musculoskeletal: Normal range of motion. General: No swelling or tenderness. Comments: Radial, DP, and PT pulses 2+ bilaterally. Skin:     General: Skin is warm and dry. Comments: L forearm superficial laceration   Neurological:      Mental Status: He is alert and oriented to person, place, and time. Comments: Strength 5/5 and sensation grossly intact to light touch and equal bilaterally throughout all extremities   Psychiatric:      Comments: Normal affect, calm, cooperative, appropriate, +SI, no HI/AVH, not responding to internal stimuli            -------------------------------------------------- RESULTS -------------------------------------------------  I have personally reviewed all laboratory and imaging results for this patient. Results are listed below.      LABS:  Labs Reviewed   COMPREHENSIVE METABOLIC PANEL - Abnormal; Notable for the following components:       Result Value    Chloride 94 (*)     Anion Gap 19 (*)     Glucose 72 (*)     BUN 25 (*)     CREATININE 1.3 (*)     All other components within normal limits   CBC WITH AUTO DIFFERENTIAL - Abnormal; Notable for the following components:    WBC 13.0 (*)     RBC 5.84 (*)     Hemoglobin 17.0 (*)     Neutrophils Absolute 8.57 (*)     Monocytes Absolute 1.10 (*)     All other components within normal limits   SERUM DRUG SCREEN - Abnormal; Notable for the following components:    Acetaminophen Level <5.0 (*)     All other components within normal limits   URINALYSIS - Abnormal; Notable for the following components:    Leukocyte Esterase, Urine LARGE (*)     All other components within normal limits   MICROSCOPIC URINALYSIS - Abnormal; Notable for the following components:    WBC, UA >20 (*)     Bacteria, UA RARE (*)     All other components within normal limits   POCT GLUCOSE - Abnormal; Notable for the following components:    Meter Glucose 114 (*)     All other components within normal limits   COVID-19, RAPID   CULTURE, URINE   URINE DRUG SCREEN   TSH WITHOUT REFLEX   POCT GLUCOSE       EKG: This EKG is signed and interpreted by the EP. Sinus tachycardia, vent rate 101bpm, normal axis and intervals, no acute injury pattern, no clinically significant change compared w/ prior EKG      ------------------------- NURSING NOTES AND VITALS REVIEWED ---------------------------   The nursing notes within the ED encounter and vital signs as below have been reviewed by myself. BP (!) 134/98   Pulse 117   Temp 96.7 °F (35.9 °C) (Temporal)   Resp 16   Ht 6' 3\" (1.905 m)   Wt 190 lb (86.2 kg)   SpO2 99%   BMI 23.75 kg/m²   Oxygen Saturation Interpretation: Normal    The patients available past medical records and past encounters were reviewed.         ------------------------------ ED COURSE/MEDICAL DECISION MAKING----------------------  Medications   sterile water injection (has no administration in time range)   glucose (GLUTOSE) 40 % oral gel 15 g (has no administration in time range)   dextrose 50 % IV solution (has no administration in time range)   glucagon (rDNA) injection 1 mg (has no administration in time range)   dextrose 5 % solution (has no administration in time range)   acetaminophen (TYLENOL) tablet 650 mg (has no administration in time range)   magnesium hydroxide (MILK OF MAGNESIA) 400 MG/5ML suspension 30 mL (has no administration in time range)   aluminum & magnesium hydroxide-simethicone (MAALOX) 200-200-20 MG/5ML suspension 30 mL (has no administration in time range)   hydrOXYzine (VISTARIL) capsule 50 mg (has no administration in time range) haloperidol lactate (HALDOL) injection 5 mg (has no administration in time range)     Or   haloperidol (HALDOL) tablet 5 mg (has no administration in time range)   traZODone (DESYREL) tablet 50 mg (has no administration in time range)   cefdinir (OMNICEF) capsule 300 mg (300 mg Oral Given 21)   ziprasidone (GEODON) injection 20 mg (20 mg Intramuscular Given 21)   LORazepam (ATIVAN) injection 2 mg (2 mg Intramuscular Given 21)     Consultations:             Social work    Counseling: The emergency provider has spoken with the patient and discussed todays results, in addition to providing specific details for the plan of care and counseling regarding the diagnosis and prognosis. Questions are answered at this time and they are agreeable with the plan. ED Course/Medical Decision Makin y.o. male here with SI. Non-toxic appearing, afebrile, hemodynamically stable, and in no acute distress. High risk given history of prior attempts. Recent decreased PO intake. Benign abdominal exam. Workup notable for hypoglycemia, mild leukocytosis, possible UTI. Started po abx, required ativan and geodon for agitation. Admitted in stable condition for inpatient psych evaluation.       --------------------------------- IMPRESSION AND DISPOSITION ---------------------------------    IMPRESSION  1. Depression with suicidal ideation    2. Acute cystitis without hematuria    3. Hypoglycemia        DISPOSITION  Disposition: Admit to mental health unit - medically cleared for admission  Patient condition is stable    NOTE: This report was transcribed using voice recognition software.  Every effort was made to ensure accuracy; however, inadvertent computerized transcription errors may be present    Eliseo Lozada MD  Attending Emergency Physician          Eliseo Lozada MD  [phone]

## 2021-04-24 PROBLEM — L30.9 DERMATITIS: Status: RESOLVED | Noted: 2020-12-02 | Resolved: 2021-04-24

## 2021-04-24 PROBLEM — R11.2 INTRACTABLE VOMITING WITH NAUSEA: Status: RESOLVED | Noted: 2021-04-21 | Resolved: 2021-04-24

## 2021-04-24 PROBLEM — E78.49 OTHER HYPERLIPIDEMIA: Status: RESOLVED | Noted: 2021-02-05 | Resolved: 2021-04-24

## 2021-04-24 PROBLEM — R30.0 DYSURIA: Status: RESOLVED | Noted: 2021-02-05 | Resolved: 2021-04-24

## 2021-04-24 PROBLEM — R55 SYNCOPE AND COLLAPSE: Status: RESOLVED | Noted: 2021-01-25 | Resolved: 2021-04-24

## 2021-04-24 PROBLEM — F31.2 SEVERE MANIC BIPOLAR 1 DISORDER WITH PSYCHOTIC BEHAVIOR (HCC): Status: ACTIVE | Noted: 2021-04-24

## 2021-04-24 PROBLEM — K43.5 PARASTOMAL HERNIA WITHOUT OBSTRUCTION OR GANGRENE: Status: RESOLVED | Noted: 2021-04-21 | Resolved: 2021-04-24

## 2021-04-24 PROBLEM — F23 ACUTE PSYCHOSIS (HCC): Status: RESOLVED | Noted: 2018-11-20 | Resolved: 2021-04-24

## 2021-04-24 LAB
CHOLESTEROL, TOTAL: 165 MG/DL (ref 0–199)
HBA1C MFR BLD: 5.7 % (ref 4–5.6)
HDLC SERPL-MCNC: 43 MG/DL
LDL CHOLESTEROL CALCULATED: 72 MG/DL (ref 0–99)
TRIGL SERPL-MCNC: 251 MG/DL (ref 0–149)
VLDLC SERPL CALC-MCNC: 50 MG/DL

## 2021-04-24 PROCEDURE — 99222 1ST HOSP IP/OBS MODERATE 55: CPT | Performed by: NURSE PRACTITIONER

## 2021-04-24 PROCEDURE — 6370000000 HC RX 637 (ALT 250 FOR IP): Performed by: NURSE PRACTITIONER

## 2021-04-24 PROCEDURE — 80061 LIPID PANEL: CPT

## 2021-04-24 PROCEDURE — 6370000000 HC RX 637 (ALT 250 FOR IP): Performed by: PSYCHIATRY & NEUROLOGY

## 2021-04-24 PROCEDURE — 83036 HEMOGLOBIN GLYCOSYLATED A1C: CPT

## 2021-04-24 PROCEDURE — 36415 COLL VENOUS BLD VENIPUNCTURE: CPT

## 2021-04-24 PROCEDURE — 1240000000 HC EMOTIONAL WELLNESS R&B

## 2021-04-24 RX ORDER — DIVALPROEX SODIUM 250 MG/1
500 TABLET, DELAYED RELEASE ORAL EVERY 12 HOURS SCHEDULED
Status: DISCONTINUED | OUTPATIENT
Start: 2021-04-24 | End: 2021-04-26

## 2021-04-24 RX ORDER — PRAZOSIN HYDROCHLORIDE 2 MG/1
2 CAPSULE ORAL 2 TIMES DAILY
Status: ON HOLD | COMMUNITY
End: 2021-05-27 | Stop reason: HOSPADM

## 2021-04-24 RX ORDER — MAGNESIUM HYDROXIDE/ALUMINUM HYDROXICE/SIMETHICONE 120; 1200; 1200 MG/30ML; MG/30ML; MG/30ML
30 SUSPENSION ORAL PRN
Status: DISCONTINUED | OUTPATIENT
Start: 2021-04-24 | End: 2021-04-28 | Stop reason: HOSPADM

## 2021-04-24 RX ORDER — TRAZODONE HYDROCHLORIDE 50 MG/1
50 TABLET ORAL NIGHTLY PRN
Status: DISCONTINUED | OUTPATIENT
Start: 2021-04-24 | End: 2021-04-28 | Stop reason: HOSPADM

## 2021-04-24 RX ORDER — HALOPERIDOL 5 MG
5 TABLET ORAL EVERY 6 HOURS PRN
Status: DISCONTINUED | OUTPATIENT
Start: 2021-04-24 | End: 2021-04-28 | Stop reason: HOSPADM

## 2021-04-24 RX ORDER — DIVALPROEX SODIUM 250 MG/1
1000 TABLET, DELAYED RELEASE ORAL ONCE
Status: DISCONTINUED | OUTPATIENT
Start: 2021-04-24 | End: 2021-04-26

## 2021-04-24 RX ORDER — LANOLIN ALCOHOL/MO/W.PET/CERES
6 CREAM (GRAM) TOPICAL NIGHTLY
Status: DISCONTINUED | OUTPATIENT
Start: 2021-04-24 | End: 2021-04-28 | Stop reason: HOSPADM

## 2021-04-24 RX ORDER — HALOPERIDOL 5 MG/ML
5 INJECTION INTRAMUSCULAR EVERY 6 HOURS PRN
Status: DISCONTINUED | OUTPATIENT
Start: 2021-04-24 | End: 2021-04-28 | Stop reason: HOSPADM

## 2021-04-24 RX ORDER — HYDROXYZINE PAMOATE 25 MG/1
50 CAPSULE ORAL 3 TIMES DAILY PRN
Status: DISCONTINUED | OUTPATIENT
Start: 2021-04-24 | End: 2021-04-28 | Stop reason: HOSPADM

## 2021-04-24 RX ORDER — BENZTROPINE MESYLATE 1 MG/1
1 TABLET ORAL 2 TIMES DAILY
Status: DISCONTINUED | OUTPATIENT
Start: 2021-04-24 | End: 2021-04-28 | Stop reason: HOSPADM

## 2021-04-24 RX ORDER — RISPERIDONE 1 MG/1
1 TABLET, FILM COATED ORAL 2 TIMES DAILY
Status: DISCONTINUED | OUTPATIENT
Start: 2021-04-24 | End: 2021-04-26

## 2021-04-24 RX ORDER — LORAZEPAM 1 MG/1
1 TABLET ORAL 2 TIMES DAILY
Status: DISCONTINUED | OUTPATIENT
Start: 2021-04-24 | End: 2021-04-28 | Stop reason: HOSPADM

## 2021-04-24 RX ORDER — ACETAMINOPHEN 325 MG/1
650 TABLET ORAL EVERY 6 HOURS PRN
Status: DISCONTINUED | OUTPATIENT
Start: 2021-04-24 | End: 2021-04-28 | Stop reason: HOSPADM

## 2021-04-24 RX ADMIN — LORAZEPAM 1 MG: 1 TABLET ORAL at 22:08

## 2021-04-24 RX ADMIN — LORAZEPAM 1 MG: 1 TABLET ORAL at 13:13

## 2021-04-24 RX ADMIN — BENZTROPINE MESYLATE 1 MG: 1 TABLET ORAL at 11:30

## 2021-04-24 RX ADMIN — HALOPERIDOL 5 MG: 5 TABLET ORAL at 11:30

## 2021-04-24 RX ADMIN — HYDROXYZINE PAMOATE 50 MG: 25 CAPSULE ORAL at 11:29

## 2021-04-24 ASSESSMENT — PAIN SCALES - GENERAL
PAINLEVEL_OUTOF10: 0

## 2021-04-24 ASSESSMENT — PAIN DESCRIPTION - PAIN TYPE: TYPE: OTHER (COMMENT)

## 2021-04-24 ASSESSMENT — PATIENT HEALTH QUESTIONNAIRE - PHQ9
SUM OF ALL RESPONSES TO PHQ QUESTIONS 1-9: 24
SUM OF ALL RESPONSES TO PHQ QUESTIONS 1-9: 27

## 2021-04-24 ASSESSMENT — LIFESTYLE VARIABLES: HISTORY_ALCOHOL_USE: NO

## 2021-04-24 NOTE — PROGRESS NOTES
Recreation assessment completed. Patient highly irritable, yelling at times at this staff, able to be redirected however patient appears miserable.

## 2021-04-24 NOTE — PROGRESS NOTES
No behaviors observed during night shift, no prn's administered at this time. No signs/symptoms of distress or discomfort noted. Patient in bed resting with eyes closed. Will continue to monitor and support.  Q 15 minute observation checks maintained

## 2021-04-24 NOTE — PROGRESS NOTES
[employer]  Initial Interdisciplinary Treatment Plan NOTE    Review Date & Time: 04/24/2021 0900    Patient was in treatment team    Admission Type:   Admission Type: Involuntary    Reason for admission:  Reason for Admission: \"I cut myself and my wife thought that I was suicidal. She called the [employer] police, and they transported me here. \"      Estimated Length of Stay Update:  1-3 days  Estimated Discharge Date Update: 04/26/2021    PATIENT STRENGTHS:  Patient Strengths Strengths: Social Skills, Positive Support  Patient Strengths and Limitations:Limitations: Difficult relationships / poor social skills, Unrealistic self-view, Multiple barriers to leisure interests, Difficulty problem solving/relies on others to help solve problems, Hopeless about future, General negative or hopeless attitude about future/recovery  Addictive Behavior:Addictive Behavior  In the past 3 months, have you felt or has someone told you that you have a problem with:  : None  Do you have a history of Chemical Use?: No  Do you have a history of Alcohol Use?: No  Do you have a history of Street Drug Abuse?: No  Histroy of Prescripton Drug Abuse?: No  Medical Problems:  Past Medical History:   Diagnosis Date    ADD (attention deficit disorder)     Anxiety     [facility]2017    Cancer ([facility] prostate cancer    2014 / treated with surgery    Crohn's disease ([facility]     Depression     GERD (gastroesophageal reflux disease)     Panic attack     Rectal pain     has a fissure    Schizo affective schizophrenia ([facility]     stable       EDUCATION:   Learner Progress Toward Treatment Goals: Reviewed results and recommendations of this team and Reviewed goals and plan of care    Method: Small group    Outcome: Verbalized understanding    PATIENT GOALS: None reported    PLAN/TREATMENT RECOMMENDATIONS UPDATE:Take prescribed medication. Attend and participate in offered groups.  Continue to offer emotional support to patient.     GOALS UPDATE:   Time frame for Short-Term Goals: 1-3 days    Mela Burris RN

## 2021-04-24 NOTE — ED NOTES
Patient is medically cleared for inpatient psych evaluation.        Eligio Adhikari MD  04/23/21 0556
Pending negative COVID, patient is accepted to [facility] by Dr. Eduardo Boogie.        Jake Severe, RN  04/23/21 8040
Pt changed into hospital pants. Belongings collected and placed in bag w/ patient labels in [facility].       Wilfrid Masters RN  04/23/21 0015
Denies     Collateral Information:  None      Level of Care/Disposition Plan  [] Home:   [] Outpatient Provider:   [] Crisis Unit:   [x] Inpatient Psychiatric Unit: [facility]  [] Other:        Karly Cohen, [facility]  04/23/21 9813

## 2021-04-24 NOTE — PLAN OF CARE
Patient alert and oriented x3 has difficult time with situation. He denies SI/HI he also denies any auditory and visual hallucinations. Patient is irritable in speech and guarded with his body language. Patient states his roommate is rude and mean to him. He states that he is going to say something to him because he is not going to take it. When asked further what roommate was saying patient stated maybe he was not talking to me and talking to himself. Bridger Vanu was encouraged not to say anything and he agreed. Patient was loud during breakfast stating that he needed to be medicated because he is angry. Patient was encouraged to return to his room as not to cause problem between himself and peers on the unit. He is loud and boisterous but incontrol of behaviors.       Problem: Suicide risk  Goal: Provide patient with safe environment  Description: Provide patient with safe environment  4/24/2021 1041 by Hussain Trammell RN  Outcome: Ongoing     Problem: Altered Mood, Depressive Behavior:  Goal: Able to verbalize acceptance of life and situations over which he or she has no control  Description: Able to verbalize acceptance of life and situations over which he or she has no control  4/24/2021 1041 by Hussain Trammell RN  Outcome: Ongoing     Problem: Depressive Behavior With or Without Suicide Precautions:  Goal: Able to verbalize acceptance of life and situations over which he or she has no control  Description: Able to verbalize acceptance of life and situations over which he or she has no control  4/24/2021 1041 by Hussain Trammell RN  Outcome: Ongoing     Problem: Depressive Behavior With or Without Suicide Precautions:  Goal: Able to verbalize and/or display a decrease in depressive symptoms  Description: Able to verbalize and/or display a decrease in depressive symptoms  4/24/2021 1041 by Hussain Trammell RN  Outcome: Ongoing

## 2021-04-24 NOTE — PROGRESS NOTES
`[facility] [place]  Admission Note     Admission Type:   Admission Type: Involuntary    Reason for admission:  Reason for Admission: \"I cut myself and my wife thought that I was suicidal. She called the [email or web address] police, and they transported me here. \"    PATIENT STRENGTHS:  Strengths: Positive Support, Social Skills    Patient Strengths and Limitations:  Limitations: Tendency to isolate self, Lacks leisure interests, Hopeless about future    Addictive Behavior:   Addictive Behavior  In the past 3 months, have you felt or has someone told you that you have a problem with:  : None  Do you have a history of Chemical Use?: No  Do you have a history of Alcohol Use?: No  Do you have a history of Street Drug Abuse?: No  Histroy of Prescripton Drug Abuse?: No    Medical Problems:   Past Medical History:   Diagnosis Date    ADD (attention deficit disorder)     Anxiety     Bipolar 1 disorder ([facility] 11/19/2017    Cancer ([facility] prostate cancer    2014 / treated with surgery    Crohn's disease ([facility]     Depression     GERD (gastroesophageal reflux disease)     Panic attack     Rectal pain     has a fissure    Schizo affective schizophrenia ([facility]     stable       Status EXAM:  Status and Exam  Normal: No  Facial Expression: Expressionless  Affect: Congruent  Level of Consciousness: Alert  Mood:Normal: No  Motor Activity:Normal: Yes  Interview Behavior: Cooperative  Preception: [place] to Person, Jillene Pry to Time, [place] to Place, [place] to Situation  Attention:Normal: Yes  Thought Processes: Circumstantial  Thought Content:Normal: Yes  Hallucinations: None  Delusions: No  Memory:Normal: Yes  Insight and Judgment: No  Present Suicidal Ideation: No  Present Homicidal Ideation: No    Tobacco Screening:  Practical Counseling, on admission, cherie X, if applicable and completed (first 3 are required if patient doesn't refuse):            ( )  Recognizing danger situations (included triggers and roadblocks) ( )  Coping skills (new ways to manage stress, exercise, relaxation techniques, changing routine, distraction)                                                           ( )  Basic information about quitting (benefits of quitting, techniques in how to quit, available resources  ( ) Referral for counseling faxed to [facility]                                           ( ) Patient refused counseling  ( ) Patient has not smoked in the last 30 days    Metabolic Screening:    Lab Results   Component Value Date    LABA1C 5.2 07/22/2019       Lab Results   Component Value Date    CHOL 320 (H) 07/22/2019    CHOL 188 10/08/2018     Lab Results   Component Value Date    TRIG 148 07/22/2019    TRIG 171 (H) 10/08/2018     Lab Results   Component Value Date    HDL 46 07/22/2019    HDL 52 10/08/2018    HDL 67 06/27/2018     No components found for: LDLCAL  Lab Results   Component Value Date    LABVLDL 30 07/22/2019    LABVLDL 34 10/08/2018    LABVLDL 58 06/27/2018         Body mass index is 23.75 kg/m². BP Readings from Last 2 Encounters:   04/24/21 (!) 150/92   04/21/21 120/82           Pt admitted with followings belongings:  Dentures: None  Vision - Corrective Lenses: Glasses  Hearing Aid: None  Jewelry: Ring  Body Piercings Removed: N/A  Clothing: Pants, Shirt, Footwear  Were All Patient Medications Collected?: Not Applicable  Other Valuables: None     Patient's home medications were confirmed. Patient oriented to surroundings and program expectations and copy of patient rights given: YES  Received admission packet:  YES. Consents reviewed, signed YES. Patient verbalize understanding:  YES.     Patient education on precautions: Davian Jimenes RN
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
## 2021-04-25 LAB
EKG ATRIAL RATE: 101 BPM
EKG P AXIS: 78 DEGREES
EKG P-R INTERVAL: 150 MS
EKG Q-T INTERVAL: 354 MS
EKG QRS DURATION: 86 MS
EKG QTC CALCULATION (BAZETT): 459 MS
EKG R AXIS: 72 DEGREES
EKG T AXIS: 73 DEGREES
EKG VENTRICULAR RATE: 101 BPM
URINE CULTURE, ROUTINE: NORMAL

## 2021-04-25 PROCEDURE — 1240000000 HC EMOTIONAL WELLNESS R&B

## 2021-04-25 PROCEDURE — 6370000000 HC RX 637 (ALT 250 FOR IP): Performed by: PSYCHIATRY & NEUROLOGY

## 2021-04-25 PROCEDURE — 99231 SBSQ HOSP IP/OBS SF/LOW 25: CPT | Performed by: NURSE PRACTITIONER

## 2021-04-25 PROCEDURE — 6370000000 HC RX 637 (ALT 250 FOR IP): Performed by: NURSE PRACTITIONER

## 2021-04-25 PROCEDURE — 93010 ELECTROCARDIOGRAM REPORT: CPT | Performed by: INTERNAL MEDICINE

## 2021-04-25 RX ADMIN — LORAZEPAM 1 MG: 1 TABLET ORAL at 09:17

## 2021-04-25 RX ADMIN — Medication 6 MG: at 20:42

## 2021-04-25 RX ADMIN — RISPERIDONE 1 MG: 1 TABLET, FILM COATED ORAL at 20:42

## 2021-04-25 RX ADMIN — BENZTROPINE MESYLATE 1 MG: 1 TABLET ORAL at 20:42

## 2021-04-25 RX ADMIN — HYDROXYZINE PAMOATE 50 MG: 25 CAPSULE ORAL at 20:42

## 2021-04-25 RX ADMIN — HALOPERIDOL 5 MG: 5 TABLET ORAL at 18:10

## 2021-04-25 RX ADMIN — LORAZEPAM 1 MG: 1 TABLET ORAL at 20:42

## 2021-04-25 NOTE — PLAN OF CARE
Isolative to room   mood is irritable denies SI/HI  denies hallucinations  state \"I stay in my room because I feel like shit\"refused all meds except took ativan   will continue to monitor

## 2021-04-25 NOTE — PROGRESS NOTES
BEHAVIORAL HEALTH FOLLOW-UP NOTE     4/25/2021     Patient was seen and examined in person, Chart reviewed   Patient's case discussed with staff/team    Chief Complaint: \"Thank you for talking to me, I am just so tired, I need to rest.\"     Interim History:   Patient is observed in his room this morning he has difficulty participating in assessment. His mood is liable, he is internally stimulated. He is homicidal towards his wife. He is less agitated than yesterday, states that he is tired. He is taking his medications. Denies SI. Endorses auditory hallucinations, does not deny homicidal ideation toward wife.      Appetite: [x] Normal/Unchanged  [] Increased  [] Decreased      Sleep:       [x] Normal/Unchanged  [] Fair       [] Poor              Energy:    [x] Normal/Unchanged  [] Increased  [] Decreased        SI [] Present  [x] Absent    HI  [x]Present  [] Absent     Aggression:  [x] yes  [] no    Patient is [x] able  [] unable to CONTRACT FOR SAFETY     PAST MEDICAL/PSYCHIATRIC HISTORY:   Past Medical History:   Diagnosis Date    ADD (attention deficit disorder)     Anxiety     Bipolar 1 disorder ([facility] 11/19/2017    Cancer ([facility] prostate cancer    2014 / treated with surgery    Crohn's disease ([facility]     Depression     GERD (gastroesophageal reflux disease)     Panic attack     Rectal pain     has a fissure    Schizo affective schizophrenia ([facility]     stable       FAMILY/SOCIAL HISTORY:  Family History   Problem Relation Age of Onset    Mental Illness Mother     Mental Illness Father     Mental Illness Sister     Mental Illness Brother     Heart Disease Mother     Depression Father      Social History     Socioeconomic History    Marital status:      Spouse name: ZENAIDA    Number of children: 0    Years of education: 12 +8    Highest education level: Not on file   Occupational History    Occupation: [employer]     Employer: UNEMPLOYED     Comment: SSDI   Social Needs    Financial resource strain: Not on file    Food insecurity     Worry: Not on file     Inability: Not on file    Transportation needs     Medical: Not on file     Non-medical: Not on file   Tobacco Use    Smoking status: Former Smoker     Packs/day: 1.00     Years: 41.00     Pack years: 41.00     Types: Cigarettes     Start date: 1978     Quit date: 2021     Years since quittin.0    Smokeless tobacco: Never Used   Substance and Sexual Activity    Alcohol use: No     Comment: no alcohol in5 yrs    Drug use: No     Comment: last use  / marijuana    Sexual activity: Not Currently     Partners: Female   Lifestyle    Physical activity     Days per week: Not on file     Minutes per session: Not on file    Stress: Not on file   Relationships    Social connections     Talks on phone: Not on file     Gets together: Not on file     Attends Nondenominational service: Not on file     Active member of club or organization: Not on file     Attends meetings of clubs or organizations: Not on file     Relationship status: Not on file    Intimate partner violence     Fear of current or ex partner: Not on file     Emotionally abused: Not on file     Physically abused: Not on file     Forced sexual activity: Not on file   Other Topics Concern    Not on file   Social History Narrative    ** Merged History Encounter **                ROS:  [x] All negative/unchanged except if checked.  Explain positive(checked items) below:  [] Constitutional  [] Eyes  [] Ear/Nose/Mouth/Throat  [] Respiratory  [] CV  [] GI  []   [] Musculoskeletal  [] Skin/Breast  [] Neurological  [] Endocrine  [] Heme/Lymph  [] Allergic/Immunologic    Explanation:     MEDICATIONS:    Current Facility-Administered Medications:     acetaminophen (TYLENOL) tablet 650 mg, 650 mg, Oral, Q6H PRN, Tammy Riedel, MD    magnesium hydroxide (MILK OF MAGNESIA) 400 MG/5ML suspension 30 mL, 30 mL, Oral, Daily PRN, Tammy Riedel, MD    aluminum & magnesium hydroxide-simethicone (MAALOX) 200-200-20 MG/5ML suspension 30 mL, 30 mL, Oral, PRN, Arlene Chandra MD    hydrOXYzine (VISTARIL) capsule 50 mg, 50 mg, Oral, TID PRN, Arlene Chandra MD, 50 mg at 04/24/21 1129    haloperidol lactate (HALDOL) injection 5 mg, 5 mg, Intramuscular, Q6H PRN **OR** haloperidol (HALDOL) tablet 5 mg, 5 mg, Oral, Q6H PRN, Arlene Chandra MD, 5 mg at 04/24/21 1130    traZODone (DESYREL) tablet 50 mg, 50 mg, Oral, Nightly PRN, Arlene Chandra MD    divalproex (DEPAKOTE) DR tablet 500 mg, 500 mg, Oral, 2 times per day, Amira Torres APRN - CNP    divalproex (DEPAKOTE) DR tablet 1,000 mg, 1,000 mg, Oral, Once, Amira Torres APRN - CNP    benztropine (COGENTIN) tablet 1 mg, 1 mg, Oral, BID, Valoleg Torres, APRN - CNP, 1 mg at 04/24/21 1130    melatonin tablet 6 mg, 6 mg, Oral, Nightly, KAM Marinelli - CNP    risperiDONE (RISPERDAL) tablet 1 mg, 1 mg, Oral, BID, Amira Torres, APRN - CNP    LORazepam (ATIVAN) tablet 1 mg, 1 mg, Oral, BID, Valetta Melissa, APRN - CNP, 1 mg at 04/25/21 0917    glucose (GLUTOSE) 40 % oral gel 15 g, 15 g, Oral, PRN, Felicia Jones MD    dextrose 50 % IV solution, 12.5 g, Intravenous, PRN, Felicia Jones MD    glucagon (rDNA) injection 1 mg, 1 mg, Intramuscular, PRN, Felicia Jones MD    dextrose 5 % solution, 100 mL/hr, Intravenous, PRN, Felicia Jones MD      Examination:  /66   Pulse 104   Temp 98 °F (36.7 °C) (Temporal)   Resp 16   Ht 6' 3\" (1.905 m)   Wt 190 lb (86.2 kg)   SpO2 98%   BMI 23.75 kg/m²   Gait - steady  Medication side effects(SE): none reported  Mental Status Examination:    Level of consciousness:  within normal limits   Appearance:  poor grooming and poor hygiene  Behavior/Motor:  agitated  Attitude toward examiner:  guarded  Speech:  normal rate and normal volume   Mood: labile  Affect:  mood congruent  Thought processes:  illogical   Thought content:  Homicidal towards wife  Cognition:  oriented to person, place, and time   Concentration poor  Insight poor   Judgement poor     ASSESSMENT:   Patient symptoms are:  [] Well controlled  [] Improving  [] Worsening  [x] No change      Diagnosis:   Principal Problem:    Severe manic bipolar 1 disorder with psychotic behavior ([facility]  Resolved Problems:    * No resolved hospital problems. *      LABS:    Recent Labs     04/23/21  1727   WBC 13.0*   HGB 17.0*        Recent Labs     04/23/21  1727      K 3.8   CL 94*   CO2 23   BUN 25*   CREATININE 1.3*   GLUCOSE 72*     Recent Labs     04/23/21  1727   BILITOT 0.3   ALKPHOS 119   AST 12   ALT 9     Lab Results   Component Value Date    LABAMPH NOT DETECTED 04/23/2021    LABAMPH NOT DETECTED 02/03/2011    BARBSCNU NOT DETECTED 04/23/2021    LABBENZ NOT DETECTED 04/23/2021    LABBENZ SEE BELOW 12/08/2014    CANNAB NOT DETECTED  08/13/2012    COCAINESCRN NOT DETECTED 08/13/2012    LABMETH NOT DETECTED 04/23/2021    OPIATESCREENURINE NOT DETECTED 04/23/2021    PHENCYCLIDINESCREENURINE NOT DETECTED 04/23/2021    PPXUR NOT DETECTED 08/13/2012    ETOH <10 04/23/2021     Lab Results   Component Value Date    TSH 2.240 04/23/2021     Lab Results   Component Value Date    LITHIUM <0.10 (L) 10/24/2018     Lab Results   Component Value Date    VALPROATE 62 02/23/2021         Treatment Plan:  The patient's diagnosis, treatment plan, medication management were formulated after patient was seen directly by the attending physician and myself and all relevant documentation was reviewed. Reviewed current Medications with the patient. Risk, benefit, side effects, possible outcomes of the medication and alternatives discussed with the patient and the patient demonstrated understanding. The patient was also educated that the outcome of treatment will depend on the medication compliance as directed by the prescribers along with regular follow-up, compliance with the labs and other work-up, as clinically indicated. Collateral information: Followed by social work  CD evaluation  Encourage patient to attend group and other milieu activities.   Discharge planning discussed with the patient and treatment team.    PSYCHOTHERAPY/COUNSELING:  [x] Therapeutic interview  [x] Supportive  [] CBT  [] Ongoing  [] Other    [x] Patient continues to need, on a daily basis, active treatment furnished directly by or requiring the supervision of inpatient psychiatric personnel      Anticipated Length of stay: 3 to 5 days based on stability            Electronically signed by Ronney Dance, APRN - CNP on 4/25/2021 at 11:03 AM

## 2021-04-25 NOTE — PROGRESS NOTES
Patient only took  Lorazepam 1 mg. PO And refused the other night medication. Patient state, \" I do not want any other medications, just the ativan, it`s making me sick and not fell good. \"

## 2021-04-25 NOTE — PLAN OF CARE
Patient A+Ox 4, denies SI, HI, and hallucinations. Rates anxiety 9/10 and depression 10/10. Good eye contact when conversing. Flat demeanor, blunt responses. Patient states that he feels hopeless about the future and states that he is unable to provide adequately d/t disability. Patient isolates to room, very depressed. Social activity encouraged.  No behaviors observed   Problem: Altered Mood, Depressive Behavior:  Goal: Able to verbalize acceptance of life and situations over which he or she has no control  Description: Able to verbalize acceptance of life and situations over which he or she has no control  4/25/2021 0031 by Ibis Castro RN  Outcome: Ongoing  4/24/2021 1041 by Eileen Montalvo RN  Outcome: Ongoing     Problem: Altered Mood, Depressive Behavior:  Goal: Able to verbalize and/or display a decrease in depressive symptoms  Description: Able to verbalize and/or display a decrease in depressive symptoms  Outcome: Ongoing

## 2021-04-26 PROCEDURE — 6370000000 HC RX 637 (ALT 250 FOR IP): Performed by: NURSE PRACTITIONER

## 2021-04-26 PROCEDURE — 6370000000 HC RX 637 (ALT 250 FOR IP): Performed by: PSYCHIATRY & NEUROLOGY

## 2021-04-26 PROCEDURE — 99231 SBSQ HOSP IP/OBS SF/LOW 25: CPT | Performed by: NURSE PRACTITIONER

## 2021-04-26 PROCEDURE — 1240000000 HC EMOTIONAL WELLNESS R&B

## 2021-04-26 RX ORDER — RISPERIDONE 2 MG/1
2 TABLET, FILM COATED ORAL 2 TIMES DAILY
Status: DISCONTINUED | OUTPATIENT
Start: 2021-04-26 | End: 2021-04-28 | Stop reason: HOSPADM

## 2021-04-26 RX ORDER — VALPROIC ACID 250 MG/5ML
500 SOLUTION ORAL EVERY 12 HOURS SCHEDULED
Status: DISCONTINUED | OUTPATIENT
Start: 2021-04-26 | End: 2021-04-28 | Stop reason: HOSPADM

## 2021-04-26 RX ADMIN — VALPROIC ACID 500 MG: 250 SOLUTION ORAL at 21:18

## 2021-04-26 RX ADMIN — RISPERIDONE 1 MG: 1 TABLET, FILM COATED ORAL at 10:08

## 2021-04-26 RX ADMIN — RISPERIDONE 2 MG: 2 TABLET, FILM COATED ORAL at 21:18

## 2021-04-26 RX ADMIN — BENZTROPINE MESYLATE 1 MG: 1 TABLET ORAL at 21:18

## 2021-04-26 RX ADMIN — BENZTROPINE MESYLATE 1 MG: 1 TABLET ORAL at 10:11

## 2021-04-26 RX ADMIN — LORAZEPAM 1 MG: 1 TABLET ORAL at 10:09

## 2021-04-26 RX ADMIN — HALOPERIDOL 5 MG: 5 TABLET ORAL at 10:11

## 2021-04-26 RX ADMIN — LORAZEPAM 1 MG: 1 TABLET ORAL at 21:18

## 2021-04-26 RX ADMIN — HALOPERIDOL 5 MG: 5 TABLET ORAL at 16:55

## 2021-04-26 RX ADMIN — VALPROIC ACID 500 MG: 250 SOLUTION ORAL at 11:06

## 2021-04-26 RX ADMIN — Medication 6 MG: at 21:18

## 2021-04-26 NOTE — PROGRESS NOTES
Group Therapy Note    Patient attended goals group and stated daily goal as to work on self-esteem. Group Therapy Note    Date: 4/26/2021  Start Time: 9:45  End Time: 10:15  Number of Participants: 14    Type of Group: Psychoeducation    Wellness Binder Information  Module Name: Self-care  Session Number: NA    Patient's Goal: To id ways to take care of oneself on a daily basis. Notes: Attended group and was able to id positive ways to take care of self. Status After Intervention:  Improved    Participation Level:  Active Listener    Participation Quality: Attentive      Speech:  hesitant      Thought Process/Content: Linear      Affective Functioning: Blunted      Mood: anxious, depressed and irritable      Level of consciousness:  Alert      Response to Learning: Progressing to goal      Endings: None Reported    Modes of Intervention: Education      Discipline Responsible: Psychoeducational Specialist      Signature:  SUSHANT Be

## 2021-04-26 NOTE — PLAN OF CARE
[facility]  Day 3 Interdisciplinary Treatment Plan NOTE    Review Date & Time: 4/2/21 1000    Patient was in treatment team    Admission Type:   Admission Type: Involuntary    Reason for admission:  Reason for Admission: \"I cut myself and my wife thought that I was suicidal. She called the [place] police, and they transported me here. \"  Estimated Length of Stay Update:  3-5 days  Estimated Discharge Date Update: 5-7 days    PATIENT STRENGTHS:  Patient Strengths Strengths: Social Skills, Positive Support  Patient Strengths and Limitations:Limitations: Difficult relationships / poor social skills, Unrealistic self-view, Multiple barriers to leisure interests, Difficulty problem solving/relies on others to help solve problems, Hopeless about future, General negative or hopeless attitude about future/recovery  Addictive Behavior:Addictive Behavior  In the past 3 months, have you felt or has someone told you that you have a problem with:  : None  Do you have a history of Chemical Use?: No  Do you have a history of Alcohol Use?: No  Do you have a history of Street Drug Abuse?: No  Histroy of Prescripton Drug Abuse?: No  Medical Problems:  Past Medical History:   Diagnosis Date    ADD (attention deficit disorder)     Anxiety     [facility] ([facility] 11/19/2017    Cancer ([facility] prostate cancer    2014 / treated with surgery    Crohn's disease ([facility]     Depression     GERD (gastroesophageal reflux disease)     Panic attack     Rectal pain     has a fissure    Schizo affective schizophrenia ([facility]     stable       Risk:  Fall RiskTotal: 79  Andre Scale Andre Scale Score: 22  BVC Total: 1  Change in scoresnone.  Changes to plan of Care none    Status EXAM:   Status and Exam  Normal: No  Facial Expression: Avoids Gaze  Affect: Blunt  Level of Consciousness: Alert  Mood:Normal: No  Mood: Anxious  Motor Activity:Normal: No  Motor Activity: Decreased  Interview Behavior: Cooperative  Preception:

## 2021-04-26 NOTE — PLAN OF CARE
Mood is improved thi morning  affect brighter but underlying irritability   denies   SI/HI     admits to auditory hallucinations of voices saying mean things to him   cooperative with meds   attending groups   will continue to monitor

## 2021-04-26 NOTE — PROGRESS NOTES
BEHAVIORAL HEALTH FOLLOW-UP NOTE     4/26/2021     Patient was seen and examined in person, Chart reviewed   Patient's case discussed with staff/team    Chief Complaint: \"I don't think you people are going to do anything to help me. \"     Interim History:   Patient is observed up on the unit, he is visible in the milieu and participating to the best of his ability despite his decompensated psychiatric condition. He is grossly psychotic, paranoid hallucinating and continues to endorse homicidal ideation towards his wife. Stating that he believes she spends too much money on cigarettes, and that he cannot afford to purchase foods he is supposed to be eating due to his Crohn's disease, because of the expense of her smoking addiction. He states that the amount of Ativan we are giving him is not enough, and that he does not believe are going to do anything to help him. The patient had been refusing his Depakote, the Depakote was switched to Depakote liquid 500 mg twice daily and he has begun to take it. He is taking his Risperdal, and the Ativan. Ativan is only used for short-term for rapid stabilization of mood. His speech is pressured, he is loud, his eye contact is intense.         Appetite:  [x] Normal/Unchanged  [] Increased  [] Decreased      Sleep:       [x] Normal/Unchanged  [] Fair       [] Poor              Energy:    [x] Normal/Unchanged  [] Increased  [] Decreased        SI [] Present  [x] Absent    HI  [x]Present  [] Absent     Aggression:  [x] yes  [] no    Patient is [x] able  [] unable to CONTRACT FOR SAFETY     PAST MEDICAL/PSYCHIATRIC HISTORY:   Past Medical History:   Diagnosis Date    ADD (attention deficit disorder)     Anxiety     Bipolar 1 disorder ([facility] 11/19/2017    Cancer ([facility] prostate cancer    2014 / treated with surgery    Crohn's disease ([facility]     Depression     GERD (gastroesophageal reflux disease)     Panic attack     Rectal pain     has a fissure    Schizo affective ROS:  [x] All negative/unchanged except if checked.  Explain positive(checked items) below:  [] Constitutional  [] Eyes  [] Ear/Nose/Mouth/Throat  [] Respiratory  [] CV  [] GI  []   [] Musculoskeletal  [] Skin/Breast  [] Neurological  [] Endocrine  [] Heme/Lymph  [] Allergic/Immunologic    Explanation:     MEDICATIONS:    Current Facility-Administered Medications:     risperiDONE (RISPERDAL) tablet 2 mg, 2 mg, Oral, BID, KAM Chopra - CNP    valproic acid (DEPACON) 250 MG/5ML oral solution 500 mg, 500 mg, Oral, 2 times per day, KAM Chopra - CNP, 500 mg at 04/26/21 1106    acetaminophen (TYLENOL) tablet 650 mg, 650 mg, Oral, Q6H PRN, Chiqui Carrera MD    magnesium hydroxide (MILK OF MAGNESIA) 400 MG/5ML suspension 30 mL, 30 mL, Oral, Daily PRN, Chiqui Carrera MD    aluminum & magnesium hydroxide-simethicone (MAALOX) 200-200-20 MG/5ML suspension 30 mL, 30 mL, Oral, PRN, Chiqui Carrera MD    hydrOXYzine (VISTARIL) capsule 50 mg, 50 mg, Oral, TID PRN, Chiqui Carrera MD, 50 mg at 04/25/21 2042    haloperidol lactate (HALDOL) injection 5 mg, 5 mg, Intramuscular, Q6H PRN **OR** haloperidol (HALDOL) tablet 5 mg, 5 mg, Oral, Q6H PRN, Chiqui Carrera MD, 5 mg at 04/26/21 1011    traZODone (DESYREL) tablet 50 mg, 50 mg, Oral, Nightly PRN, Chiqui Carrera MD    benztropine (COGENTIN) tablet 1 mg, 1 mg, Oral, BID, KAM Chopra - CNP, 1 mg at 04/26/21 1011    melatonin tablet 6 mg, 6 mg, Oral, Nightly, KAM Chopra - CNP, 6 mg at 04/25/21 2042    LORazepam (ATIVAN) tablet 1 mg, 1 mg, Oral, BID, KAM Chopra - CNP, 1 mg at 04/26/21 1009    glucose (GLUTOSE) 40 % oral gel 15 g, 15 g, Oral, PRN, Guanako Mims MD    dextrose 50 % IV solution, 12.5 g, Intravenous, PRN, Guanako Mims MD    glucagon (rDNA) injection 1 mg, 1 mg, Intramuscular, PRN, Guanako Mims MD    dextrose 5 % solution, 100 mL/hr, Intravenous, PRN, Guanako Mims MD      Examination:  BP (!) 162/90 Pulse 106   Temp 97 °F (36.1 °C) (Temporal)   Resp 16   Ht 6' 3\" (1.905 m)   Wt 190 lb (86.2 kg)   SpO2 100%   BMI 23.75 kg/m²   Gait - steady  Medication side effects(SE): none reported  Mental Status Examination:    Level of consciousness:  within normal limits   Appearance:  poor grooming and poor hygiene  Behavior/Motor:  psychomotor agitation  Attitude toward examiner:  hostile  Speech:  pressured   Mood: labile  Affect:  anxious, intense and angry  Thought processes:  goal directed , concrete   thought content: Psychotic, homicidal towards wife  cognition:  oriented to person, place, and time   Concentration distractible  Insight poor   Judgement poor     ASSESSMENT:   Patient symptoms are:  [] Well controlled  [] Improving  [] Worsening  [x] No change      Diagnosis:   Principal Problem:    Severe manic bipolar 1 disorder with psychotic behavior ([facility]  Resolved Problems:    * No resolved hospital problems.  *      LABS:    Recent Labs     04/23/21  1727   WBC 13.0*   HGB 17.0*        Recent Labs     04/23/21  1727      K 3.8   CL 94*   CO2 23   BUN 25*   CREATININE 1.3*   GLUCOSE 72*     Recent Labs     04/23/21  1727   BILITOT 0.3   ALKPHOS 119   AST 12   ALT 9     Lab Results   Component Value Date    LABAMPH NOT DETECTED 04/23/2021    LABAMPH NOT DETECTED 02/03/2011    BARBSCNU NOT DETECTED 04/23/2021    LABBENZ NOT DETECTED 04/23/2021    LABBENZ SEE BELOW 12/08/2014    CANNAB NOT DETECTED  08/13/2012    COCAINESCRN NOT DETECTED 08/13/2012    LABMETH NOT DETECTED 04/23/2021    OPIATESCREENURINE NOT DETECTED 04/23/2021    PHENCYCLIDINESCREENURINE NOT DETECTED 04/23/2021    PPXUR NOT DETECTED 08/13/2012    ETOH <10 04/23/2021     Lab Results   Component Value Date    TSH 2.240 04/23/2021     Lab Results   Component Value Date    LITHIUM <0.10 (L) 10/24/2018     Lab Results   Component Value Date    VALPROATE 62 02/23/2021         Treatment Plan:  Reviewed current Medications with the patient. Risk, benefit, side effects, possible outcomes of the medication and alternatives discussed with the patient and the patient demonstrated understanding. The patient was also educated that the outcome of treatment will depend on the medication compliance as directed by the prescribers along with regular follow-up, compliance with the labs and other work-up, as clinically indicated. Depakote liquid 500 mg twice daily for mood stability  Risperdal 2 mg twice daily for psychosis  Cogentin 1 mg twice daily for akathisia  Ativan 1 mg twice daily for 3 days  Collateral information: Followed by social work  CD evaluation  Encourage patient to attend group and other milieu activities.   Discharge planning discussed with the patient and treatment team.    PSYCHOTHERAPY/COUNSELING:  [x] Therapeutic interview  [x] Supportive  [] CBT  [] Ongoing  [] Other    [x] Patient continues to need, on a daily basis, active treatment furnished directly by or requiring the supervision of inpatient psychiatric personnel      Anticipated Length of stay: 7 to 10 days based on stability            Electronically signed by KAM Ledesma CNP on 4/26/2021 at 1:42 PM

## 2021-04-27 PROCEDURE — 99231 SBSQ HOSP IP/OBS SF/LOW 25: CPT | Performed by: NURSE PRACTITIONER

## 2021-04-27 PROCEDURE — 6370000000 HC RX 637 (ALT 250 FOR IP): Performed by: NURSE PRACTITIONER

## 2021-04-27 PROCEDURE — 6370000000 HC RX 637 (ALT 250 FOR IP): Performed by: PSYCHIATRY & NEUROLOGY

## 2021-04-27 PROCEDURE — 1240000000 HC EMOTIONAL WELLNESS R&B

## 2021-04-27 RX ADMIN — VALPROIC ACID 500 MG: 250 SOLUTION ORAL at 09:31

## 2021-04-27 RX ADMIN — RISPERIDONE 2 MG: 2 TABLET, FILM COATED ORAL at 09:32

## 2021-04-27 RX ADMIN — HYDROXYZINE PAMOATE 50 MG: 25 CAPSULE ORAL at 00:35

## 2021-04-27 RX ADMIN — HALOPERIDOL 5 MG: 5 TABLET ORAL at 09:32

## 2021-04-27 RX ADMIN — TRAZODONE HYDROCHLORIDE 50 MG: 50 TABLET ORAL at 00:35

## 2021-04-27 RX ADMIN — Medication 6 MG: at 21:44

## 2021-04-27 RX ADMIN — TRAZODONE HYDROCHLORIDE 50 MG: 50 TABLET ORAL at 21:43

## 2021-04-27 RX ADMIN — VALPROIC ACID 500 MG: 250 SOLUTION ORAL at 21:42

## 2021-04-27 RX ADMIN — RISPERIDONE 2 MG: 2 TABLET, FILM COATED ORAL at 21:43

## 2021-04-27 RX ADMIN — LORAZEPAM 1 MG: 1 TABLET ORAL at 21:43

## 2021-04-27 RX ADMIN — BENZTROPINE MESYLATE 1 MG: 1 TABLET ORAL at 21:43

## 2021-04-27 RX ADMIN — BENZTROPINE MESYLATE 1 MG: 1 TABLET ORAL at 09:32

## 2021-04-27 RX ADMIN — LORAZEPAM 1 MG: 1 TABLET ORAL at 09:32

## 2021-04-27 ASSESSMENT — PAIN SCALES - GENERAL
PAINLEVEL_OUTOF10: 0
PAINLEVEL_OUTOF10: 0

## 2021-04-27 ASSESSMENT — PAIN - FUNCTIONAL ASSESSMENT: PAIN_FUNCTIONAL_ASSESSMENT: RESPIRATORY RATE >10

## 2021-04-27 NOTE — PLAN OF CARE
Patient is calm and cooperative. Pleasant during assessment. Appears flat and sad, brightens during conversation. Patient admits to mood improvement throughout day. Denies depression/anxiety at this time. Patient also denies SI/HI but continues to have auditory hallucinations. Patient states, \"The voices are not as harsh anymore and not as degrading. They are becoming more tolerable and the medication is working. \" Patient has been medication compliant. Out on the unit, social with peers. Will continue to monitor and provide support.     Problem: Suicide risk  Goal: Provide patient with safe environment  Description: Provide patient with safe environment  4/26/2021 2219 by Sofiya Ortiz RN  Outcome: Met This Shift     Problem: Altered Mood, Depressive Behavior:  Goal: Able to verbalize acceptance of life and situations over which he or she has no control  Description: Able to verbalize acceptance of life and situations over which he or she has no control  4/26/2021 2219 by Sofiya Ortiz RN  Outcome: Ongoing     Problem: Altered Mood, Depressive Behavior:  Goal: Able to verbalize and/or display a decrease in depressive symptoms  Description: Able to verbalize and/or display a decrease in depressive symptoms  4/26/2021 2219 by Sofiya Ortiz RN  Outcome: Ongoing

## 2021-04-27 NOTE — PROGRESS NOTES
BEHAVIORAL HEALTH FOLLOW-UP NOTE     4/27/2021     Patient was seen and examined in person, Chart reviewed   Patient's case discussed with staff/team    Chief Complaint: \"I feel a lot better and I am ready to go home. \"     Interim History:   Patient is observed up on the unit, he is visible in the milieu and participating to the best of his ability . He has had a marked improvement in his psychiatric condition. He said he is now ready to go home, feeling much better. He states he is no longer homicidal towards his wife. Tells me that he got the best sleep last night, but that is not out of the ordinary for him. Tells me he is no longer hearing voices or seeing things. The patient is requesting discharge. Patient counseled that this would have to be discussed with the psychiatric provider. Patient is somewhat exaggerated and overly bright, in comparison to his presentation yesterday, when she was still overtly psychotic. the Depakote was switched to Depakote liquid 500 mg twice daily and he has begun to take it. He is taking his Risperdal, and the Ativan. Ativan is only used for short-term for rapid stabilization of mood. His speech is pressured, and he seems to be in better control of his behaviors and actions today than yesterday.     Appetite:  [x] Normal/Unchanged  [] Increased  [] Decreased      Sleep:       [x] Normal/Unchanged  [] Fair       [] Poor              Energy:    [x] Normal/Unchanged  [] Increased  [] Decreased        SI [] Present  [x] Absent    HI  [x]Present  [] Absent     Aggression:  [x] yes  [] no    Patient is [x] able  [] unable to CONTRACT FOR SAFETY     PAST MEDICAL/PSYCHIATRIC HISTORY:   Past Medical History:   Diagnosis Date    ADD (attention deficit disorder)     Anxiety     [facility] ([facility] 11/19/2017    Cancer ([facility] prostate cancer    2014 / treated with surgery    Crohn's disease ([facility]     Depression     GERD (gastroesophageal reflux disease)     Panic attack Narrative    ** Merged History Encounter **                ROS:  [x] All negative/unchanged except if checked.  Explain positive(checked items) below:  [] Constitutional  [] Eyes  [] Ear/Nose/Mouth/Throat  [] Respiratory  [] CV  [] GI  []   [] Musculoskeletal  [] Skin/Breast  [] Neurological  [] Endocrine  [] Heme/Lymph  [] Allergic/Immunologic    Explanation:     MEDICATIONS:    Current Facility-Administered Medications:     risperiDONE (RISPERDAL) tablet 2 mg, 2 mg, Oral, BID, Jovany Henderson APRN - CNP, 2 mg at 04/27/21 0932    valproic acid (DEPACON) 250 MG/5ML oral solution 500 mg, 500 mg, Oral, 2 times per day, Jovany Henderson APRN - CNP, 500 mg at 04/27/21 0931    acetaminophen (TYLENOL) tablet 650 mg, 650 mg, Oral, Q6H PRN, Juan Carlos Calderon MD    magnesium hydroxide (MILK OF MAGNESIA) 400 MG/5ML suspension 30 mL, 30 mL, Oral, Daily PRN, Juan Carlos Calderon MD    aluminum & magnesium hydroxide-simethicone (MAALOX) 200-200-20 MG/5ML suspension 30 mL, 30 mL, Oral, PRN, Juan Carlos Calderon MD    hydrOXYzine (VISTARIL) capsule 50 mg, 50 mg, Oral, TID PRN, Juan Carlos Calderon MD, 50 mg at 04/27/21 0035    haloperidol lactate (HALDOL) injection 5 mg, 5 mg, Intramuscular, Q6H PRN **OR** haloperidol (HALDOL) tablet 5 mg, 5 mg, Oral, Q6H PRN, Juan Carlos Calderon MD, 5 mg at 04/27/21 0932    traZODone (DESYREL) tablet 50 mg, 50 mg, Oral, Nightly PRN, Juan Carlos Calderon MD, 50 mg at 04/27/21 0035    benztropine (COGENTIN) tablet 1 mg, 1 mg, Oral, BID, Jovany Henderson APRN - CNP, 1 mg at 04/27/21 0932    melatonin tablet 6 mg, 6 mg, Oral, Nightly, Jovany Henderson APRN - CNP, 6 mg at 04/26/21 2118    LORazepam (ATIVAN) tablet 1 mg, 1 mg, Oral, BID, Jovany Santiago, APRN - CNP, 1 mg at 04/27/21 0932    glucose (GLUTOSE) 40 % oral gel 15 g, 15 g, Oral, PRNRyan MD    dextrose 50 % IV solution, 12.5 g, Intravenous, Ryan KIRBY MD    glucagon (rDNA) injection 1 mg, 1 mg, Intramuscular, Ryan KIRBY MD

## 2021-04-27 NOTE — PLAN OF CARE
Problem: Suicide risk  Goal: Provide patient with safe environment  Description: Provide patient with safe environment  4/27/2021 1051 by Antonia Liu RN  Outcome: Ongoing     Problem: Falls - Risk of:  Goal: Absence of physical injury  Description: Absence of physical injury  Outcome: Ongoing     Problem: Altered Mood, Depressive Behavior:  Goal: Able to verbalize acceptance of life and situations over which he or she has no control  Description: Able to verbalize acceptance of life and situations over which he or she has no control  4/27/2021 1051 by Antonia Liu RN  Outcome: Ongoing     Problem: Altered Mood, Depressive Behavior:  Goal: Able to verbalize and/or display a decrease in depressive symptoms  Description: Able to verbalize and/or display a decrease in depressive symptoms  4/27/2021 1051 by Antonia Liu RN  Outcome: Ongoing     Problem: Altered Mood, Depressive Behavior:  Goal: Able to verbalize support systems  Description: Able to verbalize support systems  Outcome: Ongoing     Problem: Altered Mood, Depressive Behavior:  Goal: Absence of self-harm  Description: Absence of self-harm  Outcome: Ongoing     Patient is evasive, but cooperative with assessment. He is out on unit and social with select peers. He denies suicidal ideations, homicidal ideations, and hallucinations. Patient is medication compliant and in control of his behavior thus far in shift. Will continue to monitor and support.

## 2021-04-27 NOTE — PROGRESS NOTES
Group Therapy Note    Patient attended goals group and stated daily goal as to be kind to others. Group Therapy Note    Date: 4/27/2021  Start Time: 10:00  End Time:  10:30  Number of Participants:13    Type of Group: Psychoeducation    Wellness Binder Information  Module Name: Self-esteem  Session Number:  NA    Patient's Goal:  To id positive affirmations to improve self-esteem. Notes: Attended group and was able to participate in positive affirmations. Status After Intervention:  Improved    Participation Level:  Active Listener    Participation Quality: Attentive      Speech:  hesitant      Thought Process/Content: Linear      Affective Functioning: Flat      Mood: anxious and depressed      Level of consciousness:  Alert      Response to Learning: Progressing to goal      Endings: None Reported    Modes of Intervention: Education      Discipline Responsible: Psychoeducational Specialist      Signature:  SUSHANT West

## 2021-04-27 NOTE — PROGRESS NOTES
Spoke with patient's wife Luci Manrique for collateral information. She states that she feels he is safe for discharge and can return home. She states that there are no weapons in the home.

## 2021-04-28 VITALS
RESPIRATION RATE: 16 BRPM | WEIGHT: 190 LBS | HEART RATE: 100 BPM | TEMPERATURE: 97.5 F | SYSTOLIC BLOOD PRESSURE: 144 MMHG | OXYGEN SATURATION: 100 % | BODY MASS INDEX: 23.62 KG/M2 | DIASTOLIC BLOOD PRESSURE: 85 MMHG | HEIGHT: 75 IN

## 2021-04-28 PROCEDURE — 99239 HOSP IP/OBS DSCHRG MGMT >30: CPT | Performed by: NURSE PRACTITIONER

## 2021-04-28 PROCEDURE — 6370000000 HC RX 637 (ALT 250 FOR IP): Performed by: NURSE PRACTITIONER

## 2021-04-28 PROCEDURE — 6370000000 HC RX 637 (ALT 250 FOR IP): Performed by: PSYCHIATRY & NEUROLOGY

## 2021-04-28 RX ORDER — BENZTROPINE MESYLATE 1 MG/1
1 TABLET ORAL 2 TIMES DAILY
Qty: 60 TABLET | Refills: 0 | Status: ON HOLD
Start: 2021-04-28 | End: 2021-05-27 | Stop reason: HOSPADM

## 2021-04-28 RX ORDER — VALPROIC ACID 250 MG/5ML
500 SOLUTION ORAL EVERY 12 HOURS SCHEDULED
Qty: 600 ML | Refills: 0 | Status: ON HOLD
Start: 2021-04-28 | End: 2021-05-27 | Stop reason: HOSPADM

## 2021-04-28 RX ORDER — LANOLIN ALCOHOL/MO/W.PET/CERES
6 CREAM (GRAM) TOPICAL NIGHTLY
Qty: 60 TABLET | Refills: 0 | Status: ON HOLD
Start: 2021-04-28 | End: 2021-05-27 | Stop reason: HOSPADM

## 2021-04-28 RX ORDER — RISPERIDONE 2 MG/1
2 TABLET, FILM COATED ORAL 2 TIMES DAILY
Qty: 60 TABLET | Refills: 0 | Status: ON HOLD
Start: 2021-04-28 | End: 2021-05-27 | Stop reason: HOSPADM

## 2021-04-28 RX ADMIN — VALPROIC ACID 500 MG: 250 SOLUTION ORAL at 09:42

## 2021-04-28 RX ADMIN — RISPERIDONE 2 MG: 2 TABLET, FILM COATED ORAL at 09:42

## 2021-04-28 RX ADMIN — BENZTROPINE MESYLATE 1 MG: 1 TABLET ORAL at 09:42

## 2021-04-28 RX ADMIN — LORAZEPAM 1 MG: 1 TABLET ORAL at 09:42

## 2021-04-28 RX ADMIN — HALOPERIDOL 5 MG: 5 TABLET ORAL at 09:42

## 2021-04-28 RX ADMIN — HYDROXYZINE PAMOATE 50 MG: 25 CAPSULE ORAL at 09:43

## 2021-04-28 ASSESSMENT — PAIN SCALES - GENERAL: PAINLEVEL_OUTOF10: 0

## 2021-04-28 NOTE — PROGRESS NOTES
CLINICAL PHARMACY NOTE: MEDS TO [address] Select Patient?: Yes  Total # of Prescriptions Filled: 3   The following medications were delivered to the patient:  · Risperidone 2 mg   · Benztropine 1 mg Valproic acid 250/5     Total # of Interventions Completed: 2  Time Spent (min): 30    Additional Documentation:

## 2021-04-28 NOTE — DISCHARGE SUMMARY
DISCHARGE SUMMARY      Patient ID:  Radha Santacruz  [id number]  75 y.o.  1965    Admit date: 4/23/2021    Discharge date and time: 4/28/2021    Admitting Physician: Giovanna Murillo MD     Discharge Physician: Dr Tisha Tovar MD    Discharge Diagnoses:   Patient Active Problem List   Diagnosis    Drug overdose, multiple drugs    [facility])    Laceration of neck    Laceration of left wrist    GERD (gastroesophageal reflux disease)    Bipolar 1 disorder ([address]    Opioid overdose ([address]    Drug-induced tremor    Orchitis    H/O carcinoma in situ of prostate    Essential hypertension    PND (post-nasal drip)    Scrotal abscess    Depression with suicidal ideation    Colitis    Acute Crohn's disease with rectal bleeding ([address]    Opiate abuse, episodic ([address]    LILLY (acute kidney injury) ([address]    Intra-abdominal abscess ([address]    Ileostomy in place ([address]    Crohn's disease of large intestine with abscess ([address]    Severe manic bipolar 1 disorder with psychotic behavior ([address]       Admission Condition: poor    Discharged Condition: stable    Admission Circumstance:   \"Wife called police night of April 32MD after patient cut his left forearm.  EMS brought to ER where he was medically cleared and pink slipped to psychiatry for further management\"    PAST MEDICAL/PSYCHIATRIC HISTORY:   Past Medical History:   Diagnosis Date    ADD (attention deficit disorder)     Anxiety     Bipolar 1 disorder ([address] 11/19/2017    Cancer ([address] prostate cancer    2014 / treated with surgery    Crohn's disease ([address]     Depression     GERD (gastroesophageal reflux disease)     Panic attack     Rectal pain     has a fissure    Schizo affective schizophrenia ([address]     stable       FAMILY/SOCIAL HISTORY:  Family History   Problem Relation Age of Onset    Mental Illness Mother     Mental Illness Father     Mental Illness Sister     Mental Illness Brother     Heart Disease Mother     Depression Father      Social History     Socioeconomic History    Marital status:      Spouse name: ZENAIDA    Number of children: 0    Years of education: 12 +8    Highest education level: Not on file   Occupational History    Occupation: [employer]     Employer: UNEMPLOYED     Comment: SSDI   Social Needs    Financial resource strain: Not on file    Food insecurity     Worry: Not on file     Inability: Not on file   [employer] needs     Medical: Not on file     Non-medical: Not on file   Tobacco Use    Smoking status: Former Smoker     Packs/day: 1.00     Years: 41.00     Pack years: 41.00     Types: Cigarettes     Start date: 1978     Quit date: 2021     Years since quittin.0    Smokeless tobacco: Never Used   Substance and Sexual Activity    Alcohol use: No     Comment: no alcohol in5 yrs    Drug use: No     Comment: last use  / marijuana    Sexual activity: Not Currently     Partners: Female   Lifestyle    Physical activity     Days per week: Not on file     Minutes per session: Not on file    Stress: Not on file   Relationships    Social connections     Talks on phone: Not on file     Gets together: Not on file     Attends Hindu service: Not on file     Active member of club or organization: Not on file     Attends meetings of clubs or organizations: Not on file     Relationship status: Not on file    Intimate partner violence     Fear of current or ex partner: Not on file     Emotionally abused: Not on file     Physically abused: Not on file     Forced sexual activity: Not on file   Other Topics Concern    Not on file   Social History Narrative    ** Merged History Encounter **            MEDICATIONS:    Current Facility-Administered Medications:     risperiDONE (RISPERDAL) tablet 2 mg, 2 mg, Oral, BID, KAM Ledesma - CNP, 2 mg at 21 5065    valproic acid (DEPACON) 250 MG/5ML oral solution 500 mg, 500 mg, Oral, 2 times per day, KAM Ledesma - CNP, 500 mg at 04/28/21 0942    acetaminophen (TYLENOL) tablet 650 mg, 650 mg, Oral, Q6H PRN, Freda Bourgeois MD    magnesium hydroxide (MILK OF MAGNESIA) 400 MG/5ML suspension 30 mL, 30 mL, Oral, Daily PRN, Freda Bourgeois MD    aluminum & magnesium hydroxide-simethicone (MAALOX) 200-200-20 MG/5ML suspension 30 mL, 30 mL, Oral, PRN, Freda Bourgeois MD    hydrOXYzine (VISTARIL) capsule 50 mg, 50 mg, Oral, TID PRN, Freda Bourgeois MD, 50 mg at 04/28/21 0943    haloperidol lactate (HALDOL) injection 5 mg, 5 mg, Intramuscular, Q6H PRN **OR** haloperidol (HALDOL) tablet 5 mg, 5 mg, Oral, Q6H PRN, Freda Bourgeois MD, 5 mg at 04/28/21 7689    traZODone (DESYREL) tablet 50 mg, 50 mg, Oral, Nightly PRN, Freda Bourgeois MD, 50 mg at 04/27/21 2143    benztropine (COGENTIN) tablet 1 mg, 1 mg, Oral, BID, KAM Garber CNP, 1 mg at 04/28/21 9175    melatonin tablet 6 mg, 6 mg, Oral, Nightly, KMA Garber - CNP, 6 mg at 04/27/21 2144    LORazepam (ATIVAN) tablet 1 mg, 1 mg, Oral, BID, KAM Garber CNP, 1 mg at 04/28/21 0942    glucose (GLUTOSE) 40 % oral gel 15 g, 15 g, Oral, PRN, Dar Sharfi MD    dextrose 50 % IV solution, 12.5 g, Intravenous, PRN, Dar Sharif MD    glucagon (rDNA) injection 1 mg, 1 mg, Intramuscular, PRN, Dar Sharif MD    dextrose 5 % solution, 100 mL/hr, Intravenous, PRN, Dar Sharif MD    Examination:  BP (!) 144/85   Pulse 100   Temp 97.5 °F (36.4 °C) (Temporal)   Resp 16   Ht 6' 3\" (1.905 m)   Wt 190 lb (86.2 kg)   SpO2 100%   BMI 23.75 kg/m²   Gait - steady    HOSPITAL COURSE[de-identified]  Following admission to the hospital, patient had a complete physical exam and blood work up, which he was medically cleared and admitted to [facility] for psychiatric evaluation and stabilization. The patient was monitored closely with suicide and appropriate precautions.   He was started on Depakote blood 500 mg twice daily, Risperdal 2 mg twice daily, melatonin 6 mg nightly at 1800 and Cogentin 1 mg 2 times daily for akathisia. He was encouraged to participate in group and other milieu activity and started to feel better with this combination of treatment. There has been significant progress in the improvement of symptoms since admission. The patient has been an active participant in his treatment, and discharge planning. Patient was no longer suicidal, homicidal, manic or psychotic. He received the required treatment with medication, participated in group milieu, remained engaged in unit activities, learned appropriate coping skills. He was seen to be watching television socializing with peers using the phone. There were no mention or gestures of self-harm or harm to others. His mental status has returned to baseline. The treatment team believes the patient obtain maximum benefit out of this hospitalization and does not meet the criteria for inpatient hospitalization anymore. However he will continue to benefit from outpatient follow-up and treatment to maintain stability. Collateral information has been obtained and reconciled and there are no concerns about his safety. He has no access to guns or weapons. He appreciates the help that he received here. This patient no longer meets criteria for inpatient hospitalization. He was discharged home to his family in psychiatrically stable condition. Appetite:  [x] Normal  [] Increased  [] Decreased    Sleep:       [x] Normal  [] Fair       [] Poor            Energy:    [x] Normal  [] Increased  [] Decreased     SI [] Present  [x] Absent  HI  []Present  [x] Absent   Aggression:  [] yes  [] no  Patient is [x] able  [] unable to CONTRACT FOR SAFETY   Medication side effects(SE):  [x] None(Psych.  Meds.) [] Other      Mental Status Examination on discharge:    Level of consciousness:  within normal limits   Appearance:  well-appearing  Behavior/Motor:  no abnormalities noted  Attitude toward examiner:  attentive and good eye contact  Speech:  spontaneous, normal rate and normal volume   Mood: euthymic  Affect:  mood congruent  Thought processes:  goal directed   Thought content: The patient is devoid of suicidal or homicidal ideation intent or plan. Devoid of auditory or visual hallucinations or other perceptual disturbances, there are no overt or covert signs of psychosis or paranoia. There are no neurovegetative signs of depression. Cognition:  oriented to person, place, and time   Concentration intact  Memory intact  Insight good   Judgement fair   Fund of Knowledge adequate      ASSESSMENT:  Patient symptoms are:  [x] Well controlled  [x] Improving  [] Worsening  [] No change    Reason for more than one antipsychotic:  [x] N/A  [] 3 Failed Monotherapy attempts (Drugs tried:)  [] Crossover to a new antipsychotic  [] Taper to Monotherapy from Polypharmacy  [] Augmentation of clozapine therapy due to treatment resistance to single therapy    Diagnosis:  Principal Problem:    Severe manic bipolar 1 disorder with psychotic behavior ([facility]  Resolved Problems:    * No resolved hospital problems. *      LABS:    No results for input(s): WBC, HGB, PLT in the last 72 hours. No results for input(s): NA, K, CL, CO2, BUN, CREATININE, GLUCOSE in the last 72 hours. No results for input(s): BILITOT, ALKPHOS, AST, ALT in the last 72 hours.   Lab Results   Component Value Date    LABAMPH NOT DETECTED 04/23/2021    LABAMPH NOT DETECTED 02/03/2011    BARBSCNU NOT DETECTED 04/23/2021    LABBENZ NOT DETECTED 04/23/2021    LABBENZ SEE BELOW 12/08/2014    CANNAB NOT DETECTED  08/13/2012    COCAINESCRN NOT DETECTED 08/13/2012    LABMETH NOT DETECTED 04/23/2021    OPIATESCREENURINE NOT DETECTED 04/23/2021    PHENCYCLIDINESCREENURINE NOT DETECTED 04/23/2021    PPXUR NOT DETECTED 08/13/2012    ETOH <10 04/23/2021     Lab Results   Component Value Date    TSH 2.240 04/23/2021     Lab Results   Component Value Date    LITHIUM <0.10 (L) 10/24/2018     Lab Results   Component Value Date    VALPROATE 62 02/23/2021       RISK ASSESSMENT AT DISCHARGE: Low risk for suicide and homicide. Treatment Plan:  The patient's diagnosis, treatment plan, medication management were formulated after patient was seen directly by the attending physician and myself and all relevant documentation was reviewed. Reviewed current Medications with the patient. Education provided on the complaince with treatment. Risk, benefit, side effects, possible outcomes of the medication and alternatives discussed with the patient and the patient demonstrated understanding. The patient was also educated that the outcome of treatment will depend on the medication compliance as directed by the prescribers along with regular follow-up, compliance with the labs and other work-up, as clinically indicated. Encourage patient to attend outpatient follow up appointment and therapy outpatient follow-up appointments been scheduled prior to discharge. Patient was advised to call the outpatient provider, visit the nearest ED or call 911 if symptoms are not manageable. Patient's family member was contacted prior to the discharge, patient has been discharged home in psychiatrically stable condition functioning at his baseline.         Medication List      START taking these medications    benztropine 1 MG tablet  Commonly known as: COGENTIN  Take 1 tablet by mouth 2 times daily     melatonin 3 MG Tabs tablet  Take 2 tablets by mouth nightly     risperiDONE 2 MG tablet  Commonly known as: RISPERDAL  Take 1 tablet by mouth 2 times daily     valproic acid 250 MG/5ML Soln oral solution  Commonly known as: DEPACON  Take 10 mLs by mouth every 12 hours        CONTINUE taking these medications    atorvastatin 10 MG tablet  Commonly known as: LIPITOR  Take 1 tablet by mouth daily     Austedo 6 MG tablet  Generic drug: deutetrabenazine     ondansetron 4 MG tablet  Commonly known as: ZOFRAN  Take 1 tablet by mouth every 6 hours as needed for Nausea or Vomiting     prazosin 2 MG capsule  Commonly known as: MINIPRESS        STOP taking these medications    chlorproMAZINE 100 MG tablet  Commonly known as: THORAZINE     clomiPRAMINE 75 MG capsule  Commonly known as: ANAFRANIL     divalproex 500 MG DR tablet  Commonly known as: DEPAKOTE           Where to Get Your Medications      These medications were sent to [facility] [address]    Phone: [phone]   · benztropine 1 MG tablet  · melatonin 3 MG Tabs tablet  · risperiDONE 2 MG tablet  · valproic acid 250 MG/5ML Soln oral solution           TIME SPEND - 35 MINUTES TO COMPLETE THE EVALUATION, DISCHARGE SUMMARY, MEDICATION RECONCILIATION AND FOLLOW UP CARE     Signed:  Ofelia Lala  4/28/2021  10:50 AM

## 2021-04-28 NOTE — CARE COORDINATION
Appointments scheduled at [facility].
In order to ensure appropriate transition and discharge planning is in place, the following documents have been transmitted to [facility], as the new outpatient provider:     · The d/c diagnosis was transmitted to the next care provider  · The reason for hospitalization was transmitted to the next care provider  · The d/c medications (dosage and indication) were transmitted to the next care provider   · The continuing care plan was transmitted to the next care provider
Sw gained collateral from pt's wife, Esau Orozco states that she has no concerns for this pt returning to the home. Sw notified her of pt's homicidal comments to her upon admission. Ankit Carmen states that she is aware of this, and still feels comfortable with the pt returning home. Pt does not have access to weapons per Esau Orozco has no concerns for the pt's possible D/C tomorrow.
86.2

## 2021-04-28 NOTE — GROUP NOTE
Group Therapy Note    Date: 4/28/2021    Group Start Time: 1000  Group End Time: 8487  Group Topic: Psychoeducation    [facility]    Chloé Murdock, CTRS        Group Therapy Note    Number of participants: 13  Type of group: Psychoeducation  Mode of intervention: Education, Support, Socialization, Exploration, Clarifying, and Problem-solving  Topic: Gratitude   Objective: Pt will identify 3 ways to practice gratitude in recovery. Patient's Goal:  \"Go home\"     Notes:  Pt offered minimal interaction during group but was an active listener throughout group. Accepted support and feedback from others. Status After Intervention:  Unchanged    Participation Level:  Active Listener and Minimal    Participation Quality: Appropriate and Attentive      Speech:  normal      Thought Process/Content: Logical      Affective Functioning: Congruent      Mood: euthymic      Level of consciousness:  Alert, Oriented x4 and Attentive      Response to Learning: Able to verbalize current knowledge/experience, Able to verbalize/acknowledge new learning, Able to retain information, Capable of insight, Able to change behavior and Progressing to goal      Endings: None Reported    Modes of Intervention: Education, Support, Socialization, Exploration, Clarifying and Problem-solving

## 2021-04-28 NOTE — PROGRESS NOTES
[facility]  Discharge Note    Pt discharged with followings belongings:   Dentures: None  Vision - Corrective Lenses: Glasses  Hearing Aid: None  Jewelry: Ring  Body Piercings Removed: N/A  Clothing: Pants, Shirt, Footwear  Were All Patient Medications Collected?: Not Applicable  Other Valuables: None   Valuables sent home with Yes. Patient education on aftercare instructions: Patient verbalize understanding of AVS:  Yes.     Status EXAM upon discharge:  Status and Exam  Normal: No  Facial Expression: Flat  Affect: Congruent  Level of Consciousness: Alert  Mood:Normal: No  Mood: Anxious  Motor Activity:Normal: Yes  Motor Activity: Decreased  Interview Behavior: Evasive, Cooperative  Preception: [place] to Person, [place] to Time, [place] to Place  Attention:Normal: No  Attention: Distractible  Thought Processes: Circumstantial  Thought Content:Normal: No  Thought Content: Preoccupations  Hallucinations: None  Delusions: No  Delusions: Persecution  Memory:Normal: No  Memory: Poor Recent  Insight and Judgment: No  Insight and Judgment: Poor Judgment, Poor Insight  Present Suicidal Ideation: No  Present Homicidal Ideation: No      Metabolic Screening:    Lab Results   Component Value Date    LABA1C 5.7 (H) 04/24/2021       Lab Results   Component Value Date    CHOL 165 04/24/2021    CHOL 320 (H) 07/22/2019    CHOL 188 10/08/2018     Lab Results   Component Value Date    TRIG 251 (H) 04/24/2021    TRIG 148 07/22/2019    TRIG 171 (H) 10/08/2018     Lab Results   Component Value Date    HDL 43 04/24/2021    HDL 46 07/22/2019    HDL 52 10/08/2018    HDL 67 06/27/2018     No components found for: LDLCAL  Lab Results   Component Value Date    LABVLDL 50 04/24/2021    LABVLDL 30 07/22/2019    LABVLDL 34 10/08/2018    LABVLDL 58 06/27/2018       FÁTIMA PINA RN

## 2021-04-28 NOTE — PLAN OF CARE
Denies SI, HI and hallucinations. Out on unit for provided meals. Complaint with medications. No concerns or complaints verbalized. Will continue to monitor and offer emotional support.   Problem: Altered Mood, Depressive Behavior:  Goal: Absence of self-harm  Description: Absence of self-harm  Outcome: Met This Shift     Problem: Altered Mood, Depressive Behavior:  Goal: Able to verbalize and/or display a decrease in depressive symptoms  Description: Able to verbalize and/or display a decrease in depressive symptoms  4/28/2021 1024 by Filipe Pérez RN  Outcome: Met This Shift  Electronically signed by Mili White RN on 4/28/2021 at 10:26 AM

## 2021-05-18 ENCOUNTER — APPOINTMENT (OUTPATIENT)
Dept: GENERAL RADIOLOGY | Age: 56
DRG: 918 | End: 2021-05-18
Payer: MEDICARE

## 2021-05-18 ENCOUNTER — APPOINTMENT (OUTPATIENT)
Dept: CT IMAGING | Age: 56
DRG: 918 | End: 2021-05-18
Payer: MEDICARE

## 2021-05-18 ENCOUNTER — HOSPITAL ENCOUNTER (EMERGENCY)
Age: 56
Discharge: HOME | DRG: 918 | End: 2021-05-18
Attending: EMERGENCY MEDICINE
Payer: MEDICARE

## 2021-05-18 VITALS
RESPIRATION RATE: 20 BRPM | OXYGEN SATURATION: 97 % | DIASTOLIC BLOOD PRESSURE: 89 MMHG | HEIGHT: 72 IN | TEMPERATURE: 96.8 F | SYSTOLIC BLOOD PRESSURE: 124 MMHG | BODY MASS INDEX: 27.09 KG/M2 | HEART RATE: 70 BPM | WEIGHT: 200 LBS

## 2021-05-18 DIAGNOSIS — R42 DIZZINESS: Primary | ICD-10-CM

## 2021-05-18 LAB
ANION GAP SERPL CALCULATED.3IONS-SCNC: 9 MMOL/L (ref 7–16)
BASOPHILS ABSOLUTE: 0.08 E9/L (ref 0–0.2)
BASOPHILS RELATIVE PERCENT: 1 % (ref 0–2)
BUN BLDV-MCNC: 24 MG/DL (ref 6–20)
CALCIUM SERPL-MCNC: 9.6 MG/DL (ref 8.6–10.2)
CHLORIDE BLD-SCNC: 102 MMOL/L (ref 98–107)
CO2: 27 MMOL/L (ref 22–29)
CREAT SERPL-MCNC: 1.1 MG/DL (ref 0.7–1.2)
D DIMER: 794 NG/ML DDU
EOSINOPHILS ABSOLUTE: 0.17 E9/L (ref 0.05–0.5)
EOSINOPHILS RELATIVE PERCENT: 2.1 % (ref 0–6)
GFR AFRICAN AMERICAN: >60
GFR NON-AFRICAN AMERICAN: >60 ML/MIN/1.73
GLUCOSE BLD-MCNC: 126 MG/DL (ref 74–99)
HCT VFR BLD CALC: 46.3 % (ref 37–54)
HEMOGLOBIN: 14.7 G/DL (ref 12.5–16.5)
IMMATURE GRANULOCYTES #: 0.03 E9/L
IMMATURE GRANULOCYTES %: 0.4 % (ref 0–5)
LYMPHOCYTES ABSOLUTE: 1.59 E9/L (ref 1.5–4)
LYMPHOCYTES RELATIVE PERCENT: 19.8 % (ref 20–42)
MCH RBC QN AUTO: 28.7 PG (ref 26–35)
MCHC RBC AUTO-ENTMCNC: 31.7 % (ref 32–34.5)
MCV RBC AUTO: 90.3 FL (ref 80–99.9)
MONOCYTES ABSOLUTE: 0.62 E9/L (ref 0.1–0.95)
MONOCYTES RELATIVE PERCENT: 7.7 % (ref 2–12)
NEUTROPHILS ABSOLUTE: 5.54 E9/L (ref 1.8–7.3)
NEUTROPHILS RELATIVE PERCENT: 69 % (ref 43–80)
PDW BLD-RTO: 14.3 FL (ref 11.5–15)
PLATELET # BLD: 348 E9/L (ref 130–450)
PMV BLD AUTO: 9 FL (ref 7–12)
POTASSIUM REFLEX MAGNESIUM: 4.1 MMOL/L (ref 3.5–5)
RBC # BLD: 5.13 E12/L (ref 3.8–5.8)
SODIUM BLD-SCNC: 138 MMOL/L (ref 132–146)
TROPONIN: <0.01 NG/ML (ref 0–0.03)
WBC # BLD: 8 E9/L (ref 4.5–11.5)

## 2021-05-18 PROCEDURE — 96374 THER/PROPH/DIAG INJ IV PUSH: CPT

## 2021-05-18 PROCEDURE — 93005 ELECTROCARDIOGRAM TRACING: CPT | Performed by: STUDENT IN AN ORGANIZED HEALTH CARE EDUCATION/TRAINING PROGRAM

## 2021-05-18 PROCEDURE — 6360000002 HC RX W HCPCS: Performed by: STUDENT IN AN ORGANIZED HEALTH CARE EDUCATION/TRAINING PROGRAM

## 2021-05-18 PROCEDURE — 85378 FIBRIN DEGRADE SEMIQUANT: CPT

## 2021-05-18 PROCEDURE — 70450 CT HEAD/BRAIN W/O DYE: CPT

## 2021-05-18 PROCEDURE — 99283 EMERGENCY DEPT VISIT LOW MDM: CPT

## 2021-05-18 PROCEDURE — 96375 TX/PRO/DX INJ NEW DRUG ADDON: CPT

## 2021-05-18 PROCEDURE — 6370000000 HC RX 637 (ALT 250 FOR IP): Performed by: STUDENT IN AN ORGANIZED HEALTH CARE EDUCATION/TRAINING PROGRAM

## 2021-05-18 PROCEDURE — 71275 CT ANGIOGRAPHY CHEST: CPT

## 2021-05-18 PROCEDURE — 71045 X-RAY EXAM CHEST 1 VIEW: CPT

## 2021-05-18 PROCEDURE — 6360000004 HC RX CONTRAST MEDICATION: Performed by: RADIOLOGY

## 2021-05-18 PROCEDURE — 84484 ASSAY OF TROPONIN QUANT: CPT

## 2021-05-18 PROCEDURE — 2580000003 HC RX 258: Performed by: STUDENT IN AN ORGANIZED HEALTH CARE EDUCATION/TRAINING PROGRAM

## 2021-05-18 PROCEDURE — 85025 COMPLETE CBC W/AUTO DIFF WBC: CPT

## 2021-05-18 PROCEDURE — 80048 BASIC METABOLIC PNL TOTAL CA: CPT

## 2021-05-18 RX ORDER — 0.9 % SODIUM CHLORIDE 0.9 %
1000 INTRAVENOUS SOLUTION INTRAVENOUS ONCE
Status: COMPLETED | OUTPATIENT
Start: 2021-05-18 | End: 2021-05-18

## 2021-05-18 RX ORDER — ONDANSETRON 2 MG/ML
4 INJECTION INTRAMUSCULAR; INTRAVENOUS ONCE
Status: COMPLETED | OUTPATIENT
Start: 2021-05-18 | End: 2021-05-18

## 2021-05-18 RX ORDER — MECLIZINE HCL 12.5 MG/1
25 TABLET ORAL ONCE
Status: COMPLETED | OUTPATIENT
Start: 2021-05-18 | End: 2021-05-18

## 2021-05-18 RX ADMIN — SODIUM CHLORIDE 1000 ML: 9 INJECTION, SOLUTION INTRAVENOUS at 10:46

## 2021-05-18 RX ADMIN — SODIUM CHLORIDE 1000 ML: 9 INJECTION, SOLUTION INTRAVENOUS at 13:30

## 2021-05-18 RX ADMIN — MECLIZINE 25 MG: 12.5 TABLET ORAL at 10:55

## 2021-05-18 RX ADMIN — ONDANSETRON 4 MG: 2 INJECTION INTRAMUSCULAR; INTRAVENOUS at 10:55

## 2021-05-18 RX ADMIN — IOPAMIDOL 75 ML: 755 INJECTION, SOLUTION INTRAVENOUS at 12:30

## 2021-05-18 NOTE — ED NOTES
Sitting Pulse:   97   /96    Laying Pulse:  103  /88    Standing  Pulse  135   BP 90/68     Kwabena Valladares RN  05/18/21 1100

## 2021-05-18 NOTE — ED PROVIDER NOTES
Department of Emergency Medicine   ED  Provider Note  Admit Date/RoomTime: 5/18/2021  9:41 AM  ED Room: [address] [place] [address]          History of Present Illness:  5/18/21, Time: 12:05 PM EDT  Chief Complaint   Patient presents with    Loss of Consciousness     \"ongoing issue for about a year\", has had recent psych med changes      Karli Quesada is a 48 y.o. male presenting to the ED for near syncope, beginning intermittently over the past year. The complaint has been persistent, mild in severity, and worsened by nothing. The patient is a 61-year-old male with a history of bipolar disease, anxiety, ADHD who presents to the emergency department complaining of loss of consciousness. Patient symptoms have been persistent for about a year, intermittent, mild, and nothing makes it better or worse. The patient states that he has felt he is going to pass out intermittently. He does notice it more with standing. He states it is worse with standing. Nothing makes it better. He states that he has been taking his psych meds as prescribed, but had a recent change in his medications. Patient did tell nursing staff that he thinks the \"government\" caused the symptoms to occur. He denies any complete loss of consciousness, fever, chills, headache, blurry vision, double vision, numbness, tingling, unilateral weakness, abdominal pain, nausea, vomiting, diarrhea, recent hospitalization, recent illness, or other acute symptoms or concerns.     Review of Systems:   Pertinent positives and negatives are stated within HPI, all other systems reviewed and are negative.        --------------------------------------------- PAST HISTORY ---------------------------------------------  Past Medical History:  has a past medical history of ADD (attention deficit disorder), Anxiety, Bipolar 1 disorder ([facility] Cancer ([facility] Crohn's disease ([facility] Depression, GERD (gastroesophageal reflux disease), Panic attack, Rectal pain, and Schizo affective schizophrenia ([facility] 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 Social History:  reports that he quit smoking about 7 weeks ago. His smoking use included cigarettes. He started smoking about 43 years ago. He has a 41.00 pack-year smoking history. He has never used smokeless tobacco. He reports that he does not drink alcohol and does not use drugs. Family History: family history includes Depression in his father; Heart Disease in his mother; Mental Illness in his brother, father, mother, and sister. . Unless otherwise noted, family history is non contributory    The patients home medications have been reviewed. Allergies: Ciprofloxacin hcl, Morphine, and Nsaids    I have reviewed the past medical history, past surgical history, social history, and family history    ---------------------------------------------------PHYSICAL EXAM--------------------------------------    Constitutional/General: Alert and oriented x3  Head: Normocephalic and atraumatic  Eyes: PERRL, EOMI, sclera non icteric. Nystagmus present. Mouth: Oropharynx clear, handling secretions, no trismus, no asymmetry of the posterior oropharynx or uvular edema  Neck: Supple, full ROM, no stridor, no meningeal signs  Respiratory: Lungs clear to auscultation bilaterally, no wheezes, rales, or rhonchi. Not in respiratory distress  Cardiovascular:  Regular rate. Regular rhythm. No murmurs, no aortic murmurs, no gallops, or rubs. Chest: No chest wall tenderness  Gastrointestinal:  Abdomen Soft, Non tender, Non distended. No rebound, guarding, or rigidity. No pulsatile masses. Musculoskeletal: Moves all extremities x 4. Warm and well perfused, no clubbing, no cyanosis, no edema.  Capillary refill <3 seconds  Skin: skin warm and dry. No rashes. Neurologic: GCS 15, no focal deficits, symmetric strength 5/5 in the upper and lower extremities bilaterally, cranial nerves II through XII are intact. There is no ataxia. Sensation is intact and equal bilateral upper and lower extremities. Psychiatric: Normal Affect    -------------------------------------------------- RESULTS -------------------------------------------------  I have personally reviewed all laboratory and imaging results for this patient. Results are listed below.      LABS: (Lab results interpreted by me)  Results for orders placed or performed during the hospital encounter of 05/18/21   CBC auto differential   Result Value Ref Range    WBC 8.0 4.5 - 11.5 E9/L    RBC 5.13 3.80 - 5.80 E12/L    Hemoglobin 14.7 12.5 - 16.5 g/dL    Hematocrit 46.3 37.0 - 54.0 %    MCV 90.3 80.0 - 99.9 fL    MCH 28.7 26.0 - 35.0 pg    MCHC 31.7 (L) 32.0 - 34.5 %    RDW 14.3 11.5 - 15.0 fL    Platelets 093 038 - 084 E9/L    MPV 9.0 7.0 - 12.0 fL    Neutrophils % 69.0 43.0 - 80.0 %    Immature Granulocytes % 0.4 0.0 - 5.0 %    Lymphocytes % 19.8 (L) 20.0 - 42.0 %    Monocytes % 7.7 2.0 - 12.0 %    Eosinophils % 2.1 0.0 - 6.0 %    Basophils % 1.0 0.0 - 2.0 %    Neutrophils Absolute 5.54 1.80 - 7.30 E9/L    Immature Granulocytes # 0.03 E9/L    Lymphocytes Absolute 1.59 1.50 - 4.00 E9/L    Monocytes Absolute 0.62 0.10 - 0.95 E9/L    Eosinophils Absolute 0.17 0.05 - 0.50 E9/L    Basophils Absolute 0.08 0.00 - 0.20 Q3/Z   Basic Metabolic Panel w/ Reflex to MG   Result Value Ref Range    Sodium 138 132 - 146 mmol/L    Potassium reflex Magnesium 4.1 3.5 - 5.0 mmol/L    Chloride 102 98 - 107 mmol/L    CO2 27 22 - 29 mmol/L    Anion Gap 9 7 - 16 mmol/L    Glucose 126 (H) 74 - 99 mg/dL    BUN 24 (H) 6 - 20 mg/dL    CREATININE 1.1 0.7 - 1.2 mg/dL    GFR Non-African American >60 >=60 mL/min/1.73    GFR African American >60     Calcium 9.6 8.6 - 10.2 mg/dL   Troponin   Result Value Ref Range    Troponin <0.01 0.00 - 0.03 ng/mL   D-Dimer, Quantitative   Result Value Ref Range    D-Dimer, Quant 794 ng/mL DDU   EKG 12 Lead   Result Value Ref Range    Ventricular Rate 114 BPM    Atrial Rate 114 BPM    P-R Interval 152 ms    QRS Duration 78 ms    Q-T Interval 332 ms    QTc Calculation (Bazett) 457 ms    P Axis 72 degrees    R Axis 62 degrees    T Axis 63 degrees   ,       RADIOLOGY:  Interpreted by Radiologist unless otherwise specified  CTA CHEST W CONTRAST   Final Result   1. There is no evidence of a pulmonary embolus. 2. There are no findings of atypical or COVID pneumonia   3. Minimal pleuroparenchymal scarring within the right lung apex. CT HEAD WO CONTRAST   Final Result   No acute intracranial abnormality. There has been a left nasal bone fracture since the prior study. If there is   not acute tenderness, this may be chronic. XR CHEST PORTABLE   Final Result   No radiographic evidence of acute abnormality               EKG Interpretation  Interpreted by Jose Cortez DO    EKG: This EKG is signed and interpreted by me. Rate: 114  Rhythm: Sinus  Interpretation: Normal sinus rhythm, normal axis, no acute ST elevations or depressions, intervals within normal limits, QTC is 457  Comparison: stable as compared to patient's most recent EKG       ------------------------- NURSING NOTES AND VITALS REVIEWED ---------------------------   The nursing notes within the ED encounter and vital signs as below have been reviewed by myself  /89   Pulse 70   Temp 96.8 °F (36 °C)   Resp 20   Ht 6' (1.829 m)   Wt 200 lb (90.7 kg)   SpO2 97%   BMI 27.12 kg/m²     Oxygen Saturation Interpretation: 97 % on room air. Normal    The patients available past medical records and past encounters were reviewed.         ------------------------------ ED COURSE/MEDICAL DECISION MAKING----------------------  Medications   0.9 % sodium chloride bolus (0 mLs Intravenous Stopped 5/18/21 1231)   meclizine (ANTIVERT) tablet 25 mg (25 mg Oral Given 5/18/21 1055)   ondansetron (ZOFRAN) injection 4 mg (4 mg Intravenous Given 5/18/21 1055)   0.9 % sodium chloride bolus (0 mLs Intravenous Stopped 5/18/21 1422)   iopamidol (ISOVUE-370) 76 % injection 75 mL (75 mLs Intravenous Given 5/18/21 1230)           The cardiac monitor revealed NSR with a heart rate in the 70s as interpreted by me. The cardiac monitor was ordered secondary to the patient's syncope and to monitor the patient for dysrhythmia. CPT [id number]           Medical Decision Making:     The patient was seen and evaluated by the Attending Emergency Medicine Physician Dr. Elroy Georges. Patient is a 52-year-old male presents to the emergency department complaining of syncope. Patient states that he did not completely pass out but felt like he was going to. He is hemodynamically stable, nontoxic-appearing, no acute distress. There are no focal neurological deficits but he does have nystagmus on examination. Treated him with IV fluids, Zofran, and meclizine. He was feeling better. EKG did not show any acute ischemic changes. Labs are within normal limits, troponin is negative, no anemia or leukocytosis. D-dimer was elevated. Did obtain CTA which was negative for PE.  CT head negative for any acute abnormalities. Did consult with social work to evaluate the patient since he did make at home about the government causing his symptoms. Social work evaluated patient states that this is his baseline and he does not have any suicidal or homicidal ideations. He not have any hallucinations. She states that he is at his psychiatric baseline and she knows him well. He is deemed stable for discharge home. Recommend him to follow-up with his family doctor. He is to return here for worsening symptoms or other acute symptoms or concerns.           Re-Evaluations:  ED Course as of May 20 1226   Tue May 18, 2021   [id number] Social work evaluated patient and deemed the patient stable for discharge at this time. She states that he is at his baseline. [KG]      ED Course User Index  [KG] Jose Cortze DO     This patient's ED course included: a personal history and physicial examination, re-evaluation prior to disposition, multiple bedside re-evaluations, IV medications, cardiac monitoring, continuous pulse oximetry and complex medical decision making and emergency management    This patient has remained hemodynamically stable during their ED course. Consultations:  Social work. Counseling: The emergency provider has spoken with the patient and discussed todays results, in addition to providing specific details for the plan of care and counseling regarding the diagnosis and prognosis. Questions are answered at this time and they are agreeable with the plan.       --------------------------------- IMPRESSION AND DISPOSITION ---------------------------------    IMPRESSION  1. Dizziness        DISPOSITION  Disposition: Discharge to home  Patient condition is stable        NOTE: This report was transcribed using voice recognition software.  Every effort was made to ensure accuracy; however, inadvertent computerized transcription errors may be present        Jose Cortez DO  Resident  05/20/21 7557

## 2021-05-19 LAB
EKG ATRIAL RATE: 114 BPM
EKG P AXIS: 72 DEGREES
EKG P-R INTERVAL: 152 MS
EKG Q-T INTERVAL: 332 MS
EKG QRS DURATION: 78 MS
EKG QTC CALCULATION (BAZETT): 457 MS
EKG R AXIS: 62 DEGREES
EKG T AXIS: 63 DEGREES
EKG VENTRICULAR RATE: 114 BPM

## 2021-05-19 PROCEDURE — 93010 ELECTROCARDIOGRAM REPORT: CPT | Performed by: INTERNAL MEDICINE

## 2021-05-20 ENCOUNTER — HOSPITAL ENCOUNTER (INPATIENT)
Age: 56
LOS: 1 days | Discharge: PSYCHIATRIC OR SUBSTANCE USE FACILITY | DRG: 918 | End: 2021-05-22
Attending: EMERGENCY MEDICINE | Admitting: NEUROMUSCULOSKELETAL MEDICINE & OMM
Payer: MEDICARE

## 2021-05-20 DIAGNOSIS — R45.851 SUICIDE IDEATION: ICD-10-CM

## 2021-05-20 DIAGNOSIS — T42.6X2A VALPROIC ACID TOXICITY, INTENTIONAL SELF-HARM, INITIAL ENCOUNTER (HCC): Primary | ICD-10-CM

## 2021-05-20 PROBLEM — T50.902A DRUG OVERDOSE, INTENTIONAL SELF-HARM, INITIAL ENCOUNTER (HCC): Status: ACTIVE | Noted: 2021-05-20

## 2021-05-20 LAB
ACETAMINOPHEN LEVEL: <5 MCG/ML (ref 10–30)
ALBUMIN SERPL-MCNC: 3.2 G/DL (ref 3.5–5.2)
ALP BLD-CCNC: 97 U/L (ref 40–129)
ALT SERPL-CCNC: 8 U/L (ref 0–40)
AMMONIA: 38 UMOL/L (ref 16–60)
AMPHETAMINE SCREEN, URINE: NOT DETECTED
ANION GAP SERPL CALCULATED.3IONS-SCNC: 19 MMOL/L (ref 7–16)
AST SERPL-CCNC: 13 U/L (ref 0–39)
BACTERIA: ABNORMAL /HPF
BARBITURATE SCREEN URINE: NOT DETECTED
BASOPHILS ABSOLUTE: 0.06 E9/L (ref 0–0.2)
BASOPHILS RELATIVE PERCENT: 0.6 % (ref 0–2)
BENZODIAZEPINE SCREEN, URINE: NOT DETECTED
BILIRUB SERPL-MCNC: <0.2 MG/DL (ref 0–1.2)
BILIRUBIN URINE: NEGATIVE
BLOOD, URINE: ABNORMAL
BUN BLDV-MCNC: 13 MG/DL (ref 6–20)
CALCIUM SERPL-MCNC: 9.3 MG/DL (ref 8.6–10.2)
CANNABINOID SCREEN URINE: NOT DETECTED
CHLORIDE BLD-SCNC: 104 MMOL/L (ref 98–107)
CLARITY: CLEAR
CO2: 17 MMOL/L (ref 22–29)
COCAINE METABOLITE SCREEN URINE: NOT DETECTED
COLOR: YELLOW
CREAT SERPL-MCNC: 1 MG/DL (ref 0.7–1.2)
EOSINOPHILS ABSOLUTE: 0.14 E9/L (ref 0.05–0.5)
EOSINOPHILS RELATIVE PERCENT: 1.4 % (ref 0–6)
ETHANOL: <10 MG/DL (ref 0–0.08)
FENTANYL SCREEN, URINE: NOT DETECTED
GFR AFRICAN AMERICAN: >60
GFR NON-AFRICAN AMERICAN: >60 ML/MIN/1.73
GLUCOSE BLD-MCNC: 184 MG/DL (ref 74–99)
GLUCOSE URINE: NEGATIVE MG/DL
HCT VFR BLD CALC: 41.2 % (ref 37–54)
HEMOGLOBIN: 13.1 G/DL (ref 12.5–16.5)
IMMATURE GRANULOCYTES #: 0.04 E9/L
IMMATURE GRANULOCYTES %: 0.4 % (ref 0–5)
INFLUENZA A: NOT DETECTED
INFLUENZA B: NOT DETECTED
KETONES, URINE: NEGATIVE MG/DL
LEUKOCYTE ESTERASE, URINE: ABNORMAL
LYMPHOCYTES ABSOLUTE: 1.66 E9/L (ref 1.5–4)
LYMPHOCYTES RELATIVE PERCENT: 16.6 % (ref 20–42)
Lab: NORMAL
MCH RBC QN AUTO: 28.8 PG (ref 26–35)
MCHC RBC AUTO-ENTMCNC: 31.8 % (ref 32–34.5)
MCV RBC AUTO: 90.5 FL (ref 80–99.9)
METHADONE SCREEN, URINE: NOT DETECTED
MONOCYTES ABSOLUTE: 0.58 E9/L (ref 0.1–0.95)
MONOCYTES RELATIVE PERCENT: 5.8 % (ref 2–12)
NEUTROPHILS ABSOLUTE: 7.51 E9/L (ref 1.8–7.3)
NEUTROPHILS RELATIVE PERCENT: 75.2 % (ref 43–80)
NITRITE, URINE: NEGATIVE
OPIATE SCREEN URINE: NOT DETECTED
OXYCODONE URINE: NOT DETECTED
PDW BLD-RTO: 14.6 FL (ref 11.5–15)
PH UA: 6 (ref 5–9)
PHENCYCLIDINE SCREEN URINE: NOT DETECTED
PLATELET # BLD: 288 E9/L (ref 130–450)
PMV BLD AUTO: 10.3 FL (ref 7–12)
POTASSIUM REFLEX MAGNESIUM: 3.8 MMOL/L (ref 3.5–5)
PROTEIN UA: NEGATIVE MG/DL
RBC # BLD: 4.55 E12/L (ref 3.8–5.8)
RBC UA: ABNORMAL /HPF (ref 0–2)
SALICYLATE, SERUM: <0.3 MG/DL (ref 0–30)
SARS-COV-2 RNA, RT PCR: NOT DETECTED
SODIUM BLD-SCNC: 140 MMOL/L (ref 132–146)
SPECIFIC GRAVITY UA: 1.01 (ref 1–1.03)
TOTAL PROTEIN: 5.9 G/DL (ref 6.4–8.3)
TRICYCLIC ANTIDEPRESSANTS SCREEN SERUM: NEGATIVE NG/ML
UROBILINOGEN, URINE: 0.2 E.U./DL
VALPROIC ACID LEVEL: 224 MCG/ML (ref 50–100)
WBC # BLD: 10 E9/L (ref 4.5–11.5)
WBC UA: ABNORMAL /HPF (ref 0–5)

## 2021-05-20 PROCEDURE — 82140 ASSAY OF AMMONIA: CPT

## 2021-05-20 PROCEDURE — 80053 COMPREHEN METABOLIC PANEL: CPT

## 2021-05-20 PROCEDURE — 80164 ASSAY DIPROPYLACETIC ACD TOT: CPT

## 2021-05-20 PROCEDURE — 80179 DRUG ASSAY SALICYLATE: CPT

## 2021-05-20 PROCEDURE — 6360000002 HC RX W HCPCS: Performed by: EMERGENCY MEDICINE

## 2021-05-20 PROCEDURE — 93005 ELECTROCARDIOGRAM TRACING: CPT | Performed by: EMERGENCY MEDICINE

## 2021-05-20 PROCEDURE — 36415 COLL VENOUS BLD VENIPUNCTURE: CPT

## 2021-05-20 PROCEDURE — 96365 THER/PROPH/DIAG IV INF INIT: CPT

## 2021-05-20 PROCEDURE — 80307 DRUG TEST PRSMV CHEM ANLYZR: CPT

## 2021-05-20 PROCEDURE — 80143 DRUG ASSAY ACETAMINOPHEN: CPT

## 2021-05-20 PROCEDURE — 82077 ASSAY SPEC XCP UR&BREATH IA: CPT

## 2021-05-20 PROCEDURE — G0378 HOSPITAL OBSERVATION PER HR: HCPCS

## 2021-05-20 PROCEDURE — 85025 COMPLETE CBC W/AUTO DIFF WBC: CPT

## 2021-05-20 PROCEDURE — 99285 EMERGENCY DEPT VISIT HI MDM: CPT

## 2021-05-20 PROCEDURE — 81001 URINALYSIS AUTO W/SCOPE: CPT

## 2021-05-20 PROCEDURE — 87636 SARSCOV2 & INF A&B AMP PRB: CPT

## 2021-05-20 PROCEDURE — 2580000003 HC RX 258: Performed by: EMERGENCY MEDICINE

## 2021-05-20 RX ORDER — 0.9 % SODIUM CHLORIDE 0.9 %
1000 INTRAVENOUS SOLUTION INTRAVENOUS ONCE
Status: COMPLETED | OUTPATIENT
Start: 2021-05-20 | End: 2021-05-20

## 2021-05-20 RX ADMIN — LEVOCARNITINE 6000 MG: 1 INJECTION, SOLUTION INTRAVENOUS at 19:33

## 2021-05-20 RX ADMIN — SODIUM CHLORIDE 1000 ML: 9 INJECTION, SOLUTION INTRAVENOUS at 17:07

## 2021-05-20 NOTE — ED NOTES
48 YOM who presents to the Emergency Department after reportedly ingesting 100; 500 mg Valproic acid DR tablets and ~30 tablets of another of his medications (most likely culprit is prazosin as patient stated he takes it for his night terrors and the resulting hypotension he is currently experiencing) approximately 3-4 hours prior to arrival.    · Per poison control, obtain Ammonia and VPA level every 4 to 6 hours  ? Must see 2 consecutive levels that are declining, within normal range, and the patient has adequate mentation before you can stop trending the level  · Ammonia levels may not peak for up to 18 - 24 hours  · Valproic Acid DR levels may peak post 4 hours after ingestion   ? VPA half life typically is 9 - 19 hours but can be delayed in toxicity  · Observe for hypocalcemia, hypernatremia and hypoglycemia; ensure that electrolytes are kept within normal limits  · Obtain an EKG - Bicarb is recommended if the patients QRS is prolonged  · Recommendations for L-Carnitine Initiation: Initiate if ammonia >100 or if it's >80 and the patient is AMS  § Initiate  levocarnitine 100 mg/kg bolus (max 6000 mg) over 30 minutes followed by a maintenance dose of 15 mg/kg every 4 hours over 30 minutes  § Ammonia levels may vary as patient receives Levocarnitine  § May consider discontinuing L Carnitine once ammonia is <100 x2 and the patient's mentation is stable. § If patient decompensates, provide supportive care as needed  § Hemodialysis may be considered if VPA >1300 mg/L, the patient has cerebral edema or the patient is in shock   § If level >800 contact poison control for further recommendations   § Serum concentrations > 450 mcg/mL is associated with drowsiness and obtundation, if >850 it is associated with coma   § Be sure to monitor blood gases and lactic acid  § May take 24 hours for ammonia and depakote level to being to normalize.       David Antonio, PharmD 5/20/2021 4:02 PM  [phone]

## 2021-05-20 NOTE — ED PROVIDER NOTES
Absolute 0.58 0.10 - 0.95 E9/L    Eosinophils Absolute 0.14 0.05 - 0.50 E9/L    Basophils Absolute 0.06 0.00 - 0.20 E9/L   Comprehensive Metabolic Panel w/ Reflex to MG   Result Value Ref Range    Sodium 140 132 - 146 mmol/L    Potassium reflex Magnesium 3.8 3.5 - 5.0 mmol/L    Chloride 104 98 - 107 mmol/L    CO2 17 (L) 22 - 29 mmol/L    Anion Gap 19 (H) 7 - 16 mmol/L    Glucose 184 (H) 74 - 99 mg/dL    BUN 13 6 - 20 mg/dL    CREATININE 1.0 0.7 - 1.2 mg/dL    GFR Non-African American >60 >=60 mL/min/1.73    GFR African American >60     Calcium 9.3 8.6 - 10.2 mg/dL    Total Protein 5.9 (L) 6.4 - 8.3 g/dL    Albumin 3.2 (L) 3.5 - 5.2 g/dL    Total Bilirubin <0.2 0.0 - 1.2 mg/dL    Alkaline Phosphatase 97 40 - 129 U/L    ALT 8 0 - 40 U/L    AST 13 0 - 39 U/L   Urinalysis, reflex to microscopic   Result Value Ref Range    Color, UA Yellow Straw/Yellow    Clarity, UA Clear Clear    Glucose, Ur Negative Negative mg/dL    Bilirubin Urine Negative Negative    Ketones, Urine Negative Negative mg/dL    Specific Gravity, UA 1.015 1.005 - 1.030    Blood, Urine TRACE-INTACT Negative    pH, UA 6.0 5.0 - 9.0    Protein, UA Negative Negative mg/dL    Urobilinogen, Urine 0.2 <2.0 E.U./dL    Nitrite, Urine Negative Negative    Leukocyte Esterase, Urine MODERATE (A) Negative   Serum Drug Screen   Result Value Ref Range    Ethanol Lvl <10 mg/dL    Acetaminophen Level <5.0 (L) 10.0 - 99.3 mcg/mL    Salicylate, Serum <9.7 0.0 - 30.0 mg/dL    TCA Scrn NEGATIVE Cutoff:300 ng/mL   Urine Drug Screen   Result Value Ref Range    Amphetamine Screen, Urine NOT DETECTED Negative <1000 ng/mL    Barbiturate Screen, Ur NOT DETECTED Negative < 200 ng/mL    Benzodiazepine Screen, Urine NOT DETECTED Negative < 200 ng/mL    Cannabinoid Scrn, Ur NOT DETECTED Negative < 50ng/mL    Cocaine Metabolite Screen, Urine NOT DETECTED Negative < 300 ng/mL    Opiate Scrn, Ur NOT DETECTED Negative < 300ng/mL    PCP Screen, Urine NOT DETECTED Negative < 25 ng/mL Methadone Screen, Urine NOT DETECTED Negative <300 ng/mL    Oxycodone Urine NOT DETECTED Negative <100 ng/mL    FENTANYL SCREEN, URINE NOT DETECTED Negative <1 ng/mL    Drug Screen Comment: see below    Valproic acid level, total   Result Value Ref Range    Valproic Acid Lvl 224 (HH) 50 - 100 mcg/mL   Ammonia   Result Value Ref Range    Ammonia 38.0 16.0 - 60.0 umol/L   Microscopic Urinalysis   Result Value Ref Range    WBC, UA 10-20 (A) 0 - 5 /HPF    RBC, UA 0-1 0 - 2 /HPF    Bacteria, UA FEW (A) None Seen /HPF       RADIOLOGY:  Interpreted by Radiologist.  No orders to display       EKG: This EKG is signed and interpreted by the EP. Time: 1527  Rate: 120  Rhythm: Sinus  Interpretation: non-specific EKG  Comparison: None      ------------------------- NURSING NOTES AND VITALS REVIEWED ---------------------------   The nursing notes within the ED encounter and vital signs as below have been reviewed by myself. /75   Pulse 70   Temp 98.2 °F (36.8 °C) (Temporal)   Resp 16   Ht 6' 3\" (1.905 m)   Wt 200 lb (90.7 kg)   SpO2 99%   BMI 25.00 kg/m²   Oxygen Saturation Interpretation: Normal    The patients available past medical records and past encounters were reviewed.         ------------------------------ ED COURSE/MEDICAL DECISION MAKING----------------------  Medications   levOCARNitine 6,000 mg in sodium chloride 0.9 % 250 mL IVPB (0 mg Intravenous Stopped 5/20/21 2011)     Followed by   levOCARNitine 1,360 mg in sodium chloride 0.9 % 250 mL IVPB (1,360 mg Intravenous New Bag 5/21/21 0005)   0.9 % sodium chloride bolus (0 mLs Intravenous Stopped 5/20/21 1855)         ED COURSE:       Medical Decision Making:    Pt ammonia level noted, depakote elevated, pt bp improved with ivf, l carnitine started, as ammonia level nml and bp stable, feel can admit to floor, on call pcp consulted will admit, pt pink slipped      This patient's ED course included: a personal history and physicial examination    This patient has remained hemodynamically stable during their ED course. Re-Evaluations:             Re-evaluation. Patients symptoms are improving    Re-examination  5/20/21   5:24 PM EDT          Vital Signs:   Vitals:    05/20/21 2007 05/20/21 2058 05/20/21 2145 05/21/21 0001   BP: 118/71 120/81 117/76 119/75   Pulse: 89 84 75 70   Resp: 17 18 18 16   Temp:   97.6 °F (36.4 °C) 98.2 °F (36.8 °C)   TempSrc:    Temporal   SpO2: 99%  99%    Weight:       Height:         Card/Pulm:  Rhythm: normal rate. Heart Sounds: no murmurs, gallops, or rubs. clear to auscultation, no wheezes or rales and unlabored breathing. Capillary Refill: normal.  Radial Pulse:  equal.  Skin:  Warm. Consultations:             harley    Critical Care: 35 min        Counseling: The emergency provider has spoken with the patient and discussed todays results, in addition to providing specific details for the plan of care and counseling regarding the diagnosis and prognosis. Questions are answered at this time and they are agreeable with the plan.       --------------------------------- IMPRESSION AND DISPOSITION ---------------------------------    IMPRESSION  1. Valproic acid toxicity, intentional self-harm, initial encounter ([facility]    2. Suicide ideation        DISPOSITION  Disposition: Admit to telemetry  Patient condition is stable    NOTE: This report was transcribed using voice recognition software.  Every effort was made to ensure accuracy; however, inadvertent computerized transcription errors may be present        Reginald Lopez MD  05/21/21 9608

## 2021-05-21 LAB
AMMONIA: 133 UMOL/L (ref 16–60)
AMMONIA: 27 UMOL/L (ref 16–60)
AMMONIA: 60 UMOL/L (ref 16–60)
AMMONIA: 79 UMOL/L (ref 16–60)
AMMONIA: 86 UMOL/L (ref 16–60)
AMMONIA: 88 UMOL/L (ref 16–60)
VALPROIC ACID LEVEL: 117 MCG/ML (ref 50–100)
VALPROIC ACID LEVEL: 48 MCG/ML (ref 50–100)
VALPROIC ACID LEVEL: 59 MCG/ML (ref 50–100)
VALPROIC ACID LEVEL: 62 MCG/ML (ref 50–100)
VALPROIC ACID LEVEL: 86 MCG/ML (ref 50–100)

## 2021-05-21 PROCEDURE — 6370000000 HC RX 637 (ALT 250 FOR IP): Performed by: NEUROMUSCULOSKELETAL MEDICINE & OMM

## 2021-05-21 PROCEDURE — 6360000002 HC RX W HCPCS: Performed by: EMERGENCY MEDICINE

## 2021-05-21 PROCEDURE — 82140 ASSAY OF AMMONIA: CPT

## 2021-05-21 PROCEDURE — 2140000000 HC CCU INTERMEDIATE R&B

## 2021-05-21 PROCEDURE — 96376 TX/PRO/DX INJ SAME DRUG ADON: CPT

## 2021-05-21 PROCEDURE — 80164 ASSAY DIPROPYLACETIC ACD TOT: CPT

## 2021-05-21 PROCEDURE — 36415 COLL VENOUS BLD VENIPUNCTURE: CPT

## 2021-05-21 PROCEDURE — 2580000003 HC RX 258: Performed by: EMERGENCY MEDICINE

## 2021-05-21 RX ORDER — ACETAMINOPHEN 500 MG
500 TABLET ORAL EVERY 6 HOURS PRN
Status: DISCONTINUED | OUTPATIENT
Start: 2021-05-21 | End: 2021-05-22 | Stop reason: HOSPADM

## 2021-05-21 RX ORDER — RISPERIDONE 2 MG/1
2 TABLET, FILM COATED ORAL 2 TIMES DAILY
Status: DISCONTINUED | OUTPATIENT
Start: 2021-05-21 | End: 2021-05-22 | Stop reason: HOSPADM

## 2021-05-21 RX ORDER — LANOLIN ALCOHOL/MO/W.PET/CERES
6 CREAM (GRAM) TOPICAL NIGHTLY
Status: DISCONTINUED | OUTPATIENT
Start: 2021-05-21 | End: 2021-05-22 | Stop reason: HOSPADM

## 2021-05-21 RX ORDER — PRAZOSIN HYDROCHLORIDE 2 MG/1
2 CAPSULE ORAL 2 TIMES DAILY
Status: DISCONTINUED | OUTPATIENT
Start: 2021-05-21 | End: 2021-05-22 | Stop reason: HOSPADM

## 2021-05-21 RX ORDER — BENZTROPINE MESYLATE 1 MG/1
1 TABLET ORAL 2 TIMES DAILY
Status: DISCONTINUED | OUTPATIENT
Start: 2021-05-21 | End: 2021-05-22 | Stop reason: HOSPADM

## 2021-05-21 RX ADMIN — PRAZOSIN HYDROCHLORIDE 2 MG: 2 CAPSULE ORAL at 22:10

## 2021-05-21 RX ADMIN — ACETAMINOPHEN 500 MG: 500 TABLET ORAL at 08:41

## 2021-05-21 RX ADMIN — BENZTROPINE MESYLATE 1 MG: 1 TABLET ORAL at 10:38

## 2021-05-21 RX ADMIN — BENZTROPINE MESYLATE 1 MG: 1 TABLET ORAL at 22:10

## 2021-05-21 RX ADMIN — RISPERIDONE 2 MG: 2 TABLET, FILM COATED ORAL at 22:11

## 2021-05-21 RX ADMIN — Medication 6 MG: at 22:10

## 2021-05-21 RX ADMIN — LEVOCARNITINE 1360 MG: 1 INJECTION, SOLUTION INTRAVENOUS at 05:02

## 2021-05-21 RX ADMIN — RISPERIDONE 2 MG: 2 TABLET, FILM COATED ORAL at 10:38

## 2021-05-21 RX ADMIN — LEVOCARNITINE 1360 MG: 1 INJECTION, SOLUTION INTRAVENOUS at 00:05

## 2021-05-21 ASSESSMENT — PAIN SCALES - GENERAL
PAINLEVEL_OUTOF10: 0
PAINLEVEL_OUTOF10: 10
PAINLEVEL_OUTOF10: 0
PAINLEVEL_OUTOF10: 0

## 2021-05-21 NOTE — CARE COORDINATION
Met with patient at bedside to introduce self and discuss transition of care. He has h/o of [facility] and [facility]. Does not want a referral to [facility]. He lives with spouse in a 2 story home with bedroom and bath on first floor. No DME. His PCP is Dr. Felicita Erickson and he uses [facility]. Patient denies any thought of self harm at this time. He appears calm and is cooperative. He is aware he may be transferred to [facility]. CM/SW will continue to follow.

## 2021-05-21 NOTE — PROGRESS NOTES
Spoke with Poison control, pt medically stable from primary. Pt A&O x 4, VSS, labs stable. Will close case.

## 2021-05-21 NOTE — PROGRESS NOTES
Dr Iris Hagan via perfect serve re: still awaiting admitting/med rec orders. Pt request for tylenol and pharm recs to d/c IV levocarnitine. Awaiting orders.      Orders placed

## 2021-05-21 NOTE — CARE COORDINATION
Per  Physician progress note  from [phone] AM this morning, patient is medically stable for transfer for Psych  inpatient eval and treatment. Message left on [facility] [phone] voice mail regarding this.

## 2021-05-21 NOTE — PROGRESS NOTES
Attempted to message Dr. Meneses [phone] x 2 via perfect serve for admission orders . message states not available.  Nayana Fajardo RN

## 2021-05-21 NOTE — PROGRESS NOTES
Jarrett Lewis was ordered deutetrabenazine (AUSTEDO) which is a nonformulary medication. The patient will need to have their home supply of this medication brought in to the hospital for inpatient use. If the medication has not been administered by 1400 on the following day from the time the order was placed, a pharmacist will follow-up with the nurse of the patient to assess the capability of the patient to bring in the medication. If it is determined that the patient cannot supply the medication and it is not available to be dispensed from the pharmacy, a call will be placed to the ordering provider to discuss alternative options.

## 2021-05-21 NOTE — H&P
[address]                  [address] [facility]                              HISTORY AND PHYSICAL    PATIENT NAME: Hollie Iglesias                       :        1965  MED REC NO:   [id number]                            ROOM:       5839  ACCOUNT NO:   [de-identified]                           ADMIT DATE: 2021  PROVIDER:     Dimitry Tomas DO    CHIEF COMPLAINT:  Suicidal ideation, drug overdose. HISTORY OF CHIEF COMPLAINT:  A 52-year-old male, patient of Dr. Tylor Chery  to whom I am covering for, presents to emergency room with suicidal  ideation with a drug overdose. Per EMS, the patient took home Depakote  unknown quantity in a suicide attempt. The patient has had a history of  this in the past.  He has also has a history of chronic noncompliance  with treatment. In the ER, he denied any nausea, vomiting, diarrhea,  chest pain, shortness of breath. Workup in the ER showed his COVID-19 and influenza were negative. White  count was 10 and he was afebrile. Metabolic panel, sugar 784, BUN and  creatinine 13 and 1.0. Liver functions unremarkable except for a low  total protein of 5.9 and a low albumin of 3.2. Urinalysis shows  moderate leukocytes. Urine drug screen was negative. His valproic acid  was 224. His ammonia level was 38. EKG revealed a sinus rhythm, rate  in the 120s, no acute ST or T-wave abnormalities noted. The patient did  receive levocarnitine 6000 mg IV piggyback. He will be admitted to rule  out medically prior to being transferred to inpatient Psych here at  [facility].  The patient was pink slipped. He offers no other  complaints at this time. PAST MEDICAL HISTORY:  1. Crohn's disease. 2.  General anxiety disorder. 3.  Schizoaffective disorder. 4.  ADD. 5.  Depression. 6.  GERD. 7.  Bipolar disorder. PAST SURGICAL HISTORY:  1.  C-scope, no surgery.   2.  Prostatectomy, I and D of scrotum in 05/2019. 3. PICC line insertion in 10/2020. ALLERGIES:.  1.  MORPHINE, states that \"it does nothing for me\". 2.  Adverse reaction to CIPRO and NSAIDs. MEDICATIONS PRIOR TO ADMISSION:  1. Valproic acid 500 mg one by mouth twice a day. 2.  Cogentin 1 mg one by mouth twice a day. 3.  Risperdal 2 mg one by mouth twice a day. 4.  Melatonin 6 mg one by mouth at bedtime. 5.  Minipress 2 mg one by mouth twice a day. 6.  Austedo 6 mg one by mouth twice a day. 7.  Zofran 4 mg one by mouth every 6 hours as needed. 8.  Lipitor 10 mg one daily as needed. SOCIAL HISTORY:  Former smoker, he quit back in 04/2021. Nondrinker. No history of illicit drug use. He last used marijuana in 1997. The  patient does not vape. Occupation, he is on disability. Marital  status, he is . FAMILY HISTORY:  Both parents are alive with both of them having a  history of mental illness. Father also with depression. Mother has a  history of heart disease. He has a brother and sister also with a  history of mental illness. REVIEW OF SYSTEMS:  As mentioned in chief complaint, otherwise  unremarkable. PHYSICAL EXAMINATION:  VITALS:  On admission, temperature 97.6, pulse 112, respirations 18,  blood pressure 84/63, pulse ox 95% on room air. Height is 6 feet 3  inches, weight 200 pounds, BMI of 25. GENERAL:  Alert and oriented x3, appears to be in no acute distress. SKIN:  Adequate turgor, no tenting, no rash or wounds. Dry and intact. HEAD:  Normocephalic, atraumatic. EYES:  Pupils equally reactive to light bilaterally. Extraocular  muscles intact. THROAT:  Without erythema, exudates. No postnasal drainage. NECK:  Supple. No adenopathy bilaterally. No carotid bruit. HEART:  Regular rate and rhythm without murmurs, rubs or gallops. LUNGS:  Clear to auscultation bilaterally. ABDOMEN:  Soft, nontender, nondistended. Good bowel sounds throughout. No masses, no organomegaly, no bruit. EXTREMITIES:  No clubbing, cyanosis, edema noted. NEUROLOGICAL:  Intact. No focal deficits. Cranial nerves II through  XII grossly intact. MUSCULOSKELETAL:  Appropriate for above-stated age. LABORATORY DATA:  Metabolic panel unremarkable except for CO2 of 17, BUN  and creatinine normal 13 and 1.0, sugar 184. Total protein 5.9, albumin  3.2. Urine drug screen was unremarkable. CBC:  White count 10,  platelets 054, H and H 13.1, 41.2. Influenza A and B negative. COVID-19 not detected. Urinalysis shows moderate leukocytes, 10-20  wbc's, few bacteria. IMPRESSION:  1. Intentional drug overdose with valproic acid. 2.  Suicidal ideation. 3.  Depression. 4.  Bipolar disorder. 5.  Schizoaffective disorder. .  6.  Attention deficit disorder. 7.  General anxiety disorder. 8.  Crohn's disease. 9.  Hyperlipidemia. [place] 10.  GERD. PLAN:  The patient is currently medically stable for transfer for Psych  inpatient evaluation and treatment. We will await psych eval and treat  accordingly.         Millicent Cm DO    D: 05/21/2021 9:12:27       T: 05/21/2021 9:21:41     [email or web address]  Job#: [id number]     Doc#: [id number]    CC:

## 2021-05-21 NOTE — ED NOTES
Spoke with Arely Marshall at [email or web address] for update on pt condition.       Arlene Garcia RN  05/20/21 [id number]

## 2021-05-22 ENCOUNTER — HOSPITAL ENCOUNTER (INPATIENT)
Age: 56
LOS: 6 days | Discharge: HOME | DRG: 885 | End: 2021-05-28
Attending: PSYCHIATRY & NEUROLOGY | Admitting: PSYCHIATRY & NEUROLOGY
Payer: MEDICARE

## 2021-05-22 VITALS
HEART RATE: 81 BPM | TEMPERATURE: 97.2 F | RESPIRATION RATE: 18 BRPM | DIASTOLIC BLOOD PRESSURE: 89 MMHG | OXYGEN SATURATION: 95 % | BODY MASS INDEX: 24.87 KG/M2 | WEIGHT: 200 LBS | HEIGHT: 75 IN | SYSTOLIC BLOOD PRESSURE: 133 MMHG

## 2021-05-22 LAB
AMMONIA: 28 UMOL/L (ref 16–60)
AMMONIA: 30 UMOL/L (ref 16–60)
AMMONIA: 34 UMOL/L (ref 16–60)
AMMONIA: 39 UMOL/L (ref 16–60)
AMMONIA: 39 UMOL/L (ref 16–60)
EKG ATRIAL RATE: 119 BPM
EKG P AXIS: 72 DEGREES
EKG P-R INTERVAL: 138 MS
EKG Q-T INTERVAL: 348 MS
EKG QRS DURATION: 84 MS
EKG QTC CALCULATION (BAZETT): 489 MS
EKG R AXIS: 78 DEGREES
EKG T AXIS: 61 DEGREES
EKG VENTRICULAR RATE: 119 BPM
VALPROIC ACID LEVEL: 17 MCG/ML (ref 50–100)
VALPROIC ACID LEVEL: 19 MCG/ML (ref 50–100)
VALPROIC ACID LEVEL: 21 MCG/ML (ref 50–100)
VALPROIC ACID LEVEL: 27 MCG/ML (ref 50–100)
VALPROIC ACID LEVEL: 32 MCG/ML (ref 50–100)
VALPROIC ACID LEVEL: 35 MCG/ML (ref 50–100)

## 2021-05-22 PROCEDURE — 93010 ELECTROCARDIOGRAM REPORT: CPT | Performed by: INTERNAL MEDICINE

## 2021-05-22 PROCEDURE — 36415 COLL VENOUS BLD VENIPUNCTURE: CPT

## 2021-05-22 PROCEDURE — 6370000000 HC RX 637 (ALT 250 FOR IP): Performed by: NEUROMUSCULOSKELETAL MEDICINE & OMM

## 2021-05-22 PROCEDURE — 82140 ASSAY OF AMMONIA: CPT

## 2021-05-22 PROCEDURE — 1240000000 HC EMOTIONAL WELLNESS R&B

## 2021-05-22 PROCEDURE — 80164 ASSAY DIPROPYLACETIC ACD TOT: CPT

## 2021-05-22 RX ORDER — RISPERIDONE 2 MG/1
2 TABLET, FILM COATED ORAL 2 TIMES DAILY
Status: CANCELLED | OUTPATIENT
Start: 2021-05-23

## 2021-05-22 RX ORDER — ACETAMINOPHEN 500 MG
500 TABLET ORAL EVERY 6 HOURS PRN
Status: CANCELLED | OUTPATIENT
Start: 2021-05-22

## 2021-05-22 RX ORDER — BENZTROPINE MESYLATE 1 MG/1
1 TABLET ORAL 2 TIMES DAILY
Status: CANCELLED | OUTPATIENT
Start: 2021-05-23

## 2021-05-22 RX ORDER — LANOLIN ALCOHOL/MO/W.PET/CERES
6 CREAM (GRAM) TOPICAL NIGHTLY
Status: CANCELLED | OUTPATIENT
Start: 2021-05-23

## 2021-05-22 RX ORDER — PRAZOSIN HYDROCHLORIDE 2 MG/1
2 CAPSULE ORAL 2 TIMES DAILY
Status: CANCELLED | OUTPATIENT
Start: 2021-05-23

## 2021-05-22 RX ADMIN — BENZTROPINE MESYLATE 1 MG: 1 TABLET ORAL at 21:05

## 2021-05-22 RX ADMIN — PRAZOSIN HYDROCHLORIDE 2 MG: 2 CAPSULE ORAL at 21:06

## 2021-05-22 RX ADMIN — PRAZOSIN HYDROCHLORIDE 2 MG: 2 CAPSULE ORAL at 09:15

## 2021-05-22 RX ADMIN — BENZTROPINE MESYLATE 1 MG: 1 TABLET ORAL at 09:15

## 2021-05-22 RX ADMIN — Medication 6 MG: at 21:05

## 2021-05-22 RX ADMIN — RISPERIDONE 2 MG: 2 TABLET, FILM COATED ORAL at 09:15

## 2021-05-22 RX ADMIN — RISPERIDONE 2 MG: 2 TABLET, FILM COATED ORAL at 21:05

## 2021-05-22 ASSESSMENT — PAIN SCALES - GENERAL
PAINLEVEL_OUTOF10: 0
PAINLEVEL_OUTOF10: 3

## 2021-05-22 ASSESSMENT — PAIN DESCRIPTION - PAIN TYPE: TYPE: ACUTE PAIN

## 2021-05-22 ASSESSMENT — PAIN DESCRIPTION - LOCATION: LOCATION: HAND

## 2021-05-22 NOTE — ED NOTES
Spoke with Dr Daja Bhatti, reports ok to review pt for possible admission 7522A, pink slip received, pt is COVID negative, medical clearance note entered by Dr Babatunde De La Torre 5/22/2021, spoke with [facility], pt RN on 8WE, advised to call charge Skip Lim at [phone] to review for possible admission, Thuy on [address] aware referral being called in.        [address], MSW, Chevy Tovar  05/22/21 1963

## 2021-05-22 NOTE — PLAN OF CARE
Problem: Suicide risk  Goal: Provide patient with safe environment  Description: Provide patient with safe environment  Outcome: Met This Shift     Problem: Skin Integrity:  Goal: Will show no infection signs and symptoms  Description: Will show no infection signs and symptoms  Outcome: Met This Shift  Goal: Absence of new skin breakdown  Description: Absence of new skin breakdown  Outcome: Met This Shift

## 2021-05-22 NOTE — PROGRESS NOTES
General Progress Note  5/22/2021 8:29 AM  Subjective:   Admit Date: 5/20/2021  PCP: Layne Salmon DO  Interval History: patient awake and alert. Communicating well. Eating breakfast. No acute issues overnight. Awaiting a bed in [facility] for in patient care. Diet: DIET GENERAL;  Pain is:None  Nausea:None  Bowel Movement/Flatus yes    Data:   Scheduled Meds:   melatonin  6 mg Oral Nightly    benztropine  1 mg Oral BID    Deutetrabenazine  9 mg Oral BID    prazosin  2 mg Oral BID    risperiDONE  2 mg Oral BID     Continuous Infusions:  PRN Meds:acetaminophen  I/O last 3 completed shifts: In: 5 [P.O.:540]  Out: 375 [Urine:150; Stool:225]  No intake/output data recorded.     Intake/Output Summary (Last 24 hours) at 5/22/2021 0829  Last data filed at 5/22/2021 0600  Gross per 24 hour   Intake 540 ml   Output 375 ml   Net 165 ml       CBC with Differential:    Lab Results   Component Value Date    WBC 10.0 05/20/2021    RBC 4.55 05/20/2021    HGB 13.1 05/20/2021    HCT 41.2 05/20/2021     05/20/2021    MCV 90.5 05/20/2021    MCH 28.8 05/20/2021    MCHC 31.8 05/20/2021    RDW 14.6 05/20/2021    NRBC 0.0 01/25/2021    BANDSPCT 1 09/17/2014    METASPCT 1.7 04/02/2020    LYMPHOPCT 16.6 05/20/2021    PROMYELOPCT 0.9 01/25/2021    MONOPCT 5.8 05/20/2021    MYELOPCT 0.9 01/25/2021    BASOPCT 0.6 05/20/2021    MONOSABS 0.58 05/20/2021    LYMPHSABS 1.66 05/20/2021    EOSABS 0.14 05/20/2021    BASOSABS 0.06 05/20/2021     CMP:    Lab Results   Component Value Date     05/20/2021    K 3.8 05/20/2021     05/20/2021    CO2 17 05/20/2021    BUN 13 05/20/2021    CREATININE 1.0 05/20/2021    GFRAA >60 05/20/2021    LABGLOM >60 05/20/2021    GLUCOSE 184 05/20/2021    GLUCOSE 118 02/05/2011    PROT 5.9 05/20/2021    LABALBU 3.2 05/20/2021    LABALBU 3.5 02/05/2011    CALCIUM 9.3 05/20/2021    BILITOT <0.2 05/20/2021    ALKPHOS 97 05/20/2021    AST 13 05/20/2021    ALT 8 05/20/2021     Magnesium:    Lab Results Component Value Date    MG 2.1 01/26/2020     Phosphorus:    Lab Results   Component Value Date    PHOS 3.4 10/03/2018     PT/INR:    Lab Results   Component Value Date    PROTIME 11.1 03/26/2021    PROTIME 11.5 02/05/2011    INR 1.0 03/26/2021     Last 3 Troponin:    Lab Results   Component Value Date    TROPONINI <0.01 05/18/2021    TROPONINI <0.01 04/13/2021    TROPONINI <0.01 03/14/2021     U/A:    Lab Results   Component Value Date    COLORU Yellow 05/20/2021    PHUR 6.0 05/20/2021    LABCAST RARE 03/06/2018    WBCUA 10-20 05/20/2021    RBCUA 0-1 05/20/2021    MUCUS Present 05/13/2019    YEAST MODERATE 05/13/2019    BACTERIA FEW 05/20/2021    CLARITYU Clear 05/20/2021    SPECGRAV 1.015 05/20/2021    LEUKOCYTESUR MODERATE 05/20/2021    UROBILINOGEN 0.2 05/20/2021    BILIRUBINUR Negative 05/20/2021    BLOODU TRACE-INTACT 05/20/2021    GLUCOSEU Negative 05/20/2021    AMORPHOUS FEW 10/24/2018     HgBA1c:  No components found for: HGBA1C  TSH:    Lab Results   Component Value Date    TSH 2.240 04/23/2021     -----------------------------------------------------------------    Objective:   Vitals: /71   Pulse 65   Temp 98.4 °F (36.9 °C) (Temporal)   Resp 20   Ht 6' 3\" (1.905 m)   Wt 200 lb (90.7 kg)   SpO2 99%   BMI 25.00 kg/m²   General appearance: alert, appears stated age and cooperative  Skin: Skin color, texture, turgor normal. No rashes or lesions  HEENT: Head: Normal, normocephalic, atraumatic.   Neck: no adenopathy, no carotid bruit, no JVD, supple, symmetrical, trachea midline and thyroid not enlarged, symmetric, no tenderness/mass/nodules  Lungs: clear to auscultation bilaterally  Heart: regular rate and rhythm, S1, S2 normal, no murmur, click, rub or gallop  Abdomen: soft, non-tender; bowel sounds normal; no masses,  no organomegaly  Extremities: extremities normal, atraumatic, no cyanosis or edema  Neurologic: Mental status: Alert, oriented, thought content appropriate    Assessment:

## 2021-05-22 NOTE — PROGRESS NOTES
Patient calm and cooperative throughout shift. Denies any suicidal thoughts or ideations at this time.

## 2021-05-22 NOTE — PROGRESS NOTES
Nurse to nurse called to 8WE. Telemetry pack removed for transfer. Patient and family member in room aware of transfer and new room number.

## 2021-05-23 PROBLEM — F39 MOOD DISORDER (HCC): Status: ACTIVE | Noted: 2021-05-23

## 2021-05-23 PROBLEM — F31.10 BIPOLAR AFFECTIVE DISORDER, CURRENT EPISODE MANIC WITHOUT PSYCHOTIC SYMPTOMS (HCC): Status: ACTIVE | Noted: 2021-05-23

## 2021-05-23 PROCEDURE — 6370000000 HC RX 637 (ALT 250 FOR IP): Performed by: PSYCHIATRY & NEUROLOGY

## 2021-05-23 PROCEDURE — 1240000000 HC EMOTIONAL WELLNESS R&B

## 2021-05-23 PROCEDURE — 6370000000 HC RX 637 (ALT 250 FOR IP): Performed by: NEUROMUSCULOSKELETAL MEDICINE & OMM

## 2021-05-23 PROCEDURE — 99222 1ST HOSP IP/OBS MODERATE 55: CPT | Performed by: PSYCHIATRY & NEUROLOGY

## 2021-05-23 RX ORDER — BENZTROPINE MESYLATE 1 MG/1
1 TABLET ORAL 2 TIMES DAILY
Status: DISCONTINUED | OUTPATIENT
Start: 2021-05-23 | End: 2021-05-28 | Stop reason: HOSPADM

## 2021-05-23 RX ORDER — MAGNESIUM HYDROXIDE/ALUMINUM HYDROXICE/SIMETHICONE 120; 1200; 1200 MG/30ML; MG/30ML; MG/30ML
30 SUSPENSION ORAL PRN
Status: DISCONTINUED | OUTPATIENT
Start: 2021-05-23 | End: 2021-05-28 | Stop reason: HOSPADM

## 2021-05-23 RX ORDER — HALOPERIDOL 5 MG
5 TABLET ORAL EVERY 6 HOURS PRN
Status: DISCONTINUED | OUTPATIENT
Start: 2021-05-23 | End: 2021-05-28 | Stop reason: HOSPADM

## 2021-05-23 RX ORDER — HALOPERIDOL 5 MG
5 TABLET ORAL 2 TIMES DAILY
Status: DISCONTINUED | OUTPATIENT
Start: 2021-05-23 | End: 2021-05-28 | Stop reason: HOSPADM

## 2021-05-23 RX ORDER — ACETAMINOPHEN 325 MG/1
650 TABLET ORAL EVERY 6 HOURS PRN
Status: DISCONTINUED | OUTPATIENT
Start: 2021-05-23 | End: 2021-05-23 | Stop reason: SDUPTHER

## 2021-05-23 RX ORDER — ACETAMINOPHEN 500 MG
500 TABLET ORAL EVERY 6 HOURS PRN
Status: DISCONTINUED | OUTPATIENT
Start: 2021-05-23 | End: 2021-05-28 | Stop reason: HOSPADM

## 2021-05-23 RX ORDER — RISPERIDONE 2 MG/1
2 TABLET, FILM COATED ORAL 2 TIMES DAILY
Status: DISCONTINUED | OUTPATIENT
Start: 2021-05-23 | End: 2021-05-23

## 2021-05-23 RX ORDER — PRAZOSIN HYDROCHLORIDE 2 MG/1
2 CAPSULE ORAL 2 TIMES DAILY
Status: DISCONTINUED | OUTPATIENT
Start: 2021-05-23 | End: 2021-05-23

## 2021-05-23 RX ORDER — BENZTROPINE MESYLATE 1 MG/1
1 TABLET ORAL 2 TIMES DAILY
Status: DISCONTINUED | OUTPATIENT
Start: 2021-05-23 | End: 2021-05-23 | Stop reason: SDUPTHER

## 2021-05-23 RX ORDER — TRAZODONE HYDROCHLORIDE 50 MG/1
50 TABLET ORAL NIGHTLY PRN
Status: DISCONTINUED | OUTPATIENT
Start: 2021-05-23 | End: 2021-05-28 | Stop reason: HOSPADM

## 2021-05-23 RX ORDER — LANOLIN ALCOHOL/MO/W.PET/CERES
6 CREAM (GRAM) TOPICAL NIGHTLY
Status: DISCONTINUED | OUTPATIENT
Start: 2021-05-23 | End: 2021-05-28 | Stop reason: HOSPADM

## 2021-05-23 RX ORDER — HALOPERIDOL 5 MG/ML
5 INJECTION INTRAMUSCULAR EVERY 6 HOURS PRN
Status: DISCONTINUED | OUTPATIENT
Start: 2021-05-23 | End: 2021-05-28 | Stop reason: HOSPADM

## 2021-05-23 RX ORDER — HYDROXYZINE PAMOATE 50 MG/1
50 CAPSULE ORAL 3 TIMES DAILY PRN
Status: DISCONTINUED | OUTPATIENT
Start: 2021-05-23 | End: 2021-05-28 | Stop reason: HOSPADM

## 2021-05-23 RX ORDER — DIVALPROEX SODIUM 500 MG/1
500 TABLET, DELAYED RELEASE ORAL 2 TIMES DAILY
Status: DISCONTINUED | OUTPATIENT
Start: 2021-05-23 | End: 2021-05-28 | Stop reason: HOSPADM

## 2021-05-23 RX ADMIN — TRAZODONE HYDROCHLORIDE 50 MG: 50 TABLET ORAL at 21:37

## 2021-05-23 RX ADMIN — BENZTROPINE MESYLATE 1 MG: 1 TABLET ORAL at 21:37

## 2021-05-23 RX ADMIN — PRAZOSIN HYDROCHLORIDE 2 MG: 2 CAPSULE ORAL at 08:24

## 2021-05-23 RX ADMIN — DIVALPROEX SODIUM 500 MG: 250 TABLET, DELAYED RELEASE ORAL at 21:37

## 2021-05-23 RX ADMIN — HALOPERIDOL 5 MG: 5 TABLET ORAL at 21:37

## 2021-05-23 RX ADMIN — BENZTROPINE MESYLATE 1 MG: 1 TABLET ORAL at 08:24

## 2021-05-23 RX ADMIN — Medication 6 MG: at 21:36

## 2021-05-23 RX ADMIN — RISPERIDONE 2 MG: 2 TABLET ORAL at 08:24

## 2021-05-23 ASSESSMENT — SLEEP AND FATIGUE QUESTIONNAIRES
DO YOU HAVE DIFFICULTY SLEEPING: NO
AVERAGE NUMBER OF SLEEP HOURS: 6

## 2021-05-23 ASSESSMENT — PAIN SCALES - GENERAL: PAINLEVEL_OUTOF10: 0

## 2021-05-23 NOTE — DISCHARGE SUMMARY
Family Practice Discharge Summary    Jarrett Lewis  :  1965  MRN:  [id number]    Admit date:  2021  Discharge date:  2021    Admitting Physician:  Humble Pelletier,     Discharge Diagnoses:    Patient Active Problem List   Diagnosis    Drug overdose, multiple drugs    Suicide attempt ([address]    Laceration of neck    Laceration of left wrist    GERD (gastroesophageal reflux disease)    Bipolar 1 disorder ([address]    Opioid overdose ([address]    Drug-induced tremor    Orchitis    H/O carcinoma in situ of prostate    Essential hypertension    PND (post-nasal drip)    Scrotal abscess    Depression with suicidal ideation    Colitis    Acute Crohn's disease with rectal bleeding ([address]    Opiate abuse, episodic ([address]    LILLY (acute kidney injury) ([address]    Intra-abdominal abscess ([address]    Ileostomy in place ([address]    Crohn's disease of large intestine with abscess ([address]    Severe manic bipolar 1 disorder with psychotic behavior ([address]    Drug overdose, intentional self-harm, initial encounter ([address]    Mood disorder ([address]       Admission Condition:  fair    Discharged Condition:  fair    Hospital Course:    A 60-year-old male, patient of Dr. Andreia Vallejo  to whom I am covering for, presents to emergency room with suicidal  ideation with a drug overdose. Per EMS, the patient took home Depakote  unknown quantity in a suicide attempt. The patient has had a history of  this in the past.  He has also has a history of chronic noncompliance  with treatment. In the ER, he denied any nausea, vomiting, diarrhea,  chest pain, shortness of breath.     Workup in the ER showed his COVID-19 and influenza were negative. White  count was 10 and he was afebrile. Metabolic panel, sugar 245, BUN and  creatinine 13 and 1.0. Liver functions unremarkable except for a low  total protein of 5.9 and a low albumin of 3.2. Urinalysis shows  moderate leukocytes. Urine drug screen was negative.   His valproic acid  was edema   Pulses:   2+ and symmetric all extremities   Skin:   Skin color, texture, turgor normal, no rashes or lesions   Lymph nodes:   Cervical, supraclavicular, and axillary nodes normal   Neurologic:   CNII-XII intact. Normal strength, sensation and reflexes       throughout       Disposition: In patient psych admission.        Medication List      ASK your doctor about these medications    atorvastatin 10 MG tablet  Commonly known as: LIPITOR  Take 1 tablet by mouth daily     Austedo 6 MG tablet  Generic drug: deutetrabenazine     benztropine 1 MG tablet  Commonly known as: COGENTIN  Take 1 tablet by mouth 2 times daily     melatonin 3 MG Tabs tablet  Take 2 tablets by mouth nightly     ondansetron 4 MG tablet  Commonly known as: ZOFRAN  Take 1 tablet by mouth every 6 hours as needed for Nausea or Vomiting     prazosin 2 MG capsule  Commonly known as: MINIPRESS     risperiDONE 2 MG tablet  Commonly known as: RISPERDAL  Take 1 tablet by mouth 2 times daily     valproic acid 250 MG/5ML Soln oral solution  Commonly known as: DEPACON  Take 10 mLs by mouth every 12 hours                 Signed:  Lilibeth Jackson DO, D.O.  5/23/2021, 6:45 AM
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
## 2021-05-23 NOTE — PLAN OF CARE
Problem: Altered Mood, Depressive Behavior:  Goal: Able to verbalize and/or display a decrease in depressive symptoms  Description: Able to verbalize and/or display a decrease in depressive symptoms  Outcome: Ongoing  Goal: Absence of self-harm  Description: Absence of self-harm  Outcome: Met This Shift

## 2021-05-23 NOTE — BH NOTE
Pt arrived with no personal belongings call to [facility] to check pt tells me he had one pair of shorts but does not know where they are he stated he came to the hospital with no belongings or valuables

## 2021-05-23 NOTE — GROUP NOTE
Group Therapy Note    Date: 5/23/2021    Group Start Time: 1110  Group End Time: 4718  Group Topic: Cognitive Skills    [facility]    BATSHEVA Warren        Group Therapy Note    Attendees: 16         Patient's Goal:  Pt will participate in class discussion and activity    Notes:  Pt was an active participant in class for a awhile but did not stay for activity. Status After Intervention:  Improved    Participation Level:  Active Listener and Minimal    Participation Quality: Appropriate, Attentive, Sharing and Supportive      Speech:  normal      Thought Process/Content: Logical      Affective Functioning:Blunted      Mood: depression      Level of consciousness:  Alert, Oriented x4 and attentive      Response to Learning: Able to verbalize current knowledge/experience, Able to verbalize/acknowledge new learning and Progressing to goal      Endings: None Reported    Modes of Intervention: Education, Support, Socialization and Activity      Discipline Responsible: /Counselor      Signature:  BATSHEVA Warren
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
## 2021-05-23 NOTE — BH NOTE
[facility]  Initial Interdisciplinary Treatment Plan NOTE    Review Date & Time: 05/23/2021 6429     Patient was in treatment team    Admission Type:   Admission Type:  Involuntary    Reason for admission:  Reason for Admission: \"I overdosed on depakote\"      Estimated Length of Stay Update:  5-7 days   Estimated Discharge Date Update: 05/28/2021    PATIENT STRENGTHS:  Patient Strengths Strengths: Communication  Patient Strengths and Limitations:Limitations: Multiple barriers to leisure interests, Lacks leisure interests  Addictive Behavior:Addictive Behavior  In the past 3 months, have you felt or has someone told you that you have a problem with:  : None  Do you have a history of Chemical Use?: No  Do you have a history of Alcohol Use?: No  Do you have a history of Street Drug Abuse?: No  Histroy of Prescripton Drug Abuse?: No  Medical Problems:  Past Medical History:   Diagnosis Date    ADD (attention deficit disorder)     Anxiety     Bipolar 1 disorder ([facility] 11/19/2017    Cancer ([facility] prostate cancer    2014 / treated with surgery    [facility]     Depression     GERD (gastroesophageal reflux disease)     Panic attack     Rectal pain     has a fissure    Schizo affective schizophrenia ([facility]     stable       EDUCATION:   Learner Progress Toward Treatment Goals: Reviewed group plan and strategies    Method: Small group    Outcome: Needs reinforcement    PATIENT GOALS: call wife     PLAN/TREATMENT RECOMMENDATIONS UPDATE: 05/25/2021    GOALS UPDATE:   Time frame for Short-Term Goals: 1-3 days     Grupo Chance RN

## 2021-05-24 PROCEDURE — 1240000000 HC EMOTIONAL WELLNESS R&B

## 2021-05-24 PROCEDURE — 6370000000 HC RX 637 (ALT 250 FOR IP): Performed by: NEUROMUSCULOSKELETAL MEDICINE & OMM

## 2021-05-24 PROCEDURE — 6370000000 HC RX 637 (ALT 250 FOR IP): Performed by: PSYCHIATRY & NEUROLOGY

## 2021-05-24 PROCEDURE — 6360000002 HC RX W HCPCS: Performed by: NEUROMUSCULOSKELETAL MEDICINE & OMM

## 2021-05-24 PROCEDURE — 99232 SBSQ HOSP IP/OBS MODERATE 35: CPT | Performed by: NURSE PRACTITIONER

## 2021-05-24 RX ADMIN — BENZTROPINE MESYLATE 1 MG: 1 TABLET ORAL at 09:26

## 2021-05-24 RX ADMIN — DEXAMETHASONE 6 MG: 4 TABLET ORAL at 21:42

## 2021-05-24 RX ADMIN — DIVALPROEX SODIUM 500 MG: 250 TABLET, DELAYED RELEASE ORAL at 09:27

## 2021-05-24 RX ADMIN — TRAZODONE HYDROCHLORIDE 50 MG: 50 TABLET ORAL at 21:41

## 2021-05-24 RX ADMIN — HALOPERIDOL 5 MG: 5 TABLET ORAL at 21:41

## 2021-05-24 RX ADMIN — DIVALPROEX SODIUM 500 MG: 250 TABLET, DELAYED RELEASE ORAL at 21:41

## 2021-05-24 RX ADMIN — Medication 6 MG: at 21:41

## 2021-05-24 RX ADMIN — BENZTROPINE MESYLATE 1 MG: 1 TABLET ORAL at 21:42

## 2021-05-24 RX ADMIN — HALOPERIDOL 5 MG: 5 TABLET ORAL at 09:27

## 2021-05-24 ASSESSMENT — PAIN SCALES - GENERAL: PAINLEVEL_OUTOF10: 0

## 2021-05-24 NOTE — PLAN OF CARE
Problem: Altered Mood, Depressive Behavior:  Goal: Able to verbalize and/or display a decrease in depressive symptoms  Description: Able to verbalize and/or display a decrease in depressive symptoms  Outcome: Ongoing  MOOD LESS DEPRESSED PER PT. Goal: Absence of self-harm  Description: Absence of self-harm  Outcome: Met This Shift  NO SELF HARM.

## 2021-05-24 NOTE — GROUP NOTE
Group Therapy Note    Date: 5/24/2021    Group Start Time: 1100  Group End Time: 7400  Group Topic: Cognitive Skills    [facility]    FABRIZIO Price LSW        Group Therapy Note    Attendees: 15         Patient's Goal:  To develop a mental health maintenance plan . Notes: Pt was observed to be attentive throughout group session. Status After Intervention:  Improved    Participation Level:  Active Listener    Participation Quality: Appropriate and Attentive      Speech:  normal      Thought Process/Content: Logical      Affective Functioning: Congruent      Mood: depressed      Level of consciousness:  Alert, Oriented x4 and Attentive      Response to Learning: Able to verbalize current knowledge/experience      Endings: None Reported    Modes of Intervention: Education      Discipline Responsible: /Counselor      Signature:  FABRIZIO Price LSW

## 2021-05-24 NOTE — PROGRESS NOTES
BEHAVIORAL HEALTH FOLLOW-UP NOTE     5/24/2021     Patient was seen and examined in person, Chart reviewed   Patient's case discussed with staff/team    Chief Complaint: \" I suffer from depression. \"    Interim History: Patient seen during treatment team.  He states the voices are \"getting better. \"  But states that he still hearing them. He states that he was suicidal because he \"suffers from depression. \"  He is minimizing the circumstance of his hospitalization he shows very poor limited sign judgment to hospitalization need for treatment    Appetite:   [x] Normal/Unchanged  [] Increased  [] Decreased      Sleep:       [x] Normal/Unchanged  [] Fair       [] Poor              Energy:    [x] Normal/Unchanged  [] Increased  [] Decreased        SI [] Present  [x] Absent    HI  []Present  [x] Absent     Aggression:  [] yes  [x] no    Patient is [x] able  [] unable to CONTRACT FOR SAFETY     PAST MEDICAL/PSYCHIATRIC HISTORY:   Past Medical History:   Diagnosis Date    ADD (attention deficit disorder)     Anxiety     Bipolar 1 disorder ([facility] 11/19/2017    Cancer ([facility] prostate cancer    2014 / treated with surgery    Crohn's disease ([facility]     Depression     GERD (gastroesophageal reflux disease)     Panic attack     Rectal pain     has a fissure    Schizo affective schizophrenia ([facility]     stable       FAMILY/SOCIAL HISTORY:  Family History   Problem Relation Age of Onset    Mental Illness Mother     Mental Illness Father     Mental Illness Sister     Mental Illness Brother     Heart Disease Mother     Depression Father      Social History     Socioeconomic History    Marital status:      Spouse name: ZENAIDA    Number of children: 0    Years of education: 12 +8    Highest education level: Not on file   Occupational History    Occupation: [employer]     Employer: UNEMPLOYED     Comment: SSDI   Tobacco Use    Smoking status: Former Smoker     Packs/day: 1.00     Years: 41.00     Pack years: 41.00     Types: Cigarettes     Start date: 1978     Quit date: 2021     Years since quittin.1    Smokeless tobacco: Never Used   Vaping Use    Vaping Use: Never used   Substance and Sexual Activity    Alcohol use: No     Comment: no alcohol in5 yrs    Drug use: No     Comment: last use 1997 / marijuana    Sexual activity: Not Currently     Partners: Female   Other Topics Concern    Not on file   Social History Narrative    ** Merged History Encounter **          Social Determinants of Health     Financial Resource Strain:     Difficulty of Paying Living Expenses:    Food Insecurity:     Worried About Running Out of Food in the Last Year:     Ran Out of Food in the Last Year:    Transportation Needs:     Lack of Transportation (Medical):  Lack of Transportation (Non-Medical):    Physical Activity:     Days of Exercise per Week:     Minutes of Exercise per Session:    Stress:     Feeling of Stress :    Social Connections:     Frequency of Communication with Friends and Family:     Frequency of Social Gatherings with Friends and Family:     Attends Nondenominational Services:     Active Member of Clubs or Organizations:     Attends Club or Organization Meetings:     Marital Status:    Intimate Partner Violence:     Fear of Current or Ex-Partner:     Emotionally Abused:     Physically Abused:     Sexually Abused:            ROS:  [x] All negative/unchanged except if checked.  Explain positive(checked items) below:  [] Constitutional  [] Eyes  [] Ear/Nose/Mouth/Throat  [] Respiratory  [] CV  [] GI  []   [] Musculoskeletal  [] Skin/Breast  [] Neurological  [] Endocrine  [] Heme/Lymph  [] Allergic/Immunologic    Explanation:     MEDICATIONS:    Current Facility-Administered Medications:     dexamethasone (DECADRON) tablet 6 mg, 6 mg, Oral, 2 times per day, Clotilde Rodriguez DO    acetaminophen (TYLENOL) tablet 500 mg, 500 mg, Oral, Q6H PRN, Maryse Azar DO   melatonin tablet 6 mg, 6 mg, Oral, Nightly, Nura Azar, DO, 6 mg at 05/23/21 2136    magnesium hydroxide (MILK OF MAGNESIA) 400 MG/5ML suspension 30 mL, 30 mL, Oral, Daily PRN, Kiara Maloney MD    aluminum & magnesium hydroxide-simethicone (MAALOX) 200-200-20 MG/5ML suspension 30 mL, 30 mL, Oral, PRN, Kiara Maloney MD    haloperidol lactate (HALDOL) injection 5 mg, 5 mg, Intramuscular, Q6H PRN **OR** haloperidol (HALDOL) tablet 5 mg, 5 mg, Oral, Q6H PRN, Kiara Maloney MD    traZODone (DESYREL) tablet 50 mg, 50 mg, Oral, Nightly PRN, Kiara Maloney MD, 50 mg at 05/23/21 2137    hydrOXYzine (VISTARIL) capsule 50 mg, 50 mg, Oral, TID PRN, Kiara Maloney MD    haloperidol (HALDOL) tablet 5 mg, 5 mg, Oral, BID, Kiara Maloney MD, 5 mg at 05/24/21 9032    benztropine (COGENTIN) tablet 1 mg, 1 mg, Oral, BID, Kiara Maloney MD, 1 mg at 05/24/21 9221    divalproex (DEPAKOTE) DR tablet 500 mg, 500 mg, Oral, BID, Kiara Maloney MD, 500 mg at 05/24/21 4616      Examination:  /61   Pulse 67   Temp 98.1 °F (36.7 °C) (Temporal)   Resp 16   Ht 6' 2\" (1.88 m)   Wt 195 lb (88.5 kg)   BMI 25.04 kg/m²   Gait - steady  Medication side effects(SE): No    Mental Status Examination:    Level of consciousness:  within normal limits   Appearance:  fair grooming and fair hygiene  Behavior/Motor:  no abnormalities noted  Attitude toward examiner:  cooperative  Speech:  spontaneous, normal rate and normal volume   Mood: \" I am okay. \"  Affect: Flat and blunted  Thought processes: Linear without flight of ideas loose associations  Thought content: Admits to auditory donations denies visual hallucinations denies delusions or perceptual denies.   Denies SI/HI intent or plan  Cognition:  oriented to person, place, and time   Concentration poor  Insight Limited  Judgement Limited    ASSESSMENT:   Patient symptoms are:  [] Well controlled  [x] Improving  [] Worsening  [] No change Diagnosis:   Principal Problem:    Bipolar affective disorder, current episode manic without psychotic symptoms ([facility]  Active Problems:    Drug overdose, intentional self-harm, initial encounter [facility])  Resolved Problems:    * No resolved hospital problems. *      LABS:    No results for input(s): WBC, HGB, PLT in the last 72 hours. No results for input(s): NA, K, CL, CO2, BUN, CREATININE, GLUCOSE in the last 72 hours. No results for input(s): BILITOT, ALKPHOS, AST, ALT in the last 72 hours. Lab Results   Component Value Date    LABAMPH NOT DETECTED 05/20/2021    LABAMPH NOT DETECTED 02/03/2011    BARBSCNU NOT DETECTED 05/20/2021    LABBENZ NOT DETECTED 05/20/2021    LABBENZ SEE BELOW 12/08/2014    CANNAB NOT DETECTED  08/13/2012    COCAINESCRN NOT DETECTED 08/13/2012    LABMETH NOT DETECTED 05/20/2021    OPIATESCREENURINE NOT DETECTED 05/20/2021    PHENCYCLIDINESCREENURINE NOT DETECTED 05/20/2021    PPXUR NOT DETECTED 08/13/2012    ETOH <10 05/20/2021     Lab Results   Component Value Date    TSH 2.240 04/23/2021     Lab Results   Component Value Date    LITHIUM <0.10 (L) 10/24/2018     Lab Results   Component Value Date    VALPROATE 17 (L) 05/22/2021         Treatment Plan:  Reviewed current Medications with the patient. Risks, benefits, side effects, drug-to-drug interactions and alternatives to treatment were discussed. Collateral information:   CD evaluation  Encourage patient to attend group and other milieu activities.   Discharge planning discussed with the patient and treatment team.    Depakote 5 mg twice daily for mood stabilization  Continue Haldol 5 mg twice daily for psychosis    PSYCHOTHERAPY/COUNSELING:  [x] Therapeutic interview  [x] Supportive  [] CBT  [] Ongoing  [] Other    [x] Patient continues to need, on a daily basis, active treatment furnished directly by or requiring the supervision of inpatient psychiatric personnel      Anticipated Length of stay: 3 to 7 days based on stability

## 2021-05-24 NOTE — PLAN OF CARE
[address] [place] [address]  Day 3 Interdisciplinary Treatment Plan NOTE    Review Date & Time: 5/24/21 1000    Patient was in treatment team    Admission Type:   Admission Type: Involuntary    Reason for admission:  Reason for Admission: \"I overdosed on depakote\"  Estimated Length of Stay Update:   5 days  Estimated Discharge Date Update:  WED. PATIENT STRENGTHS:  Patient Strengths Strengths: Communication  Patient Strengths and Limitations:Limitations: Lacks leisure interests  Addictive Behavior:Addictive Behavior  In the past 3 months, have you felt or has someone told you that you have a problem with:  : None  Do you have a history of Chemical Use?: No  Do you have a history of Alcohol Use?: No  Do you have a history of Street Drug Abuse?: No  Histroy of Prescripton Drug Abuse?: No  Medical Problems:  Past Medical History:   Diagnosis Date    ADD (attention deficit disorder)     Anxiety     [facility]ipolar 1 [facility] ([facility] 11/19/2017    [facility] ([facility] prostate cancer    2014 / treated with surgery    Cro[facility]'s disease ([facility]     Depression     GERD (gastroesophageal reflux disease)     Panic attack     Rectal pain     has a fissure    Schizo affective schizophrenia ([facility]     stable       Risk:  Fall RiskTotal: 75  Andre Scale Andre Scale Score: 22  BVC Total: 0  Change in scores: NO FALLS OR ANDRE RISK IDENTIFIED THIS SHIFT. Changes to plan of Care : CONTINUE TO ASSESS FOR CHANGE IN STATUS    Status EXAM:   Status and Exam  Normal: Yes  Facial Expression: Elevated  Affect: Congruent  Level of Consciousness: Alert  Mood:Normal: No  Mood: Anxious, Depressed  Motor Activity:Normal: Yes  Motor Activity:  (WNL)  Interview Behavior: Cooperative  Preception: [place] to Person, Jenene Lacer to Time, [place] to Place, [place] to Situation  Attention:Normal: Yes  Attention: Distractible  Thought Processes: Circumstantial  Thought Content:Normal: No  Thought Content: Preoccupations  Hallucinations:  Auditory (Comment) (non command voices)  Delusions: No  Delusions:  (denied)  Memory:Normal: Yes  Memory: Poor Remote  Insight and Judgment: No  Insight and Judgment: Other(See comment) (impaired)  Present Suicidal Ideation: No  Present Homicidal Ideation: No    Daily Assessment Last Entry:   Daily Sleep (WDL): Within Defined Limits         Patient Currently in Pain: No  Daily Nutrition (WDL): Within Defined Limits  Appetite Change: Normal for patient  Barriers to Nutrition: None  Level of Assistance: Independent/Self    Patient Monitoring:  Frequency of Checks: 4 times per hour, close    Psychiatric Symptoms:   Depression Symptoms  Depression Symptoms: Loss of interest  Anxiety Symptoms  Anxiety Symptoms: Generalized  Hortencia Symptoms  Hortencia Symptoms: No problems reported or observed. Psychosis Symptoms  Delusion Type: No problems reported or observed. Suicide Risk CSSR-S:  1) Within the past month, have you wished you were dead or wished you could go to sleep and not wake up? : Yes  2) Have you actually had any thoughts of killing yourself? : Yes  3) Have you been thinking about how you might kill yourself? : Yes  5) Have you started to work out or worked out the details of how to kill yourself?  Do you intend to carry out this plan? : Yes  6) Have you ever done anything, started to do anything, or prepared to do anything to end your life?: No  Change in Result: PT. DENIES SUICIDAL IDEATION Change in Plan of care: [address]:   Learner Progress Toward Treatment Goals: Reviewed results and recommendations of this team    Method: Small group    Outcome: Needs reinforcement    PATIENT GOALS: \"BE MORE POSITIVE\"    PLAN/TREATMENT RECOMMENDATIONS UPDATE: CONTINUE MEDICATIONS, SUPPORTIVE CARE, ASSESS FOR PSYCHOSIS, WOUND CARE,INTERNAL MED CONSULTS    GOALS UPDATE:   Time frame for Short-Term Goals:  5 DAYS      Dodie Coleman RN

## 2021-05-24 NOTE — FLOWSHEET NOTE
ET Nurse (Initial consult) [id number]  Admit Date: 5/22/2021 11:54 PM    Reason for consult:  Ileostomy    Significant history:  Per H&P    HISTORY OF PRESENT ILLNESS:    Juan Ribera is a 48year old male who is , lives with his wife, unemployed on disability wit a psychiatric history of bipolar disorder, with multiple inpatient psychiatric hospitalization including a suicide pact with his wife currently on probation follows up at [facility] in the community, discharged from [facility] end of April 2021, admitted to the medical floor after he overdosed with Depakote. Our psychiatric consult services saw the patient on 5/20/2021 and recommended admission to psychiatric banks after he is medically cleared. Patient was and notes medically cleared yesterday and patient was admitted to [facility]. Stoma assessment:       05/24/21 1446   Ileostomy Ileostomy RLQ   No Placement Date or Time found. Pre-existing: Yes  Ileostomy Type: Ileostomy  Location: RLQ   Stomal Appliance Intact   Treatment   (pouch not changed)       Plan:  Patient did not want pouch changed due to he changed it 2 hours ago. Will follow up tomorrow.     Bridgett Pretty RN 5/24/2021 2:47 PM

## 2021-05-24 NOTE — PROGRESS NOTES
Attended morning community meeting. Updated on staffing and expectations. Shared goal for the day as to be more positive.

## 2021-05-24 NOTE — PROGRESS NOTES
UP ON UNIT. IN CONTROL. PLEASANT ON APPROACH. SELF CARE WITH ILEOSTOMY. DENIES SUICIDAL IDEATIONS. PT. REPORTS AUDITORY HALLUCINATIONS OF NON COMMAND VOICES. PT. ATTENDS SELECT GROUPS AND IS MEDICATION COMPLIANT.

## 2021-05-24 NOTE — GROUP NOTE
Group Therapy Note    Date: 5/24/2021    Group Start Time: 1010  Group End Time: 1055  Group Topic: Psychoeducation    [facility]    Chloé Murdock, CTRS        Group Therapy Note    Number of participants: 14  Type of group: Psychoeducation  Mode of intervention: Education, Support, Socialization, Exploration, Clarifying, and Problem-solving  Topic: Letting Go of Emotions: [facility]  Objective: Pt will identify 1 emotion they need to let go of in recovery. Notes: Pt offered minimal interaction during group but was an active listener throughout. Enjoyed [employer] poem exercise. Status After Intervention:  Unchanged    Participation Level:  Active Listener and Minimal    Participation Quality: Appropriate and Attentive      Speech:  normal      Thought Process/Content: Logical      Affective Functioning: Flat      Mood: depressed      Level of consciousness:  Alert, Oriented x4 and Attentive      Response to Learning: Able to verbalize current knowledge/experience, Able to verbalize/acknowledge new learning, Able to retain information, Capable of insight, Able to change behavior and Progressing to goal      Endings: None Reported    Modes of Intervention: Education, Support, Socialization, Exploration, Clarifying and Problem-solving

## 2021-05-25 PROCEDURE — 6370000000 HC RX 637 (ALT 250 FOR IP): Performed by: PSYCHIATRY & NEUROLOGY

## 2021-05-25 PROCEDURE — 6370000000 HC RX 637 (ALT 250 FOR IP): Performed by: NEUROMUSCULOSKELETAL MEDICINE & OMM

## 2021-05-25 PROCEDURE — 99232 SBSQ HOSP IP/OBS MODERATE 35: CPT | Performed by: NURSE PRACTITIONER

## 2021-05-25 PROCEDURE — 1240000000 HC EMOTIONAL WELLNESS R&B

## 2021-05-25 PROCEDURE — 6360000002 HC RX W HCPCS: Performed by: NEUROMUSCULOSKELETAL MEDICINE & OMM

## 2021-05-25 RX ADMIN — BENZTROPINE MESYLATE 1 MG: 1 TABLET ORAL at 08:42

## 2021-05-25 RX ADMIN — DIVALPROEX SODIUM 500 MG: 250 TABLET, DELAYED RELEASE ORAL at 21:59

## 2021-05-25 RX ADMIN — DEXAMETHASONE 6 MG: 4 TABLET ORAL at 21:59

## 2021-05-25 RX ADMIN — BENZTROPINE MESYLATE 1 MG: 1 TABLET ORAL at 21:59

## 2021-05-25 RX ADMIN — HALOPERIDOL 5 MG: 5 TABLET ORAL at 08:42

## 2021-05-25 RX ADMIN — TRAZODONE HYDROCHLORIDE 50 MG: 50 TABLET ORAL at 22:00

## 2021-05-25 RX ADMIN — DEXAMETHASONE 6 MG: 4 TABLET ORAL at 08:42

## 2021-05-25 RX ADMIN — HALOPERIDOL 5 MG: 5 TABLET ORAL at 22:00

## 2021-05-25 RX ADMIN — DIVALPROEX SODIUM 500 MG: 250 TABLET, DELAYED RELEASE ORAL at 08:42

## 2021-05-25 RX ADMIN — Medication 6 MG: at 22:00

## 2021-05-25 ASSESSMENT — PAIN SCALES - GENERAL
PAINLEVEL_OUTOF10: 0

## 2021-05-25 NOTE — PLAN OF CARE
Problem: Suicide risk  Goal: Provide patient with safe environment  Description: Provide patient with safe environment  Outcome: Met This Shift     Problem: Altered Mood, Depressive Behavior:  Goal: Able to verbalize and/or display a decrease in depressive symptoms  Description: Able to verbalize and/or display a decrease in depressive symptoms  5/24/2021 2203 by Tabatha Chen RN  Outcome: Ongoing  5/24/2021 0925 by Guero Guillen RN  Outcome: Ongoing  Goal: Absence of self-harm  Description: Absence of self-harm  5/24/2021 2203 by Tabatha Chen RN  Outcome: Met This Shift  5/24/2021 0925 by Guero Guillen RN  Outcome: Met This Shift      PT positive for AH that are negative. Pt denies suicidal ideations and homicidal ideations. Pt is isolative to room except for dinner and snack. Pt is irritable. Flat. Will continue to monitor.

## 2021-05-25 NOTE — PROGRESS NOTES
PT. IS UP AT INTERVALS. DENIES SUICIDAL IDEATIONS. PT. REPORTS READINESS FOR DISCHARGE TODAY. PT. DID SIGN VOLUNTARY CONSENT FOR TREATMENT. IN CONTROL WITH NO UNIT PROBLEMS. MEDICATION COMPLIANT AND ATTENDS SELECT GROUPS. PT. IS SELF CARE WITH ILEOSTOMY THIS SHIFT.

## 2021-05-25 NOTE — PROGRESS NOTES
Nissa DO, 6 mg at 05/24/21 2141    magnesium hydroxide (MILK OF MAGNESIA) 400 MG/5ML suspension 30 mL, 30 mL, Oral, Daily PRN, Ila Barone MD    aluminum & magnesium hydroxide-simethicone (MAALOX) 200-200-20 MG/5ML suspension 30 mL, 30 mL, Oral, PRN, Ila Barone MD    haloperidol lactate (HALDOL) injection 5 mg, 5 mg, Intramuscular, Q6H PRN **OR** haloperidol (HALDOL) tablet 5 mg, 5 mg, Oral, Q6H PRN, Ila Barone MD    traZODone (DESYREL) tablet 50 mg, 50 mg, Oral, Nightly PRN, Ila Barone MD, 50 mg at 05/24/21 2141    hydrOXYzine (VISTARIL) capsule 50 mg, 50 mg, Oral, TID PRN, Ila Barone MD    haloperidol (HALDOL) tablet 5 mg, 5 mg, Oral, BID, Ila Barone MD, 5 mg at 05/25/21 9167    benztropine (COGENTIN) tablet 1 mg, 1 mg, Oral, BID, Ila Barone MD, 1 mg at 05/25/21 2349    divalproex (DEPAKOTE) DR tablet 500 mg, 500 mg, Oral, BID, Ila Barone MD, 500 mg at 05/25/21 9123      Examination:  BP (!) 103/53   Pulse 78   Temp 97.3 °F (36.3 °C) (Temporal)   Resp 16   Ht 6' 2\" (1.88 m)   Wt 195 lb (88.5 kg)   BMI 25.04 kg/m²   Gait - steady  Medication side effects(SE): No    Mental Status Examination:    Level of consciousness:  within normal limits   Appearance:  fair grooming and fair hygiene  Behavior/Motor:  no abnormalities noted  Attitude toward examiner:  cooperative  Speech:  spontaneous, normal rate and normal volume   Mood: \" I am okay. \"  Affect: Flat and blunted  Thought processes: Linear without flight of ideas loose associations  Thought content: Admits to auditory donations denies visual hallucinations denies delusions or perceptual denies.   Denies SI/HI intent or plan  Cognition:  oriented to person, place, and time   Concentration poor  Insight Limited  Judgement Limited    ASSESSMENT:   Patient symptoms are:  [] Well controlled  [x] Improving  [] Worsening  [] No change      Diagnosis:   Principal Problem:    Bipolar affective disorder, current episode manic without psychotic symptoms ([facility]  Active Problems:    Drug overdose, intentional self-harm, initial encounter [facility])  Resolved Problems:    * No resolved hospital problems. *      LABS:    No results for input(s): WBC, HGB, PLT in the last 72 hours. No results for input(s): NA, K, CL, CO2, BUN, CREATININE, GLUCOSE in the last 72 hours. No results for input(s): BILITOT, ALKPHOS, AST, ALT in the last 72 hours. Lab Results   Component Value Date    LABAMPH NOT DETECTED 05/20/2021    LABAMPH NOT DETECTED 02/03/2011    BARBSCNU NOT DETECTED 05/20/2021    LABBENZ NOT DETECTED 05/20/2021    LABBENZ SEE BELOW 12/08/2014    CANNAB NOT DETECTED  08/13/2012    COCAINESCRN NOT DETECTED 08/13/2012    LABMETH NOT DETECTED 05/20/2021    OPIATESCREENURINE NOT DETECTED 05/20/2021    PHENCYCLIDINESCREENURINE NOT DETECTED 05/20/2021    PPXUR NOT DETECTED 08/13/2012    ETOH <10 05/20/2021     Lab Results   Component Value Date    TSH 2.240 04/23/2021     Lab Results   Component Value Date    LITHIUM <0.10 (L) 10/24/2018     Lab Results   Component Value Date    VALPROATE 17 (L) 05/22/2021         Treatment Plan:  Reviewed current Medications with the patient. Risks, benefits, side effects, drug-to-drug interactions and alternatives to treatment were discussed. Collateral information:   CD evaluation  Encourage patient to attend group and other milieu activities.   Discharge planning discussed with the patient and treatment team.    Depakote 500 mg twice daily for mood stabilization  Continue Haldol 5 mg twice daily for psychosis    PSYCHOTHERAPY/COUNSELING:  [x] Therapeutic interview  [x] Supportive  [] CBT  [] Ongoing  [] Other    [x] Patient continues to need, on a daily basis, active treatment furnished directly by or requiring the supervision of inpatient psychiatric personnel      Anticipated Length of stay: 3 to 7 days based on stability            Electronically signed by KAM Blackman - CNP on 5/25/2021 at 9:06 AM

## 2021-05-25 NOTE — PROGRESS NOTES
Attended morning community meeting. Updated on daily schedule and staffing assignments for the day. Shared goal for the day as to remain positive.

## 2021-05-26 LAB — VALPROIC ACID LEVEL: 44 MCG/ML (ref 50–100)

## 2021-05-26 PROCEDURE — 6370000000 HC RX 637 (ALT 250 FOR IP): Performed by: PSYCHIATRY & NEUROLOGY

## 2021-05-26 PROCEDURE — 6370000000 HC RX 637 (ALT 250 FOR IP): Performed by: NEUROMUSCULOSKELETAL MEDICINE & OMM

## 2021-05-26 PROCEDURE — 36415 COLL VENOUS BLD VENIPUNCTURE: CPT

## 2021-05-26 PROCEDURE — 80164 ASSAY DIPROPYLACETIC ACD TOT: CPT

## 2021-05-26 PROCEDURE — 6360000002 HC RX W HCPCS: Performed by: NEUROMUSCULOSKELETAL MEDICINE & OMM

## 2021-05-26 PROCEDURE — 1240000000 HC EMOTIONAL WELLNESS R&B

## 2021-05-26 PROCEDURE — 99232 SBSQ HOSP IP/OBS MODERATE 35: CPT | Performed by: NURSE PRACTITIONER

## 2021-05-26 RX ADMIN — HALOPERIDOL 5 MG: 5 TABLET ORAL at 09:52

## 2021-05-26 RX ADMIN — DEXAMETHASONE 6 MG: 4 TABLET ORAL at 21:32

## 2021-05-26 RX ADMIN — Medication 6 MG: at 21:32

## 2021-05-26 RX ADMIN — BENZTROPINE MESYLATE 1 MG: 1 TABLET ORAL at 09:51

## 2021-05-26 RX ADMIN — ACETAMINOPHEN 500 MG: 500 TABLET ORAL at 21:34

## 2021-05-26 RX ADMIN — DIVALPROEX SODIUM 500 MG: 250 TABLET, DELAYED RELEASE ORAL at 21:33

## 2021-05-26 RX ADMIN — DIVALPROEX SODIUM 500 MG: 250 TABLET, DELAYED RELEASE ORAL at 09:51

## 2021-05-26 RX ADMIN — ACETAMINOPHEN 500 MG: 500 TABLET ORAL at 08:10

## 2021-05-26 RX ADMIN — TRAZODONE HYDROCHLORIDE 50 MG: 50 TABLET ORAL at 21:33

## 2021-05-26 RX ADMIN — HALOPERIDOL 5 MG: 5 TABLET ORAL at 21:33

## 2021-05-26 RX ADMIN — DEXAMETHASONE 6 MG: 4 TABLET ORAL at 09:52

## 2021-05-26 RX ADMIN — BENZTROPINE MESYLATE 1 MG: 1 TABLET ORAL at 21:33

## 2021-05-26 ASSESSMENT — PAIN SCALES - GENERAL
PAINLEVEL_OUTOF10: 0
PAINLEVEL_OUTOF10: 3

## 2021-05-26 NOTE — PROGRESS NOTES
BEHAVIORAL HEALTH FOLLOW-UP NOTE     5/26/2021     Patient was seen and examined in person, Chart reviewed   Patient's case discussed with staff/team    Chief Complaint: \" I am feeling better. \"    Interim History: Patient seen in his room states he is feeling better. States he is no longer having any auditory visualizations he denies SI/HI intent or plan he is eating well sleeping well no neurovegetative signs of his depression. He states he does not want to go the crisis unit discharge and whether discharged home.       Appetite:   [x] Normal/Unchanged  [] Increased  [] Decreased      Sleep:       [x] Normal/Unchanged  [] Fair       [] Poor              Energy:    [x] Normal/Unchanged  [] Increased  [] Decreased        SI [] Present  [x] Absent    HI  []Present  [x] Absent     Aggression:  [] yes  [x] no    Patient is [x] able  [] unable to CONTRACT FOR SAFETY     PAST MEDICAL/PSYCHIATRIC HISTORY:   Past Medical History:   Diagnosis Date    ADD (attention deficit disorder)     Anxiety     Bipolar 1 disorder ([facility] 11/19/2017    Cancer ([facility] prostate cancer    2014 / treated with surgery    Crohn's disease ([facility]     Depression     GERD (gastroesophageal reflux disease)     Panic attack     Rectal pain     has a fissure    Schizo affective schizophrenia ([facility]     stable       FAMILY/SOCIAL HISTORY:  Family History   Problem Relation Age of Onset    Mental Illness Mother     Mental Illness Father     Mental Illness Sister     Mental Illness Brother     Heart Disease Mother     Depression Father      Social History     Socioeconomic History    Marital status:      Spouse name: Clayton Campos Number of children: 0    Years of education: 12 +8    Highest education level: Not on file   Occupational History    Occupation: [employer] [address]     Employer: UNEMPLOYED     Comment: SSDI   Tobacco Use    Smoking status: Former Smoker     Packs/day: 1.00     Years: 41.00     Pack years: 41.00     Types: Cigarettes     Start date: 1978     Quit date: 2021     Years since quittin.1    Smokeless tobacco: Never Used   Vaping Use    Vaping Use: Never used   Substance and Sexual Activity    Alcohol use: No     Comment: no alcohol in5 yrs    Drug use: No     Comment: last use  / marijuana    Sexual activity: Not Currently     Partners: Female   Other Topics Concern    Not on file   Social History Narrative    ** Merged History Encounter **          Social Determinants of Health     Financial Resource Strain:     Difficulty of Paying Living Expenses:    Food Insecurity:     Worried About Running Out of Food in the Last Year:     Ran Out of Food in the Last Year:    Transportation Needs:     Lack of Transportation (Medical):  Lack of Transportation (Non-Medical):    Physical Activity:     Days of Exercise per Week:     Minutes of Exercise per Session:    Stress:     Feeling of Stress :    Social Connections:     Frequency of Communication with Friends and Family:     Frequency of Social Gatherings with Friends and Family:     Attends Anabaptism Services:     Active Member of Clubs or Organizations:     Attends Club or Organization Meetings:     Marital Status:    Intimate Partner Violence:     Fear of Current or Ex-Partner:     Emotionally Abused:     Physically Abused:     Sexually Abused:            ROS:  [x] All negative/unchanged except if checked.  Explain positive(checked items) below:  [] Constitutional  [] Eyes  [] Ear/Nose/Mouth/Throat  [] Respiratory  [] CV  [] GI  []   [] Musculoskeletal  [] Skin/Breast  [] Neurological  [] Endocrine  [] Heme/Lymph  [] Allergic/Immunologic    Explanation:     MEDICATIONS:    Current Facility-Administered Medications:     dexamethasone (DECADRON) tablet 6 mg, 6 mg, Oral, 2 times per day, Nura Azar, DO, 6 mg at 21 2594    acetaminophen (TYLENOL) tablet 500 mg, 500 mg, Oral, Q6H PRN, Gibson Azar, DO, 500 mg at 05/26/21 0810    melatonin tablet 6 mg, 6 mg, Oral, Nightly, Nura Azar, DO, 6 mg at 05/25/21 2200    magnesium hydroxide (MILK OF MAGNESIA) 400 MG/5ML suspension 30 mL, 30 mL, Oral, Daily PRN, Adam Busch MD    aluminum & magnesium hydroxide-simethicone (MAALOX) 200-200-20 MG/5ML suspension 30 mL, 30 mL, Oral, PRN, Adam Busch MD    haloperidol lactate (HALDOL) injection 5 mg, 5 mg, Intramuscular, Q6H PRN **OR** haloperidol (HALDOL) tablet 5 mg, 5 mg, Oral, Q6H PRN, Adam Busch MD    traZODone (DESYREL) tablet 50 mg, 50 mg, Oral, Nightly PRN, Adam Busch MD, 50 mg at 05/25/21 2200    hydrOXYzine (VISTARIL) capsule 50 mg, 50 mg, Oral, TID PRN, Adam Busch MD    haloperidol (HALDOL) tablet 5 mg, 5 mg, Oral, BID, Adam Busch MD, 5 mg at 05/26/21 4065    benztropine (COGENTIN) tablet 1 mg, 1 mg, Oral, BID, Adam Busch MD, 1 mg at 05/26/21 0951    divalproex (DEPAKOTE) DR tablet 500 mg, 500 mg, Oral, BID, Adam Busch MD, 500 mg at 05/26/21 5216      Examination:  /60   Pulse 74   Temp 98.2 °F (36.8 °C) (Oral)   Resp 16   Ht 6' 2\" (1.88 m)   Wt 195 lb (88.5 kg)   BMI 25.04 kg/m²   Gait - steady  Medication side effects(SE): No    Mental Status Examination:    Level of consciousness:  within normal limits   Appearance:  fair grooming and fair hygiene  Behavior/Motor:  no abnormalities noted  Attitude toward examiner:  cooperative  Speech:  spontaneous, normal rate and normal volume   Mood: \" I am okay. \"  Affect: Appropriate and pleasant  Thought processes: Linear without flight of ideas loose associations  Thought content: Devoid any auditory visualizations delusions or any perceptual denies.   Denies SI/HI intent or plan cognition:  oriented to person, place, and time   Concentration poor  Insight improving  Judgement improving    ASSESSMENT:   Patient symptoms are:  [] Well controlled  [x] Improving  [] Worsening  [] No change Diagnosis:   Principal Problem:    Bipolar affective disorder, current episode manic without psychotic symptoms ([facility]  Active Problems:    Drug overdose, intentional self-harm, initial encounter [facility])  Resolved Problems:    * No resolved hospital problems. *      LABS:    No results for input(s): WBC, HGB, PLT in the last 72 hours. No results for input(s): NA, K, CL, CO2, BUN, CREATININE, GLUCOSE in the last 72 hours. No results for input(s): BILITOT, ALKPHOS, AST, ALT in the last 72 hours. Lab Results   Component Value Date    LABAMPH NOT DETECTED 05/20/2021    LABAMPH NOT DETECTED 02/03/2011    BARBSCNU NOT DETECTED 05/20/2021    LABBENZ NOT DETECTED 05/20/2021    LABBENZ SEE BELOW 12/08/2014    CANNAB NOT DETECTED  08/13/2012    COCAINESCRN NOT DETECTED 08/13/2012    LABMETH NOT DETECTED 05/20/2021    OPIATESCREENURINE NOT DETECTED 05/20/2021    PHENCYCLIDINESCREENURINE NOT DETECTED 05/20/2021    PPXUR NOT DETECTED 08/13/2012    ETOH <10 05/20/2021     Lab Results   Component Value Date    TSH 2.240 04/23/2021     Lab Results   Component Value Date    LITHIUM <0.10 (L) 10/24/2018     Lab Results   Component Value Date    VALPROATE 44 (L) 05/26/2021         Treatment Plan:  Reviewed current Medications with the patient. Risks, benefits, side effects, drug-to-drug interactions and alternatives to treatment were discussed. Collateral information:   CD evaluation  Encourage patient to attend group and other milieu activities.   Discharge planning discussed with the patient and treatment team.    Depakote 500 mg twice daily for mood stabilization  Continue Haldol 5 mg twice daily for psychosis    PSYCHOTHERAPY/COUNSELING:  [x] Therapeutic interview  [x] Supportive  [] CBT  [] Ongoing  [] Other    [x] Patient continues to need, on a daily basis, active treatment furnished directly by or requiring the supervision of inpatient psychiatric personnel      Anticipated Length of stay: 3 to 7 days based on stability

## 2021-05-26 NOTE — PLAN OF CARE
Patient is isolative but pleasant and cooperative. Patient is free from irritability. He complains of a headache and continues to present anxious and depressed. He denies hallucinations and denies any SI and HI. Insight and judgement is improving. Patient is free from any delusional activity. Will monitor closely.

## 2021-05-27 PROCEDURE — 99232 SBSQ HOSP IP/OBS MODERATE 35: CPT | Performed by: NURSE PRACTITIONER

## 2021-05-27 PROCEDURE — 6370000000 HC RX 637 (ALT 250 FOR IP): Performed by: PSYCHIATRY & NEUROLOGY

## 2021-05-27 PROCEDURE — 6370000000 HC RX 637 (ALT 250 FOR IP): Performed by: NEUROMUSCULOSKELETAL MEDICINE & OMM

## 2021-05-27 PROCEDURE — 6360000002 HC RX W HCPCS: Performed by: NEUROMUSCULOSKELETAL MEDICINE & OMM

## 2021-05-27 PROCEDURE — 1240000000 HC EMOTIONAL WELLNESS R&B

## 2021-05-27 RX ORDER — HALOPERIDOL 5 MG
5 TABLET ORAL 2 TIMES DAILY
Qty: 60 TABLET | Refills: 0 | Status: ON HOLD
Start: 2021-05-27 | End: 2021-06-08 | Stop reason: HOSPADM

## 2021-05-27 RX ORDER — DIVALPROEX SODIUM 500 MG/1
500 TABLET, DELAYED RELEASE ORAL 2 TIMES DAILY
Qty: 60 TABLET | Refills: 0 | Status: SHIPPED | OUTPATIENT
Start: 2021-05-27 | End: 2021-11-03 | Stop reason: ALTCHOICE

## 2021-05-27 RX ADMIN — BENZTROPINE MESYLATE 1 MG: 1 TABLET ORAL at 09:04

## 2021-05-27 RX ADMIN — DEXAMETHASONE 6 MG: 4 TABLET ORAL at 21:34

## 2021-05-27 RX ADMIN — TRAZODONE HYDROCHLORIDE 50 MG: 50 TABLET ORAL at 21:35

## 2021-05-27 RX ADMIN — Medication 6 MG: at 21:35

## 2021-05-27 RX ADMIN — HALOPERIDOL 5 MG: 5 TABLET ORAL at 09:04

## 2021-05-27 RX ADMIN — BENZTROPINE MESYLATE 1 MG: 1 TABLET ORAL at 21:35

## 2021-05-27 RX ADMIN — HALOPERIDOL 5 MG: 5 TABLET ORAL at 21:35

## 2021-05-27 RX ADMIN — DIVALPROEX SODIUM 500 MG: 250 TABLET, DELAYED RELEASE ORAL at 09:04

## 2021-05-27 RX ADMIN — DIVALPROEX SODIUM 500 MG: 250 TABLET, DELAYED RELEASE ORAL at 21:35

## 2021-05-27 RX ADMIN — DEXAMETHASONE 6 MG: 4 TABLET ORAL at 09:04

## 2021-05-27 NOTE — FLOWSHEET NOTE
ET Nurse (Follow up ) 7522A  Admit Date: 5/22/2021 11:54 PM    Reason for consult:  Ileostomy    Stoma assessment:       05/27/21 1409   Ileostomy Ileostomy RLQ   No Placement Date or Time found. Pre-existing: Yes  Ileostomy Type: Ileostomy  Location: RLQ   Stomal Appliance 1 piece; Changed   Stoma  Assessment Red;Moist;Prolapse   Mucocutaneous Junction Intact   Peristomal Assessment Intact   Stool Color Brown   Stool Appearance Soft   Stool Amount Medium       Plan:  Pouch change  Will follow    Maximilian Nazario RN 5/27/2021 2:09 PM

## 2021-05-27 NOTE — PROGRESS NOTES
Packs/day: 1.00     Years: 41.00     Pack years: 41.00     Types: Cigarettes     Start date: 1978     Quit date: 2021     Years since quittin.1    Smokeless tobacco: Never Used   Vaping Use    Vaping Use: Never used   Substance and Sexual Activity    Alcohol use: No     Comment: no alcohol in5 yrs    Drug use: No     Comment: last use 1997 / marijuana    Sexual activity: Not Currently     Partners: Female   Other Topics Concern    Not on file   Social History Narrative    ** Merged History Encounter **          Social Determinants of Health     Financial Resource Strain:     Difficulty of Paying Living Expenses:    Food Insecurity:     Worried About Running Out of Food in the Last Year:     Ran Out of Food in the Last Year:    Transportation Needs:     Lack of Transportation (Medical):  Lack of Transportation (Non-Medical):    Physical Activity:     Days of Exercise per Week:     Minutes of Exercise per Session:    Stress:     Feeling of Stress :    Social Connections:     Frequency of Communication with Friends and Family:     Frequency of Social Gatherings with Friends and Family:     Attends Nondenominational Services:     Active Member of Clubs or Organizations:     Attends Club or Organization Meetings:     Marital Status:    Intimate Partner Violence:     Fear of Current or Ex-Partner:     Emotionally Abused:     Physically Abused:     Sexually Abused:            ROS:  [x] All negative/unchanged except if checked.  Explain positive(checked items) below:  [] Constitutional  [] Eyes  [] Ear/Nose/Mouth/Throat  [] Respiratory  [] CV  [] GI  []   [] Musculoskeletal  [] Skin/Breast  [] Neurological  [] Endocrine  [] Heme/Lymph  [] Allergic/Immunologic    Explanation:     MEDICATIONS:    Current Facility-Administered Medications:     dexamethasone (DECADRON) tablet 6 mg, 6 mg, Oral, 2 times per day, Maribel Azar DO, 6 mg at 21 0904    acetaminophen (TYLENOL) tablet 500 Length of stay: 3 to 7 days based on stability            Electronically signed by KAM Fletcher CNP on 6/96/1709 at 2:00 PM

## 2021-05-27 NOTE — PROGRESS NOTES
Attended morning community meeting. Updated on staffing and daily expectations. Shared goal for the day as to remain more positive.

## 2021-05-27 NOTE — PLAN OF CARE
Patient denies suicidal ideation, homicidal ideations and AVH. Patient denies anxiety and depression. Patient present flat, sad, and depressed. Presents calm and cooperative during assessment. Patient is isolative to room and does not appear to be social with peers so far this shift. Medications taken without issue. Patient complaining of migraine, Tylenol was offered but patient states it does not help. Encouraged patient to speak with doctor and NP tomorrow to get home medication ordered. No unit problems reported. Will continue to observe and support.     Problem: Suicide risk  Goal: Provide patient with safe environment  Description: Provide patient with safe environment  Outcome: Ongoing     Problem: Altered Mood, Depressive Behavior:  Goal: Able to verbalize and/or display a decrease in depressive symptoms  Description: Able to verbalize and/or display a decrease in depressive symptoms  5/26/2021 2128 by Peggy Lopez RN  Outcome: Ongoing     Problem: Altered Mood, Depressive Behavior:  Goal: Absence of self-harm  Description: Absence of self-harm  5/26/2021 2128 by Peggy Lopez RN  Outcome: Ongoing

## 2021-05-27 NOTE — PLAN OF CARE
Patient is pleasant and cooperative. Patient is medication compliant and increasingly social. Patient denies intent to harm self. He reports that his headache is gone. Patient denies SI, HI, and aVH. Patient insight and judgement is improved. Makes needs known. Will be discharged tomorrow.

## 2021-05-27 NOTE — GROUP NOTE
Group Therapy Note    Date: 5/27/2021    Group Start Time: 1120  Group End Time: 1200  Group Topic: Cognitive Skills    [facility]    BATSHEVA Merida        Group Therapy Note    Attendees: 13         Patient's Goal:  Pt will participate in discussion re; how to develop a positive outlook and importance of expressing gratitude. Pt will participate in writing/self esteem activity of answering when they felt proud, a positive thing they witnessed, and something they accomplished. Notes:  Pt active participant in class discussion and activity. Status After Intervention:  Improved    Participation Level:  Active Listener and Interactive    Participation Quality: Appropriate, Attentive, Sharing and Supportive      Speech:  normal      Thought Process/Content: Logical      Affective Functioning: Congruent      Mood: depressed      Level of consciousness:  Alert, Oriented x4 and Attentive      Response to Learning: Able to verbalize current knowledge/experience, Able to verbalize/acknowledge new learning and Progressing to goal      Endings: None Reported    Modes of Intervention: Education, Support, Socialization and Activity      Discipline Responsible: /Counselor      Signature:  BATSHEVA Moses

## 2021-05-27 NOTE — PROGRESS NOTES
CLINICAL PHARMACY NOTE: MEDS TO BEDS    Total # of Prescriptions Filled: 2   The following medications were delivered to the patient:  · Haloperidol 5  · Divalproex     Additional Documentation:

## 2021-05-27 NOTE — PROGRESS NOTES
Spiritual Support Group Note    Number of Participants in Group:        8                Time:   2    Goal: Relief from isolation and loneliness             Christina Sharing             Self-understanding and gain insight              Acceptance and belonging            Recognize they are not alone                Socialization             Empowerment       Encouragement    Topic:  [x] Spiritual Wellness and Self Care                  [x] Hope                     [] Connecting with Divine/Others        [] Thankfulness and Gratitude               [x]  Meaningfulness and Purpose               [] Forgiveness               [] Peace               [] Connect to [facility]      [] Other    Participation Level:   [x] Active Listener   [] Minimal   [] Monopolizing   [] Interactive   [] No Participation   []  Other:     Attention:   [x] Alert   [] Distractible   [] Drowsy   [] Poor   [] Other:    Manner:   [x] Cooperative   [] Suspicious   [] Withdrawn   [] Guarded   [] Irritable   [] Inhospitable   [] Other:     Others Comments from Group:

## 2021-05-27 NOTE — GROUP NOTE
Group Therapy Note    Date: 5/27/2021    Group Start Time: 1000  Group End Time: 1050  Group Topic: Psychoeducation    [facility]    Chloé Murdock, CTRS        Group Therapy Note    Number of participants: 12  Type of group: Psychoeducation  Mode of intervention: Education, Support, Socialization, Exploration, Clarifying, Problem-solving, and Activity  Topic: Positive Attitude Ball   Objective: Pt will identify 1 way to maintain a positive attitude in recovery. Notes:  Pt interactive during group sharing 1 way to maintain a positive attitude in recovery. Pt gave support and feedback to others. Enjoyed positive ball activity. Status After Intervention:  Improved    Participation Level:  Active Listener and Interactive    Participation Quality: Appropriate, Attentive, Sharing and Supportive      Speech:  normal      Thought Process/Content: Logical      Affective Functioning: Congruent      Mood: euthymic      Level of consciousness:  Alert, Oriented x4 and Attentive      Response to Learning: Able to verbalize current knowledge/experience, Able to verbalize/acknowledge new learning, Able to retain information, Capable of insight, Able to change behavior and Progressing to goal      Endings: None Reported    Modes of Intervention: Education, Support, Socialization, Exploration, Clarifying, Problem-solving and Activity

## 2021-05-28 VITALS
SYSTOLIC BLOOD PRESSURE: 119 MMHG | DIASTOLIC BLOOD PRESSURE: 74 MMHG | WEIGHT: 195 LBS | TEMPERATURE: 97.1 F | HEART RATE: 63 BPM | HEIGHT: 74 IN | RESPIRATION RATE: 14 BRPM | BODY MASS INDEX: 25.03 KG/M2

## 2021-05-28 PROCEDURE — 6360000002 HC RX W HCPCS: Performed by: NEUROMUSCULOSKELETAL MEDICINE & OMM

## 2021-05-28 PROCEDURE — 6370000000 HC RX 637 (ALT 250 FOR IP): Performed by: PSYCHIATRY & NEUROLOGY

## 2021-05-28 PROCEDURE — 99239 HOSP IP/OBS DSCHRG MGMT >30: CPT | Performed by: NURSE PRACTITIONER

## 2021-05-28 RX ADMIN — DIVALPROEX SODIUM 500 MG: 250 TABLET, DELAYED RELEASE ORAL at 09:14

## 2021-05-28 RX ADMIN — BENZTROPINE MESYLATE 1 MG: 1 TABLET ORAL at 09:14

## 2021-05-28 RX ADMIN — DEXAMETHASONE 6 MG: 4 TABLET ORAL at 09:14

## 2021-05-28 RX ADMIN — HALOPERIDOL 5 MG: 5 TABLET ORAL at 09:15

## 2021-05-28 ASSESSMENT — PAIN SCALES - GENERAL: PAINLEVEL_OUTOF10: 0

## 2021-05-28 NOTE — CARE COORDINATION
SW scheduled pt for an intake with [facility]. Due to the holiday pt cannot be scheduled until 6/4.      Electronically signed by FABRIZIO Mancini, SUSHANT on 5/28/2021 at 9:25 AM clean/dry

## 2021-05-28 NOTE — BH NOTE
[facility] Interdisciplinary Treatment Plan Note     Review Date & Time: 5/28/2021 1000    Patient was in treatment team.    Admission Type:   Admission Type: Involuntary    Reason for admission:  Reason for Admission: \"I overdosed on depakote\"    Estimated Length of Stay Update:  3-5 days   Estimated Discharge Date Update: 5/31/2021    PATIENT STRENGTHS:  Patient Strengths:Strengths: Communication  Patient Strengths and Limitations:Limitations: Lacks leisure interests  Addictive Behavior:Addictive Behavior  In the past 3 months, have you felt or has someone told you that you have a problem with:  : None  Do you have a history of Chemical Use?: No  Do you have a history of Alcohol Use?: No  Do you have a history of Street Drug Abuse?: No  Histroy of Prescripton Drug Abuse?: No  Medical Problems:   Past Medical History:   Diagnosis Date    ADD (attention deficit disorder)     Anxiety     Bipolar 1 disorder ([facility] 11/19/2017    Cancer ([facility] prostate cancer    2014 / treated with surgery    Crohn's disease ([facility]     Depression     GERD (gastroesophageal reflux disease)     Panic attack     Rectal pain     has a fissure    Schizo affective schizophrenia ([facility]     stable       Risk:  Fall RiskTotal: 71  Andre Scale Andre Scale Score: 22  BVC Total: 0  Change in scores none. Changes to plan of Care none    Status EXAM:   Status and Exam  Normal: No  Facial Expression: Sad, Flat  Affect: Blunt  Level of Consciousness: Alert  Mood:Normal: No  Mood: Depressed  Motor Activity:Normal: No  Motor Activity: Decreased  Interview Behavior: Cooperative  Preception: [place] to Person, [place] to Time, [place] to Situation, [place] to Place  Attention:Normal: No  Attention: Distractible  Thought Processes: Circumstantial  Thought Content:Normal: No  Thought Content: Preoccupations  Hallucinations: None  Delusions: No  Delusions:  Other(See Comment) (denies)  Memory:Normal: No  Memory: Poor Recent  Insight and Judgment: No  Insight and Judgment: Poor Insight  Present Suicidal Ideation: No  Present Homicidal Ideation: No    Daily Assessment Last Entry:   Daily Sleep (WDL): Within Defined Limits         Patient Currently in Pain: No  Daily Nutrition (WDL): Within Defined Limits  Appetite Change: Normal for patient  Barriers to Nutrition: None  Level of Assistance: Independent/Self    Patient Monitoring:  Frequency of Checks: 4 times per hour, close    Psychiatric Symptoms:   Depression Symptoms  Depression Symptoms: Loss of interest  Anxiety Symptoms  Anxiety Symptoms: Generalized  Hortencia Symptoms  Hortencia Symptoms: Pressured speech     Psychosis Symptoms  Hallucination Type: No problems reported or observed. Delusion Type: No problems reported or observed. Suicide Risk CSSR-S:  1) Within the past month, have you wished you were dead or wished you could go to sleep and not wake up? : Yes  2) Have you actually had any thoughts of killing yourself? : Yes  3) Have you been thinking about how you might kill yourself? : Yes  5) Have you started to work out or worked out the details of how to kill yourself?  Do you intend to carry out this plan? : Yes  6) Have you ever done anything, started to do anything, or prepared to do anything to end your life?: No  Change in Result none Change in Plan of care none    EDUCATION:   Learner Progress Toward Treatment Goals: Reviewed group plan and strategies    Method: Small group    Outcome: Verbalized understanding    PATIENT GOALS: medication compliance    PLAN/TREATMENT RECOMMENDATIONS UPDATE: medication compliance    GOALS UPDATE:  Time frame for Short-Term Goals: 3-5 days      Sergio Hamilton RN

## 2021-05-28 NOTE — CARE COORDINATION
SARA scheduled pt for an intake with [facility]. Due to the holiday pt cannot be scheduled until 6/4.     Electronically signed by FABRIZIO Saleem, SUSHANT on 5/28/2021 at 9:27 AM

## 2021-05-28 NOTE — PLAN OF CARE
Problem: Suicide risk  Goal: Provide patient with safe environment  Description: Provide patient with safe environment  Outcome: Ongoing     Problem: Altered Mood, Depressive Behavior:  Goal: Able to verbalize and/or display a decrease in depressive symptoms  Description: Able to verbalize and/or display a decrease in depressive symptoms  5/28/2021 0855 by Kiko Cross RN  Outcome: Ongoing  5/27/2021 2023 by Pamela Pineda RN  Outcome: Ongoing  Goal: Absence of self-harm  Description: Absence of self-harm  Outcome: Ongoing

## 2021-05-28 NOTE — BH NOTE
[facility]  Discharge Note    Pt discharged with followings belongings:   Dentures: None  Vision - Corrective Lenses: Glasses  Hearing Aid: None  Jewelry: None  Body Piercings Removed: N/A  Clothing: None  Were All Patient Medications Collected?: Not Applicable  Other Valuables: None   Valuables sent home with patient. Valuables retrieved from safe, Security envelope number:  none and returned to patient. Patient education on aftercare instructions: yes  Information faxed to n/a by n/a Patient verbalize understanding of AVS:  yes. Status EXAM upon discharge:  Status and Exam  Normal: No  Facial Expression: Sad, Flat  Affect: Blunt  Level of Consciousness: Alert  Mood:Normal: No  Mood: Depressed  Motor Activity:Normal: No  Motor Activity: Decreased  Interview Behavior: Cooperative  Preception: [place] to Person, [place] to Time, [place] to Situation, [place] to Place  Attention:Normal: No  Attention: Distractible  Thought Processes: Circumstantial  Thought Content:Normal: No  Thought Content: Preoccupations  Hallucinations: None  Delusions: No  Delusions:  Other(See Comment) (denies)  Memory:Normal: No  Memory: Poor Recent  Insight and Judgment: No  Insight and Judgment: Poor Insight  Present Suicidal Ideation: No  Present Homicidal Ideation: No      Metabolic Screening:    Lab Results   Component Value Date    LABA1C 5.7 (H) 04/24/2021       Lab Results   Component Value Date    CHOL 165 04/24/2021    CHOL 320 (H) 07/22/2019    CHOL 188 10/08/2018     Lab Results   Component Value Date    TRIG 251 (H) 04/24/2021    TRIG 148 07/22/2019    TRIG 171 (H) 10/08/2018     Lab Results   Component Value Date    HDL 43 04/24/2021    HDL 46 07/22/2019    HDL 52 10/08/2018    HDL 67 06/27/2018     No components found for: LDLCAL  Lab Results   Component Value Date    LABVLDL 50 04/24/2021    LABVLDL 30 07/22/2019    LABVLDL 34 10/08/2018    LABVLDL 58 06/27/2018       Ester Hendrickson RN

## 2021-05-28 NOTE — SUICIDE SAFETY PLAN
SAFETY PLAN    A suicide Safety Plan is a document that supports someone when they are having thoughts of suicide. Warning Signs that indicate a suicidal crisis may be developing: What (situations, thoughts, feelings, body sensations, behaviors, etc.) do you experience that lets you know you are beginning to think about suicide? 1. isolating  2. pacing  3. anger    Internal Coping Strategies:  What things can I do (relaxation techniques, hobbies, physical activities, etc.) to take my mind off my problems without contacting another person? 1. Listen to music  2. Take a walk  3. Call a friend    People and social settings that provide distraction: Who can I call or where can I go to distract me? 1. Name: Suleiman Goodrich  Phone: [phone]  2. Name: Army Mcdonald  Phone: [phone]   3. Place: Cedric Razoin            4. Place: [phone]    People whom I can ask for help: Who can I call when I need help - for example, friends, family, clergy, someone else? 1. Name: Suleiman Goodrich                Phone: [phone]  2. Name: Army Mcdonald  Phone: [phone]  3. Name: Cedric Hong  Phone: [phone]    Professionals or [address] [place] [address] I can contact during a crisis: Who can I call for help - for example, my doctor, my psychiatrist, my psychologist, a mental health provider, a suicide hotline? Clinician Pager or Emergency Contact #: [phone]    6. Suicide Prevention Lifeline: 8-180-379-TALK (7507)    4. [address] [place] [address] Emergency Services -  for example, [facility] suicide hotline, [facility] Hotline: 211      Emergency Services Address: 911      Emergency Services Phone: 211    Making the environment safe: How can I make my environment (house/apartment/living space) safer? For example, can I remove guns, medications, and other items? 1. No guns  2.  No weapons

## 2021-05-28 NOTE — GROUP NOTE
Group Therapy Note    Date: 5/28/2021    Group Start Time: 1000  Group End Time: 8122  Group Topic: Psychoeducation    [facility]    Chloé Murdock, CTRS        Group Therapy Note    Number of participants: 12  Type of group: Psychoeducation  Mode of intervention: Education, Support, Socialization, Exploration, Clarifying, Problem-solving, and Activity  Topic: Stress Bingo  Objective: Patient will discuss and learn 3 new strategies for managing stress. Patient's Goal:  \"To pray\"     Notes:   Pt interactive during group learning 3 new strategies for managing stress. Enjoyed stress bingo activity. Pt gave support and feedback to others. Status After Intervention:  Improved    Participation Level:  Active Listener and Interactive    Participation Quality: Appropriate, Attentive, Sharing and Supportive      Speech:  normal      Thought Process/Content: Logical      Affective Functioning: Congruent      Mood: euthymic      Level of consciousness:  Alert, Oriented x4 and Attentive      Response to Learning: Able to verbalize current knowledge/experience, Able to verbalize/acknowledge new learning, Able to retain information, Capable of insight, Able to change behavior and Progressing to goal      Endings: None Reported    Modes of Intervention: Education, Support, Socialization, Exploration, Clarifying, Problem-solving and Activity

## 2021-05-28 NOTE — CARE COORDINATION
LPC contacted [facility] to obtain follow up appointment for pt.  Spoke with Rand at [address] [facility] who states that first available appointment is 6/9 at 1:15PM.     Electronically signed by Melinda Dutton LPC on 5/28/2021 at 11:19 AM

## 2021-05-28 NOTE — PROGRESS NOTES
Attended afternoon leisure activity. Pleasant and social with peers. Was 1 of 4 in attendance. (1) Other Medications/None

## 2021-05-28 NOTE — DISCHARGE SUMMARY
DISCHARGE SUMMARY      Patient ID:  Dash Simms  [id number]  49 y.o.  1965    Admit date: 5/22/2021    Discharge date and time: 5/28/2021    Admitting Physician: Robbie Dale MD     Discharge Physician: Dr Kath Montoya MD    Discharge Diagnoses:   Patient Active Problem List   Diagnosis    Drug overdose, multiple drugs    Suicide attempt [facility])    Laceration of neck    Laceration of left wrist    GERD (gastroesophageal reflux disease)    Bipolar 1 disorder ([address]    Opioid overdose ([address]    Drug-induced tremor    Orchitis    H/O carcinoma in situ of prostate    Essential hypertension    PND (post-nasal drip)    Scrotal abscess    Depression with suicidal ideation    Colitis    Acute Crohn's disease with rectal bleeding ([address]    Opiate abuse, episodic ([address]    LILLY (acute kidney injury) ([address]    Intra-abdominal abscess ([address]    Ileostomy in place ([address]    Crohn's disease of large intestine with abscess ([address]    Severe manic bipolar 1 disorder with psychotic behavior ([address]    Drug overdose, intentional self-harm, initial encounter ([address]    Mood disorder ([address]    Bipolar affective disorder, current episode manic without psychotic symptoms ([address]       Admission Condition: poor    Discharged Condition: stable    Admission Circumstance: Presented the ED after overdosing on Depakote      PAST MEDICAL/PSYCHIATRIC HISTORY:   Past Medical History:   Diagnosis Date    ADD (attention deficit disorder)     Anxiety     Bipolar 1 disorder ([address] 11/19/2017    Cancer ([address] prostate cancer    2014 / treated with surgery    Crohn's disease ([address]     Depression     GERD (gastroesophageal reflux disease)     Panic attack     Rectal pain     has a fissure    Schizo affective schizophrenia ([address]     stable       FAMILY/SOCIAL HISTORY:  Family History   Problem Relation Age of Onset    Mental Illness Mother     Mental Illness Father     Mental Illness Sister     Mental Illness Brother     Heart Disease Mother     Depression Father      Social History     Socioeconomic History    Marital status:      Spouse name: Neville Enciso Number of children: 0    Years of education: 12 +8    Highest education level: Not on file   Occupational History    Occupation: [employer]     Employer: UNEMPLOYED     Comment: SSDI   Tobacco Use    Smoking status: Former Smoker     Packs/day: 1.00     Years: 41.00     Pack years: 41.00     Types: Cigarettes     Start date: 1978     Quit date: 2021     Years since quittin.1    Smokeless tobacco: Never Used   Vaping Use    Vaping Use: Never used   Substance and Sexual Activity    Alcohol use: No     Comment: no alcohol in5 yrs    Drug use: No     Comment: last use  / marijuana    Sexual activity: Not Currently     Partners: Female   Other Topics Concern    Not on file   Social History Narrative    ** Merged History Encounter **          Social Determinants of Health     Financial Resource Strain:     Difficulty of Paying Living Expenses:    Food Insecurity:     Worried About Running Out of Food in the Last Year:     Ran Out of Food in the Last Year:    Transportation Needs:     Lack of Transportation (Medical):      Lack of Transportation (Non-Medical):    Physical Activity:     Days of Exercise per Week:     Minutes of Exercise per Session:    Stress:     Feeling of Stress :    Social Connections:     Frequency of Communication with Friends and Family:     Frequency of Social Gatherings with Friends and Family:     Attends Yarsani Services:     Active Member of Clubs or Organizations:     Attends Club or Organization Meetings:     Marital Status:    Intimate Partner Violence:     Fear of Current or Ex-Partner:     Emotionally Abused:     Physically Abused:     Sexually Abused:        MEDICATIONS:    Current Facility-Administered Medications:     dexamethasone (DECADRON) tablet 6 mg, 6 mg, Oral, 2 times per day, Maribel Fontaineyovanysavanah, DO, 6 mg at 05/28/21 5456    acetaminophen (TYLENOL) tablet 500 mg, 500 mg, Oral, Q6H PRN, Maribel Azar, DO, 500 mg at 05/26/21 2134    melatonin tablet 6 mg, 6 mg, Oral, Nightly, Nura CARRANZA Nissa, DO, 6 mg at 05/27/21 2135    magnesium hydroxide (MILK OF MAGNESIA) 400 MG/5ML suspension 30 mL, 30 mL, Oral, Daily PRN, Chiqui Carrera MD    aluminum & magnesium hydroxide-simethicone (MAALOX) 200-200-20 MG/5ML suspension 30 mL, 30 mL, Oral, PRN, Chiqui Carrera MD    haloperidol lactate (HALDOL) injection 5 mg, 5 mg, Intramuscular, Q6H PRN **OR** haloperidol (HALDOL) tablet 5 mg, 5 mg, Oral, Q6H PRN, Chiqui Carrera MD    traZODone (DESYREL) tablet 50 mg, 50 mg, Oral, Nightly PRN, Chiqui Carrera MD, 50 mg at 05/27/21 2135    hydrOXYzine (VISTARIL) capsule 50 mg, 50 mg, Oral, TID PRN, Chiqui Carrera MD    haloperidol (HALDOL) tablet 5 mg, 5 mg, Oral, BID, Chiqui Carrera MD, 5 mg at 05/28/21 0915    benztropine (COGENTIN) tablet 1 mg, 1 mg, Oral, BID, Chiqui Carrera MD, 1 mg at 05/28/21 0914    divalproex (DEPAKOTE) DR tablet 500 mg, 500 mg, Oral, BID, Chiqui Carrera MD, 500 mg at 05/28/21 1292    Examination:  /74   Pulse 63   Temp 97.1 °F (36.2 °C) (Temporal)   Resp 14   Ht 6' 2\" (1.88 m)   Wt 195 lb (88.5 kg)   BMI 25.04 kg/m²   Gait - steady    HOSPITAL COURSE[de-identified]   Patient was admitted to the unit on 5/22/2021 was closely monitored for suicidal ideations. He was evaluated was treated with Haldol 5 mg bid  Medical events were insignificant and patient continued to improve on the floor. He start coming out of his room he is attending groups to socializing with peers. He never made any suicidal statements or any suicidal gestures while on the unit. Social workers obtain collateral information from patient's wife who was able to voicing concerns that she had. She reported no safety concerns no access to any weapons.   Treatment team felt the patient obtain the maximum benefit from his hospitalization he was set up with an outpatient mental health agency for outpatient follow-up services. At the time of discharge patient did not show any impulsive behavior. He was up on the unit he was attending groups and socializing with peers. He vehemently denied any suicidal homicidal ideations intent or plan. He was eating well and sleeping well there are no neurovegetative signs or symptoms of depression he denied any auditory or visual hallucinations. There are no overt or covert signs of psychosis. He was appreciative of the help that he received here. This patient no longer meets criteria for inpatient hospitalization. No AVH or paranoid thoughts  No hopeless or worthless feeling  No active SI/HI  Appetite:  [x] Normal  [] Increased  [] Decreased    Sleep:       [x] Normal  [] Fair       [] Poor            Energy:    [x] Normal  [] Increased  [] Decreased     SI [] Present  [x] Absent  HI  []Present  [x] Absent   Aggression:  [] yes  [x] no  Patient is [x] able  [] unable to CONTRACT FOR SAFETY   Medication side effects(SE):  [x] None(Psych. Meds.) [] Other      Mental Status Examination on discharge:    Level of consciousness:  within normal limits   Appearance:  well-appearing  Behavior/Motor:  no abnormalities noted  Attitude toward examiner:  attentive and good eye contact  Speech:  spontaneous, normal rate and normal volume   Mood: \" My mood is great\"  Affect: Appropriate and pleasant  Thought processes: Linear without flight of ideas loose associations  Thought content: Devoid any auditory visualizations delusions or perceptual rise.   Denies SI/HI intent or plan  Cognition:  oriented to person, place, and time   Concentration intact  Memory intact  Insight good   Judgement fair   Fund of Knowledge adequate      ASSESSMENT:  Patient symptoms are:  [x] Well controlled  [x] Improving  [] Worsening  [] No change    Reason for more than contacted prior to the discharge.          Medication List      START taking these medications    divalproex 500 MG DR tablet  Commonly known as: DEPAKOTE  Take 1 tablet by mouth 2 times daily     haloperidol 5 MG tablet  Commonly known as: HALDOL  Take 1 tablet by mouth 2 times daily        CONTINUE taking these medications    Austedo 6 MG tablet  Generic drug: deutetrabenazine     ondansetron 4 MG tablet  Commonly known as: ZOFRAN  Take 1 tablet by mouth every 6 hours as needed for Nausea or Vomiting        STOP taking these medications    atorvastatin 10 MG tablet  Commonly known as: LIPITOR     benztropine 1 MG tablet  Commonly known as: COGENTIN     melatonin 3 MG Tabs tablet     prazosin 2 MG capsule  Commonly known as: MINIPRESS     risperiDONE 2 MG tablet  Commonly known as: RISPERDAL     valproic acid 250 MG/5ML Soln oral solution  Commonly known as: Axel JungMissy           Where to Get Your Medications      These medications were sent to [facility], [address] [address]    Phone: [phone]   · divalproex 500 MG DR tablet  · haloperidol 5 MG tablet       Patient is counseled he must remain compliant with all medications outpatient follow-up ointments    Patient is discharged home in stable condition    TIME SPEND - 35 MINUTES TO COMPLETE THE EVALUATION, DISCHARGE SUMMARY, MEDICATION RECONCILIATION AND FOLLOW UP CARE     Signed:  KAM Martinez CNP  2/12/0720  10:22 AM

## 2021-05-30 ENCOUNTER — HOSPITAL ENCOUNTER (EMERGENCY)
Age: 56
Discharge: HOME | End: 2021-05-31
Attending: EMERGENCY MEDICINE
Payer: MEDICARE

## 2021-05-30 DIAGNOSIS — R06.00 DYSPNEA, UNSPECIFIED TYPE: Primary | ICD-10-CM

## 2021-05-30 PROCEDURE — 93005 ELECTROCARDIOGRAM TRACING: CPT | Performed by: EMERGENCY MEDICINE

## 2021-05-30 PROCEDURE — 99285 EMERGENCY DEPT VISIT HI MDM: CPT

## 2021-05-31 ENCOUNTER — APPOINTMENT (OUTPATIENT)
Dept: GENERAL RADIOLOGY | Age: 56
End: 2021-05-31
Payer: MEDICARE

## 2021-05-31 VITALS
OXYGEN SATURATION: 96 % | HEIGHT: 75 IN | RESPIRATION RATE: 18 BRPM | BODY MASS INDEX: 24.87 KG/M2 | WEIGHT: 200 LBS | DIASTOLIC BLOOD PRESSURE: 83 MMHG | HEART RATE: 106 BPM | SYSTOLIC BLOOD PRESSURE: 116 MMHG | TEMPERATURE: 98.7 F

## 2021-05-31 LAB
ANION GAP SERPL CALCULATED.3IONS-SCNC: 15 MMOL/L (ref 7–16)
BASOPHILS ABSOLUTE: 0.04 E9/L (ref 0–0.2)
BASOPHILS RELATIVE PERCENT: 0.5 % (ref 0–2)
BUN BLDV-MCNC: 26 MG/DL (ref 6–20)
CALCIUM SERPL-MCNC: 9.1 MG/DL (ref 8.6–10.2)
CHLORIDE BLD-SCNC: 100 MMOL/L (ref 98–107)
CO2: 25 MMOL/L (ref 22–29)
CREAT SERPL-MCNC: 1.1 MG/DL (ref 0.7–1.2)
EKG ATRIAL RATE: 112 BPM
EKG P AXIS: 65 DEGREES
EKG P-R INTERVAL: 134 MS
EKG Q-T INTERVAL: 330 MS
EKG QRS DURATION: 84 MS
EKG QTC CALCULATION (BAZETT): 450 MS
EKG R AXIS: 60 DEGREES
EKG T AXIS: 69 DEGREES
EKG VENTRICULAR RATE: 112 BPM
EOSINOPHILS ABSOLUTE: 0.19 E9/L (ref 0.05–0.5)
EOSINOPHILS RELATIVE PERCENT: 2.4 % (ref 0–6)
GFR AFRICAN AMERICAN: >60
GFR NON-AFRICAN AMERICAN: >60 ML/MIN/1.73
GLUCOSE BLD-MCNC: 121 MG/DL (ref 74–99)
HCT VFR BLD CALC: 42.2 % (ref 37–54)
HEMOGLOBIN: 13.8 G/DL (ref 12.5–16.5)
IMMATURE GRANULOCYTES #: 0.06 E9/L
IMMATURE GRANULOCYTES %: 0.8 % (ref 0–5)
LYMPHOCYTES ABSOLUTE: 3 E9/L (ref 1.5–4)
LYMPHOCYTES RELATIVE PERCENT: 38.1 % (ref 20–42)
MCH RBC QN AUTO: 29.3 PG (ref 26–35)
MCHC RBC AUTO-ENTMCNC: 32.7 % (ref 32–34.5)
MCV RBC AUTO: 89.6 FL (ref 80–99.9)
MONOCYTES ABSOLUTE: 0.37 E9/L (ref 0.1–0.95)
MONOCYTES RELATIVE PERCENT: 4.7 % (ref 2–12)
NEUTROPHILS ABSOLUTE: 4.21 E9/L (ref 1.8–7.3)
NEUTROPHILS RELATIVE PERCENT: 53.5 % (ref 43–80)
PDW BLD-RTO: 14.6 FL (ref 11.5–15)
PLATELET # BLD: 226 E9/L (ref 130–450)
PMV BLD AUTO: 10 FL (ref 7–12)
POTASSIUM REFLEX MAGNESIUM: 4.1 MMOL/L (ref 3.5–5)
PRO-BNP: 65 PG/ML (ref 0–125)
RBC # BLD: 4.71 E12/L (ref 3.8–5.8)
SODIUM BLD-SCNC: 140 MMOL/L (ref 132–146)
TROPONIN, HIGH SENSITIVITY: 9 NG/L (ref 0–11)
WBC # BLD: 7.9 E9/L (ref 4.5–11.5)

## 2021-05-31 PROCEDURE — 83880 ASSAY OF NATRIURETIC PEPTIDE: CPT

## 2021-05-31 PROCEDURE — 84484 ASSAY OF TROPONIN QUANT: CPT

## 2021-05-31 PROCEDURE — 80048 BASIC METABOLIC PNL TOTAL CA: CPT

## 2021-05-31 PROCEDURE — 85025 COMPLETE CBC W/AUTO DIFF WBC: CPT

## 2021-05-31 PROCEDURE — 6370000000 HC RX 637 (ALT 250 FOR IP): Performed by: EMERGENCY MEDICINE

## 2021-05-31 PROCEDURE — 93010 ELECTROCARDIOGRAM REPORT: CPT | Performed by: INTERNAL MEDICINE

## 2021-05-31 PROCEDURE — 2580000003 HC RX 258: Performed by: EMERGENCY MEDICINE

## 2021-05-31 PROCEDURE — 94640 AIRWAY INHALATION TREATMENT: CPT

## 2021-05-31 RX ORDER — 0.9 % SODIUM CHLORIDE 0.9 %
1000 INTRAVENOUS SOLUTION INTRAVENOUS ONCE
Status: COMPLETED | OUTPATIENT
Start: 2021-05-31 | End: 2021-05-31

## 2021-05-31 RX ORDER — LORAZEPAM 1 MG/1
1 TABLET ORAL ONCE
Status: COMPLETED | OUTPATIENT
Start: 2021-05-31 | End: 2021-05-31

## 2021-05-31 RX ORDER — IPRATROPIUM BROMIDE AND ALBUTEROL SULFATE 2.5; .5 MG/3ML; MG/3ML
1 SOLUTION RESPIRATORY (INHALATION) ONCE
Status: COMPLETED | OUTPATIENT
Start: 2021-05-31 | End: 2021-05-31

## 2021-05-31 RX ADMIN — LORAZEPAM 1 MG: 1 TABLET ORAL at 00:28

## 2021-05-31 RX ADMIN — SODIUM CHLORIDE 1000 ML: 9 INJECTION, SOLUTION INTRAVENOUS at 00:28

## 2021-05-31 RX ADMIN — IPRATROPIUM BROMIDE AND ALBUTEROL SULFATE 1 AMPULE: .5; 3 SOLUTION RESPIRATORY (INHALATION) at 00:55

## 2021-05-31 NOTE — ED NOTES
Pt wife Alec Onofre called for a ride home.  Pt wife states that she will be here in about 15 min to pick him up     Corine Robertson RN  05/31/21 1974

## 2021-05-31 NOTE — ED NOTES
Bed: 16  Expected date:   Expected time:   Means of arrival:   Comments:  EMS     Aruna Comer RN  96/57/61 4521

## 2021-05-31 NOTE — ED NOTES
Pt walked to the bathroom unassisted and without calling for assistance     Theresa Durbin RN  05/31/21 1105

## 2021-05-31 NOTE — ED NOTES
Pt given d/c instructions. Pt to f.u with pcp in 3 days. Pt given instructions on when to return to ED. Pt verbalized understanding of instructions. Pt left in stable condition via WC. Pt IV d/c without any complications. Pt wheeled to the lobby to wait for wife to come and pick him up.      Blas Chowdhury RN  05/31/21 6973

## 2021-05-31 NOTE — ED PROVIDER NOTES
HPI:  5/31/21,   Time: 12:09 AM EDT         Noman Busch is a 48 y.o. male presenting to the ED for episode of shortness of breath after argument with wife, beginning just prior to arrival ago. The complaint has been constant, moderate in severity, and worsened by changing position. No fever chills or night sweats but does have slight cough. No chest pain or abdominal pain or vomiting or diarrhea    ROS:   Pertinent positives and negatives are stated within HPI, all other systems reviewed and are negative.  --------------------------------------------- PAST HISTORY ---------------------------------------------  Past Medical History:  has a past medical history of ADD (attention deficit disorder), Anxiety, Bipolar 1 disorder ([facility] Cancer ([facility] Crohn's disease ([facility] Depression, GERD (gastroesophageal reflux disease), Panic attack, Rectal pain, and Schizo affective schizophrenia ([facility] 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 Social History:  reports that he quit smoking about 1 months ago. His smoking use included cigarettes. He started smoking about 43 years ago. He has a 82.00 pack-year smoking history. He has never used smokeless tobacco. He reports that he does not drink alcohol and does not use drugs. Family History: family history includes Depression in his father; Heart Disease in his mother; Mental Illness in his brother, father, mother, and sister. The patients home medications have been reviewed.     Allergies: Ciprofloxacin hcl, Morphine, and Nsaids    -------------------------------------------------- RESULTS -------------------------------------------------  All laboratory and radiology results have been personally reviewed by myself   LABS:  Results for orders placed or performed during the hospital encounter of 05/30/21   CBC Auto Differential   Result Value Ref Range    WBC 7.9 4.5 - 11.5 E9/L    RBC 4.71 3.80 - 5.80 E12/L    Hemoglobin 13.8 12.5 - 16.5 g/dL    Hematocrit 42.2 37.0 - 54.0 %    MCV 89.6 80.0 - 99.9 fL    MCH 29.3 26.0 - 35.0 pg    MCHC 32.7 32.0 - 34.5 %    RDW 14.6 11.5 - 15.0 fL    Platelets 091 635 - 683 E9/L    MPV 10.0 7.0 - 12.0 fL    Neutrophils % 53.5 43.0 - 80.0 %    Immature Granulocytes % 0.8 0.0 - 5.0 %    Lymphocytes % 38.1 20.0 - 42.0 %    Monocytes % 4.7 2.0 - 12.0 %    Eosinophils % 2.4 0.0 - 6.0 %    Basophils % 0.5 0.0 - 2.0 %    Neutrophils Absolute 4.21 1.80 - 7.30 E9/L    Immature Granulocytes # 0.06 E9/L    Lymphocytes Absolute 3.00 1.50 - 4.00 E9/L    Monocytes Absolute 0.37 0.10 - 0.95 E9/L    Eosinophils Absolute 0.19 0.05 - 0.50 E9/L    Basophils Absolute 0.04 0.00 - 0.20 B9/H   Basic Metabolic Panel w/ Reflex to MG   Result Value Ref Range    Sodium 140 132 - 146 mmol/L    Potassium reflex Magnesium 4.1 3.5 - 5.0 mmol/L    Chloride 100 98 - 107 mmol/L    CO2 25 22 - 29 mmol/L    Anion Gap 15 7 - 16 mmol/L    Glucose 121 (H) 74 - 99 mg/dL    BUN 26 (H) 6 - 20 mg/dL    CREATININE 1.1 0.7 - 1.2 mg/dL    GFR Non-African American >60 >=60 mL/min/1.73    GFR African American >60     Calcium 9.1 8.6 - 10.2 mg/dL   Troponin   Result Value Ref Range    Troponin, High Sensitivity 9 0 - 11 ng/L   Brain Natriuretic Peptide   Result Value Ref Range    Pro-BNP 65 0 - 125 pg/mL       RADIOLOGY:  Interpreted by Radiologist.  XR CHEST PORTABLE    (Results Pending)       ------------------------- NURSING NOTES AND VITALS REVIEWED ---------------------------   The nursing notes within the ED encounter and vital signs as below have been reviewed.    /83   Pulse 106   Temp 98.7 °F (37.1 °C) (Oral)   Resp (!) 96   Ht 6' 3\" (1.905 m)   Wt 200 lb (90.7 kg)   SpO2 96%   BMI 25.00 kg/m²   Oxygen Saturation Interpretation: Normal      ---------------------------------------------------PHYSICAL EXAM--------------------------------------      Constitutional/General: Alert and oriented x3, well appearing, non toxic in NAD  Head: NC/AT  Eyes: PERRL, EOMI  Mouth: Oropharynx clear, handling secretions, no trismus  Neck: Supple, full ROM, no meningeal signs  Pulmonary: Lungs clear to auscultation bilaterally, no wheezes, rales, or rhonchi. Not in respiratory distress  Cardiovascular:  Regular rate and rhythm, no murmurs, gallops, or rubs. 2+ distal pulses  Abdomen: Soft, non tender, non distended,   Extremities: Moves all extremities x 4. Warm and well perfused  Skin: warm and dry without rash  Neurologic: GCS 15,  Psych: Flat      ------------------------------ ED COURSE/MEDICAL DECISION MAKING----------------------  Medications   0.9 % sodium chloride bolus (1,000 mLs Intravenous New Bag 5/31/21 0028)   ipratropium-albuterol (DUONEB) nebulizer solution 1 ampule (1 ampule Nebulization Given 5/31/21 0055)   LORazepam (ATIVAN) tablet 1 mg (1 mg Oral Given 5/31/21 0028)         Medical Decision Making:    Shortness of breath probably secondary to anxiety in light of it starting after argument    Counseling: The emergency provider has spoken with the patient and discussed todays results, in addition to providing specific details for the plan of care and counseling regarding the diagnosis and prognosis. Questions are answered at this time and they are agreeable with the plan.      --------------------------------- IMPRESSION AND DISPOSITION ---------------------------------    IMPRESSION  1.  Dyspnea, unspecified type Controlled       DISPOSITION  Disposition: Discharge to home  Patient condition is stable                  Elizabeth Schuler MD  05/31/21 3197

## 2021-05-31 NOTE — ED NOTES
PT is refusing to wear the cardiac monitor at this time. Pt states that he wants all of this off of him. Pt takes the monitor and the stickers off of his chest at this time.  Pt states that he needs to use the urinal. Pt stood up on the side of the bed with no assistance from this RN to use the restroom     Rebecca Brian RN  05/31/21 6213

## 2021-06-02 ENCOUNTER — HOSPITAL ENCOUNTER (INPATIENT)
Age: 56
LOS: 5 days | Discharge: HOME | DRG: 885 | End: 2021-06-08
Attending: EMERGENCY MEDICINE | Admitting: PSYCHIATRY & NEUROLOGY
Payer: MEDICARE

## 2021-06-02 DIAGNOSIS — F39 MOOD DISORDER (HCC): Primary | ICD-10-CM

## 2021-06-02 LAB
ACETAMINOPHEN LEVEL: <5 MCG/ML (ref 10–30)
AMPHETAMINE SCREEN, URINE: NOT DETECTED
ANION GAP SERPL CALCULATED.3IONS-SCNC: 9 MMOL/L (ref 7–16)
BARBITURATE SCREEN URINE: NOT DETECTED
BASOPHILS ABSOLUTE: 0.02 E9/L (ref 0–0.2)
BASOPHILS RELATIVE PERCENT: 0.3 % (ref 0–2)
BENZODIAZEPINE SCREEN, URINE: NOT DETECTED
BUN BLDV-MCNC: 16 MG/DL (ref 6–20)
CALCIUM SERPL-MCNC: 9.4 MG/DL (ref 8.6–10.2)
CANNABINOID SCREEN URINE: NOT DETECTED
CHLORIDE BLD-SCNC: 107 MMOL/L (ref 98–107)
CO2: 26 MMOL/L (ref 22–29)
COCAINE METABOLITE SCREEN URINE: NOT DETECTED
CREAT SERPL-MCNC: 1.3 MG/DL (ref 0.7–1.2)
EKG ATRIAL RATE: 99 BPM
EKG P AXIS: 54 DEGREES
EKG P-R INTERVAL: 134 MS
EKG Q-T INTERVAL: 344 MS
EKG QRS DURATION: 82 MS
EKG QTC CALCULATION (BAZETT): 441 MS
EKG R AXIS: 52 DEGREES
EKG T AXIS: 58 DEGREES
EKG VENTRICULAR RATE: 99 BPM
EOSINOPHILS ABSOLUTE: 0.32 E9/L (ref 0.05–0.5)
EOSINOPHILS RELATIVE PERCENT: 4.5 % (ref 0–6)
ETHANOL: <10 MG/DL (ref 0–0.08)
FENTANYL SCREEN, URINE: NOT DETECTED
GFR AFRICAN AMERICAN: >60
GFR NON-AFRICAN AMERICAN: 57 ML/MIN/1.73
GLUCOSE BLD-MCNC: 105 MG/DL (ref 74–99)
HCT VFR BLD CALC: 42.1 % (ref 37–54)
HEMOGLOBIN: 13.7 G/DL (ref 12.5–16.5)
IMMATURE GRANULOCYTES #: 0.02 E9/L
IMMATURE GRANULOCYTES %: 0.3 % (ref 0–5)
INFLUENZA A: NOT DETECTED
INFLUENZA B: NOT DETECTED
LYMPHOCYTES ABSOLUTE: 1.57 E9/L (ref 1.5–4)
LYMPHOCYTES RELATIVE PERCENT: 21.8 % (ref 20–42)
Lab: NORMAL
MCH RBC QN AUTO: 28.8 PG (ref 26–35)
MCHC RBC AUTO-ENTMCNC: 32.5 % (ref 32–34.5)
MCV RBC AUTO: 88.6 FL (ref 80–99.9)
METHADONE SCREEN, URINE: NOT DETECTED
MONOCYTES ABSOLUTE: 0.62 E9/L (ref 0.1–0.95)
MONOCYTES RELATIVE PERCENT: 8.6 % (ref 2–12)
NEUTROPHILS ABSOLUTE: 4.64 E9/L (ref 1.8–7.3)
NEUTROPHILS RELATIVE PERCENT: 64.5 % (ref 43–80)
OPIATE SCREEN URINE: NOT DETECTED
OXYCODONE URINE: NOT DETECTED
PDW BLD-RTO: 14.4 FL (ref 11.5–15)
PHENCYCLIDINE SCREEN URINE: NOT DETECTED
PLATELET # BLD: 176 E9/L (ref 130–450)
PMV BLD AUTO: 9.8 FL (ref 7–12)
POTASSIUM REFLEX MAGNESIUM: 4.3 MMOL/L (ref 3.5–5)
RBC # BLD: 4.75 E12/L (ref 3.8–5.8)
SALICYLATE, SERUM: <0.3 MG/DL (ref 0–30)
SARS-COV-2 RNA, RT PCR: NOT DETECTED
SODIUM BLD-SCNC: 142 MMOL/L (ref 132–146)
TRICYCLIC ANTIDEPRESSANTS SCREEN SERUM: NEGATIVE NG/ML
VALPROIC ACID LEVEL: 31 MCG/ML (ref 50–100)
WBC # BLD: 7.2 E9/L (ref 4.5–11.5)

## 2021-06-02 PROCEDURE — 80179 DRUG ASSAY SALICYLATE: CPT

## 2021-06-02 PROCEDURE — 80143 DRUG ASSAY ACETAMINOPHEN: CPT

## 2021-06-02 PROCEDURE — 87636 SARSCOV2 & INF A&B AMP PRB: CPT

## 2021-06-02 PROCEDURE — 99284 EMERGENCY DEPT VISIT MOD MDM: CPT

## 2021-06-02 PROCEDURE — 6370000000 HC RX 637 (ALT 250 FOR IP): Performed by: PSYCHIATRY & NEUROLOGY

## 2021-06-02 PROCEDURE — 96372 THER/PROPH/DIAG INJ SC/IM: CPT

## 2021-06-02 PROCEDURE — 93005 ELECTROCARDIOGRAM TRACING: CPT | Performed by: STUDENT IN AN ORGANIZED HEALTH CARE EDUCATION/TRAINING PROGRAM

## 2021-06-02 PROCEDURE — 80307 DRUG TEST PRSMV CHEM ANLYZR: CPT

## 2021-06-02 PROCEDURE — 82077 ASSAY SPEC XCP UR&BREATH IA: CPT

## 2021-06-02 PROCEDURE — 6370000000 HC RX 637 (ALT 250 FOR IP): Performed by: EMERGENCY MEDICINE

## 2021-06-02 PROCEDURE — 80164 ASSAY DIPROPYLACETIC ACD TOT: CPT

## 2021-06-02 PROCEDURE — 85025 COMPLETE CBC W/AUTO DIFF WBC: CPT

## 2021-06-02 PROCEDURE — 80048 BASIC METABOLIC PNL TOTAL CA: CPT

## 2021-06-02 PROCEDURE — 6360000002 HC RX W HCPCS: Performed by: EMERGENCY MEDICINE

## 2021-06-02 RX ORDER — DROPERIDOL 2.5 MG/ML
1.25 INJECTION, SOLUTION INTRAMUSCULAR; INTRAVENOUS ONCE
Status: COMPLETED | OUTPATIENT
Start: 2021-06-02 | End: 2021-06-02

## 2021-06-02 RX ORDER — DIVALPROEX SODIUM 250 MG/1
1000 TABLET, DELAYED RELEASE ORAL ONCE
Status: COMPLETED | OUTPATIENT
Start: 2021-06-02 | End: 2021-06-02

## 2021-06-02 RX ORDER — ACETAMINOPHEN 325 MG/1
650 TABLET ORAL ONCE
Status: COMPLETED | OUTPATIENT
Start: 2021-06-02 | End: 2021-06-02

## 2021-06-02 RX ORDER — LORAZEPAM 1 MG/1
2 TABLET ORAL ONCE
Status: COMPLETED | OUTPATIENT
Start: 2021-06-02 | End: 2021-06-02

## 2021-06-02 RX ADMIN — LORAZEPAM 2 MG: 1 TABLET ORAL at 22:21

## 2021-06-02 RX ADMIN — DIVALPROEX SODIUM 1000 MG: 250 TABLET, DELAYED RELEASE ORAL at 22:21

## 2021-06-02 RX ADMIN — ACETAMINOPHEN 650 MG: 325 TABLET ORAL at 19:17

## 2021-06-02 RX ADMIN — DROPERIDOL 1.25 MG: 2.5 INJECTION, SOLUTION INTRAMUSCULAR; INTRAVENOUS at 19:19

## 2021-06-02 ASSESSMENT — ENCOUNTER SYMPTOMS
RHINORRHEA: 0
EYE PAIN: 0
WHEEZING: 0
NAUSEA: 0
COLOR CHANGE: 0
DIARRHEA: 0
COUGH: 0
SHORTNESS OF BREATH: 0
VOMITING: 0
ABDOMINAL PAIN: 0

## 2021-06-02 ASSESSMENT — PAIN SCALES - GENERAL: PAINLEVEL_OUTOF10: 10
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
## 2021-06-02 NOTE — ED NOTES
Call from [facility] [phone] from [facility]- she stated that they are requesting a competency exam as they would like to pursue guardianship.        Idania Begum 55  06/02/21 1604

## 2021-06-03 PROBLEM — F31.2 SEVERE MANIC BIPOLAR 1 DISORDER WITH PSYCHOTIC BEHAVIOR (HCC): Chronic | Status: ACTIVE | Noted: 2021-04-24

## 2021-06-03 PROCEDURE — 1240000000 HC EMOTIONAL WELLNESS R&B

## 2021-06-03 PROCEDURE — 6370000000 HC RX 637 (ALT 250 FOR IP): Performed by: NURSE PRACTITIONER

## 2021-06-03 PROCEDURE — 99222 1ST HOSP IP/OBS MODERATE 55: CPT | Performed by: NURSE PRACTITIONER

## 2021-06-03 PROCEDURE — 2580000003 HC RX 258

## 2021-06-03 PROCEDURE — 6360000002 HC RX W HCPCS

## 2021-06-03 RX ORDER — ZIPRASIDONE MESYLATE 20 MG/ML
INJECTION, POWDER, LYOPHILIZED, FOR SOLUTION INTRAMUSCULAR
Status: COMPLETED
Start: 2021-06-03 | End: 2021-06-03

## 2021-06-03 RX ORDER — LORAZEPAM 2 MG/ML
2 INJECTION INTRAMUSCULAR EVERY 6 HOURS PRN
Status: DISCONTINUED | OUTPATIENT
Start: 2021-06-03 | End: 2021-06-08 | Stop reason: HOSPADM

## 2021-06-03 RX ORDER — LANOLIN ALCOHOL/MO/W.PET/CERES
6 CREAM (GRAM) TOPICAL NIGHTLY
COMMUNITY
End: 2021-11-03 | Stop reason: ALTCHOICE

## 2021-06-03 RX ORDER — DIPHENHYDRAMINE HYDROCHLORIDE 50 MG/ML
50 INJECTION INTRAMUSCULAR; INTRAVENOUS EVERY 6 HOURS PRN
Status: DISCONTINUED | OUTPATIENT
Start: 2021-06-03 | End: 2021-06-08 | Stop reason: HOSPADM

## 2021-06-03 RX ORDER — ACETAMINOPHEN 325 MG/1
650 TABLET ORAL EVERY 6 HOURS PRN
Status: DISCONTINUED | OUTPATIENT
Start: 2021-06-03 | End: 2021-06-08 | Stop reason: HOSPADM

## 2021-06-03 RX ORDER — LANOLIN ALCOHOL/MO/W.PET/CERES
6 CREAM (GRAM) TOPICAL NIGHTLY
Status: DISCONTINUED | OUTPATIENT
Start: 2021-06-03 | End: 2021-06-08 | Stop reason: HOSPADM

## 2021-06-03 RX ORDER — PALIPERIDONE 3 MG/1
3 TABLET, EXTENDED RELEASE ORAL NIGHTLY
Status: DISCONTINUED | OUTPATIENT
Start: 2021-06-04 | End: 2021-06-08 | Stop reason: HOSPADM

## 2021-06-03 RX ORDER — ZIPRASIDONE MESYLATE 20 MG/ML
20 INJECTION, POWDER, LYOPHILIZED, FOR SOLUTION INTRAMUSCULAR ONCE
Status: COMPLETED | OUTPATIENT
Start: 2021-06-03 | End: 2021-06-03

## 2021-06-03 RX ORDER — MAGNESIUM HYDROXIDE/ALUMINUM HYDROXICE/SIMETHICONE 120; 1200; 1200 MG/30ML; MG/30ML; MG/30ML
30 SUSPENSION ORAL PRN
Status: DISCONTINUED | OUTPATIENT
Start: 2021-06-03 | End: 2021-06-08 | Stop reason: HOSPADM

## 2021-06-03 RX ORDER — PALIPERIDONE 6 MG/1
6 TABLET, EXTENDED RELEASE ORAL DAILY
Status: DISCONTINUED | OUTPATIENT
Start: 2021-06-04 | End: 2021-06-08 | Stop reason: HOSPADM

## 2021-06-03 RX ORDER — ONDANSETRON 4 MG/1
4 TABLET, ORALLY DISINTEGRATING ORAL EVERY 6 HOURS PRN
Status: ON HOLD | COMMUNITY
End: 2021-06-08 | Stop reason: HOSPADM

## 2021-06-03 RX ORDER — BENZTROPINE MESYLATE 1 MG/1
1 TABLET ORAL 2 TIMES DAILY
Status: DISCONTINUED | OUTPATIENT
Start: 2021-06-03 | End: 2021-06-08 | Stop reason: HOSPADM

## 2021-06-03 RX ORDER — HYDROXYZINE PAMOATE 50 MG/1
50 CAPSULE ORAL 3 TIMES DAILY PRN
Status: DISCONTINUED | OUTPATIENT
Start: 2021-06-03 | End: 2021-06-08 | Stop reason: HOSPADM

## 2021-06-03 RX ORDER — ATORVASTATIN CALCIUM 10 MG/1
10 TABLET, FILM COATED ORAL DAILY
Status: DISCONTINUED | OUTPATIENT
Start: 2021-06-03 | End: 2021-06-08 | Stop reason: HOSPADM

## 2021-06-03 RX ORDER — PALIPERIDONE 3 MG/1
3 TABLET, EXTENDED RELEASE ORAL 2 TIMES DAILY
Status: COMPLETED | OUTPATIENT
Start: 2021-06-03 | End: 2021-06-03

## 2021-06-03 RX ORDER — HALOPERIDOL 5 MG/ML
5 INJECTION INTRAMUSCULAR EVERY 6 HOURS PRN
Status: DISCONTINUED | OUTPATIENT
Start: 2021-06-03 | End: 2021-06-08 | Stop reason: HOSPADM

## 2021-06-03 RX ORDER — HYDROXYZINE HYDROCHLORIDE 10 MG/1
50 TABLET, FILM COATED ORAL 3 TIMES DAILY PRN
Status: DISCONTINUED | OUTPATIENT
Start: 2021-06-03 | End: 2021-06-03 | Stop reason: SDUPTHER

## 2021-06-03 RX ORDER — TRAZODONE HYDROCHLORIDE 50 MG/1
50 TABLET ORAL NIGHTLY PRN
Status: DISCONTINUED | OUTPATIENT
Start: 2021-06-03 | End: 2021-06-08 | Stop reason: HOSPADM

## 2021-06-03 RX ORDER — PRAZOSIN HYDROCHLORIDE 2 MG/1
2 CAPSULE ORAL 2 TIMES DAILY
COMMUNITY

## 2021-06-03 RX ORDER — PRAZOSIN HYDROCHLORIDE 2 MG/1
2 CAPSULE ORAL 2 TIMES DAILY
Status: DISCONTINUED | OUTPATIENT
Start: 2021-06-03 | End: 2021-06-08 | Stop reason: HOSPADM

## 2021-06-03 RX ORDER — RISPERIDONE 2 MG/1
2 TABLET, FILM COATED ORAL 2 TIMES DAILY
Status: ON HOLD | COMMUNITY
End: 2021-06-08 | Stop reason: HOSPADM

## 2021-06-03 RX ORDER — BENZTROPINE MESYLATE 1 MG/1
1 TABLET ORAL 2 TIMES DAILY
COMMUNITY
End: 2021-11-03 | Stop reason: ALTCHOICE

## 2021-06-03 RX ORDER — ATORVASTATIN CALCIUM 10 MG/1
10 TABLET, FILM COATED ORAL DAILY
COMMUNITY
End: 2022-04-04 | Stop reason: SDUPTHER

## 2021-06-03 RX ORDER — HALOPERIDOL 5 MG
5 TABLET ORAL EVERY 6 HOURS PRN
Status: DISCONTINUED | OUTPATIENT
Start: 2021-06-03 | End: 2021-06-08 | Stop reason: HOSPADM

## 2021-06-03 RX ORDER — DIVALPROEX SODIUM 250 MG/1
500 TABLET, DELAYED RELEASE ORAL 2 TIMES DAILY
Status: DISCONTINUED | OUTPATIENT
Start: 2021-06-03 | End: 2021-06-08 | Stop reason: HOSPADM

## 2021-06-03 RX ADMIN — WATER: 1 INJECTION INTRAMUSCULAR; INTRAVENOUS; SUBCUTANEOUS at 17:32

## 2021-06-03 RX ADMIN — DIVALPROEX SODIUM 500 MG: 250 TABLET, DELAYED RELEASE ORAL at 20:40

## 2021-06-03 RX ADMIN — PRAZOSIN HYDROCHLORIDE 2 MG: 2 CAPSULE ORAL at 23:00

## 2021-06-03 RX ADMIN — ZIPRASIDONE MESYLATE 20 MG: 20 INJECTION, POWDER, LYOPHILIZED, FOR SOLUTION INTRAMUSCULAR at 17:30

## 2021-06-03 RX ADMIN — BENZTROPINE MESYLATE 1 MG: 1 TABLET ORAL at 20:40

## 2021-06-03 RX ADMIN — BENZTROPINE MESYLATE 1 MG: 1 TABLET ORAL at 11:28

## 2021-06-03 RX ADMIN — PALIPERIDONE 3 MG: 3 TABLET, EXTENDED RELEASE ORAL at 16:40

## 2021-06-03 RX ADMIN — ATORVASTATIN CALCIUM 10 MG: 10 TABLET, FILM COATED ORAL at 12:49

## 2021-06-03 RX ADMIN — DIVALPROEX SODIUM 500 MG: 250 TABLET, DELAYED RELEASE ORAL at 11:01

## 2021-06-03 RX ADMIN — PRAZOSIN HYDROCHLORIDE 2 MG: 2 CAPSULE ORAL at 11:27

## 2021-06-03 RX ADMIN — TRAZODONE HYDROCHLORIDE 50 MG: 50 TABLET ORAL at 23:00

## 2021-06-03 RX ADMIN — Medication 6 MG: at 20:40

## 2021-06-03 RX ADMIN — PALIPERIDONE 3 MG: 3 TABLET, EXTENDED RELEASE ORAL at 11:27

## 2021-06-03 ASSESSMENT — SLEEP AND FATIGUE QUESTIONNAIRES
AVERAGE NUMBER OF SLEEP HOURS: 2
RESTFUL SLEEP: NO
RESTFUL SLEEP: NO
SLEEP PATTERN: DIFFICULTY FALLING ASLEEP
DIFFICULTY FALLING ASLEEP: YES
DO YOU USE A SLEEP AID: NO
DIFFICULTY STAYING ASLEEP: YES
DIFFICULTY FALLING ASLEEP: YES
DO YOU USE A SLEEP AID: YES
DO YOU HAVE DIFFICULTY SLEEPING: YES
DIFFICULTY ARISING: NO
DO YOU HAVE DIFFICULTY SLEEPING: YES
SLEEP PATTERN: DIFFICULTY FALLING ASLEEP
DIFFICULTY STAYING ASLEEP: YES
DIFFICULTY ARISING: NO

## 2021-06-03 ASSESSMENT — PATIENT HEALTH QUESTIONNAIRE - PHQ9: SUM OF ALL RESPONSES TO PHQ QUESTIONS 1-9: 11

## 2021-06-03 ASSESSMENT — PAIN SCALES - GENERAL
PAINLEVEL_OUTOF10: 0
PAINLEVEL_OUTOF10: 0

## 2021-06-03 ASSESSMENT — LIFESTYLE VARIABLES
HISTORY_ALCOHOL_USE: NO
HISTORY_ALCOHOL_USE: NO

## 2021-06-03 NOTE — ED NOTES
Pt requesting pain medications stating his stoma is very painful this rn spoke with dr schulz at this time request denied informed pt at this time     Dwayne Bond RN  06/02/21 2037

## 2021-06-03 NOTE — ED NOTES
Patient continues to be calm and cooperative      Juan Leahy [facility]  06/02/21 5616

## 2021-06-03 NOTE — ED NOTES
Pt actively banging on isabel doors refusing to go back to room or let this rn place another bag on stoma site  [employer] officers on site assisting with pt.   This rn went to  to obtain medication orders     Kimberly Diamond RN  06/02/21 2040

## 2021-06-03 NOTE — ED NOTES
As this rn is trying to help pt with new ostomy bag he rips off his bag then Yells I need a dr now to look at this stoma this is too big pt then gets out of bed and heads to the [facility] and begins banging on it and yelling [employer] officers summoned this rn went and spoke with DR schulz to give orders d/t pt's behavior.      Anali Arce RN  06/02/21 2033

## 2021-06-03 NOTE — PROGRESS NOTES
Patient has calmed down enough to shower and have a new ostomy bag applied. Patient has eaten his meal and is sitting in the day area watching tv with peers. Ostomy had to be cut to size 40 per patient states stoma is swollen and usually only requires a 25 cut. Skin around stoma is red and irritated. Area dried well prior to application of new appliance.

## 2021-06-03 NOTE — CARE COORDINATION
Biopsychosocial Assessment Note    Social work met with patient to complete the biopsychosocial assessment and CSSR-S. Mental Status Exam: Pt alert and oriented x 4. Pt was irritable throughout the assessment. Pt's thought process appeared disorganized, speech rapid and pressured. Chief Complaint: Jorge Larkin pt is a 48 yr old white male who is pink slipped to the ED by [facility] due to non compliance and aggression. \"    Patient Report: Pt's last admission to this psychiatric unit was 5/23. Pt was admitted to this unit after being pink slipped by [facility] for non-compliance and aggression. Pt admits to multiple suicide attempts in his lifetime, but pt would not elaborate. Pt currently denied SI/ HI/ hallucinations/ delusions. Pt denied substance use. Pt denied trauma history. Gender  [x] Male [] Female [] Transgender  [] Other    Sexual Orientation    [x] Heterosexual [] Homosexual [] Bisexual [] Other    Suicidal Ideation  [x] Past [] Present [] Denies     Homicidal Ideation  [] Past [] Present [x] Denies     Hallucinations/Delusions (Specify type)  [] Reports [x] Denies     Substance Use/Alcohol Use/Addiction  [] Reports [x] Denies     Tobacco Use (within the last 6 months)  [x] Reports [] Denies     Trauma History  [] Reports [x] Denies     Collateral Contact (ILA signed)  Name: Emmanuelle Nelson  Relationship: Wife  Number: [phone]    Collateral Information: Sw spoke with pt's wife, Claude Cha states that she has no general concerns for this pt, and that he can return home with her at discharge.  No weapons in the home per [facility].        Access to Weapons per Collateral Contact: [] Reports [x] Denies       Follow up provider preference: [facility]      Plan for discharge  Location (where do they plan on discharging to?): Home with wife    Transportation (who will pick them up at discharge?) Wife    Medications (will they have money for copays at discharge?): Pt or wife

## 2021-06-03 NOTE — PROGRESS NOTES
[facility]  Initial Interdisciplinary Treatment Plan NOTE    Review Date & Time: 6/3/2021    Patient was in treatment team    Admission Type:   Admission Type:  Involuntary    Reason for admission:  Reason for Admission: i was not taking my meds      Estimated Length of Stay Update:  3-5 days  Estimated Discharge Date Update: 6/8/2021    PATIENT STRENGTHS:  Patient Strengths Strengths: Communication  Patient Strengths and Limitations:Limitations: Multiple barriers to leisure interests, Perceives need for assistance with self-care, Difficult relationships / poor social skills, Difficulty problem solving/relies on others to help solve problems  Addictive Behavior:Addictive Behavior  In the past 3 months, have you felt or has someone told you that you have a problem with:  : None  Do you have a history of Chemical Use?: No  Do you have a history of Alcohol Use?: No  Do you have a history of Street Drug Abuse?: No  Histroy of Prescripton Drug Abuse?: No  Medical Problems:  Past Medical History:   Diagnosis Date    ADD (attention deficit disorder)     Anxiety     Bipolar 1 disorder ([facility] 11/19/2017    Cancer ([facility] prostate cancer    2014 / treated with surgery    Crohn's disease ([facility]     Depression     GERD (gastroesophageal reflux disease)     Panic attack     Rectal pain     has a fissure    Schizo affective schizophrenia ([facility]     stable       EDUCATION:   Learner Progress Toward Treatment Goals: Reviewed results and recommendations of this team, Reviewed group plan and strategies, Reviewed signs, symptoms and risk of self harm and violent behavior and Reviewed goals and plan of care    Method: Small group    Outcome: Verbalized understanding and Needs reinforcement    PATIENT GOALS:  No goal set    PLAN/TREATMENT RECOMMENDATIONS UPDATE: will support patient in setting and obtaining both short term and long term goals while on the unit    GOALS UPDATE:   Time frame for Short-Term Goals: 1-3 beto Amos RN

## 2021-06-03 NOTE — ED NOTES
The pt was accepted to [facility] bed 7523. Disposition called to [facility] in admitting.      [facility]  06/02/21 3608
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
## 2021-06-03 NOTE — PROGRESS NOTES
Pt called staff and asked for a new colostomy bag as his was leaking and he needed it changed as well as a new gown and pants. Upon request, items were brought. Pt informed that he is able bodied and does his own colostomy care at home and therefore he could do the same while here. He was given his bag from home, which did not fit. Pt became agitated and irate and began yelling at and [address] [facility] staff. Pt then began throwing feces in room as well as bathroom. Pt does have a roommate. Pt roommate is afraid to go into the room. JACKLYN Rubi was called and informed of situation. Geodon 20 mg IM was ordered and given without incident. Pt also ordered Benadryl 50, Ativan 2 and Haldol 5 mg Q6 prn. Pt is also going to be transferred to [facility], which is a private room after he is finished with his shower. Pt informed and is agreeable to transfer. We will continue to provide support and comfort for the patient.

## 2021-06-03 NOTE — PROGRESS NOTES
`[facility] [place]  Admission Note     Admission Type:   Admission Type:  Involuntary    Reason for admission:  Reason for Admission: i was not taking my meds    PATIENT STRENGTHS:  Strengths: Communication    Patient Strengths and Limitations:  Limitations: Multiple barriers to leisure interests, Perceives need for assistance with self-care, Difficult relationships / poor social skills, Difficulty problem solving/relies on others to help solve problems    Addictive Behavior:   Addictive Behavior  In the past 3 months, have you felt or has someone told you that you have a problem with:  : None  Do you have a history of Chemical Use?: No  Do you have a history of Alcohol Use?: No  Do you have a history of Street Drug Abuse?: No  Histroy of Prescripton Drug Abuse?: No    Medical Problems:   Past Medical History:   Diagnosis Date    ADD (attention deficit disorder)     Anxiety     Bipolar 1 disorder ([facility] 11/19/2017    Cancer ([facility] prostate cancer    2014 / treated with surgery    Crohn's disease ([facility]     Depression     GERD (gastroesophageal reflux disease)     Panic attack     Rectal pain     has a fissure    Schizo affective schizophrenia ([facility]     stable       Status EXAM:  Status and Exam  Normal: No  Facial Expression: Exaggerated, Flat  Affect: Blunt  Level of Consciousness: Alert  Mood:Normal: No  Mood: Angry, Irritable  Motor Activity:Normal: No  Motor Activity: Agitated  Interview Behavior: Irritable  Preception: [place] to Person, [place] to Time, [place] to Place  Attention:Normal: No  Attention: Unable to Concentrate  Thought Processes: Other(See comment)  Thought Content:Normal: No  Thought Content: Preoccupations  Hallucinations: None  Delusions: No  Memory:Normal: No  Memory: Confabulation  Insight and Judgment: No  Insight and Judgment: Poor Judgment, Poor Insight  Present Suicidal Ideation: No  Present Homicidal Ideation: No    Tobacco Screening:  Practical Counseling, on admission, cherie X, if applicable and completed (first 3 are required if patient doesn't refuse):            (X)  Recognizing danger situations (included triggers and roadblocks)                    (X)  Coping skills (new ways to manage stress, exercise, relaxation techniques, changing routine, distraction)                                                           (X)  Basic information about quitting (benefits of quitting, techniques in how to quit, available resources  ( ) Referral for counseling faxed to [facility]                                           ( ) Patient refused counseling  ( ) Patient has not smoked in the last 30 days    Metabolic Screening:    Lab Results   Component Value Date    LABA1C 5.7 (H) 04/24/2021       Lab Results   Component Value Date    CHOL 165 04/24/2021    CHOL 320 (H) 07/22/2019    CHOL 188 10/08/2018     Lab Results   Component Value Date    TRIG 251 (H) 04/24/2021    TRIG 148 07/22/2019    TRIG 171 (H) 10/08/2018     Lab Results   Component Value Date    HDL 43 04/24/2021    HDL 46 07/22/2019    HDL 52 10/08/2018    HDL 67 06/27/2018     No components found for: LDLCAL  Lab Results   Component Value Date    LABVLDL 50 04/24/2021    LABVLDL 30 07/22/2019    LABVLDL 34 10/08/2018    LABVLDL 58 06/27/2018         Body mass index is 24.87 kg/m². BP Readings from Last 2 Encounters:   06/03/21 115/68   05/31/21 116/83           Pt admitted with followings belongings:  Dentures: None  Vision - Corrective Lenses: Glasses  Hearing Aid: None  Jewelry: None  Body Piercings Removed: N/A  Clothing: Footwear, Pants, Sweater, Socks  Were All Patient Medications Collected?: Not Applicable  Other Valuables: Other (Comment) (ostomy supplies)     Valuables sent home with here in locker. Patient oriented to surroundings and program expectations and copy of patient rights given. Received admission packet:  yes. Consents reviewed, signed yes. Patient verbalize understanding:  yes.     Patient education on precautions: yes                   Fayrene Picking, RN

## 2021-06-03 NOTE — ED NOTES
Report called to receiving floor.   Once room switches are made pt will be sent up     Minerva Schuster RN  06/03/21 1331

## 2021-06-03 NOTE — PROGRESS NOTES
Patient refusing admission assessment at this time. Patient resting quietly in bed with eyes closed. Will attempt at a later time.

## 2021-06-03 NOTE — ED NOTES
Pt medicated subdued Jessica rn and this rn changed pts ostomy bag at this time     Srinath Pitch, [employer]  06/02/21 2034

## 2021-06-03 NOTE — GROUP NOTE
Group Therapy Note    Date: 6/3/2021    Group Start Time: 1400  Group End Time: 1440  Group Topic: Cognitive Skills    [facility]    [facility] [address] [address]        Group Therapy Note    Attendees: 13         Patient's Goal: Pt will be able to verbalize and understand how to resolve conflict in their everyday life with friends and family. Notes:  Pt participated in group and made connections. Status After Intervention:  Unchanged    Participation Level:  Active Listener    Participation Quality: Appropriate and Attentive      Speech:  normal      Thought Process/Content: Logical      Affective Functioning: Congruent      Mood: depressed      Level of consciousness:  Alert and Oriented x4      Response to Learning: Able to retain information      Endings: None Reported    Modes of Intervention: Education, Support, Socialization, Exploration and Clarifying      Discipline Responsible: /Counselor      Signature:  [facility] [address]

## 2021-06-03 NOTE — ED NOTES
Spoke with Dr. Jeremy Adan who accepted pt for inpatient admission on condition that patient remain calm and cooperative and be given depakote and ativan. Requested that patient be monitored for two hours after giving meds prior to going to floor.       Nay Jackman RN  06/03/21 [id number]

## 2021-06-04 ENCOUNTER — HOSPITAL ENCOUNTER (OUTPATIENT)
Dept: PSYCHIATRY | Age: 56
Setting detail: THERAPIES SERIES
Discharge: HOME | End: 2021-06-04
Payer: MEDICARE

## 2021-06-04 PROCEDURE — 99232 SBSQ HOSP IP/OBS MODERATE 35: CPT | Performed by: NURSE PRACTITIONER

## 2021-06-04 PROCEDURE — 6370000000 HC RX 637 (ALT 250 FOR IP): Performed by: PSYCHIATRY & NEUROLOGY

## 2021-06-04 PROCEDURE — 6370000000 HC RX 637 (ALT 250 FOR IP): Performed by: NURSE PRACTITIONER

## 2021-06-04 PROCEDURE — 1240000000 HC EMOTIONAL WELLNESS R&B

## 2021-06-04 RX ADMIN — BENZTROPINE MESYLATE 1 MG: 1 TABLET ORAL at 21:57

## 2021-06-04 RX ADMIN — PRAZOSIN HYDROCHLORIDE 2 MG: 2 CAPSULE ORAL at 09:32

## 2021-06-04 RX ADMIN — ATORVASTATIN CALCIUM 10 MG: 10 TABLET, FILM COATED ORAL at 09:32

## 2021-06-04 RX ADMIN — Medication 6 MG: at 21:57

## 2021-06-04 RX ADMIN — TRAZODONE HYDROCHLORIDE 50 MG: 50 TABLET ORAL at 21:57

## 2021-06-04 RX ADMIN — ACETAMINOPHEN 650 MG: 325 TABLET ORAL at 09:33

## 2021-06-04 RX ADMIN — BENZTROPINE MESYLATE 1 MG: 1 TABLET ORAL at 09:32

## 2021-06-04 RX ADMIN — PALIPERIDONE 3 MG: 6 TABLET, EXTENDED RELEASE ORAL at 21:57

## 2021-06-04 RX ADMIN — PALIPERIDONE 6 MG: 6 TABLET, EXTENDED RELEASE ORAL at 09:33

## 2021-06-04 RX ADMIN — DIVALPROEX SODIUM 500 MG: 250 TABLET, DELAYED RELEASE ORAL at 21:57

## 2021-06-04 RX ADMIN — DIVALPROEX SODIUM 500 MG: 250 TABLET, DELAYED RELEASE ORAL at 09:32

## 2021-06-04 RX ADMIN — PRAZOSIN HYDROCHLORIDE 2 MG: 2 CAPSULE ORAL at 21:57

## 2021-06-04 ASSESSMENT — PAIN SCALES - GENERAL
PAINLEVEL_OUTOF10: 9
PAINLEVEL_OUTOF10: 0
PAINLEVEL_OUTOF10: 0

## 2021-06-04 NOTE — PROGRESS NOTES
BEHAVIORAL HEALTH FOLLOW-UP NOTE     6/4/2021     Patient was seen and examined in person, Chart reviewed   Patient's case discussed with staff/team    Chief Complaint: \" I am okay today. \"    Interim History: Patient seen in his room he has limited insight and judgment into the circumstances regarding his hospitalization. He states he is doing [place]. \"  He denies SI/HI intent or plan denies any auditory or visual hallucinations. Staff reports he is irritable and easily agitated.       Appetite:   [x] Normal/Unchanged  [] Increased  [] Decreased      Sleep:       [x] Normal/Unchanged  [] Fair       [] Poor              Energy:    [x] Normal/Unchanged  [] Increased  [] Decreased        SI [] Present  [x] Absent    HI  []Present  [x] Absent     Aggression:  [] yes  [x] no    Patient is [x] able  [] unable to CONTRACT FOR SAFETY     PAST MEDICAL/PSYCHIATRIC HISTORY:   Past Medical History:   Diagnosis Date    ADD (attention deficit disorder)     Anxiety     Bipolar 1 disorder ([facility] 11/19/2017    Cancer ([facility] prostate cancer    2014 / treated with surgery    Crohn's disease ([facility]     Depression     GERD (gastroesophageal reflux disease)     Panic attack     Rectal pain     has a fissure    Schizo affective schizophrenia ([facility]     stable       FAMILY/SOCIAL HISTORY:  Family History   Problem Relation Age of Onset    Mental Illness Mother     Mental Illness Father     Mental Illness Sister     Mental Illness Brother     Heart Disease Mother     Depression Father      Social History     Socioeconomic History    Marital status:      Spouse name: Maurice Steele Number of children: 0    Years of education: 12 +8    Highest education level: Not on file   Occupational History    Occupation: [place]     Employer: UNEMPLOYED     Comment: SSDI   Tobacco Use    Smoking status: Former Smoker     Packs/day: 2.00     Years: 41.00     Pack years: 82.00     Types: Cigarettes     Start date: 1978     Quit date: 2021     Years since quittin.1    Smokeless tobacco: Never Used   Vaping Use    Vaping Use: Never used   Substance and Sexual Activity    Alcohol use: No     Comment: no alcohol in5 yrs    Drug use: No     Comment: last use  / marijuana    Sexual activity: Not Currently     Partners: Female   Other Topics Concern    Not on file   Social History Narrative    ** Merged History Encounter **          Social Determinants of Health     Financial Resource Strain:     Difficulty of Paying Living Expenses:    Food Insecurity:     Worried About Running Out of Food in the Last Year:     Ran Out of Food in the Last Year:    Transportation Needs:     Lack of Transportation (Medical):  Lack of Transportation (Non-Medical):    Physical Activity:     Days of Exercise per Week:     Minutes of Exercise per Session:    Stress:     Feeling of Stress :    Social Connections:     Frequency of Communication with Friends and Family:     Frequency of Social Gatherings with Friends and Family:     Attends [employer] Services:     Active Member of Clubs or Organizations:     Attends Club or Organization Meetings:     Marital Status:    Intimate Partner Violence:     Fear of Current or Ex-Partner:     Emotionally Abused:     Physically Abused:     Sexually Abused:            ROS:  [x] All negative/unchanged except if checked.  Explain positive(checked items) below:  [] Constitutional  [] Eyes  [] Ear/Nose/Mouth/Throat  [] Respiratory  [] CV  [] GI  []   [] Musculoskeletal  [] Skin/Breast  [] Neurological  [] Endocrine  [] Heme/Lymph  [] Allergic/Immunologic    Explanation:     MEDICATIONS:    Current Facility-Administered Medications:     acetaminophen (TYLENOL) tablet 650 mg, 650 mg, Oral, Q6H PRN, Percilla Phalen, MD, 650 mg at 21 0990    magnesium hydroxide (MILK OF MAGNESIA) 400 MG/5ML suspension 30 mL, 30 mL, Oral, Daily PRN, Percilla Phalen, MD    aluminum & magnesium hydroxide-simethicone (MAALOX) 200-200-20 MG/5ML suspension 30 mL, 30 mL, Oral, PRN, Ila Barone MD    haloperidol (HALDOL) tablet 5 mg, 5 mg, Oral, Q6H PRN **OR** haloperidol lactate (HALDOL) injection 5 mg, 5 mg, Intramuscular, Q6H PRN, Ila Barone MD    atorvastatin (LIPITOR) tablet 10 mg, 10 mg, Oral, Daily, Ronney Dance, APRN - CNP, 10 mg at 06/04/21 0932    benztropine (COGENTIN) tablet 1 mg, 1 mg, Oral, BID, Harvey Burgosce, APRN - CNP, 1 mg at 06/04/21 0932    divalproex (DEPAKOTE) DR tablet 500 mg, 500 mg, Oral, BID, Harvey Dance, APRN - CNP, 500 mg at 06/04/21 0932    melatonin tablet 6 mg, 6 mg, Oral, Nightly, Harvey Burgosce, APRN - CNP, 6 mg at 06/03/21 2040    prazosin (MINIPRESS) capsule 2 mg, 2 mg, Oral, BID, Harvey Burgosce, APRN - CNP, 2 mg at 06/04/21 0932    paliperidone (INVEGA) extended release tablet 3 mg, 3 mg, Oral, Nightly, Ronney Dance, APRN - CNP    paliperidone (INVEGA) extended release tablet 6 mg, 6 mg, Oral, Daily, Harvey Dance, APRN - CNP, 6 mg at 06/04/21 0933    diphenhydrAMINE (BENADRYL) injection 50 mg, 50 mg, Intramuscular, Q6H PRN, Ronney Dance, APRN - CNP    LORazepam (ATIVAN) injection 2 mg, 2 mg, Intramuscular, Q6H PRN, Harvey Burgosce, APRN - CNP    hydrOXYzine (VISTARIL) capsule 50 mg, 50 mg, Oral, TID PRN, Ila Barone MD    traZODone (DESYREL) tablet 50 mg, 50 mg, Oral, Nightly PRN, Harvey Burgosce, APRN - CNP, 50 mg at 06/03/21 2300      Examination:  BP (!) 132/90   Pulse 85   Temp 97 °F (36.1 °C) (Temporal)   Resp 18   Ht 6' 3.2\" (1.91 m)   Wt 200 lb (90.7 kg)   SpO2 97%   BMI 24.87 kg/m²   Gait - steady  Medication side effects(SE):     Mental Status Examination:    Level of consciousness:  within normal limits   Appearance:  fair grooming and fair hygiene  Behavior/Motor:  no abnormalities noted  Attitude toward examiner:  cooperative  Speech:  spontaneous, normal rate and normal volume   Mood: \" I am okay. \" Affect: Irritable easily agitated  Thought processes: Linear without flight of ideas loose associations  Thought content: Devoid any auditory visual hallucinations delusions or any other perceptual abnormalities. Denies SI/HI intent or plan  Cognition:  oriented to person, place, and time   Concentration intact  Insight poor   Judgement poor     ASSESSMENT:   Patient symptoms are:  [] Well controlled  [] Improving  [] Worsening  [x] No change      Diagnosis:   Principal Problem:    Severe manic bipolar 1 disorder with psychotic behavior ([facility]  Resolved Problems:    * No resolved hospital problems. *      LABS:    Recent Labs     06/02/21  1500   WBC 7.2   HGB 13.7        Recent Labs     06/02/21  1500      K 4.3      CO2 26   BUN 16   CREATININE 1.3*   GLUCOSE 105*     No results for input(s): BILITOT, ALKPHOS, AST, ALT in the last 72 hours. Lab Results   Component Value Date    LABAMPH NOT DETECTED 06/02/2021    LABAMPH NOT DETECTED 02/03/2011    BARBSCNU NOT DETECTED 06/02/2021    LABBENZ NOT DETECTED 06/02/2021    LABBENZ SEE BELOW 12/08/2014    CANNAB NOT DETECTED  08/13/2012    COCAINESCRN NOT DETECTED 08/13/2012    LABMETH NOT DETECTED 06/02/2021    OPIATESCREENURINE NOT DETECTED 06/02/2021    PHENCYCLIDINESCREENURINE NOT DETECTED 06/02/2021    PPXUR NOT DETECTED 08/13/2012    ETOH <10 06/02/2021     Lab Results   Component Value Date    TSH 2.240 04/23/2021     Lab Results   Component Value Date    LITHIUM <0.10 (L) 10/24/2018     Lab Results   Component Value Date    VALPROATE 31 (L) 06/02/2021           Treatment Plan:  Reviewed current Medications with the patient. Risks, benefits, side effects, drug-to-drug interactions and alternatives to treatment were discussed. Collateral information:   CD evaluation  Encourage patient to attend group and other milieu activities.   Discharge planning discussed with the patient and treatment team.    Continue Depakote 500 mg twice daily  Continue Invega 6 mg daily and 3 mg at bedtime    PSYCHOTHERAPY/COUNSELING:  [x] Therapeutic interview  [x] Supportive  [] CBT  [] Ongoing  [] Other    [x] Patient continues to need, on a daily basis, active treatment furnished directly by or requiring the supervision of inpatient psychiatric personnel      Anticipated Length of stay: 3 to 5 days based on stability            Electronically signed by KAM Martinez CNP on 8/7/6343 at 1:26 PM

## 2021-06-04 NOTE — PLAN OF CARE
Pt stayed up till 04:00 reading a book while lying in bed. Isolative. Maintained control of his behavior. Remains on close observation staggered q 15 min observation.

## 2021-06-04 NOTE — PROGRESS NOTES
Pt transferred from [address] [place] [address] to this unit. Pt oriented to his room. His behavior is appropriate and is pleasant and cooperative.

## 2021-06-04 NOTE — PROGRESS NOTES
Attended afternoon meet and greet. Updated on staffing and evening updates. Patient participated in group of movie and or trivia games. Patient 1 of 14 in attendance.

## 2021-06-05 PROCEDURE — 1240000000 HC EMOTIONAL WELLNESS R&B

## 2021-06-05 PROCEDURE — 99231 SBSQ HOSP IP/OBS SF/LOW 25: CPT | Performed by: NURSE PRACTITIONER

## 2021-06-05 PROCEDURE — 6370000000 HC RX 637 (ALT 250 FOR IP): Performed by: NURSE PRACTITIONER

## 2021-06-05 PROCEDURE — 6370000000 HC RX 637 (ALT 250 FOR IP): Performed by: PSYCHIATRY & NEUROLOGY

## 2021-06-05 RX ADMIN — ACETAMINOPHEN 650 MG: 325 TABLET ORAL at 09:08

## 2021-06-05 RX ADMIN — HYDROXYZINE PAMOATE 50 MG: 50 CAPSULE ORAL at 23:02

## 2021-06-05 RX ADMIN — PALIPERIDONE 6 MG: 6 TABLET, EXTENDED RELEASE ORAL at 09:09

## 2021-06-05 RX ADMIN — NICOTINE POLACRILEX 4 MG: 2 GUM, CHEWING BUCCAL at 13:28

## 2021-06-05 RX ADMIN — NICOTINE POLACRILEX 4 MG: 2 GUM, CHEWING BUCCAL at 15:44

## 2021-06-05 RX ADMIN — PRAZOSIN HYDROCHLORIDE 2 MG: 2 CAPSULE ORAL at 09:09

## 2021-06-05 RX ADMIN — ATORVASTATIN CALCIUM 10 MG: 10 TABLET, FILM COATED ORAL at 09:09

## 2021-06-05 RX ADMIN — PALIPERIDONE 3 MG: 6 TABLET, EXTENDED RELEASE ORAL at 23:01

## 2021-06-05 RX ADMIN — PRAZOSIN HYDROCHLORIDE 2 MG: 2 CAPSULE ORAL at 23:01

## 2021-06-05 RX ADMIN — DIVALPROEX SODIUM 500 MG: 250 TABLET, DELAYED RELEASE ORAL at 23:01

## 2021-06-05 RX ADMIN — ACETAMINOPHEN 650 MG: 325 TABLET ORAL at 03:49

## 2021-06-05 RX ADMIN — TRAZODONE HYDROCHLORIDE 50 MG: 50 TABLET ORAL at 23:01

## 2021-06-05 RX ADMIN — BENZTROPINE MESYLATE 1 MG: 1 TABLET ORAL at 09:08

## 2021-06-05 RX ADMIN — BENZTROPINE MESYLATE 1 MG: 1 TABLET ORAL at 23:01

## 2021-06-05 RX ADMIN — Medication 6 MG: at 23:01

## 2021-06-05 RX ADMIN — DIVALPROEX SODIUM 500 MG: 250 TABLET, DELAYED RELEASE ORAL at 09:09

## 2021-06-05 ASSESSMENT — PAIN SCALES - GENERAL
PAINLEVEL_OUTOF10: 9
PAINLEVEL_OUTOF10: 9

## 2021-06-05 NOTE — PLAN OF CARE
Problem: Anger Management/Homicidal Ideation:  Goal: Able to display appropriate communication and problem solving  Description: Able to display appropriate communication and problem solving  Outcome: Ongoing  Goal: Ability to verbalize frustrations and anger appropriately will improve  Description: Ability to verbalize frustrations and anger appropriately will improve  Outcome: Ongoing  Goal: Absence of angry outbursts  Description: Absence of angry outbursts  Outcome: Ongoing  Goal: Absence of homicidal ideation  Description: Absence of homicidal ideation  Outcome: Ongoing  Goal: Participates in care planning  Description: Participates in care planning  Outcome: Ongoing  Goal: Patient specific goal  Description: Patient specific goal  Outcome: Ongoing     Problem: Altered Mood, Psychotic Behavior:  Goal: Able to demonstrate trust by eating, participating in treatment and following staff's direction  Description: Able to demonstrate trust by eating, participating in treatment and following staff's direction  Outcome: Ongoing  Goal: Able to verbalize decrease in frequency and intensity of hallucinations  Description: Able to verbalize decrease in frequency and intensity of hallucinations  Outcome: Ongoing  Goal: Able to verbalize reality based thinking  Description: Able to verbalize reality based thinking  Outcome: Ongoing  Goal: Absence of self-harm  Description: Absence of self-harm  Outcome: Ongoing  Goal: Ability to achieve adequate nutritional intake will improve  Description: Ability to achieve adequate nutritional intake will improve  Outcome: Ongoing  Goal: Ability to interact with others will improve  Description: Ability to interact with others will improve  Outcome: Ongoing  Goal: Compliance with prescribed medication regimen will improve  Description: Compliance with prescribed medication regimen will improve  Outcome: Ongoing  Goal: Patient specific goal  Description: Patient specific goal  Outcome:

## 2021-06-05 NOTE — PROGRESS NOTES
Group Therapy Note    Date: 6/5/2021  Start Time:9:45  End Time:  10:30  Number of Participants: 13    Type of Group: Psychoeducation    Wellness Binder Information  Module Name: Goal setting  Session Number:  NA    Patient's Goal: To id positive goals in ones life to achieve to improve wellness recovery. Notes: Attended group and was able to participate      Status After Intervention:  Improved    Participation Level:  Active Listener and Interactive    Participation Quality: Attentive and Sharing      Speech:  hesitant      Thought Process/Content: Linear      Affective Functioning: Flat      Mood: depressed      Level of consciousness:  Alert      Response to Learning: Progressing to goal      Endings: None Reported    Modes of Intervention: Education      Discipline Responsible: Psychoeducational Specialist      Signature:  SUSHANT Long

## 2021-06-05 NOTE — PROGRESS NOTES
Pt woke for c/o stoma pain and headache. Tylenol given. Pt denies SI, HI, and AVH. New ostomy bags ordered for pt. Stoma is reddened and herniated, protruding approx 4 inches into the bag. Pt returned to his room.  Will continue to monitor

## 2021-06-05 NOTE — PROGRESS NOTES
BEHAVIORAL HEALTH FOLLOW-UP NOTE     6/5/2021     Patient was seen and examined in person, Chart reviewed   Patient's case discussed with staff/team  Personally assessed the patient this morning and agree with the below statement by the medical student. Mental status examination revealed a 56-year-old tall well-built well-nourished  man in hospital gown very manic but very respectful pleasant.  Psychomotor revealed increased agitation.  Eye contact was good.  Speech was rapid pressured.  Mood \"I am doing fine but I did a mistake DrConner\" Affect is hyper impulsive little euphoric.  Thought process with some flights of ideas.  Thought contents are devoid of any auditory visual hallucination delusion or any other perceptual abnormalities.  She currently regrets what he did. Ruba Jackman denies suicidal homicidal thought not impulse control poor.  Cognitive function is at baseline.  Alert and oriented pleasant person. Chief Complaint: Manic    Interim History: Patient seen this morning. He was out socializing with peers. He states \"I feel good. \" He says his energy level is the same but has not been sleeping as he feels more productive at night. He states the medications are working well for him. He denies SI/HI intent or plan. He denies any auditory or visual hallucinations.         Appetite:   [x] Normal/Unchanged  [] Increased  [] Decreased      Sleep:       [x] Normal/Unchanged  [] Fair       [] Poor              Energy:    [x] Normal/Unchanged  [] Increased  [] Decreased        SI [] Present  [x] Absent    HI  []Present  [x] Absent     Aggression:  [] yes  [x] no    Patient is [x] able  [] unable to CONTRACT FOR SAFETY     PAST MEDICAL/PSYCHIATRIC HISTORY:   Past Medical History:   Diagnosis Date    ADD (attention deficit disorder)     Anxiety     [facility] 11/19/2017    Cancer ([facility] prostate cancer    2014 / treated with surgery    Crohn's disease ([facility]     Depression     GERD (gastroesophageal reflux disease)     Panic attack     Rectal pain     has a fissure    Schizo affective schizophrenia ([address]     stable       FAMILY/SOCIAL HISTORY:  Family History   Problem Relation Age of Onset    Mental Illness Mother     Mental Illness Father     Mental Illness Sister     Mental Illness Brother     Heart Disease Mother     Depression Father      Social History     Socioeconomic History    Marital status:      Spouse name: Coco Child Number of children: 0    Years of education: 12 +8    Highest education level: Not on file   Occupational History    Occupation: [employer]     Employer: UNEMPLOYED     Comment: SSDI   Tobacco Use    Smoking status: Former Smoker     Packs/day: 2.00     Years: 41.00     Pack years: 82.00     Types: Cigarettes     Start date: 1978     Quit date: 2021     Years since quittin.1    Smokeless tobacco: Never Used   Vaping Use    Vaping Use: Never used   Substance and Sexual Activity    Alcohol use: No     Comment: no alcohol in5 yrs    Drug use: No     Comment: last use 1997 / marijuana    Sexual activity: Not Currently     Partners: Female   Other Topics Concern    Not on file   Social History Narrative    ** Merged History Encounter **          Social Determinants of Health     Financial Resource Strain:     Difficulty of Paying Living Expenses:    Food Insecurity:     Worried About Running Out of Food in the Last Year:     Ran Out of Food in the Last Year:    Transportation Needs:     Lack of Transportation (Medical):      Lack of Transportation (Non-Medical):    Physical Activity:     Days of Exercise per Week:     Minutes of Exercise per Session:    Stress:     Feeling of Stress :    Social Connections:     Frequency of Communication with Friends and Family:     Frequency of Social Gatherings with Friends and Family:     Attends Spiritism Services:     Active Member of Clubs or Organizations:  Attends Club or Organization Meetings:     Marital Status:    Intimate Partner Violence:     Fear of Current or Ex-Partner:     Emotionally Abused:     Physically Abused:     Sexually Abused:            ROS:  [x] All negative/unchanged except if checked.  Explain positive(checked items) below:  [] Constitutional  [] Eyes  [] Ear/Nose/Mouth/Throat  [] Respiratory  [] CV  [] GI  []   [] Musculoskeletal  [] Skin/Breast  [] Neurological  [] Endocrine  [] Heme/Lymph  [] Allergic/Immunologic    Explanation:     MEDICATIONS:    Current Facility-Administered Medications:     acetaminophen (TYLENOL) tablet 650 mg, 650 mg, Oral, Q6H PRN, Leela Steele MD, 650 mg at 06/05/21 0908    magnesium hydroxide (MILK OF MAGNESIA) 400 MG/5ML suspension 30 mL, 30 mL, Oral, Daily PRN, Leela Steele MD    aluminum & magnesium hydroxide-simethicone (MAALOX) 200-200-20 MG/5ML suspension 30 mL, 30 mL, Oral, PRN, Leela Steele MD    haloperidol (HALDOL) tablet 5 mg, 5 mg, Oral, Q6H PRN **OR** haloperidol lactate (HALDOL) injection 5 mg, 5 mg, Intramuscular, Q6H PRN, Leela Steele MD    atorvastatin (LIPITOR) tablet 10 mg, 10 mg, Oral, Daily, Rosi Emanuels, APRN - CNP, 10 mg at 06/05/21 2882    benztropine (COGENTIN) tablet 1 mg, 1 mg, Oral, BID, Rosi Emanuels, APRN - CNP, 1 mg at 06/05/21 0908    divalproex (DEPAKOTE) DR tablet 500 mg, 500 mg, Oral, BID, Rosi Chiles, APRN - CNP, 500 mg at 06/05/21 0909    melatonin tablet 6 mg, 6 mg, Oral, Nightly, Westbrook Chiles, APRN - CNP, 6 mg at 06/04/21 2157    prazosin (MINIPRESS) capsule 2 mg, 2 mg, Oral, BID, Rosi Emanuels, APRN - CNP, 2 mg at 06/05/21 5030    paliperidone (INVEGA) extended release tablet 3 mg, 3 mg, Oral, Nightly, Westbrook Emanuels, APRN - CNP, 3 mg at 06/04/21 2157    paliperidone (INVEGA) extended release tablet 6 mg, 6 mg, Oral, Daily, Rosi Logan, APRN - CNP, 6 mg at 06/05/21 0909    diphenhydrAMINE (BENADRYL) injection 50 mg, 50 mg, Intramuscular, Q6H PRN, Ronney Dance, APRN - CNP    LORazepam (ATIVAN) injection 2 mg, 2 mg, Intramuscular, Q6H PRN, Ronney Dance, APRN - CNP    hydrOXYzine (VISTARIL) capsule 50 mg, 50 mg, Oral, TID PRN, Ila Barone MD    traZODone (DESYREL) tablet 50 mg, 50 mg, Oral, Nightly PRN, Ronney Dance, APRN - CNP, 50 mg at 06/04/21 1959      Examination:  /80   Pulse 98   Temp 98 °F (36.7 °C)   Resp 15   Ht 6' 3.2\" (1.91 m)   Wt 200 lb (90.7 kg)   SpO2 98%   BMI 24.87 kg/m²   Gait - steady  Medication side effects(SE):     Mental Status Examination:    Level of consciousness:  within normal limits   Appearance:  fair grooming and fair hygiene  Behavior/Motor:  no abnormalities noted  Attitude toward examiner:  cooperative  Speech:  spontaneous, normal rate and normal volume   Mood: \" I feel good. \"  Affect: Mood congruent, appropriate. Thought processes: Linear without flight of ideas loose associations  Thought content: Devoid any auditory visual hallucinations delusions or any other perceptual abnormalities. Denies SI/HI intent or plan  Cognition:  oriented to person, place, and time   Concentration intact  Insight poor   Judgement poor     ASSESSMENT:   Patient symptoms are:  [] Well controlled  [] Improving  [] Worsening  [x] No change      Diagnosis:   Principal Problem:    Severe manic bipolar 1 disorder with psychotic behavior ([facility]  Resolved Problems:    * No resolved hospital problems. *      LABS:    Recent Labs     06/02/21  1500   WBC 7.2   HGB 13.7        Recent Labs     06/02/21  1500      K 4.3      CO2 26   BUN 16   CREATININE 1.3*   GLUCOSE 105*     No results for input(s): BILITOT, ALKPHOS, AST, ALT in the last 72 hours.   Lab Results   Component Value Date    LABAMPH NOT DETECTED 06/02/2021    LABAMPH NOT DETECTED 02/03/2011    BARBSCNU NOT DETECTED 06/02/2021    LABBENZ NOT DETECTED 06/02/2021    LABBENZ SEE BELOW 12/08/2014    CANNAB NOT DETECTED

## 2021-06-05 NOTE — PROGRESS NOTES
Pt alert, irritable, and cooperative. Pt states he was upset earlier about his stoma and ostomy. States his bag \"blew off\" and nobody would help him, There was a consult put in, but per report, they havent come up yet. Pt denies SI, HI, and AVH. States he needs a higher dose of his Haldol. But \"nobody will give it to me. Dahlia been on 30 mg for 20 years\" Pt did state that \"whatever they are giving me is kind helping\". Pt is currently watching tv with peers. Will print med info for pt as well. Denies any needs at this time. Will continue to monitor and will attempt to get ostomy supplies tonight for pt.

## 2021-06-06 LAB — VALPROIC ACID LEVEL: 47 MCG/ML (ref 50–100)

## 2021-06-06 PROCEDURE — 6370000000 HC RX 637 (ALT 250 FOR IP): Performed by: PSYCHIATRY & NEUROLOGY

## 2021-06-06 PROCEDURE — 36415 COLL VENOUS BLD VENIPUNCTURE: CPT

## 2021-06-06 PROCEDURE — 1240000000 HC EMOTIONAL WELLNESS R&B

## 2021-06-06 PROCEDURE — 6370000000 HC RX 637 (ALT 250 FOR IP): Performed by: NURSE PRACTITIONER

## 2021-06-06 PROCEDURE — 80164 ASSAY DIPROPYLACETIC ACD TOT: CPT

## 2021-06-06 PROCEDURE — 99231 SBSQ HOSP IP/OBS SF/LOW 25: CPT | Performed by: NURSE PRACTITIONER

## 2021-06-06 RX ADMIN — TRAZODONE HYDROCHLORIDE 50 MG: 50 TABLET ORAL at 23:12

## 2021-06-06 RX ADMIN — PALIPERIDONE 6 MG: 6 TABLET, EXTENDED RELEASE ORAL at 09:25

## 2021-06-06 RX ADMIN — PRAZOSIN HYDROCHLORIDE 2 MG: 2 CAPSULE ORAL at 23:13

## 2021-06-06 RX ADMIN — NICOTINE POLACRILEX 4 MG: 2 GUM, CHEWING BUCCAL at 17:01

## 2021-06-06 RX ADMIN — PALIPERIDONE 3 MG: 6 TABLET, EXTENDED RELEASE ORAL at 23:12

## 2021-06-06 RX ADMIN — BENZTROPINE MESYLATE 1 MG: 1 TABLET ORAL at 23:14

## 2021-06-06 RX ADMIN — BENZTROPINE MESYLATE 1 MG: 1 TABLET ORAL at 09:25

## 2021-06-06 RX ADMIN — ATORVASTATIN CALCIUM 10 MG: 10 TABLET, FILM COATED ORAL at 09:25

## 2021-06-06 RX ADMIN — DIVALPROEX SODIUM 500 MG: 250 TABLET, DELAYED RELEASE ORAL at 09:25

## 2021-06-06 RX ADMIN — PRAZOSIN HYDROCHLORIDE 2 MG: 2 CAPSULE ORAL at 09:25

## 2021-06-06 RX ADMIN — DIVALPROEX SODIUM 500 MG: 250 TABLET, DELAYED RELEASE ORAL at 23:14

## 2021-06-06 RX ADMIN — HYDROXYZINE PAMOATE 50 MG: 50 CAPSULE ORAL at 23:12

## 2021-06-06 RX ADMIN — Medication 6 MG: at 23:12

## 2021-06-06 NOTE — PROGRESS NOTES
BEHAVIORAL HEALTH FOLLOW-UP NOTE     6/6/2021     Patient was seen and examined in person, Chart reviewed   Patient's case discussed with staff/team  Personally assessed the patient this morning and agree with the below statement by the medical student. Mental status examination revealed a 70-year-old tall well-built well-nourished  man in hospital gown very manic but very respectful pleasant.  Psychomotor revealed increased agitation.  Eye contact was good.  Speech was rapid pressured.  Mood \"I am doing fine but I did a mistake DrConner\" Affect is hyper impulsive little euphoric.  Thought process with some flights of ideas.  Thought contents are devoid of any auditory visual hallucination delusion or any other perceptual abnormalities.  She currently regrets what he did. Adrián Bah denies suicidal homicidal thought not impulse control poor.  Cognitive function is at baseline.  Alert and oriented pleasant person. Chief Complaint: \"I feel ready to go\"    Interim History: Patient seen this morning. He was resting in bed. He states \"I feel ready to go\" Good appetite, sleep, and energy. He states he's \"surpised by how well the meds are working. I usually have some complaint or side effect, but these are good. \" Started talking about how he needs to get out of the hospital to care for his wife who has dementia and \"lost her car\"    He denies SI/HI intent or plan. He denies any auditory or visual hallucinations.         Appetite:   [x] Normal/Unchanged  [] Increased  [] Decreased      Sleep:       [x] Normal/Unchanged  [] Fair       [] Poor              Energy:    [x] Normal/Unchanged  [] Increased  [] Decreased        SI [] Present  [x] Absent    HI  []Present  [x] Absent     Aggression:  [] yes  [x] no    Patient is [x] able  [] unable to CONTRACT FOR SAFETY     PAST MEDICAL/PSYCHIATRIC HISTORY:   Past Medical History:   Diagnosis Date    ADD (attention deficit disorder)     Anxiety     Bipolar 1 disorder Frequency of Social Gatherings with Friends and Family:     Attends Hindu Services:     Active Member of Clubs or Organizations:     Attends Club or Organization Meetings:     Marital Status:    Intimate Partner Violence:     Fear of Current or Ex-Partner:     Emotionally Abused:     Physically Abused:     Sexually Abused:            ROS:  [x] All negative/unchanged except if checked.  Explain positive(checked items) below:  [] Constitutional  [] Eyes  [] Ear/Nose/Mouth/Throat  [] Respiratory  [] CV  [] GI  []   [] Musculoskeletal  [] Skin/Breast  [] Neurological  [] Endocrine  [] Heme/Lymph  [] Allergic/Immunologic    Explanation:     MEDICATIONS:    Current Facility-Administered Medications:     nicotine polacrilex (NICORETTE) gum 4 mg, 4 mg, Oral, PRN, Pinky Sharma MD, 4 mg at 06/05/21 1544    acetaminophen (TYLENOL) tablet 650 mg, 650 mg, Oral, Q6H PRN, Pinky Sharma MD, 650 mg at 06/05/21 0908    magnesium hydroxide (MILK OF MAGNESIA) 400 MG/5ML suspension 30 mL, 30 mL, Oral, Daily PRN, Pinky Sharma MD    aluminum & magnesium hydroxide-simethicone (MAALOX) 200-200-20 MG/5ML suspension 30 mL, 30 mL, Oral, PRN, Pinky Sharma MD    haloperidol (HALDOL) tablet 5 mg, 5 mg, Oral, Q6H PRN **OR** haloperidol lactate (HALDOL) injection 5 mg, 5 mg, Intramuscular, Q6H PRN, Pinky Sharma MD    atorvastatin (LIPITOR) tablet 10 mg, 10 mg, Oral, Daily, Su Trey, APRN - CNP, 10 mg at 06/06/21 4742    benztropine (COGENTIN) tablet 1 mg, 1 mg, Oral, BID, Su [place], APRN - CNP, 1 mg at 06/06/21 8996    divalproex (DEPAKOTE) DR tablet 500 mg, 500 mg, Oral, BID, Su [place], APRN - CNP, 500 mg at 06/06/21 0925    melatonin tablet 6 mg, 6 mg, Oral, Nightly, Su Trey, APRN - CNP, 6 mg at 06/05/21 2301    prazosin (MINIPRESS) capsule 2 mg, 2 mg, Oral, BID, Georges Yang, APRN - CNP, 2 mg at 06/06/21 0925    paliperidone (INVEGA) extended release tablet 3 mg, 3 mg, Oral, Nightly, Alvera Vianey, APRN - CNP, 3 mg at 06/05/21 2301    paliperidone (INVEGA) extended release tablet 6 mg, 6 mg, Oral, Daily, Alvera Vianey, APRN - CNP, 6 mg at 06/06/21 0925    diphenhydrAMINE (BENADRYL) injection 50 mg, 50 mg, Intramuscular, Q6H PRN, Alvera Vianey, APRN - CNP    LORazepam (ATIVAN) injection 2 mg, 2 mg, Intramuscular, Q6H PRN, Alvera Vianey, APRN - CNP    hydrOXYzine (VISTARIL) capsule 50 mg, 50 mg, Oral, TID PRN, Siria Parson MD, 50 mg at 06/05/21 2302    traZODone (DESYREL) tablet 50 mg, 50 mg, Oral, Nightly PRN, Alvera Vianey, APRN - CNP, 50 mg at 06/05/21 2301      Examination:  BP (!) 143/87   Pulse 75   Temp 98.6 °F (37 °C)   Resp 14   Ht 6' 3.2\" (1.91 m)   Wt 200 lb (90.7 kg)   SpO2 98%   BMI 24.87 kg/m²   Gait - steady  Medication side effects(SE):     Mental Status Examination:    Level of consciousness:  within normal limits   Appearance:  fair grooming and fair hygiene  Behavior/Motor:  no abnormalities noted  Attitude toward examiner:  cooperative  Speech:  spontaneous, hyper-verbal, rapid   Mood: \" I feel good\"  Affect: Mood congruent, appropriate. Thought processes: Linear without flight of ideas loose associations  Thought content: Devoid any auditory visual hallucinations delusions or any other perceptual abnormalities. Denies SI/HI intent or plan  Cognition:  oriented to person, place, and time   Concentration intact  Insight poor   Judgement poor     ASSESSMENT:   Patient symptoms are:  [] Well controlled  [x] Improving  [] Worsening  [] No change      Diagnosis:   Principal Problem:    Severe manic bipolar 1 disorder with psychotic behavior ([facility] [place] [address]  Resolved Problems:    * No resolved hospital problems. *      LABS:    No results for input(s): WBC, HGB, PLT in the last 72 hours. No results for input(s): NA, K, CL, CO2, BUN, CREATININE, GLUCOSE in the last 72 hours. No results for input(s): BILITOT, ALKPHOS, AST, ALT in the last 72 hours.   Lab Results   Component Value Date    LABAMPH NOT DETECTED 06/02/2021    LABAMPH NOT DETECTED 02/03/2011    BARBSCNU NOT DETECTED 06/02/2021    LABBENZ NOT DETECTED 06/02/2021    LABBENZ SEE BELOW 12/08/2014    CANNAB NOT DETECTED  08/13/2012    COCAINESCRN NOT DETECTED 08/13/2012    LABMETH NOT DETECTED 06/02/2021    OPIATESCREENURINE NOT DETECTED 06/02/2021    PHENCYCLIDINESCREENURINE NOT DETECTED 06/02/2021    PPXUR NOT DETECTED 08/13/2012    ETOH <10 06/02/2021     Lab Results   Component Value Date    TSH 2.240 04/23/2021     Lab Results   Component Value Date    LITHIUM <0.10 (L) 10/24/2018     Lab Results   Component Value Date    VALPROATE 47 (L) 06/06/2021           Treatment Plan:  Reviewed current Medications with the patient. Risks, benefits, side effects, drug-to-drug interactions and alternatives to treatment were discussed. Collateral information:   CD evaluation  Encourage patient to attend group and other milieu activities. Discharge planning discussed with the patient and treatment team.    Continue Depakote 500 mg twice daily  Continue Invega 6 mg daily and 3 mg at bedtime    PSYCHOTHERAPY/COUNSELING:  [x] Therapeutic interview  [x] Supportive  [] CBT  [] Ongoing  [] Other    [x] Patient continues to need, on a daily basis, active treatment furnished directly by or requiring the supervision of inpatient psychiatric personnel      Anticipated Length of stay: 3 to 5 days based on stability    NOTE: This report was transcribed using voice recognition software.  Every effort was made to ensure accuracy; however, inadvertent computerized transcription errors may be present.       Electronically signed by Nikunj Cooper on 6/6/2021 at 10:19 AM

## 2021-06-06 NOTE — PROGRESS NOTES
Pt was resting in his room. Alert, calm, and cooperative. Pt denies SI, HI, and AVH. States his moods are better and spoke of the event with his wife today. Pts wife has early dementia and lost 900.00 and her car today. Pt is worried about her. \"We take care of eachother. I just want to get home to her. \" Pt's ostomy bag had leaked. All supplies gathered to fix it and change bed. Pt did it all on his own. Pt appears brighter than last night. Encouraged him to socialize and have snack. Pt appreciative .  Will continue to monitor

## 2021-06-07 PROCEDURE — 6370000000 HC RX 637 (ALT 250 FOR IP): Performed by: NURSE PRACTITIONER

## 2021-06-07 PROCEDURE — 6370000000 HC RX 637 (ALT 250 FOR IP): Performed by: PSYCHIATRY & NEUROLOGY

## 2021-06-07 PROCEDURE — 1240000000 HC EMOTIONAL WELLNESS R&B

## 2021-06-07 PROCEDURE — 99232 SBSQ HOSP IP/OBS MODERATE 35: CPT | Performed by: NURSE PRACTITIONER

## 2021-06-07 RX ADMIN — ACETAMINOPHEN 650 MG: 325 TABLET ORAL at 16:58

## 2021-06-07 RX ADMIN — PRAZOSIN HYDROCHLORIDE 2 MG: 2 CAPSULE ORAL at 09:17

## 2021-06-07 RX ADMIN — DIVALPROEX SODIUM 500 MG: 250 TABLET, DELAYED RELEASE ORAL at 20:43

## 2021-06-07 RX ADMIN — NICOTINE POLACRILEX 4 MG: 2 GUM, CHEWING BUCCAL at 09:17

## 2021-06-07 RX ADMIN — BENZTROPINE MESYLATE 1 MG: 1 TABLET ORAL at 20:42

## 2021-06-07 RX ADMIN — BENZTROPINE MESYLATE 1 MG: 1 TABLET ORAL at 09:17

## 2021-06-07 RX ADMIN — PRAZOSIN HYDROCHLORIDE 2 MG: 2 CAPSULE ORAL at 20:43

## 2021-06-07 RX ADMIN — DIVALPROEX SODIUM 500 MG: 250 TABLET, DELAYED RELEASE ORAL at 09:16

## 2021-06-07 RX ADMIN — ACETAMINOPHEN 650 MG: 325 TABLET ORAL at 09:16

## 2021-06-07 RX ADMIN — HYDROXYZINE PAMOATE 50 MG: 50 CAPSULE ORAL at 16:58

## 2021-06-07 RX ADMIN — PALIPERIDONE 3 MG: 6 TABLET, EXTENDED RELEASE ORAL at 20:42

## 2021-06-07 RX ADMIN — HYDROXYZINE PAMOATE 50 MG: 50 CAPSULE ORAL at 06:51

## 2021-06-07 RX ADMIN — TRAZODONE HYDROCHLORIDE 50 MG: 50 TABLET ORAL at 20:43

## 2021-06-07 RX ADMIN — ATORVASTATIN CALCIUM 10 MG: 10 TABLET, FILM COATED ORAL at 09:17

## 2021-06-07 RX ADMIN — Medication 6 MG: at 20:42

## 2021-06-07 RX ADMIN — PALIPERIDONE 6 MG: 6 TABLET, EXTENDED RELEASE ORAL at 09:16

## 2021-06-07 ASSESSMENT — PAIN SCALES - GENERAL
PAINLEVEL_OUTOF10: 0
PAINLEVEL_OUTOF10: 8
PAINLEVEL_OUTOF10: 0
PAINLEVEL_OUTOF10: 8

## 2021-06-07 NOTE — GROUP NOTE
Group Therapy Note    Date: 6/7/2021    Group Start Time: 1100  Group End Time: 1130  Group Topic: Cognitive Skills    [facility]    Jhonatan White        Group Therapy Note    Attendees: 18         Patient's Goal:  To participate in group discussion on identifying distressing emotions and finding ways to work on those emotions. Notes:  Pt was an active listener and participant in group discussion. Status After Intervention:  Improved    Participation Level:  Active Listener and Interactive    Participation Quality: Appropriate, Attentive, Sharing and Supportive      Speech:  normal      Thought Process/Content: Logical      Affective Functioning: Congruent      Mood: anxious      Level of consciousness:  Alert and Oriented x4      Response to Learning: Able to verbalize current knowledge/experience, Able to verbalize/acknowledge new learning and Able to retain information      Endings: None Reported    Modes of Intervention: Education, Support, Socialization, Exploration, Clarifying and Problem-solving      Discipline Responsible: /Counselor      Signature:  Saratha Dancer Appugliese

## 2021-06-07 NOTE — PROGRESS NOTES
Attended morning community meeting. Updated on daily staffing and daily expectations. Shared goal for the day as to be positive.

## 2021-06-07 NOTE — CARE COORDINATION
Amado received a phone call from pt wife Tali Green reports pt called her and stated pt informed him that the DR wants him to get a guardian. Jackygian Felix does not feel pt needs a guardian and reports she is not working on becoming his guardian. Jackygian Washington wants pt to come home but also understands why pt is not being discharged yet stating she wants the meds to be adjusted so he doesn't come right back. No further questions or concerns, offered to assist as needed.

## 2021-06-07 NOTE — PROGRESS NOTES
BEHAVIORAL HEALTH FOLLOW-UP NOTE     6/7/2021     Patient was seen and examined in person, Chart reviewed   Patient's case discussed with staff/team    Chief Complaint: \"I I am ready to go home\"    Interim History: Patient up on the unit. He voices readiness for discharge. He denies SI/HI intent or plan he denies any auditory or visual hallucinations. He states that he is only here because the police lie that he was not taking his medications but states he actually was.   He is physical in the milieu attending groups eating well sleeping well no neurovegetative signs or symptoms of depression    Appetite:   [x] Normal/Unchanged  [] Increased  [] Decreased      Sleep:       [x] Normal/Unchanged  [] Fair       [] Poor              Energy:    [x] Normal/Unchanged  [] Increased  [] Decreased        SI [] Present  [x] Absent    HI  []Present  [x] Absent     Aggression:  [] yes  [x] no    Patient is [x] able  [] unable to CONTRACT FOR SAFETY     PAST MEDICAL/PSYCHIATRIC HISTORY:   Past Medical History:   Diagnosis Date    ADD (attention deficit disorder)     Anxiety     Bipolar 1 disorder ([facility] 11/19/2017    Cancer ([facility] prostate cancer    2014 / treated with surgery    Crohn's disease ([facility]     Depression     GERD (gastroesophageal reflux disease)     Panic attack     Rectal pain     has a fissure    Schizo affective schizophrenia ([facility]     stable       FAMILY/SOCIAL HISTORY:  Family History   Problem Relation Age of Onset    Mental Illness Mother     Mental Illness Father     Mental Illness Sister     Mental Illness Brother     Heart Disease Mother     Depression Father      Social History     Socioeconomic History    Marital status:      Spouse name: ZENAIDA    Number of children: 0    Years of education: 12 +8    Highest education level: Not on file   Occupational History    Occupation: [employer]     Employer: UNEMPLOYED     Comment: SSDI   Tobacco Use    Smoking status: Former Smoker     Packs/day: 2.00     Years: 41.00     Pack years: 82.00     Types: Cigarettes     Start date: 1978     Quit date: 2021     Years since quittin.1    Smokeless tobacco: Never Used   Vaping Use    Vaping Use: Never used   Substance and Sexual Activity    Alcohol use: No     Comment: no alcohol in5 yrs    Drug use: No     Comment: last use 1997 / marijuana    Sexual activity: Not Currently     Partners: Female   Other Topics Concern    Not on file   Social History Narrative    ** Merged History Encounter **          Social Determinants of Health     Financial Resource Strain:     Difficulty of Paying Living Expenses:    Food Insecurity:     Worried About Running Out of Food in the Last Year:     Ran Out of Food in the Last Year:    Transportation Needs:     Lack of Transportation (Medical):  Lack of Transportation (Non-Medical):    Physical Activity:     Days of Exercise per Week:     Minutes of Exercise per Session:    Stress:     Feeling of Stress :    Social Connections:     Frequency of Communication with Friends and Family:     Frequency of Social Gatherings with Friends and Family:     Attends Mormonism Services:     Active Member of Clubs or Organizations:     Attends Club or Organization Meetings:     Marital Status:    Intimate Partner Violence:     Fear of Current or Ex-Partner:     Emotionally Abused:     Physically Abused:     Sexually Abused:            ROS:  [x] All negative/unchanged except if checked.  Explain positive(checked items) below:  [] Constitutional  [] Eyes  [] Ear/Nose/Mouth/Throat  [] Respiratory  [] CV  [] GI  []   [] Musculoskeletal  [] Skin/Breast  [] Neurological  [] Endocrine  [] Heme/Lymph  [] Allergic/Immunologic    Explanation:     MEDICATIONS:    Current Facility-Administered Medications:     nicotine polacrilex (NICORETTE) gum 4 mg, 4 mg, Oral, PRN, Ila Barone MD, 4 mg at 21 1701    acetaminophen (TYLENOL) tablet 650 mg, 650 mg, Oral, Q6H PRN, Giovanna Murillo MD, 650 mg at 06/05/21 0908    magnesium hydroxide (MILK OF MAGNESIA) 400 MG/5ML suspension 30 mL, 30 mL, Oral, Daily PRN, Giovanna Murillo MD    aluminum & magnesium hydroxide-simethicone (MAALOX) 200-200-20 MG/5ML suspension 30 mL, 30 mL, Oral, PRN, Giovanna Murillo MD    haloperidol (HALDOL) tablet 5 mg, 5 mg, Oral, Q6H PRN **OR** haloperidol lactate (HALDOL) injection 5 mg, 5 mg, Intramuscular, Q6H PRN, Giovanna Murillo MD    atorvastatin (LIPITOR) tablet 10 mg, 10 mg, Oral, Daily, Maryagnes Christelle, APRN - CNP, 10 mg at 06/06/21 6367    benztropine (COGENTIN) tablet 1 mg, 1 mg, Oral, BID, Maryagnes Christelle, APRN - CNP, 1 mg at 06/06/21 2314    divalproex (DEPAKOTE) DR tablet 500 mg, 500 mg, Oral, BID, Maryagnes Christelle, APRN - CNP, 500 mg at 06/06/21 2314    melatonin tablet 6 mg, 6 mg, Oral, Nightly, Maryagnes Christelle, APRN - CNP, 6 mg at 06/06/21 2312    prazosin (MINIPRESS) capsule 2 mg, 2 mg, Oral, BID, Maryagnes Christelle, APRN - CNP, 2 mg at 06/06/21 2313    paliperidone (INVEGA) extended release tablet 3 mg, 3 mg, Oral, Nightly, Maryagnes Christelle, APRN - CNP, 3 mg at 06/06/21 2312    paliperidone (INVEGA) extended release tablet 6 mg, 6 mg, Oral, Daily, Maryagnes Christelle, APRN - CNP, 6 mg at 06/06/21 0925    diphenhydrAMINE (BENADRYL) injection 50 mg, 50 mg, Intramuscular, Q6H PRN, Rosaagnes Christelle, APRN - CNP    LORazepam (ATIVAN) injection 2 mg, 2 mg, Intramuscular, Q6H PRN, Maryagnes Christelle, APRN - CNP    hydrOXYzine (VISTARIL) capsule 50 mg, 50 mg, Oral, TID PRN, Giovanna Murillo MD, 50 mg at 06/07/21 0651    traZODone (DESYREL) tablet 50 mg, 50 mg, Oral, Nightly PRN, Brant Bourgeois APRN - CNP, 50 mg at 06/06/21 7630      Examination:  /62   Pulse 69   Temp 98.1 °F (36.7 °C) (Oral)   Resp 14   Ht 6' 3.2\" (1.91 m)   Wt 200 lb (90.7 kg)   SpO2 98%   BMI 24.87 kg/m²   Gait - steady  Medication side effects(SE):     Mental Status Examination:    Level of consciousness:  within normal limits   Appearance:  fair grooming and fair hygiene  Behavior/Motor:  no abnormalities noted  Attitude toward examiner:  cooperative  Speech:  spontaneous, hyper-verbal, rapid   Mood: \" I feel good\"  Affect: Mood congruent, appropriate. Thought processes: Linear without flight of ideas loose associations  Thought content: Devoid any auditory visual hallucinations delusions or any other perceptual abnormalities. Denies SI/HI intent or plan  Cognition:  oriented to person, place, and time   Concentration intact  Insight poor   Judgement poor     ASSESSMENT:   Patient symptoms are:  [] Well controlled  [x] Improving  [] Worsening  [] No change      Diagnosis:   Principal Problem:    Severe manic bipolar 1 disorder with psychotic behavior ([facility] [address]  Resolved Problems:    * No resolved hospital problems. *      LABS:    No results for input(s): WBC, HGB, PLT in the last 72 hours. No results for input(s): NA, K, CL, CO2, BUN, CREATININE, GLUCOSE in the last 72 hours. No results for input(s): BILITOT, ALKPHOS, AST, ALT in the last 72 hours. Lab Results   Component Value Date    LABAMPH NOT DETECTED 06/02/2021    LABAMPH NOT DETECTED 02/03/2011    BARBSCNU NOT DETECTED 06/02/2021    LABBENZ NOT DETECTED 06/02/2021    LABBENZ SEE BELOW 12/08/2014    CANNAB NOT DETECTED  08/13/2012    COCAINESCRN NOT DETECTED 08/13/2012    LABMETH NOT DETECTED 06/02/2021    OPIATESCREENURINE NOT DETECTED 06/02/2021    PHENCYCLIDINESCREENURINE NOT DETECTED 06/02/2021    PPXUR NOT DETECTED 08/13/2012    ETOH <10 06/02/2021     Lab Results   Component Value Date    TSH 2.240 04/23/2021     Lab Results   Component Value Date    LITHIUM <0.10 (L) 10/24/2018     Lab Results   Component Value Date    VALPROATE 47 (L) 06/06/2021           Treatment Plan:  Reviewed current Medications with the patient.    Risks, benefits, side effects, drug-to-drug interactions and alternatives to treatment were discussed. Collateral information:   CD evaluation  Encourage patient to attend group and other milieu activities. Discharge planning discussed with the patient and treatment team.    Continue Depakote 500 mg twice daily  Continue Invega 6 mg daily and 3 mg at bedtime    PSYCHOTHERAPY/COUNSELING:  [x] Therapeutic interview  [x] Supportive  [] CBT  [] Ongoing  [] Other    [x] Patient continues to need, on a daily basis, active treatment furnished directly by or requiring the supervision of inpatient psychiatric personnel      Anticipated Length of stay: 3 to 5 days based on stability    NOTE: This report was transcribed using voice recognition software.  Every effort was made to ensure accuracy; however, inadvertent computerized transcription errors may be present.       Electronically signed by Alethea Galeazzi, APRN - CNP on 7/5/3060 at 8:54 AM

## 2021-06-07 NOTE — GROUP NOTE
Group Therapy Note    Date: 6/7/2021    Group Start Time: 1000  Group End Time: 5209  Group Topic: Psychoeducation    [facility]    Chloé Murdock, CTRS        Group Therapy Note    Number of participants:13  Type of group: Psychoeducation  Mode of intervention: Education, Support, Socialization, Exploration, Clarifying, and Problem-solving  Topic: Time Management  Objective: Pt will identify 3 ways to manage time better in recovery. Notes:  Pt was interactive during group sharing 3 ways to manage time better in recovery. Pt gave support and feedback to others. Status After Intervention:  Improved    Participation Level:  Active Listener and Interactive    Participation Quality: Appropriate, Attentive, Sharing and Supportive      Speech:  normal      Thought Process/Content: Logical      Affective Functioning: Congruent      Mood: euthymic      Level of consciousness:  Alert, Oriented x4 and Attentive      Response to Learning: Able to verbalize current knowledge/experience, Able to verbalize/acknowledge new learning, Able to retain information, Capable of insight, Able to change behavior and Progressing to goal      Endings: None Reported    Modes of Intervention: Education, Support, Socialization, Exploration, Clarifying and Problem-solving

## 2021-06-08 VITALS
OXYGEN SATURATION: 98 % | DIASTOLIC BLOOD PRESSURE: 72 MMHG | HEIGHT: 75 IN | BODY MASS INDEX: 24.87 KG/M2 | WEIGHT: 200 LBS | TEMPERATURE: 98 F | RESPIRATION RATE: 16 BRPM | HEART RATE: 74 BPM | SYSTOLIC BLOOD PRESSURE: 122 MMHG

## 2021-06-08 PROCEDURE — 99239 HOSP IP/OBS DSCHRG MGMT >30: CPT | Performed by: NURSE PRACTITIONER

## 2021-06-08 PROCEDURE — 6370000000 HC RX 637 (ALT 250 FOR IP): Performed by: PSYCHIATRY & NEUROLOGY

## 2021-06-08 PROCEDURE — 6370000000 HC RX 637 (ALT 250 FOR IP): Performed by: NURSE PRACTITIONER

## 2021-06-08 RX ORDER — PALIPERIDONE 6 MG/1
6 TABLET, EXTENDED RELEASE ORAL DAILY
Qty: 30 TABLET | Refills: 0 | Status: SHIPPED | OUTPATIENT
Start: 2021-06-09 | End: 2021-11-22

## 2021-06-08 RX ORDER — PALIPERIDONE 3 MG/1
3 TABLET, EXTENDED RELEASE ORAL NIGHTLY
Qty: 30 TABLET | Refills: 0 | Status: SHIPPED | OUTPATIENT
Start: 2021-06-08 | End: 2021-11-03 | Stop reason: ALTCHOICE

## 2021-06-08 RX ADMIN — ATORVASTATIN CALCIUM 10 MG: 10 TABLET, FILM COATED ORAL at 08:44

## 2021-06-08 RX ADMIN — PALIPERIDONE 6 MG: 6 TABLET, EXTENDED RELEASE ORAL at 08:45

## 2021-06-08 RX ADMIN — ACETAMINOPHEN 650 MG: 325 TABLET ORAL at 08:45

## 2021-06-08 RX ADMIN — BENZTROPINE MESYLATE 1 MG: 1 TABLET ORAL at 08:45

## 2021-06-08 RX ADMIN — DIVALPROEX SODIUM 500 MG: 250 TABLET, DELAYED RELEASE ORAL at 08:44

## 2021-06-08 RX ADMIN — PRAZOSIN HYDROCHLORIDE 2 MG: 2 CAPSULE ORAL at 08:44

## 2021-06-08 RX ADMIN — HYDROXYZINE PAMOATE 50 MG: 50 CAPSULE ORAL at 08:45

## 2021-06-08 ASSESSMENT — PAIN SCALES - GENERAL
PAINLEVEL_OUTOF10: 0
PAINLEVEL_OUTOF10: 8

## 2021-06-08 NOTE — CARE COORDINATION
In order to ensure appropriate transition and discharge planning is in place, the following documents have been transmitted to [facility], as the new outpatient provider:     The d/c diagnosis was transmitted to the next care provider   The reason for hospitalization was transmitted to the next care provider   The d/c medications (dosage and indication) were transmitted to the next care provider    The continuing care plan was transmitted to the next care provider

## 2021-06-08 NOTE — GROUP NOTE
Group Therapy Note    Date: 6/8/2021    Group Start Time: 2475  Group End Time: 1050  Group Topic: Psychoeducation    [facility]    Chloé Murdock, CTRS        Group Therapy Note    Number of participants: 11  Type of group: Psychoeducation  Mode of intervention: Education, Support, Socialization, Exploration, Clarifying, and Problem-solving  Topic: Boundaries  Objective: Pt will identify 1 way to maintain healthy boundaries in recovery. Notes:  Pt was interactive during group sharing 1 way to maintain healthy boundaries in recovery. Pt gave support and feedback to others. Status After Intervention:  Improved    Participation Level:  Active Listener and Interactive    Participation Quality: Appropriate, Attentive, Sharing and Supportive      Speech:  normal      Thought Process/Content: Logical      Affective Functioning: Congruent      Mood: euthymic      Level of consciousness:  Alert, Oriented x4 and Attentive      Response to Learning: Able to verbalize current knowledge/experience, Able to verbalize/acknowledge new learning, Able to retain information, Capable of insight, Able to change behavior and Progressing to goal      Endings: None Reported    Modes of Intervention: Education, Support, Socialization, Exploration, Clarifying and Problem-solving

## 2021-06-08 NOTE — PROGRESS NOTES
CLINICAL PHARMACY NOTE: MEDS TO BEDS    Total # of Prescriptions Filled: 2   The following medications were delivered to the patient:  · invega er 6mg  · invega er 3mg    Additional Documentation:    Delivered to [facility] RN 6/8 11:05am

## 2021-06-08 NOTE — SUICIDE SAFETY PLAN
SAFETY PLAN    A suicide Safety Plan is a document that supports someone when they are having thoughts of suicide. Warning Signs that indicate a suicidal crisis may be developing: What (situations, thoughts, feelings, body sensations, behaviors, etc.) do you experience that lets you know you are beginning to think about suicide? 1. Isolating  2.   3. Internal Coping Strategies:  What things can I do (relaxation techniques, hobbies, physical activities, etc.) to take my mind off my problems without contacting another person? 1. Talk to wife  2. Talk to family  3. Call suicide hotline    People and social settings that provide distraction: Who can I call or where can I go to distract me? 1. Name: Wife  Phone: [phone]  2. Name: Mother  Phone: [phone]  3. Place: Mother's house         4. Place: [employer]. People whom I can ask for help: Who can I call when I need help - for example, friends, family, clergy, someone else? 1. Name: Wife                Phone:   2. Name: Mother  Phone:   3. Name: Father Phone:     Professionals or [employer] agencies I can contact during a crisis: Who can I call for help - for example, my doctor, my psychiatrist, my psychologist, a mental health provider, a suicide hotline? 1. Clinician Name: Nain Busch    Phone: At [address] Pager or Emergency Contact #:     2. Clinician Name:   Phone:       Clinician Pager or Emergency Contact #:     3. Suicide Prevention Lifeline: 6-328-582-TALK (9650)    4. [address] [place] [address] Emergency Services -  for example, [facility] suicide hotline, [facility] Hotline:       Emergency Services Address:       Emergency Services Phone:     Making the environment safe: How can I make my environment (house/apartment/living space) safer? For example, can I remove guns, medications, and other items? 1. No Guns  2.  Minimal Drugs

## 2021-06-08 NOTE — PLAN OF CARE
Patient denies suicidal ideation, homicidal ideations and AVH. Patient denies anxiety and depression. Patient appears flat and worried but does brighten with conversation. Patient reports that \"meds are working well for me, no side effects, no depression or anger today. \"  Presents calm and cooperative during assessment. Patient is out on the unit and is social with peers. Medications taken without issue. No complaints or concerns verbalized at this time. No unit problems reported. Will continue to observe and support.     Problem: Anger Management/Homicidal Ideation:  Goal: Able to display appropriate communication and problem solving  Description: Able to display appropriate communication and problem solving  Outcome: Ongoing     Problem: Anger Management/Homicidal Ideation:  Goal: Ability to verbalize frustrations and anger appropriately will improve  Description: Ability to verbalize frustrations and anger appropriately will improve  Outcome: Ongoing     Problem: Anger Management/Homicidal Ideation:  Goal: Absence of angry outbursts  Description: Absence of angry outbursts  Outcome: Ongoing     Problem: Anger Management/Homicidal Ideation:  Goal: Absence of homicidal ideation  Description: Absence of homicidal ideation  Outcome: Ongoing     Problem: Altered Mood, Psychotic Behavior:  Goal: Absence of self-harm  Description: Absence of self-harm  Outcome: Ongoing

## 2021-06-08 NOTE — BH NOTE
[address] [facility]    Pt discharged with followings belongings:   Dentures: None  Vision - Corrective Lenses: Glasses  Hearing Aid: None  Jewelry: None  Body Piercings Removed: N/A  Clothing: Footwear, Pants, Sweater, Socks  Were All Patient Medications Collected?: Not Applicable  Other Valuables: Other (Comment) (ostomy supplies)    Patient education on aftercare instructions:  yes Patient verbalize understanding of AVS:  yes.     Status EXAM upon discharge:  Status and Exam  Normal: Yes  Facial Expression: Brightened  Affect: Appropriate  Level of Consciousness: Alert  Mood:Normal: Yes  Mood: Anxious  Motor Activity:Normal: Yes  Motor Activity: Increased  Interview Behavior: Cooperative  Preception: Letcher to Person, Loan Naegeli to Time, Letcher to Place, Letcher to Situation  Attention:Normal: Yes  Attention: Distractible  Thought Processes: Circumstantial  Thought Content:Normal: Yes  Thought Content: Preoccupations  Hallucinations: None  Delusions: No  Delusions: Persecution  Memory:Normal: Yes  Memory: Poor Recent  Insight and Judgment: No  Insight and Judgment: Poor Insight  Present Suicidal Ideation: No  Present Homicidal Ideation: No      Metabolic Screening:    Lab Results   Component Value Date    LABA1C 5.7 (H) 04/24/2021       Lab Results   Component Value Date    CHOL 165 04/24/2021    CHOL 320 (H) 07/22/2019    CHOL 188 10/08/2018     Lab Results   Component Value Date    TRIG 251 (H) 04/24/2021    TRIG 148 07/22/2019    TRIG 171 (H) 10/08/2018     Lab Results   Component Value Date    HDL 43 04/24/2021    HDL 46 07/22/2019    HDL 52 10/08/2018    HDL 67 06/27/2018     No components found for: LDLCAL  Lab Results   Component Value Date    LABVLDL 50 04/24/2021    LABVLDL 30 07/22/2019    LABVLDL 34 10/08/2018    LABVLDL 58 06/27/2018       Soraida Méndez RN
New Ostomy bag has been applied to the pt's ostomy. Negative complications. Skin slightly reddened at this time immediately around the stomal opening. Negative breaks to the skin surface assessed. Will follow and monitor.
Patient denies thoughts to harm self or others, denies hallucinations. Patient is pleasant and cooperative. Patient takes his medications and attends groups. Patient states he is sad and upset today after a phone call from his mother, who is assisting taking care of his wife, who has dementia and today \"lost the car and $900\". Emotional support given. Patient behavior is in control.
Pt is stable, more cooperative and without irritability. Pt denies suicidal or homicidal ideations. Pt denies hallucinations. Pt ostomy without complications. No other behavioral concerns to this point. Will follow and monitor.
Processes: Circumstantial  Thought Content:Normal: No  Thought Content: Preoccupations  Hallucinations: None  Delusions: No  Memory:Normal: No  Memory: Confabulation  Insight and Judgment: No  Insight and Judgment: Poor Insight, Unmotivated  Present Suicidal Ideation: No  Present Homicidal Ideation: No    Daily Assessment Last Entry:   Daily Sleep (WDL): Exceptions to WDL (Pt stayed up and read a book)         Patient Currently in Pain: Denies       Patient Monitoring:  Frequency of Checks: 4 times per hour, close    Psychiatric Symptoms:                    Suicide Risk CSSR-S:  1) Within the past month, have you wished you were dead or wished you could go to sleep and not wake up? : No  2) Have you actually had any thoughts of killing yourself? : No  3) Have you been thinking about how you might kill yourself? : No  5) Have you started to work out or worked out the details of how to kill yourself? Do you intend to carry out this plan? : Yes  6) Have you ever done anything, started to do anything, or prepared to do anything to end your life?: No  Change in Result: pt denies all. Change in Plan of care: continue to monitor. EDUCATION:   Learner Progress Toward Treatment Goals: Reviewed results and recommendations of this team and Reviewed group plan and strategies    Method: Small group    Outcome: Verbalized understanding    PATIENT GOALS: \"To be positive\" pr pt. PLAN/TREATMENT RECOMMENDATIONS UPDATE: Continue to monitor pt progress. GOALS UPDATE:   Time frame for Short-Term Goals: Daily re assessment.        Raiza Mendez RN

## 2021-06-08 NOTE — PLAN OF CARE
Pt is stable, appears more brightened. Pt appears pleasant in demeanor. Pt denies suicidal or homicidal ideations. Pt denies hallucinations. Pt does not indicate complications with ostomy at this time. Pt does not indicate requests discussions about other concerns presently. Pt reports goal, \"To treat others with respect\" pr pt. Will follow and monitor.

## 2021-06-08 NOTE — PROGRESS NOTES
Attended community meeting. Updated on staffing and daily expectations. Shared goal for the day as to be positive.

## 2021-09-13 ENCOUNTER — HOSPITAL ENCOUNTER (EMERGENCY)
Age: 56
Discharge: HOME | End: 2021-09-13
Attending: EMERGENCY MEDICINE
Payer: MEDICARE

## 2021-09-13 VITALS
DIASTOLIC BLOOD PRESSURE: 94 MMHG | HEART RATE: 98 BPM | WEIGHT: 180 LBS | TEMPERATURE: 98.9 F | HEIGHT: 75 IN | SYSTOLIC BLOOD PRESSURE: 119 MMHG | BODY MASS INDEX: 22.38 KG/M2 | RESPIRATION RATE: 18 BRPM | OXYGEN SATURATION: 98 %

## 2021-09-13 DIAGNOSIS — K94.19 PROLAPSE OF ILEOSTOMY (HCC): Primary | ICD-10-CM

## 2021-09-13 DIAGNOSIS — R10.9 ABDOMINAL PAIN, UNSPECIFIED ABDOMINAL LOCATION: ICD-10-CM

## 2021-09-13 LAB
ANION GAP SERPL CALCULATED.3IONS-SCNC: 11 MMOL/L (ref 7–16)
BUN BLDV-MCNC: 17 MG/DL (ref 6–20)
CALCIUM SERPL-MCNC: 9.5 MG/DL (ref 8.6–10.2)
CHLORIDE BLD-SCNC: 104 MMOL/L (ref 98–107)
CO2: 22 MMOL/L (ref 22–29)
CREAT SERPL-MCNC: 1.1 MG/DL (ref 0.7–1.2)
GFR AFRICAN AMERICAN: >60
GFR NON-AFRICAN AMERICAN: >60 ML/MIN/1.73
GLUCOSE BLD-MCNC: 87 MG/DL (ref 74–99)
HCT VFR BLD CALC: 41.6 % (ref 37–54)
HEMOGLOBIN: 13.4 G/DL (ref 12.5–16.5)
LACTIC ACID: 1.5 MMOL/L (ref 0.5–2.2)
MCH RBC QN AUTO: 28.5 PG (ref 26–35)
MCHC RBC AUTO-ENTMCNC: 32.2 % (ref 32–34.5)
MCV RBC AUTO: 88.5 FL (ref 80–99.9)
PDW BLD-RTO: 15.6 FL (ref 11.5–15)
PLATELET # BLD: 268 E9/L (ref 130–450)
PMV BLD AUTO: 9.4 FL (ref 7–12)
POTASSIUM SERPL-SCNC: 5 MMOL/L (ref 3.5–5)
RBC # BLD: 4.7 E12/L (ref 3.8–5.8)
SODIUM BLD-SCNC: 137 MMOL/L (ref 132–146)
WBC # BLD: 7.9 E9/L (ref 4.5–11.5)

## 2021-09-13 PROCEDURE — 80048 BASIC METABOLIC PNL TOTAL CA: CPT

## 2021-09-13 PROCEDURE — 83605 ASSAY OF LACTIC ACID: CPT

## 2021-09-13 PROCEDURE — 99284 EMERGENCY DEPT VISIT MOD MDM: CPT

## 2021-09-13 PROCEDURE — 2580000003 HC RX 258: Performed by: EMERGENCY MEDICINE

## 2021-09-13 PROCEDURE — 85027 COMPLETE CBC AUTOMATED: CPT

## 2021-09-13 RX ORDER — FENTANYL CITRATE 50 UG/ML
50 INJECTION, SOLUTION INTRAMUSCULAR; INTRAVENOUS ONCE
Status: DISCONTINUED | OUTPATIENT
Start: 2021-09-13 | End: 2021-09-14 | Stop reason: HOSPADM

## 2021-09-13 RX ORDER — 0.9 % SODIUM CHLORIDE 0.9 %
1000 INTRAVENOUS SOLUTION INTRAVENOUS ONCE
Status: COMPLETED | OUTPATIENT
Start: 2021-09-13 | End: 2021-09-13

## 2021-09-13 RX ADMIN — SODIUM CHLORIDE 1000 ML: 9 INJECTION, SOLUTION INTRAVENOUS at 21:08

## 2021-09-13 ASSESSMENT — PAIN DESCRIPTION - LOCATION: LOCATION: ABDOMEN

## 2021-09-13 ASSESSMENT — PAIN DESCRIPTION - PROGRESSION: CLINICAL_PROGRESSION: NOT CHANGED

## 2021-09-13 ASSESSMENT — PAIN DESCRIPTION - PAIN TYPE: TYPE: ACUTE PAIN

## 2021-09-13 ASSESSMENT — PAIN SCALES - GENERAL: PAINLEVEL_OUTOF10: 10

## 2021-09-13 ASSESSMENT — PAIN DESCRIPTION - DESCRIPTORS: DESCRIPTORS: DISCOMFORT;SHARP

## 2021-09-13 ASSESSMENT — PAIN DESCRIPTION - ONSET: ONSET: ON-GOING

## 2021-09-13 ASSESSMENT — PAIN DESCRIPTION - FREQUENCY: FREQUENCY: CONTINUOUS

## 2021-09-14 NOTE — ED PROVIDER NOTES
HPI:  9/13/21,   Time: 9:55 PM EDT         Marjorie Ruiz is a 55 y.o. male presenting to the ED for Pain around his ileostomy site with prolapse of his ostomy. , beginning several days ago. The complaint has been persistent, moderate in severity, and worsened by nothing. Patient has known colostomy that he had performed up in [facility] by Dr. Dario Adams for Crohn's disease. He had multiple ED visits for prolapse of his ileostomy. He also has pain around the ostomy site. He was found to have a parastomal hernia. He has seen comprehensive surgical care as well as follow-up with his doctor up in [facility]. Patient has not followed through with revision of his colostomy. He also suffers from bipolar disorder and schizoaffective disorder. He states his output has been the same. He has no nausea vomiting fevers chills or other abdominal pain. ROS:   Pertinent positives and negatives are stated within HPI, all other systems reviewed and are negative.  --------------------------------------------- PAST HISTORY ---------------------------------------------  Past Medical History:  has a past medical history of ADD (attention deficit disorder), Anxiety, Bipolar 1 disorder ([facility]'s disease ([facility] Depression, GERD (gastroesophageal reflux disease), Panic attack, Rectal pain, and Schizo affective schizophrenia ([facility] 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 Social History:  reports that he quit smoking about 5 months ago. His smoking use included cigarettes. He started smoking about 43 years ago. He has a 82.00 pack-year smoking history. He has never used smokeless tobacco. He reports that he does not drink alcohol and does not use drugs.     Family History: family history includes Depression in his father; Heart Disease in his mother; Mental Illness in his brother, father, mother, and sister. The patients home medications have been reviewed. Allergies: Ciprofloxacin hcl, Morphine, and Nsaids    -------------------------------------------------- RESULTS -------------------------------------------------  All laboratory and radiology results have been personally reviewed by myself   LABS:  Results for orders placed or performed during the hospital encounter of 09/13/21   CBC   Result Value Ref Range    WBC 7.9 4.5 - 11.5 E9/L    RBC 4.70 3.80 - 5.80 E12/L    Hemoglobin 13.4 12.5 - 16.5 g/dL    Hematocrit 41.6 37.0 - 54.0 %    MCV 88.5 80.0 - 99.9 fL    MCH 28.5 26.0 - 35.0 pg    MCHC 32.2 32.0 - 34.5 %    RDW 15.6 (H) 11.5 - 15.0 fL    Platelets 741 438 - 057 E9/L    MPV 9.4 7.0 - 12.0 fL   Lactic Acid, Plasma   Result Value Ref Range    Lactic Acid 1.5 0.5 - 2.2 mmol/L   Basic Metabolic Panel   Result Value Ref Range    Sodium 137 132 - 146 mmol/L    Potassium 5.0 3.5 - 5.0 mmol/L    Chloride 104 98 - 107 mmol/L    CO2 22 22 - 29 mmol/L    Anion Gap 11 7 - 16 mmol/L    Glucose 87 74 - 99 mg/dL    BUN 17 6 - 20 mg/dL    CREATININE 1.1 0.7 - 1.2 mg/dL    GFR Non-African American >60 >=60 mL/min/1.73    GFR African American >60     Calcium 9.5 8.6 - 10.2 mg/dL       RADIOLOGY:  Interpreted by Radiologist.  No orders to display       ------------------------- NURSING NOTES AND VITALS REVIEWED ---------------------------   The nursing notes within the ED encounter and vital signs as below have been reviewed.    BP (!) 119/94   Pulse 98   Temp 98.9 °F (37.2 °C)   Resp 18   Ht 6' 3\" (1.905 m)   Wt 180 lb (81.6 kg)   SpO2 98%   BMI 22.50 kg/m²   Oxygen Saturation Interpretation: Normal      ---------------------------------------------------PHYSICAL EXAM--------------------------------------      Constitutional/General: Alert and oriented x3, well appearing, non toxic in NAD  Head: NC/AT  Eyes: PERRL, EOMI  Mouth: Oropharynx clear, handling secretions, no trismus  Neck: Supple, full ROM, no meningeal signs  Pulmonary: Lungs clear to auscultation bilaterally, no wheezes, rales, or rhonchi. Not in respiratory distress  Cardiovascular:  Regular rate and rhythm, no murmurs, gallops, or rubs. 2+ distal pulses  Abdomen: Soft, non tender, non distended, patient is tender around the ileostomy site. There is no reduction in his output. His prolapse was partially reduced with manual pressure and granulated sugar. Extremities: Moves all extremities x 4. Warm and well perfused  Skin: warm and dry without rash  Neurologic: GCS 15, no focal deficits  Psych: Normal Affect      ------------------------------ ED COURSE/MEDICAL DECISION MAKING----------------------  Medications   0.9 % sodium chloride bolus (0 mLs IntraVENous Stopped 9/13/21 2222)         Medical Decision Making:    Patient's labs not worrisome. Clinically he does appear to be obstructed. I did not CT him at this time. Patient's prolapse was reduced with granulated sugar. He did get some relief of his prolapse. I did speak with Dr. Debbi Davis and said he would be happy to see him this week in possibly operated on him. Patient is to follow follow-up with him call his office in the morning. Counseling: The emergency provider has spoken with the patient and discussed todays results, in addition to providing specific details for the plan of care and counseling regarding the diagnosis and prognosis. Questions are answered at this time and they are agreeable with the plan.      --------------------------------- IMPRESSION AND DISPOSITION ---------------------------------    IMPRESSION  1.  Prolapse of ileostomy ([address]    2. Abdominal pain, unspecified abdominal location        DISPOSITION  Disposition: Discharge to home  Patient condition is stable                Melanie Henderson DO  09/14/21 0101

## 2021-09-22 ENCOUNTER — HOSPITAL ENCOUNTER (EMERGENCY)
Age: 56
Discharge: LEFT BEFORE DISCHARGE | End: 2021-09-22
Payer: MEDICARE

## 2021-09-22 ENCOUNTER — HOSPITAL ENCOUNTER (EMERGENCY)
Age: 56
Discharge: HOME | End: 2021-09-22
Attending: EMERGENCY MEDICINE
Payer: MEDICARE

## 2021-09-22 VITALS
OXYGEN SATURATION: 99 % | WEIGHT: 163 LBS | HEIGHT: 75 IN | HEART RATE: 78 BPM | DIASTOLIC BLOOD PRESSURE: 99 MMHG | BODY MASS INDEX: 20.27 KG/M2 | RESPIRATION RATE: 18 BRPM | SYSTOLIC BLOOD PRESSURE: 148 MMHG | TEMPERATURE: 98.4 F

## 2021-09-22 VITALS
HEIGHT: 75 IN | WEIGHT: 163 LBS | BODY MASS INDEX: 20.27 KG/M2 | HEART RATE: 77 BPM | OXYGEN SATURATION: 97 % | RESPIRATION RATE: 14 BRPM | SYSTOLIC BLOOD PRESSURE: 150 MMHG | TEMPERATURE: 98.2 F | DIASTOLIC BLOOD PRESSURE: 88 MMHG

## 2021-09-22 DIAGNOSIS — R10.9 ABDOMINAL PAIN, UNSPECIFIED ABDOMINAL LOCATION: ICD-10-CM

## 2021-09-22 DIAGNOSIS — K94.19 PROLAPSE OF ILEOSTOMY (HCC): Primary | ICD-10-CM

## 2021-09-22 LAB
ALBUMIN SERPL-MCNC: 3.9 G/DL (ref 3.5–5.2)
ALP BLD-CCNC: 106 U/L (ref 40–129)
ALT SERPL-CCNC: 6 U/L (ref 0–40)
ANION GAP SERPL CALCULATED.3IONS-SCNC: 11 MMOL/L (ref 7–16)
AST SERPL-CCNC: 12 U/L (ref 0–39)
BASOPHILS ABSOLUTE: 0.08 E9/L (ref 0–0.2)
BASOPHILS RELATIVE PERCENT: 0.8 % (ref 0–2)
BILIRUB SERPL-MCNC: 0.3 MG/DL (ref 0–1.2)
BUN BLDV-MCNC: 18 MG/DL (ref 6–20)
CALCIUM SERPL-MCNC: 9.3 MG/DL (ref 8.6–10.2)
CHLORIDE BLD-SCNC: 98 MMOL/L (ref 98–107)
CO2: 22 MMOL/L (ref 22–29)
CREAT SERPL-MCNC: 1 MG/DL (ref 0.7–1.2)
EOSINOPHILS ABSOLUTE: 0.14 E9/L (ref 0.05–0.5)
EOSINOPHILS RELATIVE PERCENT: 1.4 % (ref 0–6)
GFR AFRICAN AMERICAN: >60
GFR NON-AFRICAN AMERICAN: >60 ML/MIN/1.73
GLUCOSE BLD-MCNC: 102 MG/DL (ref 74–99)
HCT VFR BLD CALC: 45.1 % (ref 37–54)
HEMOGLOBIN: 14.8 G/DL (ref 12.5–16.5)
IMMATURE GRANULOCYTES #: 0.04 E9/L
IMMATURE GRANULOCYTES %: 0.4 % (ref 0–5)
LYMPHOCYTES ABSOLUTE: 1.92 E9/L (ref 1.5–4)
LYMPHOCYTES RELATIVE PERCENT: 18.6 % (ref 20–42)
MCH RBC QN AUTO: 28.3 PG (ref 26–35)
MCHC RBC AUTO-ENTMCNC: 32.8 % (ref 32–34.5)
MCV RBC AUTO: 86.2 FL (ref 80–99.9)
MONOCYTES ABSOLUTE: 1.05 E9/L (ref 0.1–0.95)
MONOCYTES RELATIVE PERCENT: 10.2 % (ref 2–12)
NEUTROPHILS ABSOLUTE: 7.07 E9/L (ref 1.8–7.3)
NEUTROPHILS RELATIVE PERCENT: 68.6 % (ref 43–80)
PDW BLD-RTO: 15.4 FL (ref 11.5–15)
PLATELET # BLD: 263 E9/L (ref 130–450)
PMV BLD AUTO: 9 FL (ref 7–12)
POTASSIUM REFLEX MAGNESIUM: 4.2 MMOL/L (ref 3.5–5)
RBC # BLD: 5.23 E12/L (ref 3.8–5.8)
SODIUM BLD-SCNC: 131 MMOL/L (ref 132–146)
TOTAL PROTEIN: 6.7 G/DL (ref 6.4–8.3)
WBC # BLD: 10.3 E9/L (ref 4.5–11.5)

## 2021-09-22 PROCEDURE — 96374 THER/PROPH/DIAG INJ IV PUSH: CPT

## 2021-09-22 PROCEDURE — 96376 TX/PRO/DX INJ SAME DRUG ADON: CPT

## 2021-09-22 PROCEDURE — 99283 EMERGENCY DEPT VISIT LOW MDM: CPT

## 2021-09-22 PROCEDURE — 80053 COMPREHEN METABOLIC PANEL: CPT

## 2021-09-22 PROCEDURE — 6360000002 HC RX W HCPCS: Performed by: STUDENT IN AN ORGANIZED HEALTH CARE EDUCATION/TRAINING PROGRAM

## 2021-09-22 PROCEDURE — 85025 COMPLETE CBC W/AUTO DIFF WBC: CPT

## 2021-09-22 RX ORDER — FENTANYL CITRATE 50 UG/ML
50 INJECTION, SOLUTION INTRAMUSCULAR; INTRAVENOUS ONCE
Status: COMPLETED | OUTPATIENT
Start: 2021-09-22 | End: 2021-09-22

## 2021-09-22 RX ORDER — FENTANYL CITRATE 50 UG/ML
25 INJECTION, SOLUTION INTRAMUSCULAR; INTRAVENOUS ONCE
Status: COMPLETED | OUTPATIENT
Start: 2021-09-22 | End: 2021-09-22

## 2021-09-22 RX ADMIN — FENTANYL CITRATE 25 MCG: 0.05 INJECTION, SOLUTION INTRAMUSCULAR; INTRAVENOUS at 14:31

## 2021-09-22 RX ADMIN — FENTANYL CITRATE 50 MCG: 0.05 INJECTION, SOLUTION INTRAMUSCULAR; INTRAVENOUS at 13:01

## 2021-09-22 ASSESSMENT — PAIN DESCRIPTION - PROGRESSION: CLINICAL_PROGRESSION: NOT CHANGED

## 2021-09-22 ASSESSMENT — PAIN DESCRIPTION - LOCATION
LOCATION: ABDOMEN
LOCATION: ABDOMEN;OTHER (COMMENT)

## 2021-09-22 ASSESSMENT — PAIN SCALES - GENERAL
PAINLEVEL_OUTOF10: 10
PAINLEVEL_OUTOF10: 9
PAINLEVEL_OUTOF10: 10

## 2021-09-22 ASSESSMENT — PAIN DESCRIPTION - ORIENTATION: ORIENTATION: RIGHT

## 2021-09-22 ASSESSMENT — PAIN DESCRIPTION - PAIN TYPE
TYPE: ACUTE PAIN
TYPE: ACUTE PAIN

## 2021-09-22 ASSESSMENT — PAIN DESCRIPTION - FREQUENCY: FREQUENCY: CONTINUOUS

## 2021-09-22 NOTE — ED PROVIDER NOTES
64-year-old male with history of Crohn's status post hemicolectomy with ostomy presenting for protrusion of bowel through stoma. He states this is a frequent occurrence which happens almost daily. He states that he tried putting sugar on it, however it did not help. He complains of pain around the site. He states he had some bleeding into his ostomy bag and had to drain his bag 3 times. He has been unable to fit colostomy bag over it due to swelling. He denies other complaints. Review of Systems   Constitutional: Negative for chills and fever. HENT: Negative for congestion and sore throat. Eyes: Negative for photophobia and visual disturbance. Respiratory: Negative for cough. Cardiovascular: Negative for chest pain. Gastrointestinal: Positive for abdominal pain. Negative for diarrhea, nausea and vomiting. Blood in ostomy bag, protrusion of bowel   Genitourinary: Negative for dysuria and flank pain. Musculoskeletal: Negative for back pain and myalgias. Skin: Negative for rash and wound. Neurological: Negative for dizziness, light-headedness and headaches. Physical Exam  Constitutional:       General: He is not in acute distress. Appearance: He is not ill-appearing. HENT:      Head: Normocephalic and atraumatic. Eyes:      General: No scleral icterus. Cardiovascular:      Rate and Rhythm: Normal rate and regular rhythm. Pulmonary:      Effort: Pulmonary effort is normal.      Breath sounds: Normal breath sounds. Abdominal:      General: There is no distension. Palpations: Abdomen is soft. Tenderness: There is abdominal tenderness. Comments: R ileostomy prolapse with approximately 6 inches of bowel exposed   Skin:     General: Skin is warm and dry. Neurological:      General: No focal deficit present. Mental Status: He is alert.    Psychiatric:         Mood and Affect: Mood normal.         Behavior: Behavior normal.          Procedures MDM  Number of Diagnoses or Management Options  Abdominal pain, unspecified abdominal location  Prolapse of ileostomy ([facility]  Diagnosis management comments: 50-year-old male with history of Crohn's status post hemicolectomy with ostomy presenting for protrusion of bowel through stoma. Hemoglobin stable. Sugar was poured over bowel with no improvement of swelling. General surgery was consulted and reduced bowel prolapse at bedside and new colostomy bag was applied. Patient was urged to follow-up with his general surgeon in [place]. He expressed understanding. He was discharged home in stable condition.                    --------------------------------------------- PAST HISTORY ---------------------------------------------  Past Medical History:  has a past medical history of ADD (attention deficit disorder), Anxiety, Bipolar 1 disorder ([facility] Cancer ([facility] Crohn's disease ([facility] Depression, GERD (gastroesophageal reflux disease), Panic attack, Rectal pain, and Schizo affective schizophrenia ([facility] 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 Social History:  reports that he quit smoking about 5 months ago. His smoking use included cigarettes. He started smoking about 43 years ago. He has a 82.00 pack-year smoking history. He has never used smokeless tobacco. He reports that he does not drink alcohol and does not use drugs. Family History: family history includes Depression in his father; Heart Disease in his mother; Mental Illness in his brother, father, mother, and sister. The patients home medications have been reviewed.     Allergies: Ciprofloxacin hcl, Morphine, and Nsaids    -------------------------------------------------- RESULTS -------------------------------------------------  Labs:  Results for orders placed or performed during the hospital encounter of 09/22/21   CBC Auto Differential   Result Value Ref Range    WBC 10.3 4.5 - 11.5 E9/L    RBC 5.23 3.80 - 5.80 E12/L    Hemoglobin 14.8 12.5 - 16.5 g/dL    Hematocrit 45.1 37.0 - 54.0 %    MCV 86.2 80.0 - 99.9 fL    MCH 28.3 26.0 - 35.0 pg    MCHC 32.8 32.0 - 34.5 %    RDW 15.4 (H) 11.5 - 15.0 fL    Platelets 071 363 - 142 E9/L    MPV 9.0 7.0 - 12.0 fL    Neutrophils % 68.6 43.0 - 80.0 %    Immature Granulocytes % 0.4 0.0 - 5.0 %    Lymphocytes % 18.6 (L) 20.0 - 42.0 %    Monocytes % 10.2 2.0 - 12.0 %    Eosinophils % 1.4 0.0 - 6.0 %    Basophils % 0.8 0.0 - 2.0 %    Neutrophils Absolute 7.07 1.80 - 7.30 E9/L    Immature Granulocytes # 0.04 E9/L    Lymphocytes Absolute 1.92 1.50 - 4.00 E9/L    Monocytes Absolute 1.05 (H) 0.10 - 0.95 E9/L    Eosinophils Absolute 0.14 0.05 - 0.50 E9/L    Basophils Absolute 0.08 0.00 - 0.20 E9/L   Comprehensive Metabolic Panel w/ Reflex to MG   Result Value Ref Range    Sodium 131 (L) 132 - 146 mmol/L    Potassium reflex Magnesium 4.2 3.5 - 5.0 mmol/L    Chloride 98 98 - 107 mmol/L    CO2 22 22 - 29 mmol/L    Anion Gap 11 7 - 16 mmol/L    Glucose 102 (H) 74 - 99 mg/dL    BUN 18 6 - 20 mg/dL    CREATININE 1.0 0.7 - 1.2 mg/dL    GFR Non-African American >60 >=60 mL/min/1.73    GFR African American >60     Calcium 9.3 8.6 - 10.2 mg/dL    Total Protein 6.7 6.4 - 8.3 g/dL    Albumin 3.9 3.5 - 5.2 g/dL    Total Bilirubin 0.3 0.0 - 1.2 mg/dL    Alkaline Phosphatase 106 40 - 129 U/L    ALT 6 0 - 40 U/L    AST 12 0 - 39 U/L       Radiology:  No orders to display       ------------------------- NURSING NOTES AND VITALS REVIEWED ---------------------------  Date / Time Roomed:  9/22/2021 10:14 AM  ED Bed Assignment:  [email or web address]    The nursing notes within the ED encounter and vital signs as below have been reviewed.    BP (!) 150/88   Pulse 77   Temp 98.2 °F (36.8 °C) (Oral)   Resp 14   Ht 6' 3\" (1.905 m)   Wt 163 lb (73.9 kg)   SpO2 97%   BMI 20.37 kg/m²   Oxygen Saturation Interpretation: Normal      ------------------------------------------ PROGRESS NOTES ------------------------------------------    I have spoken with the patient and discussed todays results, in addition to providing specific details for the plan of care and counseling regarding the diagnosis and prognosis. Their questions are answered at this time and they are agreeable with the plan. I discussed at length with them reasons for immediate return here for re evaluation. They will followup with their general surgeon by calling their office tomorrow. --------------------------------- ADDITIONAL PROVIDER NOTES ---------------------------------  At this time the patient is without objective evidence of an acute process requiring hospitalization or inpatient management. They have remained hemodynamically stable throughout their entire ED visit and are stable for discharge with outpatient follow-up. The plan has been discussed in detail and they are aware of the specific conditions for emergent return, as well as the importance of follow-up. Discharge Medication List as of 9/22/2021  4:11 PM          Diagnosis:  1. Prolapse of ileostomy ([facility]    2. Abdominal pain, unspecified abdominal location        Disposition:  Patient's disposition: Discharge to home  Patient's condition is stable.           Nelson Lam MD  Resident  09/25/21 2002

## 2021-09-22 NOTE — CONSULTS
GENERAL SURGERY  CONSULT NOTE  9/22/2021  Chief Complaint   Patient presents with    Other    Abdominal Pain     at stoma site. Physician Consulted: Dr. Enrico Robles  Reason for Consult: Prolapsed ileostomy   Referring Physician: Dr. Bre LEE  Isaura Basilio is a 55 y.o. male who presents for evaluation of prolapsed ileostomy. Mr. Alex Levy presented to the ED from home complaining of prolapsed ileostomy and pain around ostomy site. He states that he is tried to reduce the prolapse with 1.5 pounds of sugar at home last night without success. Patient states that his total colectomy with ileostomy was performed in [facility] clinic by Dr. Ashtyn Marlow for Crohn's disease in May 2020. He has many recent ED visits for prolapse of his ileostomy. His most recent visit was on 9/13/2021. Ostomy prolapse was successfully reduced at that time and he was supposed to follow-up outpatient with Dr. Enrico Robles. Patient failed to present for follow-up. He states that this most recent prolapse occurred approximately 1 day ago. Patient states his last colonoscopy and EGD were over 1 year ago, prior to his surgery. 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 Patient is also a former smoker. At this time, patient is on no anticoagulation.     Past Medical History:   Diagnosis Date    ADD (attention deficit disorder)     Anxiety     Bipolar 1 disorder ([address] 11/19/2017    Cancer ([address] prostate cancer    2014 / treated with surgery    Crohn's disease ([address]     Depression     GERD (gastroesophageal reflux disease)     Panic attack     Rectal pain     has a fissure    Schizo affective schizophrenia ([address]     stable       Past Surgical History:   Procedure Laterality Date    COLONOSCOPY      COLONOSCOPY N/A 1/28/2020    COLONOSCOPY WITH BIOPSY performed by Nevaeh Junior MD at [address] [place] [address] DIAGNOSTIC      INCISION AND DRAINAGE N/A 5/15/2019    I & D SCROTAL ABSCESS WITH EXPLANTATION ARTIFICIAL URINARY SPHINCTER COMPLEX URETHROPLASTY performed by Dianna Dowd,  at ini 22 NOSE SURGERY  2002? deviated septum    PICC LINE INSERTION NURSE  10/3/2020         PROSTATECTOMY  9/15/2014    laparoscopic robotic assisted. Medications Prior to Admission:    Prior to Admission medications    Medication Sig Start Date End Date Taking? Authorizing Provider   nicotine polacrilex (NICORETTE) 4 MG gum Take 1 each by mouth as needed for Smoking cessation 2/9/53   KAM Gomez CNP   paliperidone (INVEGA) 3 MG extended release tablet Take 1 tablet by mouth nightly 6/8/21 7/8/76  KAM Gomez CNP   paliperidone (INVEGA) 6 MG extended release tablet Take 1 tablet by mouth daily 6/9/21 2/2/23  KAM Ansari CNP   atorvastatin (LIPITOR) 10 MG tablet Take 10 mg by mouth daily    Historical Provider, MD   benztropine (COGENTIN) 1 MG tablet Take 1 mg by mouth 2 times daily    Historical Provider, MD   Deutetrabenazine (AUSTEDO) 9 MG tablet Take 9 mg by mouth 2 times daily    Historical Provider, MD   melatonin 3 MG TABS tablet Take 6 mg by mouth nightly    Historical Provider, MD   prazosin (MINIPRESS) 2 MG capsule Take 2 mg by mouth 2 times daily    Historical Provider, MD   divalproex (DEPAKOTE) 500 MG DR tablet Take 1 tablet by mouth 2 times daily 5/27/21 4/65/51  KAM Ansari CNP   AUSTEDO 6 MG tablet Take 9 mg by mouth 2 times daily  2/18/21   Historical Provider, MD   ondansetron (ZOFRAN) 4 MG tablet Take 1 tablet by mouth every 6 hours as needed for Nausea or Vomiting 4/21/21   Skylar Salmon, DO       Allergies   Allergen Reactions    Ciprofloxacin Hcl      constipation    Morphine      \"it does nothing for me\"    Nsaids Other (See Comments)     Per pt's physician Dirk Yu) he is not to take NSAIDS due to HX of Crohn's disease.        Family History   Problem Relation Age of Onset    Mental Illness Mother     Mental Illness Father     Mental Illness Sister     Mental Illness Brother     Heart Disease Mother     Depression Father        Social History     Tobacco Use    Smoking status: Former Smoker     Packs/day: 2.00     Years: 41.00     Pack years: 82.00     Types: Cigarettes     Start date: 1978     Quit date: 2021     Years since quittin.4    Smokeless tobacco: Never Used   Vaping Use    Vaping Use: Never used   Substance Use Topics    Alcohol use: No     Comment: no alcohol in5 yrs    Drug use: No     Comment: last use  / marijuana         Review of Systems   General ROS: negative for - chills, fatigue, fever, night sweats, weight gain or weight loss  Hematological and Lymphatic ROS: negative for - bleeding problems or jaundice  Respiratory ROS: no cough, shortness of breath, or wheezing  Cardiovascular ROS: no chest pain or dyspnea on exertion  Gastrointestinal ROS: no change in bowel habits, or black or bloody stools. Admits to pain around his ostomy site and prolapsed ileostomy  Genito-Urinary ROS: no dysuria, trouble voiding, or hematuria  Musculoskeletal ROS: negative      PHYSICAL EXAM:    Vitals:    21 1431   BP: (!) 150/88   Pulse: 77   Resp: 14   Temp:    SpO2: 97%       General Appearance:  awake, alert, oriented, in no acute distress  Skin:  Skin color, texture, turgor normal. No rashes or lesions. Head/face:  NCAT  Eyes:  No gross abnormalities. , PERRL, EOMI and Sclera nonicteric  Lungs/Chest:  Normal expansion. No chest wall tenderness  Heart:  Heart regular rate. No chest pain  Abdomen:  Soft, non-tender, normal bowel sounds. Ileostomy bright red, bleeding, prolapsed (but reducible) approximately 6 inches with large amount of sugar spread around ostomy site. Extremities: Extremities warm to touch, pink, with no edema. Rectal:  Rectal exam deferred.       LABS:    CBC  Recent Labs     21  1410   WBC 10.3   HGB 14.8   HCT 45.1        BMP  Recent Labs     09/22/21  1410   *   K 4.2   CL 98   CO2 22   BUN 18   CREATININE 1.0   CALCIUM 9.3     Liver Function  Recent Labs     09/22/21  1410   BILITOT 0.3   AST 12   ALT 6   ALKPHOS 106   PROT 6.7   LABALBU 3.9     No results for input(s): LACTATE in the last 72 hours. No results for input(s): INR, PTT in the last 72 hours. Invalid input(s): PT    RADIOLOGY    No results found. ASSESSMENT:  55 y.o. male with prolapsed ileostomy     PLAN:  - Ileostomy reduced successfully in the emergency department. -OK for diet  -Discuss with patient that he need to follow-up outpatient with his surgeon at [facility]. Patient findings and plan discussed with Dr. Gold Menjivar. Electronically signed by Maggie Waldron DO on 9/22/21 at 2:21 PM EDT      Pt seen in the ED yesterday afternoon with residents; agree w above  Ileostomy successfully reduced at bedside  Recommend f/u with [facility] surgeons who performed procedure    Oly Jimenez MD  Minimally Invasive General Surgery and Endoscopy  [address] [place] [address].  [address]: [phone]  F: [phone]    Electronically signed by Louisa Church MD on 9/23/2021 at 9:34 AM

## 2021-09-25 ASSESSMENT — ENCOUNTER SYMPTOMS
ABDOMINAL PAIN: 1
SORE THROAT: 0
BACK PAIN: 0
VOMITING: 0
DIARRHEA: 0
PHOTOPHOBIA: 0
NAUSEA: 0
COUGH: 0

## 2021-11-02 PROCEDURE — 99285 EMERGENCY DEPT VISIT HI MDM: CPT

## 2021-11-02 PROCEDURE — 96376 TX/PRO/DX INJ SAME DRUG ADON: CPT

## 2021-11-02 PROCEDURE — 96375 TX/PRO/DX INJ NEW DRUG ADDON: CPT

## 2021-11-02 PROCEDURE — 96374 THER/PROPH/DIAG INJ IV PUSH: CPT

## 2021-11-02 ASSESSMENT — PAIN SCALES - GENERAL: PAINLEVEL_OUTOF10: 10

## 2021-11-03 ENCOUNTER — APPOINTMENT (OUTPATIENT)
Dept: CT IMAGING | Age: 56
End: 2021-11-03
Payer: MEDICARE

## 2021-11-03 ENCOUNTER — HOSPITAL ENCOUNTER (EMERGENCY)
Age: 56
Discharge: ANOTHER HOSPITAL | End: 2021-11-04
Attending: EMERGENCY MEDICINE
Payer: MEDICARE

## 2021-11-03 DIAGNOSIS — K94.09 COLOSTOMY PROLAPSE (HCC): Primary | ICD-10-CM

## 2021-11-03 LAB
ALBUMIN SERPL-MCNC: 4.1 G/DL (ref 3.5–5.2)
ALP BLD-CCNC: 103 U/L (ref 40–129)
ALT SERPL-CCNC: 6 U/L (ref 0–40)
ANION GAP SERPL CALCULATED.3IONS-SCNC: 14 MMOL/L (ref 7–16)
AST SERPL-CCNC: 9 U/L (ref 0–39)
BACTERIA: ABNORMAL /HPF
BASOPHILS ABSOLUTE: 0.05 E9/L (ref 0–0.2)
BASOPHILS RELATIVE PERCENT: 0.6 % (ref 0–2)
BILIRUB SERPL-MCNC: <0.2 MG/DL (ref 0–1.2)
BILIRUBIN URINE: NEGATIVE
BLOOD, URINE: ABNORMAL
BUN BLDV-MCNC: 11 MG/DL (ref 6–20)
CALCIUM SERPL-MCNC: 9.2 MG/DL (ref 8.6–10.2)
CHLORIDE BLD-SCNC: 99 MMOL/L (ref 98–107)
CLARITY: ABNORMAL
CO2: 22 MMOL/L (ref 22–29)
COLOR: YELLOW
CREAT SERPL-MCNC: 0.9 MG/DL (ref 0.7–1.2)
EOSINOPHILS ABSOLUTE: 0.14 E9/L (ref 0.05–0.5)
EOSINOPHILS RELATIVE PERCENT: 1.7 % (ref 0–6)
GFR AFRICAN AMERICAN: >60
GFR NON-AFRICAN AMERICAN: >60 ML/MIN/1.73
GLUCOSE BLD-MCNC: 144 MG/DL (ref 74–99)
GLUCOSE URINE: NEGATIVE MG/DL
HCT VFR BLD CALC: 41.6 % (ref 37–54)
HEMOGLOBIN: 13.3 G/DL (ref 12.5–16.5)
IMMATURE GRANULOCYTES #: 0.03 E9/L
IMMATURE GRANULOCYTES %: 0.4 % (ref 0–5)
KETONES, URINE: NEGATIVE MG/DL
LACTIC ACID: 2.2 MMOL/L (ref 0.5–2.2)
LEUKOCYTE ESTERASE, URINE: ABNORMAL
LYMPHOCYTES ABSOLUTE: 1.97 E9/L (ref 1.5–4)
LYMPHOCYTES RELATIVE PERCENT: 23.7 % (ref 20–42)
MCH RBC QN AUTO: 27 PG (ref 26–35)
MCHC RBC AUTO-ENTMCNC: 32 % (ref 32–34.5)
MCV RBC AUTO: 84.4 FL (ref 80–99.9)
MONOCYTES ABSOLUTE: 0.38 E9/L (ref 0.1–0.95)
MONOCYTES RELATIVE PERCENT: 4.6 % (ref 2–12)
NEUTROPHILS ABSOLUTE: 5.75 E9/L (ref 1.8–7.3)
NEUTROPHILS RELATIVE PERCENT: 69 % (ref 43–80)
NITRITE, URINE: NEGATIVE
PDW BLD-RTO: 14.9 FL (ref 11.5–15)
PH UA: 6 (ref 5–9)
PLATELET # BLD: 324 E9/L (ref 130–450)
PMV BLD AUTO: 8.8 FL (ref 7–12)
POTASSIUM SERPL-SCNC: 4.1 MMOL/L (ref 3.5–5)
PROTEIN UA: 30 MG/DL
RBC # BLD: 4.93 E12/L (ref 3.8–5.8)
RBC UA: ABNORMAL /HPF (ref 0–2)
SARS-COV-2, NAAT: NOT DETECTED
SODIUM BLD-SCNC: 135 MMOL/L (ref 132–146)
SPECIFIC GRAVITY UA: 1.01 (ref 1–1.03)
TOTAL PROTEIN: 7.4 G/DL (ref 6.4–8.3)
UROBILINOGEN, URINE: 0.2 E.U./DL
WBC # BLD: 8.3 E9/L (ref 4.5–11.5)
WBC UA: >20 /HPF (ref 0–5)

## 2021-11-03 PROCEDURE — 83605 ASSAY OF LACTIC ACID: CPT

## 2021-11-03 PROCEDURE — 74177 CT ABD & PELVIS W/CONTRAST: CPT

## 2021-11-03 PROCEDURE — 80053 COMPREHEN METABOLIC PANEL: CPT

## 2021-11-03 PROCEDURE — 85025 COMPLETE CBC W/AUTO DIFF WBC: CPT

## 2021-11-03 PROCEDURE — 6360000002 HC RX W HCPCS: Performed by: SURGERY

## 2021-11-03 PROCEDURE — 87040 BLOOD CULTURE FOR BACTERIA: CPT

## 2021-11-03 PROCEDURE — 81001 URINALYSIS AUTO W/SCOPE: CPT

## 2021-11-03 PROCEDURE — 96375 TX/PRO/DX INJ NEW DRUG ADDON: CPT

## 2021-11-03 PROCEDURE — 96374 THER/PROPH/DIAG INJ IV PUSH: CPT

## 2021-11-03 PROCEDURE — 6360000004 HC RX CONTRAST MEDICATION: Performed by: RADIOLOGY

## 2021-11-03 PROCEDURE — 6360000002 HC RX W HCPCS: Performed by: STUDENT IN AN ORGANIZED HEALTH CARE EDUCATION/TRAINING PROGRAM

## 2021-11-03 PROCEDURE — 96376 TX/PRO/DX INJ SAME DRUG ADON: CPT

## 2021-11-03 PROCEDURE — 36415 COLL VENOUS BLD VENIPUNCTURE: CPT

## 2021-11-03 PROCEDURE — 87635 SARS-COV-2 COVID-19 AMP PRB: CPT

## 2021-11-03 RX ORDER — DIVALPROEX SODIUM 250 MG/1
250 TABLET, DELAYED RELEASE ORAL 2 TIMES DAILY
COMMUNITY
End: 2021-11-22 | Stop reason: SDUPTHER

## 2021-11-03 RX ORDER — PALIPERIDONE 9 MG/1
9 TABLET, EXTENDED RELEASE ORAL NIGHTLY
COMMUNITY
End: 2021-11-22 | Stop reason: SDUPTHER

## 2021-11-03 RX ORDER — TRAZODONE HYDROCHLORIDE 50 MG/1
50-100 TABLET ORAL NIGHTLY PRN
COMMUNITY
End: 2022-01-13

## 2021-11-03 RX ORDER — DIVALPROEX SODIUM 500 MG/1
500 TABLET, DELAYED RELEASE ORAL 2 TIMES DAILY
COMMUNITY
End: 2021-11-22 | Stop reason: SDUPTHER

## 2021-11-03 RX ORDER — PROPRANOLOL HYDROCHLORIDE 20 MG/1
20 TABLET ORAL 2 TIMES DAILY
COMMUNITY
End: 2022-04-04 | Stop reason: SDUPTHER

## 2021-11-03 RX ORDER — FENTANYL CITRATE 50 UG/ML
50 INJECTION, SOLUTION INTRAMUSCULAR; INTRAVENOUS ONCE
Status: COMPLETED | OUTPATIENT
Start: 2021-11-03 | End: 2021-11-03

## 2021-11-03 RX ADMIN — IOHEXOL 50 ML: 240 INJECTION, SOLUTION INTRATHECAL; INTRAVASCULAR; INTRAVENOUS; ORAL at 10:20

## 2021-11-03 RX ADMIN — FENTANYL CITRATE 50 MCG: 0.05 INJECTION, SOLUTION INTRAMUSCULAR; INTRAVENOUS at 06:29

## 2021-11-03 RX ADMIN — HYDROMORPHONE HYDROCHLORIDE 1 MG: 1 INJECTION, SOLUTION INTRAMUSCULAR; INTRAVENOUS; SUBCUTANEOUS at 18:35

## 2021-11-03 RX ADMIN — HYDROMORPHONE HYDROCHLORIDE 1 MG: 1 INJECTION, SOLUTION INTRAMUSCULAR; INTRAVENOUS; SUBCUTANEOUS at 22:21

## 2021-11-03 RX ADMIN — HYDROMORPHONE HYDROCHLORIDE 1 MG: 1 INJECTION, SOLUTION INTRAMUSCULAR; INTRAVENOUS; SUBCUTANEOUS at 11:04

## 2021-11-03 RX ADMIN — HYDROMORPHONE HYDROCHLORIDE 1 MG: 1 INJECTION, SOLUTION INTRAMUSCULAR; INTRAVENOUS; SUBCUTANEOUS at 08:01

## 2021-11-03 RX ADMIN — IOPAMIDOL 75 ML: 755 INJECTION, SOLUTION INTRAVENOUS at 11:25

## 2021-11-03 RX ADMIN — HYDROMORPHONE HYDROCHLORIDE 1 MG: 1 INJECTION, SOLUTION INTRAMUSCULAR; INTRAVENOUS; SUBCUTANEOUS at 14:09

## 2021-11-03 ASSESSMENT — PAIN SCALES - GENERAL
PAINLEVEL_OUTOF10: 8
PAINLEVEL_OUTOF10: 9
PAINLEVEL_OUTOF10: 10
PAINLEVEL_OUTOF10: 9
PAINLEVEL_OUTOF10: 6
PAINLEVEL_OUTOF10: 9

## 2021-11-03 ASSESSMENT — ENCOUNTER SYMPTOMS
VOMITING: 0
DIARRHEA: 0
NAUSEA: 0
ABDOMINAL PAIN: 1
TROUBLE SWALLOWING: 0
VOICE CHANGE: 0
PHOTOPHOBIA: 0
COUGH: 0
RHINORRHEA: 0
SHORTNESS OF BREATH: 0

## 2021-11-03 ASSESSMENT — PAIN DESCRIPTION - PAIN TYPE
TYPE: ACUTE PAIN
TYPE: ACUTE PAIN

## 2021-11-03 ASSESSMENT — PAIN DESCRIPTION - LOCATION
LOCATION: ABDOMEN
LOCATION: ABDOMEN

## 2021-11-03 NOTE — ED PROVIDER NOTES
HPI   Patient is a 54 y/o male with medical history of prostate cancer in remission and crohn's/UC s/p colostomy presenting to the ED due to colostomy prolapse. Patient states that the prolapsed bowel caused his ostomy bag to perforate. He notes that this happens pretty regularly, last incidence occurring in August. The surgery was originally performed in [address] [place] [address]. Patient endorses moderate pain at the site of the prolapse. Pain has been constant, non-radiating and achy in nature. No exacerbating or remitting factors noted. Patient also admits to chills but denies ant other associated symptoms including chest pain, SOB, fever, nausea, vomiting or dizziness. Review of Systems   Constitutional: Positive for chills. Negative for fever. Chills but improved   HENT: Negative for congestion, rhinorrhea, trouble swallowing and voice change. Eyes: Negative for photophobia and visual disturbance. Respiratory: Negative for cough and shortness of breath. Cardiovascular: Negative for chest pain and palpitations. Gastrointestinal: Positive for abdominal pain. Negative for diarrhea, nausea and vomiting. Colostomy herniation/prolaspe   Genitourinary: Negative for dysuria. Musculoskeletal: Negative for arthralgias. Skin: Negative for rash and wound. Neurological: Negative for dizziness and headaches. Psychiatric/Behavioral: Negative for behavioral problems and confusion. Physical Exam  Constitutional:       General: He is not in acute distress. Appearance: Normal appearance. He is not ill-appearing. HENT:      Head: Normocephalic and atraumatic. Right Ear: External ear normal.      Left Ear: External ear normal.      Nose: Nose normal.      Mouth/Throat:      Mouth: Mucous membranes are moist.      Pharynx: Oropharynx is clear. Eyes:      Extraocular Movements: Extraocular movements intact.       Conjunctiva/sclera: Conjunctivae normal.   Cardiovascular:      Rate and Rhythm: Normal rate and regular rhythm. Pulses: Normal pulses. Heart sounds: Normal heart sounds. Pulmonary:      Effort: Pulmonary effort is normal. No respiratory distress. Breath sounds: Normal breath sounds. No wheezing or rales. Abdominal:      General: Abdomen is flat. Palpations: Abdomen is soft. There is mass. Tenderness: There is no abdominal tenderness. There is no guarding or rebound. Hernia: A hernia is present. Comments: Colostomy herniation with no evidence of strangulation    Musculoskeletal:         General: Normal range of motion. Cervical back: Normal range of motion and neck supple. Right lower leg: No edema. Left lower leg: No edema. Skin:     General: Skin is warm and dry. Capillary Refill: Capillary refill takes less than 2 seconds. Neurological:      General: No focal deficit present. Mental Status: He is alert and oriented to person, place, and time. Cranial Nerves: No cranial nerve deficit. Coordination: Coordination normal.   Psychiatric:         Mood and Affect: Mood normal.         Behavior: Behavior normal.            MDM   Patient is a 60-year-old male with past medical history of Crohn's colitis status post colostomy presenting to the emergency department due to colostomy prolapse. Patient states that the colostomy began to protrude out of the abdomen around 4 PM yesterday and progressively worsened bringing him to the emergency department for evaluation. On evaluation, colostomy is approximately 7 to 8 inches out and appeared pink with mild discoloration at the distal tip. Multiple attempts to reduce the prolapsed colostomy by emergency medicine and general surgery team were unsuccessful. Patient is well-known to the [facility] clinic and sees Dr. Mcgovern Her who performed the initial colostomy surgery.   Discussed the patient with [facility] clinic who is willing to transfer the patient for further evaluation and possible surgical intervention needs. Patient remained hemodynamically stable and is agreeable with plan for transfer. ED Course as of Nov 03 1021   Wed Nov 03, 2021   3155 Patient re-evaluated, sugar applied to he prolapsed bowel. Patient is comfortable at this time. [PP]   0700 Reevaluated after pain medication. Attempted to reduce prolapsed ostomy but was unsuccessful.     [PP]   3204 Spoke with general surgery regarding this case, surgical residents will come to assess the patient. [PP]   6614 Unsuccessful attempts to reduce colostomy prolapse by surgical residents. Plan to transfer the patient to [facility] for surgical evaluation where he is an established patient with GI surgery     [PP]   80 Dr. César Rodgers spoke with surgery team at [facility] who accepted patient for transfer.    [PP]      ED Course User Index  [PP] Nyle Boeck, DO          --------------------------------------------- PAST HISTORY ---------------------------------------------  Past Medical History:  has a past medical history of ADD (attention deficit disorder), Anxiety, Bipolar 1 disorder ([facility] Cancer ([facility] Crohn's disease ([facility] Depression, GERD (gastroesophageal reflux disease), Panic attack, Rectal pain, and Schizo affective schizophrenia ([facility] 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 Social History:  reports that he quit smoking about 7 months ago. His smoking use included cigarettes. He started smoking about 43 years ago. He has a 82.00 pack-year smoking history. He has never used smokeless tobacco. He reports that he does not drink alcohol and does not use drugs.     Family History: family history includes Depression in his father; Heart Disease in his mother; Mental Illness in his brother, father, mother, and sister. The patients home medications have been reviewed.     Allergies: Ciprofloxacin hcl, Morphine, and Nsaids    -------------------------------------------------- RESULTS -------------------------------------------------    Lab  Results for orders placed or performed during the hospital encounter of 11/03/21   CBC Auto Differential   Result Value Ref Range    WBC 8.3 4.5 - 11.5 E9/L    RBC 4.93 3.80 - 5.80 E12/L    Hemoglobin 13.3 12.5 - 16.5 g/dL    Hematocrit 41.6 37.0 - 54.0 %    MCV 84.4 80.0 - 99.9 fL    MCH 27.0 26.0 - 35.0 pg    MCHC 32.0 32.0 - 34.5 %    RDW 14.9 11.5 - 15.0 fL    Platelets 668 386 - 357 E9/L    MPV 8.8 7.0 - 12.0 fL    Neutrophils % 69.0 43.0 - 80.0 %    Immature Granulocytes % 0.4 0.0 - 5.0 %    Lymphocytes % 23.7 20.0 - 42.0 %    Monocytes % 4.6 2.0 - 12.0 %    Eosinophils % 1.7 0.0 - 6.0 %    Basophils % 0.6 0.0 - 2.0 %    Neutrophils Absolute 5.75 1.80 - 7.30 E9/L    Immature Granulocytes # 0.03 E9/L    Lymphocytes Absolute 1.97 1.50 - 4.00 E9/L    Monocytes Absolute 0.38 0.10 - 0.95 E9/L    Eosinophils Absolute 0.14 0.05 - 0.50 E9/L    Basophils Absolute 0.05 0.00 - 0.20 E9/L   Comprehensive Metabolic Panel   Result Value Ref Range    Sodium 135 132 - 146 mmol/L    Potassium 4.1 3.5 - 5.0 mmol/L    Chloride 99 98 - 107 mmol/L    CO2 22 22 - 29 mmol/L    Anion Gap 14 7 - 16 mmol/L    Glucose 144 (H) 74 - 99 mg/dL    BUN 11 6 - 20 mg/dL    CREATININE 0.9 0.7 - 1.2 mg/dL    GFR Non-African American >60 >=60 mL/min/1.73    GFR African American >60     Calcium 9.2 8.6 - 10.2 mg/dL    Total Protein 7.4 6.4 - 8.3 g/dL    Albumin 4.1 3.5 - 5.2 g/dL    Total Bilirubin <0.2 0.0 - 1.2 mg/dL    Alkaline Phosphatase 103 40 - 129 U/L    ALT 6 0 - 40 U/L    AST 9 0 - 39 U/L   Lactic Acid, Plasma   Result Value Ref Range    Lactic Acid 2.2 0.5 - 2.2 mmol/L       Radiology  CT ABDOMEN PELVIS W IV CONTRAST Additional Contrast? Oral    (Results Pending) ------------------------- NURSING NOTES AND VITALS REVIEWED ---------------------------  Date / Time Roomed:  11/3/2021  4:26 AM  ED Bed Assignment:  21/21    The nursing notes within the ED encounter and vital signs as below have been reviewed. Patient Vitals for the past 24 hrs:   BP Temp Temp src Pulse Resp SpO2 Height Weight   11/03/21 0940 112/72   86 16 98 %     11/03/21 0534 112/68   90 16 98 %     11/02/21 2045 128/83 97 °F (36.1 °C) Temporal 125 18 98 % 6' 3\" (1.905 m) 195 lb (88.5 kg)       Oxygen Saturation Interpretation: Normal      ------------------------------------------ PROGRESS NOTES ------------------------------------------  Re-evaluation(s):  See ED course. I have spoken with the patient and discussed todays results, in addition to providing specific details for the plan of care and counseling regarding the diagnosis and prognosis. Their questions are answered at this time and they are agreeable with the plan. I have discussed the risks and benefits of transfer and they wish to proceed with the transfer. --------------------------------- ADDITIONAL PROVIDER NOTES ---------------------------------  Consultations:  Spoke with general surgery team.  Discussed case. They will admit this patient and accept the patient for transfer    Reason for transfer: prolapsed colostomy. This patient's ED course included: a personal history and physicial examination, re-evaluation prior to disposition, multiple bedside re-evaluations, IV medications, continuous pulse oximetry and complex medical decision making and emergency management    This patient has remained hemodynamically stable during their ED course. Please note that the withdrawal or failure to initiate urgent interventions for this patient would likely result in a life threatening deterioration or permanent disability. Clinical Impression  1.  Colostomy prolapse ([address]          Disposition  Patient's disposition: Transfer to [facility]. Transferred by: Mary Alice Noguera. Patient's condition is stable.                Lauro Alicia DO  Resident  11/03/21 1024

## 2021-11-03 NOTE — ED PROVIDER NOTES
ATTENDING PROVIDER ATTESTATION:     Ronda Cabot presented to the emergency department for evaluation of Wound Check (colostomy is protruding out and can't fit a bag on it / has had colostomy for a year / states it is bleeding )   and was initially evaluated by the Medical Resident. See Original ED Note for H&P and ED course above. I have reviewed and discussed the case, including pertinent history (medical, surgical, family and social) and exam findings with the Medical Resident assigned to Ronda Cabot. I have personally performed and/or participated in the history, exam, medical decision making, and procedures and agree with all pertinent clinical information and any additional changes or corrections are noted below in my assessment and plan. I have discussed this patient in detail with the resident, and provided the instruction and education,       I have reviewed my findings and recommendations with the assigned Medical Resident, Ronda Cabot and members of family present at the time of disposition. I have performed a history and physical examination of this patient and directly supervised the resident caring for this patient      History of Present Illness:    Presents to the ED for prolapsed ostomy, beginning last night. The complaint has been constant, moderate in severity, and worsened by nothing. Patient reports he has a history of prolapse from his ostomy requiring manual reduction. Reports this happened again last night. He reports he is unable to get it reduced. He reports he follows with a surgeon in [place] for his ostomy. He c/o pain at the site. He denies other complaints. No fever, chills, chest pain, shortness of breath or any other complaints.          Review of Systems:   A complete review of systems was performed and pertinent positives and negatives are stated within HPI, all other systems reviewed and are negative.    --------------------------------------------- PAST HISTORY ---------------------------------------------  Past Medical History:  has a past medical history of ADD (attention deficit disorder), Anxiety, Bipolar 1 disorder ([facility] Cancer ([facility] Crohn's disease ([facility] Depression, GERD (gastroesophageal reflux disease), Panic attack, Rectal pain, and Schizo affective schizophrenia ([facility] 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 Social History:  reports that he quit smoking about 7 months ago. His smoking use included cigarettes. He started smoking about 43 years ago. He has a 82.00 pack-year smoking history. He has never used smokeless tobacco. He reports that he does not drink alcohol and does not use drugs. Family History: family history includes Depression in his father; Heart Disease in his mother; Mental Illness in his brother, father, mother, and sister. Unless otherwise noted, family history is non contributory    The patients home medications have been reviewed. Allergies: Ciprofloxacin hcl, Morphine, and Nsaids      Physical Exam:  Constitutional/General: Alert and oriented x3  Head: Normocephalic and atraumatic  Eyes: PERRL, EOMI, sclera non icteric  ENT: Oropharynx clear, handling secretions  Neck: Supple, full ROM, no stridor, no meningeal signs  Respiratory: Lungs clear to auscultation bilaterally, no wheezes, rales, or rhonchi. Not in respiratory distress  Cardiovascular:  Regular rate. Regular rhythm. No murmurs, no gallops, no rubs. 2+ distal pulses. Equal extremity pulses. GI:  Abdomen Soft, approximately 6 inches of ostomy is protruding through the stoma. The ostomy is pink. There are no signs of ischemia. The abdomen is Non tender, Non distended. No rebound, guarding, or rigidity. No pulsatile masses.   Musculoskeletal: Moves all extremities x 4. Warm and well perfused,  no clubbing, no cyanosis, no edema. Palpable peripheral pulses  Integument: skin warm and dry. No rashes. Neurologic: GCS 15, no focal deficits  Psychiatric: Normal Affect      I directly supervised any procedures performed by the resident and was present for the procedure including all critical portions of the procedure      I, Dr. Magno Mcnamara, am the primary provider of record    My Medical Decision Making: We applied sugar and attempted manual reduction of the protruding ileostomy. Unfortunately this was unable to be reduced. We then called general surgery emergently and they came down and attempted reduction but were unsuccessful. Based on the complex nature of the patient's condition and his close follow at the [facility], General Surgery recommended transfer to [place] for further definitive care. I spoke with Dr. Lauren Rosa at [facility], he accepts for transfer       1.  Colostomy prolapse ([facility]           Cherie Byrne MD  11/03/21 1047

## 2021-11-03 NOTE — CONSULTS
GENERAL SURGERY  CONSULT NOTE  11/3/2021    Physician Consulted: Dr. Perlita Cruz  Reason for Consult: prolapsed ileostomy  Referring Physician: Dr. Tameka Douglas    Chief Complaint   Patient presents with    Wound Check     colostomy is protruding out and can't fit a bag on it / has had colostomy for a year / states it is bleeding          HPI  Gaye Bee is a 55 y.o. male who presents for evaluation of stoma prolapse. PMH Crohn's disease s/p lap TAC with end ileostomy. Patient reports has ileostomy has been prolapse since approximately 4 PM yesterday. It occurred after a coughing episode. He is a chronic smoker and has a chronic cough and has recurrent episodes with his ostomy prolapsing and typically is able to reduce it on his own. He also has a known chronic parastomal hernia. Patient complains of abdominal pain associated with stoma prolapse. He is afebrile. He is otherwise been having normal bowel function and tolerating a diet. General surgery consulted to evaluate. The ED did make attempts to reduce stoma however were unsuccessful. He has been seen at [facility] by Dr. Sharron Tyson, colorectal surgery recently and was recommended for proctectomy for recurrent Crohn's disease and proctitis. He is s/p lap TAC May 2020. He has had problems with parastomal hernia and recurrent ostomy prolapse. Colorectal surgery planned to refer to abdominal hernia specialist for evaluation.       Past Medical History:   Diagnosis Date    ADD (attention deficit disorder)     Anxiety     Bipolar 1 disorder ([address] 11/19/2017    Cancer ([address] prostate cancer    2014 / treated with surgery    Crohn's disease ([address]     Depression     GERD (gastroesophageal reflux disease)     Panic attack     Rectal pain     has a fissure    Schizo affective schizophrenia ([address]     stable       Past Surgical History:   Procedure Laterality Date    COLONOSCOPY      COLONOSCOPY N/A 1/28/2020    COLONOSCOPY WITH BIOPSY performed by Aubrey Contreras Carlos Eduardo Munson MD at [address] [place] [address], COLON, DIAGNOSTIC      INCISION AND DRAINAGE N/A 5/15/2019    I & D SCROTAL ABSCESS WITH EXPLANTATION ARTIFICIAL URINARY SPHINCTER COMPLEX URETHROPLASTY performed by Yuko Dowd DO at [facility]  2002? deviated septum    PICC LINE INSERTION NURSE  10/3/2020         PROSTATECTOMY  9/15/2014    laparoscopic robotic assisted. Medications Prior to Admission    Prior to Admission medications    Medication Sig Start Date End Date Taking?  Authorizing Provider   nicotine polacrilex (NICORETTE) 4 MG gum Take 1 each by mouth as needed for Smoking cessation 2/3/86   KAM Avila CNP   paliperidone (INVEGA) 3 MG extended release tablet Take 1 tablet by mouth nightly 6/8/21 2/2/06  KAM Avila CNP   paliperidone (INVEGA) 6 MG extended release tablet Take 1 tablet by mouth daily 6/9/21 7/1/66  KAM Barr CNP   atorvastatin (LIPITOR) 10 MG tablet Take 10 mg by mouth daily    Historical Provider, MD   benztropine (COGENTIN) 1 MG tablet Take 1 mg by mouth 2 times daily    Historical Provider, MD   Deutetrabenazine (AUSTEDO) 9 MG tablet Take 9 mg by mouth 2 times daily    Historical Provider, MD   melatonin 3 MG TABS tablet Take 6 mg by mouth nightly    Historical Provider, MD   prazosin (MINIPRESS) 2 MG capsule Take 2 mg by mouth 2 times daily    Historical Provider, MD   divalproex (DEPAKOTE) 500 MG DR tablet Take 1 tablet by mouth 2 times daily 5/27/21 0/48/57  KAM Barr CNP   AUSTEDO 6 MG tablet Take 9 mg by mouth 2 times daily  2/18/21   Historical Provider, MD   ondansetron (ZOFRAN) 4 MG tablet Take 1 tablet by mouth every 6 hours as needed for Nausea or Vomiting 4/21/21   Skylar Salmon, DO       Allergies   Allergen Reactions    Ciprofloxacin Hcl      constipation    Morphine      \"it does nothing for me\"    Nsaids Other (See Comments)     Per pt's physician Sheridan Blackwood) he is not to take NSAIDS due to HX of Crohn's disease. Family History   Problem Relation Age of Onset    Mental Illness Mother     Mental Illness Father     Mental Illness Sister     Mental Illness Brother     Heart Disease Mother     Depression Father        Social History     Tobacco Use    Smoking status: Former Smoker     Packs/day: 2.00     Years: 41.00     Pack years: 82.00     Types: Cigarettes     Start date: 1978     Quit date: 2021     Years since quittin.5    Smokeless tobacco: Never Used   Vaping Use    Vaping Use: Never used   Substance Use Topics    Alcohol use: No     Comment: no alcohol in5 yrs    Drug use: No     Comment: last use  / marijuana         Review of Systems:   General ROS: negative  Hematological and Lymphatic ROS: negative  Respiratory ROS: no cough, shortness of breath, or wheezing  Cardiovascular ROS: no chest pain or dyspnea on exertion  Gastrointestinal ROS: see HPI  Genito-Urinary ROS: no dysuria, trouble voiding, or hematuria  Musculoskeletal ROS: negative      PHYSICAL EXAM:    Vitals:    21 0534   BP: 112/68   Pulse: 90   Resp: 16   Temp:    SpO2: 98%       GENERAL:  NAD. A&Ox3. HEAD:  Normocephalic. Atraumatic. EYES:   No scleral icterus. PERRL. LUNGS:  No increased work of breathing. CARDIOVASCULAR: Regular rate  ABDOMEN:  Soft, non-distended, TTP around stoma. His end ileostomy is prolapsed approx 15cm with some duskiness of mucosa. No guarding, rigidity, rebound. EXTREMITIES:   MAEx4. Atraumatic. No LE edema. SKIN:  Warm and dry, no rashes or lesions  NEUROLOGIC:  no focal deficits    LABS:    CBC  Recent Labs     21   WBC 8.3   HGB 13.3   HCT 41.6        BMP  Recent Labs     21  0259      K 4.1   CL 99   CO2 22   BUN 11   CREATININE 0.9   CALCIUM 9.2     Liver Function  Recent Labs     21   BILITOT <0.2   AST 9   ALT 6   ALKPHOS 103   PROT 7.4   LABALBU 4.1     No results for input(s): LACTATE in the last 72 hours.   No results for input(s): INR, PTT in the last 72 hours. Invalid input(s): PT    RADIOLOGY    No results found. ASSESSMENT/PLAN:  55 y.o. male with prolapsed end ileostomy    Multiple attempts at bedside reduction by ED and Surgery team  Unable to completely reduce and ostomy is dusky. At this point ostomy has been prolapsed too long and will need to be reduced surgically and likely revised. Due to his Crohns disease and surgical history recommend that [facility] known the GI and Colorectal surgery for his Crohn's disease. Plan discussed with Dr. Marcin Cho.     Toni Bourne DO  Resident, PGY-3  11/3/2021  7:28 AM

## 2021-11-03 NOTE — ED NOTES
FIRST PROVIDER CONTACT ASSESSMENT NOTE      Department of Emergency Medicine   Admit Date: No admission date for patient encounter. Chief Complaint: Wound Check (colostomy is protruding out and can't fit a bag on it / has had colostomy for a year / states it is bleeding )      History of Present Illness:    Manisha Kearney is a 55 y.o. male who presents to the ED for prolapsed ileostomy bag. Patient reports that has been out for about 6 hours now. He reports this happens all the time and he is usually will get back in. He was in hospital on September 22 for same thing and surgery came down and reduced it in department. He has not followed back up with his surgeon at [facility] because he does not want to see that surgeon any longer, patient feels that the surgeon did not do a good job and he does not have another surgeon with whom he has followed up with yet.   He reports they referred him to a surgeon and [facility] but he and his wife are on disability and have trouble getting there.        -----------------END OF FIRST PROVIDER CONTACT ASSESSMENT NOTE--------------  Electronically signed by Javon Barajas PA-C   DD: 11/2/21             Javon Barajas PA-C  11/02/21 2049

## 2021-11-04 VITALS
HEART RATE: 77 BPM | RESPIRATION RATE: 18 BRPM | HEIGHT: 75 IN | DIASTOLIC BLOOD PRESSURE: 85 MMHG | BODY MASS INDEX: 24.25 KG/M2 | OXYGEN SATURATION: 99 % | TEMPERATURE: 98.1 F | SYSTOLIC BLOOD PRESSURE: 119 MMHG | WEIGHT: 195 LBS

## 2021-11-04 LAB
ACANTHOCYTES: ABNORMAL
ALBUMIN SERPL-MCNC: 3.6 G/DL (ref 3.5–5.2)
ALP BLD-CCNC: 110 U/L (ref 40–129)
ALT SERPL-CCNC: 5 U/L (ref 0–40)
ANION GAP SERPL CALCULATED.3IONS-SCNC: 13 MMOL/L (ref 7–16)
AST SERPL-CCNC: 12 U/L (ref 0–39)
BASOPHILS ABSOLUTE: 0.05 E9/L (ref 0–0.2)
BASOPHILS RELATIVE PERCENT: 0.6 % (ref 0–2)
BILIRUB SERPL-MCNC: 0.2 MG/DL (ref 0–1.2)
BUN BLDV-MCNC: 11 MG/DL (ref 6–20)
BURR CELLS: ABNORMAL
CALCIUM SERPL-MCNC: 9.5 MG/DL (ref 8.6–10.2)
CHLORIDE BLD-SCNC: 102 MMOL/L (ref 98–107)
CO2: 20 MMOL/L (ref 22–29)
CREAT SERPL-MCNC: 0.9 MG/DL (ref 0.7–1.2)
EOSINOPHILS ABSOLUTE: 0.34 E9/L (ref 0.05–0.5)
EOSINOPHILS RELATIVE PERCENT: 4 % (ref 0–6)
GFR AFRICAN AMERICAN: >60
GFR NON-AFRICAN AMERICAN: >60 ML/MIN/1.73
GLUCOSE BLD-MCNC: 81 MG/DL (ref 74–99)
HCT VFR BLD CALC: 40.3 % (ref 37–54)
HEMOGLOBIN: 13 G/DL (ref 12.5–16.5)
IMMATURE GRANULOCYTES #: 0.03 E9/L
IMMATURE GRANULOCYTES %: 0.3 % (ref 0–5)
LACTIC ACID: 0.9 MMOL/L (ref 0.5–2.2)
LYMPHOCYTES ABSOLUTE: 2.07 E9/L (ref 1.5–4)
LYMPHOCYTES RELATIVE PERCENT: 24.1 % (ref 20–42)
MCH RBC QN AUTO: 27.2 PG (ref 26–35)
MCHC RBC AUTO-ENTMCNC: 32.3 % (ref 32–34.5)
MCV RBC AUTO: 84.3 FL (ref 80–99.9)
MONOCYTES ABSOLUTE: 0.82 E9/L (ref 0.1–0.95)
MONOCYTES RELATIVE PERCENT: 9.5 % (ref 2–12)
NEUTROPHILS ABSOLUTE: 5.29 E9/L (ref 1.8–7.3)
NEUTROPHILS RELATIVE PERCENT: 61.5 % (ref 43–80)
OVALOCYTES: ABNORMAL
PDW BLD-RTO: 15.1 FL (ref 11.5–15)
PLATELET # BLD: 335 E9/L (ref 130–450)
PMV BLD AUTO: 9.5 FL (ref 7–12)
POIKILOCYTES: ABNORMAL
POTASSIUM REFLEX MAGNESIUM: 4.7 MMOL/L (ref 3.5–5)
RBC # BLD: 4.78 E12/L (ref 3.8–5.8)
SODIUM BLD-SCNC: 135 MMOL/L (ref 132–146)
TOTAL PROTEIN: 6.9 G/DL (ref 6.4–8.3)
WBC # BLD: 8.6 E9/L (ref 4.5–11.5)

## 2021-11-04 PROCEDURE — 6360000002 HC RX W HCPCS: Performed by: STUDENT IN AN ORGANIZED HEALTH CARE EDUCATION/TRAINING PROGRAM

## 2021-11-04 PROCEDURE — 85025 COMPLETE CBC W/AUTO DIFF WBC: CPT

## 2021-11-04 PROCEDURE — 2580000003 HC RX 258: Performed by: EMERGENCY MEDICINE

## 2021-11-04 PROCEDURE — 6360000002 HC RX W HCPCS: Performed by: SURGERY

## 2021-11-04 PROCEDURE — 80053 COMPREHEN METABOLIC PANEL: CPT

## 2021-11-04 PROCEDURE — 83605 ASSAY OF LACTIC ACID: CPT

## 2021-11-04 RX ORDER — DIPHENHYDRAMINE HYDROCHLORIDE 50 MG/ML
25 INJECTION INTRAMUSCULAR; INTRAVENOUS ONCE
Status: COMPLETED | OUTPATIENT
Start: 2021-11-04 | End: 2021-11-04

## 2021-11-04 RX ORDER — SODIUM CHLORIDE 9 MG/ML
INJECTION, SOLUTION INTRAVENOUS CONTINUOUS
Status: DISCONTINUED | OUTPATIENT
Start: 2021-11-04 | End: 2021-11-04 | Stop reason: HOSPADM

## 2021-11-04 RX ADMIN — HYDROMORPHONE HYDROCHLORIDE 1 MG: 1 INJECTION, SOLUTION INTRAMUSCULAR; INTRAVENOUS; SUBCUTANEOUS at 06:34

## 2021-11-04 RX ADMIN — SODIUM CHLORIDE: 9 INJECTION, SOLUTION INTRAVENOUS at 16:48

## 2021-11-04 RX ADMIN — DIPHENHYDRAMINE HYDROCHLORIDE 25 MG: 50 INJECTION, SOLUTION INTRAMUSCULAR; INTRAVENOUS at 12:12

## 2021-11-04 RX ADMIN — HYDROMORPHONE HYDROCHLORIDE 1 MG: 1 INJECTION, SOLUTION INTRAMUSCULAR; INTRAVENOUS; SUBCUTANEOUS at 20:15

## 2021-11-04 RX ADMIN — HYDROMORPHONE HYDROCHLORIDE 1 MG: 1 INJECTION, SOLUTION INTRAMUSCULAR; INTRAVENOUS; SUBCUTANEOUS at 15:55

## 2021-11-04 RX ADMIN — HYDROMORPHONE HYDROCHLORIDE 1 MG: 1 INJECTION, SOLUTION INTRAMUSCULAR; INTRAVENOUS; SUBCUTANEOUS at 01:51

## 2021-11-04 RX ADMIN — HYDROMORPHONE HYDROCHLORIDE 1 MG: 1 INJECTION, SOLUTION INTRAMUSCULAR; INTRAVENOUS; SUBCUTANEOUS at 11:46

## 2021-11-04 ASSESSMENT — PAIN SCALES - GENERAL
PAINLEVEL_OUTOF10: 8
PAINLEVEL_OUTOF10: 7
PAINLEVEL_OUTOF10: 9
PAINLEVEL_OUTOF10: 10

## 2021-11-04 NOTE — ED NOTES
Spoke with [facility] transfer center. Pt assigned to [facility], H50 Bed 15. [facility]N can be called at [phone]. [facility] called to arrange transport. S crew.  ETA - 4-5 hrs ([phone])     Burak Wallace RN  11/04/21 6660

## 2021-11-04 NOTE — ED NOTES
Spoke with Roxana Pittman from [facility] transport. They will be sending transport via ground truck. ETA about an hour.       Kiara Horton RN  11/04/21 1916

## 2021-11-05 NOTE — ED NOTES
Report called to Daina at [facility]. Pt transported to [facility] on cot. All belongings sent with pt. Pt is a/ox3. VSS. Report and chart given to EMS staff.       Jose Muller RN  11/04/21 2043

## 2021-11-08 LAB
BLOOD CULTURE, ROUTINE: NORMAL
CULTURE, BLOOD 2: NORMAL

## 2021-11-22 ENCOUNTER — HOSPITAL ENCOUNTER (EMERGENCY)
Age: 56
Discharge: HOME | End: 2021-11-22
Attending: EMERGENCY MEDICINE
Payer: MEDICARE

## 2021-11-22 VITALS
HEIGHT: 75 IN | HEART RATE: 80 BPM | WEIGHT: 195 LBS | DIASTOLIC BLOOD PRESSURE: 94 MMHG | RESPIRATION RATE: 14 BRPM | OXYGEN SATURATION: 97 % | BODY MASS INDEX: 24.25 KG/M2 | SYSTOLIC BLOOD PRESSURE: 141 MMHG

## 2021-11-22 DIAGNOSIS — F32.A DEPRESSION, UNSPECIFIED DEPRESSION TYPE: Primary | ICD-10-CM

## 2021-11-22 LAB
EKG ATRIAL RATE: 75 BPM
EKG P AXIS: 65 DEGREES
EKG P-R INTERVAL: 156 MS
EKG Q-T INTERVAL: 390 MS
EKG QRS DURATION: 86 MS
EKG QTC CALCULATION (BAZETT): 435 MS
EKG R AXIS: 76 DEGREES
EKG T AXIS: 72 DEGREES
EKG VENTRICULAR RATE: 75 BPM

## 2021-11-22 PROCEDURE — 99285 EMERGENCY DEPT VISIT HI MDM: CPT

## 2021-11-22 PROCEDURE — 93005 ELECTROCARDIOGRAM TRACING: CPT | Performed by: EMERGENCY MEDICINE

## 2021-11-22 PROCEDURE — 93010 ELECTROCARDIOGRAM REPORT: CPT | Performed by: INTERNAL MEDICINE

## 2021-11-22 RX ORDER — PALIPERIDONE 9 MG/1
9 TABLET, EXTENDED RELEASE ORAL NIGHTLY
Qty: 30 TABLET | Refills: 0 | Status: ON HOLD | OUTPATIENT
Start: 2021-11-22 | End: 2022-03-28 | Stop reason: HOSPADM

## 2021-11-22 RX ORDER — DIVALPROEX SODIUM 500 MG/1
500 TABLET, DELAYED RELEASE ORAL 2 TIMES DAILY
Qty: 90 TABLET | Refills: 0 | Status: SHIPPED | OUTPATIENT
Start: 2021-11-22 | End: 2022-04-04 | Stop reason: ALTCHOICE

## 2021-11-22 RX ORDER — DIVALPROEX SODIUM 250 MG/1
250 TABLET, DELAYED RELEASE ORAL 2 TIMES DAILY
Qty: 90 TABLET | Refills: 0 | Status: SHIPPED | OUTPATIENT
Start: 2021-11-22 | End: 2022-04-04 | Stop reason: ALTCHOICE

## 2021-11-22 ASSESSMENT — PAIN DESCRIPTION - LOCATION: LOCATION: BUTTOCKS

## 2021-11-22 ASSESSMENT — PAIN SCALES - GENERAL: PAINLEVEL_OUTOF10: 3

## 2021-11-22 ASSESSMENT — PAIN SCALES - WONG BAKER: WONGBAKER_NUMERICALRESPONSE: 2

## 2021-11-22 ASSESSMENT — PAIN DESCRIPTION - FREQUENCY: FREQUENCY: CONTINUOUS

## 2021-11-22 ASSESSMENT — PAIN DESCRIPTION - PAIN TYPE: TYPE: ACUTE PAIN

## 2021-11-22 ASSESSMENT — PAIN DESCRIPTION - DESCRIPTORS: DESCRIPTORS: ACHING
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
## 2021-11-22 NOTE — ED PROVIDER NOTES
.HPI:  11/22/21,   Time: 12:27 PM CALEB Moon is a 55 y.o. male presenting to the ED for psych eval, beginning 2 weeks ago. The complaint has been persistent, moderate in severity, and worsened by stress. Pt out of meds, more depressd, no si/hi. Followed by [facility], well known, poor social structure. No hallucinations//v/d/cp/sob/cough/congestion    Review of Systems:   Pertinent positives and negatives are stated within HPI, all other systems reviewed and are negative.          --------------------------------------------- PAST HISTORY ---------------------------------------------  Past Medical History:  has a past medical history of ADD (attention deficit disorder), Anxiety, Bipolar 1 disorder ([facility] Cancer ([facility] Crohn's disease ([facility] Depression, GERD (gastroesophageal reflux disease), Panic attack, Rectal pain, and Schizo affective schizophrenia ([facility] 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 Social History:  reports that he quit smoking about 7 months ago. His smoking use included cigarettes. He started smoking about 43 years ago. He has a 82.00 pack-year smoking history. He has never used smokeless tobacco. He reports that he does not drink alcohol and does not use drugs. Family History: family history includes Depression in his father; Heart Disease in his mother; Mental Illness in his brother, father, mother, and sister. The patients home medications have been reviewed.     Allergies: Ciprofloxacin hcl, Morphine, and Nsaids        ---------------------------------------------------PHYSICAL EXAM--------------------------------------    Constitutional/General: Alert and oriented x3, well appearing, non toxic in NAD  Head: Normocephalic and MAKING----------------------  Medications - No data to display      ED COURSE:       Medical Decision Making:    Pt just wants refills, no si/hi, refill meds and fu with [facility]      This patient's ED course included: a personal history and physicial examination    This patient has remained hemodynamically stable during their ED course. Counseling: The emergency provider has spoken with the patient and discussed todays results, in addition to providing specific details for the plan of care and counseling regarding the diagnosis and prognosis. Questions are answered at this time and they are agreeable with the plan.       --------------------------------- IMPRESSION AND DISPOSITION ---------------------------------    IMPRESSION  1. Depression, unspecified depression type        DISPOSITION  Disposition: Discharge to home  Patient condition is stable    NOTE: This report was transcribed using voice recognition software.  Every effort was made to ensure accuracy; however, inadvertent computerized transcription errors may be present        Kerrie Parr MD  11/22/21 8333

## 2022-01-07 PROBLEM — Z20.822 ENCOUNTER BY TELEHEALTH FOR SUSPECTED COVID-19: Status: ACTIVE | Noted: 2022-01-07

## 2022-01-13 ENCOUNTER — HOSPITAL ENCOUNTER (EMERGENCY)
Age: 57
Discharge: HOME | End: 2022-01-13
Attending: EMERGENCY MEDICINE
Payer: MEDICARE

## 2022-01-13 VITALS
TEMPERATURE: 97.9 F | BODY MASS INDEX: 22.38 KG/M2 | HEIGHT: 75 IN | RESPIRATION RATE: 18 BRPM | WEIGHT: 180 LBS | OXYGEN SATURATION: 98 % | HEART RATE: 95 BPM | SYSTOLIC BLOOD PRESSURE: 168 MMHG | DIASTOLIC BLOOD PRESSURE: 90 MMHG

## 2022-01-13 DIAGNOSIS — N48.89 PENILE PAIN: Primary | ICD-10-CM

## 2022-01-13 LAB
BACTERIA: ABNORMAL /HPF
BILIRUBIN URINE: NEGATIVE
BLOOD, URINE: ABNORMAL
CLARITY: ABNORMAL
COLOR: YELLOW
GLUCOSE URINE: NEGATIVE MG/DL
KETONES, URINE: ABNORMAL MG/DL
LEUKOCYTE ESTERASE, URINE: ABNORMAL
MUCUS: PRESENT /LPF
NITRITE, URINE: NEGATIVE
PH UA: 5.5 (ref 5–9)
PROTEIN UA: 100 MG/DL
RBC UA: >20 /HPF (ref 0–2)
SPECIFIC GRAVITY UA: >=1.03 (ref 1–1.03)
UROBILINOGEN, URINE: 0.2 E.U./DL
WBC UA: ABNORMAL /HPF (ref 0–5)

## 2022-01-13 PROCEDURE — 6370000000 HC RX 637 (ALT 250 FOR IP): Performed by: EMERGENCY MEDICINE

## 2022-01-13 PROCEDURE — 87088 URINE BACTERIA CULTURE: CPT

## 2022-01-13 PROCEDURE — 51702 INSERT TEMP BLADDER CATH: CPT

## 2022-01-13 PROCEDURE — 99285 EMERGENCY DEPT VISIT HI MDM: CPT

## 2022-01-13 PROCEDURE — 81001 URINALYSIS AUTO W/SCOPE: CPT

## 2022-01-13 RX ORDER — LIDOCAINE HYDROCHLORIDE 20 MG/ML
SOLUTION OROPHARYNGEAL
Status: DISCONTINUED
Start: 2022-01-13 | End: 2022-01-13 | Stop reason: HOSPADM

## 2022-01-13 RX ORDER — LIDOCAINE HYDROCHLORIDE 20 MG/ML
15 SOLUTION OROPHARYNGEAL ONCE
Status: COMPLETED | OUTPATIENT
Start: 2022-01-13 | End: 2022-01-13

## 2022-01-13 RX ORDER — PHENAZOPYRIDINE HYDROCHLORIDE 100 MG/1
200 TABLET, FILM COATED ORAL ONCE
Status: COMPLETED | OUTPATIENT
Start: 2022-01-13 | End: 2022-01-13

## 2022-01-13 RX ORDER — HYDROCODONE BITARTRATE AND ACETAMINOPHEN 5; 325 MG/1; MG/1
2 TABLET ORAL ONCE
Status: COMPLETED | OUTPATIENT
Start: 2022-01-13 | End: 2022-01-13

## 2022-01-13 RX ORDER — PHENAZOPYRIDINE HYDROCHLORIDE 100 MG/1
100 TABLET, FILM COATED ORAL 3 TIMES DAILY PRN
Qty: 15 TABLET | Refills: 0 | Status: SHIPPED | OUTPATIENT
Start: 2022-01-13 | End: 2022-01-18

## 2022-01-13 RX ADMIN — PHENAZOPYRIDINE HYDROCHLORIDE 200 MG: 100 TABLET ORAL at 03:53

## 2022-01-13 RX ADMIN — LIDOCAINE HYDROCHLORIDE 15 ML: 20 SOLUTION ORAL; TOPICAL at 03:52

## 2022-01-13 RX ADMIN — HYDROCODONE BITARTRATE AND ACETAMINOPHEN 2 TABLET: 5; 325 TABLET ORAL at 05:00

## 2022-01-13 ASSESSMENT — PAIN DESCRIPTION - ONSET: ONSET: ON-GOING

## 2022-01-13 ASSESSMENT — PAIN DESCRIPTION - DESCRIPTORS: DESCRIPTORS: BURNING

## 2022-01-13 ASSESSMENT — PAIN DESCRIPTION - PAIN TYPE: TYPE: ACUTE PAIN

## 2022-01-13 ASSESSMENT — PAIN SCALES - GENERAL
PAINLEVEL_OUTOF10: 9
PAINLEVEL_OUTOF10: 10

## 2022-01-13 ASSESSMENT — PAIN DESCRIPTION - LOCATION: LOCATION: PENIS

## 2022-01-13 NOTE — ED PROVIDER NOTES
HPI:  1/13/22,   Time: 3:43 AM EST Genevia Collet is a 55 y.o. male presenting to the ED for penile pain, beginning 1 day ago. The complaint has been persistent, mild in severity, and worsened by nothing. The patient states that that he underwent a urethrotomy by Dr. Hazel Cisneros on 1/12/2022 for a urethral stricture. The patient states he is currently on [address] He states that starting yesterday he has been having pain at the tip of his penis where the Castillo catheter is in place. He states it feels as if it a burning sensation is at the tip of his penis. Due to his persistent pain despite Tylenol he came to the ED to be evaluated. Denies any fevers. No abdominal pain. No nausea vomiting. Patient states he is making plenty of urine in his Castillo catheter bag and has had no bleeding that he has noticed. Review of Systems:   Pertinent positives and negatives are stated within HPI, all other systems reviewed and are negative.          --------------------------------------------- PAST HISTORY ---------------------------------------------  Past Medical History:  has a past medical history of ADD (attention deficit disorder), Anxiety, Bipolar 1 disorder ([facility] Cancer ([facility] Crohn's disease ([facility] Depression, GERD (gastroesophageal reflux disease), Panic attack, Rectal pain, Schizo affective schizophrenia ([facility] and Urethral stricture. 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 Social History:  reports that he quit smoking about 9 months ago. His smoking use included cigarettes. He started smoking about 44 years ago. He has a 82.00 pack-year smoking history. He has never used smokeless tobacco. He reports that he does not drink alcohol and does not use drugs.     Family History: family history includes Depression in his father; Heart Disease in his mother; Mental Illness in his brother, father, mother, and sister. The patients home medications have been reviewed. Allergies: Aspirin, Ciprofloxacin hcl, Morphine, and Nsaids        ---------------------------------------------------PHYSICAL EXAM--------------------------------------    Constitutional/General: Alert and oriented x3, well appearing, non toxic in NAD  Head: Normocephalic and atraumatic  Eyes: PERRL, EOMI, conjunctive normal, sclera non icteric  Mouth: Oropharynx clear, handling secretions, no trismus, no asymmetry of the posterior oropharynx or uvular edema  Neck: Supple, full ROM, non tender to palpation in the midline, no stridor, no crepitus, no meningeal signs  Respiratory: Lungs clear to auscultation bilaterally, no wheezes, rales, or rhonchi. Not in respiratory distress  Cardiovascular:  Regular rate. Regular rhythm. No murmurs, gallops, or rubs. 2+ distal pulses  Chest: No chest wall tenderness  GI:  Abdomen Soft, Non tender, Non distended. +BS. No organomegaly, no palpable masses,  No rebound, guarding, or rigidity. Castillo catheter in place. No erythema or irritation or wounds noted to the urinary meatus. Mild leakage of urine around the catheter. Yellow urine noted and catheter bag. Musculoskeletal: Moves all extremities x 4. Warm and well perfused, no clubbing, cyanosis, or edema. Capillary refill <3 seconds  Integument: skin warm and dry. No rashes. Neurologic: GCS 15, no focal deficits,   Psychiatric: Normal Affect    -------------------------------------------------- RESULTS -------------------------------------------------  I have personally reviewed all laboratory and imaging results for this patient. Results are listed below.      LABS:  Results for orders placed or performed during the hospital encounter of 01/13/22   Urinalysis, reflex to microscopic   Result Value Ref Range    Color, UA Yellow Straw/Yellow    Clarity, UA SL CLOUDY Clear    Glucose, Ur Negative Negative mg/dL    Bilirubin Urine Negative Negative    Ketones, Urine TRACE (A) Negative mg/dL    Specific Gravity, UA >=1.030 1.005 - 1.030    Blood, Urine LARGE (A) Negative    pH, UA 5.5 5.0 - 9.0    Protein,  (A) Negative mg/dL    Urobilinogen, Urine 0.2 <2.0 E.U./dL    Nitrite, Urine Negative Negative    Leukocyte Esterase, Urine SMALL (A) Negative       RADIOLOGY:  Interpreted by Radiologist.  No orders to display         ------------------------- NURSING NOTES AND VITALS REVIEWED ---------------------------   The nursing notes within the ED encounter and vital signs as below have been reviewed by myself. BP (!) 168/90   Pulse 95   Temp 97.9 °F (36.6 °C) (Oral)   Resp 18   Ht 6' 3\" (1.905 m)   Wt 180 lb (81.6 kg)   SpO2 98%   BMI 22.50 kg/m²   Oxygen Saturation Interpretation: Normal    The patients available past medical records and past encounters were reviewed. ------------------------------ ED COURSE/MEDICAL DECISION MAKING----------------------  Medications   lidocaine viscous hcl (XYLOCAINE) 2 % solution (  Not Given 1/13/22 0421)   HYDROcodone-acetaminophen (NORCO) 5-325 MG per tablet 2 tablet (has no administration in time range)   phenazopyridine (PYRIDIUM) tablet 200 mg (200 mg Oral Given 1/13/22 0353)   lidocaine viscous hcl (XYLOCAINE) 2 % solution 15 mL (15 mLs Mouth/Throat Given 1/13/22 0352)         ED COURSE:       Medical Decision Making: This is a 60-year-old male presented to the ED for penile pain. Patient's urinalysis sent which did show some blood and leukocyte Estrace but patient is on antibiotics already. Patient had no improvement with this excited crying around the tip of the penis at the insertion site the catheter. Patient's symptoms likely related to postoperative pain. Patient will be placed on Pyridium and will follow up with his urologist.  Patient has no signs of infection.   Patient is afebrile appears nontoxic. Patient having plenty of urine output in his Castillo bag. Abdomen is soft and nontender. Strict return precautions given. Patient agrees with plan. I, Dr. Misael Moore, am the primary provider for this encounter    This patient's ED course included: a personal history and physicial examination, re-evaluation prior to disposition and multiple bedside re-evaluations    This patient has remained hemodynamically stable during their ED course. Re-Evaluations:             Re-evaluation. Patients symptoms are improving      Counseling: The emergency provider has spoken with the patient and discussed todays results, in addition to providing specific details for the plan of care and counseling regarding the diagnosis and prognosis. Questions are answered at this time and they are agreeable with the plan.       --------------------------------- IMPRESSION AND DISPOSITION ---------------------------------    IMPRESSION  1. Penile pain        DISPOSITION  Disposition: Discharge to home  Patient condition is stable    NOTE: This report was transcribed using voice recognition software.  Every effort was made to ensure accuracy; however, inadvertent computerized transcription errors may be present        Madeleine Rayo DO  01/13/22 0822

## 2022-01-15 LAB — URINE CULTURE, ROUTINE: NORMAL

## 2022-02-27 ENCOUNTER — HOSPITAL ENCOUNTER (OUTPATIENT)
Age: 57
Setting detail: OBSERVATION
Discharge: HOME | End: 2022-03-01
Attending: EMERGENCY MEDICINE | Admitting: INTERNAL MEDICINE
Payer: MEDICARE

## 2022-02-27 ENCOUNTER — APPOINTMENT (OUTPATIENT)
Dept: CT IMAGING | Age: 57
End: 2022-02-27
Payer: MEDICARE

## 2022-02-27 ENCOUNTER — APPOINTMENT (OUTPATIENT)
Dept: GENERAL RADIOLOGY | Age: 57
End: 2022-02-27
Payer: MEDICARE

## 2022-02-27 DIAGNOSIS — R47.01 APHASIA: Primary | ICD-10-CM

## 2022-02-27 LAB
ALBUMIN SERPL-MCNC: 3.9 G/DL (ref 3.5–5.2)
ALP BLD-CCNC: 108 U/L (ref 40–129)
ALT SERPL-CCNC: 7 U/L (ref 0–40)
ANION GAP SERPL CALCULATED.3IONS-SCNC: 12 MMOL/L (ref 7–16)
ANISOCYTOSIS: ABNORMAL
AST SERPL-CCNC: 12 U/L (ref 0–39)
BASOPHILS ABSOLUTE: 0.1 E9/L (ref 0–0.2)
BASOPHILS RELATIVE PERCENT: 1.5 % (ref 0–2)
BILIRUB SERPL-MCNC: <0.2 MG/DL (ref 0–1.2)
BUN BLDV-MCNC: 16 MG/DL (ref 6–20)
CALCIUM SERPL-MCNC: 9.3 MG/DL (ref 8.6–10.2)
CHLORIDE BLD-SCNC: 104 MMOL/L (ref 98–107)
CHP ED QC CHECK: NORMAL
CO2: 22 MMOL/L (ref 22–29)
CREAT SERPL-MCNC: 1 MG/DL (ref 0.7–1.2)
EOSINOPHILS ABSOLUTE: 0.38 E9/L (ref 0.05–0.5)
EOSINOPHILS RELATIVE PERCENT: 5.7 % (ref 0–6)
GFR AFRICAN AMERICAN: >60
GFR NON-AFRICAN AMERICAN: >60 ML/MIN/1.73
GLUCOSE BLD-MCNC: 93 MG/DL
GLUCOSE BLD-MCNC: 99 MG/DL (ref 74–99)
HCT VFR BLD CALC: 39.4 % (ref 37–54)
HEMOGLOBIN: 12 G/DL (ref 12.5–16.5)
IMMATURE GRANULOCYTES #: 0.02 E9/L
IMMATURE GRANULOCYTES %: 0.3 % (ref 0–5)
LYMPHOCYTES ABSOLUTE: 1.22 E9/L (ref 1.5–4)
LYMPHOCYTES RELATIVE PERCENT: 18.3 % (ref 20–42)
MCH RBC QN AUTO: 22.7 PG (ref 26–35)
MCHC RBC AUTO-ENTMCNC: 30.5 % (ref 32–34.5)
MCV RBC AUTO: 74.6 FL (ref 80–99.9)
METER GLUCOSE: 93 MG/DL (ref 74–99)
MONOCYTES ABSOLUTE: 0.22 E9/L (ref 0.1–0.95)
MONOCYTES RELATIVE PERCENT: 3.3 % (ref 2–12)
NEUTROPHILS ABSOLUTE: 4.71 E9/L (ref 1.8–7.3)
NEUTROPHILS RELATIVE PERCENT: 70.9 % (ref 43–80)
OVALOCYTES: ABNORMAL
PDW BLD-RTO: 20.5 FL (ref 11.5–15)
PLATELET # BLD: 303 E9/L (ref 130–450)
PMV BLD AUTO: 9.3 FL (ref 7–12)
POIKILOCYTES: ABNORMAL
POLYCHROMASIA: ABNORMAL
POTASSIUM SERPL-SCNC: 4.5 MMOL/L (ref 3.5–5)
RBC # BLD: 5.28 E12/L (ref 3.8–5.8)
SODIUM BLD-SCNC: 138 MMOL/L (ref 132–146)
TOTAL PROTEIN: 6.9 G/DL (ref 6.4–8.3)
TROPONIN, HIGH SENSITIVITY: 8 NG/L (ref 0–11)
VALPROIC ACID LEVEL: 3 MCG/ML (ref 50–100)
WBC # BLD: 6.7 E9/L (ref 4.5–11.5)

## 2022-02-27 PROCEDURE — 80164 ASSAY DIPROPYLACETIC ACD TOT: CPT

## 2022-02-27 PROCEDURE — 93005 ELECTROCARDIOGRAM TRACING: CPT | Performed by: EMERGENCY MEDICINE

## 2022-02-27 PROCEDURE — 84484 ASSAY OF TROPONIN QUANT: CPT

## 2022-02-27 PROCEDURE — 82962 GLUCOSE BLOOD TEST: CPT

## 2022-02-27 PROCEDURE — 70450 CT HEAD/BRAIN W/O DYE: CPT

## 2022-02-27 PROCEDURE — 6370000000 HC RX 637 (ALT 250 FOR IP): Performed by: INTERNAL MEDICINE

## 2022-02-27 PROCEDURE — 85025 COMPLETE CBC W/AUTO DIFF WBC: CPT

## 2022-02-27 PROCEDURE — G0378 HOSPITAL OBSERVATION PER HR: HCPCS

## 2022-02-27 PROCEDURE — 80053 COMPREHEN METABOLIC PANEL: CPT

## 2022-02-27 PROCEDURE — 99285 EMERGENCY DEPT VISIT HI MDM: CPT

## 2022-02-27 PROCEDURE — 71045 X-RAY EXAM CHEST 1 VIEW: CPT

## 2022-02-27 RX ORDER — PROPRANOLOL HYDROCHLORIDE 20 MG/1
20 TABLET ORAL 2 TIMES DAILY
Status: DISCONTINUED | OUTPATIENT
Start: 2022-02-27 | End: 2022-03-01 | Stop reason: HOSPADM

## 2022-02-27 RX ORDER — PALIPERIDONE 3 MG/1
9 TABLET, EXTENDED RELEASE ORAL NIGHTLY
Status: DISCONTINUED | OUTPATIENT
Start: 2022-02-27 | End: 2022-03-01 | Stop reason: HOSPADM

## 2022-02-27 RX ORDER — PRAZOSIN HYDROCHLORIDE 2 MG/1
2 CAPSULE ORAL 2 TIMES DAILY
Status: DISCONTINUED | OUTPATIENT
Start: 2022-02-27 | End: 2022-03-01 | Stop reason: HOSPADM

## 2022-02-27 RX ORDER — CLOMIPRAMINE HYDROCHLORIDE 50 MG/1
50 CAPSULE ORAL NIGHTLY
COMMUNITY

## 2022-02-27 RX ORDER — ATORVASTATIN CALCIUM 10 MG/1
10 TABLET, FILM COATED ORAL NIGHTLY
Status: DISCONTINUED | OUTPATIENT
Start: 2022-02-27 | End: 2022-03-01 | Stop reason: HOSPADM

## 2022-02-27 RX ORDER — HALOPERIDOL 5 MG
10 TABLET ORAL 2 TIMES DAILY
Status: DISCONTINUED | OUTPATIENT
Start: 2022-02-27 | End: 2022-03-01 | Stop reason: HOSPADM

## 2022-02-27 RX ORDER — DIVALPROEX SODIUM 250 MG/1
250 TABLET, DELAYED RELEASE ORAL 2 TIMES DAILY
Status: DISCONTINUED | OUTPATIENT
Start: 2022-02-27 | End: 2022-03-01 | Stop reason: HOSPADM

## 2022-02-27 RX ORDER — CLOMIPRAMINE HYDROCHLORIDE 25 MG/1
50 CAPSULE ORAL 2 TIMES DAILY
Status: DISCONTINUED | OUTPATIENT
Start: 2022-02-27 | End: 2022-03-01 | Stop reason: HOSPADM

## 2022-02-27 RX ORDER — DIVALPROEX SODIUM 250 MG/1
500 TABLET, DELAYED RELEASE ORAL 2 TIMES DAILY
Status: DISCONTINUED | OUTPATIENT
Start: 2022-02-27 | End: 2022-03-01 | Stop reason: HOSPADM

## 2022-02-27 RX ADMIN — DIVALPROEX SODIUM 500 MG: 250 TABLET, DELAYED RELEASE ORAL at 21:20

## 2022-02-27 RX ADMIN — PROPRANOLOL HYDROCHLORIDE 20 MG: 20 TABLET ORAL at 21:21

## 2022-02-27 RX ADMIN — CLOMIPRAMINE HYDROCHLORIDE 50 MG: 25 CAPSULE ORAL at 21:19

## 2022-02-27 RX ADMIN — PRAZOSIN HYDROCHLORIDE 2 MG: 2 CAPSULE ORAL at 21:20

## 2022-02-27 RX ADMIN — ATORVASTATIN CALCIUM 10 MG: 10 TABLET, FILM COATED ORAL at 21:19

## 2022-02-27 RX ADMIN — DIVALPROEX SODIUM 250 MG: 250 TABLET, DELAYED RELEASE ORAL at 21:20

## 2022-02-27 RX ADMIN — PALIPERIDONE 9 MG: 3 TABLET, EXTENDED RELEASE ORAL at 21:20

## 2022-02-27 RX ADMIN — HALOPERIDOL 10 MG: 5 TABLET ORAL at 21:20

## 2022-02-27 NOTE — ED PROVIDER NOTES
Department of Emergency Medicine   ED  Provider Note  Admit Date/RoomTime: 2/27/2022 10:54 AM  ED Room: [facility]/17A-17          History of Present Illness:  2/27/22, Time: 2:26 PM EST  Chief Complaint   Patient presents with    Seizures     possible seizure activity witnessed by wife. per EMS wife states she believes patient took to much of his haldol before seizure                Navya Chris is a 55 y.o. male presenting to the ED for seizure. Patient had a possible seizure at home. He has no history of seizures. He states he had an episode where he could not get his words out, he was awake and alert during the episode, lasted several minutes, did resolve on its own. No associated pain. He has had this a few times in the past couple of weeks. He is not sure if it is related to his psychiatric medications or not. He denies any actual body twitching. Denies loss of bowel or bladder control. Denies ever losing consciousness. Denies fever, chills, cough, sputum, paresthesias, lethargy, or any other symptoms or complaints. He has no symptoms or complaints on initial exam.    Review of Systems:   Pertinent positives and negatives are stated within HPI, all other systems reviewed and are negative.        --------------------------------------------- PAST HISTORY ---------------------------------------------  Past Medical History:  has a past medical history of ADD (attention deficit disorder), Anxiety, Bipolar 1 disorder ([facility] Cancer ([facility] Crohn's disease ([facility] Depression, GERD (gastroesophageal reflux disease), Panic attack, Rectal pain, Schizo affective schizophrenia ([facility] and Urethral stricture. 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     Social History:  reports that he quit smoking about 10 months ago. His smoking use included cigarettes. He started smoking about 44 years ago. He has a 82.00 pack-year smoking history. He has never used smokeless tobacco. He reports that he does not drink alcohol and does not use drugs. Family History: family history includes Depression in his father; Heart Disease in his mother; Mental Illness in his brother, father, mother, and sister. . Unless otherwise noted, family history is non contributory    The patients home medications have been reviewed. Allergies: Aspirin, Ciprofloxacin hcl, Morphine, and Nsaids        ---------------------------------------------------PHYSICAL EXAM--------------------------------------    Constitutional/General: Alert and oriented x3  Head: Normocephalic and atraumatic  Eyes: PERRL, EOMI, sclera non icteric  Mouth: Oropharynx clear, handling secretions, no trismus, no asymmetry of the posterior oropharynx or uvular edema  Neck: Supple, full ROM, no stridor, no meningeal signs  Respiratory: Lungs clear to auscultation bilaterally, no wheezes, rales, or rhonchi. Not in respiratory distress  Cardiovascular:  Regular rate. Regular rhythm. 2+ distal pulses. Equal extremity pulses. Chest: No chest wall tenderness  GI:  Abdomen Soft, Non tender, Non distended. No rebound, guarding, or rigidity. No pulsatile masses. Musculoskeletal: Moves all extremities x 4. Warm and well perfused, no clubbing, cyanosis, or edema. Capillary refill <3 seconds  Integument: skin warm and dry. No rashes. Neurologic: GCS 15, no focal deficits, symmetric strength 5/5 in the upper and lower extremities bilaterally. NIH is 0  Psychiatric: Normal Affect          -------------------------------------------------- RESULTS -------------------------------------------------  I have personally reviewed all laboratory and imaging results for this patient. Results are listed below.      LABS: (Lab results interpreted by me)  Results for orders placed or performed during the hospital encounter of 02/27/22   CBC with Auto Differential   Result Value Ref Range    WBC 6.7 4.5 - 11.5 E9/L    RBC 5.28 3.80 - 5.80 E12/L    Hemoglobin 12.0 (L) 12.5 - 16.5 g/dL    Hematocrit 39.4 37.0 - 54.0 %    MCV 74.6 (L) 80.0 - 99.9 fL    MCH 22.7 (L) 26.0 - 35.0 pg    MCHC 30.5 (L) 32.0 - 34.5 %    RDW 20.5 (H) 11.5 - 15.0 fL    Platelets 693 491 - 764 E9/L    MPV 9.3 7.0 - 12.0 fL    Neutrophils % 70.9 43.0 - 80.0 %    Immature Granulocytes % 0.3 0.0 - 5.0 %    Lymphocytes % 18.3 (L) 20.0 - 42.0 %    Monocytes % 3.3 2.0 - 12.0 %    Eosinophils % 5.7 0.0 - 6.0 %    Basophils % 1.5 0.0 - 2.0 %    Neutrophils Absolute 4.71 1.80 - 7.30 E9/L    Immature Granulocytes # 0.02 E9/L    Lymphocytes Absolute 1.22 (L) 1.50 - 4.00 E9/L    Monocytes Absolute 0.22 0.10 - 0.95 E9/L    Eosinophils Absolute 0.38 0.05 - 0.50 E9/L    Basophils Absolute 0.10 0.00 - 0.20 E9/L    Anisocytosis 2+     Polychromasia 1+     Poikilocytes 1+     Ovalocytes 1+    Comprehensive Metabolic Panel   Result Value Ref Range    Sodium 138 132 - 146 mmol/L    Potassium 4.5 3.5 - 5.0 mmol/L    Chloride 104 98 - 107 mmol/L    CO2 22 22 - 29 mmol/L    Anion Gap 12 7 - 16 mmol/L    Glucose 99 74 - 99 mg/dL    BUN 16 6 - 20 mg/dL    CREATININE 1.0 0.7 - 1.2 mg/dL    GFR Non-African American >60 >=60 mL/min/1.73    GFR African American >60     Calcium 9.3 8.6 - 10.2 mg/dL    Total Protein 6.9 6.4 - 8.3 g/dL    Albumin 3.9 3.5 - 5.2 g/dL    Total Bilirubin <0.2 0.0 - 1.2 mg/dL    Alkaline Phosphatase 108 40 - 129 U/L    ALT 7 0 - 40 U/L    AST 12 0 - 39 U/L   Troponin   Result Value Ref Range    Troponin, High Sensitivity 8 0 - 11 ng/L   Valproic Acid Level, Total   Result Value Ref Range    Valproic Acid Lvl 3 (L) 50 - 100 mcg/mL   POCT Glucose   Result Value Ref Range    Glucose 93 mg/dL    QC OK? ok    POCT Glucose   Result Value Ref Range    Meter Glucose 93 74 - 99 mg/dL   EKG 12 Lead   Result Value Ref Range Ventricular Rate 83 BPM    Atrial Rate 83 BPM    P-R Interval 156 ms    QRS Duration 86 ms    Q-T Interval 350 ms    QTc Calculation (Bazett) 411 ms    R Axis 146 degrees    T Axis 150 degrees   ,       RADIOLOGY:  Interpreted by Radiologist unless otherwise specified  CT HEAD WO CONTRAST   Final Result   No acute intracranial abnormality. XR CHEST PORTABLE   Final Result   No acute process. EKG Interpretation  Interpreted by emergency department physician, Dr. Genoveva Husain     Sinus, rate 83, no STEMI, no ischemic change         ------------------------- NURSING NOTES AND VITALS REVIEWED ---------------------------   The nursing notes within the ED encounter and vital signs as below have been reviewed by myself  BP (!) 128/92   Pulse 75   Temp 99 °F (37.2 °C)   Resp 19   SpO2 99%     Oxygen Saturation Interpretation: Normal    The patients available past medical records and past encounters were reviewed. ------------------------------ ED COURSE/MEDICAL DECISION MAKING----------------------  Medications - No data to display        The cardiac monitor revealed sinus with a heart rate in the 80s as interpreted by me. The cardiac monitor was ordered secondary to the patient's aphasia and to monitor the patient for dysrhythmia. CPT [id number]         Medical Decision Making:    Patient had no symptoms or complaints on arrival.  Any to 0. Labs and imaging reviewed. Patient has with a appears to be episodes of expressive aphasia over the past couple of weeks. Possibly due to his medications, however, cannot rule out ischemic events. Discussed with the hospitalist, patient will be admitted. Counseling: The emergency provider has spoken with the patient and discussed todays results, in addition to providing specific details for the plan of care and counseling regarding the diagnosis and prognosis. Questions are answered at this time and they are agreeable with the plan. --------------------------------- IMPRESSION AND DISPOSITION ---------------------------------    IMPRESSION  1. Aphasia        DISPOSITION  Disposition: Admit to telemetry  Patient condition is stable        NOTE: This report was transcribed using voice recognition software.  Every effort was made to ensure accuracy; however, inadvertent computerized transcription errors may be present        Zaid Hope MD  02/27/22 [id number]

## 2022-02-27 NOTE — ED NOTES
The patient arrive via ems from home for a seizure this morning.    the patient is awake and alert, oriented x 4 at this time      Charles Coronado RN  02/27/22 0247

## 2022-02-27 NOTE — PROGRESS NOTES
Hilda Winslow was ordered austedo 9mg q12h which is a nonformulary medication. This medication will need to be supplied by the patient as the pharmacy does not carry this non-formulary medication. If the medication has not been administered by 1400 on the following day from the time the order was placed, a pharmacist will follow-up with the nurse of the patient to assess the capability of the patient to bring in the medication. If it is determined that the patient cannot supply the medication and it is not available to be dispensed from the pharmacy, the provider will be notified.

## 2022-02-27 NOTE — PROGRESS NOTES
Patient arrived via cart. Assessment complete, vss. Called Dr. Jailyn Darnell for orders. Tahir Ricardo, RN    [id number] received call back from Dr. Ashleigh Alicia  He advised that he cannot put orders in. He wants orders for Regular Diet and Neurology consult. Reviewed patients home medications and he wants all to be continued. No other orders at this time.     Chaim Leo

## 2022-02-28 ENCOUNTER — APPOINTMENT (OUTPATIENT)
Dept: NEUROLOGY | Age: 57
End: 2022-02-28
Payer: MEDICARE

## 2022-02-28 ENCOUNTER — APPOINTMENT (OUTPATIENT)
Dept: MRI IMAGING | Age: 57
End: 2022-02-28
Payer: MEDICARE

## 2022-02-28 LAB
EKG ATRIAL RATE: 83 BPM
EKG P-R INTERVAL: 156 MS
EKG Q-T INTERVAL: 350 MS
EKG QRS DURATION: 86 MS
EKG QTC CALCULATION (BAZETT): 411 MS
EKG R AXIS: 146 DEGREES
EKG T AXIS: 150 DEGREES
EKG VENTRICULAR RATE: 83 BPM
VALPROIC ACID LEVEL: 72 MCG/ML (ref 50–100)

## 2022-02-28 PROCEDURE — A9577 INJ MULTIHANCE: HCPCS | Performed by: RADIOLOGY

## 2022-02-28 PROCEDURE — G0378 HOSPITAL OBSERVATION PER HR: HCPCS

## 2022-02-28 PROCEDURE — 95819 EEG AWAKE AND ASLEEP: CPT

## 2022-02-28 PROCEDURE — 36415 COLL VENOUS BLD VENIPUNCTURE: CPT

## 2022-02-28 PROCEDURE — 93010 ELECTROCARDIOGRAM REPORT: CPT | Performed by: INTERNAL MEDICINE

## 2022-02-28 PROCEDURE — 70553 MRI BRAIN STEM W/O & W/DYE: CPT

## 2022-02-28 PROCEDURE — 99219 PR INITIAL OBSERVATION CARE/DAY 50 MINUTES: CPT | Performed by: PSYCHIATRY & NEUROLOGY

## 2022-02-28 PROCEDURE — 6360000002 HC RX W HCPCS: Performed by: INTERNAL MEDICINE

## 2022-02-28 PROCEDURE — 6360000004 HC RX CONTRAST MEDICATION: Performed by: RADIOLOGY

## 2022-02-28 PROCEDURE — 95816 EEG AWAKE AND DROWSY: CPT | Performed by: PSYCHIATRY & NEUROLOGY

## 2022-02-28 PROCEDURE — 6370000000 HC RX 637 (ALT 250 FOR IP): Performed by: INTERNAL MEDICINE

## 2022-02-28 PROCEDURE — 96374 THER/PROPH/DIAG INJ IV PUSH: CPT

## 2022-02-28 PROCEDURE — 80164 ASSAY DIPROPYLACETIC ACD TOT: CPT

## 2022-02-28 RX ORDER — ACETAMINOPHEN 325 MG/1
650 TABLET ORAL EVERY 6 HOURS PRN
Status: DISCONTINUED | OUTPATIENT
Start: 2022-02-28 | End: 2022-03-01 | Stop reason: HOSPADM

## 2022-02-28 RX ORDER — LORAZEPAM 2 MG/ML
1 INJECTION INTRAMUSCULAR ONCE
Status: COMPLETED | OUTPATIENT
Start: 2022-02-28 | End: 2022-02-28

## 2022-02-28 RX ADMIN — ACETAMINOPHEN 650 MG: 325 TABLET ORAL at 08:57

## 2022-02-28 RX ADMIN — PROPRANOLOL HYDROCHLORIDE 20 MG: 20 TABLET ORAL at 22:33

## 2022-02-28 RX ADMIN — PRAZOSIN HYDROCHLORIDE 2 MG: 2 CAPSULE ORAL at 08:53

## 2022-02-28 RX ADMIN — DIVALPROEX SODIUM 250 MG: 250 TABLET, DELAYED RELEASE ORAL at 08:55

## 2022-02-28 RX ADMIN — LORAZEPAM 1 MG: 2 INJECTION INTRAMUSCULAR; INTRAVENOUS at 15:40

## 2022-02-28 RX ADMIN — PRAZOSIN HYDROCHLORIDE 2 MG: 2 CAPSULE ORAL at 22:32

## 2022-02-28 RX ADMIN — HALOPERIDOL 10 MG: 5 TABLET ORAL at 22:32

## 2022-02-28 RX ADMIN — DIVALPROEX SODIUM 500 MG: 250 TABLET, DELAYED RELEASE ORAL at 22:31

## 2022-02-28 RX ADMIN — CLOMIPRAMINE HYDROCHLORIDE 50 MG: 25 CAPSULE ORAL at 22:28

## 2022-02-28 RX ADMIN — DIVALPROEX SODIUM 250 MG: 250 TABLET, DELAYED RELEASE ORAL at 22:31

## 2022-02-28 RX ADMIN — CLOMIPRAMINE HYDROCHLORIDE 50 MG: 25 CAPSULE ORAL at 08:53

## 2022-02-28 RX ADMIN — PALIPERIDONE 9 MG: 3 TABLET, EXTENDED RELEASE ORAL at 22:32

## 2022-02-28 RX ADMIN — GADOBENATE DIMEGLUMINE 16 ML: 529 INJECTION, SOLUTION INTRAVENOUS at 16:34

## 2022-02-28 RX ADMIN — ATORVASTATIN CALCIUM 10 MG: 10 TABLET, FILM COATED ORAL at 22:22

## 2022-02-28 RX ADMIN — DIVALPROEX SODIUM 500 MG: 250 TABLET, DELAYED RELEASE ORAL at 08:53

## 2022-02-28 RX ADMIN — PROPRANOLOL HYDROCHLORIDE 20 MG: 20 TABLET ORAL at 08:53

## 2022-02-28 RX ADMIN — HALOPERIDOL 10 MG: 5 TABLET ORAL at 08:53

## 2022-02-28 ASSESSMENT — PAIN SCALES - GENERAL
PAINLEVEL_OUTOF10: 5
PAINLEVEL_OUTOF10: 9
PAINLEVEL_OUTOF10: 0
PAINLEVEL_OUTOF10: 9

## 2022-02-28 NOTE — H&P
Fawn Bence is a 55 y.o. male presenting to the ED for seizure. Patient had a possible seizure at home. He has no history of seizures. He states he had an episode where he could not get his words out, he was awake and alert during the episode, lasted several minutes, did resolve on its own. No associated pain. He has had this a few times in the past couple of weeks. He is not sure if it is related to his psychiatric medications or not. He denies any actual body twitching. Denies loss of bowel or bladder control. Denies ever losing consciousness. Denies fever, chills, cough, sputum, paresthesias, lethargy, or any other symptoms or complaints. Ct scan head unremarkable admitted to monitor     Past Medical History:   Diagnosis Date    ADD (attention deficit disorder)     Anxiety     Bipolar 1 disorder ([facility] 11/19/2017    Cancer ([facility] prostate cancer    2014 / treated with surgery    Crohn's disease ([facility]     Depression     GERD (gastroesophageal reflux disease)     Panic attack     Rectal pain     has a fissure    Schizo affective schizophrenia ([facility]     stable    Urethral stricture 01/12/2022       Past Surgical History:   Procedure Laterality Date    COLONOSCOPY      COLONOSCOPY N/A 1/28/2020    COLONOSCOPY WITH BIOPSY performed by Debbie Durand MD at [address] [place] [address], DIAGNOSTIC      INCISION AND DRAINAGE N/A 5/15/2019    I & D SCROTAL ABSCESS WITH EXPLANTATION ARTIFICIAL URINARY SPHINCTER COMPLEX URETHROPLASTY performed by Stacy Dowd DO at [facility]  2002? deviated septum    PICC LINE INSERTION NURSE  10/3/2020         PROSTATECTOMY  9/15/2014    laparoscopic robotic assisted.        Family History   Problem Relation Age of Onset    Mental Illness Mother     Mental Illness Father     Mental Illness Sister     Mental Illness Brother     Heart Disease Mother     Depression Father        Prior to Admission medications    Medication Sig Start BP: 111/69   Pulse: 71   Resp: 18   Temp: 97.3 °F (36.3 °C)   SpO2: 95%      Skin:  Warm and dry. No rash or bruises  HEENT:  PERRLA, EOMI  Neck:  No JVD, No thyromegaly, No carotid bruit  Cardiac:  RRR, No gallop or murmur  Lungs:  CTA, Normal percussion  Abdomen: Normal bowel sounds, no HSM, non-tender  Extremities:  No clubbing, edema or cyanosis  Neurological:  Moves all extremities, normal DTR    Labs:    CBC with Differential:    Lab Results   Component Value Date    WBC 6.7 02/27/2022    RBC 5.28 02/27/2022    HGB 12.0 02/27/2022    HCT 39.4 02/27/2022     02/27/2022    MCV 74.6 02/27/2022    MCH 22.7 02/27/2022    MCHC 30.5 02/27/2022    RDW 20.5 02/27/2022    NRBC 0.0 01/25/2021    BANDSPCT 1 09/17/2014    METASPCT 1.7 04/02/2020    LYMPHOPCT 18.3 02/27/2022    PROMYELOPCT 0.9 01/25/2021    MONOPCT 3.3 02/27/2022    MYELOPCT 0.9 01/25/2021    BASOPCT 1.5 02/27/2022    MONOSABS 0.22 02/27/2022    LYMPHSABS 1.22 02/27/2022    EOSABS 0.38 02/27/2022    BASOSABS 0.10 02/27/2022     CMP:    Lab Results   Component Value Date     02/27/2022    K 4.5 02/27/2022    K 4.7 11/04/2021     02/27/2022    CO2 22 02/27/2022    BUN 16 02/27/2022    CREATININE 1.0 02/27/2022    GFRAA >60 02/27/2022    LABGLOM >60 02/27/2022    GLUCOSE 93 02/27/2022    GLUCOSE 118 02/05/2011    PROT 6.9 02/27/2022    LABALBU 3.9 02/27/2022    LABALBU 3.5 02/05/2011    CALCIUM 9.3 02/27/2022    BILITOT <0.2 02/27/2022    ALKPHOS 108 02/27/2022    AST 12 02/27/2022    ALT 7 02/27/2022      Imaging:Ct scan head ;unremarkable     Assessment and Plan:    Patient Active Problem List   Diagnosis    Episode of aphasia ? cause     Suicide attempt ([facility] by Hx     Essential hypertension    Depression with suicidal ideation    Ileostomy in place [facility])    Crohn's disease of large intestine     Severe manic bipolar [facility]

## 2022-02-28 NOTE — PROCEDURES
Kat Report    MRN: [id number]  Patient's name: Joanna Inman  : 1965  Age: 55 y.o. Gender: male    Date of report: 2022   Date of service: 2022   Interpreting physician: Caio Shen MD  Referring physician: Caio Shen MD    Procedure  Routine EEG with video    Duration of Study  0:27:27    Indication for Study  New-onset witnessed seizure-like activity    Technical Summary  Digital video and scalp EEG monitoring was performed using the standard protocol for this laboratory. Scalp electrodes were applied in the international 10/20 system. Multiple digital montage arrangements were utilized for evaluation. EKG and video were recorded. EEG Description  There was extensive muscle artifact throughout the record. The readable portion showed an 8.5 Hz posterior rhythm. The background also included diffuse, asynchronous, admixed theta and delta range activity. Hyperventilation was not performed. Photic strobe stimulation elicited no abnormalities. The record showed variability. There were no focal, lateralized or epileptiform abnormalities. Clinical Interpretation:   Is a technically limited study due to extensive muscle artifact throughout a significant portion of the record. The readable portion of the record is consistent with mild generalized slowing. Generalized slowing indicates diffuse cerebral dysfunction as seen with toxic, metabolic, or diffuse or multifocal structural abnormalities.

## 2022-02-28 NOTE — CARE COORDINATION
2/28 Care Coordination:Pt presented to the ED yesterday after an episode of seizure-like activity. Has a history of bipolar. Neuro consult, for MRI. He has h/o of [facility] and [facility]. PTA lives at home with wife. Plan is return home. No needs identified in rounds. CM/SW will continue to follow for discharge planning.    Can MATTA,RN-CV-BC  [phone]

## 2022-02-28 NOTE — CONSULTS
Neurology - Inpatient Consultation     Consult Requested By: Clarissa Chappell MD    Indication for Consult: Seizure    History of Present Illness: The patient is a 60-year-old male with history of bipolar, on Depakote 750 mg twice daily, who presented yesterday after an episode of seizure-like activity. The patient was reportedly sitting at home watching TV when he was witnessed by his wife to have sudden shaking of his arms lasting four minutes. Shaking was moderate in severity and was subsequently followed by difficulty with getting his words out. The patient denies having a history of seizures, denies having any recent medication changes, denies recent illness, denies use of alcohol or drugs. Patient states that he did not lose awareness and recalls the entire situation. Currently has no focal neurological deficits. Past Medical History:     Past Medical History:   Diagnosis Date    ADD (attention deficit disorder)     Anxiety     Bipolar 1 disorder ([facility] 11/19/2017    Cancer ([facility] prostate cancer    2014 / treated with surgery    Crohn's disease ([facility]     Depression     GERD (gastroesophageal reflux disease)     Panic attack     Rectal pain     has a fissure    Schizo affective schizophrenia ([facility]     stable    Urethral stricture 01/12/2022       Past Surgical History:     Past Surgical History:   Procedure Laterality Date    COLONOSCOPY      COLONOSCOPY N/A 1/28/2020    COLONOSCOPY WITH BIOPSY performed by Girish Cook MD at [address] [place] [address], DIAGNOSTIC      INCISION AND DRAINAGE N/A 5/15/2019    I & D SCROTAL ABSCESS WITH EXPLANTATION ARTIFICIAL URINARY SPHINCTER COMPLEX URETHROPLASTY performed by Stewart Dowd DO at [facility]  2002? deviated septum    PICC LINE INSERTION NURSE  10/3/2020         PROSTATECTOMY  9/15/2014    laparoscopic robotic assisted.        Allergies:   Aspirin, Ciprofloxacin hcl, Morphine, and Nsaids    Medications: Scheduled Meds:   atorvastatin  10 mg Oral Nightly    clomiPRAMINE  50 mg Oral BID    Deutetrabenazine  9 mg Oral BID    divalproex  250 mg Oral BID    divalproex  500 mg Oral BID    haloperidol  10 mg Oral BID    paliperidone  9 mg Oral Nightly    prazosin  2 mg Oral BID    propranolol  20 mg Oral BID     Continuous Infusions:  PRN Meds:.acetaminophen    Social History:     Social History     Tobacco Use    Smoking status: Former Smoker     Packs/day: 2.00     Years: 41.00     Pack years: 82.00     Types: Cigarettes     Start date: 1978     Quit date: 2021     Years since quittin.9    Smokeless tobacco: Never Used   Vaping Use    Vaping Use: Never used   Substance Use Topics    Alcohol use: No     Comment: no alcohol in5 yrs    Drug use: No     Comment: last use  / marijuana       Review of Systems:   General/constitutional, Dermatologic, Ophthalmologic, ENT, Cardiovascular, Pulmonary, Gastrointestinal, Genitourinary, Psychiatric, Neurological, Hematologic, Musculoskeletal and Endocrine systems were reviewed. Symptoms for each system are noted in this note or are otherwise negative. Family History:     Family History   Problem Relation Age of Onset    Mental Illness Mother     Mental Illness Father     Mental Illness Sister     Mental Illness Brother     Heart Disease Mother     Depression Father        Objective:   BP (!) 122/91   Pulse 73   Temp 98.4 °F (36.9 °C) (Oral)   Resp 16   SpO2 95%     GENERAL: Patient is alert and interacts appropriately; comprehension and speech are normal.  PSYCHIATRIC: Normal mood; normal affect. NECK: Supple; trachea midline. HEENT: Atraumatic; normocephalic; PERRL; EOMI; normal conjunctivae and lids; moist oropharynx. RESPIRATORY: Breathing is unlabored; respiratory rate normal; symmetric air intake and chest rise. CARDIOVASCULAR: Regular rate and rhythm; no ankle edema.   GASTROINTESTINAL: Abdomen soft, non-tender, non-distended; no hepatosplenomegaly. SKIN: No vesicular, erythematous, or ecchymotic lesions. MUSCULOSKELETAL: Muscle tone and range of motion normal in all extremities; no resting tremors or rigidity. NEUROLOGICAL: Cranial nerves II through XII intact; left arm motor strength is 5/5; right arm motor strength is 5/5; left leg motor strength is 5/5; right leg motor strength is 5/5; cutaneous sensation is intact and symmetric throughout; no extinction to double simultaneous stimulation; deep tendon reflexes are 2+ in biceps and patellae bilaterally; no dysmetria on finger-to-nose testing. Laboratory/Radiology:     CBC:   Lab Results   Component Value Date    WBC 6.7 02/27/2022    RBC 5.28 02/27/2022    HGB 12.0 02/27/2022    HCT 39.4 02/27/2022    MCV 74.6 02/27/2022    MCH 22.7 02/27/2022    MCHC 30.5 02/27/2022    RDW 20.5 02/27/2022     02/27/2022    MPV 9.3 02/27/2022     CMP:    Lab Results   Component Value Date     02/27/2022    K 4.5 02/27/2022    K 4.7 11/04/2021     02/27/2022    CO2 22 02/27/2022    BUN 16 02/27/2022    CREATININE 1.0 02/27/2022    GFRAA >60 02/27/2022    LABGLOM >60 02/27/2022    GLUCOSE 93 02/27/2022    GLUCOSE 118 02/05/2011    PROT 6.9 02/27/2022    LABALBU 3.9 02/27/2022    LABALBU 3.5 02/05/2011    CALCIUM 9.3 02/27/2022    BILITOT <0.2 02/27/2022    ALKPHOS 108 02/27/2022    AST 12 02/27/2022    ALT 7 02/27/2022       Lab Results   Component Value Date    LABA1C 5.7 04/24/2021     Lab Results   Component Value Date    LDLCALC 72 04/24/2021    LDLCALC 244 (H) 07/22/2019       CT HEAD WO CONTRAST    Result Date: 2/27/2022  No acute intracranial abnormality. XR CHEST PORTABLE    Result Date: 2/27/2022  No acute process. I have independently reviewed the most recent labs, diagnostic studies and neuroradiological images available for this patient. Assessment:   New onset seizure-like activity secondary to seizure versus nonepileptic event.       Plan:   Continue Depakote 750 mg twice daily   EEG  MRI brain with and without contrast  Depakote level   Seizure precautions      Choco Gonzalez MD,   11:23 AM  2/28/2022

## 2022-03-01 VITALS
TEMPERATURE: 97.2 F | HEART RATE: 86 BPM | DIASTOLIC BLOOD PRESSURE: 87 MMHG | OXYGEN SATURATION: 95 % | SYSTOLIC BLOOD PRESSURE: 120 MMHG | RESPIRATION RATE: 18 BRPM

## 2022-03-01 PROCEDURE — G0378 HOSPITAL OBSERVATION PER HR: HCPCS

## 2022-03-01 PROCEDURE — 99232 SBSQ HOSP IP/OBS MODERATE 35: CPT | Performed by: PHYSICIAN ASSISTANT

## 2022-03-01 PROCEDURE — 6370000000 HC RX 637 (ALT 250 FOR IP): Performed by: INTERNAL MEDICINE

## 2022-03-01 RX ADMIN — CLOMIPRAMINE HYDROCHLORIDE 50 MG: 25 CAPSULE ORAL at 08:44

## 2022-03-01 RX ADMIN — PRAZOSIN HYDROCHLORIDE 2 MG: 2 CAPSULE ORAL at 08:44

## 2022-03-01 RX ADMIN — PROPRANOLOL HYDROCHLORIDE 20 MG: 20 TABLET ORAL at 08:44

## 2022-03-01 RX ADMIN — DIVALPROEX SODIUM 250 MG: 250 TABLET, DELAYED RELEASE ORAL at 08:43

## 2022-03-01 RX ADMIN — HALOPERIDOL 10 MG: 5 TABLET ORAL at 08:44

## 2022-03-01 RX ADMIN — DIVALPROEX SODIUM 500 MG: 250 TABLET, DELAYED RELEASE ORAL at 08:46

## 2022-03-01 ASSESSMENT — PAIN SCALES - GENERAL: PAINLEVEL_OUTOF10: 0

## 2022-03-01 NOTE — PROGRESS NOTES
Neurology follow up     This patient is a 55year old man who presents for evaluation of seizure. PMH significant for ADD, anxiety, bipolar d/o, Chron's, depression, schizoaffective d/o, cancer    He was on Depakote 750 mg BID PTA reportedly for bipolar d/o who presented yesterday after an episode of seizure-like activity. The patient was reportedly sitting at home watching TV when he was witnessed by his wife to have sudden shaking of his arms lasting four minutes. followed by difficulty with getting his words out. The patient denies having a history of seizures, denies having any recent medication changes, denies recent illness, denies use of alcohol or drugs. Patient states that he did not lose awareness and recalls the entire situation. Currently has no focal neurological deficits. EEG and MRI brain were completed and grossly normal-- though significantly degraded by motion. No further events. Does admit to poor sleep and dehydration PTA-- may have been a provoking factor. VPA level on 2/27 was 3-- now today is 72. Question if he had been taking this PTA. Wife at bedside. Exam non-focal, ready to go home. ROS otherwise negative     Allergies   Allergen Reactions    Aspirin Other (See Comments)     Chron's    Ciprofloxacin Hcl Nausea And Vomiting and Other (See Comments)     constipation    Morphine Other (See Comments)     \"it does nothing for me\"    Nsaids Other (See Comments)     Per pt's physician Joya Chavira) he is not to take NSAIDS due to HX of Crohn's disease. Objective:   /87   Pulse 86   Temp 97.2 °F (36.2 °C) (Temporal)   Resp 18   SpO2 95%     GENERAL: Patient is alert and interacts appropriately; comprehension and speech are normal.  NECK: Supple; trachea midline. HEENT: Atraumatic; normocephalic  RESPIRATORY: Breathing is unlabored; respiratory rate normal  SKIN: No vesicular, erythematous, or ecchymotic lesions.     NEUROLOGICAL: Cranial nerves II through XII intact; left arm motor strength is 5/5; right arm motor strength is 5/5; left leg motor strength is 5/5; right leg motor strength is 5/5; cutaneous sensation is intact and symmetric throughout; deep tendon reflexes are 2+ in biceps and patellae bilaterally; no dysmetria on finger-to-nose testing. Laboratory/Radiology:     CBC:   Lab Results   Component Value Date    WBC 6.7 02/27/2022    RBC 5.28 02/27/2022    HGB 12.0 02/27/2022    HCT 39.4 02/27/2022    MCV 74.6 02/27/2022    MCH 22.7 02/27/2022    MCHC 30.5 02/27/2022    RDW 20.5 02/27/2022     02/27/2022    MPV 9.3 02/27/2022     CMP:    Lab Results   Component Value Date     02/27/2022    K 4.5 02/27/2022    K 4.7 11/04/2021     02/27/2022    CO2 22 02/27/2022    BUN 16 02/27/2022    CREATININE 1.0 02/27/2022    GFRAA >60 02/27/2022    LABGLOM >60 02/27/2022    GLUCOSE 93 02/27/2022    GLUCOSE 118 02/05/2011    PROT 6.9 02/27/2022    LABALBU 3.9 02/27/2022    LABALBU 3.5 02/05/2011    CALCIUM 9.3 02/27/2022    BILITOT <0.2 02/27/2022    ALKPHOS 108 02/27/2022    AST 12 02/27/2022    ALT 7 02/27/2022       Lab Results   Component Value Date    LABA1C 5.7 04/24/2021     Lab Results   Component Value Date    LDLCALC 72 04/24/2021    LDLCALC 244 (H) 07/22/2019       CT HEAD WO CONTRAST    Result Date: 2/27/2022  No acute intracranial abnormality. XR CHEST PORTABLE    Result Date: 2/27/2022  No acute process. MRI brain   1. Limited study due to prominent patient motion artifact, especially on the  coronal T2 FLAIR and the post-contrast enhanced images. 2. Within this limitation, no acute abnormality is noted. 3. No mass, mass effect, edema or hemorrhage. 4.  If clinically indicated, this study may be repeated once the patient is  either better able to lie still or is sedated. EEG  Is a technically limited study due to extensive muscle artifact throughout a significant portion of the record.  The readable portion of the record is consistent with mild generalized slowing. Generalized slowing indicates diffuse cerebral dysfunction as seen with toxic, metabolic, or diffuse or multifocal structural abnormalities. I have independently reviewed the most recent labs, diagnostic studies and neuroradiological images available for this patient. Assessment:   New onset seizure   EEG and MRI brain grossly negative, but significantly degraded by motion. Consider repeats in the future. Does report sleep depravation-- staying up 2-3 days, then sleeping a whole day, then up again and dehydration \"has not drank water for months\". These may have been contributing factors.      Plan:   Continue Depakote 750 mg twice daily     Seizure safety precautions-- no driving 6 months     F/u OP neuro     Okay to d/c -- will sign off     Afsaneh Dickinson PA-C  8:31 AM  3/1/2022

## 2022-03-01 NOTE — PROGRESS NOTES
Admit Date: 2/27/2022    Subjective:     Feels fine no complaint     Objective:     No data found. I/O last 3 completed shifts:  In: -   Out: 150 [Stool:150]  No intake/output data recorded. HEENT: Normal  NECK: Thyroid normal. No carotid bruit. No lymphphadenopathy. CVS: RRR  RS: Clear. No wheeze. No rhonchi. Good airflow bilaterally. ABD: Soft. Non tender. No mass. Normal BS. EXT: No edema. Non tender. Pulses present. Skin intact.   NEURO: no focal deficit       Scheduled Meds:   atorvastatin  10 mg Oral Nightly    clomiPRAMINE  50 mg Oral BID    Deutetrabenazine  9 mg Oral BID    divalproex  250 mg Oral BID    divalproex  500 mg Oral BID    haloperidol  10 mg Oral BID    paliperidone  9 mg Oral Nightly    prazosin  2 mg Oral BID    propranolol  20 mg Oral BID     Continuous Infusions:    CBC with Differential:    Lab Results   Component Value Date    WBC 6.7 02/27/2022    RBC 5.28 02/27/2022    HGB 12.0 02/27/2022    HCT 39.4 02/27/2022     02/27/2022    MCV 74.6 02/27/2022    MCH 22.7 02/27/2022    MCHC 30.5 02/27/2022    RDW 20.5 02/27/2022    NRBC 0.0 01/25/2021    BANDSPCT 1 09/17/2014    METASPCT 1.7 04/02/2020    LYMPHOPCT 18.3 02/27/2022    PROMYELOPCT 0.9 01/25/2021    MONOPCT 3.3 02/27/2022    MYELOPCT 0.9 01/25/2021    BASOPCT 1.5 02/27/2022    MONOSABS 0.22 02/27/2022    LYMPHSABS 1.22 02/27/2022    EOSABS 0.38 02/27/2022    BASOSABS 0.10 02/27/2022     CMP:    Lab Results   Component Value Date     02/27/2022    K 4.5 02/27/2022    K 4.7 11/04/2021     02/27/2022    CO2 22 02/27/2022    BUN 16 02/27/2022    CREATININE 1.0 02/27/2022    GFRAA >60 02/27/2022    LABGLOM >60 02/27/2022    PROT 6.9 02/27/2022    LABALBU 3.9 02/27/2022    LABALBU 3.5 02/05/2011    CALCIUM 9.3 02/27/2022    BILITOT <0.2 02/27/2022    ALKPHOS 108 02/27/2022    AST 12 02/27/2022    ALT 7 02/27/2022     PT/INR:    Lab Results   Component Value Date    PROTIME 11.1 03/26/2021    PROTIME 11.5 02/05/2011    INR 1.0 03/26/2021     MRI unremarkable    Assessment:     Principal Problem:   Episode of aphasia ? cause ?seizures     Suicide attempt ([facility] by Hx     Essential hypertension    Depression with suicidal ideation    Ileostomy in place [facility])    Crohn's disease of large intestine     Severe manic bipolar 1 disorder with psychotic behavior ([facility]         Plan:   Stable home per neurology

## 2022-03-02 NOTE — DISCHARGE SUMMARY
Discharge Summary    Date: 3/2/2022  Patient Name: Karli Quesada YOB: 1965 Age: 55 y.o. Admit Date: 2/27/2022  Discharge Date: 3/1/2022  Discharge Condition: Stable    Admission Diagnosis  Aphasia (R47.01)     Discharge Diagnosis  Principal Problem: AphasiaActive Problems: Observed seizure-like activity (HCC)Resolved Problems: * No resolved hospital problems. [facility]  Narrative of Hospital Course:  Admitted from ER with episodes of aphasia seen by neurologist MRI head and EEG were unremarkable patient was stable with no further episodes advised to continue on his depekote and no driving for 6 month     Consultants:  [address] [place] [address]    Surgeries/procedures Performed:       Treatments:           Discharge Plan/Disposition:  Home    Hospital/Incidental Findings Requiring Follow Up:    Patient Instructions:    Diet: Regular Diet    Activity:Activity as Tolerated  For number of days (if applicable): Other Instructions:    Provider Follow-Up:   No follow-ups on file. Significant Diagnostic Studies:    Recent Labs:  Admission on 02/27/2022, Discharged on 03/01/2022Glucose                                      Date: 02/27/2022Value: 93          Ref range: mg/dL              Status: FinalQC OK?                                         Date: 02/27/2022Value: ok            Status: [address] [facility]                                           Date: 02/27/2022Value: 6.7         Ref range: 4.5 - 11.5 E9/L    Status: FinalRBC                                           Date: 02/27/2022Value: 5.28        Ref range: 3.80 - 5.80 E12/L  Status: FinalHemoglobin                                    Date: 02/27/2022Value: 12.0*       Ref range: 12.5 - 16.5 g/dL   Status: FinalHematocrit                                    Date: 02/27/2022Value: 39.4        Ref range: 37.0 - 54.0 %      Status: FinalMCV                                           Date: 02/27/2022Value: 74.6*       Ref range: 80.0 - 99.9 fL     Status: [address] [place]                                           Date: 02/27/2022Value: 22.7*       Ref range: 26.0 - 35.0 pg     Status: [address]                                          Date: 02/27/2022Value: 30.5*       Ref range: 32.0 - 34.5 %      Status: FinalRDW                                           Date: 02/27/2022Value: 20.5*       Ref range: 11.5 - 15.0 fL     Status: FinalPlatelets                                     Date: 02/27/2022Value: 303         Ref range: 130 - 450 E9/L     Status: FinalMPV                                           Date: 02/27/2022Value: 9.3         Ref range: 7.0 - 12.0 fL      Status: FinalNeutrophils %                                 Date: 02/27/2022Value: 70.9        Ref range: 43.0 - 80.0 %      Status: FinalImmature Granulocytes %                       Date: 02/27/2022Value: 0.3         Ref range: 0.0 - 5.0 %        Status: FinalLymphocytes %                                 Date: 02/27/2022Value: 18.3*       Ref range: 20.0 - 42.0 %      Status: FinalMonocytes %                                   Date: 02/27/2022Value: 3.3         Ref range: 2.0 - 12.0 %       Status: FinalEosinophils %                                 Date: 02/27/2022Value: 5.7         Ref range: 0.0 - 6.0 %        Status: FinalBasophils %                                   Date: 02/27/2022Value: 1.5         Ref range: 0.0 - 2.0 %        Status: FinalNeutrophils Absolute                          Date: 02/27/2022Value: 4.71        Ref range: 1.80 - 7.30 E9/L   Status: FinalImmature Granulocytes #                       Date: 02/27/2022Value: 0.02        Ref range: E9/L               Status: FinalLymphocytes Absolute                          Date: 02/27/2022Value: 1.22*       Ref range: 1.50 - 4.00 E9/L   Status: FinalMonocytes Absolute                            Date: 02/27/2022Value: 0.22        Ref range: 0.10 - 0.95 E9/L   Status: FinalEosinophils Absolute                          Date: 02/27/2022Value: 0.38        Ref range: 0.05 - 0.50 E9/L   Status: FinalBasophils Absolute                            Date: 02/27/2022Value: 0.10        Ref range: 0.00 - 0.20 E9/L   Status: FinalAnisocytosis                                  Date: 02/27/2022Value: 2+            Status: FinalPolychromasia                                 Date: 02/27/2022Value: 1+            Status: FinalPoikilocytes                                  Date: 02/27/2022Value: 1+            Status: FinalOvalocytes                                    Date: 02/27/2022Value: 1+            Status: FinalSodium                                        Date: 02/27/2022Value: 138         Ref range: 132 - 146 mmol/L   Status: FinalPotassium                                     Date: 02/27/2022Value: 4.5         Ref range: 3.5 - 5.0 mmol/L   Status: FinalChloride                                      Date: 02/27/2022Value: 104         Ref range: 98 - 107 mmol/L    Status: FinalCO2                                           Date: 02/27/2022Value: 22          Ref range: 22 - 29 mmol/L     Status: FinalAnion Gap                                     Date: 02/27/2022Value: 12          Ref range: 7 - 16 mmol/L      Status: FinalGlucose                                       Date: 02/27/2022Value: 99          Ref range: 74 - 99 mg/dL      Status: FinalBUN                                           Date: 02/27/2022Value: 16          Ref range: 6 - 20 mg/dL       Status: FinalCREATININE                                    Date: 02/27/2022Value: 1.0         Ref range: 0.7 - 1.2 mg/dL    Status: FinalGFR Non-                      Date: 02/27/2022Value: >60         Ref range: >=60 mL/min/1.73   Status: Final              Comment: Chronic Kidney Disease: less than 60 ml/min/1.73 sq.m. Kidney Failure: less than 15 ml/min/1.73 sq. m. Results valid for patients 18 years and older. GFR                           Date: 02/27/2022Value: >60 Status: FinalCalcium                                       Date: 02/27/2022Value: 9.3         Ref range: 8.6 - 10.2 mg/dL   Status: FinalTotal Protein                                 Date: 02/27/2022Value: 6.9         Ref range: 6.4 - 8.3 g/dL     Status: FinalAlbumin                                       Date: 02/27/2022Value: 3.9         Ref range: 3.5 - 5.2 g/dL     Status: FinalTotal Bilirubin                               Date: 02/27/2022Value: <0.2        Ref range: 0.0 - 1.2 mg/dL    Status: FinalAlkaline Phosphatase                          Date: 02/27/2022Value: 108         Ref range: 40 - 129 U/L       Status: FinalALT                                           Date: 02/27/2022Value: 7           Ref range: 0 - 40 U/L         Status: FinalAST                                           Date: 02/27/2022Value: 12          Ref range: 0 - 39 U/L         Status: FinalVentricular Rate                              Date: 02/27/2022Value: 83          Ref range: BPM                Status: FinalAtrial Rate                                   Date: 02/27/2022Value: 83          Ref range: BPM                Status: FinalP-R Interval                                  Date: 02/27/2022Value: 156         Ref range: ms                 Status: FinalQRS Duration                                  Date: 02/27/2022Value: 86          Ref range: ms                 Status: FinalQ-T Interval                                  Date: 02/27/2022Value: 350         Ref range: ms                 Status: FinalQTc Calculation (Bazett)                      Date: 02/27/2022Value: 411         Ref range: ms                 Status: FinalR Axis                                        Date: 02/27/2022Value: 146         Ref range: degrees            Status: FinalT Axis                                        Date: 02/27/2022Value: 150         Ref range: degrees            Status: FinalTroponin, High Sensitivity                    Date: 02/27/2022Value: 8 Ref range: 0 - 11 ng/L        Status: Final              Comment: High Sensitivity Troponin values cannot be compared withother Troponin methodologies. Patients with high levels of Biotin oral intake (i.e. >5 mg/day)may have falsely decreased Troponin levels. Samples collectedwithin 8 hours of biotin intake may require additional informationfor diagnosis. Valproic Acid Lvl                             Date: 02/27/2022Value: 3*          Ref range: 50 - 100 mcg/mL    Status: FinalMeter Glucose                                 Date: 02/27/2022Value: 93          Ref range: 74 - 99 mg/dL      Status: FinalValproic Acid Lvl                             Date: 02/28/2022Value: 72          Ref range: 50 - 100 mcg/mL    Status: Final------------    Radiology last 7 days:  CT HEAD WO CONTRASTResult Date: 2/27/2022No acute intracranial abnormality. XR CHEST PORTABLEResult Date: 2/27/2022No acute process. MRI BRAIN W WO CONTRASTResult Date: 2/28/20221. Limited study due to prominent patient motion artifact, especially on the coronal T2 FLAIR and the post-contrast enhanced images. 2. Within this limitation, no acute abnormality is noted. 3. No mass, mass effect, edema or hemorrhage. 4.  If clinically indicated, this study may be repeated once the patient is either better able to lie still or is sedated.  RECOMMENDATIONSCreighton Idania    Discharge Medications    Discharge Medication List as of 3/1/2022 10:15 AM    Discharge Medication List as of 3/1/2022 10:15 AM    Discharge Medication List as of 3/1/2022 10:15 AMCONTINUE these medications which have NOT CHANGEDclomiPRAMINE (ANAFRANIL) 50 MG capsuleTake 50 mg by mouth 2 times dailyHistorical Medhaloperidol (HALDOL) 10 MG tabletTake 10 mg by mouth 2 times daily Historical MedDeutetrabenazine (AUSTEDO) 9 MG tabletTake 1 tablet by mouth 2 times daily, Disp-30 tablet, R-0Print!! divalproex (DEPAKOTE) 500 MG DR tabletTake 1 tablet by mouth 2 times daily *SEE OTHER ORDER**TAKE ALONG WITH [id number]*, Disp-90 tablet, R-0Print!! divalproex (DEPAKOTE) 250 MG DR tabletTake 1 tablet by mouth 2 times daily *SEE OTHER ORDER**TAKE ALONG WITH [id number]*, Disp-90 tablet, R-0Printpaliperidone (INVEGA) 9 MG extended release tabletTake 1 tablet by mouth nightly, Disp-30 tablet, R-0Printpropranolol (INDERAL) 20 MG tabletTake 20 mg by mouth 2 times dailyHistorical Medatorvastatin (LIPITOR) 10 MG tabletTake 10 mg by mouth dailyHistorical Medprazosin (MINIPRESS) 2 MG capsuleTake 2 mg by mouth 2 times dailyHistorical Med!! - Potential duplicate medications found. Please discuss with provider. Discharge Medication List as of 3/1/2022 10:15 AM    Time Spent on Discharge:3E] minutes were spent in patient examination, evaluation, counseling as well as medication reconciliation, prescriptions for required medications, discharge plan, and follow up.     Electronically signed by Robert Neumann MD on 3/2/22 at 7:45 AM EST

## 2022-03-24 ENCOUNTER — HOSPITAL ENCOUNTER (INPATIENT)
Age: 57
LOS: 3 days | Discharge: HOME | DRG: 885 | End: 2022-03-28
Attending: EMERGENCY MEDICINE | Admitting: PSYCHIATRY & NEUROLOGY
Payer: MEDICARE

## 2022-03-24 ENCOUNTER — TELEPHONE (OUTPATIENT)
Dept: FAMILY MEDICINE CLINIC | Age: 57
End: 2022-03-24

## 2022-03-24 DIAGNOSIS — R45.851 SUICIDAL IDEATION: Primary | ICD-10-CM

## 2022-03-24 LAB
ACETAMINOPHEN LEVEL: <5 MCG/ML (ref 10–30)
ALBUMIN SERPL-MCNC: 3.9 G/DL (ref 3.5–5.2)
ALP BLD-CCNC: 126 U/L (ref 40–129)
ALT SERPL-CCNC: 6 U/L (ref 0–40)
ANION GAP SERPL CALCULATED.3IONS-SCNC: 11 MMOL/L (ref 7–16)
ANISOCYTOSIS: ABNORMAL
AST SERPL-CCNC: 10 U/L (ref 0–39)
ATYPICAL LYMPHOCYTE RELATIVE PERCENT: 3.5 % (ref 0–4)
BASOPHILS ABSOLUTE: 0.07 E9/L (ref 0–0.2)
BASOPHILS RELATIVE PERCENT: 0.9 % (ref 0–2)
BILIRUB SERPL-MCNC: <0.2 MG/DL (ref 0–1.2)
BUN BLDV-MCNC: 11 MG/DL (ref 6–20)
BURR CELLS: ABNORMAL
CALCIUM SERPL-MCNC: 9.5 MG/DL (ref 8.6–10.2)
CHLORIDE BLD-SCNC: 107 MMOL/L (ref 98–107)
CO2: 24 MMOL/L (ref 22–29)
CREAT SERPL-MCNC: 1.2 MG/DL (ref 0.7–1.2)
EOSINOPHILS ABSOLUTE: 0.45 E9/L (ref 0.05–0.5)
EOSINOPHILS RELATIVE PERCENT: 6.1 % (ref 0–6)
ETHANOL: <10 MG/DL (ref 0–0.08)
GFR AFRICAN AMERICAN: >60
GFR NON-AFRICAN AMERICAN: >60 ML/MIN/1.73
GLUCOSE BLD-MCNC: 84 MG/DL (ref 74–99)
HCT VFR BLD CALC: 38.6 % (ref 37–54)
HEMOGLOBIN: 11.9 G/DL (ref 12.5–16.5)
INFLUENZA A: NOT DETECTED
INFLUENZA B: NOT DETECTED
LYMPHOCYTES ABSOLUTE: 1.78 E9/L (ref 1.5–4)
LYMPHOCYTES RELATIVE PERCENT: 20 % (ref 20–42)
MCH RBC QN AUTO: 22.8 PG (ref 26–35)
MCHC RBC AUTO-ENTMCNC: 30.8 % (ref 32–34.5)
MCV RBC AUTO: 73.9 FL (ref 80–99.9)
MONOCYTES ABSOLUTE: 0.59 E9/L (ref 0.1–0.95)
MONOCYTES RELATIVE PERCENT: 7.8 % (ref 2–12)
MYELOCYTE PERCENT: 2.6 % (ref 0–0)
NEUTROPHILS ABSOLUTE: 4.59 E9/L (ref 1.8–7.3)
NEUTROPHILS RELATIVE PERCENT: 59.1 % (ref 43–80)
OVALOCYTES: ABNORMAL
PDW BLD-RTO: 20.9 FL (ref 11.5–15)
PLATELET # BLD: 347 E9/L (ref 130–450)
PMV BLD AUTO: 9.1 FL (ref 7–12)
POIKILOCYTES: ABNORMAL
POLYCHROMASIA: ABNORMAL
POTASSIUM SERPL-SCNC: 4.2 MMOL/L (ref 3.5–5)
RBC # BLD: 5.22 E12/L (ref 3.8–5.8)
SALICYLATE, SERUM: 9.6 MG/DL (ref 0–30)
SARS-COV-2 RNA, RT PCR: NOT DETECTED
SODIUM BLD-SCNC: 142 MMOL/L (ref 132–146)
TARGET CELLS: ABNORMAL
TEAR DROP CELLS: ABNORMAL
TOTAL PROTEIN: 7 G/DL (ref 6.4–8.3)
TRICYCLIC ANTIDEPRESSANTS SCREEN SERUM: NEGATIVE NG/ML
WBC # BLD: 7.4 E9/L (ref 4.5–11.5)

## 2022-03-24 PROCEDURE — 85025 COMPLETE CBC W/AUTO DIFF WBC: CPT

## 2022-03-24 PROCEDURE — 6370000000 HC RX 637 (ALT 250 FOR IP)

## 2022-03-24 PROCEDURE — 87636 SARSCOV2 & INF A&B AMP PRB: CPT

## 2022-03-24 PROCEDURE — 80307 DRUG TEST PRSMV CHEM ANLYZR: CPT

## 2022-03-24 PROCEDURE — 80179 DRUG ASSAY SALICYLATE: CPT

## 2022-03-24 PROCEDURE — 80164 ASSAY DIPROPYLACETIC ACD TOT: CPT

## 2022-03-24 PROCEDURE — 99285 EMERGENCY DEPT VISIT HI MDM: CPT

## 2022-03-24 PROCEDURE — 36415 COLL VENOUS BLD VENIPUNCTURE: CPT

## 2022-03-24 PROCEDURE — 80053 COMPREHEN METABOLIC PANEL: CPT

## 2022-03-24 PROCEDURE — 82077 ASSAY SPEC XCP UR&BREATH IA: CPT

## 2022-03-24 PROCEDURE — 93005 ELECTROCARDIOGRAM TRACING: CPT | Performed by: EMERGENCY MEDICINE

## 2022-03-24 PROCEDURE — 80143 DRUG ASSAY ACETAMINOPHEN: CPT

## 2022-03-24 RX ORDER — LORAZEPAM 1 MG/1
2 TABLET ORAL ONCE
Status: COMPLETED | OUTPATIENT
Start: 2022-03-24 | End: 2022-03-24

## 2022-03-24 RX ADMIN — LORAZEPAM 2 MG: 1 TABLET ORAL at 20:26

## 2022-03-24 ASSESSMENT — ENCOUNTER SYMPTOMS
DIARRHEA: 0
NAUSEA: 0
ABDOMINAL DISTENTION: 0
CONSTIPATION: 0
ABDOMINAL PAIN: 0
EYE REDNESS: 0
TROUBLE SWALLOWING: 0
CHEST TIGHTNESS: 0
VOMITING: 0
RHINORRHEA: 0
BACK PAIN: 0
EYE ITCHING: 0
WHEEZING: 0
SHORTNESS OF BREATH: 0

## 2022-03-24 NOTE — ED PROVIDER NOTES
Bib Mathis is a 55 y.o. male    Chief Complaint   Patient presents with    Psychiatric Evaluation     Pt presents to ED from home via EMS for psych eval. Per report, patient was having suicidal thoughts earlier and told his wife. On arrival to ED, pt denies SI/HI. Pt cooperative during triage. HPI   Bib Mathis is a 55 y.o. male presenting to the ED for Psychiatric Evaluation (Pt presents to ED from home via EMS for psych eval. Per report, patient was having suicidal thoughts earlier and told his wife. On arrival to ED, pt denies SI/HI. Pt cooperative during triage. )    History comes primarily from the patient. Patient was brought in by EMS due to suicidal ideation. During my interview, the patient stated that he has psychiatric issues including depression and bipolar. Is on several medications. He also mentions that he has significant financial stressors including multiple 100s of thousands of dollars owed to both [facility] and [facility] ([facility] He is not complaining of any physical ailments at this time. Patient's wife is the one who states that he was suicidal and called 911. Review of Systems   Constitutional: Negative for appetite change, fatigue and fever. HENT: Negative for congestion, rhinorrhea and trouble swallowing. Eyes: Negative for redness and itching. Respiratory: Negative for chest tightness, shortness of breath and wheezing. Cardiovascular: Negative for chest pain, palpitations and leg swelling. Gastrointestinal: Negative for abdominal distention, abdominal pain, constipation, diarrhea, nausea and vomiting. Genitourinary: Negative for decreased urine volume, difficulty urinating and frequency. Musculoskeletal: Negative for arthralgias, back pain and myalgias. Neurological: Negative for dizziness, syncope, weakness, numbness and headaches. Psychiatric/Behavioral: Positive for dysphoric mood and suicidal ideas.  Negative for agitation, behavioral problems, confusion and decreased concentration. The patient is nervous/anxious. All other systems reviewed and are negative. Physical Exam  Vitals reviewed. Constitutional:       General: He is not in acute distress. Appearance: Normal appearance. He is not ill-appearing. HENT:      Head: Normocephalic and atraumatic. Nose: Nose normal. No congestion or rhinorrhea. Mouth/Throat:      Mouth: Mucous membranes are moist.      Pharynx: Oropharynx is clear. No oropharyngeal exudate or posterior oropharyngeal erythema. Eyes:      Extraocular Movements: Extraocular movements intact. Conjunctiva/sclera: Conjunctivae normal.      Pupils: Pupils are equal, round, and reactive to light. Cardiovascular:      Rate and Rhythm: Normal rate and regular rhythm. Heart sounds: Normal heart sounds. No murmur heard. Pulmonary:      Effort: Pulmonary effort is normal. No respiratory distress. Breath sounds: Normal breath sounds. Abdominal:      General: Abdomen is flat. There is no distension. Tenderness: There is no abdominal tenderness. There is no guarding. Musculoskeletal:         General: No swelling or tenderness. Normal range of motion. Cervical back: Normal range of motion. No rigidity or tenderness. Skin:     General: Skin is warm and dry. Capillary Refill: Capillary refill takes less than 2 seconds. Coloration: Skin is not jaundiced or pale. Findings: No bruising or erythema. Neurological:      General: No focal deficit present. Mental Status: He is alert and oriented to person, place, and time. Cranial Nerves: No cranial nerve deficit. Motor: No weakness. Psychiatric:         Attention and Perception: Attention and perception normal.         Mood and Affect: Mood and affect normal.         Speech: Speech normal.         Behavior: Behavior normal. Behavior is cooperative. Thought Content:  Thought content is not paranoid or delusional. Thought content includes suicidal ideation. Thought content does not include homicidal ideation. Thought content does not include homicidal or suicidal plan. Cognition and Memory: Cognition normal.          Procedures     MDM   Patient presented to the Emergency Department for Psychiatric Evaluation (Pt presents to ED from home via EMS for psych eval. Per report, patient was having suicidal thoughts earlier and told his wife. On arrival to ED, pt denies SI/HI. Pt cooperative during triage. )    They are clinically stable, vital signs stable, non toxic appearing. Patient presents for suicidal ideation has an extensive psychiatric history with suicidal ideation in the past.    Patient lab work is fairly normal with mild anemia hemoglobin of 11.9. Patient is Covid negative. Serum drug screen is negative. Patient states that he is very anxious that one point is given Ativan for his symptoms. The patient is on one-to-one observation. Urine drug screen is still pending at this time. However, patient is still cleared for medical evaluation by psychiatry and social work.         --------------------------------------------- PAST HISTORY ---------------------------------------------  Past Medical History:  has a past medical history of ADD (attention deficit disorder), Anxiety, Bipolar 1 disorder ([facility] Crohn's disease ([facility] Depression, GERD (gastroesophageal reflux disease), Panic attack, Rectal pain, Schizo affective schizophrenia ([facility] and Urethral stricture. 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 Social History:  reports that he quit smoking about a year ago. His smoking use included cigarettes. He started smoking about 44 years ago.  He has a 82.00 pack-year smoking history. He has never used smokeless tobacco. He reports that he does not drink alcohol and does not use drugs. Family History: family history includes Depression in his father; Heart Disease in his mother; Mental Illness in his brother, father, mother, and sister. The patients home medications have been reviewed.     Allergies: Aspirin, Ciprofloxacin hcl, Morphine, and Nsaids    -------------------------------------------------- RESULTS -------------------------------------------------    LABS:  Results for orders placed or performed during the hospital encounter of 03/24/22   COVID-19 & Influenza Combo    Specimen: Nasopharyngeal Swab   Result Value Ref Range    SARS-CoV-2 RNA, RT PCR NOT DETECTED NOT DETECTED    INFLUENZA A NOT DETECTED NOT DETECTED    INFLUENZA B NOT DETECTED NOT DETECTED   CBC with Auto Differential   Result Value Ref Range    WBC 7.4 4.5 - 11.5 E9/L    RBC 5.22 3.80 - 5.80 E12/L    Hemoglobin 11.9 (L) 12.5 - 16.5 g/dL    Hematocrit 38.6 37.0 - 54.0 %    MCV 73.9 (L) 80.0 - 99.9 fL    MCH 22.8 (L) 26.0 - 35.0 pg    MCHC 30.8 (L) 32.0 - 34.5 %    RDW 20.9 (H) 11.5 - 15.0 fL    Platelets 735 353 - 673 E9/L    MPV 9.1 7.0 - 12.0 fL    Neutrophils % 59.1 43.0 - 80.0 %    Lymphocytes % 20.0 20.0 - 42.0 %    Monocytes % 7.8 2.0 - 12.0 %    Eosinophils % 6.1 (H) 0.0 - 6.0 %    Basophils % 0.9 0.0 - 2.0 %    Neutrophils Absolute 4.59 1.80 - 7.30 E9/L    Lymphocytes Absolute 1.78 1.50 - 4.00 E9/L    Monocytes Absolute 0.59 0.10 - 0.95 E9/L    Eosinophils Absolute 0.45 0.05 - 0.50 E9/L    Basophils Absolute 0.07 0.00 - 0.20 E9/L    Atypical Lymphocytes Relative 3.5 0.0 - 4.0 %    Myelocyte Percent 2.6 0 - 0 %    Anisocytosis 1+     Polychromasia 1+     Poikilocytes 2+     Wendi Cells 1+     Ovalocytes 1+     Target Cells 1+     Tear Drop Cells 1+    Comprehensive Metabolic Panel   Result Value Ref Range    Sodium 142 132 - 146 mmol/L    Potassium 4.2 3.5 - 5.0 mmol/L Chloride 107 98 - 107 mmol/L    CO2 24 22 - 29 mmol/L    Anion Gap 11 7 - 16 mmol/L    Glucose 84 74 - 99 mg/dL    BUN 11 6 - 20 mg/dL    CREATININE 1.2 0.7 - 1.2 mg/dL    GFR Non-African American >60 >=60 mL/min/1.73    GFR African American >60     Calcium 9.5 8.6 - 10.2 mg/dL    Total Protein 7.0 6.4 - 8.3 g/dL    Albumin 3.9 3.5 - 5.2 g/dL    Total Bilirubin <0.2 0.0 - 1.2 mg/dL    Alkaline Phosphatase 126 40 - 129 U/L    ALT 6 0 - 40 U/L    AST 10 0 - 39 U/L   Serum Drug Screen   Result Value Ref Range    Ethanol Lvl <10 mg/dL    Acetaminophen Level <5.0 (L) 10.0 - 35.3 mcg/mL    Salicylate, Serum 9.6 0.0 - 30.0 mg/dL    TCA Scrn NEGATIVE Cutoff:300 ng/mL       RADIOLOGY:  No orders to display         ------------------------- NURSING NOTES AND VITALS REVIEWED ---------------------------  Date / Time Roomed:  3/24/2022  5:20 PM  ED Bed Assignment:  ANDREW COLE/DEENA    The nursing notes within the ED encounter and vital signs as below have been reviewed. Patient Vitals for the past 24 hrs:   BP Temp Temp src Pulse Resp SpO2 Weight   03/24/22 1728 126/86 97.4 °F (36.3 °C) Oral 89 18 98 % 180 lb (81.6 kg)       Oxygen Saturation Interpretation: Normal    ------------------------------------------ PROGRESS NOTES ------------------------------------------  Re-evaluation(s):  Time: Multiple. Patients symptoms show no change  Repeat physical examination is not changed    Counseling:  I have spoken with the patient and discussed todays results, in addition to providing specific details for the plan of care and counseling regarding the diagnosis and prognosis. Their questions are answered at this time and they are agreeable with the plan of admission.    --------------------------------- ADDITIONAL PROVIDER NOTES ---------------------------------  Consultations:  Patient to be evaluated by social work. This patient has remained hemodynamically stable during their ED course. Diagnosis:  1.  Suicidal ideation

## 2022-03-24 NOTE — TELEPHONE ENCOUNTER
----- Message from [employer] sent at 3/24/2022  2:57 PM EDT -----  Subject: Message to Provider    QUESTIONS  Information for Provider? pt. would like a callback to help w/driving   directions to the office  ---------------------------------------------------------------------------  --------------  [address]  What is the best way for the office to contact you? OK to leave message on   voicemail  Preferred Call Back Phone Number? [phone]  ---------------------------------------------------------------------------  --------------  SCRIPT ANSWERS  Relationship to Patient?  Self

## 2022-03-25 PROBLEM — F31.9 BIPOLAR 1 DISORDER (HCC): Status: RESOLVED | Noted: 2017-11-19 | Resolved: 2022-03-25

## 2022-03-25 PROBLEM — F31.9 BIPOLAR 1 DISORDER (HCC): Status: ACTIVE | Noted: 2022-03-25

## 2022-03-25 LAB
AMPHETAMINE SCREEN, URINE: NOT DETECTED
BARBITURATE SCREEN URINE: NOT DETECTED
BENZODIAZEPINE SCREEN, URINE: POSITIVE
CANNABINOID SCREEN URINE: NOT DETECTED
COCAINE METABOLITE SCREEN URINE: NOT DETECTED
EKG ATRIAL RATE: 94 BPM
EKG P AXIS: 67 DEGREES
EKG P-R INTERVAL: 140 MS
EKG Q-T INTERVAL: 352 MS
EKG QRS DURATION: 78 MS
EKG QTC CALCULATION (BAZETT): 440 MS
EKG R AXIS: 57 DEGREES
EKG T AXIS: 59 DEGREES
EKG VENTRICULAR RATE: 94 BPM
FENTANYL SCREEN, URINE: NOT DETECTED
Lab: ABNORMAL
METHADONE SCREEN, URINE: NOT DETECTED
OPIATE SCREEN URINE: NOT DETECTED
OXYCODONE URINE: NOT DETECTED
PHENCYCLIDINE SCREEN URINE: NOT DETECTED
VALPROIC ACID LEVEL: 3 MCG/ML (ref 50–100)

## 2022-03-25 PROCEDURE — 6370000000 HC RX 637 (ALT 250 FOR IP): Performed by: EMERGENCY MEDICINE

## 2022-03-25 PROCEDURE — 1240000000 HC EMOTIONAL WELLNESS R&B

## 2022-03-25 PROCEDURE — 93010 ELECTROCARDIOGRAM REPORT: CPT | Performed by: INTERNAL MEDICINE

## 2022-03-25 PROCEDURE — 99222 1ST HOSP IP/OBS MODERATE 55: CPT | Performed by: NURSE PRACTITIONER

## 2022-03-25 RX ORDER — MAGNESIUM HYDROXIDE/ALUMINUM HYDROXICE/SIMETHICONE 120; 1200; 1200 MG/30ML; MG/30ML; MG/30ML
30 SUSPENSION ORAL PRN
Status: DISCONTINUED | OUTPATIENT
Start: 2022-03-25 | End: 2022-03-28 | Stop reason: HOSPADM

## 2022-03-25 RX ORDER — HALOPERIDOL 5 MG/ML
5 INJECTION INTRAMUSCULAR EVERY 6 HOURS PRN
Status: DISCONTINUED | OUTPATIENT
Start: 2022-03-25 | End: 2022-03-28 | Stop reason: HOSPADM

## 2022-03-25 RX ORDER — HYDROXYZINE PAMOATE 50 MG/1
50 CAPSULE ORAL 3 TIMES DAILY PRN
Status: DISCONTINUED | OUTPATIENT
Start: 2022-03-25 | End: 2022-03-28 | Stop reason: HOSPADM

## 2022-03-25 RX ORDER — LANOLIN ALCOHOL/MO/W.PET/CERES
3 CREAM (GRAM) TOPICAL NIGHTLY
Status: DISCONTINUED | OUTPATIENT
Start: 2022-03-25 | End: 2022-03-28 | Stop reason: HOSPADM

## 2022-03-25 RX ORDER — ALPRAZOLAM 1 MG/1
0.5 TABLET ORAL 2 TIMES DAILY PRN
COMMUNITY

## 2022-03-25 RX ORDER — LORAZEPAM 1 MG/1
2 TABLET ORAL EVERY 6 HOURS PRN
Status: DISCONTINUED | OUTPATIENT
Start: 2022-03-25 | End: 2022-03-26

## 2022-03-25 RX ORDER — NICOTINE 21 MG/24HR
1 PATCH, TRANSDERMAL 24 HOURS TRANSDERMAL DAILY
Status: DISCONTINUED | OUTPATIENT
Start: 2022-03-25 | End: 2022-03-28 | Stop reason: HOSPADM

## 2022-03-25 RX ORDER — HALOPERIDOL 5 MG
5 TABLET ORAL EVERY 6 HOURS PRN
Status: DISCONTINUED | OUTPATIENT
Start: 2022-03-25 | End: 2022-03-28 | Stop reason: HOSPADM

## 2022-03-25 RX ORDER — ACETAMINOPHEN 325 MG/1
650 TABLET ORAL EVERY 6 HOURS PRN
Status: DISCONTINUED | OUTPATIENT
Start: 2022-03-25 | End: 2022-03-25 | Stop reason: SDUPTHER

## 2022-03-25 RX ORDER — ACETAMINOPHEN 500 MG
1000 TABLET ORAL EVERY 8 HOURS PRN
Status: DISCONTINUED | OUTPATIENT
Start: 2022-03-25 | End: 2022-03-25

## 2022-03-25 RX ORDER — DIPHENHYDRAMINE HYDROCHLORIDE 50 MG/ML
50 INJECTION INTRAMUSCULAR; INTRAVENOUS EVERY 6 HOURS PRN
Status: DISCONTINUED | OUTPATIENT
Start: 2022-03-25 | End: 2022-03-28 | Stop reason: HOSPADM

## 2022-03-25 RX ADMIN — ACETAMINOPHEN 1000 MG: 500 TABLET ORAL at 07:46

## 2022-03-25 RX ADMIN — LORAZEPAM 2 MG: 1 TABLET ORAL at 07:46

## 2022-03-25 ASSESSMENT — SLEEP AND FATIGUE QUESTIONNAIRES
AVERAGE NUMBER OF SLEEP HOURS: 5
DO YOU HAVE DIFFICULTY SLEEPING: NO
DO YOU USE A SLEEP AID: NO

## 2022-03-25 ASSESSMENT — LIFESTYLE VARIABLES: HISTORY_ALCOHOL_USE: NO

## 2022-03-25 ASSESSMENT — PAIN DESCRIPTION - LOCATION: LOCATION: BACK

## 2022-03-25 ASSESSMENT — PAIN SCALES - GENERAL
PAINLEVEL_OUTOF10: 0
PAINLEVEL_OUTOF10: 8
PAINLEVEL_OUTOF10: 8
PAINLEVEL_OUTOF10: 0

## 2022-03-25 NOTE — ED NOTES
Received report from [facility]; constant observer at bedside     Priyanka Silva, [facility]  03/25/22 0306

## 2022-03-25 NOTE — ED NOTES
VANESSA nurse will call for admission when labs are back      Arielle Latif, [employer]  03/25/22 2493

## 2022-03-25 NOTE — ED NOTES
Spoke to ED MD regarding patient CSSR-S assessment and that he continues to denies feelings of suicide. ED MD discontinued the constant observation one to one.      Jessica Velazquez RN  03/25/22 0899

## 2022-03-25 NOTE — ED NOTES
ASSIGNED [id number] TO ERIKA IN [address] [place] [address], [facility]  03/25/22 2479

## 2022-03-25 NOTE — ED NOTES
VANESSA SW attempted for several minutes to wake patient up for assessment. Patient would open eyes and try to stay awake but he fell back asleep before answering any questions. Patient has a sitter, sitter will inform RN when patient is awake and alert. VANESSA SW will also keep checking on patient.      Sergio Jung, MSW, LSW  03/25/22 0104

## 2022-03-25 NOTE — ED NOTES
Pt placed in hospital gown and bagged belongings placed in locker 29. Deric Raman given report.       Sid Pruett RN  03/25/22 3500

## 2022-03-25 NOTE — ED NOTES
Attempted to speak to the pt. The pt was unable to open his eyes. His C/O stated that he was medicated. Attempted several times to speak with the pt.      [address] [facility]  03/24/22 0991

## 2022-03-25 NOTE — ED NOTES
Debbie Velasco NP to present patient for admission. She is rounding and will return my phone call.      Gorge Salazar RN  03/25/22 9656
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
## 2022-03-26 LAB
AMORPHOUS: ABNORMAL
BACTERIA: ABNORMAL /HPF
BILIRUBIN URINE: ABNORMAL
BLOOD, URINE: NEGATIVE
CLARITY: ABNORMAL
COLOR: YELLOW
GLUCOSE URINE: NEGATIVE MG/DL
KETONES, URINE: NEGATIVE MG/DL
LEUKOCYTE ESTERASE, URINE: NEGATIVE
NITRITE, URINE: NEGATIVE
PH UA: 6 (ref 5–9)
PROTEIN UA: NEGATIVE MG/DL
RBC UA: ABNORMAL /HPF (ref 0–2)
SPECIFIC GRAVITY UA: >=1.03 (ref 1–1.03)
UROBILINOGEN, URINE: 0.2 E.U./DL
WBC UA: ABNORMAL /HPF (ref 0–5)

## 2022-03-26 PROCEDURE — 81001 URINALYSIS AUTO W/SCOPE: CPT

## 2022-03-26 PROCEDURE — 6370000000 HC RX 637 (ALT 250 FOR IP): Performed by: NURSE PRACTITIONER

## 2022-03-26 PROCEDURE — 1240000000 HC EMOTIONAL WELLNESS R&B

## 2022-03-26 PROCEDURE — 6370000000 HC RX 637 (ALT 250 FOR IP): Performed by: PSYCHIATRY & NEUROLOGY

## 2022-03-26 RX ORDER — PROPRANOLOL HYDROCHLORIDE 20 MG/1
20 TABLET ORAL 2 TIMES DAILY
Status: DISCONTINUED | OUTPATIENT
Start: 2022-03-26 | End: 2022-03-28 | Stop reason: HOSPADM

## 2022-03-26 RX ORDER — CHLORDIAZEPOXIDE HYDROCHLORIDE 25 MG/1
25 CAPSULE, GELATIN COATED ORAL 4 TIMES DAILY PRN
Status: DISCONTINUED | OUTPATIENT
Start: 2022-03-26 | End: 2022-03-28 | Stop reason: HOSPADM

## 2022-03-26 RX ORDER — DIVALPROEX SODIUM 250 MG/1
250 TABLET, DELAYED RELEASE ORAL 2 TIMES DAILY
Status: DISCONTINUED | OUTPATIENT
Start: 2022-03-26 | End: 2022-03-28 | Stop reason: HOSPADM

## 2022-03-26 RX ORDER — PRAZOSIN HYDROCHLORIDE 2 MG/1
2 CAPSULE ORAL 2 TIMES DAILY
Status: DISCONTINUED | OUTPATIENT
Start: 2022-03-26 | End: 2022-03-28 | Stop reason: HOSPADM

## 2022-03-26 RX ORDER — HALOPERIDOL 5 MG
10 TABLET ORAL 2 TIMES DAILY
Status: DISCONTINUED | OUTPATIENT
Start: 2022-03-26 | End: 2022-03-28 | Stop reason: HOSPADM

## 2022-03-26 RX ORDER — DIVALPROEX SODIUM 500 MG/1
500 TABLET, DELAYED RELEASE ORAL 2 TIMES DAILY
Status: DISCONTINUED | OUTPATIENT
Start: 2022-03-26 | End: 2022-03-28 | Stop reason: HOSPADM

## 2022-03-26 RX ADMIN — DIVALPROEX SODIUM 250 MG: 250 TABLET, DELAYED RELEASE ORAL at 12:33

## 2022-03-26 RX ADMIN — DIVALPROEX SODIUM 500 MG: 500 TABLET, DELAYED RELEASE ORAL at 12:31

## 2022-03-26 RX ADMIN — DIVALPROEX SODIUM 250 MG: 250 TABLET, DELAYED RELEASE ORAL at 22:25

## 2022-03-26 RX ADMIN — Medication 3 MG: at 22:24

## 2022-03-26 RX ADMIN — DIVALPROEX SODIUM 500 MG: 500 TABLET, DELAYED RELEASE ORAL at 22:24

## 2022-03-26 RX ADMIN — HALOPERIDOL 10 MG: 5 TABLET ORAL at 12:33

## 2022-03-26 RX ADMIN — PROPRANOLOL HYDROCHLORIDE 20 MG: 20 TABLET ORAL at 14:31

## 2022-03-26 RX ADMIN — PROPRANOLOL HYDROCHLORIDE 20 MG: 20 TABLET ORAL at 22:25

## 2022-03-26 RX ADMIN — PRAZOSIN HYDROCHLORIDE 2 MG: 2 CAPSULE ORAL at 14:30

## 2022-03-26 RX ADMIN — HALOPERIDOL 10 MG: 5 TABLET ORAL at 22:24

## 2022-03-26 RX ADMIN — PRAZOSIN HYDROCHLORIDE 2 MG: 2 CAPSULE ORAL at 22:24

## 2022-03-26 ASSESSMENT — SLEEP AND FATIGUE QUESTIONNAIRES
DO YOU USE A SLEEP AID: NO
AVERAGE NUMBER OF SLEEP HOURS: 6
DO YOU HAVE DIFFICULTY SLEEPING: NO

## 2022-03-26 ASSESSMENT — LIFESTYLE VARIABLES: HISTORY_ALCOHOL_USE: NO
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
## 2022-03-26 NOTE — PLAN OF CARE
Patient has been resting in his room all shift except for breakfast thus far. He is angry at his wife and states she lied to the police saying he was suicidal. He adamantly denies SI, HI or hallucinations. He states he has been taking all his home medications and listed them. Patient stated \"I have been out of psychiatric hospitals for suicide for over 9 months so I must be doing something right\".      Problem: Depressive Behavior With or Without Suicide Precautions:  Goal: Able to verbalize and/or display a decrease in depressive symptoms  Description: Able to verbalize and/or display a decrease in depressive symptoms  3/26/2022 1101 by Pedro Mccarty RN  Outcome: Met This Shift     Problem: Depressive Behavior With or Without Suicide Precautions:  Goal: Ability to disclose and discuss suicidal ideas will improve  Description: Ability to disclose and discuss suicidal ideas will improve  3/26/2022 1101 by Pedro Mccarty RN  Outcome: Met This Shift     Problem: Depressive Behavior With or Without Suicide Precautions:  Goal: Absence of self-harm  Description: Absence of self-harm  3/26/2022 1101 by Pedro Mccarty RN  Outcome: Met This Shift     Problem: Depressive Behavior With or Without Suicide Precautions:  Goal: Able to verbalize acceptance of life and situations over which he or she has no control  Description: Able to verbalize acceptance of life and situations over which he or she has no control  3/26/2022 1101 by Pedro Mccarty RN  Outcome: Not Met This Shift

## 2022-03-26 NOTE — CARE COORDINATION
SARA spoke with pt's wife, Mary Guaman (ILA signed). Mary Guaman states that she has no mental health concerns for this pt. Mary Guaman states that this pt is able to return home at discharge. No weapons in the home. Mary Guaman will provide transport. Mary Guaman also notified this  that this pt no longer treats at [facility]. Pt now treats with [facility].
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
## 2022-03-26 NOTE — PLAN OF CARE
Problem: Depressive Behavior With or Without Suicide Precautions:  Goal: Able to verbalize acceptance of life and situations over which he or she has no control  Description: Able to verbalize acceptance of life and situations over which he or she has no control  3/25/2022 2242 by Lashawn Vera RN  Outcome: Ongoing  3/25/2022 1419 by Carrington Leyden, RN  Outcome: Ongoing     Problem: Depressive Behavior With or Without Suicide Precautions:  Goal: Able to verbalize and/or display a decrease in depressive symptoms  Description: Able to verbalize and/or display a decrease in depressive symptoms  3/25/2022 2242 by Lashawn Vera RN  Outcome: Ongoing  3/25/2022 1419 by Carrington Leyden, RN  Outcome: Ongoing     Problem: Depressive Behavior With or Without Suicide Precautions:  Goal: Able to verbalize support systems  Description: Able to verbalize support systems  3/25/2022 2242 by Lashawn Vera RN  Outcome: Ongoing  3/25/2022 1419 by Carrington Leyden, RN  Outcome: Ongoing
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
## 2022-03-26 NOTE — PROGRESS NOTES
Patient A+Ox4, denies SI, HI, and internal stimulation. Patient rates anxiety 9/10 and depression 6/10. Patient is calm, pleasant, and cooperative with staff. Patient states that he's angry with his wife because she put him here, and he's slightly irritable about it. Patient is medication compliant and is observed resting in bed. No behaviors observed.  Staff will offer support and interventions as requested or required

## 2022-03-27 PROCEDURE — 6370000000 HC RX 637 (ALT 250 FOR IP): Performed by: PSYCHIATRY & NEUROLOGY

## 2022-03-27 PROCEDURE — 6370000000 HC RX 637 (ALT 250 FOR IP): Performed by: NURSE PRACTITIONER

## 2022-03-27 PROCEDURE — 1240000000 HC EMOTIONAL WELLNESS R&B

## 2022-03-27 PROCEDURE — 99231 SBSQ HOSP IP/OBS SF/LOW 25: CPT | Performed by: NURSE PRACTITIONER

## 2022-03-27 RX ADMIN — DIVALPROEX SODIUM 500 MG: 500 TABLET, DELAYED RELEASE ORAL at 08:58

## 2022-03-27 RX ADMIN — DIVALPROEX SODIUM 500 MG: 500 TABLET, DELAYED RELEASE ORAL at 22:55

## 2022-03-27 RX ADMIN — PROPRANOLOL HYDROCHLORIDE 20 MG: 20 TABLET ORAL at 08:59

## 2022-03-27 RX ADMIN — PRAZOSIN HYDROCHLORIDE 2 MG: 2 CAPSULE ORAL at 22:51

## 2022-03-27 RX ADMIN — HALOPERIDOL 10 MG: 5 TABLET ORAL at 08:59

## 2022-03-27 RX ADMIN — DIVALPROEX SODIUM 250 MG: 250 TABLET, DELAYED RELEASE ORAL at 22:54

## 2022-03-27 RX ADMIN — HALOPERIDOL 10 MG: 5 TABLET ORAL at 22:51

## 2022-03-27 RX ADMIN — Medication 3 MG: at 22:50

## 2022-03-27 RX ADMIN — PRAZOSIN HYDROCHLORIDE 2 MG: 2 CAPSULE ORAL at 08:59

## 2022-03-27 RX ADMIN — DIVALPROEX SODIUM 250 MG: 250 TABLET, DELAYED RELEASE ORAL at 08:58

## 2022-03-27 RX ADMIN — PROPRANOLOL HYDROCHLORIDE 20 MG: 20 TABLET ORAL at 22:48

## 2022-03-27 ASSESSMENT — PAIN SCALES - GENERAL
PAINLEVEL_OUTOF10: 0

## 2022-03-27 NOTE — GROUP NOTE
Group Therapy Note    Date: 3/27/2022    Group Start Time: 1120  Group End Time: 9930  Group Topic: Psychoeducation    [facility]    [place]                                                                        Group Therapy Note    Date: 3/27/2022    Type of Group: Psychoeducation    Wellness Binder Information  Module Name: supporting those with depression   Patient's Goal:  patient  will be able to identify how to support those with depression and tell others how he/she would like to be supported. Notes: pleasant and sharing in group, willing to take turns and share insight with others. Status After Intervention:  Improved    Participation Level:  Active Listener    Participation Quality: Appropriate, Attentive, and Sharing      Speech: normal       Thought Process/Content: Logical      Affective Functioning: Congruent      Mood: euthymic      Level of consciousness:  Alert, Oriented x4, and Attentive      Response to Learning: Able to verbalize/acknowledge new learning, Able to retain information, and Progressing to goal      Endings: None Reported    Modes of Intervention: Education, Support, Socialization, Exploration, and Problem-solving      Discipline Responsible: Psychoeducational Specialist      Signature:  Levar Santacruz

## 2022-03-27 NOTE — PLAN OF CARE
Problem: Depressive Behavior With or Without Suicide Precautions:  Goal: Able to verbalize and/or display a decrease in depressive symptoms  Description: Able to verbalize and/or display a decrease in depressive symptoms  3/27/2022 1232 by Keisha Matthews RN  Outcome: Met This Shift  3/27/2022 0139 by Sebastian Agurire RN  Outcome: Ongoing     Problem: Depressive Behavior With or Without Suicide Precautions:  Goal: Ability to disclose and discuss suicidal ideas will improve  Description: Ability to disclose and discuss suicidal ideas will improve  3/27/2022 1232 by Keisha Matthews RN  Outcome: Met This Shift  3/27/2022 0139 by Sebastian Aguirre RN  Outcome: Ongoing     Problem: Depressive Behavior With or Without Suicide Precautions:  Goal: Able to verbalize support systems  Description: Able to verbalize support systems  3/27/2022 1232 by Keisha Matthews RN  Outcome: Met This Shift  3/27/2022 0139 by Sebastian Aguirre RN  Outcome: Ongoing     Problem: Depressive Behavior With or Without Suicide Precautions:  Goal: Absence of self-harm  Description: Absence of self-harm  3/27/2022 1232 by Keisha Matthews RN  Outcome: Met This Shift  3/27/2022 0139 by Sebastian Aguirre RN  Outcome: Ongoing     Problem: Depressive Behavior With or Without Suicide Precautions:  Goal: Able to verbalize acceptance of life and situations over which he or she has no control  Description: Able to verbalize acceptance of life and situations over which he or she has no control  3/27/2022 1232 by Keisha Matthews RN  Outcome: Ongoing  3/27/2022 0139 by Sebastian Aguirre RN  Outcome: Ongoing     Patient denies SI/HI/Hallucinations, anxiety or depression. Patient told this RN \"I feel the best I have ever felt. \" Patient pleasant and cooperative during conversation. Patient observed out on the unit watching TV and socializing with peers. Patient taking prescribed medications, eating provided meals, and attending groups.

## 2022-03-27 NOTE — PROGRESS NOTES
BEHAVIORAL HEALTH FOLLOW-UP NOTE     3/27/2022     Patient was seen and examined in person, Chart reviewed   Patient's case discussed with staff/team    Chief Complaint: **I am not suicidal, this is the best I have ever done*    Interim History:     Patient is observed in the day room, he is using the phone he is social and appropriate with peers and staff. He is much brighter than normal for this patient. This patient is well-known to these providers and he presents as well above his baseline. Patient endorses that he stayed out of the hospital for greater than 8 months, he is adamant that he has not suicidal and that he had just been having a disagreement with his wife. Collateral from the wife indicates that she has no concerns for him and he may return home when discharged from the hospital.  He is taking his medications he is pleasant he has had no behavioral disturbances. Patient does have a significant psychiatric history however he appears to be functioning well above his established baseline.   Appetite:  [x] Normal/Unchanged  [] Increased  [] Decreased      Sleep:       [x] Normal/Unchanged  [] Fair       [] Poor              Energy:    [x] Normal/Unchanged  [] Increased  [] Decreased        SI [] Present  [x] Absent    HI  []Present  [x] Absent     Aggression:  [] yes  [x] no    Patient is [x] able  [] unable to CONTRACT FOR SAFETY     PAST MEDICAL/PSYCHIATRIC HISTORY:   Past Medical History:   Diagnosis Date    ADD (attention deficit disorder)     Anxiety     Bipolar 1 [facility] ([facility] 11/19/2017    Cancer ([facility] prostate cancer    2014 / treated with surgery    [facility]     Depression     GERD (gastroesophageal reflux disease)     Panic attack     Rectal pain     has a fissure    Schizo affective schizophrenia ([facility]     stable    Urethral stricture 01/12/2022       FAMILY/SOCIAL HISTORY:  Family History   Problem Relation Age of Onset    Mental Illness Mother     Mental Illness Father     Mental Illness Sister     Mental Illness Brother     Heart Disease Mother     Depression Father      Social History     Socioeconomic History    Marital status:      Spouse name: Severo Marcus Number of children: 0    Years of education: 12 +8    Highest education level: Not on file   Occupational History    Occupation: [employer]     Employer: UNEMPLOYED     Comment: SSDI   Tobacco Use    Smoking status: Former Smoker     Packs/day: 2.00     Years: 41.00     Pack years: 82.00     Types: Cigarettes     Start date: 1978     Quit date: 2021     Years since quittin.9    Smokeless tobacco: Never Used   Vaping Use    Vaping Use: Never used   Substance and Sexual Activity    Alcohol use: No     Comment: no alcohol in5 yrs    Drug use: No     Comment: last use  / marijuana    Sexual activity: Not Currently     Partners: Female   Other Topics Concern    Not on file   Social History Narrative    ** Merged History Encounter **          Social Determinants of Health     Financial Resource Strain: Low Risk     Difficulty of Paying Living Expenses: Not very hard   Food Insecurity: No Food Insecurity    Worried About Running Out of Food in the Last Year: Never true    Bryant of Food in the Last Year: Never true   Transportation Needs:     Lack of Transportation (Medical): Not on file    Lack of Transportation (Non-Medical):  Not on file   Physical Activity:     Days of Exercise per Week: Not on file    Minutes of Exercise per Session: Not on file   Stress:     Feeling of Stress : Not on file   Social Connections:     Frequency of Communication with Friends and Family: Not on file    Frequency of Social Gatherings with Friends and Family: Not on file    Attends Druze Services: Not on file    Active Member of Clubs or Organizations: Not on file    Attends Club or Organization Meetings: Not on file    Marital Status: Not on file Intimate Partner Violence:     Fear of Current or Ex-Partner: Not on file    Emotionally Abused: Not on file    Physically Abused: Not on file    Sexually Abused: Not on file   Housing Stability:     Unable to Pay for Housing in the Last Year: Not on file    Number of [place] in the Last Year: Not on file    Unstable Housing in the Last Year: Not on file           ROS:  [x] All negative/unchanged except if checked.  Explain positive(checked items) below:  [] Constitutional  [] Eyes  [] Ear/Nose/Mouth/Throat  [] Respiratory  [] CV  [] GI  []   [] Musculoskeletal  [] Skin/Breast  [] Neurological  [] Endocrine  [] Heme/Lymph  [] Allergic/Immunologic    Explanation:     MEDICATIONS:    Current Facility-Administered Medications:     divalproex (DEPAKOTE) DR tablet 250 mg, 250 mg, Oral, BID, Estanislado Cary, APRN - CNP, 250 mg at 03/27/22 0858    divalproex (DEPAKOTE) DR tablet 500 mg, 500 mg, Oral, BID, Estanislado Ananya, APRN - CNP, 500 mg at 03/27/22 7014    haloperidol (HALDOL) tablet 10 mg, 10 mg, Oral, BID, Estanislado Ananya, APRN - CNP, 10 mg at 03/27/22 0859    prazosin (MINIPRESS) capsule 2 mg, 2 mg, Oral, BID, Estanislado Cary, APRN - CNP, 2 mg at 03/27/22 0859    propranolol (INDERAL) tablet 20 mg, 20 mg, Oral, BID, Estanislado Ananya, APRN - CNP, 20 mg at 03/27/22 0859    chlordiazePOXIDE (LIBRIUM) capsule 25 mg, 25 mg, Oral, 4x Daily PRN, Jose Alfredo Hare, APRN - CNP    magnesium hydroxide (MILK OF MAGNESIA) 400 MG/5ML suspension 30 mL, 30 mL, Oral, Daily PRN, Dee Dong MD    nicotine (NICODERM CQ) 21 MG/24HR 1 patch, 1 patch, TransDERmal, Daily, Dee Dong MD, 1 patch at 03/27/22 0859    aluminum & magnesium hydroxide-simethicone (MAALOX) 200-200-20 MG/5ML suspension 30 mL, 30 mL, Oral, PRN, Dee oDng MD    hydrOXYzine (VISTARIL) capsule 50 mg, 50 mg, Oral, TID PRN, Dee Dong MD    haloperidol (HALDOL) tablet 5 mg, 5 mg, Oral, Q6H PRN **OR** haloperidol lactate (HALDOL) injection 5 mg, 5 mg, IntraMUSCular, Q6H PRN, Eliseo Menard MD    melatonin tablet 3 mg, 3 mg, Oral, Nightly, Eliseo Menard MD, 3 mg at 03/26/22 2224    diphenhydrAMINE (BENADRYL) injection 50 mg, 50 mg, IntraMUSCular, Q6H PRN, Eliseo Menard MD      Examination:  /84   Pulse 80   Temp 97.5 °F (36.4 °C) (Temporal)   Resp 16   Wt 180 lb (81.6 kg)   SpO2 100%   BMI 22.50 kg/m²   Gait - steady  Medication side effects(SE): None reported    Mental Status Examination:    Level of consciousness:  within normal limits   Appearance:  fair grooming and fair hygiene  Behavior/Motor:  no abnormalities noted  Attitude toward examiner:  cooperative  Speech:  normal rate and loud   Mood: euthymic  Affect:  mood congruent  Thought processes:  linear and goal directed   Thought content: The patient is devoid of suicidal or homicidal ideation intent or plan. Devoid of auditory or visual hallucinations or other perceptual disturbances, there are no overt or covert signs of psychosis or paranoia. There are no neurovegetative signs of depression. Cognition:  oriented to person, place, and time   Concentration intact  Insight fair   Judgement fair     ASSESSMENT:   Patient symptoms are:  [x] Well controlled  [] Improving  [] Worsening  [] No change      Diagnosis:   Principal Problem:    [facility] ([facility]  Resolved Problems:    * No resolved hospital problems.  *      LABS:    Recent Labs     03/24/22  1820   WBC 7.4   HGB 11.9*        Recent Labs     03/24/22  1820      K 4.2      CO2 24   BUN 11   CREATININE 1.2   GLUCOSE 84     Recent Labs     03/24/22  1820   BILITOT <0.2   ALKPHOS 126   AST 10   ALT 6     Lab Results   Component Value Date    LABAMPH NOT DETECTED 03/24/2022    LABAMPH NOT DETECTED 02/03/2011    BARBSCNU NOT DETECTED 03/24/2022    LABBENZ POSITIVE 03/24/2022    LABBENZ SEE BELOW 12/08/2014    CANNAB NOT DETECTED  08/13/2012    COCAINESCRN NOT DETECTED 08/13/2012    LABMETH NOT DETECTED 03/24/2022    OPIATESCREENURINE NOT DETECTED 03/24/2022    PHENCYCLIDINESCREENURINE NOT DETECTED 03/24/2022    PPXUR NOT DETECTED 08/13/2012    ETOH <10 03/24/2022     Lab Results   Component Value Date    TSH 2.240 04/23/2021     Lab Results   Component Value Date    LITHIUM <0.10 (L) 10/24/2018     Lab Results   Component Value Date    VALPROATE 3 (L) 03/24/2022       RISK ASSESSMENT:   The patient's risk factors have been mitigated, he is admitted to inpatient psych in a safe emotionally supportive environment with every 15 minute safety rounds. Patient does have risk factors including many previous psychiatric admissions and an extensive history of suicide attempt however he states that he has not been admitted psychiatrically for greater than 8 months and that he has been doing well on his medications. He has protective factors including stable housing, he is connected with an outpatient provider and has support from his family. On encounter today the patient is linear and goal-directed vehemently denies suicidal ideation intent or plan, he is at low risk for suicide. Treatment Plan:  The patient's diagnosis, treatment plan, medication management were formulated after patient was seen directly by the attending physician and myself and all relevant documentation was reviewed.     Risk, benefit, side effects, possible outcomes of the medication and alternatives discussed with the patient and the patient demonstrated understanding. The patient was also educated that the outcome of treatment will depend on the medication compliance as directed by the prescribers along with regular follow-up, compliance with the labs and other work-up, as clinically indicated.        Risk Management: Based on the diagnosis and assessment biopsychosocial treatment model was presented to the patient and was given the opportunity to ask any question.   The patient was agreeable to the plan and all the patient's questions were answered to the patient's satisfaction. I discussed with the patient the risk, benefit, alternative and common side effects for the proposed medication treatment. The patient is consenting to this treatment.      The patient was referred to outpatient/inpatient substance abuse rehabilitation programming. He was educated multiple times during the hospitalization that if he chooses to continue to use drugs or alcohol, he may potentially act out impulsively, resulting in serious harm to self or others, even though unintentional.  He was also educated that mental health treatment cannot be optimized with ongoing use of drugs. He demonstrated understanding has the capacity to understand that. Patient to medications continued. There is no identifiable need for any changes to this patient psychotropic medication regime he is well managed on his current regime and is functioning above his baseline. Collateral information: followed by social work  CD evaluation  Encourage patient to attend group and other milieu activities. Discharge planning discussed with the patient and treatment team.    PSYCHOTHERAPY/COUNSELING:  [x] Therapeutic interview  [x] Supportive  [] CBT  [] Ongoing  [] Other    [x] Patient continues to need, on a daily basis, active treatment furnished directly by or requiring the supervision of inpatient psychiatric personnel      Anticipated Length of stay: 3 - 5 days based on stability       NOTE: This report was transcribed using voice recognition software. Every effort was made to ensure accuracy; however, inadvertent computerized transcription errors may be present.     Electronically signed by KAM Johansen CNP on 3/27/2022 at 2:11 PM

## 2022-03-27 NOTE — PROGRESS NOTES
Patient A+Ox 4, denies SI, HI, and internal stimulation. Patient rates anxiety 4/10 and depression 3/10. Patient is calm, pleasant, and cooperative with staff. Observed resting in bed. Patient isolates to his room and is compliant with the medication regimen. No behaviors observed.  Staff will offer support and interventions as requested or required

## 2022-03-27 NOTE — PROGRESS NOTES
[facility]  Day 3 Interdisciplinary Treatment Plan NOTE    Review Date & Time: 3/27/22 0900    Patient was in treatment team    Estimated Length of Stay Update:  3-5 days  Estimated Discharge Date Update: 3/29/22    EDUCATION:   Learner Progress Toward Treatment Goals: Reviewed results and recommendations of this team and Reviewed goals and plan of care    Method: Small group    Outcome: Verbalized understanding    PATIENT GOALS: \"Patient  will be able to identify how to support those with depression and tell others how he/she would like to be supported. \"    PLAN/TREATMENT RECOMMENDATIONS UPDATE: Take prescribed medications, attend/participate in groups. Continue to provide emotional support to patient.     GOALS UPDATE:   Time frame for Short-Term Goals: 1-3 days      Alexandre Spain RN

## 2022-03-27 NOTE — PLAN OF CARE
Problem: Depressive Behavior With or Without Suicide Precautions:  Goal: Able to verbalize acceptance of life and situations over which he or she has no control  Description: Able to verbalize acceptance of life and situations over which he or she has no control  Outcome: Ongoing     Problem: Depressive Behavior With or Without Suicide Precautions:  Goal: Able to verbalize and/or display a decrease in depressive symptoms  Description: Able to verbalize and/or display a decrease in depressive symptoms  Outcome: Ongoing     Problem: Depressive Behavior With or Without Suicide Precautions:  Goal: Able to verbalize support systems  Description: Able to verbalize support systems  Outcome: Ongoing

## 2022-03-28 VITALS
TEMPERATURE: 97.3 F | DIASTOLIC BLOOD PRESSURE: 82 MMHG | BODY MASS INDEX: 22.5 KG/M2 | SYSTOLIC BLOOD PRESSURE: 117 MMHG | OXYGEN SATURATION: 97 % | HEART RATE: 75 BPM | RESPIRATION RATE: 16 BRPM | WEIGHT: 180 LBS

## 2022-03-28 PROCEDURE — 6370000000 HC RX 637 (ALT 250 FOR IP): Performed by: NURSE PRACTITIONER

## 2022-03-28 PROCEDURE — 6370000000 HC RX 637 (ALT 250 FOR IP): Performed by: PSYCHIATRY & NEUROLOGY

## 2022-03-28 PROCEDURE — 99239 HOSP IP/OBS DSCHRG MGMT >30: CPT | Performed by: NURSE PRACTITIONER

## 2022-03-28 RX ORDER — NICOTINE 21 MG/24HR
1 PATCH, TRANSDERMAL 24 HOURS TRANSDERMAL DAILY
Qty: 30 PATCH | Refills: 3
Start: 2022-03-29

## 2022-03-28 RX ORDER — LANOLIN ALCOHOL/MO/W.PET/CERES
3 CREAM (GRAM) TOPICAL NIGHTLY
Refills: 3 | COMMUNITY
Start: 2022-03-28 | End: 2022-04-04 | Stop reason: ALTCHOICE

## 2022-03-28 RX ADMIN — HALOPERIDOL 10 MG: 5 TABLET ORAL at 10:33

## 2022-03-28 RX ADMIN — PROPRANOLOL HYDROCHLORIDE 20 MG: 20 TABLET ORAL at 10:35

## 2022-03-28 RX ADMIN — DIVALPROEX SODIUM 250 MG: 250 TABLET, DELAYED RELEASE ORAL at 10:36

## 2022-03-28 RX ADMIN — DIVALPROEX SODIUM 500 MG: 500 TABLET, DELAYED RELEASE ORAL at 10:35

## 2022-03-28 RX ADMIN — PRAZOSIN HYDROCHLORIDE 2 MG: 2 CAPSULE ORAL at 10:34

## 2022-03-28 ASSESSMENT — PAIN SCALES - GENERAL: PAINLEVEL_OUTOF10: 0

## 2022-03-28 NOTE — CARE COORDINATION
SW confirmed pt's appointments were scheduled 3/30 and 11:20 and 12 PM at [facility] in [facility].

## 2022-03-28 NOTE — CARE COORDINATION
SARA spoke with pt to discuss discharge today. Pt stated that he is going to be returning back home with his wife and he believes that his wife will be able to pick him up upon discharge. Pt stated that he had appointments scheduled with [facility] coming up. SW informed pt that the appointments are confirmed and they will be in his discharge paperwork. Pt is alert and oriented x4. Pt is calm and cooperative. Pt's insight and judgment is improved. SARA called pt's wife [place] [phone] to discuss the discharge today. She stated that she has no questions about the discharge today. Mia stated that she will be able to pick the pt up upon discharge. SW stated the discharge process and informed wife that someone will call her when he is ready to be picked up. SW provided directions on where to pick the pt up and the RN station number to call when arrived. SARA scheduled pt's appointments with [facility] 3/30 for both counseling and medication management.      In order to ensure appropriate transition and discharge planning is in place, the following documents have been transmitted to [facility], as the new outpatient provider:     The d/c diagnosis was transmitted to the next care provider   The reason for hospitalization was transmitted to the next care provider   The d/c medications (dosage and indication) were transmitted to the next care provider    The continuing care plan was transmitted to the next care provider    Mateo Ram [address]   Phone: [phone]   Fax: [phone]

## 2022-03-28 NOTE — GROUP NOTE
Group Therapy Note    Date: 3/28/2022    Group Start Time: 1110  Group End Time: 1210  Group Topic: Psychotherapy    [facility]  [address], \[facility] [place] [facility]\""        Group Therapy Note    Attendees: 10         Patient's Goal:  To increase social interaction and improve relationships with others. Notes:  Pt was attentive in group and was able to identify an agenda. Pt was also able to verbalize relating to others within the group. Status After Intervention:  Improved    Participation Level:  Active Listener and Interactive    Participation Quality: Appropriate, Attentive and Sharing      Speech:  normal      Thought Process/Content: Logical  Linear      Affective Functioning: Congruent      Mood: anxious      Level of consciousness:  Alert, Oriented x4 and Attentive      Response to Learning: Able to verbalize current knowledge/experience, Able to verbalize/acknowledge new learning, Able to retain information and Capable of insight      Endings: None Reported    Modes of Intervention: Support, Socialization and Exploration      Discipline Responsible: /Counselor      Signature:  FABRIZIO Ray, [place]

## 2022-03-28 NOTE — PROGRESS NOTES
Patient A+Ox4 denies SI, HI and internal stimulation. Patient rates anxiety 9/10 and depression 4/10. Patient is calm, pleasant, and cooperative with staff. Flat affect but does brighten with conversation. Observed resting in bed. Patient isolates to his room and is compliant with the medication regimen. No behaviors observed.  Staff will offer support and interventions as requested or required

## 2022-03-28 NOTE — DISCHARGE SUMMARY
DISCHARGE SUMMARY      Patient ID:  Shirley Liu  [id number]  24 y.o.  1965    Admit date: 3/24/2022    Discharge date and time: 3/28/2022    Admitting Physician: Rock Diego MD     Discharge Physician: Dr Lillie Jarrett MD    Discharge Diagnoses:   Patient Active Problem List   Diagnosis    Drug overdose, multiple drugs    Suicide attempt [facility])    Laceration of neck    Laceration of left wrist    GERD (gastroesophageal reflux disease)    Opioid overdose ([address]    Drug-induced tremor    Orchitis    H/O carcinoma in situ of prostate    Essential hypertension    PND (post-nasal drip)    Scrotal abscess    Depression with suicidal ideation    Colitis    Acute Crohn's disease with rectal bleeding ([address]    Opiate abuse, episodic ([address]    LILLY (acute kidney injury) ([address]    Intra-abdominal abscess ([address]    Ileostomy in place ([address]    Crohn's disease of large intestine with abscess ([address]    Severe manic bipolar 1 disorder with psychotic behavior ([address]    Drug overdose, intentional self-harm, initial encounter ([address]    Mood disorder ([address]    Bipolar affective disorder, current episode manic without psychotic symptoms ([address]    Encounter by telehealth for suspected COVID-19    Aphasia    Observed seizure-like activity ([address]    Bipolar 1 disorder ([address]       Admission Condition: poor    Discharged Condition: stable    Admission Circumstance:   Patient is a 56 yo male presenting to the ED by EMS for psych eval, Pt presents to ED from home via EMS for psych eval. Per report, patient was having suicidal thoughts earlier and told his wife. Though he vehemently denies this. Patient pink slipped in the ED.       PAST MEDICAL/PSYCHIATRIC HISTORY:   Past Medical History:   Diagnosis Date    ADD (attention deficit disorder)     Anxiety     Bipolar 1 disorder ([address] 11/19/2017    Cancer ([address] prostate cancer    2014 / treated with surgery    Crohn's disease ([address]     Depression     GERD (gastroesophageal reflux disease)     Panic attack     Rectal pain     has a fissure    Schizo affective schizophrenia ([facility]     stable    Urethral stricture 2022       FAMILY/SOCIAL HISTORY:  Family History   Problem Relation Age of Onset    Mental Illness Mother     Mental Illness Father     Mental Illness Sister     Mental Illness Brother     Heart Disease Mother     Depression Father      Social History     Socioeconomic History    Marital status:      Spouse name: Ramona Tom Number of children: 0    Years of education: 12 +8    Highest education level: Not on file   Occupational History    Occupation: [employer]     Employer: UNEMPLOYED     Comment: SSDI   Tobacco Use    Smoking status: Former Smoker     Packs/day: 2.00     Years: 41.00     Pack years: 82.00     Types: Cigarettes     Start date: 1978     Quit date: 2021     Years since quittin.9    Smokeless tobacco: Never Used   Vaping Use    Vaping Use: Never used   Substance and Sexual Activity    Alcohol use: No     Comment: no alcohol in5 yrs    Drug use: No     Comment: last use  / marijuana    Sexual activity: Not Currently     Partners: Female   Other Topics Concern    Not on file   Social History Narrative    ** Merged History Encounter **          Social Determinants of Health     Financial Resource Strain: Low Risk     Difficulty of Paying Living Expenses: Not very hard   Food Insecurity: No Food Insecurity    Worried About Running Out of Food in the Last Year: Never true    Bryant of Food in the Last Year: Never true   Transportation Needs:     Lack of Transportation (Medical): Not on file    Lack of Transportation (Non-Medical):  Not on file   Physical Activity:     Days of Exercise per Week: Not on file    Minutes of Exercise per Session: Not on file   Stress:     Feeling of Stress : Not on file   Social Connections:     Frequency of Communication with Friends and Family: Not on file    Frequency of Social Gatherings with Friends and Family: Not on file    Attends Jewish Services: Not on file    Active Member of Clubs or Organizations: Not on file    Attends Club or Organization Meetings: Not on file    Marital Status: Not on file   Intimate Partner Violence:     Fear of Current or Ex-Partner: Not on file    Emotionally Abused: Not on file    Physically Abused: Not on file    Sexually Abused: Not on file   Housing Stability:     Unable to Pay for Housing in the Last Year: Not on file    Number of [place] in the Last Year: Not on file    Unstable Housing in the Last Year: Not on file       MEDICATIONS:    Current Facility-Administered Medications:     divalproex (DEPAKOTE) DR tablet 250 mg, 250 mg, Oral, BID, Blaine Willams APRN - CNP, 250 mg at 03/28/22 1036    divalproex (DEPAKOTE) DR tablet 500 mg, 500 mg, Oral, BID, Blaine Willams, APRN - CNP, 500 mg at 03/28/22 1035    haloperidol (HALDOL) tablet 10 mg, 10 mg, Oral, BID, Blaine Willams APRN - CNP, 10 mg at 03/28/22 1033    prazosin (MINIPRESS) capsule 2 mg, 2 mg, Oral, BID, Lbaine Imer, APRN - CNP, 2 mg at 03/28/22 1034    propranolol (INDERAL) tablet 20 mg, 20 mg, Oral, BID, Blaine Imer, APRN - CNP, 20 mg at 03/28/22 1035    chlordiazePOXIDE (LIBRIUM) capsule 25 mg, 25 mg, Oral, 4x Daily PRN, KAM Brown CNP    magnesium hydroxide (MILK OF MAGNESIA) 400 MG/5ML suspension 30 mL, 30 mL, Oral, Daily PRN, Dylan Vivar MD    nicotine (NICODERM CQ) 21 MG/24HR 1 patch, 1 patch, TransDERmal, Daily, Dylan Vivar MD, 1 patch at 03/28/22 1035    aluminum & magnesium hydroxide-simethicone (MAALOX) 200-200-20 MG/5ML suspension 30 mL, 30 mL, Oral, PRN, Dylan Vivar MD    hydrOXYzine (VISTARIL) capsule 50 mg, 50 mg, Oral, TID PRN, Dylan Vivar MD    haloperidol (HALDOL) tablet 5 mg, 5 mg, Oral, Q6H PRN **OR** haloperidol lactate (HALDOL) injection 5 mg, 5 mg, IntraMUSCular, Q6H PRN, Camryn Garcia MD    melatonin tablet 3 mg, 3 mg, Oral, Nightly, Camryn Garcia MD, 3 mg at 03/27/22 2250    diphenhydrAMINE (BENADRYL) injection 50 mg, 50 mg, IntraMUSCular, Q6H PRN, Camryn Garcia MD    Examination:  /82   Pulse 75   Temp 97.3 °F (36.3 °C) (Temporal)   Resp 16   Wt 180 lb (81.6 kg)   SpO2 97%   BMI 22.50 kg/m²   Gait - steady    HOSPITAL COURSE[de-identified]  Following admission to the hospital, patient had a complete physical exam and blood work up, which he was medically cleared and admitted to [facility] for psychiatric evaluation and stabilization. The patient was monitored closely with suicide and appropriate precautions. He was continued on his home medication regime as there was no acute need for any medication adjustments on this admission. He was encouraged to participate in group and other milieu activity and started to feel better with this combination of treatment. There has been significant progress in the improvement of symptoms since admission. The patient has been an active participant in his treatment, and discharge planning. Patient was no longer suicidal, homicidal, manic or psychotic. He received the required treatment with medication, participated in group milieu, remained engaged in unit activities, learned appropriate coping skills. He was seen to be watching television socializing with peers using the phone. There were no mention or gestures of self-harm or harm to others. His mental status has returned to baseline. The treatment team believes the patient obtain maximum benefit out of this hospitalization and does not meet the criteria for inpatient hospitalization anymore. However he will continue to benefit from outpatient follow-up and treatment to maintain stability. Collateral information has been obtained and reconciled and there are no concerns about his safety. He has no access to guns or weapons.   He appreciates the help that he received here.  This patient no longer meets criteria for inpatient hospitalization. He was discharged home to his family in psychiatrically stable condition. Appetite:  [x] Normal  [] Increased  [] Decreased    Sleep:       [x] Normal  [] Fair       [] Poor            Energy:    [x] Normal  [] Increased  [] Decreased     SI [] Present  [x] Absent  HI  []Present  [x] Absent   Aggression:  [] yes  [] no  Patient is [x] able  [] unable to CONTRACT FOR SAFETY   Medication side effects(SE):  [x] None(Psych. Meds.) [] Other      Mental Status Examination on discharge:    Level of consciousness:  within normal limits   Appearance:  well-appearing  Behavior/Motor:  no abnormalities noted  Attitude toward examiner:  attentive and good eye contact  Speech:  spontaneous, normal rate and normal volume   Mood: euthymic  Affect:  mood congruent  Thought processes:  linear and goal directed   Thought content: The patient is devoid of suicidal or homicidal ideation intent or plan. Devoid of auditory or visual hallucinations or other perceptual disturbances, there are no overt or covert signs of psychosis or paranoia. There are no neurovegetative signs of depression. Cognition:  oriented to person, place, and time   Concentration intact  Memory intact  Insight good   Judgement fair   Fund of Knowledge adequate      ASSESSMENT:  Patient symptoms are:  [x] Well controlled  [x] Improving  [] Worsening  [] No change    Reason for more than one antipsychotic:  [x] N/A  [] 3 Failed Monotherapy attempts (Drugs tried:)  [] Crossover to a new antipsychotic  [] Taper to Monotherapy from Polypharmacy  [] Augmentation of clozapine therapy due to treatment resistance to single therapy    Diagnosis:  Principal Problem:    B[facility]  Resolved Problems:    * No resolved hospital problems. *      LABS:    No results for input(s): WBC, HGB, PLT in the last 72 hours.   No results for input(s): NA, K, CL, CO2, BUN, CREATININE, GLUCOSE in the last 72 hours. No results for input(s): BILITOT, ALKPHOS, AST, ALT in the last 72 hours. Lab Results   Component Value Date    LABAMPH NOT DETECTED 03/24/2022    LABAMPH NOT DETECTED 02/03/2011    BARBSCNU NOT DETECTED 03/24/2022    LABBENZ POSITIVE 03/24/2022    LABBENZ SEE BELOW 12/08/2014    CANNAB NOT DETECTED  08/13/2012    COCAINESCRN NOT DETECTED 08/13/2012    LABMETH NOT DETECTED 03/24/2022    OPIATESCREENURINE NOT DETECTED 03/24/2022    PHENCYCLIDINESCREENURINE NOT DETECTED 03/24/2022    PPXUR NOT DETECTED 08/13/2012    ETOH <10 03/24/2022     Lab Results   Component Value Date    TSH 2.240 04/23/2021     Lab Results   Component Value Date    LITHIUM <0.10 (L) 10/24/2018     Lab Results   Component Value Date    VALPROATE 3 (L) 03/24/2022       RISK ASSESSMENT AT DISCHARGE: Low risk for suicide and homicide. Treatment Plan:  The patient's diagnosis, treatment plan, medication management were formulated after patient was seen directly by the attending physician and myself and all relevant documentation was reviewed. Risk, benefit, side effects, possible outcomes of the medication and alternatives discussed with the patient and the patient demonstrated understanding. The patient was also educated that the outcome of treatment will depend on the medication compliance as directed by the prescribers along with regular follow-up, compliance with the labs and other work-up, as clinically indicated. Encourage patient to attend outpatient follow up appointment and therapy, outpatient follow-up appointments have been scheduled prior to discharge. Patient was advised to call the outpatient provider, visit the nearest ED or call 911 if symptoms are not manageable. Patient's family member was contacted prior to the discharge, patient has been discharged home in psychiatrically stable condition on this encounter the patient was functioning at higher than his average baseline. Medication List      START taking these medications    melatonin 3 MG Tabs tablet  Take 1 tablet by mouth nightly     nicotine 21 MG/24HR  Commonly known as: [address] [facility] 1 patch onto the skin daily  Start taking on: March 29, 2022        CHANGE how you take these medications    Deutetrabenazine 9 MG tablet  Commonly known as: AUSTEDO  Take 1 tablet by mouth 2 times daily  What changed: how much to take        CONTINUE taking these medications    ALPRAZolam 1 MG tablet  Commonly known as: XANAX     atorvastatin 10 MG tablet  Commonly known as: LIPITOR     clomiPRAMINE 50 MG capsule  Commonly known as: ANAFRANIL     * divalproex 500 MG DR tablet  Commonly known as: DEPAKOTE  Take 1 tablet by mouth 2 times daily *SEE OTHER ORDER*  *TAKE ALONG WITH 484MM=914TG*     * divalproex 250 MG DR tablet  Commonly known as: DEPAKOTE  Take 1 tablet by mouth 2 times daily *SEE OTHER ORDER*  *TAKE ALONG WITH 041MH=738HS*     haloperidol 10 MG tablet  Commonly known as: HALDOL     prazosin 2 MG capsule  Commonly known as: MINIPRESS     propranolol 20 MG tablet  Commonly known as: INDERAL         * This list has 2 medication(s) that are the same as other medications prescribed for you. Read the directions carefully, and ask your doctor or other care provider to review them with you. STOP taking these medications    paliperidone 9 MG extended release tablet  Commonly known as: INVEGA           Where to Get Your Medications      You can get these medications from any pharmacy    You don't need a prescription for these medications  · melatonin 3 MG Tabs tablet     Information about where to get these medications is not yet available    Ask your nurse or doctor about these medications  · nicotine 21 MG/24HR     NOTE: This report was transcribed using voice recognition software. Every effort was made to ensure accuracy; however, inadvertent computerized transcription errors may be present.     TIME SPEND - 35 MINUTES TO COMPLETE THE EVALUATION, DISCHARGE SUMMARY, MEDICATION RECONCILIATION AND FOLLOW UP CARE     Signed:  KAM Carr CNP  3/28/2022  11:49 AM

## 2022-03-28 NOTE — GROUP NOTE
Group Therapy Note    Date: 3/28/2022    Group Start Time: 1000  Group End Time: 5274  Group Topic: Psychoeducation    [facility]    Chloé Murdock, CTRS        Group Therapy Note      Number of participants: 8  Type of group: Psychoeducation  Mode of intervention: Education, Support, Socialization, Exploration, Clarifying, and Problem-solving  Topic: Strengths Exploration   Objective: Pt will identify 3 strengths to utilize in recovery. Patient's Goal:  \"be more positive\"     Notes:  Pt was interactive during group sharing 3 strengths to utilize in recovery. Pt gave support and feedback to others. Status After Intervention:  Improved    Participation Level:  Active Listener and Interactive    Participation Quality: Appropriate, Attentive, Sharing and Supportive      Speech:  normal      Thought Process/Content: Logical      Affective Functioning: Congruent      Mood: euthymic      Level of consciousness:  Alert, Oriented x4 and Attentive      Response to Learning: Able to verbalize current knowledge/experience, Able to verbalize/acknowledge new learning, Able to retain information, Capable of insight, Able to change behavior and Progressing to goal      Endings: None Reported    Modes of Intervention: Education, Support, Socialization, Exploration, Clarifying and Problem-solving

## 2022-03-28 NOTE — PROGRESS NOTES
Pt denies SI HI and hallucinations. Pt is pleasant, bright, cheerful. Pt is out on the unit social with staff and peers. Expresses needs appropriately. Pt rated anxiety and depression 8 d/t being nervous to go home as he stated \"I make my wife nervous and I agitate her because I constantly talk to her. Even when she leaves the room I have to follow her and talk to her and it annoys her and makes her mad. I don't know why I do that\". Pt is medication compliant. Pt is eating provided meals. Pt is attending and participating in groups. No aggression or behaviors. No voiced paranoia or delusions. We will continue to provide support and comfort for the patient.

## 2022-03-28 NOTE — PLAN OF CARE
Problem: Depressive Behavior With or Without Suicide Precautions:  Goal: Able to verbalize acceptance of life and situations over which he or she has no control  Description: Able to verbalize acceptance of life and situations over which he or she has no control  3/27/2022 2354 by Ramsey Goldberg, RN  Outcome: Ongoing  3/27/2022 1232 by Radha Nation RN  Outcome: Ongoing     Problem: Depressive Behavior With or Without Suicide Precautions:  Goal: Able to verbalize and/or display a decrease in depressive symptoms  Description: Able to verbalize and/or display a decrease in depressive symptoms  3/27/2022 2354 by Ramsey Goldberg, RN  Outcome: Ongoing  3/27/2022 1232 by Radha Nation RN  Outcome: Met This Shift     Problem: Depressive Behavior With or Without Suicide Precautions:  Goal: Able to verbalize support systems  Description: Able to verbalize support systems  3/27/2022 2354 by Ramsey Goldberg, RN  Outcome: Ongoing  3/27/2022 1232 by Radha Nation RN  Outcome: Met This Shift

## 2022-03-28 NOTE — PLAN OF CARE
Problem: Depressive Behavior With or Without Suicide Precautions:  Goal: Able to verbalize acceptance of life and situations over which he or she has no control  Description: Able to verbalize acceptance of life and situations over which he or she has no control  3/28/2022 1336 by Cristofer Ojeda RN  Outcome: Met This Shift     Problem: Depressive Behavior With or Without Suicide Precautions:  Goal: Ability to disclose and discuss suicidal ideas will improve  Description: Ability to disclose and discuss suicidal ideas will improve  3/28/2022 1336 by Cristofer Ojeda RN  Outcome: Met This Shift     Problem: Depressive Behavior With or Without Suicide Precautions:  Goal: Able to verbalize support systems  Description: Able to verbalize support systems  3/28/2022 1336 by Cristofer Ojeda RN  Outcome: Met This Shift     Problem: Depressive Behavior With or Without Suicide Precautions:  Goal: Absence of self-harm  Description: Absence of self-harm  3/28/2022 1336 by Cristofer Ojeda RN  Outcome: Met This Shift     Problem: Depressive Behavior With or Without Suicide Precautions:  Goal: Participates in care planning  Description: Participates in care planning  3/28/2022 1336 by Cristofer Ojeda RN  Outcome: Met This Shift

## 2022-03-28 NOTE — PROGRESS NOTES
[facility]  Discharge Note    Pt discharged with followings belongings:   Dental Appliances: None  Vision - Corrective Lenses: Glasses  Hearing Aid: None  Jewelry: None  Clothing: Footwear,Pants,Shirt,Socks  Were All Patient Medications Collected?: Not Applicable  Other Valuables: None   Valuables sent home with patient or returned to patient. Patient education on aftercare instructions: Yes  Information reviewed with and copy provided to patient by Sofi Durant RN  at 1:51 PM .Patient verbalize understanding of AVS:  Yes.     Status EXAM upon discharge:  Status and Exam  Normal: No  Facial Expression: Flat (Brightens with conversation)  Affect: Congruent  Level of Consciousness: Alert  Mood:Normal: No  Mood: Depressed,Anxious  Motor Activity:Normal: Yes  Motor Activity:  (Normal)  Interview Behavior: Cooperative  Preception: Gibbs to Person,Gibbs to Time,Gibbs to Place,Gibbs to Situation  Attention:Normal: No  Attention: Distractible  Thought Processes: Circumstantial  Thought Content:Normal: No  Thought Content: Preoccupations  Hallucinations: None  Delusions: No  Memory:Normal: No  Memory: Poor Recent  Insight and Judgment: No  Insight and Judgment: Poor Judgment,Poor Insight  Present Suicidal Ideation: No  Present Homicidal Ideation: No      Metabolic Screening:    Lab Results   Component Value Date    LABA1C 5.7 (H) 04/24/2021       Lab Results   Component Value Date    CHOL 165 04/24/2021    CHOL 320 (H) 07/22/2019    CHOL 188 10/08/2018     Lab Results   Component Value Date    TRIG 251 (H) 04/24/2021    TRIG 148 07/22/2019    TRIG 171 (H) 10/08/2018     Lab Results   Component Value Date    HDL 43 04/24/2021    HDL 46 07/22/2019    HDL 52 10/08/2018    HDL 67 06/27/2018     No components found for: LDLCAL  Lab Results   Component Value Date    LABVLDL 50 04/24/2021    LABVLDL 30 07/22/2019    LABVLDL 34 10/08/2018    LABVLDL 58 06/27/2018       Cely Hardy RN

## 2022-04-04 ENCOUNTER — OFFICE VISIT (OUTPATIENT)
Dept: FAMILY MEDICINE CLINIC | Age: 57
End: 2022-04-04
Payer: MEDICARE

## 2022-04-04 VITALS
HEIGHT: 75 IN | TEMPERATURE: 98.3 F | WEIGHT: 186.4 LBS | BODY MASS INDEX: 23.18 KG/M2 | SYSTOLIC BLOOD PRESSURE: 136 MMHG | RESPIRATION RATE: 18 BRPM | DIASTOLIC BLOOD PRESSURE: 92 MMHG | HEART RATE: 104 BPM | OXYGEN SATURATION: 99 %

## 2022-04-04 DIAGNOSIS — C61 PROSTATE CANCER (HCC): ICD-10-CM

## 2022-04-04 DIAGNOSIS — Z13.1 SCREENING FOR DIABETES MELLITUS: ICD-10-CM

## 2022-04-04 DIAGNOSIS — E78.49 OTHER HYPERLIPIDEMIA: ICD-10-CM

## 2022-04-04 DIAGNOSIS — I10 ESSENTIAL HYPERTENSION: ICD-10-CM

## 2022-04-04 DIAGNOSIS — E83.10 DISORDER OF IRON METABOLISM, UNSPECIFIED: ICD-10-CM

## 2022-04-04 DIAGNOSIS — Z00.00 ENCOUNTER FOR MEDICAL EXAMINATION TO ESTABLISH CARE: Primary | ICD-10-CM

## 2022-04-04 LAB — HBA1C MFR BLD: 5.7 % (ref 4–5.6)

## 2022-04-04 PROCEDURE — 99204 OFFICE O/P NEW MOD 45 MIN: CPT | Performed by: FAMILY MEDICINE

## 2022-04-04 PROCEDURE — 1111F DSCHRG MED/CURRENT MED MERGE: CPT | Performed by: FAMILY MEDICINE

## 2022-04-04 PROCEDURE — 3017F COLORECTAL CA SCREEN DOC REV: CPT | Performed by: FAMILY MEDICINE

## 2022-04-04 PROCEDURE — 4004F PT TOBACCO SCREEN RCVD TLK: CPT | Performed by: FAMILY MEDICINE

## 2022-04-04 PROCEDURE — G8420 CALC BMI NORM PARAMETERS: HCPCS | Performed by: FAMILY MEDICINE

## 2022-04-04 PROCEDURE — G8427 DOCREV CUR MEDS BY ELIG CLIN: HCPCS | Performed by: FAMILY MEDICINE

## 2022-04-04 RX ORDER — ATORVASTATIN CALCIUM 10 MG/1
10 TABLET, FILM COATED ORAL DAILY
Qty: 30 TABLET | Refills: 2 | Status: SHIPPED
Start: 2022-04-04 | End: 2022-04-05

## 2022-04-04 RX ORDER — LURASIDONE HYDROCHLORIDE 20 MG/1
TABLET, FILM COATED ORAL
COMMUNITY
Start: 2022-03-30

## 2022-04-04 RX ORDER — DEUTETRABENAZINE 6 MG/1
TABLET, COATED ORAL
COMMUNITY
Start: 2022-03-05 | End: 2022-04-04 | Stop reason: SDUPTHER

## 2022-04-04 RX ORDER — TRAMADOL HYDROCHLORIDE 50 MG/1
TABLET ORAL
COMMUNITY
Start: 2022-01-14 | End: 2022-04-04

## 2022-04-04 RX ORDER — PROPRANOLOL HYDROCHLORIDE 20 MG/1
20 TABLET ORAL 2 TIMES DAILY
Qty: 60 TABLET | Refills: 2 | Status: SHIPPED | OUTPATIENT
Start: 2022-04-04

## 2022-04-04 ASSESSMENT — COLUMBIA-SUICIDE SEVERITY RATING SCALE - C-SSRS
1. WITHIN THE PAST MONTH, HAVE YOU WISHED YOU WERE DEAD OR WISHED YOU COULD GO TO SLEEP AND NOT WAKE UP?: NO
2. HAVE YOU ACTUALLY HAD ANY THOUGHTS OF KILLING YOURSELF?: NO
6. HAVE YOU EVER DONE ANYTHING, STARTED TO DO ANYTHING, OR PREPARED TO DO ANYTHING TO END YOUR LIFE?: NO

## 2022-04-04 ASSESSMENT — PATIENT HEALTH QUESTIONNAIRE - PHQ9
SUM OF ALL RESPONSES TO PHQ QUESTIONS 1-9: 13
2. FEELING DOWN, DEPRESSED OR HOPELESS: 0
9. THOUGHTS THAT YOU WOULD BE BETTER OFF DEAD, OR OF HURTING YOURSELF: 0
6. FEELING BAD ABOUT YOURSELF - OR THAT YOU ARE A FAILURE OR HAVE LET YOURSELF OR YOUR FAMILY DOWN: 1
SUM OF ALL RESPONSES TO PHQ QUESTIONS 1-9: 13
1. LITTLE INTEREST OR PLEASURE IN DOING THINGS: 0
8. MOVING OR SPEAKING SO SLOWLY THAT OTHER PEOPLE COULD HAVE NOTICED. OR THE OPPOSITE, BEING SO FIGETY OR RESTLESS THAT YOU HAVE BEEN MOVING AROUND A LOT MORE THAN USUAL: 2
7. TROUBLE CONCENTRATING ON THINGS, SUCH AS READING THE NEWSPAPER OR WATCHING TELEVISION: 1
10. IF YOU CHECKED OFF ANY PROBLEMS, HOW DIFFICULT HAVE THESE PROBLEMS MADE IT FOR YOU TO DO YOUR WORK, TAKE CARE OF THINGS AT HOME, OR GET ALONG WITH OTHER PEOPLE: 1
SUM OF ALL RESPONSES TO PHQ QUESTIONS 1-9: 13
SUM OF ALL RESPONSES TO PHQ QUESTIONS 1-9: 13
5. POOR APPETITE OR OVEREATING: 3
3. TROUBLE FALLING OR STAYING ASLEEP: 3
SUM OF ALL RESPONSES TO PHQ9 QUESTIONS 1 & 2: 0
4. FEELING TIRED OR HAVING LITTLE ENERGY: 3

## 2022-04-04 ASSESSMENT — LIFESTYLE VARIABLES
HOW OFTEN DO YOU HAVE A DRINK CONTAINING ALCOHOL: NEVER
HOW MANY STANDARD DRINKS CONTAINING ALCOHOL DO YOU HAVE ON A TYPICAL DAY: PATIENT DECLINED

## 2022-04-04 NOTE — PROGRESS NOTES
HPI:  The patient is a 55 y.o. male who presents today to establish care. Previous PCP was Dr. Duglas Willingham. Hypertension:   Patient is here for follow up chronic hypertension. Patient is  compliant with lifestyle modifications. Patient is not well controlled. Patient denies chest pain, diaphoresis, dyspnea, dyspnea on exertion, peripheral edema, palpitations, HA, visual issues. Cardiovascular risk factors: advanced age (older than 54 for men, 72 for women), dyslipidemia, hypertension and male gender. Patient does smoke. Is currently on propranolol. Taking as prescribed. No adverse effects. Hyperlipidemia:  Patient presents with hyperlipidemia. Is asymptomatic. This is a chronic problem. Patient is  well controlled, as reviewed and seen on most recent labs. Is currently on lipitor. Compliance with treatment thus far has been fair. No adverse effects. The patient does not use medications that may worsen dyslipidemias (corticosteroids, progestins, anabolic steroids, diuretics, beta-blockers, amiodarone, cyclosporine, olanzapine). The patient exercises intermittently. Patient is  a smoker.       Past Medical History:   Diagnosis Date    ADD (attention deficit disorder)     Anxiety     Bipolar 1 disorder ([facility] 11/19/2017    Cancer ([address] prostate cancer    2014 / treated with surgery    Crohn's disease ([address]     Depression     GERD (gastroesophageal reflux disease)     Hyperlipidemia     Panic attack     Primary hypertension     Prostate cancer ([address]     Rectal pain     has a fissure    Schizo affective schizophrenia ([facility]     stable    Urethral stricture 01/12/2022      Past Surgical History:   Procedure Laterality Date    COLONOSCOPY      COLONOSCOPY N/A 01/28/2020    COLONOSCOPY WITH BIOPSY performed by Siobhan Cunningham MD at [facility], DIAGNOSTIC      INCISION AND DRAINAGE N/A 05/15/2019    I & D SCROTAL ABSCESS WITH EXPLANTATION ARTIFICIAL URINARY SPHINCTER COMPLEX URETHROPLASTY performed by Danie Dowd DO at [facility] SURGERY  2002? deviated septum    PICC LINE INSERTION NURSE  10/03/2020         PROSTATECTOMY  09/15/2014    laparoscopic robotic assisted.  PROSTATECTOMY  2004      Family History   Problem Relation Age of Onset    Mental Illness Mother     Heart Disease Mother     Schizophrenia Mother     Schizophrenia Father     Mental Illness Father     Depression Father     Mental Illness Sister     Lupus Sister     No Known Problems Sister     No Known Problems Sister     Mental Illness Brother     Schizophrenia Brother      Social History     Socioeconomic History    Marital status:      Spouse name: Blas Duff Number of children: 0    Years of education: 12 +8    Highest education level: Not on file   Occupational History    Occupation: [employer]     Employer: UNEMPLOYED     Comment: SSDI   Tobacco Use    Smoking status: Current Every Day Smoker     Packs/day: 4.00     Years: 41.00     Pack years: 164.00     Types: Cigarettes     Start date: 1/1/1978    Smokeless tobacco: Never Used    Tobacco comment: Currently cutting back on smoking   Vaping Use    Vaping Use: Never used   Substance and Sexual Activity    Alcohol use: No     Comment: no alcohol in5 yrs    Drug use: No     Comment: last use 1997 / marijuana    Sexual activity: Not Currently     Partners: Female   Other Topics Concern    Not on file   Social History Narrative    ** Merged History Encounter **          Social Determinants of Health     Financial Resource Strain: Low Risk     Difficulty of Paying Living Expenses: Not very hard   Food Insecurity: No Food Insecurity    Worried About Running Out of Food in the Last Year: Never true    Bryant of Food in the Last Year: Never true   Transportation Needs:     Lack of Transportation (Medical): Not on file    Lack of Transportation (Non-Medical):  Not on file   Physical Activity:     Days of Exercise per Week: Not on file    Minutes of Exercise per Session: Not on file   Stress:     Feeling of Stress : Not on file   Social Connections:     Frequency of Communication with Friends and Family: Not on file    Frequency of Social Gatherings with Friends and Family: Not on file    Attends Confucianism Services: Not on file    Active Member of Clubs or Organizations: Not on file    Attends Club or Organization Meetings: Not on file    Marital Status: Not on file   Intimate Partner Violence:     Fear of Current or Ex-Partner: Not on file    Emotionally Abused: Not on file    Physically Abused: Not on file    Sexually Abused: Not on file   Housing Stability:     Unable to Pay for Housing in the Last Year: Not on file    Number of [place] in the Last Year: Not on file    Unstable Housing in the Last Year: Not on file      Allergies   Allergen Reactions    Aspirin Other (See Comments)     Chron's    Ciprofloxacin Hcl Nausea And Vomiting and Other (See Comments)     constipation    Morphine Other (See Comments)     \"it does nothing for me\"    Nsaids Other (See Comments)     Per pt's physician Dipak Powers) he is not to take NSAIDS due to HX of Crohn's disease.         Review of Systems:  Constitutional:  No fever, no fatigue, no chills, no headaches, no weight change  Dermatology:  No rash, no mole, no dry or sensitive skin  ENT:  No cough, no sore throat, no sinus pain, no runny nose, no ear pain  Cardiology:  No chest pain, no palpitations, no leg edema, no shortness of breath, no PND  Endocrinology:  No polydipsia, no polyuria, no cold intolerance, no heat intolerance, no polyphagia, no hair changes  Gastroenterology:  No dysphagia, no abdominal pain, no nausea, no vomiting, no constipation, no diarrhea, no heartburn  Male Reproductive:  No penile discharge, no dysuria  Musculoskeletal:  No joint pain, no leg cramps, no back pain, no muscle aches  Respiratory:  No shortness of breath, no orthopnea, no wheezing, no NUR, no hemoptysis  Urology:  No blood in the urine, no urinary frequency, no urinary incontinence, no urinary urgency, no nocturia, no dysuria  Neurology:  No numbness/tingling, no dizziness, no weakness  Psychology:  + depression, no sleep disturbances, no suicidal ideation, + anxiety      Vitals:    04/04/22 1450 04/04/22 1521   BP: (!) 126/92 (!) 136/92   Pulse: 104    Resp: 18    Temp: 98.3 °F (36.8 °C)    SpO2: 99%    Weight: 186 lb 6.4 oz (84.6 kg)    Height: 6' 3\" (1.905 m)        Physical Exam  Vitals and nursing note reviewed. Constitutional:       Appearance: He is well-developed. HENT:      Head: Normocephalic and atraumatic. Right Ear: External ear normal.      Left Ear: External ear normal.      Nose: Nose normal.   Eyes:      Conjunctiva/sclera: Conjunctivae normal.      Pupils: Pupils are equal, round, and reactive to light. Neck:      Thyroid: No thyromegaly. Cardiovascular:      Rate and Rhythm: Normal rate and regular rhythm. Heart sounds: Normal heart sounds. Pulmonary:      Effort: Pulmonary effort is normal.      Breath sounds: Normal breath sounds. No wheezing. Abdominal:      General: Bowel sounds are normal.      Palpations: Abdomen is soft. Tenderness: There is no abdominal tenderness. Musculoskeletal:         General: Normal range of motion. Cervical back: Normal range of motion and neck supple. Skin:     General: Skin is warm and dry. Findings: No rash. Neurological:      Mental Status: He is alert and oriented to person, place, and time. Deep Tendon Reflexes: Reflexes are normal and symmetric. Psychiatric:         Behavior: Behavior normal.         Assessment/Plan:      Jennifer Ramirez was seen today for new patient.     Diagnoses and all orders for this visit:    Encounter for medical examination to establish care    Prostate cancer ([facility]  -     PSA, DIAGNOSTIC; Future  No longer follows with guardian verbalizes understanding and agrees with above counseling, assessment and plan. All questions answered. Sidney Mohamud DO  4/4/2022    I have personally reviewed and updated the chief complaint, HPI, Past Medical, Family and Social History, as well as the above Review of Systems.

## 2022-04-04 NOTE — PATIENT INSTRUCTIONS
Patient Education        DASH Diet: Care Instructions  Your Care Instructions     The DASH diet is an eating plan that can help lower your blood pressure. DASH stands for Dietary Approaches to Stop Hypertension. Hypertension is high bloodpressure. The DASH diet focuses on eating foods that are high in calcium, potassium, and magnesium. These nutrients can lower blood pressure. The foods that are highest in these nutrients are fruits, vegetables, low-fat dairy products, nuts, seeds, and legumes. But taking calcium, potassium, and magnesium supplements instead of eating foods that are high in those nutrients does not have the same effect. The DASH diet also includes whole grains, fish, and poultry. The DASH diet is one of several lifestyle changes your doctor may recommend to lower your high blood pressure. Your doctor may also want you to decrease the amount of sodium in your diet. Lowering sodium while following the DASH dietcan lower blood pressure even further than just the DASH diet alone. Follow-up care is a key part of your treatment and safety. Be sure to make and go to all appointments, and call your doctor if you are having problems. It's also a good idea to know your test results and keep alist of the medicines you take. How can you care for yourself at home? Following the DASH diet   Eat 4 to 5 servings of fruit each day. A serving is 1 medium-sized piece of fruit, ½ cup chopped or canned fruit, 1/4 cup dried fruit, or 4 ounces (½ cup) of fruit juice. Choose fruit more often than fruit juice.  Eat 4 to 5 servings of vegetables each day. A serving is 1 cup of lettuce or raw leafy vegetables, ½ cup of chopped or cooked vegetables, or 4 ounces (½ cup) of vegetable juice. Choose vegetables more often than vegetable juice.  Get 2 to 3 servings of low-fat and fat-free dairy each day. A serving is 8 ounces of milk, 1 cup of yogurt, or 1 ½ ounces of cheese.  Eat 6 to 8 servings of grains each day.  A low-fat dip as an appetizer instead of chips and dip. ? Sprinkle sunflower seeds or chopped almonds over salads. Or try adding chopped walnuts or almonds to cooked vegetables. ? Try some vegetarian meals using beans and peas. Add garbanzo or kidney beans to salads. Make burritos and tacos with mashed mcnamara beans or black beans. Where can you learn more? Go to https://[employer].Omegawave. org and sign in to your [employer] account. Enter Q436 in the [employer] box to learn more about \"DASH Diet: Care Instructions. \"     If you do not have an account, please click on the \"Sign Up Now\" link. Current as of: January 10, 2022               Content Version: 13.2  © 2275-1968 Healthwise, Incorporated. Care instructions adapted under license by [facility] ([facility]). If you have questions about a medical condition or this instruction, always ask your healthcare professional. [address] any warranty or liability for your use of this information.

## 2022-04-05 DIAGNOSIS — E78.49 OTHER HYPERLIPIDEMIA: ICD-10-CM

## 2022-04-05 LAB
ALBUMIN SERPL-MCNC: 4.4 G/DL (ref 3.5–5.2)
ALP BLD-CCNC: 147 U/L (ref 40–129)
ALT SERPL-CCNC: 9 U/L (ref 0–40)
ANION GAP SERPL CALCULATED.3IONS-SCNC: 13 MMOL/L (ref 7–16)
AST SERPL-CCNC: 14 U/L (ref 0–39)
BILIRUB SERPL-MCNC: <0.2 MG/DL (ref 0–1.2)
BUN BLDV-MCNC: 13 MG/DL (ref 6–20)
CALCIUM SERPL-MCNC: 10 MG/DL (ref 8.6–10.2)
CHLORIDE BLD-SCNC: 102 MMOL/L (ref 98–107)
CHOLESTEROL, TOTAL: 267 MG/DL (ref 0–199)
CO2: 23 MMOL/L (ref 22–29)
CREAT SERPL-MCNC: 1.1 MG/DL (ref 0.7–1.2)
GFR AFRICAN AMERICAN: >60
GFR NON-AFRICAN AMERICAN: >60 ML/MIN/1.73
GLUCOSE BLD-MCNC: 97 MG/DL (ref 74–99)
HDLC SERPL-MCNC: 67 MG/DL
LDL CHOLESTEROL CALCULATED: 175 MG/DL (ref 0–99)
POTASSIUM SERPL-SCNC: 5.3 MMOL/L (ref 3.5–5)
PROSTATE SPECIFIC ANTIGEN: <0.01 NG/ML (ref 0–4)
SODIUM BLD-SCNC: 138 MMOL/L (ref 132–146)
TOTAL PROTEIN: 7.9 G/DL (ref 6.4–8.3)
TRIGL SERPL-MCNC: 123 MG/DL (ref 0–149)
VLDLC SERPL CALC-MCNC: 25 MG/DL

## 2022-04-05 RX ORDER — ATORVASTATIN CALCIUM 20 MG/1
10 TABLET, FILM COATED ORAL DAILY
Qty: 30 TABLET | Refills: 2 | Status: SHIPPED | OUTPATIENT
Start: 2022-04-05

## 2022-04-19 NOTE — PROGRESS NOTES
Admit Date: 3/14/2021    Subjective:     Feels fine comfortable no pain     Objective:     Patient Vitals for the past 8 hrs:   BP Temp Temp src Pulse Resp SpO2 Weight   03/16/21 0410       198 lb 8 oz (90 kg)   03/16/21 0115 123/81 97.6 °F (36.4 °C) Oral 94 17 96 %      I/O last 3 completed shifts:  In: -   Out: 800 [Urine:150; Stool:650]  I/O this shift:  In: -   Out: 200 [Stool:200]    HEENT: Normal  NECK: Thyroid normal. No carotid bruit. No lymphphadenopathy. CVS: RRR  RS: Clear. No wheeze. No rhonchi. Good airflow bilaterally. ABD: Soft. Non tender. No mass. Normal BS.ileostomy herniated   EXT: No edema. Non tender. Pulses present.    NEURO:no focal deficit       Scheduled Meds:   atorvastatin  10 mg Oral Daily    clomiPRAMINE  50 mg Oral Daily    divalproex  500 mg Oral TID     Continuous Infusions:   sodium chloride 100 mL/hr at 03/16/21 0017       CBC with Differential:    Lab Results   Component Value Date    WBC 9.0 03/14/2021    RBC 4.10 03/14/2021    HGB 12.3 03/14/2021    HCT 37.7 03/14/2021     03/14/2021    MCV 92.0 03/14/2021    MCH 30.0 03/14/2021    MCHC 32.6 03/14/2021    RDW 14.9 03/14/2021    NRBC 0.0 01/25/2021    BANDSPCT 1 09/17/2014    METASPCT 1.7 04/02/2020    LYMPHOPCT 15.2 03/14/2021    PROMYELOPCT 0.9 01/25/2021    MONOPCT 9.3 03/14/2021    MYELOPCT 0.9 01/25/2021    BASOPCT 0.6 03/14/2021    MONOSABS 0.83 03/14/2021    LYMPHSABS 1.36 03/14/2021    EOSABS 0.36 03/14/2021    BASOSABS 0.05 03/14/2021     CMP:    Lab Results   Component Value Date     03/14/2021    K 5.1 03/14/2021    K 4.7 03/03/2021     03/14/2021    CO2 23 03/14/2021    BUN 25 03/14/2021    CREATININE 1.1 03/14/2021    GFRAA >60 03/14/2021    LABGLOM >60 03/14/2021    PROT 6.1 03/14/2021    LABALBU 3.3 03/14/2021    LABALBU 3.5 02/05/2011    CALCIUM 8.7 03/14/2021    BILITOT <0.2 03/14/2021    ALKPHOS 126 03/14/2021    AST 25 03/14/2021    ALT 8 03/14/2021     PT/INR:    Lab Results Component Value Date    PROTIME 12.5 03/14/2021    PROTIME 11.5 02/05/2011    INR 1.2 03/14/2021       Assessment:     Active Problems:    Syncope and collapse    Prolapse ileostomy    Chron's    Depression       Plan:   Stable for discharge Mohs Case Number:

## 2023-01-17 NOTE — CONSULTS
Department of Podiatry   Consult Note        Reason for Consult: right foot fractures    CHIEF COMPLAINT: Right foot fracture    HISTORY OF PRESENT ILLNESS:      This patient is a 45-year-old male nondiabetic with a history of long-time smoking. Report to the emergency room after experiencing a syncopal episode yesterday where he did lose consciousness and fell. He reports he has had been having repeated episodes over the past month and says this is secondary to a change in his amlodipine medication which is caused him to feel lightheaded when he stands up. Podiatry has been consulted for evaluation of fractures to his right foot. Earlier this month he was also seen in the emergency room after another fall and was diagnosed with several metatarsal fractures on his left foot. The left foot has been being treated conservatively with a cam boot which he is wearing on his encounter. Overall he says he feels fine otherwise. Reports no nausea, fever, chills, shortness of breath, chest pain diarrhea. No other pedal complaints. Past Medical History:        Diagnosis Date    ADD (attention deficit disorder)     Anxiety     Bipolar 1 disorder ([address] 11/19/2017    Cancer ([address] prostate cancer    2014 / treated with surgery    Crohn's disease ([address]     Depression     GERD (gastroesophageal reflux disease)     Panic attack     Rectal pain     has a fissure    Schizo affective schizophrenia ([address]     stable   ·     Past Surgical History:        Procedure Laterality Date    COLONOSCOPY      COLONOSCOPY N/A 1/28/2020    COLONOSCOPY WITH BIOPSY performed by Adriane Khan MD at [facility], COLON, DIAGNOSTIC      INCISION AND DRAINAGE N/A 5/15/2019    I & D SCROTAL ABSCESS WITH EXPLANTATION ARTIFICIAL URINARY SPHINCTER COMPLEX URETHROPLASTY performed by Griselda Hurdle Memo, DO at [facility] SURGERY  2002?     deviated septum    PICC LINE INSERTION NURSE  10/3/2020         PROSTATECTOMY 9/15/2014    laparoscopic robotic assisted. ·     Medications Prior to Admission:    · Medications Prior to Admission: Misc. Devices (WALKER) MISC, 1 each by Does not apply route daily DX- left foot injury  · clomiPRAMINE (ANAFRANIL) 25 MG capsule, Take 25 mg by mouth 2 times daily  · amLODIPine (NORVASC) 10 MG tablet, Take 1 tablet by mouth daily  · atorvastatin (LIPITOR) 10 MG tablet, Take 1 tablet by mouth daily  · chlorproMAZINE (THORAZINE) 25 MG tablet, Take 100 mg by mouth 4 times daily   · divalproex (DEPAKOTE) 500 MG DR tablet, Take 1 tablet by mouth every 12 hours (Patient taking differently: Take 500 mg by mouth 3 times daily )    Allergies:  Ciprofloxacin hcl, Morphine, and Nsaids    Social History:   · TOBACCO:   reports that he has been smoking cigarettes. He started smoking about 43 years ago. He has been smoking about 1.00 pack per day. He has never used smokeless tobacco.  · ETOH:   reports no history of alcohol use. DRUGS:   Social History     Substance and Sexual Activity   Drug Use No    Comment: last use 1997 / marijuana   ·     Family History:       Problem Relation Age of Onset    Mental Illness Mother     Mental Illness Father     Mental Illness Sister     Mental Illness Brother     Heart Disease Mother     Depression Father    ·     REVIEW OF SYSTEMS: All pertinent positives and negatives as noted in the HPI    LOWER EXTREMITY EXAMINATION     VASCULAR:  DP and PT pulses are palpable. CFT < 5 seconds B/L. Warm to warm from the tibial tuberosity to the distal aspect of the digits dorsally. Hair growth noted to the distal aspects dorsally. NEUROLOGIC:  Protective sensation is diminished by grossly intact    DERM:  There is diffuse edema present to the right distal right forefoot. There are several ecchymotic patches just proximal to the bases of toes 234 and 5 in the right fifth toe does appear largely ecchymotic.   There is also an ecchymotic patch located plantarly within the medial arch of the foot and also laterally along the fifth metatarsal area of left foot. To the left foot there is pain to palpation upon metatarsal heads 3 4 and 5. MUSCULOSKELETAL: Second toe of the right foot appears deviated and rotated into valgus. There is some lateral deviation of the third toe as well. Compartments of the right lower extremity remain soft and compressible bilaterally. Laure Saner' sign negative bilaterally. Patient does demonstrate ability to activate all pedal muscle groups but strength is limited secondary to pain. CONSULTS:  IP CONSULT TO INTERNAL MEDICINE  IP CONSULT TO PODIATRY    MEDICATION:  Scheduled Meds:   atorvastatin  10 mg Oral Daily    chlorproMAZINE  100 mg Oral 4x Daily    clomiPRAMINE  25 mg Oral BID     Continuous Infusions:   sodium chloride 100 mL/hr at 01/26/21 1055     PRN Meds:. RADIOLOGY:  CTA PULMONARY W CONTRAST   Final Result   1. No evidence of pulmonary embolism or acute pulmonary abnormality. 2.  Few cysts associated with left kidney. Few of the cysts are hyperdense   as seen on prior unenhanced CT abdomen. Ultrasound could be helpful for   further characterization and follow-up. XR FOOT RIGHT (MIN 3 VIEWS)   Final Result   Mildly displaced and angulated fractures at the distal end/neck of the 2nd,   3rd, and 4th metatarsals. XR KNEE RIGHT (1-2 VIEWS)   Final Result   No fracture or dislocation. Joint spaces are intact.           Vitals:    /87   Pulse 92   Temp 98.2 °F (36.8 °C) (Oral)   Resp 16   Ht 6' 3\" (1.905 m)   Wt 198 lb (89.8 kg)   SpO2 96%   BMI 24.75 kg/m²     LABS:   Recent Labs     01/25/21  1821   WBC 9.8   HGB 13.8   HCT 43.7        Recent Labs     01/25/21  1821      K 4.4      CO2 28   BUN 26*   CREATININE 1.2     Recent Labs     01/25/21  1821   PROT 6.7       ASSESSMENTS:   -Mildly displaced fractures of metatarsal heads 2, 3 and 4 right foot  -Conservatively managed fractures of metatarsal necks 3, 4 and 5 left foot   -Pain in right foot  -PAD         PLAN:  -Patient seen and evaluated  -Charts and labs reviewed  -Overall appears to be medically stable at this time  -Radiographs obtained in the ED of right foot demonstrate mildly displaced fractures of the metatarsal heads 2/3/4 at the level of the metatarsal necks.   -Will obtain updated left foot radiographs   -No plans for surgical intervention. Will treat right and left feet conservatively. -PT consult placed  -Will need a walker upon discharge  -May WBAT on right and left feet with surgical shoes on both   -Ultram 50mg q6h prn pain   -Follow up with Dr. Mary Fajardo in outpatient setting for continued fracture management    Discussed with Dr. Mary Fajardo    Thank you for the opportunity to take part in the patient's care. Please do not hesitate to call for any questions or concerns. 1

## 2023-06-27 NOTE — PROGRESS NOTES
[facility] Quality Flow/Interdisciplinary Rounds Progress Note        Quality Flow Rounds held on October 25, 2018    Disciplines Attending:  Bedside Nurse, ,  and Nursing Unit Keyona Morejon was admitted on 10/24/2018  3:37 PM    Anticipated Discharge Date:  Expected Discharge Date: 10/27/18    Disposition:    Andre Score:  Andre Scale Score: 23    Readmission Risk              Risk of Unplanned Readmission:        19           Discussed patient goal for the day, patient clinical progression, and barriers to discharge.   The following Goal(s) of the Day/Commitment(s) have been identified:  Await cultures, check id plan      Heather Drummond  October 25, 2018 [facility] Pregnancy And Lactation Text: This medication is contraindicated in pregnancy and when breast feeding.

## 2024-05-20 NOTE — PROGRESS NOTES
[facility] PROGRESS NOTE                                                                       10/25/2018        CHIEF UROLOGIC COMPLAINT: Prostate cancer, TRISTIN, UTI, cellulitis    HISTORY OF PRESENT ILLNESS:  Patient without new complaints. No fevers overnight. Feeling better. Swelling down.       REVIEW OF SYSTEMS:   CONSTITUTIONAL: negative  HEENT: negative  HEMATOLOGIC: negative  ENDOCRINE: negative  RESPIRATORY: negative  CV: negative  GI: negative  NEURO: negative  ORTHOPEDICS: negative  PSYCHIATRIC: negative  : as above    PAST FAMILY HISTORY:    Family History   Problem Relation Age of Onset    Mental Illness Mother     Mental Illness Father     Mental Illness Sister     Mental Illness Brother     Heart Disease Mother     Depression Father      PAST SOCIAL HISTORY:    Social History     Social History    Marital status:      Spouse name: ZENAIDA    Number of children: 0    Years of education: 12     Occupational History    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     [employer]     Social History Main Topics    Smoking status: Former Smoker     Packs/day: 2.00     Types: Cigarettes     Quit date: 11/3/2017    Smokeless tobacco: Never Used      Comment: QUIT 5 WEEKS AGO    Alcohol use No      Comment: no alcohol in 20 yrs    Drug use: No      Comment: last use 1997 / marijuana    Sexual activity: No     Other Topics Concern    None     Social History Narrative    ** Merged History Encounter **            Scheduled Meds:   enoxaparin  30 mg Subcutaneous Daily    ALPRAZolam  1 mg Oral 4x Daily    divalproex  500 mg Oral BID    ferrous sulfate  325 mg Oral Daily with breakfast    fluticasone  1 spray Nasal Daily    lurasidone  80 mg Oral Nightly    propranolol  10 mg Oral BID    PARoxetine  10 mg Oral Daily    linezolid  600 mg Intravenous Q12H    cefepime  1 All other review of systems negative, except as noted in HPI

## 2024-10-13 NOTE — PROGRESS NOTES
DATE OF SERVICE:     7/26/2019    Kevin Prom seen today for the purpose of continuation of care. Nursing, social work reports, laboratory studies and vital signs are reviewed. Patient chief complaint today is: not depressed or anxious            [] Depression      [] Anxiety        [] Psychosis         [] Suicidal/Homicidal                         [] Delusions           [] Aggression          Subjective: Today patient states he is still tired. Denies depression and anxiety. Pleasant and cooperative. Signed voluntary. Denies SI, HI, and AVH. Med compliant and going to groups. Sleep:  [x] Good [] Fair  [] Poor  Appetite:  [x] Good [] Fair  [] Poor    Depression:  [] Mild [] Moderate [] Severe                [] Constant [] Sporadic     Anxiety: [] Mild [] Moderate [] Severe    [] Constant [] Sporadic     Delusions: [] Mild [] Moderate [] Severe     [] Constant [] Sporadic     [] Paranoid [] Somatic [] Grandiose     Hallucinations: [] Mild [] Moderate [] Severe     [] Constant [] Sporadic    [] Auditory  [] Visual [] Tactile       Suicidal: [] Constant [] Sporadic  Homicidal: [] Constant [] Sporadic    Unscheduled Medications     [] Patient Receiving Emergency Medications \" Chemical Restraint\"   [] Requesting PRN medications for anxiety    Medical Review of Systems:     All other than marked systmes have been reviewed and are all negative.     Constitutional Symptoms: []  fever []  Chills  Skin Symptoms: [] rash []  Pruritus   Eye Symptoms: [] Vision unchanged []  recent vision problems[] blurred vision   Respiratory Symptoms:[] shortness of breath [] cough  Cardiovascular Symptoms:  [] chest pain   [] palpitations   Gastrointestinal Symptoms: []  abdominal pain []  nausea []  vomiting []  diarrhea  Genitourinary Symptoms: []  dysuria  []  hematuria   Musculoskeletal Symptoms: []  back pain []  muscle pain []  joint pain  Neurologic Symptoms: []  headache []  dizziness  Hematolymphoid Symptoms: [] polypharmacy  [] Augmentation of Clozapine therapy due to treatment resistance to single therapy      Signed:  Heath Jackson  7/26/2019  4:34 PM Refer to the Assessment tab to view/cancel completed assessment.

## (undated) DEVICE — CONTAINER SPEC 60ML PH 7NEUTRAL BUFF FRMLN RDY TO USE

## (undated) DEVICE — Z DUP USE 2139333 GUIDEWIRE UROLOGICAL STR STD 0.035 IN X150 CM REG ZIPWIRE LF

## (undated) DEVICE — SWAB SPEC COLL SHFT L5.25IN POLYUR FOAM TIP SFT DBL MEDIA

## (undated) DEVICE — GAUZE,SPONGE,4"X4",8PLY,STRL,LF,10/TRAY: Brand: MEDLINE

## (undated) DEVICE — BAG SPECIMEN BIOHAZARD 6IN X 9IN

## (undated) DEVICE — SYRINGE,TOOMEY,IRRIGATION,70CC,STERILE: Brand: MEDLINE

## (undated) DEVICE — HOSE CONN FOR WST MGMT SYS NEPTUNE 2

## (undated) DEVICE — Device

## (undated) DEVICE — DOUBLE BASIN SET: Brand: MEDLINE INDUSTRIES, INC.

## (undated) DEVICE — SUTURE STRATAFIX SPRL MCRYL + SZ 2 0 L27IN ABSRB UD W NDL SXMP1B419

## (undated) DEVICE — Y-TYPE TUR/BLADDER IRRIGATION SET, REGULATING CLAMP

## (undated) DEVICE — SPONGE LAP W18XL18IN WHT COT 4 PLY FLD STRUNG RADPQ DISP ST

## (undated) DEVICE — PACK PROCEDURE SURG GEN CUST

## (undated) DEVICE — FORCEPS BX L240CM JAW DIA2.4MM WRK CHN 2.8MM ORNG L CAP W/

## (undated) DEVICE — GOWN,SIRUS,FABRNF,XL,20/CS: Brand: MEDLINE

## (undated) DEVICE — E-Z CLEAN, NON-STICK, PTFE COATED, ELECTROSURGICAL NEEDLE ELECTRODE, MODIFIED EXTENDED INSULATION, 2.75 INCH (7 CM): Brand: MEGADYNE

## (undated) DEVICE — STANDARD HYPODERMIC NEEDLE,POLYPROPYLENE HUB: Brand: MONOJECT

## (undated) DEVICE — DEFENDO AIR WATER SUCTION AND BIOPSY VALVE KIT FOR  OLYMPUS: Brand: DEFENDO AIR/WATER/SUCTION AND BIOPSY VALVE

## (undated) DEVICE — PATIENT RETURN ELECTRODE, SINGLE-USE, CONTACT QUALITY MONITORING, ADULT, WITH 9FT CORD, FOR PATIENTS WEIGING OVER 33LBS. (15KG): Brand: MEGADYNE

## (undated) DEVICE — GOWN,SIRUS,FABRNF,L,20/CS: Brand: MEDLINE

## (undated) DEVICE — READY WET SKIN SCRUB TRAY-LF: Brand: MEDLINE INDUSTRIES, INC.

## (undated) DEVICE — SUPPORT SCROT L AD L39-44IN SFT KNIT PCH SUSP WAISTBAND

## (undated) DEVICE — TOWEL,OR,DSP,ST,BLUE,STD,6/PK,12PK/CS: Brand: MEDLINE

## (undated) DEVICE — CANNULA NSL ORAL AD FOR CAPNOFLEX CO2 O2 AIRLFE

## (undated) DEVICE — CATHETER URETH 20FR BLLN 5CC SIL F INDWL 2 W SHT LEN STD

## (undated) DEVICE — PAD,NON-ADHERENT,2X3,STERILE,LF,1/PK: Brand: MEDLINE

## (undated) DEVICE — EXTRAS GU

## (undated) DEVICE — CONNECTOR TBNG AUX H2O JET DISP FOR OLY 160/180 SER

## (undated) DEVICE — SPONGE,LAP,4"X18",XR,ST,5/PK,40PK/CS: Brand: MEDLINE INDUSTRIES, INC.

## (undated) DEVICE — CATHETER URETH 20FR BLLN 5CC STD LTX HYDRGEL 2 W F BARDX

## (undated) DEVICE — CONTAINER VACUTAINER ANAER CULTURE SWAB

## (undated) DEVICE — GAUZE,SPONGE,POST-OP,4X3,STRL,LF: Brand: MEDLINE

## (undated) DEVICE — SHEET, T, LAPAROTOMY, STERILE: Brand: MEDLINE

## (undated) DEVICE — CATHETER F BLLN 5CC 16FR 2 W HYDRGEL COAT LESS TRAUM LUB

## (undated) DEVICE — 4-PORT MANIFOLD: Brand: NEPTUNE 2